# Patient Record
Sex: FEMALE | Race: WHITE | Employment: OTHER | URBAN - METROPOLITAN AREA
[De-identification: names, ages, dates, MRNs, and addresses within clinical notes are randomized per-mention and may not be internally consistent; named-entity substitution may affect disease eponyms.]

---

## 2019-05-10 ENCOUNTER — HOSPITAL ENCOUNTER (OUTPATIENT)
Dept: MAMMOGRAPHY | Age: 59
Discharge: HOME | End: 2019-05-10
Attending: NURSE PRACTITIONER
Payer: MEDICARE

## 2019-05-10 DIAGNOSIS — Z12.31 VISIT FOR SCREENING MAMMOGRAM: ICD-10-CM

## 2019-05-10 PROCEDURE — 77063 BREAST TOMOSYNTHESIS BI: CPT

## 2020-05-13 ENCOUNTER — APPOINTMENT (OUTPATIENT)
Dept: GENERAL RADIOLOGY | Age: 60
DRG: 570 | End: 2020-05-13
Attending: INTERNAL MEDICINE
Payer: MEDICARE

## 2020-05-13 ENCOUNTER — APPOINTMENT (OUTPATIENT)
Dept: ULTRASOUND IMAGING | Age: 60
DRG: 570 | End: 2020-05-13
Attending: FAMILY MEDICINE
Payer: MEDICARE

## 2020-05-13 ENCOUNTER — HOSPITAL ENCOUNTER (EMERGENCY)
Age: 60
Discharge: ANOTHER HOSPITAL | DRG: 570 | End: 2020-05-13
Attending: EMERGENCY MEDICINE | Admitting: FAMILY MEDICINE
Payer: MEDICARE

## 2020-05-13 ENCOUNTER — HOSPITAL ENCOUNTER (INPATIENT)
Age: 60
LOS: 9 days | Discharge: NURSING, REHAB OR LONG-TERM CARE | DRG: 570 | End: 2020-05-22
Attending: FAMILY MEDICINE | Admitting: INTERNAL MEDICINE
Payer: MEDICARE

## 2020-05-13 ENCOUNTER — APPOINTMENT (OUTPATIENT)
Dept: GENERAL RADIOLOGY | Age: 60
DRG: 570 | End: 2020-05-13
Attending: EMERGENCY MEDICINE
Payer: MEDICARE

## 2020-05-13 VITALS
WEIGHT: 97 LBS | BODY MASS INDEX: 15.22 KG/M2 | HEIGHT: 67 IN | SYSTOLIC BLOOD PRESSURE: 130 MMHG | DIASTOLIC BLOOD PRESSURE: 60 MMHG | RESPIRATION RATE: 16 BRPM | TEMPERATURE: 97.6 F | HEART RATE: 81 BPM | OXYGEN SATURATION: 96 %

## 2020-05-13 DIAGNOSIS — E86.0 DEHYDRATION: ICD-10-CM

## 2020-05-13 DIAGNOSIS — K80.50 CHOLEDOCHOLITHIASIS: ICD-10-CM

## 2020-05-13 DIAGNOSIS — E46 PROTEIN-CALORIE MALNUTRITION, UNSPECIFIED SEVERITY (HCC): Primary | ICD-10-CM

## 2020-05-13 DIAGNOSIS — D72.829 LEUKOCYTOSIS, UNSPECIFIED TYPE: ICD-10-CM

## 2020-05-13 DIAGNOSIS — D72.828 OTHER ELEVATED WHITE BLOOD CELL (WBC) COUNT: ICD-10-CM

## 2020-05-13 DIAGNOSIS — K80.20 SYMPTOMATIC CHOLELITHIASIS: ICD-10-CM

## 2020-05-13 DIAGNOSIS — N17.9 ACUTE RENAL FAILURE, UNSPECIFIED ACUTE RENAL FAILURE TYPE (HCC): ICD-10-CM
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
## 2020-05-13 DIAGNOSIS — R74.01 TRANSAMINITIS: ICD-10-CM

## 2020-05-13 DIAGNOSIS — E46 PROTEIN-CALORIE MALNUTRITION, UNSPECIFIED SEVERITY (HCC): ICD-10-CM

## 2020-05-13 DIAGNOSIS — E87.6 HYPOKALEMIA: ICD-10-CM

## 2020-05-13 DIAGNOSIS — L89.319 DECUBITUS ULCER OF ISCHIAL AREA, RIGHT, UNSPECIFIED PRESSURE ULCER STAGE: ICD-10-CM

## 2020-05-13 DIAGNOSIS — G35 MULTIPLE SCLEROSIS (HCC): ICD-10-CM

## 2020-05-13 PROBLEM — G93.41 ACUTE METABOLIC ENCEPHALOPATHY: Status: ACTIVE | Noted: 2020-05-13

## 2020-05-13 LAB
ALBUMIN SERPL-MCNC: 2.5 G/DL (ref 3.5–5)
ALBUMIN SERPL-MCNC: 2.7 G/DL (ref 3.5–5)
ALBUMIN/GLOB SERPL: 0.6 {RATIO} (ref 1.2–3.5)
ALBUMIN/GLOB SERPL: 0.6 {RATIO} (ref 1.2–3.5)
ALP SERPL-CCNC: 274 U/L (ref 50–136)
ALP SERPL-CCNC: 320 U/L (ref 50–136)
ALT SERPL-CCNC: 58 U/L (ref 12–65)
ALT SERPL-CCNC: 68 U/L (ref 12–65)
AMPHET UR QL SCN: NEGATIVE
ANION GAP SERPL CALC-SCNC: 12 MMOL/L (ref 7–16)
ANION GAP SERPL CALC-SCNC: 13 MMOL/L (ref 7–16)
APPEARANCE UR: ABNORMAL
AST SERPL-CCNC: 65 U/L (ref 15–37)
AST SERPL-CCNC: 92 U/L (ref 15–37)
BACTERIA URNS QL MICRO: 0 /HPF
BARBITURATES UR QL SCN: NEGATIVE
BASOPHILS # BLD: 0.2 K/UL (ref 0–0.2)
BASOPHILS NFR BLD: 1 % (ref 0–2)
BENZODIAZ UR QL: NEGATIVE
BILIRUB SERPL-MCNC: 0.6 MG/DL (ref 0.2–1.1)
BILIRUB SERPL-MCNC: 0.7 MG/DL (ref 0.2–1.1)
BILIRUB UR QL: ABNORMAL
BLASTS NFR BLD MANUAL: 4 %
BUN SERPL-MCNC: 85 MG/DL (ref 6–23)
BUN SERPL-MCNC: 91 MG/DL (ref 6–23)
CALCIUM SERPL-MCNC: 9.7 MG/DL (ref 8.3–10.4)
CALCIUM SERPL-MCNC: 9.9 MG/DL (ref 8.3–10.4)
CANNABINOIDS UR QL SCN: NEGATIVE
CASTS URNS QL MICRO: ABNORMAL /LPF
CHLORIDE SERPL-SCNC: 96 MMOL/L (ref 98–107)
CHLORIDE SERPL-SCNC: 99 MMOL/L (ref 98–107)
CK SERPL-CCNC: 278 U/L (ref 21–215)
CO2 SERPL-SCNC: 22 MMOL/L (ref 21–32)
CO2 SERPL-SCNC: 24 MMOL/L (ref 21–32)
COCAINE UR QL SCN: NEGATIVE
COLOR UR: ABNORMAL
CREAT SERPL-MCNC: 1.22 MG/DL (ref 0.6–1)
CREAT SERPL-MCNC: 1.52 MG/DL (ref 0.6–1)
DIFFERENTIAL METHOD BLD: ABNORMAL
DIFFERENTIAL METHOD BLD: ABNORMAL
EOSINOPHIL # BLD: 0 K/UL (ref 0–0.8)
EOSINOPHIL # BLD: 0.2 K/UL (ref 0–0.8)
EOSINOPHIL NFR BLD MANUAL: 1 % (ref 1–8)
EOSINOPHIL NFR BLD: 0 % (ref 0.5–7.8)
EPI CELLS #/AREA URNS HPF: ABNORMAL /HPF
ERYTHROCYTE [DISTWIDTH] IN BLOOD BY AUTOMATED COUNT: 13.7 % (ref 11.9–14.6)
ERYTHROCYTE [DISTWIDTH] IN BLOOD BY AUTOMATED COUNT: 13.8 % (ref 11.9–14.6)
ETHANOL SERPL-MCNC: <3 MG/DL
GLOBULIN SER CALC-MCNC: 4.2 G/DL (ref 2.3–3.5)
GLOBULIN SER CALC-MCNC: 4.5 G/DL (ref 2.3–3.5)
GLUCOSE BLD STRIP.AUTO-MCNC: 110 MG/DL (ref 65–100)
GLUCOSE BLD STRIP.AUTO-MCNC: 115 MG/DL (ref 65–100)
GLUCOSE BLD STRIP.AUTO-MCNC: 126 MG/DL (ref 65–100)
GLUCOSE BLD STRIP.AUTO-MCNC: 139 MG/DL (ref 65–100)
GLUCOSE BLD STRIP.AUTO-MCNC: 59 MG/DL (ref 65–100)
GLUCOSE BLD STRIP.AUTO-MCNC: 68 MG/DL (ref 65–100)
GLUCOSE SERPL-MCNC: 100 MG/DL (ref 65–100)
GLUCOSE SERPL-MCNC: 92 MG/DL (ref 65–100)
GLUCOSE UR STRIP.AUTO-MCNC: NEGATIVE MG/DL
HCT VFR BLD AUTO: 39.6 % (ref 35.8–46.3)
HCT VFR BLD AUTO: 40.1 % (ref 35.8–46.3)
HGB BLD-MCNC: 13.8 G/DL (ref 11.7–15.4)
HGB BLD-MCNC: 13.8 G/DL (ref 11.7–15.4)
HGB UR QL STRIP: ABNORMAL
IMM GRANULOCYTES # BLD AUTO: 0.5 K/UL (ref 0–0.5)
IMM GRANULOCYTES NFR BLD AUTO: 3 % (ref 0–5)
KETONES UR QL STRIP.AUTO: 15 MG/DL
LEUKOCYTE ESTERASE UR QL STRIP.AUTO: ABNORMAL
LYMPHOCYTES # BLD: 0.2 K/UL (ref 0.5–4.6)
LYMPHOCYTES # BLD: 0.2 K/UL (ref 0.5–4.6)
LYMPHOCYTES NFR BLD MANUAL: 1 % (ref 16–44)
LYMPHOCYTES NFR BLD: 1 % (ref 13–44)
MAGNESIUM SERPL-MCNC: 2.9 MG/DL (ref 1.8–2.4)
MCH RBC QN AUTO: 33.7 PG (ref 26.1–32.9)
MCH RBC QN AUTO: 34.1 PG (ref 26.1–32.9)
MCHC RBC AUTO-ENTMCNC: 34.4 G/DL (ref 31.4–35)
MCHC RBC AUTO-ENTMCNC: 34.8 G/DL (ref 31.4–35)
MCV RBC AUTO: 96.8 FL (ref 79.6–97.8)
MCV RBC AUTO: 99 FL (ref 79.6–97.8)
METAMYELOCYTES NFR BLD MANUAL: 8 %
METHADONE UR QL: NEGATIVE
MONOCYTES # BLD: 0.5 K/UL (ref 0.1–1.3)
MONOCYTES # BLD: 0.5 K/UL (ref 0.1–1.3)
MONOCYTES NFR BLD MANUAL: 3 % (ref 3–9)
MONOCYTES NFR BLD: 3 % (ref 4–12)
MYELOCYTES NFR BLD MANUAL: 3 %
NEUTS BAND NFR BLD MANUAL: 27 % (ref 0–6)
NEUTS SEG # BLD: 13.7 K/UL (ref 1.7–8.2)
NEUTS SEG # BLD: 13.9 K/UL (ref 1.7–8.2)
NEUTS SEG NFR BLD MANUAL: 53 % (ref 47–75)
NEUTS SEG NFR BLD: 92 % (ref 43–78)
NITRITE UR QL STRIP.AUTO: NEGATIVE
NRBC # BLD: 0 K/UL (ref 0–0.2)
NRBC # BLD: 0 K/UL (ref 0–0.2)
OPIATES UR QL: POSITIVE
PCP UR QL: NEGATIVE
PH UR STRIP: 5 [PH] (ref 5–9)
PLATELET # BLD AUTO: 239 K/UL (ref 150–450)
PLATELET # BLD AUTO: 261 K/UL (ref 150–450)
PLATELET COMMENTS,PCOM: ADEQUATE
PLATELET COMMENTS,PCOM: ADEQUATE
PMV BLD AUTO: 11.3 FL (ref 9.4–12.3)
PMV BLD AUTO: 11.7 FL (ref 9.4–12.3)
POTASSIUM SERPL-SCNC: 3.8 MMOL/L (ref 3.5–5.1)
POTASSIUM SERPL-SCNC: 4.1 MMOL/L (ref 3.5–5.1)
PREALB SERPL-MCNC: 6.85 MG/DL (ref 18–35.7)
PROT SERPL-MCNC: 6.7 G/DL (ref 6.3–8.2)
PROT SERPL-MCNC: 7.2 G/DL (ref 6.3–8.2)
PROT UR STRIP-MCNC: ABNORMAL MG/DL
RBC # BLD AUTO: 4.05 M/UL (ref 4.05–5.2)
RBC # BLD AUTO: 4.09 M/UL (ref 4.05–5.2)
RBC #/AREA URNS HPF: ABNORMAL /HPF
RBC MORPH BLD: ABNORMAL
SODIUM SERPL-SCNC: 130 MMOL/L (ref 136–145)
SODIUM SERPL-SCNC: 136 MMOL/L (ref 136–145)
SP GR UR REFRACTOMETRY: 1.02 (ref 1–1.02)
TROPONIN I SERPL-MCNC: <0.02 NG/ML (ref 0.02–0.05)
UROBILINOGEN UR QL STRIP.AUTO: 0.2 EU/DL (ref 0.2–1)
WBC # BLD AUTO: 15.1 K/UL (ref 4.3–11.1)
WBC # BLD AUTO: 15.3 K/UL (ref 4.3–11.1)
WBC MORPH BLD: ABNORMAL
WBC MORPH BLD: ABNORMAL
WBC URNS QL MICRO: ABNORMAL /HPF

## 2020-05-13 PROCEDURE — 80307 DRUG TEST PRSMV CHEM ANLYZR: CPT

## 2020-05-13 PROCEDURE — 73600 X-RAY EXAM OF ANKLE: CPT

## 2020-05-13 PROCEDURE — 65270000029 HC RM PRIVATE

## 2020-05-13 PROCEDURE — 74011250636 HC RX REV CODE- 250/636: Performed by: FAMILY MEDICINE

## 2020-05-13 PROCEDURE — 77030018836 HC SOL IRR NACL ICUM -A

## 2020-05-13 PROCEDURE — 81001 URINALYSIS AUTO W/SCOPE: CPT

## 2020-05-13 PROCEDURE — 99284 EMERGENCY DEPT VISIT MOD MDM: CPT

## 2020-05-13 PROCEDURE — 74011000302 HC RX REV CODE- 302: Performed by: FAMILY MEDICINE

## 2020-05-13 PROCEDURE — 76705 ECHO EXAM OF ABDOMEN: CPT

## 2020-05-13 PROCEDURE — 82550 ASSAY OF CK (CPK): CPT

## 2020-05-13 PROCEDURE — 77030038269 HC DRN EXT URIN PURWCK BARD -A

## 2020-05-13 PROCEDURE — 85025 COMPLETE CBC W/AUTO DIFF WBC: CPT

## 2020-05-13 PROCEDURE — 93005 ELECTROCARDIOGRAM TRACING: CPT | Performed by: EMERGENCY MEDICINE

## 2020-05-13 PROCEDURE — 80053 COMPREHEN METABOLIC PANEL: CPT

## 2020-05-13 PROCEDURE — 82962 GLUCOSE BLOOD TEST: CPT

## 2020-05-13 PROCEDURE — 99222 1ST HOSP IP/OBS MODERATE 55: CPT | Performed by: SURGERY

## 2020-05-13 PROCEDURE — 74011000258 HC RX REV CODE- 258: Performed by: INTERNAL MEDICINE

## 2020-05-13 PROCEDURE — 71045 X-RAY EXAM CHEST 1 VIEW: CPT

## 2020-05-13 PROCEDURE — 84134 ASSAY OF PREALBUMIN: CPT

## 2020-05-13 PROCEDURE — 74011250636 HC RX REV CODE- 250/636: Performed by: EMERGENCY MEDICINE

## 2020-05-13 PROCEDURE — 86580 TB INTRADERMAL TEST: CPT | Performed by: FAMILY MEDICINE

## 2020-05-13 PROCEDURE — 74011250637 HC RX REV CODE- 250/637: Performed by: FAMILY MEDICINE

## 2020-05-13 PROCEDURE — 83735 ASSAY OF MAGNESIUM: CPT

## 2020-05-13 PROCEDURE — 36415 COLL VENOUS BLD VENIPUNCTURE: CPT

## 2020-05-13 PROCEDURE — 84484 ASSAY OF TROPONIN QUANT: CPT

## 2020-05-13 PROCEDURE — 74011250636 HC RX REV CODE- 250/636: Performed by: INTERNAL MEDICINE

## 2020-05-13 RX ORDER — HYDROCODONE BITARTRATE AND ACETAMINOPHEN 5; 325 MG/1; MG/1
1 TABLET ORAL
Status: DISCONTINUED | OUTPATIENT
Start: 2020-05-13 | End: 2020-05-22 | Stop reason: HOSPADM

## 2020-05-13 RX ORDER — AMANTADINE HYDROCHLORIDE 100 MG/1
100 CAPSULE, GELATIN COATED ORAL 2 TIMES DAILY
COMMUNITY

## 2020-05-13 RX ORDER — ONDANSETRON 4 MG/1
4 TABLET, ORALLY DISINTEGRATING ORAL
Status: DISCONTINUED | OUTPATIENT
Start: 2020-05-13 | End: 2020-05-22 | Stop reason: HOSPADM

## 2020-05-13 RX ORDER — SODIUM CHLORIDE 9 MG/ML
100 INJECTION, SOLUTION INTRAVENOUS CONTINUOUS
Status: DISCONTINUED | OUTPATIENT
Start: 2020-05-13 | End: 2020-05-15

## 2020-05-13 RX ORDER — HEPARIN SODIUM 5000 [USP'U]/ML
5000 INJECTION, SOLUTION INTRAVENOUS; SUBCUTANEOUS EVERY 8 HOURS
Status: DISCONTINUED | OUTPATIENT
Start: 2020-05-13 | End: 2020-05-14

## 2020-05-13 RX ORDER — HYDROCODONE BITARTRATE AND ACETAMINOPHEN 10; 300 MG/1; MG/1
1 TABLET ORAL
COMMUNITY
End: 2020-07-28

## 2020-05-13 RX ORDER — VANCOMYCIN HYDROCHLORIDE
1250 ONCE
Status: COMPLETED | OUTPATIENT
Start: 2020-05-13 | End: 2020-05-13

## 2020-05-13 RX ORDER — AMANTADINE HYDROCHLORIDE 100 MG/1
100 CAPSULE, GELATIN COATED ORAL 2 TIMES DAILY
Status: DISCONTINUED | OUTPATIENT
Start: 2020-05-13 | End: 2020-05-22 | Stop reason: HOSPADM

## 2020-05-13 RX ORDER — ACETAMINOPHEN 325 MG/1
650 TABLET ORAL
Status: DISCONTINUED | OUTPATIENT
Start: 2020-05-13 | End: 2020-05-22 | Stop reason: HOSPADM

## 2020-05-13 RX ORDER — BISACODYL 5 MG
5 TABLET, DELAYED RELEASE (ENTERIC COATED) ORAL DAILY PRN
Status: DISCONTINUED | OUTPATIENT
Start: 2020-05-13 | End: 2020-05-22 | Stop reason: HOSPADM

## 2020-05-13 RX ORDER — SODIUM CHLORIDE 0.9 % (FLUSH) 0.9 %
5-40 SYRINGE (ML) INJECTION EVERY 8 HOURS
Status: DISCONTINUED | OUTPATIENT
Start: 2020-05-13 | End: 2020-05-22 | Stop reason: HOSPADM

## 2020-05-13 RX ORDER — VANCOMYCIN/0.9 % SOD CHLORIDE 750 MG/250
750 PLASTIC BAG, INJECTION (ML) INTRAVENOUS
Status: DISCONTINUED | OUTPATIENT
Start: 2020-05-14 | End: 2020-05-16 | Stop reason: SDUPTHER

## 2020-05-13 RX ORDER — SODIUM CHLORIDE 0.9 % (FLUSH) 0.9 %
5-40 SYRINGE (ML) INJECTION AS NEEDED
Status: DISCONTINUED | OUTPATIENT
Start: 2020-05-13 | End: 2020-05-22 | Stop reason: HOSPADM

## 2020-05-13 RX ADMIN — HYDROCODONE BITARTRATE AND ACETAMINOPHEN 1 TABLET: 5; 325 TABLET ORAL at 15:23

## 2020-05-13 RX ADMIN — SODIUM CHLORIDE 100 ML/HR: 900 INJECTION, SOLUTION INTRAVENOUS at 21:33

## 2020-05-13 RX ADMIN — VANCOMYCIN HYDROCHLORIDE 1250 MG: 10 INJECTION, POWDER, LYOPHILIZED, FOR SOLUTION INTRAVENOUS at 18:00

## 2020-05-13 RX ADMIN — Medication 10 ML: at 21:40

## 2020-05-13 RX ADMIN — AMANTADINE HYDROCHLORIDE 100 MG: 100 CAPSULE ORAL at 09:36

## 2020-05-13 RX ADMIN — HEPARIN SODIUM 5000 UNITS: 5000 INJECTION INTRAVENOUS; SUBCUTANEOUS at 06:01

## 2020-05-13 RX ADMIN — TUBERCULIN PURIFIED PROTEIN DERIVATIVE 5 UNITS: 5 INJECTION, SOLUTION INTRADERMAL at 09:36

## 2020-05-13 RX ADMIN — HEPARIN SODIUM 5000 UNITS: 5000 INJECTION INTRAVENOUS; SUBCUTANEOUS at 15:16

## 2020-05-13 RX ADMIN — Medication 10 ML: at 15:18

## 2020-05-13 RX ADMIN — CEFEPIME HYDROCHLORIDE 1 G: 1 INJECTION, POWDER, FOR SOLUTION INTRAMUSCULAR; INTRAVENOUS at 16:52

## 2020-05-13 RX ADMIN — HEPARIN SODIUM 5000 UNITS: 5000 INJECTION INTRAVENOUS; SUBCUTANEOUS at 21:34

## 2020-05-13 NOTE — PROGRESS NOTES
At bedside to provide hygiene care. Upon assessment, large open wound visualized to the right buttocks and multiple other wounds/ deep tissue injuries visualized (see wound care notes). Nursing supervisor, MD, and wound care notified.

## 2020-05-13 NOTE — WOUND CARE
Patient hs MS, is able to transfer to wheelchair. Patient is very thin and has multiple Has had multiple falls at home. Left hip DTI with some open areas 2.5x1.5x0.1+cm the deep purple areas will likely open to full thickness. Right hip DTI 2x1x0.1cm deep purple areas may open. Right ischial [id number] area of erythema and induration with 9x12cm are of boggy slough, redness extends into inner thigh and right external labia. Concern for deep infection right ischial area, recommend to consult surgical surgery. Coccyx with 2x2cm stage 1. Right lataeral ankle with 2x2x0.2cm open area with slough, mild erythema surrounding, patient states she can't remember how long it has been open, continue foam dressing, consider diagnostic study such as xray to r/o osteomyelitis. Bilateral feet and lower legs have 3+pitting edema and multiple superficial scratches and scabs, she wears tubigrip compression stockings, these area not available in this facility recommend TIGRE hose for light compression. Bridge of nose also has a scab, patient states she hit it last time she fell, recommend to leave open to air. Dr. Leon Point in to see wounds, discussed all above with her. Will add Dakin's wet to dry for right ischial wound. Foam dressing for each hip, coccyx and ankle wounds. Frequent turning and offloading, heel offloading boots, wedge and foam cushion for chair and low air matress will be added.

## 2020-05-13 NOTE — PROGRESS NOTES
05/13/20 0510   Dual Skin Pressure Injury Assessment   Dual Skin Pressure Injury Assessment X   Second Care Provider (Based on [address] [place] [address]) Jose Ramon Aceves RN   Skin Integumentary   Skin Integumentary (WDL) X   Skin Color Ecchymosis (comment); Red  (bruising to nose, redness to back)   Skin Condition/Temp Dry;Flaky; Warm   Skin Integrity Abrasion;Blister;Scars (comment); Other (comment)  (ankit hip abrasion, BLE zach/scars, R foot blister, L footscab)   Wound Prevention and Protection Methods   Orientation of Wound Prevention Lower   Location of Wound Prevention Sacrum/Coccyx   Dressing Present  Yes   Dressing Status Other (comment)  (applied new)     allevyn applied to sacrum, allevyn applied to bilateral hips and allevyn applied to R knee. Pillows appiled to back and between knees.

## 2020-05-13 NOTE — H&P (VIEW-ONLY)
[address]  [address] [facility] , [address] [facility], [address]        History and Physical/Surgical Consult   Jay Gregory    Admit date: 2020    MRN: [id number]     : 1960     Age: 63 y.o.          2020 6:26 PM    Subjective/HPI:   This patient is a 63 y.o. seen and evaluated at the request of Dr. Brisa Llanos for right ischial decubitus ulcer. The patient is a poor historian and offers very little verbal answers to my questions. Her history is taken from the H&P. Patient 61-year-old female history of MS who presents to the emergency room after sustaining multiple falls. Patient reports she \"hurts all over\". Denies any headache, sore throat, cough, chest pain, nausea vomiting. Completed course of antibiotics approximately 1 week ago per report for UTI. Admits to tobacco abuse and 1 glass of wine daily.     ED work-up was notable for GLENROY, dehydration, hyponatremia, transaminitis, leukocytosis. She was admitted for the further work-up to include a right upper quadrant ultrasound which shows dilated CBD, IV fluid resuscitation, PT OT.     Per d/w pt's friend Ed Mays pt is wheelchair dependent, can usually transfer independently. Sustained fall 1.5 months ago and has been \"going downhill\" since - more weakness, confusion and eating/drinking less. Surgery is consulted for right ischial decubitus ulcer. Right Upper Quadrant Ultrasound     INDICATION:  Transaminitis, hyponatremia, leukocytosis, abnormal urinalysis;  generalized weakness and multiple recent falls, acute kidney injury. Cachexia,  multiple sclerosis.     COMPARISON: none     TECHNIQUE:  Sonographic gray scale and Doppler images were obtained of the right  upper quadrant of the abdomen.     FINDINGS: There are no discrete lesions in the visualized portions of the liver.   Liver size is 15 cm, within normal limits.     Main portal vein is patent on Doppler imaging.     The common bile duct diameter is abnormally dilated at 1.2 cm. The gallbladder  demonstrates intraluminal mobile debris and sludge. No gallstones or  pericholecystic fluid are evident. There is no wall thickening. Sonographic  Barboza's sign is negative.     There are no discrete lesions in the limited visualized portions of the  pancreas. Pancreatic duct is normal in caliber as seen.     The right kidney measures 10.8 cm in length. There is no hydronephrosis. There  is no evidence of a solid renal mass.      There is no evidence of ascites.      The aorta and IVC are patent on Doppler imaging. Aortic diameter is within  normal limits. Atherosclerotic changes are noted of the aorta.        IMPRESSION  IMPRESSION:   1. Gallbladder sludge. 2. Common bile duct dilatation. Review of Systems  Review of systems not obtained due to patient factors. Past Medical History:   Diagnosis Date    Menopause       No past surgical history on file. Allergies   Allergen Reactions    Latex Rash    Pcn [Penicillins] Swelling      Social History     Tobacco Use    Smoking status: Not on file   Substance Use Topics    Alcohol use: Not Currently      Social History     Social History Narrative    Not on file     Family History   Problem Relation Age of Onset    Breast Cancer Mother       Prior to Admission Medications   Prescriptions Last Dose Informant Patient Reported? Taking? HYDROcodone-acetaminophen (XODOL)  mg tab per tablet 5/12/2020 at Unknown time  Yes Yes   Sig: Take 1 Tab by mouth four (4) times daily as needed. amantadine HCL (SYMMETREL) 100 mg capsule 5/12/2020 at Unknown time  Yes Yes   Sig: Take 100 mg by mouth two (2) times a day.       Facility-Administered Medications: None     Current Facility-Administered Medications   Medication Dose Route Frequency    amantadine HCL (SYMMETREL) capsule 100 mg  100 mg Oral BID    sodium chloride (NS) flush 5-40 mL  5-40 mL IntraVENous Q8H    sodium chloride (NS) flush 5-40 mL 5-40 mL IntraVENous PRN    acetaminophen (TYLENOL) tablet 650 mg  650 mg Oral Q4H PRN    HYDROcodone-acetaminophen (NORCO) 5-325 mg per tablet 1 Tab  1 Tab Oral Q4H PRN    ondansetron (ZOFRAN ODT) tablet 4 mg  4 mg Oral Q4H PRN    bisacodyL (DULCOLAX) tablet 5 mg  5 mg Oral DAILY PRN    heparin (porcine) injection 5,000 Units  5,000 Units SubCUTAneous Q8H    tuberculin injection 5 Units  5 Units IntraDERMal ONCE    cefepime (MAXIPIME) 1 g in 0.9% sodium chloride (MBP/ADV) 50 mL  1 g IntraVENous Q24H    vancomycin (VANCOCIN) 1250 mg in  ml infusion  1,250 mg IntraVENous ONCE    [START ON 5/14/2020] vancomycin (VANCOCIN) 750 mg in  mL infusion  750 mg IntraVENous Q18H    [START ON 5/14/2020] sodium hypochlorite (QUARTER STRENGTH DAKIN'S) 0.125% irrigation (bottle)   Topical DAILY    pantothenic ac-min oil-pet,hyd (AQUAPHOR) 41 % ointment   Topical PRN    0.9% sodium chloride infusion  100 mL/hr IntraVENous CONTINUOUS     Objective:     Vitals:    05/13/20 0527 05/13/20 0723 05/13/20 1129   BP: 117/70 (!) 142/94 114/67   Pulse: 72 73 82   Resp: 16 18 18   Temp: 97.7 °F (36.5 °C) 97.8 °F (36.6 °C) 98.2 °F (36.8 °C)   SpO2: 100% 98% 93%     05/13 0701 - 05/13 1900  In: -   Out: 400 [Urine:400]  No intake/output data recorded. Physical Exam:   Gen- the patient is well developed and in no acute distress  HEENT- PERRL, EOMI, no scleral icterus       nose without alar flaring or epistaxis                  oral muscosa moist without cyanosis  Neck- no JVD or retractions  Lungs- resp even/unlab   Heart- RRR   Abd-soft there is no tenderness to palpation. She is cachectic with no abdominal pain. Ext- warm without cyanosis. There is no lower leg edema. Skin- no jaundice or rashes. The right ischium demonstrates a 4 x 4 cm necrotic foul-smelling decubitus ulcer. There is surrounding redness extending along the medial thigh with some tenderness palpation. This area has been marked with a sharpie pen. There is otherwise no decubitus ulcers. Neuro- alert and oriented x 3. No gross sensorimotor deficits are present. Data Review   Recent Labs     05/13/20  0649 05/13/20  0153   WBC 15.1* 15.3*   HGB 13.8 13.8   HCT 40.1 39.6    261     Recent Labs     05/13/20  0649 05/13/20  0153    130*   K 3.8 4.1   CL 99 96*   CO2 24 22    92   BUN 85* 91*   CREA 1.22* 1.52*   MG  --  2.9*   TROIQ  --  <0.02*       Assessment:     Hospital Problems  Never Reviewed          Codes Class Noted POA    GLENROY (acute kidney injury) ([facility] ICD-10-CM: N17.9  ICD-9-CM: 584.9  5/13/2020 Unknown        Decubitus ulcer of ischial area, right, unspecified pressure ulcer stage ICD-10-CM: L89.319  ICD-9-CM: 707.05, 707.20  5/13/2020 Unknown        Transaminitis ICD-10-CM: R74.0  ICD-9-CM: 790.4  5/13/2020 Unknown        Acute metabolic encephalopathy NXM-59-FQ: G93.41  ICD-9-CM: 348.31  5/13/2020 Unknown        Multiple sclerosis ([facility] ICD-10-CM: G35  ICD-9-CM: 985  5/13/2020 Unknown        Protein calorie malnutrition ([facility] ICD-10-CM: E46  ICD-9-CM: 263.9  5/13/2020 Unknown        Leukocytosis ICD-10-CM: H81.791  ICD-9-CM: 288.60  5/13/2020 Unknown            Plan:   Stage IV sacral decubitus ulcer of the right ischium    This area will need to be debrided and surgery. I discussed this with the patient and she nodded yes that she would agree to the surgery. I explained to the patient that I would discuss this with surgery and identified time and date that this could be a part accomplished. We will schedule surgery most likely for tomorrow. I note that an MRI of the abdomen is pending most likely to evaluate her dilated common bile duct. We will most likely hold for results of the MRI in case she needs cholecystectomy. For now we will continue subcutaneous heparin. We will reevaluate in the morning with plans.   --    Thereasa Leak Zach, DO

## 2020-05-13 NOTE — PROGRESS NOTES
Visual signs of pain, see FLACC score. PRN Norco 5-325 mg one tab given PO per MD order. Safety measures in place, call light within reach.

## 2020-05-13 NOTE — PROGRESS NOTES
Shift assessment complete. Pt drowsy, easy to arouse. Oriented x4. Respirations even and unlabored. Lung sounds clear on room air. HR regular. Abdomen soft, flat, with active bowel sounds heard in all four quadrants. Skin thin, fragile, with multiple abrasions noted, see flow sheets. Protective Allevyn's intact to bilateral hips and sacrum. Pt repositioned on the right side with pillows between all bony prominences. Pitting, 4+ edema noted to the BLE, BLE elevated. Pt denies pain, nausea, SOB. All needs met at this time. Safety measures in place, call light within reach, side rails x3, bed alarm on, door remaining open. Will continue to monitor.

## 2020-05-13 NOTE — PROGRESS NOTES
Hospitalist Progress Note    2020  Admit Date: 2020  5:12 AM   NAME: Trace Acuña   :  1960   MRN:  [id number]   Attending: Giselle Cha DO  PCP:  Augustina, MD Marga    SUBJECTIVE:   Patient 60-year-old female history of MS follows with Dr. Bakari Clarke (no relation) presents to the emergency room after sustaining multiple falls. Patient reports she \"hurts all over\". Denies any headache, sore throat, cough, chest pain, nausea vomiting. Completed course of antibiotics approximately 1 week ago per report for UTI. Admits to tobacco abuse and 1 glass of wine daily. ED work-up was notable for GLENROY, dehydration, hyponatremia, transaminitis, leukocytosis. She was admitted for the further work-up to include a right upper quadrant ultrasound which shows dilated CBD, IV fluid resuscitation, PT OT. Per d/w pt's friend Victorino Perez pt is wheelchair dependent, can usually transfer independently. Sustained fall 1.5 months ago and has been \"going downhill\" since - more weakness, confusion and eating/drinking less.  - RN has noticed ischial decub and right malleolus pressure ulcers. Ischial may be infected. Pt just keeps saying she \"hurts all over\" especially her \"brain bones\" she is oriented to person only. Review of Systems negative with exception of pertinent positives noted above  PHYSICAL EXAM     Visit Vitals  /67 (BP 1 Location: Left arm, BP Patient Position: At rest)   Pulse 82   Temp 98.2 °F (36.8 °C)   Resp 18   SpO2 93%      Temp (24hrs), Av.8 °F (36.6 °C), Min:97.6 °F (36.4 °C), Max:98.2 °F (36.8 °C)    Oxygen Therapy  O2 Sat (%): 93 % (20 1129)    Intake/Output Summary (Last 24 hours) at 2020 1644  Last data filed at 2020 1610  Gross per 24 hour   Intake    Output 400 ml   Net -400 ml      General: No acute distress, thin/frail. Appears older than stated age    Lungs:  CTA Bilaterally.    Heart:  Regular rate and rhythm,  No murmur, rub, or gallop  Abdomen: Soft, Non distended, Non tender, Positive bowel sounds  Extremities: No cyanosis, clubbing or edema  Neurologic:  No focal deficits    ASSESSMENT      Active Hospital Problems    Diagnosis Date Noted    GLENROY (acute kidney injury) ([facility] 05/13/2020    Decubitus ulcer of ischial area, right, unspecified pressure ulcer stage 05/13/2020    Transaminitis 05/13/2020    Acute metabolic encephalopathy 93/11/8516    Multiple sclerosis ([facility] 05/13/2020    Protein calorie malnutrition ([facility] 05/13/2020     A/P    - GLENROY - secondary to dehydration. Improved with IVF, continue. - Hyponatremia - corrected with IVF. Repeat labs in AM    - Right ischial decub ulcer, right lateral malleoli ulcer - wound care has seen. recs for sx eval for ischial decub. Will obtain plain film of ankle to r/o osteo. Add vanco/cefepime    - Leukocytosis - suspect secondary to above    - MS - given frequent falls, worsening mobility will check MRI brain. Requested outpt neuro records from Dr Penelope Mcdowell. Cont home meds (amantadine)    - Transaminitis - ?secondary to amantadine but with dilated CBD on US will check MRCP r/o CBD stone. - protein calorie malnutrition - nutrition consult    - Acute metabolic encephalopathy - likely secondary to infection, dehydration, malnutrition.     - HTN - on atenolol 50 bid and lotrel 5/20 at home - hold currently and resume when appropriate. - Debility - pt/ot/ppd.  CM following (will order EDP as I anticipate she will need STR at discharge)    DVT Prophylaxis: hep sq    Signed By: Mckenna Roth,      May 13, 2020

## 2020-05-13 NOTE — ED TRIAGE NOTES
Pt was brought from home via EMS for falling approx 4 hrs ago and was found on the floor. EMS reports this is her 3rd fall today. Pt was seen earlier today for scheduled MRI. Pt has hx of MS. Denies LOC, dizziness, or blurred vision. Patient masked upon arrival to ED.

## 2020-05-13 NOTE — PROGRESS NOTES
Pt assessment completed. Pt in bed. NAD noted. purwick placed. Call light and essentials within reach. Pt aware to call with needs. Dyana on, door open. Will monitor.

## 2020-05-13 NOTE — PROGRESS NOTES
TRANSFER - IN REPORT:    Verbal report received from Steve ambriz RN(name) on Josue Poole  being received from  MS(unit) for routine progression of care      Report consisted of patients Situation, Background, Assessment and   Recommendations(SBAR). Information from the following report(s) SBAR, Intake/Output and Recent Results was reviewed with the receiving nurse. Opportunity for questions and clarification was provided. Assessment completed upon patients arrival to unit and care assumed.

## 2020-05-13 NOTE — PROGRESS NOTES
Pharmacokinetic Consult to Pharmacist    Frederic Babinski is a 63 y.o. female being treated for SSTI with vancomycin and cefepime. Lab Results   Component Value Date/Time    BUN 85 (H) 05/13/2020 06:49 AM    Creatinine 1.22 (H) 05/13/2020 06:49 AM    WBC 15.1 (H) 05/13/2020 06:49 AM      Estimated Creatinine Clearance: 36 mL/min (A) (based on SCr of 1.22 mg/dL (H)). Day 1 of vancomycin. Goal trough is 10-20. Dosing started with 1.25g x 1 and then 750 mg q18h. Will continue to follow patient. Thank you,  Pilar Ramirez, Pharm. D.   Clinical Pharmacist  [phone]

## 2020-05-13 NOTE — PROGRESS NOTES
Care Management Interventions  PCP Verified by CM: Yes  Physical Therapy Consult: No  Occupational Therapy Consult: No  Speech Therapy Consult: No  Current Support Network: Lives Alone  [place] of Choice List was Provided with Basic Dialogue that Supports the Patient's Individualized Plan of Care/Goals, Treatment Preferences and Shares the Quality Data Associated with the Providers?: Yes   Resource Information Provided?: No  Discharge Location  Discharge Placement: Unable to determine at this time  Patient lives alone but a friend checks on her several times a day. She uses a wc to ambulate. Currently patient is receiving PT[facility]OT [facility] in home. She tells CM that she has never been to rehab. She has a pcp. CM is following for discharge needs.

## 2020-05-13 NOTE — ED PROVIDER NOTES
Patient is a 43-year-old female who presented to the emergency department today complaining of falls x3 today. The patient has a history of MS and states that over the last couple months she has had increasing falls. She has followed up with neurology and had neck done on 5/12/2020. The patient says that she feels fine at this time and has no complaints but had to call 911 to have her picked up out of the floor. She does have dried blood on the bridge of her nose bruising and history of epistaxis from a fall earlier today. The patient states she was on the ground for about 4 hours prior to getting a hold of 911. The patient says that she lives at home alone. She smokes about 3 to 4 cigarettes a day drinks alcohol a glass of wine nightly and denies any recreational drug use. Past Medical History:   Diagnosis Date    Menopause        History reviewed. No pertinent surgical history.       Family History:   Problem Relation Age of Onset    Breast Cancer Mother        Social History     Socioeconomic History    Marital status: SINGLE     Spouse name: Not on file    Number of children: Not on file    Years of education: Not on file    Highest education level: Not on file   Occupational History    Not on file   Social Needs    Financial resource strain: Not on file    Food insecurity     Worry: Not on file     Inability: Not on file    Transportation needs     Medical: Not on file     Non-medical: Not on file   Tobacco Use    Smoking status: Not on file   Substance and Sexual Activity    Alcohol use: Not Currently    Drug use: Not Currently    Sexual activity: Not on file   Lifestyle    Physical activity     Days per week: Not on file     Minutes per session: Not on file    Stress: Not on file   Relationships    Social connections     Talks on phone: Not on file     Gets together: Not on file     Attends [employer] service: Not on file     Active member of club or organization: Not on file Attends meetings of clubs or organizations: Not on file     Relationship status: Not on file    Intimate partner violence     Fear of current or ex partner: Not on file     Emotionally abused: Not on file     Physically abused: Not on file     Forced sexual activity: Not on file   Other Topics Concern    Not on file   Social History Narrative    Not on file         ALLERGIES: Latex and Pcn [penicillins]    Review of Systems   Constitutional: Positive for fatigue. HENT: Positive for nosebleeds. Neurological: Positive for weakness. Negative for syncope and light-headedness. All other systems reviewed and are negative. Vitals:    05/13/20 0100 05/13/20 0107 05/13/20 0108   BP: 125/62     Pulse: 89     Resp: 16     Temp: 97.7 °F (36.5 °C)     SpO2: 93% 95% 94%   Weight: 44 kg (97 lb)     Height: 5' 7\" (1.702 m)              Physical Exam     GENERAL:The patient is cachectic, and dehydrated. VITAL SIGNS: Heart rate, blood pressure, respiratory rate reviewed as recorded in  nurse's notes  EYES: Pupils reactive. Extraocular motion intact. No conjunctival redness or drainage. EARS: No external masses or lesions. NOSE: No nasal drainage or active epistaxis, dried blood in the right naris. Abrasion over the bridge of the nose with dried blood present and ecchymosis to the left side. MOUTH/THROAT: Pharynx clear; airway patent. Floor the mouth is soft. The patient's lips and tongue are excessively dry and there is dried blood in her mouth and in the posterior pharynx from her epistaxis earlier today  NECK: Supple, no meningeal signs. Trachea midline. No masses or thyromegaly. LUNGS: Breath sounds clear and equal bilaterally no accessory muscle use  CARDIOVASCULAR: Regular rate and rhythm  ABDOMEN: Soft without tenderness. No palpable masses or organomegaly. No  peritoneal signs. No rigidity. EXTREMITIES: No clubbing or cyanosis.   Edema to the lower extremities bilaterally with compression stockings in place. Normal muscle tone. No  restricted range of motion appreciated. NEUROLOGIC: Sensation is grossly intact. Cranial nerve exam reveals face is  symmetrical, tongue is midline speech is clear. SKIN: No rash or petechiae. Decreased skin turgor palpated. MDM  Number of Diagnoses or Management Options  Diagnosis management comments: Rhabdomyolysis, dehydration, electrolyte abnormality, cardiac arrhythmia, pneumonia, renal failure       Amount and/or Complexity of Data Reviewed  Clinical lab tests: reviewed and ordered  Tests in the radiology section of CPT®: ordered and reviewed  Tests in the medicine section of CPT®: ordered and reviewed  Review and summarize past medical records: yes  Independent visualization of images, tracings, or specimens: yes      ED Course as of May 13 0324   Wed May 13, 2020   0205 IMPRESSION: No acute process. [KH]   0214 WBC(!): 15.3 [KH]   0228 BUN(!): 91 [KH]   0229 Creatinine(!): 1.52 [KH]   0229 GFR est non-AA(!): 37 [KH]   0229 Albumin(!): 2.7 [KH]   0308 I talked to the patient about the findings in the emergency department and her because of her renal failure and severe dehydration I feel as though she needs to be admitted to the hospital for further treatment. The patient is agreeable with this plan.     [KH]   0321 [facility] and [facility] patient and arrange for the admission    [KH]      ED Course User Index  [KH] Oneal Miller DO       Procedures

## 2020-05-13 NOTE — ED NOTES
TRANSFER - OUT REPORT:    Verbal report given to [facility], RN on Erma Mtz  being transferred to [address] [place] [address] for routine progression of care       Report consisted of patients Situation, Background, Assessment and   Recommendations(SBAR). Information from the following report(s) SBAR, Kardex, ED Summary, [facility] and Recent Results was reviewed with the receiving nurse. Lines:   Peripheral IV 05/13/20 Right Hand (Active)   Site Assessment Clean, dry, & intact 5/13/2020  2:33 AM   Phlebitis Assessment 0 5/13/2020  2:33 AM   Dressing Status Clean, dry, & intact 5/13/2020  2:33 AM   Hub Color/Line Status Blue;Flushed 5/13/2020  2:33 AM        Opportunity for questions and clarification was provided.       Patient transported with:  [facility]

## 2020-05-13 NOTE — CONSULTS
[address]  [address] [facility] , [address] [facility], [address] [facility]       History and Physical/Surgical Consult   Trace Acuña    Admit date: 2020    MRN: [id number]     : 1960     Age: 63 y.o.          2020 6:26 PM    Subjective/HPI:   This patient is a 63 y.o. seen and evaluated at the request of Dr. Matias Jones for right ischial decubitus ulcer. The patient is a poor historian and offers very little verbal answers to my questions. Her history is taken from the H&P. Patient 60-year-old female history of MS who presents to the emergency room after sustaining multiple falls. Patient reports she \"hurts all over\". Denies any headache, sore throat, cough, chest pain, nausea vomiting. Completed course of antibiotics approximately 1 week ago per report for UTI. Admits to tobacco abuse and 1 glass of wine daily.     ED work-up was notable for GLENROY, dehydration, hyponatremia, transaminitis, leukocytosis. She was admitted for the further work-up to include a right upper quadrant ultrasound which shows dilated CBD, IV fluid resuscitation, PT OT.     Per d/w pt's friend Victorino Perez pt is wheelchair dependent, can usually transfer independently. Sustained fall 1.5 months ago and has been \"going downhill\" since - more weakness, confusion and eating/drinking less. Surgery is consulted for right ischial decubitus ulcer. Right Upper Quadrant Ultrasound     INDICATION:  Transaminitis, hyponatremia, leukocytosis, abnormal urinalysis;  generalized weakness and multiple recent falls, acute kidney injury. Cachexia,  multiple sclerosis.     COMPARISON: none     TECHNIQUE:  Sonographic gray scale and Doppler images were obtained of the right  upper quadrant of the abdomen.     FINDINGS: There are no discrete lesions in the visualized portions of the liver.   Liver size is 15 cm, within normal limits.     Main portal vein is patent on Doppler imaging.     The common bile duct diameter is abnormally dilated at 1.2 cm. The gallbladder  demonstrates intraluminal mobile debris and sludge. No gallstones or  pericholecystic fluid are evident. There is no wall thickening. Sonographic  Barboza's sign is negative.     There are no discrete lesions in the limited visualized portions of the  pancreas. Pancreatic duct is normal in caliber as seen.     The right kidney measures 10.8 cm in length. There is no hydronephrosis. There  is no evidence of a solid renal mass.      There is no evidence of ascites.      The aorta and IVC are patent on Doppler imaging. Aortic diameter is within  normal limits. Atherosclerotic changes are noted of the aorta.        IMPRESSION  IMPRESSION:   1. Gallbladder sludge. 2. Common bile duct dilatation. Review of Systems  Review of systems not obtained due to patient factors. Past Medical History:   Diagnosis Date    Menopause       No past surgical history on file. Allergies   Allergen Reactions    Latex Rash    Pcn [Penicillins] Swelling      Social History     Tobacco Use    Smoking status: Not on file   Substance Use Topics    Alcohol use: Not Currently      Social History     Social History Narrative    Not on file     Family History   Problem Relation Age of Onset    Breast Cancer Mother       Prior to Admission Medications   Prescriptions Last Dose Informant Patient Reported? Taking? HYDROcodone-acetaminophen (XODOL)  mg tab per tablet 5/12/2020 at Unknown time  Yes Yes   Sig: Take 1 Tab by mouth four (4) times daily as needed. amantadine HCL (SYMMETREL) 100 mg capsule 5/12/2020 at Unknown time  Yes Yes   Sig: Take 100 mg by mouth two (2) times a day.       Facility-Administered Medications: None     Current Facility-Administered Medications   Medication Dose Route Frequency    amantadine HCL (SYMMETREL) capsule 100 mg  100 mg Oral BID    sodium chloride (NS) flush 5-40 mL  5-40 mL IntraVENous Q8H    sodium chloride (NS) flush 5-40 mL 5-40 mL IntraVENous PRN    acetaminophen (TYLENOL) tablet 650 mg  650 mg Oral Q4H PRN    HYDROcodone-acetaminophen (NORCO) 5-325 mg per tablet 1 Tab  1 Tab Oral Q4H PRN    ondansetron (ZOFRAN ODT) tablet 4 mg  4 mg Oral Q4H PRN    bisacodyL (DULCOLAX) tablet 5 mg  5 mg Oral DAILY PRN    heparin (porcine) injection 5,000 Units  5,000 Units SubCUTAneous Q8H    tuberculin injection 5 Units  5 Units IntraDERMal ONCE    cefepime (MAXIPIME) 1 g in 0.9% sodium chloride (MBP/ADV) 50 mL  1 g IntraVENous Q24H    vancomycin (VANCOCIN) 1250 mg in  ml infusion  1,250 mg IntraVENous ONCE    [START ON 5/14/2020] vancomycin (VANCOCIN) 750 mg in  mL infusion  750 mg IntraVENous Q18H    [START ON 5/14/2020] sodium hypochlorite (QUARTER STRENGTH DAKIN'S) 0.125% irrigation (bottle)   Topical DAILY    pantothenic ac-min oil-pet,hyd (AQUAPHOR) 41 % ointment   Topical PRN    0.9% sodium chloride infusion  100 mL/hr IntraVENous CONTINUOUS     Objective:     Vitals:    05/13/20 0527 05/13/20 0723 05/13/20 1129   BP: 117/70 (!) 142/94 114/67   Pulse: 72 73 82   Resp: 16 18 18   Temp: 97.7 °F (36.5 °C) 97.8 °F (36.6 °C) 98.2 °F (36.8 °C)   SpO2: 100% 98% 93%     05/13 0701 - 05/13 1900  In: -   Out: 400 [Urine:400]  No intake/output data recorded. Physical Exam:   Gen- the patient is well developed and in no acute distress  HEENT- PERRL, EOMI, no scleral icterus       nose without alar flaring or epistaxis                  oral muscosa moist without cyanosis  Neck- no JVD or retractions  Lungs- resp even/unlab   Heart- RRR   Abd-soft there is no tenderness to palpation. She is cachectic with no abdominal pain. Ext- warm without cyanosis. There is no lower leg edema. Skin- no jaundice or rashes. The right ischium demonstrates a 4 x 4 cm necrotic foul-smelling decubitus ulcer. There is surrounding redness extending along the medial thigh with some tenderness palpation. This area has been marked with a sharpie pen. There is otherwise no decubitus ulcers. Neuro- alert and oriented x 3. No gross sensorimotor deficits are present. Data Review   Recent Labs     05/13/20  0649 05/13/20  0153   WBC 15.1* 15.3*   HGB 13.8 13.8   HCT 40.1 39.6    261     Recent Labs     05/13/20  0649 05/13/20  0153    130*   K 3.8 4.1   CL 99 96*   CO2 24 22    92   BUN 85* 91*   CREA 1.22* 1.52*   MG  --  2.9*   TROIQ  --  <0.02*       Assessment:     Hospital Problems  Never Reviewed          Codes Class Noted POA    GLENROY (acute kidney injury) ([facility] ICD-10-CM: N17.9  ICD-9-CM: 584.9  5/13/2020 Unknown        Decubitus ulcer of ischial area, right, unspecified pressure ulcer stage ICD-10-CM: L89.319  ICD-9-CM: 707.05, 707.20  5/13/2020 Unknown        Transaminitis ICD-10-CM: R74.0  ICD-9-CM: 790.4  5/13/2020 Unknown        Acute metabolic encephalopathy LKG-97-LM: G93.41  ICD-9-CM: 348.31  5/13/2020 Unknown        Multiple sclerosis ([facility].) ICD-10-CM: G35  ICD-9-CM: 053  5/13/2020 Unknown        Protein calorie malnutrition ([facility].) ICD-10-CM: E46  ICD-9-CM: 263.9  5/13/2020 Unknown        Leukocytosis ICD-10-CM: H98.928  ICD-9-CM: 288.60  5/13/2020 Unknown            Plan:   Stage IV sacral decubitus ulcer of the right ischium    This area will need to be debrided and surgery. I discussed this with the patient and she nodded yes that she would agree to the surgery. I explained to the patient that I would discuss this with surgery and identified time and date that this could be a part accomplished. We will schedule surgery most likely for tomorrow. I note that an MRI of the abdomen is pending most likely to evaluate her dilated common bile duct. We will most likely hold for results of the MRI in case she needs cholecystectomy. For now we will continue subcutaneous heparin. We will reevaluate in the morning with plans.   --    Yuliana Serrano, DO

## 2020-05-13 NOTE — PROGRESS NOTES
Pt transported off the floor in stable condition via stretcher to [address] [place] [address].

## 2020-05-13 NOTE — H&P
HOSPITALIST H&P/CONSULT  NAME:  Jay Gregory   Age:  63 y.o.  :   1960   MRN:   [id number]  PCP: Arianna De Oliveira MD  Consulting MD:  Treatment Team: Attending Provider: Cecilia Aviles MD  HPI:   Patient is a 62yo F with hx MS who presents after multiple falls. She had a fall with 4 days on floor several weeks ago, not seen by MD at that time. She had three or four falls today before presentation, helped up by a friend each time. She was down for 4 hours the last time she fell. She is sore all over, denies any acute injuries. No headaches, sore throat, cough, cp, n/v, change in bowel habit. Recently treated for dysuria and sx resolved on antibiotics. Finished antibiotic several days ago. Smoked 4-5 cigarettes daily until recently. Drank 1 glass wine daily until 4d ago. Quit because not feeling well, but can't describe how not feeling well. Complete ROS done and is as stated in HPI or otherwise negative  Past Medical History:   Diagnosis Date    Menopause       No past surgical history on file. reviewed  Cannot display prior to admission medications because the patient has not been admitted in this contact. Allergies   Allergen Reactions    Latex Rash    Pcn [Penicillins] Swelling      Social History     Tobacco Use    Smoking status: Not on file   Substance Use Topics    Alcohol use: Not Currently      Family History   Problem Relation Age of Onset    Breast Cancer Mother     reviewed  Objective: There were no vitals taken for this visit. Temp (24hrs), Av.7 °F (36.5 °C), Min:97.7 °F (36.5 °C), Max:97.7 °F (36.5 °C)       Physical Exam:  General:    Cachectic, NAD  Head:   NCAT  Nose:  Small abrasion bridge of nose  Lungs:   Clear to auscultation bilaterally. No Wheezing or Rhonchi. No rales. Heart:   Regular rate and rhythm,  no murmur, rub or gallop. Abdomen:   Soft, non-tender. Not distended. Bowel sounds normal.   Extremities: No cyanosis. No edema.  No clubbing  Skin:     Various small abrasions   Neurologic: Alert and oriented x 3, non focal     Data Review:   Recent Results (from the past 24 hour(s))   METABOLIC PANEL, COMPREHENSIVE    Collection Time: 05/13/20  1:53 AM   Result Value Ref Range    Sodium 130 (L) 136 - 145 mmol/L    Potassium 4.1 3.5 - 5.1 mmol/L    Chloride 96 (L) 98 - 107 mmol/L    CO2 22 21 - 32 mmol/L    Anion gap 12 7 - 16 mmol/L    Glucose 92 65 - 100 mg/dL    BUN 91 (H) 6 - 23 MG/DL    Creatinine 1.52 (H) 0.6 - 1.0 MG/DL    GFR est AA 45 (L) >60 ml/min/1.73m2    GFR est non-AA 37 (L) >60 ml/min/1.73m2    Calcium 9.9 8.3 - 10.4 MG/DL    Bilirubin, total 0.7 0.2 - 1.1 MG/DL    ALT (SGPT) 58 12 - 65 U/L    AST (SGOT) 65 (H) 15 - 37 U/L    Alk. phosphatase 274 (H) 50 - 136 U/L    Protein, total 7.2 6.3 - 8.2 g/dL    Albumin 2.7 (L) 3.5 - 5.0 g/dL    Globulin 4.5 (H) 2.3 - 3.5 g/dL    A-G Ratio 0.6 (L) 1.2 - 3.5     CBC WITH AUTOMATED DIFF    Collection Time: 05/13/20  1:53 AM   Result Value Ref Range    WBC 15.3 (H) 4.3 - 11.1 K/uL    RBC 4.09 4.05 - 5.2 M/uL    HGB 13.8 11.7 - 15.4 g/dL    HCT 39.6 35.8 - 46.3 %    MCV 96.8 79.6 - 97.8 FL    MCH 33.7 (H) 26.1 - 32.9 PG    MCHC 34.8 31.4 - 35.0 g/dL    RDW 13.7 11.9 - 14.6 %    PLATELET 943 023 - 411 K/uL    MPV 11.7 9.4 - 12.3 FL    ABSOLUTE NRBC 0.00 0.0 - 0.2 K/uL    NEUTROPHILS 53 47 - 75 %    BAND NEUTROPHILS 27 (H) 0 - 6 %    LYMPHOCYTES 1 (L) 16 - 44 %    MONOCYTES 3 3 - 9 %    EOSINOPHILS 1 1 - 8 %    METAMYELOCYTES 8 %    MYELOCYTES 3 %    BLASTS 4 %    ABS. NEUTROPHILS 13.9 (H) 1.7 - 8.2 K/UL    ABS. LYMPHOCYTES 0.2 (L) 0.5 - 4.6 K/UL    ABS. MONOCYTES 0.5 0.1 - 1.3 K/UL    ABS.  EOSINOPHILS 0.2 0.0 - 0.8 K/UL    RBC COMMENTS SLIGHT  ANISOCYTOSIS + POIKILOCYTOSIS        RBC COMMENTS OCCASIONAL  MYRANDA CELLS        WBC COMMENTS Result Confirmed By Smear      PLATELET COMMENTS ADEQUATE      DF MANUAL     CK    Collection Time: 05/13/20  1:53 AM   Result Value Ref Range     (H) 21 - 215 U/L   MAGNESIUM    Collection Time: 05/13/20  1:53 AM   Result Value Ref Range    Magnesium 2.9 (H) 1.8 - 2.4 mg/dL   TROPONIN I    Collection Time: 05/13/20  1:53 AM   Result Value Ref Range    Troponin-I, Qt. <0.02 (L) 0.02 - 0.05 NG/ML   ETHYL ALCOHOL    Collection Time: 05/13/20  1:53 AM   Result Value Ref Range    ALCOHOL(ETHYL),SERUM <3 MG/DL   URINALYSIS W/ RFLX MICROSCOPIC    Collection Time: 05/13/20  2:02 AM   Result Value Ref Range    Color DARIUSZ      Appearance CLOUDY      Specific gravity 1.017 1.001 - 1.023      pH (UA) 5.0 5.0 - 9.0      Protein TRACE (A) NEG mg/dL    Glucose Negative mg/dL    Ketone 15 (A) NEG mg/dL    Bilirubin SMALL (A) NEG      Blood MODERATE (A) NEG      Urobilinogen 0.2 0.2 - 1.0 EU/dL    Nitrites Negative NEG      Leukocyte Esterase SMALL (A) NEG      WBC 5-10 0 /hpf    RBC 20-50 0 /hpf    Epithelial cells 5-10 0 /hpf    Bacteria 0 0 /hpf    Casts 3-5 0 /lpf     Imaging /Procedures /Studies   No orders to display       Assessment and Plan:   There are no active hospital problems to display for this patient. PLAN:  GLENROY: likely dehydration  SCr doubled from prior  IV fluids  Trend Cr. Recheck CK     Hyponatremia  IVF as above  Possibly contributing to weakness/falls    Transaminitis  No RUQ pain or abdominal sx. Possibly from dehydration  Trend.  RUQ US    Leukocytosis/Left shift  No clear infectious source, urine noninfectious  Consider heme consult for abnormal     MS  Home amantadine, can have someone bring home fingolimod    Falls:  PT    DVT PPx: hsq  Code Status: DNR- sister is HCPOA    Anticipated discharge: 2-3 days    Signed By: Renan Mario MD     May 13, 2020

## 2020-05-14 ENCOUNTER — APPOINTMENT (OUTPATIENT)
Dept: MRI IMAGING | Age: 60
DRG: 570 | End: 2020-05-14
Attending: INTERNAL MEDICINE
Payer: MEDICARE

## 2020-05-14 ENCOUNTER — ANESTHESIA EVENT (OUTPATIENT)
Dept: SURGERY | Age: 60
DRG: 570 | End: 2020-05-14
Payer: MEDICARE

## 2020-05-14 ENCOUNTER — PATIENT OUTREACH (OUTPATIENT)
Dept: CASE MANAGEMENT | Age: 60
End: 2020-05-14

## 2020-05-14 ENCOUNTER — APPOINTMENT (OUTPATIENT)
Dept: MRI IMAGING | Age: 60
DRG: 570 | End: 2020-05-14
Attending: SURGERY
Payer: MEDICARE

## 2020-05-14 ENCOUNTER — ANESTHESIA (OUTPATIENT)
Dept: SURGERY | Age: 60
DRG: 570 | End: 2020-05-14
Payer: MEDICARE

## 2020-05-14 LAB
AMORPH CRY URNS QL MICRO: ABNORMAL
ANION GAP SERPL CALC-SCNC: 9 MMOL/L (ref 7–16)
APPEARANCE UR: ABNORMAL
BACTERIA URNS QL MICRO: 0 /HPF
BILIRUB UR QL: NEGATIVE
BUN SERPL-MCNC: 33 MG/DL (ref 6–23)
CALCIUM SERPL-MCNC: 9 MG/DL (ref 8.3–10.4)
CHLORIDE SERPL-SCNC: 108 MMOL/L (ref 98–107)
CO2 SERPL-SCNC: 25 MMOL/L (ref 21–32)
COLOR UR: YELLOW
CREAT SERPL-MCNC: 0.49 MG/DL (ref 0.6–1)
ERYTHROCYTE [DISTWIDTH] IN BLOOD BY AUTOMATED COUNT: 14.5 % (ref 11.9–14.6)
GLUCOSE BLD STRIP.AUTO-MCNC: 103 MG/DL (ref 65–100)
GLUCOSE BLD STRIP.AUTO-MCNC: 108 MG/DL (ref 65–100)
GLUCOSE BLD STRIP.AUTO-MCNC: 123 MG/DL (ref 65–100)
GLUCOSE BLD STRIP.AUTO-MCNC: 219 MG/DL (ref 65–100)
GLUCOSE SERPL-MCNC: 169 MG/DL (ref 65–100)
GLUCOSE UR STRIP.AUTO-MCNC: 250 MG/DL
HCT VFR BLD AUTO: 34.7 % (ref 35.8–46.3)
HGB BLD-MCNC: 11.7 G/DL (ref 11.7–15.4)
HGB UR QL STRIP: ABNORMAL
KETONES UR QL STRIP.AUTO: 15 MG/DL
LEUKOCYTE ESTERASE UR QL STRIP.AUTO: NEGATIVE
MCH RBC QN AUTO: 33.8 PG (ref 26.1–32.9)
MCHC RBC AUTO-ENTMCNC: 33.7 G/DL (ref 31.4–35)
MCV RBC AUTO: 100.3 FL (ref 79.6–97.8)
MM INDURATION POC: 0 MM (ref 0–5)
NITRITE UR QL STRIP.AUTO: NEGATIVE
NRBC # BLD: 0 K/UL (ref 0–0.2)
OTHER OBSERVATIONS,UCOM: ABNORMAL
PH UR STRIP: 6 [PH] (ref 5–9)
PLATELET # BLD AUTO: 244 K/UL (ref 150–450)
PMV BLD AUTO: 11.3 FL (ref 9.4–12.3)
POTASSIUM SERPL-SCNC: 4 MMOL/L (ref 3.5–5.1)
PPD POC: NEGATIVE NEGATIVE
PROT UR STRIP-MCNC: 30 MG/DL
RBC # BLD AUTO: 3.46 M/UL (ref 4.05–5.2)
RBC #/AREA URNS HPF: ABNORMAL /HPF
SODIUM SERPL-SCNC: 142 MMOL/L (ref 136–145)
SP GR UR REFRACTOMETRY: 1.02 (ref 1–1.02)
UROBILINOGEN UR QL STRIP.AUTO: 1 EU/DL (ref 0.2–1)
WBC # BLD AUTO: 19.1 K/UL (ref 4.3–11.1)
WBC URNS QL MICRO: ABNORMAL /HPF

## 2020-05-14 PROCEDURE — 80074 ACUTE HEPATITIS PANEL: CPT

## 2020-05-14 PROCEDURE — 11047 DBRDMT BONE EACH ADDL: CPT | Performed by: SURGERY

## 2020-05-14 PROCEDURE — 76210000006 HC OR PH I REC 0.5 TO 1 HR: Performed by: SURGERY

## 2020-05-14 PROCEDURE — 80048 BASIC METABOLIC PNL TOTAL CA: CPT

## 2020-05-14 PROCEDURE — 74011000258 HC RX REV CODE- 258: Performed by: SURGERY

## 2020-05-14 PROCEDURE — 87040 BLOOD CULTURE FOR BACTERIA: CPT

## 2020-05-14 PROCEDURE — 74011250636 HC RX REV CODE- 250/636: Performed by: ANESTHESIOLOGY

## 2020-05-14 PROCEDURE — 77030018842 HC SOL IRR SOD CL 9% BAXT -A: Performed by: SURGERY

## 2020-05-14 PROCEDURE — 74011000250 HC RX REV CODE- 250: Performed by: NURSE ANESTHETIST, CERTIFIED REGISTERED

## 2020-05-14 PROCEDURE — 88305 TISSUE EXAM BY PATHOLOGIST: CPT

## 2020-05-14 PROCEDURE — 77030010509 HC AIRWY LMA MSK TELE -A: Performed by: NURSE ANESTHETIST, CERTIFIED REGISTERED

## 2020-05-14 PROCEDURE — 76060000033 HC ANESTHESIA 1 TO 1.5 HR: Performed by: SURGERY

## 2020-05-14 PROCEDURE — 74011250636 HC RX REV CODE- 250/636: Performed by: NURSE ANESTHETIST, CERTIFIED REGISTERED

## 2020-05-14 PROCEDURE — 65270000029 HC RM PRIVATE

## 2020-05-14 PROCEDURE — 74750000023 HC WOUND THERAPY

## 2020-05-14 PROCEDURE — 74011250636 HC RX REV CODE- 250/636: Performed by: INTERNAL MEDICINE

## 2020-05-14 PROCEDURE — 76010000160 HC OR TIME 0.5 TO 1 HR INTENSV-TIER 1: Performed by: SURGERY

## 2020-05-14 PROCEDURE — 82962 GLUCOSE BLOOD TEST: CPT

## 2020-05-14 PROCEDURE — A9575 INJ GADOTERATE MEGLUMI 0.1ML: HCPCS | Performed by: INTERNAL MEDICINE

## 2020-05-14 PROCEDURE — 77030040361 HC SLV COMPR DVT MDII -B: Performed by: SURGERY

## 2020-05-14 PROCEDURE — 11044 DBRDMT BONE 1ST 20 SQ CM/<: CPT | Performed by: SURGERY

## 2020-05-14 PROCEDURE — 77030038269 HC DRN EXT URIN PURWCK BARD -A

## 2020-05-14 PROCEDURE — 99232 SBSQ HOSP IP/OBS MODERATE 35: CPT | Performed by: SURGERY

## 2020-05-14 PROCEDURE — 77030019952 HC CANSTR VAC ASST KCON -B: Performed by: SURGERY

## 2020-05-14 PROCEDURE — 77030018717 HC DRSG GRNUFM KCON -B: Performed by: SURGERY

## 2020-05-14 PROCEDURE — 74011000250 HC RX REV CODE- 250: Performed by: SURGERY

## 2020-05-14 PROCEDURE — 74011250637 HC RX REV CODE- 250/637: Performed by: SURGERY

## 2020-05-14 PROCEDURE — 74011250637 HC RX REV CODE- 250/637: Performed by: FAMILY MEDICINE

## 2020-05-14 PROCEDURE — 36415 COLL VENOUS BLD VENIPUNCTURE: CPT

## 2020-05-14 PROCEDURE — 81001 URINALYSIS AUTO W/SCOPE: CPT

## 2020-05-14 PROCEDURE — 70553 MRI BRAIN STEM W/O & W/DYE: CPT

## 2020-05-14 PROCEDURE — 85027 COMPLETE CBC AUTOMATED: CPT

## 2020-05-14 PROCEDURE — 74181 MRI ABDOMEN W/O CONTRAST: CPT

## 2020-05-14 PROCEDURE — 77030040922 HC BLNKT HYPOTHRM STRY -A: Performed by: ANESTHESIOLOGY

## 2020-05-14 PROCEDURE — 0JB90ZZ EXCISION OF BUTTOCK SUBCUTANEOUS TISSUE AND FASCIA, OPEN APPROACH: ICD-10-PCS | Performed by: SURGERY

## 2020-05-14 PROCEDURE — 77030040832 HC IRR TRAY MDII -A

## 2020-05-14 PROCEDURE — 74011250636 HC RX REV CODE- 250/636: Performed by: SURGERY

## 2020-05-14 RX ORDER — SODIUM CHLORIDE, SODIUM LACTATE, POTASSIUM CHLORIDE, CALCIUM CHLORIDE 600; 310; 30; 20 MG/100ML; MG/100ML; MG/100ML; MG/100ML
75 INJECTION, SOLUTION INTRAVENOUS CONTINUOUS
Status: DISCONTINUED | OUTPATIENT
Start: 2020-05-14 | End: 2020-05-14 | Stop reason: HOSPADM

## 2020-05-14 RX ORDER — OXYCODONE AND ACETAMINOPHEN 5; 325 MG/1; MG/1
1 TABLET ORAL
Status: DISCONTINUED | OUTPATIENT
Start: 2020-05-14 | End: 2020-05-22 | Stop reason: HOSPADM

## 2020-05-14 RX ORDER — LIDOCAINE HYDROCHLORIDE 10 MG/ML
0.1 INJECTION INFILTRATION; PERINEURAL AS NEEDED
Status: DISCONTINUED | OUTPATIENT
Start: 2020-05-14 | End: 2020-05-14 | Stop reason: HOSPADM

## 2020-05-14 RX ORDER — SODIUM CHLORIDE, SODIUM LACTATE, POTASSIUM CHLORIDE, CALCIUM CHLORIDE 600; 310; 30; 20 MG/100ML; MG/100ML; MG/100ML; MG/100ML
100 INJECTION, SOLUTION INTRAVENOUS CONTINUOUS
Status: DISCONTINUED | OUTPATIENT
Start: 2020-05-14 | End: 2020-05-14 | Stop reason: HOSPADM

## 2020-05-14 RX ORDER — SODIUM CHLORIDE 0.9 % (FLUSH) 0.9 %
10 SYRINGE (ML) INJECTION
Status: COMPLETED | OUTPATIENT
Start: 2020-05-14 | End: 2020-05-14

## 2020-05-14 RX ORDER — HYDROMORPHONE HYDROCHLORIDE 2 MG/ML
0.5 INJECTION, SOLUTION INTRAMUSCULAR; INTRAVENOUS; SUBCUTANEOUS
Status: DISCONTINUED | OUTPATIENT
Start: 2020-05-14 | End: 2020-05-14 | Stop reason: HOSPADM

## 2020-05-14 RX ORDER — DIPHENHYDRAMINE HYDROCHLORIDE 50 MG/ML
12.5 INJECTION, SOLUTION INTRAMUSCULAR; INTRAVENOUS
Status: DISCONTINUED | OUTPATIENT
Start: 2020-05-14 | End: 2020-05-14 | Stop reason: HOSPADM

## 2020-05-14 RX ORDER — MORPHINE SULFATE 2 MG/ML
2 INJECTION, SOLUTION INTRAMUSCULAR; INTRAVENOUS
Status: DISCONTINUED | OUTPATIENT
Start: 2020-05-14 | End: 2020-05-22 | Stop reason: HOSPADM

## 2020-05-14 RX ORDER — OXYCODONE HYDROCHLORIDE 5 MG/1
5 TABLET ORAL
Status: DISCONTINUED | OUTPATIENT
Start: 2020-05-14 | End: 2020-05-14 | Stop reason: HOSPADM

## 2020-05-14 RX ORDER — EPHEDRINE SULFATE/0.9% NACL/PF 50 MG/5 ML
SYRINGE (ML) INTRAVENOUS AS NEEDED
Status: DISCONTINUED | OUTPATIENT
Start: 2020-05-14 | End: 2020-05-14 | Stop reason: HOSPADM

## 2020-05-14 RX ORDER — GADOTERATE MEGLUMINE 376.9 MG/ML
9 INJECTION INTRAVENOUS
Status: COMPLETED | OUTPATIENT
Start: 2020-05-14 | End: 2020-05-14

## 2020-05-14 RX ORDER — LIDOCAINE HYDROCHLORIDE 20 MG/ML
INJECTION, SOLUTION EPIDURAL; INFILTRATION; INTRACAUDAL; PERINEURAL AS NEEDED
Status: DISCONTINUED | OUTPATIENT
Start: 2020-05-14 | End: 2020-05-14 | Stop reason: HOSPADM

## 2020-05-14 RX ORDER — FLUMAZENIL 0.1 MG/ML
0.2 INJECTION INTRAVENOUS
Status: DISCONTINUED | OUTPATIENT
Start: 2020-05-14 | End: 2020-05-14 | Stop reason: HOSPADM

## 2020-05-14 RX ORDER — BUPIVACAINE HYDROCHLORIDE AND EPINEPHRINE 2.5; 5 MG/ML; UG/ML
INJECTION, SOLUTION EPIDURAL; INFILTRATION; INTRACAUDAL; PERINEURAL AS NEEDED
Status: DISCONTINUED | OUTPATIENT
Start: 2020-05-14 | End: 2020-05-14 | Stop reason: HOSPADM

## 2020-05-14 RX ORDER — NALOXONE HYDROCHLORIDE 0.4 MG/ML
0.1 INJECTION, SOLUTION INTRAMUSCULAR; INTRAVENOUS; SUBCUTANEOUS AS NEEDED
Status: DISCONTINUED | OUTPATIENT
Start: 2020-05-14 | End: 2020-05-14 | Stop reason: HOSPADM

## 2020-05-14 RX ORDER — PROPOFOL 10 MG/ML
INJECTION, EMULSION INTRAVENOUS AS NEEDED
Status: DISCONTINUED | OUTPATIENT
Start: 2020-05-14 | End: 2020-05-14 | Stop reason: HOSPADM

## 2020-05-14 RX ADMIN — GADOTERATE MEGLUMINE 9 ML: 376.9 INJECTION INTRAVENOUS at 15:45

## 2020-05-14 RX ADMIN — SODIUM CHLORIDE 100 ML/HR: 900 INJECTION, SOLUTION INTRAVENOUS at 22:40

## 2020-05-14 RX ADMIN — AMANTADINE HYDROCHLORIDE 100 MG: 100 CAPSULE ORAL at 21:16

## 2020-05-14 RX ADMIN — Medication 10 ML: at 15:45

## 2020-05-14 RX ADMIN — Medication 10 ML: at 05:23

## 2020-05-14 RX ADMIN — CEFEPIME HYDROCHLORIDE 1 G: 1 INJECTION, POWDER, FOR SOLUTION INTRAMUSCULAR; INTRAVENOUS at 16:50

## 2020-05-14 RX ADMIN — PROPOFOL 100 MG: 10 INJECTION, EMULSION INTRAVENOUS at 11:04

## 2020-05-14 RX ADMIN — PHENYLEPHRINE HYDROCHLORIDE 100 MCG: 10 INJECTION INTRAVENOUS at 11:21

## 2020-05-14 RX ADMIN — PHENYLEPHRINE HYDROCHLORIDE 100 MCG: 10 INJECTION INTRAVENOUS at 11:15

## 2020-05-14 RX ADMIN — Medication 10 ML: at 13:53

## 2020-05-14 RX ADMIN — SODIUM CHLORIDE, SODIUM LACTATE, POTASSIUM CHLORIDE, AND CALCIUM CHLORIDE 100 ML/HR: 600; 310; 30; 20 INJECTION, SOLUTION INTRAVENOUS at 10:30

## 2020-05-14 RX ADMIN — PHENYLEPHRINE HYDROCHLORIDE 100 MCG: 10 INJECTION INTRAVENOUS at 11:28

## 2020-05-14 RX ADMIN — PHENYLEPHRINE HYDROCHLORIDE 100 MCG: 10 INJECTION INTRAVENOUS at 11:12

## 2020-05-14 RX ADMIN — AMANTADINE HYDROCHLORIDE 100 MG: 100 CAPSULE ORAL at 08:44

## 2020-05-14 RX ADMIN — LIDOCAINE HYDROCHLORIDE 60 MG: 20 INJECTION, SOLUTION EPIDURAL; INFILTRATION; INTRACAUDAL; PERINEURAL at 11:04

## 2020-05-14 RX ADMIN — Medication 10 MG: at 11:29

## 2020-05-14 RX ADMIN — VANCOMYCIN HYDROCHLORIDE 750 MG: 1 INJECTION, POWDER, LYOPHILIZED, FOR SOLUTION INTRAVENOUS at 12:10

## 2020-05-14 RX ADMIN — Medication 10 ML: at 21:16

## 2020-05-14 NOTE — ROUTINE PROCESS
TRANSFER - IN REPORT: 
 
Verbal report received from RADHA Bhakta(name) on Hannah Spearing  being received from 819(unit) for routine progression of care Report consisted of patients Situation, Background, Assessment and  
Recommendations(SBAR). Information from the following report(s) SBAR, Kardex, [facility] and Recent Results was reviewed with the receiving nurse. Opportunity for questions and clarification was provided. Assessment completed upon patients arrival to unit and care assumed.

## 2020-05-14 NOTE — PROGRESS NOTES
Nutrition Assessment for:   Consult for general nutrition management and supplements (Hospitalist)  [address] for Pressure Injury stage 2 or greater    Assessment: Patient with PMH of MS admitted after multiple falls. She was found to have an ischial ulcer and is now s/p I&D with wound vac. Attempted to see patient x2 today. Was in surgery and now in MRI. Spoke with primary RN who reports that patient was not eating well prior to admission due to gradual decline. RN reports that patient seems very frail. Called and spoke with emergency contact who verified patient by name and . She states that patient has not been eating anything for the last week. She states that she and her ex  check on her frequently and she has been barely eating. She states that she takes food and they bought her Ensure, but she has not really been drinking those. She does state that she does not like chocolate, but will drink vanilla or strawberry. She states that she has not noticed any weight loss and that patient has always been thin with her MS. NFPE: deferred  DIET REGULAR      Anthropometrics:  Height: 66\", Weight: 45.4 kg (100#), unknown weight source, BMI 16.1. BMI class of Underweight. There is no weight history since 2014. Macronutrient needs: (using Listed body weight 45.4 kg)  EER: 4408-8272 kcal/day (30-35 kcal/kg)  EPR: 68-91 g/day (1.5-2 g/kg)    Nutrition Diagnosis:  Inadequate oral intake related to poor appetite as evidenced by reported barrier to PO intake, reported diet recall prior to admission. Nutrition Intervention:  Meals and snacks: Continue current diet  Medical food supplement therapy: Add Ensure Enlive TID - vanilla or strawberry  Coordination of nutrition care by a Nutrition Professional: Discussed with Christopher Mathews, RN  Discharge Nutrition Plan: Too soon to determine.     [address] [facility] [address] [place] [address], [address], [address]

## 2020-05-14 NOTE — ANESTHESIA POSTPROCEDURE EVALUATION
Procedure(s):  INCISION AND DRAINAGE DECUBITUS. general    Anesthesia Post Evaluation        Patient location during evaluation: PACU  Patient participation: complete - patient participated  Level of consciousness: awake  Pain management: adequate  Airway patency: patent  Anesthetic complications: no  Cardiovascular status: acceptable  Respiratory status: acceptable  Hydration status: acceptable  Post anesthesia nausea and vomiting:  controlled      Vitals Value Taken Time   /70 5/14/2020 12:32 PM   Temp 36.8 °C (98.3 °F) 5/14/2020 12:28 PM   Pulse 85 5/14/2020 12:32 PM   Resp 17 5/14/2020 12:28 PM   SpO2 100 % 5/14/2020 12:32 PM   Vitals shown include unvalidated device data.

## 2020-05-14 NOTE — PROGRESS NOTES
Occupational Therapy Note:  OT orders received, chart review initiated for evaluation. Patient off the floor at this time per chart. Will check back as schedule permits and patient is available to initiate occupational therapy evaluation.    Thank you for this consult,   Mary Mcknight, OTR/L

## 2020-05-14 NOTE — PROGRESS NOTES
Currently inpatient. JESSICA deferred. Likely to be reassigned post discharge for [facility] call. This note will not be viewable in [address]

## 2020-05-14 NOTE — ANESTHESIA PREPROCEDURE EVALUATION
Relevant Problems   No relevant active problems       Anesthetic History   No history of anesthetic complications            Review of Systems / Medical History  Patient summary reviewed and pertinent labs reviewed    Pulmonary                Comments: Former smoker   Neuro/Psych             Comments: MS - limited to lower extremities and arms - no pharyngeal weakness/symptoms  Cardiovascular                  Exercise tolerance: <4 METS     GI/Hepatic/Renal         Renal disease (GLENROY )      Comments: Mild transaminitis  Endo/Other             Other Findings   Comments: Decub ulcer for I&D            Physical Exam    Airway  Mallampati: II  TM Distance: 4 - 6 cm  Neck ROM: normal range of motion   Mouth opening: Normal     Cardiovascular    Rhythm: regular  Rate: normal         Dental  No notable dental hx       Pulmonary  Breath sounds clear to auscultation               Abdominal  GI exam deferred       Other Findings            Anesthetic Plan    ASA: 3  Anesthesia type: general          Induction: Intravenous  Anesthetic plan and risks discussed with: Patient      Of note, patient has a DNR order - we discussed at length the normal operations of the OR/anesthesia and suspending her DNR for the procedure. Patient endorsed understanding and wishes to proceed. All questions/concerns addressed.

## 2020-05-14 NOTE — BRIEF OP NOTE
Brief Postoperative Note    Patient: Theresa Stone  YOB: 1960  MRN: [id number]    Date of Procedure: 5/14/2020     Pre-Op Diagnosis: Encounter for incision and drainage procedure [Z76.89]    Post-Op Diagnosis: Same as preoperative diagnosis. Ischial decubitus ulcer down to ischial bone [id number]    Procedure(s):  Sharp incision and sharp debridement (with scissors and #10 scalpel blade) of right ischial decubitus ulcer down to ischial bone [id number]   First 20cm2 CPT 57506  Second 20cm2 CPT 54437  Third 20cm2 CPT 98691  Fourth 20cm2 CPT 52175  Placement of wound vac negative pressure wound dressing CPT 45884    Surgeon(s):  Ave Serrano DO    Surgical Assistant: None    Anesthesia: General     Estimated Blood Loss (mL): Minimal    Complications: None    Specimens:   ID Type Source Tests Collected by Time Destination   1 : right ischial decubitus ulcer Preservative Decubitus  Nia Stephen DO 5/14/2020 1127 Pathology        Implants: * No implants in log *    Drains: * No LDAs found *    Findings: [id number] necrotic ischial decubitus ulcer stage 4 down to ischial bone. Necrotic liquifactive foul smelling tissue debrided with sharp scissors and #10 scalpel blade down to bone.       Electronically Signed by Chloe Stewart DO on 5/14/2020 at 11:52 AM  [id number]

## 2020-05-14 NOTE — OP NOTES
[address] [place] [address]  OPERATIVE REPORT    Name:  Raghav Toribio  MR#:  [id number]  :  1960  ACCOUNT #:  [de-identified]  DATE OF SERVICE:  2020    PREOPERATIVE DIAGNOSIS:  Stage IV right ischial decubitus ulcer. POSTOPERATIVE DIAGNOSIS:  Ischial decubitus ulcer stage IV down to ischial bone, 6 x 6 x 2 cm, for a total square centimeters of 72 cm2. PROCEDURE PERFORMED:  1.  Sharp incision and sharp debridement with scissors and 10-scalpel blade of right ischial decubitus ulcer down to ischial bone, 6 cm x 6 cm x 2 cm, for total square centimeter surface area of 72 cm2. A.  First 20 cm2 would be CPT code [id number]. B. Second 20 cm2, CPT code 62755. C. Third 20 cm2, CPT code 93564. D. Fourth 20 cm2, CPT code 32896.    2.  Placement of wound VAC negative pressure wound dressing, CPT code 96547. SURGEON:  Andrew Arrington DO    ASSISTANT:  None. ANESTHESIA:  General endotracheal.    COMPLICATIONS:  None. SPECIMENS REMOVED:  Devitalized necrotic skin and subcutaneous tissue. IMPLANTS:  Wound VAC. ESTIMATED BLOOD LOSS:  Minimal.    DISPOSITION:  Stable. COUNTS:  Sponge count, needle count, instrument count all correct x3. DESCRIPTION OF PROCEDURE:  This is a 27-year-old female with history of MS with an ischial right-sided stage IV decubitus ulcer. She is prepared for incision and debridement of decubitus ulcer with wound VAC placement. Consent was obtained by describing the procedure to the patient including potential complications to include infection, bleeding, wound VAC placement. Consent was obtained and placed in the final chart. She was administered vancomycin and Maxipime IV preoperatively. She was taken to the operating suite and placed in a left lateral decubitus position with a beanbag and then prepped and draped in usual sterile fashion. General anesthesia was initiated.   Following this, a time-out was taken to confirm the patient's name and proper procedure. We then obtained a 10-scalpel blade and used sharp incisional debridement removing devitalized tissue, necrotic fat with liquefaction, and foul odor down to the ischial bone. We also removed additional tissue with sharp scissors until all devitalized tissue had been removed to fresh bleeding subcutaneous fat and skin. Using spot cauterization, we controlled hemostasis. Once hemostasis was confirmed, we irrigated with saline until clear. We probed the wound in all directions finding no new evidence of necrosis or abscess cavity. We then obtained a ruler and measured the debridement area to be 6 cm x 6 cm x 2 cm deep for a total square surface of 72 cm2. We obtained a wound VAC and trimmed the sponge to fit appropriately within the wound. We then sealed with the adhesive and then placed the wound VAC with a good seal.  At this point, we concluded the case. The patient will be transferred back to her room stable. FINDINGS:  A 69-year-old female with MS. She had a right ischial decubitus stage IV ulcer down to the ischial bone. Sharp incision and debridement with 10-scalpel blade and sharp scissors removed devitalized necrotic liquefactive foul-smelling tissue down to the ischial bone. The total square surface was 72 cm2. A wound VAC was placed. She tolerated the procedure well with minimal bleeding.       [address], DO [id number]  D:  05/14/2020 12:04  T:  05/14/2020 15:36  JOB #:  [id number]

## 2020-05-14 NOTE — PERIOP NOTES
TRANSFER - OUT REPORT:    Verbal report given to Monica Pack on Liliam Yanes  being transferred to [facility] [phone] for routine post - op       Report consisted of patients Situation, Background, Assessment and   Recommendations(SBAR). Information from the following report(s) OR Summary, Procedure Summary, Intake/Output and MAR was reviewed with the receiving nurse. Lines:   Peripheral IV 05/13/20 Left Arm (Active)   Site Assessment Clean, dry, & intact 5/14/2020 11:58 AM   Phlebitis Assessment 0 5/14/2020 11:58 AM   Infiltration Assessment 0 5/14/2020 11:58 AM   Dressing Status Clean, dry, & intact 5/14/2020 11:58 AM   Dressing Type Tape;Transparent 5/14/2020 11:58 AM   Hub Color/Line Status Patent 5/14/2020 11:58 AM   Alcohol Cap Used No 5/14/2020 11:58 AM       Peripheral IV 05/14/20 Right Wrist (Active)   Site Assessment Clean, dry, & intact 5/14/2020 11:58 AM   Phlebitis Assessment 0 5/14/2020 11:58 AM   Infiltration Assessment 0 5/14/2020 11:58 AM   Dressing Status Clean, dry, & intact 5/14/2020 11:58 AM   Dressing Type Tape;Transparent 5/14/2020 11:58 AM   Hub Color/Line Status Patent 5/14/2020 11:58 AM   Alcohol Cap Used No 5/14/2020 11:58 AM        Opportunity for questions and clarification was provided. Patient transported with:   O2 @ 2 liters    VTE prophylaxis orders have been written for Liliam Yanes. Patient and family given floor number and nurses name. Family updated re: pt status after security code verified.

## 2020-05-14 NOTE — PROGRESS NOTES
[address]  1454 [facility] 2050, [address] [facility], [address]      PLAN:To OR today at 11:00 for I and D of right decubitus ulcer  NPO  Hold Sub Q Heparin  Consent  MRI abdomen later today      ASSESSMENT:  Admit Date: 5/13/2020   * No surgery date entered *  Procedure(s) with comments:  INCISION AND DRAINAGE DECUBITUS - I&D ischecial decubitus    Active Problems:    GLENROY (acute kidney injury) ([facility] (5/13/2020)      Decubitus ulcer of ischial area, right, unspecified pressure ulcer stage (5/13/2020)      Transaminitis (5/13/2020)      Acute metabolic encephalopathy (3/67/4539)      Multiple sclerosis ([facility] (5/13/2020)      Protein calorie malnutrition ([facility] (5/13/2020)      Leukocytosis (5/13/2020)         Jules Benitez is more alert today. She denies abdominal pain, N/V.  VSS afebrile. She agrees to I and D of decubitus ulcer today. We discussed wound vac. Decision for surgery today at 11:00 for I and D of decubitus ulcer. OBJECTIVE:  Constitutional: Alert oriented cooperative patient in no acute distress; appears stated age   Visit Vitals  /83 (BP 1 Location: Right arm, BP Patient Position: At rest;Supine)   Pulse 95   Temp 98.5 °F (36.9 °C)   Resp 16   SpO2 96%     Eyes:Sclera are clear. ENMT: no external lesions gross hearing normal; no obvious neck masses, no ear or lip lesions  CV: RRR. Normal perfusion  Resp: No JVD. Breathing is  non-labored; no audible wheezing. GI: Soft. Calcifications of abdominal wall. No TTP. Musculoskeletal: unremarkable with normal function. No embolic signs or cyanosis.    Neuro:  Oriented; moves all 4; no focal deficits  Psychiatric: normal affect and mood, no memory impairment      Patient Vitals for the past 24 hrs:   BP Temp Pulse Resp SpO2   05/14/20 0748 146/83 98.5 °F (36.9 °C) 95 16 96 %   05/14/20 0349 150/87 98.3 °F (36.8 °C) 63 18 98 %   05/14/20 0015 135/79 98.2 °F (36.8 °C) 82 18 98 %   05/13/20 1947 116/70 98.2 °F (36.8 °C) 78 18 98 %   05/13/20 1129 114/67 98.2 °F (36.8 °C) 82 18 93 %     Labs:    Recent Labs     05/13/20  0649 05/13/20  0153   WBC 15.1* 15.3*   HGB 13.8 13.8    261    130*   K 3.8 4.1   CL 99 96*   CO2 24 22   BUN 85* 91*   CREA 1.22* 1.52*    92   TBILI 0.6 0.7   SGOT 92* 65*   ALT 68* 58   * 274*   TROIQ  --  <0.02*         Chester Grave Zach, DO

## 2020-05-14 NOTE — PROGRESS NOTES
TRANSFER - OUT REPORT:    Verbal report given to Skyler Naranjo RN(name) on Nick Freeman  being transferred to Preop(unit) for ordered procedure       Report consisted of patients Situation, Background, Assessment and   Recommendations(SBAR). Information from the following report(s) SBAR, Kardex, [facility] and Recent Results was reviewed with the receiving nurse. Lines:   Peripheral IV 05/13/20 Left Arm (Active)   Site Assessment Clean, dry, & intact 5/14/2020  7:16 AM   Phlebitis Assessment 0 5/14/2020  7:16 AM   Infiltration Assessment 0 5/14/2020  7:16 AM   Dressing Status Clean, dry, & intact 5/14/2020  7:16 AM   Dressing Type Transparent 5/14/2020  7:16 AM   Hub Color/Line Status Infusing 5/14/2020  7:16 AM        Opportunity for questions and clarification was provided.       Patient transported with:   Kenny Whiteside updated per pt request.

## 2020-05-14 NOTE — PROGRESS NOTES
Physical Therapy Note:    Physical therapy evaluation orders received and chart reviewed. Patient scheduled for OR for I&D this morning. Will follow and re-attempt at a later time/date as schedule permits/patient available in order to initiate assessment.     Brian Barrow, PT, DPT

## 2020-05-14 NOTE — PROGRESS NOTES
Hospitalist Progress Note    2020  Admit Date: 2020  5:12 AM   NAME: Kendra Wright   :  1960   MRN:  [id number]   Attending: Ben Kan DO  PCP:  Augustina, MD Marga    SUBJECTIVE:   Patient 81-year-old female history of MS follows with Dr. Ki Car (no relation) presents to the emergency room after sustaining multiple falls. Patient reports she \"hurts all over\". Denies any headache, sore throat, cough, chest pain, nausea vomiting. Completed course of antibiotics approximately 1 week ago per report for UTI. Admits to tobacco abuse and 1 glass of wine daily. ED work-up was notable for GLENROY, dehydration, hyponatremia, transaminitis, leukocytosis. She was admitted for the further work-up to include a right upper quadrant ultrasound which shows dilated CBD, IV fluid resuscitation, PT OT. Per d/w pt's friend Stacey Appiah pt is wheelchair dependent, can usually transfer independently. Sustained fall 1.5 months ago and has been \"going downhill\" since - more weakness, confusion and eating/drinking less.  - RN has noticed ischial decub and right malleolus pressure ulcers. Ischial may be infected. Pt just keeps saying she \"hurts all over\" especially her \"brain bones\" she is oriented to person only.  - much more alert and in less pain today. Sacral decub debrided  by surgery.  Pt admits to mild RUQ pain w/ palpation    Review of Systems negative with exception of pertinent positives noted above  PHYSICAL EXAM     Visit Vitals  /70 (BP 1 Location: Right arm, BP Patient Position: At rest;Supine)   Pulse 91   Temp 97.9 °F (36.6 °C)   Resp 16   SpO2 98%      Temp (24hrs), Av.3 °F (36.8 °C), Min:97.9 °F (36.6 °C), Max:99.1 °F (37.3 °C)    Oxygen Therapy  O2 Sat (%): 98 % (20 1329)  Pulse via Oximetry: 84 beats per minute (20 1228)  O2 Device: Nasal cannula (20 1228)  O2 Flow Rate (L/min): 2 l/min (20 1228)    Intake/Output Summary (Last 24 hours) at 5/14/2020 1910  Last data filed at 5/14/2020 1739  Gross per 24 hour   Intake 2080 ml   Output 330 ml   Net 1750 ml      General: No acute distress, thin/frail. Appears older than stated age    Lungs:  CTA Bilaterally. Heart:  Regular rate and rhythm,  No murmur, rub, or gallop  Abdomen: Soft, Non distended, Non tender, Positive bowel sounds  Extremities: No cyanosis, clubbing or edema  Neurologic:  No focal deficits    ASSESSMENT      Active Hospital Problems    Diagnosis Date Noted    GLENROY (acute kidney injury) ([facility] 05/13/2020    Decubitus ulcer of ischial area, right, unspecified pressure ulcer stage 05/13/2020    Transaminitis 05/13/2020    Acute metabolic encephalopathy 21/92/5818    Multiple sclerosis ([facility] 05/13/2020    [facility] 05/13/2020    Leukocytosis 05/13/2020     A/P    - GLENROY - secondary to dehydration. Improved with IVF, continue. - Hyponatremia - corrected with IVF. monitor    - Right ischial decub ulcer, right lateral malleoli ulcer - wound care has seen. recs s/p sacral decub debridement by gen sx 5/14. Plain film of ankle neg for osteo. cont vanco/cefepime D2  Blood cultures added after atbx - follow    - Leukocytosis - suspect secondary to above. Follow blood cx.     - MS - MRI brain unchanged with regard to hyperintensities. Does mention smooth enhancement of meninges - ?significance. Will request neuro eval in AM.   Cont home meds (amantadine)    - Transaminitis - ?secondary to amantadine but with dilated CBD on US as well as on MRCP with distal CBD small stone. Moderately symptomatic with abd pain today. Will request GI eval for AM.     - protein calorie malnutrition - nutrition consult    - Acute metabolic encephalopathy - likely secondary to infection, dehydration, malnutrition. improving    - HTN - on atenolol 50 bid and lotrel 5/20 at home - hold currently and resume when appropriate. - Debility - pt/ot/ppd.  CM following (will order EDP as I anticipate she will need STR at discharge)    DVT Prophylaxis: hep sq    Signed By: Wilfrido Martínez,      May 14, 2020

## 2020-05-14 NOTE — PROGRESS NOTES
TRANSFER - IN REPORT:    Verbal report received from Armando Crane RN(name) on Armando Patel  being received from PACU(unit) for routine post - op      Report consisted of patients Situation, Background, Assessment and   Recommendations(SBAR). Information from the following report(s) SBAR and OR Summary was reviewed with the receiving nurse. Opportunity for questions and clarification was provided. Assessment completed upon patients arrival to unit and care assumed.      VAC in place

## 2020-05-15 ENCOUNTER — ANESTHESIA (OUTPATIENT)
Dept: ENDOSCOPY | Age: 60
DRG: 570 | End: 2020-05-15
Payer: MEDICARE

## 2020-05-15 ENCOUNTER — ANESTHESIA EVENT (OUTPATIENT)
Dept: ENDOSCOPY | Age: 60
DRG: 570 | End: 2020-05-15
Payer: MEDICARE

## 2020-05-15 ENCOUNTER — APPOINTMENT (OUTPATIENT)
Dept: GENERAL RADIOLOGY | Age: 60
DRG: 570 | End: 2020-05-15
Attending: HOSPITALIST
Payer: MEDICARE

## 2020-05-15 LAB
ALBUMIN SERPL-MCNC: 1.7 G/DL (ref 3.5–5)
ALBUMIN/GLOB SERPL: 0.5 {RATIO} (ref 1.2–3.5)
ALP SERPL-CCNC: 226 U/L (ref 50–136)
ALT SERPL-CCNC: 39 U/L (ref 12–65)
ANION GAP SERPL CALC-SCNC: 6 MMOL/L (ref 7–16)
AST SERPL-CCNC: 31 U/L (ref 15–37)
BILIRUB SERPL-MCNC: 0.4 MG/DL (ref 0.2–1.1)
BUN SERPL-MCNC: 24 MG/DL (ref 6–23)
CALCIUM SERPL-MCNC: 8.3 MG/DL (ref 8.3–10.4)
CHLORIDE SERPL-SCNC: 112 MMOL/L (ref 98–107)
CO2 SERPL-SCNC: 25 MMOL/L (ref 21–32)
CREAT SERPL-MCNC: 0.37 MG/DL (ref 0.6–1)
EMERGENT DISEASE PANEL, EDPR: NOT DETECTED
ERYTHROCYTE [DISTWIDTH] IN BLOOD BY AUTOMATED COUNT: 14.5 % (ref 11.9–14.6)
GLOBULIN SER CALC-MCNC: 3.5 G/DL (ref 2.3–3.5)
GLUCOSE BLD STRIP.AUTO-MCNC: 111 MG/DL (ref 65–100)
GLUCOSE BLD STRIP.AUTO-MCNC: 121 MG/DL (ref 65–100)
GLUCOSE BLD STRIP.AUTO-MCNC: 134 MG/DL (ref 65–100)
GLUCOSE BLD STRIP.AUTO-MCNC: 92 MG/DL (ref 65–100)
GLUCOSE SERPL-MCNC: 143 MG/DL (ref 65–100)
HCT VFR BLD AUTO: 32.3 % (ref 35.8–46.3)
HGB BLD-MCNC: 11.4 G/DL (ref 11.7–15.4)
MAGNESIUM SERPL-MCNC: 1.9 MG/DL (ref 1.8–2.4)
MCH RBC QN AUTO: 34 PG (ref 26.1–32.9)
MCHC RBC AUTO-ENTMCNC: 35.3 G/DL (ref 31.4–35)
MCV RBC AUTO: 96.4 FL (ref 79.6–97.8)
MM INDURATION POC: 0 MM (ref 0–5)
NRBC # BLD: 0 K/UL (ref 0–0.2)
PLATELET # BLD AUTO: 187 K/UL (ref 150–450)
PMV BLD AUTO: 11 FL (ref 9.4–12.3)
POTASSIUM SERPL-SCNC: 2.9 MMOL/L (ref 3.5–5.1)
PPD POC: NEGATIVE NEGATIVE
PROT SERPL-MCNC: 5.2 G/DL (ref 6.3–8.2)
RBC # BLD AUTO: 3.35 M/UL (ref 4.05–5.2)
SODIUM SERPL-SCNC: 143 MMOL/L (ref 136–145)
WBC # BLD AUTO: 17.3 K/UL (ref 4.3–11.1)

## 2020-05-15 PROCEDURE — 74011000250 HC RX REV CODE- 250: Performed by: REGISTERED NURSE

## 2020-05-15 PROCEDURE — 65270000029 HC RM PRIVATE

## 2020-05-15 PROCEDURE — 76060000032 HC ANESTHESIA 0.5 TO 1 HR: Performed by: INTERNAL MEDICINE

## 2020-05-15 PROCEDURE — 97605 NEG PRS WND THER DME<=50SQCM: CPT

## 2020-05-15 PROCEDURE — 83735 ASSAY OF MAGNESIUM: CPT

## 2020-05-15 PROCEDURE — 85027 COMPLETE CBC AUTOMATED: CPT

## 2020-05-15 PROCEDURE — 74011250637 HC RX REV CODE- 250/637: Performed by: HOSPITALIST

## 2020-05-15 PROCEDURE — 97162 PT EVAL MOD COMPLEX 30 MIN: CPT

## 2020-05-15 PROCEDURE — 77030012595 HC SPHNTOM BILI BSC -D: Performed by: INTERNAL MEDICINE

## 2020-05-15 PROCEDURE — 97535 SELF CARE MNGMENT TRAINING: CPT

## 2020-05-15 PROCEDURE — 74011250637 HC RX REV CODE- 250/637: Performed by: SURGERY

## 2020-05-15 PROCEDURE — 77030038269 HC DRN EXT URIN PURWCK BARD -A

## 2020-05-15 PROCEDURE — 74011250636 HC RX REV CODE- 250/636: Performed by: REGISTERED NURSE

## 2020-05-15 PROCEDURE — 77030019934 HC DRSG VAC ASST KCON -B

## 2020-05-15 PROCEDURE — 82962 GLUCOSE BLOOD TEST: CPT

## 2020-05-15 PROCEDURE — 99223 1ST HOSP IP/OBS HIGH 75: CPT | Performed by: PSYCHIATRY & NEUROLOGY

## 2020-05-15 PROCEDURE — 74750000023 HC WOUND THERAPY

## 2020-05-15 PROCEDURE — 97530 THERAPEUTIC ACTIVITIES: CPT

## 2020-05-15 PROCEDURE — 36573 INSJ PICC RS&I 5 YR+: CPT | Performed by: HOSPITALIST

## 2020-05-15 PROCEDURE — 74011250636 HC RX REV CODE- 250/636: Performed by: HOSPITALIST

## 2020-05-15 PROCEDURE — 74011250636 HC RX REV CODE- 250/636: Performed by: SURGERY

## 2020-05-15 PROCEDURE — 74011250636 HC RX REV CODE- 250/636: Performed by: PHYSICIAN ASSISTANT

## 2020-05-15 PROCEDURE — 77030039425 HC BLD LARYNG TRULITE DISP TELE -A: Performed by: ANESTHESIOLOGY

## 2020-05-15 PROCEDURE — 77030032490 HC SLV COMPR SCD KNE COVD -B: Performed by: INTERNAL MEDICINE

## 2020-05-15 PROCEDURE — 74330 X-RAY BILE/PANC ENDOSCOPY: CPT

## 2020-05-15 PROCEDURE — 74011250637 HC RX REV CODE- 250/637: Performed by: INTERNAL MEDICINE

## 2020-05-15 PROCEDURE — 02HV33Z INSERTION OF INFUSION DEVICE INTO SUPERIOR VENA CAVA, PERCUTANEOUS APPROACH: ICD-10-PCS | Performed by: HOSPITALIST

## 2020-05-15 PROCEDURE — 77030007288 HC DEV LOK BILI BSC -A: Performed by: INTERNAL MEDICINE

## 2020-05-15 PROCEDURE — 76040000026: Performed by: INTERNAL MEDICINE

## 2020-05-15 PROCEDURE — 74011250636 HC RX REV CODE- 250/636: Performed by: ANESTHESIOLOGY

## 2020-05-15 PROCEDURE — 97167 OT EVAL HIGH COMPLEX 60 MIN: CPT

## 2020-05-15 PROCEDURE — 77030009038 HC CATH BILI STN RTVR BSC -C: Performed by: INTERNAL MEDICINE

## 2020-05-15 PROCEDURE — 36415 COLL VENOUS BLD VENIPUNCTURE: CPT

## 2020-05-15 PROCEDURE — 0FC98ZZ EXTIRPATION OF MATTER FROM COMMON BILE DUCT, VIA NATURAL OR ARTIFICIAL OPENING ENDOSCOPIC: ICD-10-PCS | Performed by: INTERNAL MEDICINE

## 2020-05-15 PROCEDURE — 77030037088 HC TUBE ENDOTRACH ORAL NSL COVD-A: Performed by: ANESTHESIOLOGY

## 2020-05-15 PROCEDURE — 80053 COMPREHEN METABOLIC PANEL: CPT

## 2020-05-15 PROCEDURE — 74011636320 HC RX REV CODE- 636/320: Performed by: INTERNAL MEDICINE

## 2020-05-15 RX ORDER — SODIUM CHLORIDE, SODIUM LACTATE, POTASSIUM CHLORIDE, CALCIUM CHLORIDE 600; 310; 30; 20 MG/100ML; MG/100ML; MG/100ML; MG/100ML
75 INJECTION, SOLUTION INTRAVENOUS CONTINUOUS
Status: DISCONTINUED | OUTPATIENT
Start: 2020-05-15 | End: 2020-05-15 | Stop reason: HOSPADM

## 2020-05-15 RX ORDER — LIDOCAINE HYDROCHLORIDE 10 MG/ML
0.1 INJECTION INFILTRATION; PERINEURAL AS NEEDED
Status: DISCONTINUED | OUTPATIENT
Start: 2020-05-15 | End: 2020-05-15 | Stop reason: HOSPADM

## 2020-05-15 RX ORDER — HALOPERIDOL 5 MG/ML
1 INJECTION INTRAMUSCULAR
Status: DISCONTINUED | OUTPATIENT
Start: 2020-05-15 | End: 2020-05-15 | Stop reason: HOSPADM

## 2020-05-15 RX ORDER — OXYCODONE HYDROCHLORIDE 5 MG/1
5 TABLET ORAL
Status: DISCONTINUED | OUTPATIENT
Start: 2020-05-15 | End: 2020-05-15 | Stop reason: HOSPADM

## 2020-05-15 RX ORDER — HYDROMORPHONE HYDROCHLORIDE 1 MG/ML
0.5 INJECTION, SOLUTION INTRAMUSCULAR; INTRAVENOUS; SUBCUTANEOUS
Status: DISCONTINUED | OUTPATIENT
Start: 2020-05-15 | End: 2020-05-15 | Stop reason: HOSPADM

## 2020-05-15 RX ORDER — POTASSIUM CHLORIDE 14.9 MG/ML
20 INJECTION INTRAVENOUS
Status: COMPLETED | OUTPATIENT
Start: 2020-05-15 | End: 2020-05-15

## 2020-05-15 RX ORDER — MIDAZOLAM HYDROCHLORIDE 1 MG/ML
2 INJECTION, SOLUTION INTRAMUSCULAR; INTRAVENOUS
Status: DISCONTINUED | OUTPATIENT
Start: 2020-05-15 | End: 2020-05-15 | Stop reason: HOSPADM

## 2020-05-15 RX ORDER — LIDOCAINE HYDROCHLORIDE 20 MG/ML
INJECTION, SOLUTION EPIDURAL; INFILTRATION; INTRACAUDAL; PERINEURAL AS NEEDED
Status: DISCONTINUED | OUTPATIENT
Start: 2020-05-15 | End: 2020-05-15 | Stop reason: HOSPADM

## 2020-05-15 RX ORDER — FENTANYL CITRATE 50 UG/ML
100 INJECTION, SOLUTION INTRAMUSCULAR; INTRAVENOUS ONCE
Status: DISCONTINUED | OUTPATIENT
Start: 2020-05-15 | End: 2020-05-15 | Stop reason: HOSPADM

## 2020-05-15 RX ORDER — SODIUM CHLORIDE, SODIUM LACTATE, POTASSIUM CHLORIDE, CALCIUM CHLORIDE 600; 310; 30; 20 MG/100ML; MG/100ML; MG/100ML; MG/100ML
25 INJECTION, SOLUTION INTRAVENOUS CONTINUOUS
Status: DISCONTINUED | OUTPATIENT
Start: 2020-05-15 | End: 2020-05-15 | Stop reason: HOSPADM

## 2020-05-15 RX ORDER — ROCURONIUM BROMIDE 10 MG/ML
INJECTION, SOLUTION INTRAVENOUS AS NEEDED
Status: DISCONTINUED | OUTPATIENT
Start: 2020-05-15 | End: 2020-05-15 | Stop reason: HOSPADM

## 2020-05-15 RX ORDER — SODIUM CHLORIDE AND POTASSIUM CHLORIDE .9; .15 G/100ML; G/100ML
SOLUTION INTRAVENOUS CONTINUOUS
Status: DISPENSED | OUTPATIENT
Start: 2020-05-15 | End: 2020-05-16

## 2020-05-15 RX ORDER — NALOXONE HYDROCHLORIDE 0.4 MG/ML
0.2 INJECTION, SOLUTION INTRAMUSCULAR; INTRAVENOUS; SUBCUTANEOUS AS NEEDED
Status: DISCONTINUED | OUTPATIENT
Start: 2020-05-15 | End: 2020-05-15 | Stop reason: HOSPADM

## 2020-05-15 RX ORDER — NALOXONE HYDROCHLORIDE 0.4 MG/ML
0.2 INJECTION, SOLUTION INTRAMUSCULAR; INTRAVENOUS; SUBCUTANEOUS
Status: DISCONTINUED | OUTPATIENT
Start: 2020-05-15 | End: 2020-05-15 | Stop reason: HOSPADM

## 2020-05-15 RX ORDER — CLINDAMYCIN HYDROCHLORIDE 150 MG/1
300 CAPSULE ORAL EVERY 6 HOURS
Status: COMPLETED | OUTPATIENT
Start: 2020-05-15 | End: 2020-05-16

## 2020-05-15 RX ORDER — SODIUM CHLORIDE AND POTASSIUM CHLORIDE .9; .15 G/100ML; G/100ML
SOLUTION INTRAVENOUS CONTINUOUS
Status: DISCONTINUED | OUTPATIENT
Start: 2020-05-15 | End: 2020-05-15

## 2020-05-15 RX ORDER — MIDAZOLAM HYDROCHLORIDE 1 MG/ML
2 INJECTION, SOLUTION INTRAMUSCULAR; INTRAVENOUS ONCE
Status: DISCONTINUED | OUTPATIENT
Start: 2020-05-15 | End: 2020-05-15 | Stop reason: HOSPADM

## 2020-05-15 RX ORDER — POTASSIUM CHLORIDE 20 MEQ/1
40 TABLET, EXTENDED RELEASE ORAL
Status: COMPLETED | OUTPATIENT
Start: 2020-05-15 | End: 2020-05-15

## 2020-05-15 RX ORDER — ONDANSETRON 2 MG/ML
4 INJECTION INTRAMUSCULAR; INTRAVENOUS
Status: DISCONTINUED | OUTPATIENT
Start: 2020-05-15 | End: 2020-05-15 | Stop reason: HOSPADM

## 2020-05-15 RX ORDER — POTASSIUM CHLORIDE 29.8 MG/ML
40 INJECTION INTRAVENOUS ONCE
Status: DISCONTINUED | OUTPATIENT
Start: 2020-05-15 | End: 2020-05-15 | Stop reason: DRUGHIGH

## 2020-05-15 RX ORDER — PROPOFOL 10 MG/ML
INJECTION, EMULSION INTRAVENOUS AS NEEDED
Status: DISCONTINUED | OUTPATIENT
Start: 2020-05-15 | End: 2020-05-15 | Stop reason: HOSPADM

## 2020-05-15 RX ADMIN — AMANTADINE HYDROCHLORIDE 100 MG: 100 CAPSULE ORAL at 09:10

## 2020-05-15 RX ADMIN — ROCURONIUM BROMIDE 30 MG: 10 INJECTION, SOLUTION INTRAVENOUS at 14:16

## 2020-05-15 RX ADMIN — POTASSIUM CHLORIDE 20 MEQ: 200 INJECTION, SOLUTION INTRAVENOUS at 11:42

## 2020-05-15 RX ADMIN — SODIUM CHLORIDE AND POTASSIUM CHLORIDE: 9; 1.49 INJECTION, SOLUTION INTRAVENOUS at 09:44

## 2020-05-15 RX ADMIN — PROPOFOL 200 MG: 10 INJECTION, EMULSION INTRAVENOUS at 14:16

## 2020-05-15 RX ADMIN — SODIUM CHLORIDE, SODIUM LACTATE, POTASSIUM CHLORIDE, AND CALCIUM CHLORIDE 25 ML/HR: 600; 310; 30; 20 INJECTION, SOLUTION INTRAVENOUS at 13:06

## 2020-05-15 RX ADMIN — POTASSIUM CHLORIDE 40 MEQ: 20 TABLET, EXTENDED RELEASE ORAL at 09:10

## 2020-05-15 RX ADMIN — HYDROCODONE BITARTRATE AND ACETAMINOPHEN 1 TABLET: 5; 325 TABLET ORAL at 06:05

## 2020-05-15 RX ADMIN — INDOMETHACIN 100 MG: 50 SUPPOSITORY RECTAL at 14:52

## 2020-05-15 RX ADMIN — CLINDAMYCIN HYDROCHLORIDE 300 MG: 150 CAPSULE ORAL at 22:10

## 2020-05-15 RX ADMIN — IOPAMIDOL 10 ML: 755 INJECTION, SOLUTION INTRAVENOUS at 14:00

## 2020-05-15 RX ADMIN — LIDOCAINE HYDROCHLORIDE 100 MG: 20 INJECTION, SOLUTION EPIDURAL; INFILTRATION; INTRACAUDAL; PERINEURAL at 14:16

## 2020-05-15 RX ADMIN — POTASSIUM CHLORIDE 20 MEQ: 200 INJECTION, SOLUTION INTRAVENOUS at 09:45

## 2020-05-15 RX ADMIN — AMANTADINE HYDROCHLORIDE 100 MG: 100 CAPSULE ORAL at 17:44

## 2020-05-15 RX ADMIN — SUGAMMADEX 200 MG: 100 INJECTION, SOLUTION INTRAVENOUS at 14:48

## 2020-05-15 RX ADMIN — Medication 10 ML: at 05:52

## 2020-05-15 RX ADMIN — VANCOMYCIN HYDROCHLORIDE 750 MG: 1 INJECTION, POWDER, LYOPHILIZED, FOR SOLUTION INTRAVENOUS at 05:52

## 2020-05-15 RX ADMIN — PHENYLEPHRINE HYDROCHLORIDE 200 MCG: 10 INJECTION INTRAVENOUS at 14:30

## 2020-05-15 NOTE — PROGRESS NOTES
Assessment completed. Respirations re even and unlabored. Wound vac in place. Continuous IV fluid infusing. No distress at this time. Bed is low, locked and call light within reach.

## 2020-05-15 NOTE — PROGRESS NOTES
Problem: Self Care Deficits Care Plan (Adult)  Goal: *Acute Goals and Plan of Care (Insert Text)  Description:   1. Patient will complete upper body bathing and dressing with moderate assistance and adaptive equipment as needed. 2. Patient will complete grooming with minimal assistance and adaptive equipment as needed. 3. Patient will tolerate 30 minutes of OT treatment with self-incorporated rest breaks to increase activity tolerance for ADLs. 4. Patient will complete bed mobility with minimal assistance in preparation for functional transfers. 5. Patient will attempt to  preparation for functional transfers with adaptive equipment as needed. 6. Patient will demonstrate modified independence with therapeutic exercise HEP to increase strength in BUEs for increased safety and independence with functional transfers. 7. Patient will demonstrate improved sitting balance for ADLs with stand by assistance and adaptive equipment as needed. Timeframe: 7 visits      Outcome: Progressing Towards Goal     OCCUPATIONAL THERAPY: Initial Assessment, Daily Note, and AM 5/15/2020  INPATIENT: OT Visit Days: 1  [place] / [place] / Product Type: Medicare /      NAME/AGE/GENDER: Frederic Babinski is a 63 y.o. female   PRIMARY DIAGNOSIS:  [facility]RO[facility]acute kidney [facility]) ([facility] [N17.9] Decubitus ulcer of ischial area, right, unspecified pressure ulcer stage Decubitus ulcer of ischial area, right, unspecified pressure ulcer stage  Procedure(s) (LRB):  ENDOSCOPIC RETROGRADE CHOLANGIOPANCREATOGRAPHY (ERCP) (N/A)  Day of Surgery  ICD-10: Treatment Diagnosis:    Generalized Muscle Weakness (M62.81)  Other lack of cordination (R27.8)  Repeated Falls (R29.6)   Precautions/Allergies:    Fall precautions    Latex and Pcn [penicillins]      ASSESSMENT:     Ms. Nima Hansen is a 63 y.o. female admitted with GLENROY, several wounds including R ischial decubitus ulcer s/p I&D and wound vac placement. Hx MS.  At baseline pt lives alone and reports modified independence with ADLs, IADLs including grocery shopping and cooking, driving, and mobility using power w/c. Pt has friends who supervise during bathing. Pt reports independence with functional transfers including car transfers (keeps manual w/c in car for grocery shopping). Endorses several recent falls, per chart spent 3-4 days on floor. Upon arrival pt alert and agreeable to OT/PT evaluation and co-treatment. BUE assessment revealed AROM generally decreased in BUEs proximally and strength grossly decreased in BUEs. Pt completed bed mobility with MaxAx2/cueing for technique. Pt with poor sitting balance requiring total assist for proper positioning. PT addressed balance while OT addressed self-care. Pt practiced tooth brushing with Min-ModA after set up. Pt using LUE as c/o pain from IV in RUE. RN aware and presents to assess. MaxA-total assist for hair washing and total assist for combing hair. Pt returned to sidelying with MaxAx2-total assist. Pt left sidelying L with bony prominences offloaded and call bell within reach. Pt presents with deficits in skin integrity, strength, activity tolerance, balance, and transfers. Severa Rumpf is currently functioning below baseline and would benefit from continued OT to increase safety and independence with ADLs. Will follow. This patient is appropriate for co-treatment at this time due to multiple deficits including decreased balance, decreased skin integrity, decreased endurance, decreased strength, and need for high level assistance to complete functional transfers and functional tasks.      This section established at most recent assessment   PROBLEM LIST (Impairments causing functional limitations):  Decreased Strength  Decreased ADL/Functional Activities  Decreased Transfer Abilities  Decreased Balance  Increased Pain  Decreased Activity Tolerance  Decreased Pacing Skills  Decreased Work Simplification/Energy Conservation Techniques  Increased Fatigue  Decreased Flexibility/Joint Mobility  Decreased Skin Integrity/Hygeine  Decreased Media with Home Exercise Program   INTERVENTIONS PLANNED: (Benefits and precautions of occupational therapy have been discussed with the patient.)  Activities of daily living training  Adaptive equipment training  Balance training  Clothing management  Donning&doffing training  Hygiene training  Neuromuscular re-eduation  Re-evaluation  Therapeutic activity  Therapeutic exercise  Wheelchair management     TREATMENT PLAN: Frequency/Duration: Follow patient 3-4x/week to address above goals. Rehabilitation Potential For Stated Goals: Good     REHAB RECOMMENDATIONS (at time of discharge pending progress):    Placement: It is my opinion, based on this patient's performance to date, that Ms. Ambrosio Cartagena may benefit intensive rehab at [facility] vs intensive therapy at [address] after discharge due to the functional deficits listed above that are likely to improve with skilled rehabilitation and concerns that he/she may be unsafe to be unsupervised at home due to impaired strength and balance impacting ADLs, increasing risk of falls. Equipment:   TBD               OCCUPATIONAL PROFILE AND HISTORY:   History of Present Injury/Illness (Reason for Referral):  See H&P. \"Patient is a 64yo F with hx MS who presents after multiple falls. She had a fall with 4 days on floor several weeks ago, not seen by MD at that time. She had three or four falls today before presentation, helped up by a friend each time. She was down for 4 hours the last time she fell. She is sore all over, denies any acute injuries. No headaches, sore throat, cough, cp, n/v, change in bowel habit. Recently treated for dysuria and sx resolved on antibiotics. Finished antibiotic several days ago. Smoked 4-5 cigarettes daily until recently. Drank 1 glass wine daily until 4d ago.  Quit because not feeling well, but can't describe how not feeling well. \"  Past Medical History/Comorbidities:   Ms. [facility]  has a past medical history of Menopause. Ms. [facility]  has no past surgical history on file. Social History/Living Environment:   Home Environment: Private residence  # Steps to Enter: 0  One/Two Story Residence: One story  Living Alone: Yes  Support Systems: Friends \ neighbors  Patient Expects to be Discharged to[de-identified] Unknown  Current DME Used/Available at Home: Hospital bed, Tub transfer bench, Wheelchair, Wheelchair, power  Tub or Shower Type: Tub/Shower combination  Prior Level of Function/Work/Activity:  At baseline pt lives alone and reports modified independence with ADLs, IADLs including grocery shopping and cooking, driving, and mobility using power w/c. Pt has friends who supervise during bathing. Pt reports independence with functional transfers including car transfers (keeps manual w/c in car for grocery shopping). Endorses several recent falls. Dominant Side:         RIGHT    Personal Factors:          Sex:  female        Age:  63 y.o. Other factors that influence how disability is experienced by the patient:  Multiple co-morbidities, Falls, extensive medical hx   Number of Personal Factors/Comorbidities that affect the Plan of Care: Extensive review of physical, cognitive, and psychosocial performance (3+):  HIGH COMPLEXITY   ASSESSMENT OF OCCUPATIONAL PERFORMANCE[de-identified]   Activities of Daily Living:   Basic ADLs (From Assessment) Complex ADLs (From Assessment)   Feeding: Minimum assistance  Oral Facial Hygiene/Grooming: Maximum assistance  Bathing: Maximum assistance  Upper Body Dressing: Maximum assistance  Lower Body Dressing: Total assistance  Toileting: Total assistance Instrumental ADL  Meal Preparation: Total assistance  Homemaking: Total assistance   Grooming/Bathing/Dressing Activities of Daily Living   Grooming  Brushing Teeth:  Moderate assistance  Brushing/Combing Hair: Total assistance (dependent) Cognitive Retraining  Safety/Judgement: Awareness of environment; Fall prevention; Insight into deficits                     Lower Body Dressing Assistance  Socks:  Total assistance (dependent) Bed/Mat Mobility  Rolling: Maximum assistance;Assist x2  Supine to Sit: Maximum assistance;Assist x2  Sit to Supine: Maximum assistance;Assist x2;Total assistance     Most Recent Physical Functioning:   Gross Assessment:  AROM: Generally decreased, functional(BUEs proximally)  Strength: Grossly decreased, non-functional(BUEs)  Coordination: Generally decreased, functional(BUEs)  Tone: Abnormal(Low tone BUEs)  Sensation: Intact(BUEs to light touch)               Posture:     Balance:  Sitting: Impaired  Sitting - Static: Poor (constant support)  Sitting - Dynamic: Poor (constant support) Bed Mobility:  Rolling: Maximum assistance;Assist x2  Supine to Sit: Maximum assistance;Assist x2  Sit to Supine: Maximum assistance;Assist x2;Total assistance  Wheelchair Mobility:     Transfers:               Patient Vitals for the past 6 hrs:   BP BP Patient Position SpO2 Pulse   05/15/20 1221 120/69 At rest;Supine 96 % 86   05/15/20 1257 138/82 -- 96 % 87       Mental Status  Neurologic State: Alert  Orientation Level: Appropriate for age  Cognition: Appropriate for age attention/concentration, Follows commands  Perception: Cues to maintain midline in sitting  Perseveration: No perseveration noted  Safety/Judgement: Awareness of environment, Fall prevention, Insight into deficits                          Physical Skills Involved:  Range of Motion  Balance  Strength  Activity Tolerance  Fine Motor Control  Gross Motor Control  Pain (acute)  Pain (Chronic)  Skin Integrity Cognitive Skills Affected (resulting in the inability to perform in a timely and safe manner):  None  Psychosocial Skills Affected:  Habits/Routines  Environmental Adaptation  Social Interaction  Emotional Regulation  Self-Awareness  Awareness of Others  Social Roles   Number of elements that affect the Plan of Care: 5+:  HIGH COMPLEXITY   CLINICAL DECISION MAKIN[address] [place] [id number] AM-PAC 6 Clicks   Daily Activity Inpatient Short Form  How much help from another person does the patient currently need. .. Total A Lot A Little None   1. Putting on and taking off regular lower body clothing? [x] 1   [] 2   [] 3   [] 4   2. Bathing (including washing, rinsing, drying)? [] 1   [x] 2   [] 3   [] 4   3. Toileting, which includes using toilet, bedpan or urinal?   [x] 1   [] 2   [] 3   [] 4   4. Putting on and taking off regular upper body clothing? [] 1   [x] 2   [] 3   [] 4   5. Taking care of personal grooming such as brushing teeth? [] 1   [x] 2   [] 3   [] 4   6. Eating meals? [] 1   [] 2   [x] 3   [] 4   © , Trustees of [address] [place], [place] [id number], under license to [employer]. All rights reserved      Score:  Initial: 11 5/15/2020 Most Recent: X (Date: -- )    Interpretation of Tool:  Represents activities that are increasingly more difficult (i.e. Bed mobility, Transfers, Gait). Medical Necessity:     Patient demonstrates   good   rehab potential due to higher previous functional level. Reason for Services/Other Comments:  Patient continues to require skilled intervention due to   Inability to complete ADLs at prior level of independence   .    Use of outcome tool(s) and clinical judgement create a POC that gives a: HIGH COMPLEXITY         TREATMENT:   (In addition to Assessment/Re-Assessment sessions the following treatments were rendered)     Pre-treatment Symptoms/Complaints:    Pain: Initial:   Pain Intensity 1: 4  Post Session:  no complaint of pain at rest     Today's treatment session addressed Decreased Strength, Decreased ADL/Functional Activities, Decreased Transfer Abilities, Decreased Balance, Increased Pain, Decreased Activity Tolerance, Increased Fatigue, Decreased Flexibility/Joint Mobility, and Decreased Skin Integrity/Hygeine to progress towards achieving goal(s). During this session,  Physical Therapy addressed  Balance to progress towards their discipline specific goal(s). Co-treatment was necessary to improve patient's ability to follow higher level commands, ability to increase activity demands, and ability to return to normal functional activity. Self Care: (38 minutes): Procedure(s) (per grid) utilized to improve and/or restore self-care/home management as related to grooming. Required moderate to maximal visual, verbal, manual, and tactile cueing to facilitate activities of daily living skills and compensatory activities. PT addressed balance while OT addressed self-care. Pt practiced tooth brushing with Min-ModA after set up. Pt using LUE as c/o pain from IV in RUE. RN aware and presents to assess. MaxA-total assist for hair washing and total assist for combing hair. Pt returned to sidelying with MaxAx2-total assist. Pt left sidelying L with bony prominences offloaded and call bell within reach    Braces/Orthotics/Lines/Etc:   IV  Wound vac   O2 Device: Room air  Purewick  Treatment/Session Assessment:    Response to Treatment:  Tolerated well   Interdisciplinary Collaboration:   Physical Therapist  Occupational Therapist  Registered Nurse    After treatment position/precautions:   Supine in bed  Bed/Chair-wheels locked  Bed in low position  Call light within reach  RN notified  Nurse at bedside   Compliance with Program/Exercises: Compliant all of the time, Will assess as treatment progresses. Recommendations/Intent for next treatment session: \"Next visit will focus on advancements to more challenging activities and reduction in assistance provided\".   Total Treatment Duration:  OT Patient Time In/Time Out  Time In: 1105  Time Out: SIVA Morse/BERNARDINO

## 2020-05-15 NOTE — PROGRESS NOTES
Problem: Mobility Impaired (Adult and Pediatric)  Goal: *Acute Goals and Plan of Care  Description: Acute PT Goals:  (1.)Dalia Hale will move from supine to sit and sit to supine , scoot up and down and roll side to side with MINIMAL ASSIST within 7 treatment day(s). (2.)Dalia Hale will transfer from bed to chair and chair to bed with MODERATE ASSIST using the least restrictive device within 7 treatment day(s). (3.)Dalia Hale will perform sitting static and dynamic balance activities x 30 minutes with CONTACT GUARD ASSIST to improve safety and activity tolerance within 7 treatment day(s). (4.)Dalia Mcdaniels will perform bilateral lower extremity exercises x 20 min for HEP with SUPERVISION to improve strength, endurance, and functional mobility within 7 treatment day(s). PHYSICAL THERAPY: Initial Assessment, Daily Note, and AM 5/15/2020  INPATIENT: PT Visit Days : 1  [place] / [place] / Product Type: Medicare /       NAME/AGE/GENDER: Trish Patel is a 63 y.o. female   PRIMARY DIAGNOSIS: [facility]RO[facility]) [facility] [N17.9] Decubitus ulcer of ischial area, right, unspecified pressure ulcer stage Decubitus ulcer of ischial area, right, unspecified pressure ulcer stage  Procedure(s) (LRB):  ENDOSCOPIC RETROGRADE CHOLANGIOPANCREATOGRAPHY (ERCP) (N/A)  Day of Surgery  ICD-10: Treatment Diagnosis:   · Generalized Muscle Weakness (M62.81)  · Other lack of cordination (R27.8)  · Difficulty in walking, Not elsewhere classified (R26.2)  · Other abnormalities of gait and mobility (R26.89)  · Repeated Falls (R29.6)  · History of falling (Z91.81)   Precaution/Allergies:  Latex and Pcn [penicillins]      ASSESSMENT:     Ms. Mary Mcdaniels is a 63year old F who presents to hospital with GLENROY, several wounds including R ischial decubitus ulcer s/p I&D and wound vac placement. PMH includes MSFeng  Prior to hospital admission pt lives alone, with friend support, in a one story home with no step(s) to enter. Pt endorses several falls in past 6 months. Prior to admission Ms. Shannon Bhatia uses a power wheelchair for mobility which she reports prior to this onset of illness she was able to transfer to independently. Upon entering, pt resting in bed, agreeable to PT evaluation. she reports 4/10 pain in her buttocks (wound site) at rest. BLE assessment indicates sensation to light touch diminished distal BLE, AROM diminished BLE, and strength grossly diminished. Pt performed supine > sit with Max A x2, sitting EOB with poor sitting balance control. Facilitation of sitting balance at edge of bed, fluctuating balance control. Pt very weak/debilitated, with strong posterior lean due to decreased strength. Sit > supine with Max A x. Pt in sidelying with bony prominences offloaded. All needs within reach, RN notified. Pt presents as functioning below her baseline, with deficits in mobility including transfers, gait, balance, and activity tolerance. Pt will benefit from skilled therapy services to address stated deficits to promote return to highest level of function, independence, and safety. Will continue to follow. At this time, patient is appropriate for Co-treatment with occupational therapy due to patient's decreased overall endurance/tolerance levels, as well as need for high level skilled assistance to complete functional transfers/mobility and functional tasks. Willie Nose is appropriate for a multidisciplinary co-treatment of PT and OT to address goals of both disciplines. This section established at most recent assessment   PROBLEM LIST (Impairments causing functional limitations):  1. Decreased Strength  2. Decreased ADL/Functional Activities  3. Decreased Transfer Abilities  4. Decreased Ambulation Ability/Technique  5. Decreased Balance  6. Increased Pain  7. Decreased Activity Tolerance  8. Increased Fatigue  9.  Decreased Skin Integrity/Hygeine   INTERVENTIONS PLANNED: (Benefits and precautions of physical therapy have been discussed with the patient.)  1. Balance Exercise  2. Bed Mobility  3. Family Education  4. Gait Training  5. Home Exercise Program (HEP)  6. Neuromuscular Re-education/Strengthening  7. Therapeutic Activites  8. Therapeutic Exercise/Strengthening  9. Transfer Training     TREATMENT PLAN: Frequency/Duration: 3 times a week for duration of hospital stay  Rehabilitation Potential For Stated Goals: Good     REHAB RECOMMENDATIONS (at time of discharge pending progress):    Placement: It is my opinion, based on this patient's performance to date, that Ms. Polo Boeck may benefit from intensive therapy at a [address] [place] [address] after discharge due to the functional deficits listed above that are likely to improve with skilled rehabilitation and concerns that he/she may be unsafe to be unsupervised at home due to medical complications and mobility deficits which put her at increase risk of functional decline and/or falling. LTACH. Equipment:    None at this time            HISTORY:   History of Present Injury/Illness (Reason for Referral):  Per H&P: \"Patient is a 62yo F with hx MS who presents after multiple falls. She had a fall with 4 days on floor several weeks ago, not seen by MD at that time. She had three or four falls today before presentation, helped up by a friend each time. She was down for 4 hours the last time she fell. She is sore all over, denies any acute injuries. No headaches, sore throat, cough, cp, n/v, change in bowel habit. Recently treated for dysuria and sx resolved on antibiotics. Finished antibiotic several days ago. Smoked 4-5 cigarettes daily until recently. Drank 1 glass wine daily until 4d ago. Quit because not feeling well, but can't describe how not feeling well. \"  Past Medical History/Comorbidities:   Ms. Polo Boeck  has a past medical history of Menopause. Ms. Polo Boeck  has no past surgical history on file.   Social History/Living Environment: Home Environment: Private residence  # Steps to Enter: 0  Wheelchair Ramp: Yes  One/Two Story Residence: One story  Living Alone: Yes  Support Systems: Friends \ neighbors  Patient Expects to be Discharged to[de-identified] Unknown  Current DME Used/Available at Home: Hospital bed, Tub transfer bench, Wheelchair, Wheelchair, power  Tub or Shower Type: Tub/Shower combination  Prior Level of Function/Work/Activity:   Prior to hospital admission pt lives alone, with friend support, in a one story home with no step(s) to enter. Pt endorses several falls in past 6 months. Prior to admission Ms. Baudilio Mello uses a power wheelchair for mobility which she reports prior to this onset of illness she was able to transfer to independently. Number of Personal Factors/Comorbidities that affect the Plan of Care: 1-2: MODERATE COMPLEXITY   EXAMINATION:   Most Recent Physical Functioning:   Gross Assessment:  AROM: Generally decreased, functional  Strength: Grossly decreased, non-functional  Coordination: Generally decreased, functional  Tone: Abnormal  Sensation: Intact               Posture:     Balance:  Sitting: Impaired  Sitting - Static: Poor (constant support)  Sitting - Dynamic: Poor (constant support) Bed Mobility:  Rolling: Maximum assistance;Assist x2  Supine to Sit: Maximum assistance;Assist x2  Sit to Supine: Maximum assistance;Assist x2  Interventions: Safety awareness training;Verbal cues; Tactile cues  Wheelchair Mobility:     Transfers:     Gait:            Body Structures Involved:  1. Nerves  2. Bones  3. Joints  4. Muscles Body Functions Affected:  1. Neuromusculoskeletal  2. Movement Related  3. Skin Related Activities and Participation Affected:  1. General Tasks and Demands  2. Mobility  3. Self Care  4.  Interpersonal Interactions and Relationships   Number of elements that affect the Plan of Care: 3: MODERATE COMPLEXITY   CLINICAL PRESENTATION:   Presentation: Evolving clinical presentation with changing clinical characteristics: MODERATE COMPLEXITY   CLINICAL DECISION MAKIN[address] [place] [address] AM-PAC 6 Clicks   Basic Mobility Inpatient Short Form  How much difficulty does the patient currently have. .. Unable A Lot A Little None   1. Turning over in bed (including adjusting bedclothes, sheets and blankets)? [] 1   [x] 2   [] 3   [] 4   2. Sitting down on and standing up from a chair with arms ( e.g., wheelchair, bedside commode, etc.)   [x] 1   [] 2   [] 3   [] 4   3. Moving from lying on back to sitting on the side of the bed? [] 1   [x] 2   [] 3   [] 4   How much help from another person does the patient currently need. .. Total A Lot A Little None   4. Moving to and from a bed to a chair (including a wheelchair)? [x] 1   [] 2   [] 3   [] 4   5. Need to walk in hospital room? [x] 1   [] 2   [] 3   [] 4   6. Climbing 3-5 steps with a railing? [x] 1   [] 2   [] 3   [] 4   © 2007, Trustees of [address] [place] [id number], under license to [employer]. All rights reserved      Score:  Initial: 8 Most Recent: X (Date: -- )    Interpretation of Tool:  Represents activities that are increasingly more difficult (i.e. Bed mobility, Transfers, Gait). Medical Necessity:     · Patient is expected to demonstrate progress in strength, range of motion, balance, coordination and functional technique to decrease assistance required with all mobility. Reason for Services/Other Comments:  · Patient continues to require skilled intervention due to medical complications and mobility deficits which impact her level of function, mobility, and independence as indicated above. Use of outcome tool(s) and clinical judgement create a POC that gives a: Questionable prediction of patient's progress: MODERATE COMPLEXITY        TREATMENT:   (In addition to Assessment/Re-Assessment sessions the following treatments were rendered)   Pre-treatment Symptoms/Complaints:  Pain, fatigue.   Pain: Initial:   Pain Intensity 1: 4  Pain Location 1: Buttocks  Post Session:  Unchanged. RN aware. Today's treatment session addressed Decreased Strength, Decreased ADL/Functional Activities, Decreased Transfer Abilities, Decreased Balance, Increased Pain and Decreased Skin Integrity/Hygeine to progress towards achieving stated therapy goals. During this session, Occupational Therapy addressed ADLs to progress towards their discipline specific goal(s). Co-treatment was necessary to improve patient's ability to follow higher level commands, ability to increase activity demands and ability to return to normal functional activity. Therapeutic Activity: (    38 Minutes): Therapeutic activities including Bed transfers and sitting balance control training to improve mobility, strength, balance and coordination. Required maximal   to promote static and dynamic balance in sitting and promote coordination of bilateral, upper extremity(s), lower extremity(s). Braces/Orthotics/Lines/Etc:   · IV  · Wound vac  · O2 Device: Room air  Treatment/Session Assessment:    · Response to Treatment:  See above  · Interdisciplinary Collaboration:   o Physical Therapist  o Occupational Therapist  o Registered Nurse  · After treatment position/precautions:   o Bed/Chair-wheels locked  o Bed in low position  o Call light within reach  o RN notified  o Sidelying   · Compliance with Program/Exercises: Will assess as treatment progresses  · Recommendations/Intent for next treatment session: \"Next visit will focus on advancements to more challenging activities and reduction in assistance provided\".     Total Treatment Duration:  PT Patient Time In/Time Out  Time In: 1105  Time Out: 1815 [facility]

## 2020-05-15 NOTE — PROGRESS NOTES
TRANSFER - IN REPORT:    Verbal report received from hospitals (name) on Armando Patel  being received from room 819 (unit) for ordered procedure. Report consisted of patients Situation, Background, Assessment and   Recommendations(SBAR). Information from the following report(s) SBAR was reviewed with the receiving nurse. Opportunity for questions and clarification was provided. Awaiting transport to bring pt to the GI Lab at this time.

## 2020-05-15 NOTE — CONSULTS
[facility] Consult Note       Primary GI Physician:  Dr. Amanda Irizarry    Referring Provider:  Dr. Suma Hall Date:  5/15/2020    Admit Date:  5/13/2020    Chief Complaint:  RUQ discomfort and CBD stone on MRCP    Subjective:     History of Present Illness:  Patient is a 63 y.o. female with PMH of MS - wheelchair bound, who is seen in consultation at the request of Dr. Donald Morillo for RUQ discomfort and CBD stone on MRCP. She was admitted after presenting after multiple falls and hurting all over. She was noted on labs to have elevated LFTs, GLENROY, hyponatremia, and leukocytosis. RUQ US noted CBD and MRCP obtained and noted distal CBD stone. She also has a sacral decubitus ulcer and underwent debridement yesterday. She has had some confusion over this admission, but is more alert today. She denies any abd pain, nausea, vomiting, heartburn, chest pain, chronic cough, difficulty swallowing, changes in bowel habits, or bloody or black stools. She denies any prior EGD or colonoscopy, but in review of our records, she had an incomplete colonoscopy in 2013 with Dr. Amanda Irizarry with solid stool noted. She is wheelchair dependent. Her father had colon cancer about age 79, as well as ulcers and hx of pancreatitis. PMH:  Past Medical History:   Diagnosis Date    Menopause      MS - wheelchair bound    Incomplete colonoscopy (open access colonoscopy) 2013 with Dr. Amanda Irizarry with solid stool. PSH:  Debridement of decubitus ulcer. Allergies: Allergies   Allergen Reactions    Latex Rash    Pcn [Penicillins] Swelling       Home Medications:  Prior to Admission medications    Medication Sig Start Date End Date Taking? Authorizing Provider   amantadine HCL (SYMMETREL) 100 mg capsule Take 100 mg by mouth two (2) times a day. Yes Other, MD Marga   HYDROcodone-acetaminophen (XODOL)  mg tab per tablet Take 1 Tab by mouth four (4) times daily as needed.    Yes Other, MD MARIELLA Gresham Medications:  Current Facility-Administered Medications   Medication Dose Route Frequency    0.9% sodium chloride with KCl 20 mEq/L infusion   IntraVENous CONTINUOUS    potassium chloride (K-DUR, KLOR-CON) SR tablet 40 mEq  40 mEq Oral NOW    morphine injection 2 mg  2 mg IntraVENous Q3H PRN    oxyCODONE-acetaminophen (PERCOCET) 5-325 mg per tablet 1 Tab  1 Tab Oral Q4H PRN    amantadine HCL (SYMMETREL) capsule 100 mg  100 mg Oral BID    sodium chloride (NS) flush 5-40 mL  5-40 mL IntraVENous Q8H    sodium chloride (NS) flush 5-40 mL  5-40 mL IntraVENous PRN    acetaminophen (TYLENOL) tablet 650 mg  650 mg Oral Q4H PRN    HYDROcodone-acetaminophen (NORCO) 5-325 mg per tablet 1 Tab  1 Tab Oral Q4H PRN    ondansetron (ZOFRAN ODT) tablet 4 mg  4 mg Oral Q4H PRN    bisacodyL (DULCOLAX) tablet 5 mg  5 mg Oral DAILY PRN    cefepime (MAXIPIME) 1 g in 0.9% sodium chloride (MBP/ADV) 50 mL  1 g IntraVENous Q24H    vancomycin (VANCOCIN) 750 mg in  mL infusion  750 mg IntraVENous Q18H    sodium hypochlorite (QUARTER STRENGTH DAKIN'S) 0.125% irrigation (bottle)   Topical DAILY    pantothenic ac-min oil-pet,hyd (AQUAPHOR) 41 % ointment   Topical PRN       Social History:  Social History     Tobacco Use    Smoking status: Not on file   Substance Use Topics    Alcohol use: Not Currently       Family History:  Family History   Problem Relation Age of Onset    Breast Cancer Mother      Her father had colon cancer about age 79, as well as ulcers and hx of pancreatitis. Review of Systems:  A detailed 10 system ROS is obtained, with pertinent positives as listed above. All others are negative.     Diet:  NPO    Objective:     Physical Exam:  Vitals:  Visit Vitals  /76 (BP 1 Location: Right arm, BP Patient Position: At rest)   Pulse 90   Temp 98.9 °F (37.2 °C)   Resp 18   SpO2 95%     Gen:  Pt is alert, cooperative, no acute distress, lying in bed on left side (RN changing pt's wound vac)  Skin: Extremities and face reveal no rashes. HEENT: Sclerae anicteric. Extra-occular muscles are intact. No oral ulcers. No abnormal pigmentation of the lips. The neck is supple. Cardiovascular: Regular rate and rhythm. No murmurs, gallops, or rubs. Respiratory:  Comfortable breathing with no accessory muscle use. Clear breath sounds anteriorly with no wheezes, rales, or rhonchi. GI:  Abdomen nondistended, soft, and nontender. Normal active bowel sounds. No enlargement of the liver or spleen. No masses palpable. Rectal:  Deferred  Musculoskeletal:  Edema of the lower legs, SCDs in place. Neurological:  Gross memory appears intact. Patient is alert and oriented. Psychiatric:  Mood appears appropriate with judgement intact. Lymphatic:  No cervical or supraclavicular adenopathy. Laboratory:    Recent Labs     05/15/20  0538 05/14/20  1759 05/13/20  0649 05/13/20  0153   WBC 17.3* 19.1* 15.1* 15.3*   HGB 11.4* 11.7 13.8 13.8   HCT 32.3* 34.7* 40.1 39.6    244 239 261   MCV 96.4 100.3* 99.0* 96.8    142 136 130*   K 2.9* 4.0 3.8 4.1   * 108* 99 96*   CO2 25 25 24 22   BUN 24* 33* 85* 91*   CREA 0.37* 0.49* 1.22* 1.52*   CA 8.3 9.0 9.7 9.9   MG  --   --   --  2.9*   * 169* 100 92   *  --  320* 274*   SGOT 31  --  92* 65*   ALT 39  --  68* 58   TBILI 0.4  --  0.6 0.7   ALB 1.7*  --  2.5* 2.7*   TP 5.2*  --  6.7 7.2      Ref. Range 5/13/2020 01:53 5/13/2020 06:49   Troponin-I, Qt. Latest Ref Range: 0.02 - 0.05 NG/ML <0.02 (L)    Prealbumin Latest Ref Range: 18.0 - 35.7 MG/DL  6.85 (L)     Right Upper Quadrant Ultrasound 13 May 2020   INDICATION:  Transaminitis, hyponatremia, leukocytosis, abnormal urinalysis;  generalized weakness and multiple recent falls, acute kidney injury.  Cachexia,  multiple sclerosis.   COMPARISON: none   TECHNIQUE:  Sonographic gray scale and Doppler images were obtained of the right  upper quadrant of the abdomen.   FINDINGS: There are no discrete lesions in the visualized portions of the liver. Liver size is 15 cm, within normal limits.   Main portal vein is patent on Doppler imaging.   The common bile duct diameter is abnormally dilated at 1.2 cm. The gallbladder  demonstrates intraluminal mobile debris and sludge. No gallstones or  pericholecystic fluid are evident. There is no wall thickening. Sonographic  Barboza's sign is negative.   There are no discrete lesions in the limited visualized portions of the  pancreas. Pancreatic duct is normal in caliber as seen.   The right kidney measures 10.8 cm in length. There is no hydronephrosis. There  is no evidence of a solid renal mass.    There is no evidence of ascites.    The aorta and IVC are patent on Doppler imaging. Aortic diameter is within  normal limits. Atherosclerotic changes are noted of the aorta.   IMPRESSION  IMPRESSION:   1. Gallbladder sludge. 2. Common bile duct dilatation. MRCP, 5/14/2020:   Indication: Elevated liver function tests, and dilated common bile duct on  ultrasound.   Procedure: Three-dimensional MRCP was performed using maximum intensity  projection reconstruction. The examination consisted of axial T2W/HASTE, axial  fat-saturated T2W, axial T1 in and an out-of-phase,and T2 weighted MIP images  rotated about the axis of the biliary tree generated.   Comparison: Limited abdominal ultrasound 5/13/2020    Findings:   Evaluation of the lung bases is limited by MRI imaging. Small bilateral  dependent pleural effusions are seen. Additional dependent signal is seen in the  left lung base which may represent additional dependent atelectatic changes.   The gallbladder appears distended and mildly thick-walled. No focal filling  defect is seen within the gallbladder to suggest a gallstone. No significant  intrahepatic biliary ductal dilatation is seen. However, the extrahepatic bile  ducts measures up to 12 mm in diameter which is dilated.  A tiny 1 to 2 mm  filling defect is seen at the most distal common bile duct on axial T2-weighted  image 21, and MRCP image 78. Although this could be artifactual, the possibility  of a tiny distal biliary stone cannot be excluded. No abnormal focal caliber  changes are seen of the biliary tree. The pancreatic duct is normal in caliber  measuring 1 to 2 mm. The course of the pancreatic duct is normal without  evidence for pancreas divisum.   Evaluation of the solid organs is somewahat limited without intravenous  contrast. However, no focal signal abnormalities are definitely seen of the  liver, spleen, pancreas or kidneys. No significant drop in signal intensity of  the liver is seen to suggest fatty infiltration. No contour deforming  abnormalities are seen of the pancreas although a small lesion could be missed  due to the noncontrast technique. No evidence of hydronephrosis is seen. No  contour deforming abnormality is seen of the kidneys.   Assessment of bowel and peritoneum is limited given that there is significant  patient breathing motion artifact and this patient is relatively thin. No  abnormal bowel dilation is seen. On MRCP images, there is suggested asymmetric  edema in the left abdomen. This closely approximates the pancreatic tail and  left kidney.   IMPRESSION  IMPRESSION:  1. Dilated common bile duct measuring up to 12 mm in diameter. A tiny 1 to 2 mm  filling defect is suggested in the most distal common bile duct on axial T2 and  MRCP images. A tiny distal biliary stone cannot be excluded.   2.  Left abdominal mesenteric edema poorly assessed due to significant patient  breathing motion artifact and the patient's thin body habitus. This most closely  approximates the pancreatic tail and left kidney and can suggest potential acute  inflammation of either of these structures. Recommend correlation for  signs/symptoms of either pancreatitis or left pyelonephritis.   3.  Distended gallbladder with mild gallbladder wall thickening.  The gallbladder  was better assessed by ultrasound and I would refer to that examination for  assessment of this structure. Covid-19 testing 13 May 2020 pending    Hepatitis panel pending 14 May 2020    Assessment:     Active Problems:    GLENROY (acute kidney injury) ([facility] (5/13/2020)      Decubitus ulcer of ischial area, right, unspecified pressure ulcer stage (5/13/2020)      Transaminitis (5/13/2020)      Acute metabolic encephalopathy (6/78/8932)      Multiple sclerosis (HCC) (5/13/2020)      Protein calorie malnutrition ([address] (5/13/2020)      Leukocytosis (5/13/2020)    62 yo female pt of Dr. Devyn Dorman with PMH of MS - wheelchair dependent, who is seen in consultation 15 May 2020 at the request of Dr. Nathan Paige for RUQ discomfort and CBD stone on MRCP, who was admitted after presenting after multiple falls and hurting all over and was noted on labs to have elevated LFTs, GLENROY, hyponatremia, and leukocytosis. RUQ US noted CBD and MRCP obtained and noted distal CBD stone. Though she has been asymptomatic from GI standpoint, concern for risk of cholangitis or pancreatitis given MRCP findings. She also has a sacral decubitus ulcer and underwent debridement yesterday. In review of our records, she had an incomplete colonoscopy in 2013 with Dr. Devyn Dorman with solid stool noted. Her father had colon cancer about age 79, as well as ulcers and hx of pancreatitis. Plan:     -Supportive care, maintain electrolytes. -ERCP today with Dr. Corby Mittal. Discussed risks including bleeding, perforation, IV complications, sedation risks, and pt states understanding and agrees to proceed.   -NPO. -Will give dose of KCL 40 meq IV now prior to ERCP. -Monitor labs. -Follow. Patient is seen and examined in collaboration with Dr. Chilango Webb. Assessment and plan as per Dr. Corby Mittal. [facility]  [facility]

## 2020-05-15 NOTE — ANESTHESIA POSTPROCEDURE EVALUATION
Procedure(s):  ENDOSCOPIC RETROGRADE CHOLANGIOPANCREATOGRAPHY (ERCP)  ENDOSCOPIC SPHINCTEROTOMY  ENDOSCOPIC STONE EXTRACTION/BALLOON SWEEP. general    Anesthesia Post Evaluation      Multimodal analgesia: multimodal analgesia used between 6 hours prior to anesthesia start to PACU discharge  Patient location during evaluation: bedside  Patient participation: complete - patient participated  Level of consciousness: awake  Pain score: 1  Pain management: adequate  Airway patency: patent  Anesthetic complications: no  Cardiovascular status: acceptable  Respiratory status: acceptable  Hydration status: acceptable  Comments: Patient doing well. Continue care on floor. Post anesthesia nausea and vomiting:  none      Vitals Value Taken Time   /77 5/15/2020  3:18 PM   Temp 36.5 °C (97.7 °F) 5/15/2020  3:00 PM   Pulse 82 5/15/2020  3:21 PM   Resp 17 5/15/2020  3:00 PM   SpO2 100 % 5/15/2020  3:21 PM   Vitals shown include unvalidated device data.

## 2020-05-15 NOTE — PROGRESS NOTES
Nutrition:  Provided CM with number for sister provided by patient's friend yesteday.     [address] [facility], , LD

## 2020-05-15 NOTE — PROGRESS NOTES
Pt continues to be alert and oriented, NPO for ERCP today. Potassium to be repleted. VAC intact; frequent turn and position as pt has many areas of breakdown/fragility. Dietician to order ensure supplements.

## 2020-05-15 NOTE — CONSULTS
[facility] Neurology [facility]  [address] [facility]  [address] [place] [address], [address] [place] [address]          No chief complaint on file. Liliam Yanes is a 63 y.o. female who presents on referral from the hospitalist service    28-year-old female with a longstanding history of multiple sclerosis admitted with multiple problems including a sacral decubitus ulcer which is being addressed by surgery. Asked to see her with reference to diminished responsiveness and suggestions with reference to ongoing management  Patient's course has been characterized by some degree of progression of disability which is been insidious. She is under the care of Dr. Raul Cooper. She has been on fingolimod she believes ever since the drug was introduced to the market which was early 2011. Prior to that time she was on high-dose interferon beta-1a in the form of rebif  She has not been Tysabri nor has she been on other oral such as Tecfidera or Aubagio. She has not had B-cell therapy in the form of ocrelizumab    The patient's last dose of fingolimod was 4 days ago. Past Medical History:   Diagnosis Date    Menopause        No past surgical history on file.     Family History   Problem Relation Age of Onset    Breast Cancer Mother        Social History     Socioeconomic History    Marital status: SINGLE     Spouse name: Not on file    Number of children: Not on file    Years of education: Not on file    Highest education level: Not on file   Substance and Sexual Activity    Alcohol use: Not Currently    Drug use: Not Currently           Current Facility-Administered Medications:     potassium chloride 20 mEq in 100 ml IVPB, 20 mEq, IntraVENous, Q2H, Verna Byrnes PA, Last Rate: 50 mL/hr at 05/15/20 0945, 20 mEq at 05/15/20 0945    0.9% sodium chloride with KCl 20 mEq/L infusion, , IntraVENous, CONTINUOUS, Bulmaro Montgomery MD    morphine injection 2 mg, 2 mg, IntraVENous, Q3H PRN, Zach, Viviana Filter, DO   oxyCODONE-acetaminophen (PERCOCET) 5-325 mg per tablet 1 Tab, 1 Tab, Oral, Q4H PRN, Zach, Claybon Ditty, DO    amantadine HCL (SYMMETREL) capsule 100 mg, 100 mg, Oral, BID, Zach, Claybon Ditty, DO, 100 mg at 05/15/20 0910    sodium chloride (NS) flush 5-40 mL, 5-40 mL, IntraVENous, Q8H, Zach, Claybon Ditty, DO, 10 mL at 05/15/20 0552    sodium chloride (NS) flush 5-40 mL, 5-40 mL, IntraVENous, PRN, Zach, Claybon Ditty, DO    acetaminophen (TYLENOL) tablet 650 mg, 650 mg, Oral, Q4H PRN, Zach, Claybon Ditty, DO    HYDROcodone-acetaminophen (NORCO) 5-325 mg per tablet 1 Tab, 1 Tab, Oral, Q4H PRN, Zach, Claybon Ditty, DO, 1 Tab at 05/15/20 0605    ondansetron (ZOFRAN ODT) tablet 4 mg, 4 mg, Oral, Q4H PRN, Zach, Claybon Ditty, DO    bisacodyL (DULCOLAX) tablet 5 mg, 5 mg, Oral, DAILY PRN, Zach, Claybon Ditty, DO    cefepime (MAXIPIME) 1 g in 0.9% sodium chloride (MBP/ADV) 50 mL, 1 g, IntraVENous, Q24H, Zach, Claybon Ditty, DO, Last Rate: 100 mL/hr at 05/14/20 1650, 1 g at 05/14/20 1650    vancomycin (VANCOCIN) 750 mg in  mL infusion, 750 mg, IntraVENous, Q18H, Zach, Claybon Ditty, DO, Last Rate: 250 mL/hr at 05/15/20 0552, 750 mg at 05/15/20 0552    sodium hypochlorite (QUARTER STRENGTH DAKIN'S) 0.125% irrigation (bottle), , Topical, DAILY, Zach, Claybon Ditty, DO, Stopped at 05/14/20 0900    pantothenic ac-min oil-pet,hyd (AQUAPHOR) 41 % ointment, , Topical, PRN, Zach, Michael Ditty, DO    Allergies   Allergen Reactions    Latex Rash    Pcn [Penicillins] Swelling       Review of Systems  Substantial amount of generalized discomfort with her MS. Poor appetite. No GI bleeding. Substantial fatigue and poor energy. Visit Vitals  /72 (BP 1 Location: Right arm, BP Patient Position: At rest;Supine)   Pulse 87   Temp 99.4 °F (37.4 °C)   Resp 16   SpO2 96%       Neurologic Exam    Alert and cooperative.   She is able to give reasonable details of her history but has a degree of generalized cognitive impairment  She is petite small muscle. Overall substantial deconditioning  No cranial nerve signs with the exception of minimal droop to the left side of the face. Motor examination demonstrates poor muscle development. There is no focal drift there is generalized weakness proximally and distally with a degree of spasticity. There is hypotonia in both lower extremities. There is bilateral Achilles tendon contracture. There is hyperreflexia there are bilateral upgoing toes. No sensory level is identified  Most recent MRI  Results from [place] encounter on 05/13/20   MRI BRAIN W WO CONT    Narrative MRI brain with and without contrast    History: extremity weakness/pain, eval for MS exacerbation. Imaging sequences: Sagittal short TR/short TE, axial short TR/short TE, long  TR/long TE, sagittal and axial FLAIR, gradient recall, diffusion weighted images  and ADC mapping. Axial and coronal short TR/short TE postcontrast images. Imaging was performed on a 1.5 Melissa magnet, utilizing the uneventful  administration of 9 mL of intravenous Dotarem and order to better evaluate for  intracranial pathology. Comparison: 02/24/2014    Findings: The ventricles and sulci are prominent compatible with moderate volume  loss for age with probable progression. 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 There are no  extra-axial fluid collections. Normal flow voids are present within all of the  major intracranial vessels. No evidence of intraparenchymal hemorrhage or mass  effect is identified. .  There are no areas of restricted diffusion to suggest an  acute or subacute infarction. There is a remote left cerebellar infarction,  unchanged. Several patchy and discrete foci of T2 hyperintensity are once again  noted within the supratentorial white matter, without significant change . There  is mild smooth thin enhancement of the pachymeninges.  No additional areas of  abnormal enhancement identified. The visualized mastoid air cells and paranasal  sinuses are well pneumatized and aerated. Impression Impression:  1. No appreciable change in the multifocal areas of nonenhancing T2  hyperintensity within the supratentorial white matter. These are nonspecific  findings but would be compatible the patient's clinical diagnosis of multiple  sclerosis. 2. Moderate volume loss. 3. Remote left cerebellar infarction. 4. Smooth enhancement of the pachymeninges. This can be seen secondary to  intracranial hypotension including from recent lumbar puncture. Other meningeal  processes including infectious meningitis cannot be entirely excluded. Most recent MRA  Results from [place] encounter on 05/13/20   MRI BRAIN W WO CONT    Narrative MRI brain with and without contrast    History: extremity weakness/pain, eval for MS exacerbation. Imaging sequences: Sagittal short TR/short TE, axial short TR/short TE, long  TR/long TE, sagittal and axial FLAIR, gradient recall, diffusion weighted images  and ADC mapping. Axial and coronal short TR/short TE postcontrast images. Imaging was performed on a 1.5 Melissa magnet, utilizing the uneventful  administration of 9 mL of intravenous Dotarem and order to better evaluate for  intracranial pathology. Comparison: 02/24/2014    Findings: The ventricles and sulci are prominent compatible with moderate volume  loss for age with probable progression. 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 There are no  extra-axial fluid collections. Normal flow voids are present within all of the  major intracranial vessels. No evidence of intraparenchymal hemorrhage or mass  effect is identified. .  There are no areas of restricted diffusion to suggest an  acute or subacute infarction. There is a remote left cerebellar infarction,  unchanged.  Several patchy and discrete foci of T2 hyperintensity are once again  noted within the supratentorial white matter, without significant change . There  is mild smooth thin enhancement of the pachymeninges. No additional areas of  abnormal enhancement identified. The visualized mastoid air cells and paranasal  sinuses are well pneumatized and aerated. Impression Impression:  1. No appreciable change in the multifocal areas of nonenhancing T2  hyperintensity within the supratentorial white matter. These are nonspecific  findings but would be compatible the patient's clinical diagnosis of multiple  sclerosis. 2. Moderate volume loss. 3. Remote left cerebellar infarction. 4. Smooth enhancement of the pachymeninges. This can be seen secondary to  intracranial hypotension including from recent lumbar puncture. Other meningeal  processes including infectious meningitis cannot be entirely excluded. Most recent CTA  No results found for this or any previous visit. Most recent Echo  No results found for this visit on 05/13/20. Most recent lipid panels  No results found for: CHOL, CHOLPOCT, CHOLX, CHLST, CHOLV, HDL, HDLPOC, HDLP, LDL, LDLCPOC, LDLC, DLDLP, VLDLC, VLDL, TGLX, TRIGL, TRIGP, TGLPOCT, CHHD, CHHDX    Most recent Hgb A1C  No results found for: HBA1C, HGBE8, XBH4QISR, ADA9BKQK              Diagnoses and all orders for this visit:    1. Decubitus ulcer of ischial area, right, unspecified pressure ulcer stage    2. Multiple sclerosis ([address]    3. Transaminitis    4.  Protein-calorie malnutrition, unspecified severity ([address]    Other orders  -     amantadine HCL (SYMMETREL) capsule 100 mg  -     sodium chloride (NS) flush 5-40 mL  -     sodium chloride (NS) flush 5-40 mL  -     DO NOT RESUSCITATE; Standing  -     VITAL SIGNS PER UNIT ROUTINE; Standing  -     acetaminophen (TYLENOL) tablet 650 mg  -     HYDROcodone-acetaminophen (NORCO) 5-325 mg per tablet 1 Tab  -     ondansetron (ZOFRAN ODT) tablet 4 mg  - bisacodyL (DULCOLAX) tablet 5 mg  -     POC GLUCOSE; Standing  -     METABOLIC PANEL, COMPREHENSIVE; Standing  -     CBC WITH AUTOMATED DIFF; Standing  -     PLEASE READ & DOCUMENT PPD TEST IN 24 HRS; Standing  -     PLEASE READ & DOCUMENT PPD TEST IN 48 HRS; Standing  -     PLEASE READ & DOCUMENT PPD TEST IN 72 HRS; Standing  -     tuberculin injection 5 Units  -     PREALBUMIN; Standing  -     US ABD LTD; Standing  -     GLUCOSE, POC; Standing  -     GLUCOSE, POC; Standing  -     GLUCOSE, POC; Standing  -     GLUCOSE, POC; Standing  -     INITIAL PHYSICIAN ORDER: INPATIENT; Standing  -     DRUG SCREEN, URINE; Standing  -     MRI ABD WO CONT; Standing  -     STRAIGHT CATHETER (NURSING); Standing  -     NURSING-MISCELLANEOUS:; Standing  -     IP CONSULT TO WOUND CARE; Standing  -     SPECIALTY BED; Standing  -     IP CONSULT TO GENERAL SURGERY; Standing  -     IP CONSULT TO PHARMACY - VANCOMYCIN DOSING; Standing  -     cefepime (MAXIPIME) 1 g in 0.9% sodium chloride (MBP/ADV) 50 mL  -     XR ANKLE RT AP/LAT; Standing  -     vancomycin (VANCOCIN) 1250 mg in  ml infusion  -     vancomycin (VANCOCIN) 750 mg in  mL infusion  -     WOUND CARE, DRESSING CHANGE; Standing  -     sodium hypochlorite (QUARTER STRENGTH DAKIN'S) 0.125% irrigation (bottle)  -     pantothenic ac-min oil-pet,hyd (AQUAPHOR) 41 % ointment  -     WOUND CARE, DRESSING CHANGE; Standing  -     MATERIAL DISTRIBUTION MESSAGE; Standing  -     TURN & POSITION; Standing  -     GLUCOSE, POC; Standing  -     IP CONSULT TO NUTRITION SERVICES; Standing  -     EMERGENT DISEASE PANEL; Standing  -     PT--EVAL, DEVISE PLAN OF CARE AND TREAT; Standing  -     OT--EVAL, DEVISE PLAN OF CARE AND TREAT; Standing  -     GLUCOSE, POC; Standing  -     STRAIGHT CATHETER (NURSING);  Standing  -     URINALYSIS W/ RFLX MICROSCOPIC; Standing  -     VERIFY CONSENT HAS BEEN OBTAINED; Standing  -     GLUCOSE, POC; Standing  -     MRI BRAIN W WO CONT; Standing  - TRANSFER PATIENT; Standing  -     IP CONSULT TO WOUND CARE; Standing  -     morphine injection 2 mg  -     oxyCODONE-acetaminophen (PERCOCET) 5-325 mg per tablet 1 Tab  -     GLUCOSE, POC; Standing  -     CULTURE, BLOOD; Standing  -     CULTURE, BLOOD; Standing  -     CBC W/O DIFF; Standing  -     METABOLIC PANEL, BASIC; Standing  -     gadoterate meglumine (DOTAREM) 0.5 mmol/mL (376.9 mg/mL) contrast solution 9 mL  -     saline peripheral flush soln 10 mL  -     DIET NUTRITIONAL SUPPLEMENTS; Standing  -     GLUCOSE, POC; Standing  -     HEPATITIS PANEL, ACUTE; Standing  -     IP CONSULT TO NEUROLOGY; Standing  -     IP CONSULT TO GASTROENTEROLOGY; Standing  -     METABOLIC PANEL, COMPREHENSIVE; Standing  -     CBC W/O DIFF; Standing  -     GLUCOSE, POC; Standing  -     MATERIAL DISTRIBUTION MESSAGE; Standing  -     POTASSIUM; Standing  -     MAGNESIUM; Standing  -     potassium chloride (K-DUR, KLOR-CON) SR tablet 40 mEq  -     GLUCOSE, POC; Standing  -     DIET NPO; Standing  -     VERIFY CONSENT HAS BEEN OBTAINED; Standing  -     ERCP; Standing  -     STF SURGICAL PATHOLOGY; Standing  -     potassium chloride 20 mEq in 100 ml IVPB  -     WOUND CARE, DRESSING CHANGE; Standing  -     0.9% sodium chloride with KCl 20 mEq/L infusion      Impression    Secondary progressive multiple sclerosis. Recent discontinuation of fingolimod. Acute discontinuation of fingolimod is associated with a incidence of material worsening of underlying multiple sclerosis and there is a recent FDA warning to that effect. Accordingly there is a rationale to continuing it in terms of avoiding this complication  On the other hand,I also have a bias against using it where there are decubitus ulcers. Brain MRI has not shown significant evolution however for the patient on the drug for a period of a number of years. Her spine undoubtedly has substantial disease burden and has not been imaged however.   Fingolimod once discontinued needs to be restarted with cardiac monitoring for the initial 6 hours  My bias with reference to her management would be to reinstitute fingolimod but at the point 2.5 mg daily dosage which can conveniently be given as 5 mg every other day off label.   This is going to have somewhat less impact on her immunologic response but will hopefully bridge her over in terms of MS relapse    We appreciate being involved in her care            Shayna Romo MD

## 2020-05-15 NOTE — ANESTHESIA PREPROCEDURE EVALUATION
Relevant Problems   No relevant active problems       Anesthetic History   No history of anesthetic complications            Review of Systems / Medical History  Patient summary reviewed and pertinent labs reviewed    Pulmonary          Smoker (Former)         Neuro/Psych             Comments: MS - limited to lower extremities and arms - no pharyngeal weakness/symptoms  Cardiovascular                  Exercise tolerance: <4 METS  Comments: Denies known CV history   GI/Hepatic/Renal         Renal disease (GLENROY ): ARF      Comments: Mild transaminitis  Endo/Other             Other Findings   Comments: Decub ulcer for I&D   MS Flair           Physical Exam    Airway  Mallampati: II  TM Distance: 4 - 6 cm  Neck ROM: normal range of motion   Mouth opening: Normal     Cardiovascular    Rhythm: regular  Rate: normal         Dental  No notable dental hx       Pulmonary  Breath sounds clear to auscultation               Abdominal  GI exam deferred       Other Findings            Anesthetic Plan    ASA: 3  Anesthesia type: general          Induction: Intravenous  Anesthetic plan and risks discussed with: Patient      Aware DNR suspended for perioperative period. Potassium replaced.  GETA with sugammadex for reversal.

## 2020-05-15 NOTE — PROGRESS NOTES
Hospitalist Progress Note    5/15/2020  Admit Date: 2020  5:12 AM   NAME: Severa Rumpf   :  1960   MRN:  [id number]   Attending: Vince Baig MD  PCP:  Estela Sainz MD    SUBJECTIVE:   Patient 50-year-old female history of MS follows with Dr. Cristina Springer (no relation) presents to the emergency room after sustaining multiple falls. Patient reports she \"hurts all over\". Denies any headache, sore throat, cough, chest pain, nausea vomiting. Completed course of antibiotics approximately 1 week ago per report for UTI. Admits to tobacco abuse and 1 glass of wine daily. ED work-up was notable for GLENROY, dehydration, hyponatremia, transaminitis, leukocytosis. She was admitted for the further work-up to include a right upper quadrant ultrasound which shows dilated CBD, IV fluid resuscitation, PT OT. Per d/w pt's friend Miya Gutierrez pt is wheelchair dependent, can usually transfer independently. Sustained fall 1.5 months ago and has been \"going downhill\" since - more weakness, confusion and eating/drinking less. Today, pt appears fully oriented, no ac events, had ERCP    PHYSICAL EXAM     Visit Vitals  /80 (BP 1 Location: Right arm, BP Patient Position: At rest;Supine)   Pulse 84   Temp 98.1 °F (36.7 °C)   Resp 17   SpO2 98%      Temp (24hrs), Av.5 °F (36.9 °C), Min:97.7 °F (36.5 °C), Max:99.5 °F (37.5 °C)    Oxygen Therapy  O2 Sat (%): 98 % (05/15/20 1558)  Pulse via Oximetry: 81 beats per minute (05/15/20 1538)  O2 Device: Nasal cannula (05/15/20 153)  O2 Flow Rate (L/min): 2 l/min (05/15/20 153)    Intake/Output Summary (Last 24 hours) at 5/15/2020 1707  Last data filed at 5/15/2020 1452  Gross per 24 hour   Intake 2200 ml   Output 0 ml   Net 2200 ml      General: AAO3, pale, mild to moderate distress, thin/frail. Appears older than stated age    Lungs:  CTA Bilaterally.  Diminished bibasilar  Heart:  Regular rate and rhythm,  No murmur, rub, or gallop  Abdomen: Soft, Non distended, Non tender, Positive bowel sounds  Extremities: No cyanosis, clubbing or edema  Neurologic:  No focal deficits    ASSESSMENT      1- GLENROY - secondary to dehydration. Resolved    2- Hyponatremia - hypovolemic, corrected with IVF    3- Right ischial decub ulcer, right lateral malleoli ulcer - wound care has seen. recs s/p sacral decub debridement by gen sx 5/14. Plain film of ankle neg for osteo. cont vanco/cefepime D2  Blood cultures added after atbx - follow. Discussed with wound care RN    4- M\ultiple sclerosis - MRI brain unchanged with regard to hyperintensities. Does mention smooth enhancement of meninges - seen by Neuro, defer Rx      5- CBD minimal sludge, s/p ERCP and papillotomy today    6- protein calorie malnutrition - nutrition consult    7- Acute metabolic encephalopathy - likely secondary to infection, dehydration, malnutrition.  resolved    8- HTN - controlled    Dispo; rehab    Signed By: Lakshmi Marin MD     May 15, 2020

## 2020-05-15 NOTE — PROCEDURES
ENDOSCOPIC  RETROGRADE CHOLANGIOPANCREATOGRAPHY    DATE of PROCEDURE: 5/15/2020    PT NAME: Larwence Baumgarten     [id number]    MEDICATION: general    INSTRUMENT:  [id number]    SPECIAL PROCEDURE:papillotomy w/ balloon sweep  BLOOD LOSS- 0 to min. SPEC- no  IMPLANT- none    PROCEDURE: standard procedure w/o complications    ASSESSMENT:  1. Stenotic orifice with CBD of 10 mm- minimal sludge extracted   2.  Pancreas normal by guide wire  3. GB not seen    PLAN:  1. inpt f/u    C Ana Cristina Mckee MD

## 2020-05-15 NOTE — PROGRESS NOTES
[address]  [address] [facility], [facility] [address], [address]      PLAN:  NPO  ERCP today with GI  Wound care following for wound vac to right ischial wound  Eventual lap jj after ERCP      ASSESSMENT:  Admit Date: 5/13/2020   * No surgery date entered *  Procedure(s) with comments:  INCISION AND DRAINAGE DECUBITUS - I&D ischecial decubitus    Principal Problem:    Decubitus ulcer of ischial area, right, unspecified pressure ulcer stage (5/13/2020)    Active Problems:    GLENROY (acute kidney injury) ([facility] (5/13/2020)      Transaminitis (5/13/2020)      Acute metabolic encephalopathy (3/49/9013)      Multiple sclerosis ([facility] (5/13/2020)      Protein calorie malnutrition ([facility] (5/13/2020)      Leukocytosis (5/13/2020)         Linus Barrett is more alert today. She denies abdominal pain, N/V. WB 17.  Wound RN doing vac change at bedside. OBJECTIVE:  Constitutional: Alert oriented cooperative patient in no acute distress; appears stated age   Visit Vitals  /72 (BP 1 Location: Right arm, BP Patient Position: At rest;Supine)   Pulse 87   Temp 99.4 °F (37.4 °C)   Resp 16   SpO2 96%     Eyes: Sclera are clear. ENMT: no external lesions gross hearing normal; no obvious neck masses, no ear or lip lesions  CV: RRR. Normal perfusion  Resp: No JVD. Breathing is  non-labored; no audible wheezing. GI: Soft. Non distended. Musculoskeletal: unremarkable with normal function. No embolic signs or cyanosis. Integument: right ischial wound slough/eschar in base and pink areas surrounding it. Moderate amount of serosanguinous drainage noted.   Neuro:  Oriented; moves all 4; no focal deficits  Psychiatric: normal affect and mood, no memory impairment      Patient Vitals for the past 24 hrs:   BP Temp Pulse Resp SpO2   05/15/20 0722 102/72 99.4 °F (37.4 °C) 87 16 96 %   05/15/20 0423 135/76 98.9 °F (37.2 °C) 90 18 95 %   05/15/20 0012 126/72 99.5 °F (37.5 °C) 86 18 97 % 05/14/20 2000 112/65 98.3 °F (36.8 °C) 84 18 100 %   05/14/20 1653 117/70 97.9 °F (36.6 °C) 91 16    05/14/20 1329 108/66 98 °F (36.7 °C) 86 18 98 %   05/14/20 1228 126/73 98.3 °F (36.8 °C) 84 17 100 %   05/14/20 1222 132/72  84 16 100 %   05/14/20 1217 126/68  85 16 100 %   05/14/20 1212 126/69  85 16 100 %   05/14/20 1207 131/70  86 16 100 %   05/14/20 1202 131/67  85 16 100 %   05/14/20 1158 100/48 98.1 °F (36.7 °C) 87 15 100 %     Labs:    Recent Labs     05/15/20  0538  05/13/20  0153   WBC 17.3*   < > 15.3*   HGB 11.4*   < > 13.8      < > 261      < > 130*   K 2.9*   < > 4.1   *   < > 96*   CO2 25   < > 22   BUN 24*   < > 91*   CREA 0.37*   < > 1.52*   *   < > 92   TBILI 0.4   < > 0.7   SGOT 31   < > 65*   ALT 39   < > 58   *   < > 274*   TROIQ  --   --  <0.02*    < > = values in this interval not displayed.          MICHELINE Tomlin

## 2020-05-15 NOTE — PROGRESS NOTES
Nurse notified Dr Thuan Eugene  about patient potassium (2.9) through perfect serve. No orders received.

## 2020-05-15 NOTE — PROGRESS NOTES
TRANSFER - IN REPORT:    Verbal report received from Christin RN(name) on Clark Senior  being received from GI lab(unit) for routine progression of care      Report consisted of patients Situation, Background, Assessment and   Recommendations(SBAR). Information from the following report(s) Procedure Summary and MAR was reviewed with the receiving nurse. Opportunity for questions and clarification was provided. Assessment completed upon patients arrival to unit and care assumed.

## 2020-05-15 NOTE — WOUND CARE
Pt seen for sacral wound vac dressing change. Pt turned to left side and on right ischial wound, removed vac dressing, noted kimberley wound blanchable erythema and small skin tears from tegaderm adhesive. Wound with slough/eschar in base and pink areas surrounding it. Moderate amount of serosanguinous drainage noted. Applied skin prep to kimberley wound, cleansed wound with dermal wound cleanser. Ashley Topete [facility] with surgery able to visualize after wound vac dressing removed. Applied new wound vac dressing attached to vac machine, no leaks noted. Pt tolerated well. Pt left on left side. Will continue with dressing changes MWF.

## 2020-05-15 NOTE — PROGRESS NOTES
PT/OT is recommending LTAC. CM contacted Marlee Smith RN liaison with [facility] and made the referral. Awaiting response.

## 2020-05-16 PROBLEM — K80.50 CHOLEDOCHOLITHIASIS: Status: ACTIVE | Noted: 2020-05-16

## 2020-05-16 LAB
BASOPHILS # BLD: 0.1 K/UL (ref 0–0.2)
BASOPHILS NFR BLD: 0 % (ref 0–2)
CRP SERPL-MCNC: 9.5 MG/DL (ref 0–0.9)
DIFFERENTIAL METHOD BLD: ABNORMAL
EOSINOPHIL # BLD: 0.1 K/UL (ref 0–0.8)
EOSINOPHIL NFR BLD: 1 % (ref 0.5–7.8)
ERYTHROCYTE [DISTWIDTH] IN BLOOD BY AUTOMATED COUNT: 14.6 % (ref 11.9–14.6)
ERYTHROCYTE [SEDIMENTATION RATE] IN BLOOD: 75 MM/HR (ref 0–30)
GLUCOSE BLD STRIP.AUTO-MCNC: 107 MG/DL (ref 65–100)
GLUCOSE BLD STRIP.AUTO-MCNC: 140 MG/DL (ref 65–100)
GLUCOSE BLD STRIP.AUTO-MCNC: 168 MG/DL (ref 65–100)
GLUCOSE BLD STRIP.AUTO-MCNC: 184 MG/DL (ref 65–100)
HAV IGM SERPL QL IA: NEGATIVE
HBV CORE IGM SERPL QL IA: NEGATIVE
HBV SURFACE AG SERPL QL IA: NEGATIVE
HCT VFR BLD AUTO: 32.7 % (ref 35.8–46.3)
HCV AB S/CO SERPL IA: 0.1 S/CO RATIO (ref 0–0.9)
HGB BLD-MCNC: 11.2 G/DL (ref 11.7–15.4)
IMM GRANULOCYTES # BLD AUTO: 0.5 K/UL (ref 0–0.5)
IMM GRANULOCYTES NFR BLD AUTO: 3 % (ref 0–5)
LYMPHOCYTES # BLD: 0.4 K/UL (ref 0.5–4.6)
LYMPHOCYTES NFR BLD: 2 % (ref 13–44)
MCH RBC QN AUTO: 33.6 PG (ref 26.1–32.9)
MCHC RBC AUTO-ENTMCNC: 34.3 G/DL (ref 31.4–35)
MCV RBC AUTO: 98.2 FL (ref 79.6–97.8)
MM INDURATION POC: 0 MM (ref 0–5)
MONOCYTES # BLD: 0.7 K/UL (ref 0.1–1.3)
MONOCYTES NFR BLD: 4 % (ref 4–12)
NEUTS SEG # BLD: 15.8 K/UL (ref 1.7–8.2)
NEUTS SEG NFR BLD: 90 % (ref 43–78)
NRBC # BLD: 0 K/UL (ref 0–0.2)
PLATELET # BLD AUTO: 200 K/UL (ref 150–450)
PMV BLD AUTO: 11.4 FL (ref 9.4–12.3)
POTASSIUM SERPL-SCNC: 3.5 MMOL/L (ref 3.5–5.1)
PPD POC: NEGATIVE NEGATIVE
RBC # BLD AUTO: 3.33 M/UL (ref 4.05–5.2)
VANCOMYCIN TROUGH SERPL-MCNC: 2.4 UG/ML (ref 5–20)
WBC # BLD AUTO: 17.5 K/UL (ref 4.3–11.1)

## 2020-05-16 PROCEDURE — 99231 SBSQ HOSP IP/OBS SF/LOW 25: CPT | Performed by: SURGERY

## 2020-05-16 PROCEDURE — 74011000258 HC RX REV CODE- 258: Performed by: SURGERY

## 2020-05-16 PROCEDURE — 74011250637 HC RX REV CODE- 250/637: Performed by: HOSPITALIST

## 2020-05-16 PROCEDURE — 80202 ASSAY OF VANCOMYCIN: CPT

## 2020-05-16 PROCEDURE — 65270000029 HC RM PRIVATE

## 2020-05-16 PROCEDURE — 74011250637 HC RX REV CODE- 250/637: Performed by: NURSE PRACTITIONER

## 2020-05-16 PROCEDURE — 85652 RBC SED RATE AUTOMATED: CPT

## 2020-05-16 PROCEDURE — 74011250637 HC RX REV CODE- 250/637: Performed by: SURGERY

## 2020-05-16 PROCEDURE — 74750000023 HC WOUND THERAPY

## 2020-05-16 PROCEDURE — 82962 GLUCOSE BLOOD TEST: CPT

## 2020-05-16 PROCEDURE — 86140 C-REACTIVE PROTEIN: CPT

## 2020-05-16 PROCEDURE — 85025 COMPLETE CBC W/AUTO DIFF WBC: CPT

## 2020-05-16 PROCEDURE — C1751 CATH, INF, PER/CENT/MIDLINE: HCPCS

## 2020-05-16 PROCEDURE — 74011250636 HC RX REV CODE- 250/636: Performed by: SURGERY

## 2020-05-16 PROCEDURE — 36415 COLL VENOUS BLD VENIPUNCTURE: CPT

## 2020-05-16 PROCEDURE — 84132 ASSAY OF SERUM POTASSIUM: CPT

## 2020-05-16 RX ORDER — METOPROLOL TARTRATE 25 MG/1
12.5 TABLET, FILM COATED ORAL 2 TIMES DAILY
Status: DISCONTINUED | OUTPATIENT
Start: 2020-05-16 | End: 2020-05-22 | Stop reason: HOSPADM

## 2020-05-16 RX ORDER — CLONIDINE HYDROCHLORIDE 0.2 MG/1
0.2 TABLET ORAL
Status: DISCONTINUED | OUTPATIENT
Start: 2020-05-16 | End: 2020-05-22 | Stop reason: HOSPADM

## 2020-05-16 RX ORDER — METOPROLOL TARTRATE 25 MG/1
12.5 TABLET, FILM COATED ORAL 2 TIMES DAILY
Status: DISCONTINUED | OUTPATIENT
Start: 2020-05-16 | End: 2020-05-16

## 2020-05-16 RX ORDER — VANCOMYCIN/0.9 % SOD CHLORIDE 750 MG/250
750 PLASTIC BAG, INJECTION (ML) INTRAVENOUS EVERY 12 HOURS
Status: DISCONTINUED | OUTPATIENT
Start: 2020-05-17 | End: 2020-05-17

## 2020-05-16 RX ORDER — SODIUM CHLORIDE 0.9 % (FLUSH) 0.9 %
10 SYRINGE (ML) INJECTION AS NEEDED
Status: DISCONTINUED | OUTPATIENT
Start: 2020-05-16 | End: 2020-05-22 | Stop reason: HOSPADM

## 2020-05-16 RX ORDER — SODIUM CHLORIDE 0.9 % (FLUSH) 0.9 %
10 SYRINGE (ML) INJECTION EVERY 8 HOURS
Status: DISCONTINUED | OUTPATIENT
Start: 2020-05-16 | End: 2020-05-22 | Stop reason: HOSPADM

## 2020-05-16 RX ORDER — METRONIDAZOLE 500 MG/1
500 TABLET ORAL EVERY 12 HOURS
Status: DISCONTINUED | OUTPATIENT
Start: 2020-05-16 | End: 2020-05-22 | Stop reason: HOSPADM

## 2020-05-16 RX ORDER — POTASSIUM CHLORIDE 20 MEQ/1
40 TABLET, EXTENDED RELEASE ORAL
Status: COMPLETED | OUTPATIENT
Start: 2020-05-16 | End: 2020-05-16

## 2020-05-16 RX ADMIN — METOPROLOL TARTRATE 12.5 MG: 25 TABLET, FILM COATED ORAL at 17:25

## 2020-05-16 RX ADMIN — AMANTADINE HYDROCHLORIDE 100 MG: 100 CAPSULE ORAL at 08:10

## 2020-05-16 RX ADMIN — CLINDAMYCIN HYDROCHLORIDE 300 MG: 150 CAPSULE ORAL at 03:58

## 2020-05-16 RX ADMIN — AMANTADINE HYDROCHLORIDE 100 MG: 100 CAPSULE ORAL at 17:25

## 2020-05-16 RX ADMIN — METOPROLOL TARTRATE 12.5 MG: 25 TABLET, FILM COATED ORAL at 12:06

## 2020-05-16 RX ADMIN — POTASSIUM CHLORIDE 40 MEQ: 20 TABLET, EXTENDED RELEASE ORAL at 08:10

## 2020-05-16 RX ADMIN — Medication 10 ML: at 17:33

## 2020-05-16 RX ADMIN — VANCOMYCIN HYDROCHLORIDE 750 MG: 1 INJECTION, POWDER, LYOPHILIZED, FOR SOLUTION INTRAVENOUS at 18:13

## 2020-05-16 RX ADMIN — METRONIDAZOLE 500 MG: 500 TABLET ORAL at 17:24

## 2020-05-16 RX ADMIN — Medication 5 ML: at 23:06

## 2020-05-16 RX ADMIN — CEFEPIME HYDROCHLORIDE 1 G: 1 INJECTION, POWDER, FOR SOLUTION INTRAMUSCULAR; INTRAVENOUS at 17:25

## 2020-05-16 NOTE — PROGRESS NOTES
Shift assessment completed. Pt alert and oriented x4. Lungs CTA with even and unlabored respirations, on room air. Heart sounds regular, slightly tachycardic. Active bowel sounds with soft and flat abdomen. Skin warm and dry. Scattered abrasions noted. Right ischial wound with wound vac dressing c/d/i. Foam dressings c/d/i to bilateral hips and right ankle. No IV access at this time, MD aware and PICC line order active. Purewick in place draining clear, emily urine. Pt reports no pain, nausea, and no signs of acute distress noted. Pt resting quietly in bed locked in lowest position with call light in reach and bed alarm set.

## 2020-05-16 NOTE — PROGRESS NOTES
Notified Charge RN and MD Montgomery of MEWS score of 3 r/t . Pt has no c/o palpitations, chest pain, SOB, pain or nausea. Resting quietly in bed watching TV with no signs of acute distress.  MD to place orders

## 2020-05-16 NOTE — CONSULTS
Infectious Disease Consult    Today's Date: 5/16/2020   Admit Date: 5/13/2020    Impression:   · Stage IV right ischial decubitus ulcer/osteomyelitis s/p I&D (5/14) ; no op cx  · Common bile duct stone s/p ERCP (5/15)  · MS  · Tranaminitis; resolved  · Acute encephalopathy  · Debility; s/p multiple falls  · Malnutrition  · GLENROY resolved  · Leukocytosis    Plan:   · Continue Vancomycin and Cefepime  · Start PO Flagyl  · Check and trend APR's  · Follow blood cx  · Anticipate a long course of broad spectrum abx  · ID will continue to follow    Anti-infectives:   · IV Vancomycin (5/14-  · IV Clindamycin (5/15-5/16)  · IV Cefepime (5/13-    Subjective:   Date of Consultation:  May 16, 2020  Referring Physician: Trenton Dewitt MD    Patient is a 63 y.o. female  hx MS who presented to ER (5/13)  after multiple falls. She had a fall with 4 days on floor several weeks ago, not seen by MD at that time. She had three or four falls today before presentation, helped up by a friend each time. She was down for 4 hours the last time she fell. She is sore all over, denies any acute injuries. No headaches, sore throat, cough, cp, n/v, change in bowel habit. Recently treated for dysuria and sx resolved on antibiotics. Finished antibiotic several days ago. Smoked 4-5 cigarettes daily until recently. Drank 1 glass wine daily until 4d ago. Quit because not feeling well, but couldn't describe how not feeling well. Stage IV right ischial decubitus ulcer noted on initial assessment; S/P  I&D (5/14) with operative cx not sent. Blood cx X 2 NGTD. COVID 19 negative. Started on IV Cefepime and Vancomycin. Also had been given two days of Clindamycin. ID is asked to evaluate patient for abx recommendations.     Patient Active Problem List   Diagnosis Code    GLENROY (acute kidney injury) ([facility] N17.9    Decubitus ulcer of ischial area, right, unspecified pressure ulcer stage L89.319    Transaminitis R74.0    Acute metabolic encephalopathy G93.41    Multiple sclerosis (HCC) G35    Protein calorie malnutrition ([facility] [address] E46    Leukocytosis D72.829    Choledocholithiasis K80.50     Past Medical History:   Diagnosis Date    Menopause       Family History   Problem Relation Age of Onset    Breast Cancer Mother       Social History     Tobacco Use    Smoking status: Not on file   Substance Use Topics    Alcohol use: Not Currently     Past Surgical History:   Procedure Laterality Date    ERCP  5/15/2020           Prior to Admission medications    Medication Sig Start Date End Date Taking? Authorizing Provider   amantadine HCL (SYMMETREL) 100 mg capsule Take 100 mg by mouth two (2) times a day. Yes Other, MD Marga   HYDROcodone-acetaminophen (XODOL)  mg tab per tablet Take 1 Tab by mouth four (4) times daily as needed. Yes Other, MD Marga       Allergies   Allergen Reactions    Latex Rash    Pcn [Penicillins] Swelling        Review of Systems:  A comprehensive review of systems was negative except for that written in the History of Present Illness. Objective:     Visit Vitals  /84   Pulse (!) 115   Temp 98.6 °F (37 °C)   Resp 20   SpO2 95%     Temp (24hrs), Av.4 °F (36.9 °C), Min:97.7 °F (36.5 °C), Max:98.7 °F (37.1 °C)       Lines:  Peripheral IV:       Physical Exam:    General:  Disheveled, alert, cooperative, cachectic, appears older than stated age   Eyes:  Sclera anicteric. Pupils equally round and reactive to light. Mouth/Throat: Mucous membranes normal, oral pharynx clear   Neck: Supple   Lungs:   Clear to auscultation bilaterally, good effort   CV:  Regular rate and rhythm,no murmur, click, rub or gallop   Abdomen:   Soft, non-tender.  bowel sounds normal. non-distended   Extremities: No cyanosis or edema   Skin: Skin color, texture, turgor normal. no acute rash or lesions, L ischial wd vac   Lymph nodes: Cervical and supraclavicular normal   Musculoskeletal: No swelling or deformity   Lines/Devices: Intact, no erythema, drainage or tenderness   Psych: Alert and oriented, normal mood affect given the setting       Data Review:     CBC:  Recent Labs     05/15/20  0538 05/14/20  1759   WBC 17.3* 19.1*   HGB 11.4* 11.7   HCT 32.3* 34.7*    244       BMP:  Recent Labs     05/16/20  0626 05/15/20  0538 05/14/20  1759   CREA  --  0.37* 0.49*   BUN  --  24* 33*   NA  --  143 142   K 3.5 2.9* 4.0   CL  --  112* 108*   CO2  --  25 25   AGAP  --  6* 9   GLU  --  143* 169*       LFTS:  Recent Labs     05/15/20  0538   TBILI 0.4   ALT 39   SGOT 31   *   TP 5.2*   ALB 1.7*       Microbiology:     All Micro Results     Procedure Component Value Units Date/Time    CULTURE, BLOOD [[id number]] Collected:  05/14/20 1759    Order Status:  Completed Specimen:  Blood Updated:  05/16/20 0627     Special Requests: --        LEFT  ARM       Culture result: NO GROWTH 2 DAYS       CULTURE, BLOOD [[id number]] Collected:  05/14/20 1759    Order Status:  Completed Specimen:  Blood Updated:  05/16/20 0626     Special Requests: --        LEFT  Antecubital       Culture result: NO GROWTH 2 DAYS       EMERGENT DISEASE PANEL [[id number]] Collected:  05/13/20 1709    Order Status:  Completed Specimen:  Not Specified Updated:  05/15/20 1147     Emergent disease panel Not detected        Comment: Performed at [place] [id number], URINE [[id number]]     Order Status:  Canceled Specimen:  Urine from Clean catch           Imaging:   MRI brain  Impression:  1. No appreciable change in the multifocal areas of nonenhancing T2  hyperintensity within the supratentorial white matter. These are nonspecific  findings but would be compatible the patient's clinical diagnosis of multiple  sclerosis. 2. Moderate volume loss. 3. Remote left cerebellar infarction. 4. Smooth enhancement of the pachymeninges. This can be seen secondary to  intracranial hypotension including from recent lumbar puncture.  Other meningeal  processes including infectious meningitis cannot be entirely excluded. MRI abd  IMPRESSION:  1. Dilated common bile duct measuring up to 12 mm in diameter. A tiny 1 to 2 mm  filling defect is suggested in the most distal common bile duct on axial T2 and  MRCP images. A tiny distal biliary stone cannot be excluded.     2.  Left abdominal mesenteric edema poorly assessed due to significant patient  breathing motion artifact and the patient's thin body habitus. This most closely  approximates the pancreatic tail and left kidney and can suggest potential acute  inflammation of either of these structures. Recommend correlation for  signs/symptoms of either pancreatitis or left pyelonephritis.     3. Distended gallbladder with mild gallbladder wall thickening. The gallbladder  was better assessed by ultrasound and I would refer to that examination for  assessment of this structure. L ankle XR negative     U/S abd  IMPRESSION:   1. Gallbladder sludge. 2. Common bile duct dilatation.       CXR (-)  Signed By: Sandy Boateng NP     May 16, 2020

## 2020-05-16 NOTE — PROGRESS NOTES
Problem: Falls - Risk of  Goal: *Absence of Falls  Description: Document Dada Ashby Fall Risk and appropriate interventions in the flowsheet. Outcome: Progressing Towards Goal  Note: Fall Risk Interventions:       Mentation Interventions: Adequate sleep, hydration, pain control, Bed/chair exit alarm, Reorient patient, Room close to nurse's station    Medication Interventions: Bed/chair exit alarm, Patient to call before getting OOB, Teach patient to arise slowly    Elimination Interventions: Bed/chair exit alarm, Call light in reach, Patient to call for help with toileting needs, Stay With Me (per policy)    History of Falls Interventions: Bed/chair exit alarm, Room close to nurse's station         Problem: Pressure Injury - Risk of  Goal: *Prevention of pressure injury  Description: Document Kimani Scale and appropriate interventions in the flowsheet.   Outcome: Progressing Towards Goal  Note: Pressure Injury Interventions:  Sensory Interventions: Assess changes in LOC, Assess need for specialty bed, Check visual cues for pain, Discuss PT/OT consult with provider, Float heels, Keep linens dry and wrinkle-free, Maintain/enhance activity level, Minimize linen layers, Monitor skin under medical devices, Pad between skin to skin    Moisture Interventions: Absorbent underpads, Internal/External fecal devices, Internal/External urinary devices    Activity Interventions: Assess need for specialty bed, Increase time out of bed, Chair cushion, Pressure redistribution bed/mattress(bed type), PT/OT evaluation    Mobility Interventions: Assess need for specialty bed, HOB 30 degrees or less, Float heels, Pressure redistribution bed/mattress (bed type), PT/OT evaluation    Nutrition Interventions: Document food/fluid/supplement intake, Offer support with meals,snacks and hydration    Friction and Shear Interventions: Apply protective barrier, creams and emollients, Feet elevated on foot rest, Lift sheet, Minimize layers, Lift team/patient mobility team

## 2020-05-16 NOTE — PROGRESS NOTES
PICC Placement Note    PRE-PROCEDURE VERIFICATION  Correct Procedure: yes. Time out completed with assistant susana mcqueen rn and all persons present in agreement with time out. Correct Site:  yes  Temperature: Temp: 98 °F (36.7 °C), Temperature Source: Temp Source: Oral  Recent Labs     05/15/20  0538   BUN 24*   CREA 0.37*      WBC 17.3*     Allergies: Latex and Pcn [penicillins]  Education materials for [facility] given to patient or family. PROCEDURE DETAIL  A single lumen PICC line was started for antibiotic therapy. The following documentation is in addition to the PICC properties in the lines/airways flowsheet :  Lot #: [id number]  xylocaine used: yes  Mid-Arm Circumference: 20 (cm)  Internal Catheter Length: 39 (cm)  Internal Catheter Total Length: 39 (cm)  Vein Selection for PICC:right brachial  Central Line Bundle followed yes  Complication Related to Insertion: none  Both the insertion guidewire and ECG guidewire were removed intact all ports have positive blood return and were flush well with normal saline. The location of the tip of the PICC is verified using ECG technology. The tip is in the SVC per ECG reading. See image below.              Line is okay to use: yes

## 2020-05-16 NOTE — PROGRESS NOTES
Hospitalist Progress Note    2020  Admit Date: 2020  5:12 AM   NAME: Trish Patel   :  1960   MRN:  [id number]   Attending: Seven Farah MD  PCP:  Marvel Edwards MD    SUBJECTIVE:   Patient 81-year-old female history of MS follows with Dr. Yusef Brink (no relation) presents to the emergency room after sustaining multiple falls. Patient reports she \"hurts all over\". Denies any headache, sore throat, cough, chest pain, nausea vomiting. Completed course of antibiotics approximately 1 week ago per report for UTI. Admits to tobacco abuse and 1 glass of wine daily. ED work-up was notable for GLENROY, dehydration, hyponatremia, transaminitis, leukocytosis. She was admitted for the further work-up to include a right upper quadrant ultrasound which shows dilated CBD, IV fluid resuscitation, PT OT. Per d/w pt's friend Layton Cranker pt is wheelchair dependent, can usually transfer independently. Sustained fall 1.5 months ago and has been \"going downhill\" since - more weakness, confusion and eating/drinking less. Today, pt appears fully oriented, no ac events, had ERCP yesterday    PHYSICAL EXAM     Visit Vitals  /84   Pulse (!) 115   Temp 98.6 °F (37 °C)   Resp 20   SpO2 95%      Temp (24hrs), Av.4 °F (36.9 °C), Min:98.1 °F (36.7 °C), Max:98.7 °F (37.1 °C)    Oxygen Therapy  O2 Sat (%): 95 % (20 1147)  Pulse via Oximetry: 81 beats per minute (05/15/20 1538)  O2 Device: Room air (20 0801)  O2 Flow Rate (L/min): 2 l/min (05/15/20 1538)    Intake/Output Summary (Last 24 hours) at 2020 1517  Last data filed at 2020 0940  Gross per 24 hour   Intake    Output 300 ml   Net -300 ml      General: AAO3, pale, mild to moderate distress, thin/frail. Appears older than stated age    Lungs:  CTA Bilaterally.  Diminished bibasilar  Heart:  Regular rate and rhythm,  No murmur, rub, or gallop  Abdomen: Soft, Non distended, Non tender, Positive bowel sounds  Extremities: No cyanosis, clubbing or edema  Neurologic:  No focal deficits    ASSESSMENT      1- GLENROY - secondary to dehydration. Resolved    2- Hyponatremia - hypovolemic, corrected with IVF    3- Right ischial decub ulcer, right lateral malleoli ulcer - wound care has seen. recs s/p sacral decub debridement by gen sx 5/14. Plain film of ankle neg for osteo. Cont vanco/cefepime D2  Blood cultures added after abx. ID consulted for antibiotic recommendations. 4- Multiple sclerosis - MRI brain unchanged with regard to hyperintensities. Does mention smooth enhancement of meninges - seen by Neuro, defer Rx      5- CBD minimal sludge (removed), s/p ERCP and papillotomy on 5-15-20    6- Protein calorie malnutrition - nutrition consult    7- Acute metabolic encephalopathy - likely secondary to infection, dehydration, malnutrition.  resolved    8- HTN - controlled    Dispo; rehab when available    Signed By: Laine Reynoso MD     May 16, 2020

## 2020-05-16 NOTE — PROGRESS NOTES
Shift assessment done. Pt in bed, watching tv. Respirations even and unlabored. HR regular. Abdomen flat, soft with active bowel sounds. Denies needs and pain this time. No acute signs of distress noted. Frequent turning and positioning. Wound vac intact. Instructed to call for assistance when needed. Call light in reach. Safety measures provided. Will continue to monitor.

## 2020-05-16 NOTE — PROGRESS NOTES
5/16/2020    Admit Date: 5/13/2020    Subjective:   CHIEF COMPLAINT- fatigue  HPI      Overall-stable           Diet-reg    Appetite-fair     Nausea-no   Vomiting-no          Pain-no            BM-no   Bleeding-no    Medications-reviewed and adjusted as appropriate   IV FLUIDS-reviewed      FAM HX-per H&P   SH-per H&P       Past Medical History:   Diagnosis Date    Menopause                Past Surgical History:   Procedure Laterality Date    ERCP  5/15/2020            ROS--                 RESP-neg            CARDIAC-neg                       -neg             Further ROS as per PMH and PSH- see problem list                            Objective:     Visit Vitals  /84 (BP 1 Location: Right arm, BP Patient Position: At rest)   Pulse 98   Temp 98.5 °F (36.9 °C)   Resp 16   SpO2 94%         Intake/Output Summary (Last 24 hours) at 5/16/2020 0729  Last data filed at 5/16/2020 7543  Gross per 24 hour   Intake 1700 ml   Output 300 ml   Net 1400 ml       EXAM:     NEURO-a&o    HEENT-wnl    LUNGS-clear       COR-rrr        ABD-soft , min tenderness, no rebound, good bs        EXT-no edema                         LABS-  Lab Results   Component Value Date/Time    WBC 17.3 (H) 05/15/2020 05:38 AM    RBC 3.35 (L) 05/15/2020 05:38 AM    HGB 11.4 (L) 05/15/2020 05:38 AM    HCT 32.3 (L) 05/15/2020 05:38 AM    PLATELET 764 77/42/6462 05:38 AM     Lab Results   Component Value Date/Time    Sodium 143 05/15/2020 05:38 AM    Potassium 3.5 05/16/2020 06:26 AM    Chloride 112 (H) 05/15/2020 05:38 AM    CO2 25 05/15/2020 05:38 AM    Anion gap 6 (L) 05/15/2020 05:38 AM    Glucose 143 (H) 05/15/2020 05:38 AM    BUN 24 (H) 05/15/2020 05:38 AM    Creatinine 0.37 (L) 05/15/2020 05:38 AM    GFR est AA >60 05/15/2020 05:38 AM    GFR est non-AA >60 05/15/2020 05:38 AM    Calcium 8.3 05/15/2020 05:38 AM    Magnesium 1.9 05/15/2020 05:38 AM    Albumin 1.7 (L) 05/15/2020 05:38 AM    Bilirubin, total 0.4 05/15/2020 05:38 AM    Protein, total 5.2 (L) 05/15/2020 05:38 AM    Globulin 3.5 05/15/2020 05:38 AM    A-G Ratio 0.5 (L) 05/15/2020 05:38 AM    AST (SGOT) 31 05/15/2020 05:38 AM    ALT (SGPT) 39 05/15/2020 05:38 AM       Assessment:     Principal Problem:    Decubitus ulcer of ischial area, right, unspecified pressure ulcer stage (5/13/2020)    Active Problems:    GLENROY (acute kidney injury) ([address] (5/13/2020)      Transaminitis (5/13/2020)      Acute metabolic encephalopathy (2/93/1489)      Multiple sclerosis ([address] (5/13/2020)      Protein calorie malnutrition ([address] (5/13/2020)      Leukocytosis (5/13/2020)      Choledocholithiasis (5/16/2020)    doing well post ercp  GB not filled    Plan: Will sign off - please call if needed       PT SEEN AND EXAMINED AND PLAN DISCUSSED AND IMPLEMENTED.   Catherine Blair MD

## 2020-05-16 NOTE — PROGRESS NOTES
Informed Dr. Reyes Quan that patient has no IV access and was stuck multiple times today and has IV antibiotics for tonight.  MD ordered PO Clindamycin to cover till PICC can be placed  tomorrow AM.

## 2020-05-16 NOTE — PROGRESS NOTES
[address]  [address] [facility] [address], [facility], [address]      PLAN:  Pt on regular diet  ERCP 5/15 with stenotic CBD, sludge  Wound care following for wound vac to right ischial wound  Eventual lap jj      ASSESSMENT:  Admit Date: 5/13/2020   * No surgery date entered *  Procedure(s):  ENDOSCOPIC RETROGRADE CHOLANGIOPANCREATOGRAPHY (ERCP)  ENDOSCOPIC SPHINCTEROTOMY  ENDOSCOPIC STONE EXTRACTION/BALLOON SWEEP    Principal Problem:    Decubitus ulcer of ischial area, right, unspecified pressure ulcer stage (5/13/2020)    Active Problems:    GLENROY (acute kidney injury) ([facility] [address] (5/13/2020)      Transaminitis (5/13/2020)      Acute metabolic encephalopathy (1/98/0132)      Multiple sclerosis ([facility] [address] (5/13/2020)      Protein calorie malnutrition ([facility] [address] (5/13/2020)      Leukocytosis (5/13/2020)      Choledocholithiasis (5/16/2020)         SUBJECTIVE: Eating breakfast. She denies abdominal pain, N/V. WB 17.  Tbili 0.4, Alk Phos elevated. AF, tachy, on 2L NC. Wound vac to right ischial wound. OBJECTIVE:  Constitutional: Alert oriented cooperative patient in no acute distress; appears stated age   Visit Vitals  /77 (BP 1 Location: Right arm, BP Patient Position: At rest;Lying left side)   Pulse (!) 103   Temp 98.7 °F (37.1 °C)   Resp 20   SpO2 94%     Eyes: Sclera are clear. ENMT: no external lesions gross hearing normal; no obvious neck masses, no ear or lip lesions  CV: RRR. Normal perfusion  Resp: No JVD. Breathing is  non-labored; no audible wheezing. GI: Soft. Non distended. Musculoskeletal: unremarkable with normal function. No embolic signs or cyanosis.    Integument: right ischial wound with vac in place, maintaining seal.   Neuro:  Oriented; moves all 4; no focal deficits  Psychiatric: normal affect and mood, no memory impairment      Patient Vitals for the past 24 hrs:   BP Temp Pulse Resp SpO2   05/16/20 0733 129/77 98.7 °F (37.1 °C) (!) 103 20 94 %   05/16/20 0356 143/84 98.5 °F (36.9 °C) 98 16 94 %   05/15/20 2259 127/72 98.4 °F (36.9 °C) 90 16 96 %   05/15/20 2012 130/80 98.6 °F (37 °C) 87 16 97 %   05/15/20 1558 144/80 98.1 °F (36.7 °C) 84 17 98 %   05/15/20 1538 137/71 98.2 °F (36.8 °C) 81 16 100 %   05/15/20 1533 139/74  82 16 100 %   05/15/20 1528 132/73  83 16 100 %   05/15/20 1523 127/75  82 16 98 %   05/15/20 1518 131/77  82 16 100 %   05/15/20 1513 134/76  82 16 97 %   05/15/20 1507 132/72  85 16 98 %   05/15/20 1506     98 %   05/15/20 1504 139/80   16    05/15/20 1500 139/50 97.7 °F (36.5 °C) (!) 45 17 100 %   05/15/20 1257 138/82 98.1 °F (36.7 °C) 87 18 96 %   05/15/20 1221 120/69 98.2 °F (36.8 °C) 86 18 96 %     Labs:    Recent Labs     05/16/20  0626 05/15/20  0538   WBC  --  17.3*   HGB  --  11.4*   PLT  --  187   NA  --  143   K 3.5 2.9*   CL  --  112*   CO2  --  25   BUN  --  24*   CREA  --  0.37*   GLU  --  143*   TBILI  --  0.4   SGOT  --  31   ALT  --  39   AP  --  226*         MICHELINE Omalley

## 2020-05-17 ENCOUNTER — ANESTHESIA EVENT (OUTPATIENT)
Dept: SURGERY | Age: 60
DRG: 570 | End: 2020-05-17
Payer: MEDICARE

## 2020-05-17 LAB
GLUCOSE BLD STRIP.AUTO-MCNC: 101 MG/DL (ref 65–100)
GLUCOSE BLD STRIP.AUTO-MCNC: 83 MG/DL (ref 65–100)
GLUCOSE BLD STRIP.AUTO-MCNC: 92 MG/DL (ref 65–100)
GLUCOSE BLD STRIP.AUTO-MCNC: 92 MG/DL (ref 65–100)

## 2020-05-17 PROCEDURE — 74011000258 HC RX REV CODE- 258: Performed by: INTERNAL MEDICINE

## 2020-05-17 PROCEDURE — 65270000029 HC RM PRIVATE

## 2020-05-17 PROCEDURE — 74011250636 HC RX REV CODE- 250/636: Performed by: INTERNAL MEDICINE

## 2020-05-17 PROCEDURE — 82962 GLUCOSE BLOOD TEST: CPT

## 2020-05-17 PROCEDURE — 74011250637 HC RX REV CODE- 250/637: Performed by: HOSPITALIST

## 2020-05-17 PROCEDURE — 74750000023 HC WOUND THERAPY

## 2020-05-17 PROCEDURE — 74011250637 HC RX REV CODE- 250/637: Performed by: SURGERY

## 2020-05-17 PROCEDURE — 99231 SBSQ HOSP IP/OBS SF/LOW 25: CPT | Performed by: SURGERY

## 2020-05-17 PROCEDURE — 74011250636 HC RX REV CODE- 250/636: Performed by: HOSPITALIST

## 2020-05-17 PROCEDURE — 80202 ASSAY OF VANCOMYCIN: CPT

## 2020-05-17 PROCEDURE — 77030040393 HC DRSG OPTIFOAM GENT MDII -B

## 2020-05-17 PROCEDURE — 77030038269 HC DRN EXT URIN PURWCK BARD -A

## 2020-05-17 PROCEDURE — 74011250637 HC RX REV CODE- 250/637: Performed by: NURSE PRACTITIONER

## 2020-05-17 RX ORDER — VANCOMYCIN/0.9 % SOD CHLORIDE 750 MG/250
750 PLASTIC BAG, INJECTION (ML) INTRAVENOUS EVERY 8 HOURS
Status: DISCONTINUED | OUTPATIENT
Start: 2020-05-17 | End: 2020-05-19

## 2020-05-17 RX ADMIN — CEFEPIME HYDROCHLORIDE 1 G: 1 INJECTION, POWDER, FOR SOLUTION INTRAMUSCULAR; INTRAVENOUS at 20:43

## 2020-05-17 RX ADMIN — VANCOMYCIN HYDROCHLORIDE 750 MG: 1 INJECTION, POWDER, LYOPHILIZED, FOR SOLUTION INTRAVENOUS at 17:04

## 2020-05-17 RX ADMIN — METOPROLOL TARTRATE 12.5 MG: 25 TABLET, FILM COATED ORAL at 17:04

## 2020-05-17 RX ADMIN — METOPROLOL TARTRATE 12.5 MG: 25 TABLET, FILM COATED ORAL at 08:51

## 2020-05-17 RX ADMIN — VANCOMYCIN HYDROCHLORIDE 750 MG: 1 INJECTION, POWDER, LYOPHILIZED, FOR SOLUTION INTRAVENOUS at 10:09

## 2020-05-17 RX ADMIN — AMANTADINE HYDROCHLORIDE 100 MG: 100 CAPSULE ORAL at 08:51

## 2020-05-17 RX ADMIN — Medication 5 ML: at 05:05

## 2020-05-17 RX ADMIN — Medication 10 ML: at 21:15

## 2020-05-17 RX ADMIN — Medication 10 ML: at 15:32

## 2020-05-17 RX ADMIN — AMANTADINE HYDROCHLORIDE 100 MG: 100 CAPSULE ORAL at 17:04

## 2020-05-17 RX ADMIN — METRONIDAZOLE 500 MG: 500 TABLET ORAL at 08:51

## 2020-05-17 RX ADMIN — CEFEPIME HYDROCHLORIDE 1 G: 1 INJECTION, POWDER, FOR SOLUTION INTRAMUSCULAR; INTRAVENOUS at 08:50

## 2020-05-17 RX ADMIN — VANCOMYCIN HYDROCHLORIDE 750 MG: 10 INJECTION, POWDER, LYOPHILIZED, FOR SOLUTION INTRAVENOUS at 03:40

## 2020-05-17 RX ADMIN — METRONIDAZOLE 500 MG: 500 TABLET ORAL at 20:43

## 2020-05-17 RX ADMIN — BISACODYL 5 MG: 5 TABLET, COATED ORAL at 08:59

## 2020-05-17 NOTE — PROGRESS NOTES
Hospitalist Progress Note    2020  Admit Date: 2020  5:12 AM   NAME: Benny Cordova   :  1960   MRN:  [id number]   Attending: Latanya Sanderson MD  PCP:  Toney Lovell MD    SUBJECTIVE:   Patient 78-year-old female history of MS follows with Dr. Reilly Collins (no relation) presents to the emergency room after sustaining multiple falls. Patient reports she \"hurts all over\". Denies any headache, sore throat, cough, chest pain, nausea vomiting. Completed course of antibiotics approximately 1 week ago per report for UTI. Admits to tobacco abuse and 1 glass of wine daily. ED work-up was notable for GLENROY, dehydration, hyponatremia, transaminitis, leukocytosis. She was admitted for the further work-up to include a right upper quadrant ultrasound which shows dilated CBD, IV fluid resuscitation, PT OT. Per d/w pt's friend Danelle Kim pt is wheelchair dependent, can usually transfer independently. Sustained fall 1.5 months ago and has been \"going downhill\" since - more weakness, confusion and eating/drinking less. GI consulted. S/p MRCP showing dilated CBD and ERCP with papillotomy. Gen surgery consulted for ischial decub ulcer s/p OR debridement. ID following for atbx recs.  - reports feeling much better than she did last week. No acute complaints.      Review of Systems negative with exception of pertinent positives noted above  PHYSICAL EXAM     Visit Vitals  /88 (BP 1 Location: Left arm, BP Patient Position: At rest)   Pulse 92   Temp 98 °F (36.7 °C)   Resp 18   SpO2 98%      Temp (24hrs), Av.2 °F (36.8 °C), Min:98 °F (36.7 °C), Max:98.5 °F (36.9 °C)    Oxygen Therapy  O2 Sat (%): 98 % (20 1536)  Pulse via Oximetry: 81 beats per minute (05/15/20 1538)  O2 Device: Room air (20 0850)  O2 Flow Rate (L/min): 2 l/min (05/15/20 1538)    Intake/Output Summary (Last 24 hours) at 2020 1719  Last data filed at 2020 1500  Gross per 24 hour   Intake    Output 2500 ml   Net -2500 ml      General: No acute distress  thin, frail appearing  Lungs:  CTA Bilaterally. Heart:  Regular rate and rhythm,  No murmur, rub, or gallop  Abdomen: Soft, Non distended, Non tender, Positive bowel sounds  Extremities: No cyanosis, clubbing or edema  Neurologic:  No focal deficits    ASSESSMENT      Active Hospital Problems    Diagnosis Date Noted    Choledocholithiasis 05/16/2020    GLENROY (acute kidney injury) ([facility] [address] 05/13/2020    Decubitus ulcer of ischial area, right, unspecified pressure ulcer stage 05/13/2020    Transaminitis 05/13/2020    Acute metabolic encephalopathy 44/31/3601    Multiple sclerosis ([facility] [address] 05/13/2020    Protein calorie malnutrition ([facility] [address] 05/13/2020    Leukocytosis 05/13/2020     Plan  - GLENROY - secondary to dehydration. Resolved     2- Hyponatremia - hypovolemic, corrected with IVF     3- Right ischial decub ulcer, right lateral malleoli ulcer - wound care has seen. recs s/p sacral decub debridement by gen sx 5/14. Plain film of ankle neg for osteo. Cont vanco/cefepime, oral flagyl  Blood cultures added after abx. ID consulted for antibiotic recommendations.     4- Multiple sclerosis - MRI brain unchanged with regard to hyperintensities. Does mention smooth enhancement of meninges - seen by Neuro, defer Rx  Fingolimod resumed at 2.5mg daily (or 5mg every other day)     5- CBD minimal sludge (removed), s/p ERCP and papillotomy on 5-15-20     6- Protein calorie malnutrition - nutrition consult     7- Acute metabolic encephalopathy - likely secondary to infection, dehydration, malnutrition. resolved     8- HTN - controlled       DVT Prophylaxis:  scds    Dispo - ?if candidate for regency - consult CM. Follow for ID atbx recs.      Signed By: Bella Rodney DO     May 17, 2020

## 2020-05-17 NOTE — PROGRESS NOTES
Problem: Falls - Risk of  Goal: *Absence of Falls  Description: Document Cecilio Higginbotham Fall Risk and appropriate interventions in the flowsheet. Outcome: Progressing Towards Goal  Note: Fall Risk Interventions:  Mobility Interventions: Bed/chair exit alarm, Patient to call before getting OOB, OT consult for ADLs, PT Consult for mobility concerns, PT Consult for assist device competence, Strengthening exercises (ROM-active/passive), Utilize walker, cane, or other assistive device    Mentation Interventions: Bed/chair exit alarm, Adequate sleep, hydration, pain control    Medication Interventions: Bed/chair exit alarm, Patient to call before getting OOB, Teach patient to arise slowly    Elimination Interventions: Bed/chair exit alarm, Call light in reach, Patient to call for help with toileting needs, Stay With Me (per policy)    History of Falls Interventions: Bed/chair exit alarm, Room close to nurse's station         Problem: Pressure Injury - Risk of  Goal: *Prevention of pressure injury  Description: Document Kimani Scale and appropriate interventions in the flowsheet.   Outcome: Progressing Towards Goal  Note: Pressure Injury Interventions:  Sensory Interventions: Assess changes in LOC, Check visual cues for pain, Discuss PT/OT consult with provider, Float heels, Keep linens dry and wrinkle-free, Maintain/enhance activity level, Minimize linen layers, Monitor skin under medical devices, Pad between skin to skin    Moisture Interventions: Absorbent underpads, Internal/External fecal devices, Internal/External urinary devices, Contain wound drainage    Activity Interventions: Assess need for specialty bed, Increase time out of bed, Chair cushion, Pressure redistribution bed/mattress(bed type), PT/OT evaluation    Mobility Interventions: Assess need for specialty bed, Chair cushion, Float heels, PT/OT evaluation, HOB 30 degrees or less, Pressure redistribution bed/mattress (bed type)    Nutrition Interventions: Document food/fluid/supplement intake, Offer support with meals,snacks and hydration    Friction and Shear Interventions: Apply protective barrier, creams and emollients, Feet elevated on foot rest, Lift sheet, Lift team/patient mobility team, Minimize layers                Problem: Nutrition Deficit  Goal: *Optimize nutritional status  Outcome: Progressing Towards Goal

## 2020-05-17 NOTE — PROGRESS NOTES
[address]  [address], [address] [place] [address], [address]      PLAN:  NPO at midnight  ERCP 5/15 with stenotic CBD, sludge  Wound care following for wound vac to right ischial wound  Consent  OR tomorrow for lap jj      ASSESSMENT:  Admit Date: 5/13/2020   * No surgery date entered *  Procedure(s):  ENDOSCOPIC RETROGRADE CHOLANGIOPANCREATOGRAPHY (ERCP)  ENDOSCOPIC SPHINCTEROTOMY  ENDOSCOPIC STONE EXTRACTION/BALLOON SWEEP    Principal Problem:    Decubitus ulcer of ischial area, right, unspecified pressure ulcer stage (5/13/2020)    Active Problems:    GLENROY (acute kidney injury) ([facility] [address] (5/13/2020)      Transaminitis (5/13/2020)      Acute metabolic encephalopathy (2/20/5903)      Multiple sclerosis ([facility] [address] (5/13/2020)      Protein calorie malnutrition ([facility] [address] (5/13/2020)      Leukocytosis (5/13/2020)      Choledocholithiasis (5/16/2020)         SUBJECTIVE:  Af, tachy, HTN. Pt without complaints. OBJECTIVE:  Constitutional: Alert oriented cooperative patient in no acute distress; appears stated age   Visit Vitals  /82 (BP 1 Location: Right arm, BP Patient Position: At rest)   Pulse 98   Temp 98.3 °F (36.8 °C)   Resp 18   SpO2 97%     Eyes: Sclera are clear. ENMT: no external lesions gross hearing normal; no obvious neck masses, no ear or lip lesions  CV: tachy. Normal perfusion  Resp: No JVD. Breathing is  non-labored; no audible wheezing. GI: Soft. Non distended. Tender RUQ. Calciphylaxis RLQ. Musculoskeletal: unremarkable with normal function. No embolic signs or cyanosis.    Integument: right ischial wound with vac in place, maintaining seal.   Neuro:  Oriented; moves all 4; no focal deficits  Psychiatric: normal affect and mood, no memory impairment      Patient Vitals for the past 24 hrs:   BP Temp Pulse Resp SpO2   05/17/20 0729 138/82 98.3 °F (36.8 °C) 98 18 97 %   05/17/20 0337 (!) 147/92 98 °F (36.7 °C) (!) 102 18 96 %   05/17/20 0003 152/90 98.5 °F (36.9 °C) (!) 103 18 95 %   05/16/20 2048 143/86 98.3 °F (36.8 °C) (!) 101 20 96 %   05/16/20 1602 128/86 98 °F (36.7 °C) (!) 106 20 95 %   05/16/20 1147 138/84 98.6 °F (37 °C) (!) 115 20 95 %     Labs:    Recent Labs     05/16/20  1659 05/16/20  0626 05/15/20  0538   WBC 17.5*  --  17.3*   HGB 11.2*  --  11.4*     --  187   NA  --   --  143   K  --  3.5 2.9*   CL  --   --  112*   CO2  --   --  25   BUN  --   --  24*   CREA  --   --  0.37*   GLU  --   --  143*   TBILI  --   --  0.4   SGOT  --   --  31   ALT  --   --  39   AP  --   --  226*         MICHELINE Paz

## 2020-05-17 NOTE — PROGRESS NOTES
Pharmacokinetic Consult to Pharmacist    Nick Rayhmann is a 63 y.o. female being treated for SSTI with vancomycin and cefepime. Lab Results   Component Value Date/Time    BUN 24 (H) 05/15/2020 05:38 AM    Creatinine 0.37 (L) 05/15/2020 05:38 AM    WBC 17.5 (H) 05/16/2020 04:59 PM      Estimated Creatinine Clearance: 84.4 mL/min (A) (by C-G formula based on SCr of 0.37 mg/dL (L)). Lab Results   Component Value Date/Time    Vancomycin,trough 2.4 (L) 05/16/2020 04:59 PM     Day 5 of vancomycin. Goal trough is 10-20. Trough subtherapeutic at 2.4. Scr has greatly improved implying improved renal function. Dose increased to 750 mg every 8 hours. Will continue to follow patient.       Thank you,  Adolfo Martinez, PharmD, [address]  Clinical Pharmacy Specialist  [phone]

## 2020-05-17 NOTE — PROGRESS NOTES
Assessment complete via flow sheet. Pt A&Ox4. Respirations even and unlabored. S1 S2 auscultated. Bowel sounds active, abdomen active. Right sacral wound vac dressing c/d/i. Denies other needs. Bed in lowest position, side rails up x3, call bell in reach. Instructed to call for assistance. Pt verbalized understanding. Plan of care reviewed with patient.

## 2020-05-17 NOTE — PROGRESS NOTES
Shift assessment completed. Pt alert and oriented x4. Lungs CTA with even and unlabored respirations, on room air. Heart sounds regular. Active bowel sounds with soft and non-tender abdomen. Pt reports constipation; administered 5 mg Dulcolax PO per MD order. Skin warm and dry. Buttock wounds with vacuum dressing c/d/i. Bilateral hips & right ankle with foam dressings c/d/i. Scabbed abrasion to nose RINA. Right PICC line dressing c/d/i, flushed with positive blood return and infusing scheduled ABX. Purewick draining clear, yellow urine. Pt reports no pain or nausea and no signs of acute distress noted. Pt resting quietly in bed locked in lowest position with call light in reach and bed alarm set.

## 2020-05-18 ENCOUNTER — ANESTHESIA (OUTPATIENT)
Dept: SURGERY | Age: 60
DRG: 570 | End: 2020-05-18
Payer: MEDICARE

## 2020-05-18 LAB
ANION GAP SERPL CALC-SCNC: 8 MMOL/L (ref 7–16)
BASOPHILS # BLD: 0.1 K/UL (ref 0–0.2)
BASOPHILS NFR BLD: 1 % (ref 0–2)
BUN SERPL-MCNC: 18 MG/DL (ref 6–23)
CALCIUM SERPL-MCNC: 8.5 MG/DL (ref 8.3–10.4)
CHLORIDE SERPL-SCNC: 106 MMOL/L (ref 98–107)
CO2 SERPL-SCNC: 25 MMOL/L (ref 21–32)
CREAT SERPL-MCNC: 0.27 MG/DL (ref 0.6–1)
DIFFERENTIAL METHOD BLD: ABNORMAL
EOSINOPHIL # BLD: 0.2 K/UL (ref 0–0.8)
EOSINOPHIL NFR BLD: 1 % (ref 0.5–7.8)
ERYTHROCYTE [DISTWIDTH] IN BLOOD BY AUTOMATED COUNT: 14.6 % (ref 11.9–14.6)
GLUCOSE BLD STRIP.AUTO-MCNC: 237 MG/DL (ref 65–100)
GLUCOSE BLD STRIP.AUTO-MCNC: 85 MG/DL (ref 65–100)
GLUCOSE BLD STRIP.AUTO-MCNC: 95 MG/DL (ref 65–100)
GLUCOSE SERPL-MCNC: 87 MG/DL (ref 65–100)
HCT VFR BLD AUTO: 33.9 % (ref 35.8–46.3)
HGB BLD-MCNC: 12 G/DL (ref 11.7–15.4)
IMM GRANULOCYTES # BLD AUTO: 0.4 K/UL (ref 0–0.5)
IMM GRANULOCYTES NFR BLD AUTO: 2 % (ref 0–5)
LYMPHOCYTES # BLD: 0.5 K/UL (ref 0.5–4.6)
LYMPHOCYTES NFR BLD: 3 % (ref 13–44)
MCH RBC QN AUTO: 34.4 PG (ref 26.1–32.9)
MCHC RBC AUTO-ENTMCNC: 35.4 G/DL (ref 31.4–35)
MCV RBC AUTO: 97.1 FL (ref 79.6–97.8)
MONOCYTES # BLD: 0.6 K/UL (ref 0.1–1.3)
MONOCYTES NFR BLD: 4 % (ref 4–12)
NEUTS SEG # BLD: 13.8 K/UL (ref 1.7–8.2)
NEUTS SEG NFR BLD: 89 % (ref 43–78)
NRBC # BLD: 0 K/UL (ref 0–0.2)
PLATELET # BLD AUTO: 233 K/UL (ref 150–450)
PMV BLD AUTO: 11.1 FL (ref 9.4–12.3)
POTASSIUM SERPL-SCNC: 3.7 MMOL/L (ref 3.5–5.1)
RBC # BLD AUTO: 3.49 M/UL (ref 4.05–5.2)
SODIUM SERPL-SCNC: 139 MMOL/L (ref 136–145)
VANCOMYCIN TROUGH SERPL-MCNC: 15.4 UG/ML (ref 5–20)
WBC # BLD AUTO: 15.5 K/UL (ref 4.3–11.1)

## 2020-05-18 PROCEDURE — 74011250637 HC RX REV CODE- 250/637: Performed by: SURGERY

## 2020-05-18 PROCEDURE — 65270000029 HC RM PRIVATE

## 2020-05-18 PROCEDURE — 77030040922 HC BLNKT HYPOTHRM STRY -A: Performed by: ANESTHESIOLOGY

## 2020-05-18 PROCEDURE — 77030018836 HC SOL IRR NACL ICUM -A: Performed by: SURGERY

## 2020-05-18 PROCEDURE — 77030021158 HC TRCR BLN GELPRT AMR -B: Performed by: SURGERY

## 2020-05-18 PROCEDURE — 65660000000 HC RM CCU STEPDOWN

## 2020-05-18 PROCEDURE — 77030008606 HC TRCR ENDOSC KII AMR -B: Performed by: SURGERY

## 2020-05-18 PROCEDURE — 88304 TISSUE EXAM BY PATHOLOGIST: CPT

## 2020-05-18 PROCEDURE — 77030037892: Performed by: SURGERY

## 2020-05-18 PROCEDURE — 77030008522 HC TBNG INSUF LAPRO STRY -B: Performed by: SURGERY

## 2020-05-18 PROCEDURE — 47562 LAPAROSCOPIC CHOLECYSTECTOMY: CPT | Performed by: SURGERY

## 2020-05-18 PROCEDURE — 77030016151 HC PROTCTR LNS DFOG COVD -B: Performed by: SURGERY

## 2020-05-18 PROCEDURE — 77030010513 HC APPL CLP LIG J&J -C: Performed by: SURGERY

## 2020-05-18 PROCEDURE — 74011250637 HC RX REV CODE- 250/637: Performed by: HOSPITALIST

## 2020-05-18 PROCEDURE — 74011250636 HC RX REV CODE- 250/636: Performed by: NURSE ANESTHETIST, CERTIFIED REGISTERED

## 2020-05-18 PROCEDURE — 0FT44ZZ RESECTION OF GALLBLADDER, PERCUTANEOUS ENDOSCOPIC APPROACH: ICD-10-PCS | Performed by: SURGERY

## 2020-05-18 PROCEDURE — 74011000250 HC RX REV CODE- 250: Performed by: NURSE ANESTHETIST, CERTIFIED REGISTERED

## 2020-05-18 PROCEDURE — 76210000063 HC OR PH I REC FIRST 0.5 HR: Performed by: SURGERY

## 2020-05-18 PROCEDURE — 74011000250 HC RX REV CODE- 250: Performed by: SURGERY

## 2020-05-18 PROCEDURE — 74750000023 HC WOUND THERAPY

## 2020-05-18 PROCEDURE — 77030040361 HC SLV COMPR DVT MDII -B: Performed by: SURGERY

## 2020-05-18 PROCEDURE — 76060000033 HC ANESTHESIA 1 TO 1.5 HR: Performed by: SURGERY

## 2020-05-18 PROCEDURE — 77030037088 HC TUBE ENDOTRACH ORAL NSL COVD-A: Performed by: ANESTHESIOLOGY

## 2020-05-18 PROCEDURE — 77030000038 HC TIP SCIS LAPSCP SURI -B: Performed by: SURGERY

## 2020-05-18 PROCEDURE — 77030031139 HC SUT VCRL2 J&J -A: Performed by: SURGERY

## 2020-05-18 PROCEDURE — 74011250636 HC RX REV CODE- 250/636: Performed by: HOSPITALIST

## 2020-05-18 PROCEDURE — 77030039425 HC BLD LARYNG TRULITE DISP TELE -A: Performed by: ANESTHESIOLOGY

## 2020-05-18 PROCEDURE — 77030008771 HC TU NG SALEM SUMP -A: Performed by: ANESTHESIOLOGY

## 2020-05-18 PROCEDURE — 74011250636 HC RX REV CODE- 250/636: Performed by: NURSE PRACTITIONER

## 2020-05-18 PROCEDURE — 74011250637 HC RX REV CODE- 250/637: Performed by: NURSE PRACTITIONER

## 2020-05-18 PROCEDURE — 80048 BASIC METABOLIC PNL TOTAL CA: CPT

## 2020-05-18 PROCEDURE — 74011000258 HC RX REV CODE- 258: Performed by: NURSE PRACTITIONER

## 2020-05-18 PROCEDURE — 82962 GLUCOSE BLOOD TEST: CPT

## 2020-05-18 PROCEDURE — 76010000161 HC OR TIME 1 TO 1.5 HR INTENSV-TIER 1: Performed by: SURGERY

## 2020-05-18 PROCEDURE — 85025 COMPLETE CBC W/AUTO DIFF WBC: CPT

## 2020-05-18 RX ORDER — BUPIVACAINE HYDROCHLORIDE AND EPINEPHRINE 2.5; 5 MG/ML; UG/ML
INJECTION, SOLUTION EPIDURAL; INFILTRATION; INTRACAUDAL; PERINEURAL AS NEEDED
Status: DISCONTINUED | OUTPATIENT
Start: 2020-05-18 | End: 2020-05-18 | Stop reason: HOSPADM

## 2020-05-18 RX ORDER — FENTANYL CITRATE 50 UG/ML
INJECTION, SOLUTION INTRAMUSCULAR; INTRAVENOUS AS NEEDED
Status: DISCONTINUED | OUTPATIENT
Start: 2020-05-18 | End: 2020-05-18 | Stop reason: HOSPADM

## 2020-05-18 RX ORDER — SODIUM CHLORIDE, SODIUM LACTATE, POTASSIUM CHLORIDE, CALCIUM CHLORIDE 600; 310; 30; 20 MG/100ML; MG/100ML; MG/100ML; MG/100ML
INJECTION, SOLUTION INTRAVENOUS
Status: DISCONTINUED | OUTPATIENT
Start: 2020-05-18 | End: 2020-05-18 | Stop reason: HOSPADM

## 2020-05-18 RX ORDER — EPHEDRINE SULFATE/0.9% NACL/PF 50 MG/5 ML
SYRINGE (ML) INTRAVENOUS AS NEEDED
Status: DISCONTINUED | OUTPATIENT
Start: 2020-05-18 | End: 2020-05-18 | Stop reason: HOSPADM

## 2020-05-18 RX ORDER — DEXAMETHASONE SODIUM PHOSPHATE 4 MG/ML
INJECTION, SOLUTION INTRA-ARTICULAR; INTRALESIONAL; INTRAMUSCULAR; INTRAVENOUS; SOFT TISSUE AS NEEDED
Status: DISCONTINUED | OUTPATIENT
Start: 2020-05-18 | End: 2020-05-18 | Stop reason: HOSPADM

## 2020-05-18 RX ORDER — SUCCINYLCHOLINE CHLORIDE 20 MG/ML
INJECTION INTRAMUSCULAR; INTRAVENOUS AS NEEDED
Status: DISCONTINUED | OUTPATIENT
Start: 2020-05-18 | End: 2020-05-18 | Stop reason: HOSPADM

## 2020-05-18 RX ORDER — PROPOFOL 10 MG/ML
INJECTION, EMULSION INTRAVENOUS AS NEEDED
Status: DISCONTINUED | OUTPATIENT
Start: 2020-05-18 | End: 2020-05-18 | Stop reason: HOSPADM

## 2020-05-18 RX ORDER — LIDOCAINE HYDROCHLORIDE 20 MG/ML
INJECTION, SOLUTION EPIDURAL; INFILTRATION; INTRACAUDAL; PERINEURAL AS NEEDED
Status: DISCONTINUED | OUTPATIENT
Start: 2020-05-18 | End: 2020-05-18 | Stop reason: HOSPADM

## 2020-05-18 RX ORDER — ONDANSETRON 2 MG/ML
INJECTION INTRAMUSCULAR; INTRAVENOUS AS NEEDED
Status: DISCONTINUED | OUTPATIENT
Start: 2020-05-18 | End: 2020-05-18 | Stop reason: HOSPADM

## 2020-05-18 RX ORDER — ROCURONIUM BROMIDE 10 MG/ML
INJECTION, SOLUTION INTRAVENOUS AS NEEDED
Status: DISCONTINUED | OUTPATIENT
Start: 2020-05-18 | End: 2020-05-18 | Stop reason: HOSPADM

## 2020-05-18 RX ADMIN — SUGAMMADEX 100 MG: 100 INJECTION, SOLUTION INTRAVENOUS at 12:36

## 2020-05-18 RX ADMIN — FENTANYL CITRATE 100 MCG: 50 INJECTION INTRAMUSCULAR; INTRAVENOUS at 11:39

## 2020-05-18 RX ADMIN — PHENYLEPHRINE HYDROCHLORIDE 150 MCG: 10 INJECTION INTRAVENOUS at 11:45

## 2020-05-18 RX ADMIN — METOPROLOL TARTRATE 12.5 MG: 25 TABLET, FILM COATED ORAL at 09:49

## 2020-05-18 RX ADMIN — Medication 10 ML: at 21:09

## 2020-05-18 RX ADMIN — VANCOMYCIN HYDROCHLORIDE 750 MG: 1 INJECTION, POWDER, LYOPHILIZED, FOR SOLUTION INTRAVENOUS at 09:49

## 2020-05-18 RX ADMIN — VANCOMYCIN HYDROCHLORIDE 750 MG: 1 INJECTION, POWDER, LYOPHILIZED, FOR SOLUTION INTRAVENOUS at 01:52

## 2020-05-18 RX ADMIN — Medication 7.5 MG: at 11:59

## 2020-05-18 RX ADMIN — METOPROLOL TARTRATE 12.5 MG: 25 TABLET, FILM COATED ORAL at 17:09

## 2020-05-18 RX ADMIN — AMANTADINE HYDROCHLORIDE 100 MG: 100 CAPSULE ORAL at 17:09

## 2020-05-18 RX ADMIN — PHENYLEPHRINE HYDROCHLORIDE 100 MCG: 10 INJECTION INTRAVENOUS at 12:08

## 2020-05-18 RX ADMIN — SUCCINYLCHOLINE CHLORIDE 120 MG: 20 INJECTION, SOLUTION INTRAMUSCULAR; INTRAVENOUS at 11:39

## 2020-05-18 RX ADMIN — CEFEPIME 2 G: 2 INJECTION, POWDER, FOR SOLUTION INTRAVENOUS at 21:08

## 2020-05-18 RX ADMIN — Medication 10 MG: at 11:50

## 2020-05-18 RX ADMIN — VANCOMYCIN HYDROCHLORIDE 750 MG: 1 INJECTION, POWDER, LYOPHILIZED, FOR SOLUTION INTRAVENOUS at 17:00

## 2020-05-18 RX ADMIN — METRONIDAZOLE 500 MG: 500 TABLET ORAL at 09:49

## 2020-05-18 RX ADMIN — DEXAMETHASONE SODIUM PHOSPHATE 4 MG: 4 INJECTION, SOLUTION INTRAMUSCULAR; INTRAVENOUS at 12:22

## 2020-05-18 RX ADMIN — METRONIDAZOLE 500 MG: 500 TABLET ORAL at 21:08

## 2020-05-18 RX ADMIN — ROCURONIUM BROMIDE 10 MG: 10 INJECTION, SOLUTION INTRAVENOUS at 12:11

## 2020-05-18 RX ADMIN — LIDOCAINE HYDROCHLORIDE 100 MG: 20 INJECTION, SOLUTION EPIDURAL; INFILTRATION; INTRACAUDAL; PERINEURAL at 11:39

## 2020-05-18 RX ADMIN — ROCURONIUM BROMIDE 5 MG: 10 INJECTION, SOLUTION INTRAVENOUS at 11:39

## 2020-05-18 RX ADMIN — CEFEPIME 2 G: 2 INJECTION, POWDER, FOR SOLUTION INTRAVENOUS at 08:42

## 2020-05-18 RX ADMIN — PHENYLEPHRINE HYDROCHLORIDE 150 MCG: 10 INJECTION INTRAVENOUS at 12:00

## 2020-05-18 RX ADMIN — ROCURONIUM BROMIDE 10 MG: 10 INJECTION, SOLUTION INTRAVENOUS at 11:56

## 2020-05-18 RX ADMIN — Medication 10 ML: at 05:22

## 2020-05-18 RX ADMIN — Medication 10 ML: at 17:19

## 2020-05-18 RX ADMIN — SODIUM CHLORIDE, SODIUM LACTATE, POTASSIUM CHLORIDE, AND CALCIUM CHLORIDE: 600; 310; 30; 20 INJECTION, SOLUTION INTRAVENOUS at 11:25

## 2020-05-18 RX ADMIN — PHENYLEPHRINE HYDROCHLORIDE 100 MCG: 10 INJECTION INTRAVENOUS at 12:17

## 2020-05-18 RX ADMIN — ONDANSETRON 4 MG: 2 INJECTION INTRAMUSCULAR; INTRAVENOUS at 12:23

## 2020-05-18 RX ADMIN — PROPOFOL 10 MG: 10 INJECTION, EMULSION INTRAVENOUS at 11:39

## 2020-05-18 NOTE — PROGRESS NOTES
Pharmacokinetic Consult to Pharmacist    Kendra Adriana is a 63 y.o. female being treated for SSTI with vancomycin and cefepime. Weight: 45.4 kg (100 lb)  Lab Results   Component Value Date/Time    BUN 18 05/18/2020 04:07 AM    Creatinine 0.27 (L) 05/18/2020 04:07 AM    WBC 15.5 (H) 05/18/2020 04:07 AM      Estimated Creatinine Clearance: 62.8 mL/min (A) (by C-G formula based on SCr of 0.27 mg/dL (L)). Lab Results   Component Value Date/Time    Vancomycin,trough 15.4 05/17/2020 11:42 PM     Day 6 of vancomycin. Goal trough is 10-20. Trough therapeutic at 15.4. Scr has greatly improved implying improved renal function. Will continue 750 mg every 8 hours. Will continue to follow patient.       Thank you,  Constanza Jackson, PharmD, [address]  Clinical Pharmacy Specialist  [phone]

## 2020-05-18 NOTE — PROGRESS NOTES
Nutrition follow-up  Initial assessment for:   Consult for general nutrition management and supplements (Hospitalist)  [address] [facility] for Pressure Injury stage 2 or greater     Assessment: Patient with PMH of MS admitted after multiple falls. She was found to have an ischial ulcer and is now s/p I&D with wound vac. ERCP on 5/15. She was also found to have choledocholithiasis now s/p lap chol. Plan for I&D of ischial wound possibly tomorrow. Net with patient today. She stats that she has never been a big eater. She states that weight varies between 100-110#. She states she drinks Ensure at home, but not every day. She reports 1-2 meals per day, usually oatmeal in the morning and then something again around 2 pm. She does not provide specifics regarding intake, but states that she does not eat anything fried, or \"fatty food. \" She also states that her mother was a vegetarian and she does not eat much meat. She states that she did not eat much yesterday because she just wasn't very hungry. She states \"that's just me. \" She states that she hates chocolate Ensure. NFPE: Patient is very thin appearing, potential wasting to clavicles, but could also be positioning. DIET NPO  Ensure Enlive with all meals       Anthropometrics:  Height: 66\", Weight: 45.4 kg (100#), unknown weight source, BMI 16.1. BMI class of Underweight. There is no weight history since 2014. Macronutrient needs: (using Listed body weight 45.4 kg)  EER: 2779-8363 kcal/day (30-35 kcal/kg)  EPR: 68-91 g/day (1.5-2 g/kg)     Nutrition Intervention:  Meals and snacks: Advance diet as medically appropriate. Education: Discussed the importance of adequate nutrition for wound healing. Explained food sources of protein and encouraged intake of Ensure to help with daily protein as she does not eat much meat. Medical food supplement therapy: Continue Ensure Enlive TID - vanilla or strawberry  Discharge Nutrition Plan: Too soon to determine.     Blair Hughes [address], [address], [address]

## 2020-05-18 NOTE — PROGRESS NOTES
Shift assessment complete. Patient alert and oriented x4. Heart with regular rate and rhythm. Lung sounds clear. Patient with eschar across bridge of nose and eschar to bilateral shins. Pressure hose in place. Wound vac to ischial point is intact with good suction. Purewick external catheter draining adequate amounts of urine.  Patient is NPO for lap jj this am.

## 2020-05-18 NOTE — INTERVAL H&P NOTE
Update History & Physical    The Patient's History and Physical of May 13,2020   was reviewed with the patient and I examined the patient. There was ERCP completed on Friday successfully. She is now ready for Lap Christa. The surgical site was confirmed by the patient and me. Plan:  The risk, benefits, expected outcome, and alternative to the recommended procedure have been discussed with the patient. Patient understands and wants to proceed with the procedure.     Electronically signed by Ermelinda Betancur DO on 5/18/2020 at 11:19 AM

## 2020-05-18 NOTE — ANESTHESIA POSTPROCEDURE EVALUATION
Procedure(s):  CHOLECYSTECTOMY LAPAROSCOPIC. general    Anesthesia Post Evaluation      Multimodal analgesia: multimodal analgesia used between 6 hours prior to anesthesia start to PACU discharge  Patient location during evaluation: PACU  Patient participation: complete - patient participated  Level of consciousness: awake and awake and alert  Pain management: adequate  Airway patency: patent  Anesthetic complications: no  Cardiovascular status: acceptable  Respiratory status: acceptable  Hydration status: acceptable  Post anesthesia nausea and vomiting:  controlled      Vitals Value Taken Time   /74 5/18/2020  1:24 PM   Temp 37.3 °C (99.1 °F) 5/18/2020  1:14 PM   Pulse 86 5/18/2020  1:23 PM   Resp 15 5/18/2020  1:14 PM   SpO2 99 % 5/18/2020  1:23 PM   Vitals shown include unvalidated device data.

## 2020-05-18 NOTE — PROGRESS NOTES
Problem: Falls - Risk of  Goal: *Absence of Falls  Description: Document Pepe Campbell Fall Risk and appropriate interventions in the flowsheet. Outcome: Progressing Towards Goal  Note: Fall Risk Interventions:  Mobility Interventions: Bed/chair exit alarm, Patient to call before getting OOB    Mentation Interventions: Bed/chair exit alarm, Adequate sleep, hydration, pain control    Medication Interventions: Bed/chair exit alarm, Patient to call before getting OOB, Teach patient to arise slowly    Elimination Interventions: Bed/chair exit alarm, Call light in reach, Toileting schedule/hourly rounds    History of Falls Interventions: Bed/chair exit alarm         Problem: Patient Education: Go to Patient Education Activity  Goal: Patient/Family Education  Outcome: Progressing Towards Goal     Problem: Pressure Injury - Risk of  Goal: *Prevention of pressure injury  Description: Document Kimani Scale and appropriate interventions in the flowsheet. Outcome: Progressing Towards Goal  Note: Pressure Injury Interventions:  Sensory Interventions: Assess changes in LOC, Avoid rigorous massage over bony prominences, Keep linens dry and wrinkle-free, Pad between skin to skin    Moisture Interventions: Absorbent underpads, Apply protective barrier, creams and emollients, Check for incontinence Q2 hours and as needed    Activity Interventions: Pressure redistribution bed/mattress(bed type)    Mobility Interventions: Assess need for specialty bed, Pressure redistribution bed/mattress (bed type), Turn and reposition approx.  every two hours(pillow and wedges)    Nutrition Interventions: Document food/fluid/supplement intake    Friction and Shear Interventions: Apply protective barrier, creams and emollients, Lift sheet      Problem: Patient Education: Go to Patient Education Activity  Goal: Patient/Family Education  Outcome: Progressing Towards Goal     Problem: Nutrition Deficit  Goal: *Optimize nutritional status  Outcome: Progressing Towards Goal     Problem: Patient Education: Go to Patient Education Activity  Goal: Patient/Family Education  Outcome: Progressing Towards Goal     Problem: Patient Education: Go to Patient Education Activity  Goal: Patient/Family Education  Outcome: Progressing Towards Goal

## 2020-05-18 NOTE — PROGRESS NOTES
Physical Therapy Note:    Chart reviewed for physical therapy treatment this AM. Patient off the floor to surgery. Will attempt later as patient is available and schedule permits.     Thank you,  Boston Bear, PT, DPT

## 2020-05-18 NOTE — PERIOP NOTES
TRANSFER - OUT REPORT:    Verbal report given to RADHA flowers, on Trish Patel  being transferred to [address] [place] [address]  for ordered procedure       Report consisted of patients Situation, Background, Assessment and   Recommendations(SBAR). Information from the following report(s) SBAR, OR Summary, Procedure Summary and MAR was reviewed with the receiving nurse. Lines:   PICC Single Lumen 05/16/20 Right;Brachial (Active)   Central Line Being Utilized Yes 5/18/2020 12:58 PM   Criteria for Appropriate Use Limited/no vessel suitable for conventional peripheral access 5/18/2020 12:58 PM   Site Assessment Clean, dry, & intact 5/18/2020 12:58 PM   Phlebitis Assessment 0 5/18/2020 12:58 PM   Infiltration Assessment 0 5/18/2020 12:58 PM   Arm Circumference (cm) 20 cm 5/16/2020  4:54 PM   Date of Last Dressing Change 05/16/20 5/17/2020  7:32 PM   Dressing Status Clean, dry, & intact 5/18/2020 12:58 PM   External Catheter Length (cm) 0 centimeters 5/16/2020  4:54 PM   Dressing Type Disk with Chlorhexadine gluconate (CHG) 5/18/2020  8:11 AM   Hub Color/Line Status Infusing 5/18/2020 12:58 PM   Positive Blood Return (Site #1) Yes 5/17/2020  8:50 AM   Alcohol Cap Used No 5/16/2020  4:54 PM        Opportunity for questions and clarification was provided.       Patient transported with:   O2 @ 2 liters

## 2020-05-18 NOTE — WOUND CARE
Patient was in surgery this afternoon/am. VAC dressing change plan for tomorrow. Discussed with Pilar GARCIA.

## 2020-05-18 NOTE — OP NOTES
[address] [place] [address]  OPERATIVE REPORT    Name:  Nicolasa Mckeon  MR#:  [id number]  :  1960  ACCOUNT #:  [de-identified]  DATE OF SERVICE:  2020    PREOPERATIVE DIAGNOSIS:  Choledocholithiasis. POSTOPERATIVE DIAGNOSIS:  Choledocholithiasis. PROCEDURE PERFORMED:  Laparoscopic cholecystectomy, CPT code 94124. SURGEON:  Brooks Arrington DO    ASSISTANT:  None. ANESTHESIA:  General endotracheal.    COMPLICATIONS:  None. SPECIMENS REMOVED:  Gallbladder. IMPLANTS:  None. ESTIMATED BLOOD LOSS:  Minimal.    DISPOSITION:  Stable. COUNTS:  Sponge count, needle count, instrument count all correct x3. DESCRIPTION OF PROCEDURE:  This is a 52-year-old female with choledocholithiasis. She is status post ERCP with extraction of small amount of sludge and papillotomy. She is now prepared for a laparoscopic cholecystectomy. Preoperative laboratory values were normal.  Consent was obtained by describing the procedure to the patient including potential complications to include infection, bleeding, possible bile leak, bile duct injury, or possible cholangiogram.  Consent was obtained and placed in the final chart. She was administered Ancef 2 g IV preoperatively. She was taken to the operating suite and placed in a supine position. General anesthesia was initiated without complications. She was then prepped and draped in the usual sterile fashion. A time-out was taken to confirm the patient's name and proper procedure. Following this, an infraumbilical incision was planned. A 0.25% Marcaine with epinephrine was used to anesthetize the skin and subcutaneous tissue. An 11-scalpel blade was used to make a skin incision. Bovie cauterization was used to dissect down to the rectus fascia. The fascia was then incised and 0 Vicryl sutures were placed as anchoring stitches. The peritoneum was elevated between tonsil of Schnidt and entered with Metzenbaum scissors.   A Aileen trocar was placed into the peritoneum and secured in place. CO2 gas was used to create a pneumoperitoneum at 15 mmHg. A laparoscope was then passed into the abdomen and a brief survey revealed a normal-appearing liver and a thin-walled gallbladder with some distended loops of bowel. She also had a very small abdominal compartment due to the small size of the patient. We decided to proceed placing a 5-mm trocar in the right upper quadrant in the usual fashion with no evidence of bleeding. The gallbladder was then grasped and elevated over the dome of the liver and there were some omental attachments that were easily taken down. At this point, we did identify some evidence of pneumatosis in the surrounding fat and the omentum presumably from the ERCP. There was no leaky bile. There was no evidence of enteric contents and there was no evidence of abscess or purulent discharge. We decided to proceed with the gallbladder. We then grasped the Clarisa's pouch , elevated anteriorly and laterally exposing the triangle of Calot. The anterior peritoneum was then taken down medially and laterally exposing a 3-mm cystic duct. The duct was easily skeletonized and two clips were placed proximally and one distally, and the duct was divided. The cystic artery was identified within the triangle and two clips were placed proximally and one distally, and the artery was divided. The gallbladder was dissected off the liver bed using spot cauterization. The gallbladder was retrieved and sent to Pathology for analysis. At this point, we readmitted the scope. We irrigated with saline until clear. We ensured hemostasis on the liver bed using spot cauterization. Again, there was no evidence of bile leak and there was no evidence of enteric contents, abscess or inflammation but there was evidence of some air presumably in the subcutaneous tissues surrounding the duodenum presumable from the ERCP.   At this point, we concluded the case.  We then removed all trocars in the usual fashion with no evidence of bleeding. The periumbilical port site was closed with 0 Vicryl in a figure-of-eight fashion. We irrigated with saline until clear. We approximated the skin edges with 3-0 Vicryl in a simple running fashion. Mastisol and Steri-Strips were placed up the incision. Sterile dressing was applied. The patient will be extubated and transferred to recovery stable. FINDINGS:  A 50-year-old female with choledocholithiasis, status post ERCP. Laparoscopic findings revealed a normal-appearing liver, a thin-walled gallbladder, a 3-mm cystic duct. The case was routine. However, we did see some subcutaneous air around the duodenum and mesentery presumably from the ERCP. There was no evidence of bile, abscess or enteric contents. She tolerated the procedure well.       [address], DO      [id number]  D:  05/18/2020 12:45  T:  05/18/2020 15:19  JOB #:  [id number]

## 2020-05-18 NOTE — BRIEF OP NOTE
Brief Postoperative Note    Patient: Trish Patel  YOB: 1960  MRN: [id number]    Date of Procedure: 5/18/2020     Pre-Op Diagnosis: cholecystitis    Post-Op Diagnosis: Choledocholithiasis      Procedure(s):  CHOLECYSTECTOMY LAPAROSCOPIC CPT 95603      Surgeon(s):  Julian Figueroa DO    Surgical Assistant: None    Anesthesia: General     Estimated Blood Loss (mL): Minimal    Complications: None    Specimens:   ID Type Source Tests Collected by Time Destination   1 : Gallbladder Preservative Gallbladder  Julian Figueroa DO 5/18/2020 1205 Pathology        Implants: * No implants in log *    Drains:   External Female Catheter 05/14/20 (Active)   Site Assessment Clean, dry, & intact 5/18/2020  3:11 AM   Repositioned Yes 5/18/2020  3:11 AM   Perineal Care Yes 5/18/2020  3:11 AM   Wick Changed Yes 5/18/2020  3:11 AM   Suction Canister/Tubing Changed Yes 5/18/2020  3:11 AM   Urine Output (mL) 150 ml 5/18/2020  5:46 AM       Findings: Thin walled GB, 3 mm cystic duct, normal appearing liver. No cholangiogram elected due to normal pre op labs and s/p ERCP.      Electronically Signed by Chiquita Spring DO on 5/18/2020 at 12:37 PM  [id number]

## 2020-05-18 NOTE — PROGRESS NOTES
Hospitalist Progress Note    2020  Admit Date: 2020  5:12 AM   NAME: Jake Riley   :  1960   MRN:  [id number]   Attending: Cortney Almazan MD  PCP:  Aldo Yepez MD    SUBJECTIVE:   Patient 72-year-old female history of MS follows with Dr. Pete Orellana (no relation) presents to the emergency room after sustaining multiple falls. Patient reports she \"hurts all over\". Denies any headache, sore throat, cough, chest pain, nausea vomiting. Completed course of antibiotics approximately 1 week ago per report for UTI. Admits to tobacco abuse and 1 glass of wine daily. ED work-up was notable for GLENROY, dehydration, hyponatremia, transaminitis, leukocytosis. She was admitted for the further work-up to include a right upper quadrant ultrasound which shows dilated CBD, IV fluid resuscitation, PT OT. Per d/w pt's friend Karen Acosta pt is wheelchair dependent, can usually transfer independently. Sustained fall 1.5 months ago and has been \"going downhill\" since - more weakness, confusion and eating/drinking less. GI consulted. S/p MRCP showing dilated CBD and ERCP with papillotomy. S.p jj on . Gen surgery consulted for ischial decub ulcer s/p OR debridement. ID following for atbx recs.  - reports feeling much better than she did last week. No acute complaints.  - denies acute complaints. Seen s/p cholecystectomy.      Review of Systems negative with exception of pertinent positives noted above  PHYSICAL EXAM     Visit Vitals  /72   Pulse 91   Temp 97.7 °F (36.5 °C)   Resp 20   Wt 45.4 kg (100 lb)   SpO2 98%   BMI 16.14 kg/m²      Temp (24hrs), Av.2 °F (36.8 °C), Min:97.7 °F (36.5 °C), Max:99.2 °F (37.3 °C)    Oxygen Therapy  O2 Sat (%): 98 % (20 1615)  Pulse via Oximetry: 85 beats per minute (20 1319)  O2 Device: Room air (20 1429)  O2 Flow Rate (L/min): 1 l/min (20 1314)    Intake/Output Summary (Last 24 hours) at 2020 6003  Last data filed at 5/18/2020 1700  Gross per 24 hour   Intake 740 ml   Output 155 ml   Net 585 ml      General: No acute distress  thin, frail appearing  Lungs:  CTA Bilaterally. Heart:  Regular rate and rhythm,  No murmur, rub, or gallop  Abdomen: Soft, Non distended, Non tender, Positive bowel sounds  Extremities: No cyanosis, clubbing or edema  Neurologic:  No focal deficits    ASSESSMENT      Active Hospital Problems    Diagnosis Date Noted    Choledocholithiasis 05/16/2020    GLENROY [facility]acute kidney injury) ([facility] [address] 05/13/2020    Decubitus ulcer of ischial area, right, unspecified pressure ulcer stage 05/13/2020    Transaminitis 05/13/2020    Acute metabolic encephalopathy 36/44/1433    Multiple sclerosis ([facility] [address] 05/13/2020    Protein calorie malnutrition ([facility] [address] 05/13/2020    Leukocytosis 05/13/2020     Plan  - GLENROY - secondary to dehydration. Resolved     2- Hyponatremia - hypovolemic, corrected with IVF     3- Right ischial decub ulcer, right lateral malleoli ulcer - wound care has seen. recs s/p sacral decub debridement by gen marce 5/14. Plain film of ankle neg for osteo. ID following -  Cont vanco/cefepime, oral flagyl. Anticipate 6 week course of atbx.    4- Multiple sclerosis - MRI brain unchanged with regard to hyperintensities. Does mention smooth enhancement of meninges - seen by Neuro, defer Rx  Fingolimod resumed at 2.5mg daily (or 5mg every other day)     5- CBD minimal sludge (removed), s/p ERCP and papillotomy on 5-15-20. S/p Cholecystectomy on 5/18     6- Protein calorie malnutrition - nutrition consult     7- Acute metabolic encephalopathy - likely secondary to infection, dehydration, malnutrition.  resolved     8- HTN - controlled       DVT Prophylaxis:  scds    Dispo -?DC to [facility], will follow with CM    Signed By: Gunner Llamas DO     May 18, 2020

## 2020-05-19 LAB
ANION GAP SERPL CALC-SCNC: 6 MMOL/L (ref 7–16)
BACTERIA SPEC CULT: NORMAL
BACTERIA SPEC CULT: NORMAL
BUN SERPL-MCNC: 26 MG/DL (ref 6–23)
CALCIUM SERPL-MCNC: 8.2 MG/DL (ref 8.3–10.4)
CHLORIDE SERPL-SCNC: 109 MMOL/L (ref 98–107)
CO2 SERPL-SCNC: 26 MMOL/L (ref 21–32)
CREAT SERPL-MCNC: 0.39 MG/DL (ref 0.6–1)
ERYTHROCYTE [DISTWIDTH] IN BLOOD BY AUTOMATED COUNT: 14.8 % (ref 11.9–14.6)
GLUCOSE BLD STRIP.AUTO-MCNC: 131 MG/DL (ref 65–100)
GLUCOSE SERPL-MCNC: 107 MG/DL (ref 65–100)
HCT VFR BLD AUTO: 30.5 % (ref 35.8–46.3)
HGB BLD-MCNC: 10.7 G/DL (ref 11.7–15.4)
MCH RBC QN AUTO: 34.2 PG (ref 26.1–32.9)
MCHC RBC AUTO-ENTMCNC: 35.1 G/DL (ref 31.4–35)
MCV RBC AUTO: 97.4 FL (ref 79.6–97.8)
NRBC # BLD: 0 K/UL (ref 0–0.2)
PLATELET # BLD AUTO: 277 K/UL (ref 150–450)
PMV BLD AUTO: 11.5 FL (ref 9.4–12.3)
POTASSIUM SERPL-SCNC: 3.9 MMOL/L (ref 3.5–5.1)
RBC # BLD AUTO: 3.13 M/UL (ref 4.05–5.2)
SERVICE CMNT-IMP: NORMAL
SERVICE CMNT-IMP: NORMAL
SODIUM SERPL-SCNC: 141 MMOL/L (ref 136–145)
VANCOMYCIN SERPL-MCNC: 15.6 UG/ML
VANCOMYCIN TROUGH SERPL-MCNC: 26.2 UG/ML (ref 5–20)
WBC # BLD AUTO: 15.6 K/UL (ref 4.3–11.1)

## 2020-05-19 PROCEDURE — 77030019934 HC DRSG VAC ASST KCON -B

## 2020-05-19 PROCEDURE — 97605 NEG PRS WND THER DME<=50SQCM: CPT

## 2020-05-19 PROCEDURE — 80048 BASIC METABOLIC PNL TOTAL CA: CPT

## 2020-05-19 PROCEDURE — 97535 SELF CARE MNGMENT TRAINING: CPT

## 2020-05-19 PROCEDURE — 74011250636 HC RX REV CODE- 250/636: Performed by: HOSPITALIST

## 2020-05-19 PROCEDURE — 74011250636 HC RX REV CODE- 250/636: Performed by: INTERNAL MEDICINE

## 2020-05-19 PROCEDURE — 65660000000 HC RM CCU STEPDOWN

## 2020-05-19 PROCEDURE — 97112 NEUROMUSCULAR REEDUCATION: CPT

## 2020-05-19 PROCEDURE — 74011250637 HC RX REV CODE- 250/637: Performed by: NURSE PRACTITIONER

## 2020-05-19 PROCEDURE — 36415 COLL VENOUS BLD VENIPUNCTURE: CPT

## 2020-05-19 PROCEDURE — 74011250636 HC RX REV CODE- 250/636: Performed by: SURGERY

## 2020-05-19 PROCEDURE — 74011250637 HC RX REV CODE- 250/637: Performed by: SURGERY

## 2020-05-19 PROCEDURE — 82962 GLUCOSE BLOOD TEST: CPT

## 2020-05-19 PROCEDURE — 74011250636 HC RX REV CODE- 250/636: Performed by: NURSE PRACTITIONER

## 2020-05-19 PROCEDURE — 80202 ASSAY OF VANCOMYCIN: CPT

## 2020-05-19 PROCEDURE — 74011000258 HC RX REV CODE- 258: Performed by: SURGERY

## 2020-05-19 PROCEDURE — 74011000258 HC RX REV CODE- 258: Performed by: NURSE PRACTITIONER

## 2020-05-19 PROCEDURE — 74011250637 HC RX REV CODE- 250/637: Performed by: HOSPITALIST

## 2020-05-19 PROCEDURE — 65270000029 HC RM PRIVATE

## 2020-05-19 PROCEDURE — 85027 COMPLETE CBC AUTOMATED: CPT

## 2020-05-19 PROCEDURE — 97110 THERAPEUTIC EXERCISES: CPT

## 2020-05-19 PROCEDURE — 74750000023 HC WOUND THERAPY

## 2020-05-19 RX ORDER — VANCOMYCIN/0.9 % SOD CHLORIDE 750 MG/250
750 PLASTIC BAG, INJECTION (ML) INTRAVENOUS EVERY 12 HOURS
Status: DISCONTINUED | OUTPATIENT
Start: 2020-05-19 | End: 2020-05-22 | Stop reason: HOSPADM

## 2020-05-19 RX ADMIN — METRONIDAZOLE 500 MG: 500 TABLET ORAL at 21:07

## 2020-05-19 RX ADMIN — CEFEPIME 2 G: 2 INJECTION, POWDER, FOR SOLUTION INTRAVENOUS at 21:07

## 2020-05-19 RX ADMIN — Medication 10 ML: at 21:07

## 2020-05-19 RX ADMIN — METRONIDAZOLE 500 MG: 500 TABLET ORAL at 09:19

## 2020-05-19 RX ADMIN — VANCOMYCIN HYDROCHLORIDE 750 MG: 10 INJECTION, POWDER, LYOPHILIZED, FOR SOLUTION INTRAVENOUS at 17:20

## 2020-05-19 RX ADMIN — METOPROLOL TARTRATE 12.5 MG: 25 TABLET, FILM COATED ORAL at 17:18

## 2020-05-19 RX ADMIN — Medication 10 ML: at 05:31

## 2020-05-19 RX ADMIN — CEFEPIME 2 G: 2 INJECTION, POWDER, FOR SOLUTION INTRAVENOUS at 09:21

## 2020-05-19 RX ADMIN — AMANTADINE HYDROCHLORIDE 100 MG: 100 CAPSULE ORAL at 17:18

## 2020-05-19 RX ADMIN — Medication 10 ML: at 14:00

## 2020-05-19 RX ADMIN — METOPROLOL TARTRATE 12.5 MG: 25 TABLET, FILM COATED ORAL at 09:20

## 2020-05-19 RX ADMIN — AMANTADINE HYDROCHLORIDE 100 MG: 100 CAPSULE ORAL at 09:19

## 2020-05-19 RX ADMIN — VANCOMYCIN HYDROCHLORIDE 750 MG: 1 INJECTION, POWDER, LYOPHILIZED, FOR SOLUTION INTRAVENOUS at 01:24

## 2020-05-19 RX ADMIN — HYDROCODONE BITARTRATE AND ACETAMINOPHEN 1 TABLET: 5; 325 TABLET ORAL at 17:18

## 2020-05-19 RX ADMIN — HYDROCODONE BITARTRATE AND ACETAMINOPHEN 1 TABLET: 5; 325 TABLET ORAL at 09:35

## 2020-05-19 NOTE — PROGRESS NOTES
Hospitalist Progress Note    2020  Admit Date: 2020  5:12 AM   NAME: Mann Bocanegra   :  1960   MRN:  [id number]   Attending: Alexandria Marie DO  PCP:  Augustina, MD Marga    SUBJECTIVE:   Patient 26-year-old female history of MS follows with Dr. Segundo Chavira (no relation) presents to the emergency room after sustaining multiple falls. Patient reports she \"hurts all over\". Denies any headache, sore throat, cough, chest pain, nausea vomiting. Completed course of antibiotics approximately 1 week ago per report for UTI. Admits to tobacco abuse and 1 glass of wine daily. ED work-up was notable for GLENROY, dehydration, hyponatremia, transaminitis, leukocytosis. She was admitted for the further work-up to include a right upper quadrant ultrasound which shows dilated CBD, IV fluid resuscitation, PT OT. Per d/w pt's friend Jackie Poser pt is wheelchair dependent, can usually transfer independently. Sustained fall 1.5 months ago and has been \"going downhill\" since - more weakness, confusion and eating/drinking less. GI consulted. S/p MRCP showing dilated CBD and ERCP with papillotomy. S.p jj on . Gen surgery consulted for ischial decub ulcer s/p OR debridement. ID following for atbx recs.  - no acute concerns. abd pain improved.  No o/n events    Review of Systems negative with exception of pertinent positives noted above  PHYSICAL EXAM     Visit Vitals  /58   Pulse 90   Temp 97.8 °F (36.6 °C)   Resp 16   Wt 45.4 kg (100 lb)   SpO2 98%   BMI 16.14 kg/m²      Temp (24hrs), Av.7 °F (36.5 °C), Min:97 °F (36.1 °C), Max:98.6 °F (37 °C)    Oxygen Therapy  O2 Sat (%): 98 % (20 1548)  Pulse via Oximetry: 85 beats per minute (20 1319)  O2 Device: Room air (20 1429)  O2 Flow Rate (L/min): 1 l/min (20 1314)    Intake/Output Summary (Last 24 hours) at 2020 1935  Last data filed at 2020 0001  Gross per 24 hour   Intake 60 ml   Output    Net 60 ml      General: No acute distress  thin, frail appearing  Lungs:  CTA Bilaterally. Heart:  Regular rate and rhythm,  No murmur, rub, or gallop  Abdomen: Soft, Non distended, Non tender, Positive bowel sounds  Extremities: No cyanosis, clubbing or edema  Neurologic:  No focal deficits    ASSESSMENT      Active Hospital Problems    Diagnosis Date Noted    Choledocholithiasis 05/16/2020    [facility]ROY [facility]acute kidney [facility]) ([facility] [address] 05/13/2020    Decubitus ulcer of ischial area, right, unspecified pressure ulcer stage 05/13/2020    Transaminitis 05/13/2020    Acute metabolic encephalopathy 59/44/2489    Multiple sclerosis ([facility] [address] 05/13/2020    Protein calorie malnutrition ([facility] [address] 05/13/2020    Leukocytosis 05/13/2020     Plan  1- GLENROY - secondary to dehydration. Resolved     2- Hyponatremia - hypovolemic, corrected with IVF     3- Right ischial decub ulcer, right lateral malleoli ulcer - wound care has seen. recs s/p sacral decub debridement by gen marce 5/14. Plain film of ankle neg for osteo. ID following -  Cont vanco/cefepime, oral flagyl. Anticipate 6 week course of atbx.    4- Multiple sclerosis - MRI brain unchanged with regard to hyperintensities. Does mention smooth enhancement of meninges - seen by Neuro, defer Rx  Fingolimod resumed at 2.5mg daily (or 5mg every other day)     5- CBD minimal sludge (removed), s/p ERCP and papillotomy on 5-15-20. S/p Cholecystectomy on 5/18     6- Protein calorie malnutrition - nutrition consult     7- Acute metabolic encephalopathy - likely secondary to infection, dehydration, malnutrition.  resolved     8- HTN - controlled       DVT Prophylaxis:  scds    Dispo -DC to [facility] planned for Thursday    Signed By: Edyta Wilkinson DO     May 19, 2020

## 2020-05-19 NOTE — PROGRESS NOTES
END OF SHIFT NOTE:    Intake/Output  No intake/output data recorded. Voiding: YES  Catheter: NO  Drain:              Stool:  0 occurrences. Stool Assessment  Stool Color: Joeline Burdock (05/15/20 0620)  Stool Appearance: Soft (05/15/20 1940)  Stool Amount: Large (05/15/20 0620)  Stool Source/Status: Rectum (05/15/20 4011)    Emesis:  0 occurrences. VITAL SIGNS  Patient Vitals for the past 12 hrs:   Temp Pulse Resp BP SpO2   05/19/20 1548 97.8 °F (36.6 °C) 90 16 102/58 98 %   05/19/20 1202 97.6 °F (36.4 °C) 80 16 115/70 97 %   05/19/20 0758 97.3 °F (36.3 °C) 86 16 136/82 96 %       Pain Assessment  Pain 1  Pain Scale 1: Numeric (0 - 10) (05/19/20 1718)  Pain Intensity 1: 7 (05/19/20 1718)  Patient Stated Pain Goal: 2 (05/19/20 0850)  Pain Reassessment 1: Patient resting w/respiratory rate greater than 10 (05/19/20 1815)  Pain Onset 1: chronic (05/19/20 0940)  Pain Location 1: Buttocks;Hip (05/19/20 1718)  Pain Orientation 1: Right (05/19/20 0941)  Pain Description 1: Aching (05/19/20 1718)  Pain Intervention(s) 1: Medication (see MAR) (05/19/20 1718)    Ambulating  No    Additional Information: Patient still on wound vac. C/d/i. Gave pain medication as needed changed vanc dose. Continue tele. Shift report given to oncoming nurse at the bedside.     Tres Overton, RN

## 2020-05-19 NOTE — PROGRESS NOTES
Bedside and Verbal shift change report given to be given to self (oncoming nurse) by Jesusita Hickey RN (offgoing nurse). Report included the following information SBAR, Kardex, MAR and Recent Results.

## 2020-05-19 NOTE — PROGRESS NOTES
Shift assessment complete. A/O x 4. Respirations even and unlabored. Abdomen soft, tender. 4 PS to abdomen, steristrips c/d/i. Denies pain. SCDs in place. Wound vac in place to ischial wound. Refuses to be turned at this time. Call light within reach.

## 2020-05-19 NOTE — PROGRESS NOTES
PLAN:  FLD  Monitor labs  Pain/nausea control  OOB/ambulate  Continue abx  Wound vac to ischial wound    ASSESSMENT:  Admit Date: 5/13/2020   1 Day Post-Op  Procedure(s):  CHOLECYSTECTOMY LAPAROSCOPIC  S/p ischial wound debridement   Principal Problem:    Choledocholithiasis (5/16/2020)    Active Problems:    GLENROY (acute kidney injury) ([facility] [address] (5/13/2020)      Decubitus ulcer of ischial area, right, unspecified pressure ulcer stage (5/13/2020)      Transaminitis (5/13/2020)      Acute metabolic encephalopathy (8/08/6668)      Multiple sclerosis ([facility] [address] (5/13/2020)      Protein calorie malnutrition ([facility] [address] (5/13/2020)      Leukocytosis (5/13/2020)       SUBJECTIVE:  Awake in bed, no complaints. WBC 93075. Denies n/v. Tolerating FLD. Voiding without difficulty. AF. +flatus    Intake/Output Summary (Last 24 hours) at 5/19/2020 1537  Last data filed at 5/19/2020 0001  Gross per 24 hour   Intake 300 ml   Output    Net 300 ml     OBJECTIVE:  Constitutional: Alert oriented cooperative patient in no acute distress; appears stated age   Visit Vitals  /70   Pulse 80   Temp 97.6 °F (36.4 °C)   Resp 16   Wt 100 lb (45.4 kg)   SpO2 97%   BMI 16.14 kg/m²     Eyes:Sclera are clear. ENMT: no external lesions gross hearing normal; no obvious neck masses, no ear or lip lesions  CV: RRR. Normal perfusion  Resp: No JVD. Breathing is  non-labored; no audible wheezing. GI: soft and non-distended, Calciphylaxis RLQ. ,dressings c/d/i     Musculoskeletal: unremarkable with normal function. No embolic signs or cyanosis.    Integument: right ischial wound with vac in place, maintaining seal.   Neuro:  Oriented; moves all 4; no focal deficits  Psychiatric: normal affect and mood, no memory impairment      Patient Vitals for the past 24 hrs:   BP Temp Pulse Resp SpO2   05/19/20 1202 115/70 97.6 °F (36.4 °C) 80 16 97 %   05/19/20 0758 136/82 97.3 °F (36.3 °C) 86 16 96 %   05/19/20 0419 119/70 97 °F (36.1 °C) 87 15 96 %   05/18/20 1408 106/65 97.7 °F (36.5 °C) 88 14 96 %   05/18/20 2007 100/62 98.6 °F (37 °C) 94 19 95 %   05/18/20 1615 122/72 97.7 °F (36.5 °C) 91 20 98 %     Labs:    Recent Labs     05/19/20  0500   WBC 15.6*   HGB 10.7*         K 3.9   *   CO2 26   BUN 26*   CREA 0.39*   *       Signed:  Ramon Parish, NP

## 2020-05-19 NOTE — PROGRESS NOTES
Pharmacokinetic Consult to Pharmacist    Arirocio Patel is a 63 y.o. female being treated for SSTI with vancomycin and cefepime. Weight: 45.4 kg (100 lb)  Lab Results   Component Value Date/Time    BUN 26 (H) 05/19/2020 05:00 AM    Creatinine 0.39 (L) 05/19/2020 05:00 AM    WBC 15.6 (H) 05/19/2020 05:00 AM      Estimated Creatinine Clearance: 62.8 mL/min (A) (by C-G formula based on SCr of 0.39 mg/dL (L)). Lab Results   Component Value Date/Time    Vancomycin,trough 26.2 (HH) 05/19/2020 07:12 AM     Day 7 of vancomycin. Goal trough is 10-20. Trough supratherapeutic at 26.2, however drawn early so true trough would be slightly lower. Dose held this morning. Will obtain another level this afternoon, if therapeutic initiate 1000 mg every 12 hours. Will continue to follow patient.       Thank you,  Jhon Schuster, PharmD, [address]  Clinical Pharmacy Specialist  [phone]

## 2020-05-19 NOTE — WOUND CARE
Patient seen for wound VAC dressing change to right buttock wound. Noted dark red to kimberley-wound skin. Base has yellow slough and pink tissue. No foul odor noted. Drainage serosanguinous with small tan at time. Tract pad bridged to right hip area. Wound team will continue to follow for VAC dressings.

## 2020-05-19 NOTE — PROGRESS NOTES
MONITOR response to TREATMENT. Problem: Mobility Impaired (Adult and Pediatric)  Goal: *Acute Goals and Plan of Care  Description: Acute PT Goals:  (1.)Dalia Vivar will move from supine to sit and sit to supine , scoot up and down and roll side to side with MINIMAL ASSIST within 7 treatment day(s). (2.)Dalia Vivar will transfer from bed to chair and chair to bed with MODERATE ASSIST using the least restrictive device within 7 treatment day(s). (3.)Dalia Vivar will perform sitting static and dynamic balance activities x 30 minutes with CONTACT GUARD ASSIST to improve safety and activity tolerance within 7 treatment day(s). (4.)Dalia Mead will perform bilateral lower extremity exercises x 20 min for HEP with SUPERVISION to improve strength, endurance, and functional mobility within 7 treatment day(s). PHYSICAL THERAPY: Daily Note and AM 5/19/2020  INPATIENT: PT Visit Days : 2  [place] / [place] / Product Type: Medicare /       NAME/AGE/GENDER: Theresa Stone is a 63 y.o. female   PRIMARY DIAGNOSIS: [facility]) [facility] [N17.9] Choledocholithiasis Choledocholithiasis  Procedure(s) (LRB):  CHOLECYSTECTOMY LAPAROSCOPIC (N/A)  1 Day Post-Op  ICD-10: Treatment Diagnosis:   · Generalized Muscle Weakness (M62.81)  · Other lack of cordination (R27.8)  · Difficulty in walking, Not elsewhere classified (R26.2)  · Other abnormalities of gait and mobility (R26.89)  · Repeated Falls (R29.6)  · History of falling (Z91.81)   Precaution/Allergies:  Latex and Pcn [penicillins]      ASSESSMENT:     Ms. Kasia Mead is a 63year old F who presents to hospital with GLENROY, several wounds including R ischial decubitus ulcer s/p I&D and wound vac placement. PMH includes MS. Prior to hospital admission pt lives alone, with friend support, in a one story home with no step(s) to enter. Pt endorses several falls in past 6 months. Prior to admission Ms. Kasia Mead uses a power wheelchair for mobility which she reports prior to this onset of illness she was able to transfer to independently. Pt now s/p ERCP 5/16, and lap jj 5/18.    5/19/20: Upon entering, pt resting in bed, agreeable to therapy. she reports 4/10 pain in her sides, abdomen, and buttocks at rest. Pt performed supine > sit with Max A x2, sitting EOB with poor sitting balance control. Facilitation of sitting balance at edge of bed as pt performed functional tasks, fluctuating balance control. Intermittent posterior lean due to decreased strength and trunk control, however able to tolerate sitting EOB for much longer period of time, with episodes of ability to utilize BUE and trunk to sit upright. Noted BLE with decreased ROM, increased tone, pt noted it is \"not typically like that. \" Sit > supine with Max A x2. PT performed AAROM/PROM all BLE joints to facilitate mobility. Improved ROM, and pt reported feeling better after ther-ex performed. All needs within reach, RN notified. Pt presents as functioning below her baseline, with deficits in mobility including transfers, gait, balance, and activity tolerance. Pt will benefit from skilled therapy services to address stated deficits to promote return to highest level of function, independence, and safety. Will continue to follow. Progress with bed mobility and activity tolerance this session. At this time, patient is appropriate for Co-treatment with occupational therapy due to patient's decreased overall endurance/tolerance levels, as well as need for high level skilled assistance to complete functional transfers/mobility and functional tasks. Martell Lanes is appropriate for a multidisciplinary co-treatment of PT and OT to address goals of both disciplines. This section established at most recent assessment   PROBLEM LIST (Impairments causing functional limitations):  1. Decreased Strength  2. Decreased ADL/Functional Activities  3. Decreased Transfer Abilities  4.  Decreased Ambulation Ability/Technique  5. Decreased Balance  6. Increased Pain  7. Decreased Activity Tolerance  8. Increased Fatigue  9. Decreased Skin Integrity/Hygeine   INTERVENTIONS PLANNED: (Benefits and precautions of physical therapy have been discussed with the patient.)  1. Balance Exercise  2. Bed Mobility  3. Family Education  4. Gait Training  5. Home Exercise Program (HEP)  6. Neuromuscular Re-education/Strengthening  7. Therapeutic Activites  8. Therapeutic Exercise/Strengthening  9. Transfer Training     TREATMENT PLAN: Frequency/Duration: 3 times a week for duration of hospital stay  Rehabilitation Potential For Stated Goals: Good     REHAB RECOMMENDATIONS (at time of discharge pending progress):    Placement: It is my opinion, based on this patient's performance to date, that Ms. Baudilio Mello may benefit from intensive therapy at a [facility] after discharge due to the functional deficits listed above that are likely to improve with skilled rehabilitation and concerns that he/she may be unsafe to be unsupervised at home due to medical complications and mobility deficits which put her at increase risk of functional decline and/or falling. LTACH. Equipment:    None at this time            HISTORY:   History of Present Injury/Illness (Reason for Referral):  Per H&P: \"Patient is a 62yo F with hx MS who presents after multiple falls. She had a fall with 4 days on floor several weeks ago, not seen by MD at that time. She had three or four falls today before presentation, helped up by a friend each time. She was down for 4 hours the last time she fell. She is sore all over, denies any acute injuries. No headaches, sore throat, cough, cp, n/v, change in bowel habit. Recently treated for dysuria and sx resolved on antibiotics. Finished antibiotic several days ago. Smoked 4-5 cigarettes daily until recently. Drank 1 glass wine daily until 4d ago.  Quit because not feeling well, but can't describe how not feeling well.\"  Past Medical History/Comorbidities:   Ms. Les Eldridge  has a past medical history of Menopause. Ms. Les Eldridge  has a past surgical history that includes ercp (5/15/2020). Social History/Living Environment:   Home Environment: Private residence  # Steps to Enter: 0  Wheelchair Ramp: Yes  One/Two Story Residence: One story  Living Alone: Yes  Support Systems: Friends \ neighbors  Patient Expects to be Discharged to[de-identified] Unknown  Current DME Used/Available at Home: Hospital bed, Tub transfer bench, Wheelchair, Wheelchair, power  Tub or Shower Type: Tub/Shower combination  Prior Level of Function/Work/Activity:   Prior to hospital admission pt lives alone, with friend support, in a one story home with no step(s) to enter. Pt endorses several falls in past 6 months. Prior to admission Ms. Les Eldridge uses a power wheelchair for mobility which she reports prior to this onset of illness she was able to transfer to independently. Number of Personal Factors/Comorbidities that affect the Plan of Care: 1-2: MODERATE COMPLEXITY   EXAMINATION:   Most Recent Physical Functioning:   Gross Assessment:  AROM: Generally decreased, functional  Strength: Grossly decreased, non-functional  Coordination: Generally decreased, functional  Tone: Abnormal  Sensation: Intact               Posture:     Balance:  Sitting: Impaired  Sitting - Static: Poor (constant support); Prop sitting  Sitting - Dynamic: Poor (constant support) Bed Mobility:  Rolling: Maximum assistance;Assist x2  Supine to Sit: Maximum assistance;Assist x2  Sit to Supine: Maximum assistance;Assist x2  Scooting: Total assistance;Assist x2  Interventions: Safety awareness training; Tactile cues; Verbal cues  Wheelchair Mobility:     Transfers:     Gait:            Body Structures Involved:  1. Nerves  2. Bones  3. Joints  4. Muscles Body Functions Affected:  1. Neuromusculoskeletal  2. Movement Related  3. Skin Related Activities and Participation Affected:  1.  General Tasks and Demands  2. Mobility  3. Self Care  4. Interpersonal Interactions and Relationships   Number of elements that affect the Plan of Care: 3: MODERATE COMPLEXITY   CLINICAL PRESENTATION:   Presentation: Evolving clinical presentation with changing clinical characteristics: MODERATE COMPLEXITY   CLINICAL DECISION MAKIN [facility] Mobility Inpatient Short Form  How much difficulty does the patient currently have. .. Unable A Lot A Little None   1. Turning over in bed (including adjusting bedclothes, sheets and blankets)? [] 1   [x] 2   [] 3   [] 4   2. Sitting down on and standing up from a chair with arms ( e.g., wheelchair, bedside commode, etc.)   [x] 1   [] 2   [] 3   [] 4   3. Moving from lying on back to sitting on the side of the bed? [] 1   [x] 2   [] 3   [] 4   How much help from another person does the patient currently need. .. Total A Lot A Little None   4. Moving to and from a bed to a chair (including a wheelchair)? [x] 1   [] 2   [] 3   [] 4   5. Need to walk in hospital room? [x] 1   [] 2   [] 3   [] 4   6. Climbing 3-5 steps with a railing? [x] 1   [] 2   [] 3   [] 4   © , [employer], under license to [employer]. All rights reserved      Score:  Initial: 8 Most Recent: X (Date: -- )    Interpretation of Tool:  Represents activities that are increasingly more difficult (i.e. Bed mobility, Transfers, Gait). Medical Necessity:     · Patient is expected to demonstrate progress in strength, range of motion, balance, coordination and functional technique to decrease assistance required with all mobility. Reason for Services/Other Comments:  · Patient continues to require skilled intervention due to medical complications and mobility deficits which impact her level of function, mobility, and independence as indicated above.    Use of outcome tool(s) and clinical judgement create a POC that gives a: Questionable prediction of patient's progress: MODERATE COMPLEXITY        TREATMENT:   (In addition to Assessment/Re-Assessment sessions the following treatments were rendered)   Pre-treatment Symptoms/Complaints:  None  Pain: Initial:   Pain Intensity 1: 4  Pain Location 1: Generalized, Buttocks  Post Session:  Resting comfortably in bed     Today's treatment session addressed Decreased Strength, Decreased ADL/Functional Activities, Decreased Transfer Abilities, Decreased Balance, Increased Pain, Increased Fatigue, Decreased Flexibility/Joint Mobility and Decreased Skin Integrity/Hygeine to progress towards achieving stated therapy goals. During this session, Occupational Therapy addressed ADLs to progress towards their discipline specific goal(s). Co-treatment was necessary to improve patient's ability to follow higher level commands, ability to increase activity demands and ability to return to normal functional activity. Therapeutic Exercise: (  10 Minutes):  BLE AAROM/PROM including dorsiflexion/plantarflexion, knee flexion/extension, and hip flexion/extension to improve mobility and strength. Required maximal visual, verbal, manual and tactile cues to promote proper body alignment, promote proper body posture and promote proper body mechanics. Progressed range and complexity of movement as indicated. Neuromuscular Re-education: ( 50 Minutes):  Sitting balance control training edge of bed, and bed mobility training to improve balance, coordination and posture. Required moderate visual, verbal, manual and tactile cues to promote static and dynamic balance in sitting and promote coordination of bilateral, lower extremity(s).           Braces/Orthotics/Lines/Etc:   · IV  · Wound vac  · O2 Device: Room air  Treatment/Session Assessment:    · Response to Treatment:  See above  · Interdisciplinary Collaboration:   o Physical Therapist  o Occupational Therapist  o Registered Nurse  · After treatment position/precautions:   o Supine in bed  o Bed/Chair-wheels locked  o Bed in low position  o Call light within reach  o RN notified   · Compliance with Program/Exercises: Will assess as treatment progresses  · Recommendations/Intent for next treatment session: \"Next visit will focus on advancements to more challenging activities and reduction in assistance provided\".     Total Treatment Duration:  PT Patient Time In/Time Out  Time In: 0940  Time Out: [place], [place]

## 2020-05-19 NOTE — PROGRESS NOTES
Problem: Self Care Deficits Care Plan (Adult)  Goal: *Acute Goals and Plan of Care (Insert Text)  Description:   1. Patient will complete upper body bathing and dressing with moderate assistance and adaptive equipment as needed. 2. Patient will complete grooming with minimal assistance and adaptive equipment as needed. PROGRESSING 5/19/2020  3. Patient will tolerate 30 minutes of OT treatment with self-incorporated rest breaks to increase activity tolerance for ADLs. PROGRESSING 5/19/2020  4. Patient will complete bed mobility with minimal assistance in preparation for functional transfers. PROGRESSING 5/19/2020  5. Patient will attempt to  preparation for functional transfers with adaptive equipment as needed. 6. Patient will demonstrate modified independence with therapeutic exercise HEP to increase strength in BUEs for increased safety and independence with functional transfers. PROGRESSING 5/19/2020  7. Patient will demonstrate improved sitting balance for ADLs with stand by assistance and adaptive equipment as needed. PROGRESSING 5/19/2020    Timeframe: 7 visits      Outcome: Progressing Towards Goal     OCCUPATIONAL THERAPY: Daily Note and AM 5/19/2020  INPATIENT: OT Visit Days: 2  [place] / [place] B / Product Type: Medicare /      NAME/AGE/GENDER: Cheryl Schaeffer is a 63 y.o. female   PRIMARY DIAGNOSIS:  [facility] [N17.9] Choledocholithiasis Choledocholithiasis  Procedure(s) (LRB):  CHOLECYSTECTOMY LAPAROSCOPIC (N/A)  1 Day Post-Op  ICD-10: Treatment Diagnosis:    · Generalized Muscle Weakness (M62.81)  · Other lack of cordination (R27.8)  · Repeated Falls (R29.6)   Precautions/Allergies:    Fall precautions    Latex and Pcn [penicillins]      ASSESSMENT:   Per Initial Assessment:  Ms. Kalie Owen is a 63 y.o. female admitted with GLENROY, several wounds including R ischial decubitus ulcer s/p I&D and wound vac placement. Hx MS.  At baseline pt lives alone and reports modified independence with ADLs, IADLs including grocery shopping and cooking, driving, and mobility using power w/c. Pt has friends who supervise during bathing. Pt reports independence with functional transfers including car transfers (keeps manual w/c in car for grocery shopping). Endorses several recent falls, per chart spent 3-4 days on floor. 5/19/2020: Pt found turned L in bed upon arrival, alert and agreeable to OT/PT co-treatment. Pt practiced rolling R with MaxAx2/cues for technique. Sidelying to sit with MaxAx2-total assist. Total assist/cueing for technique to scoot to edge of bed. Increased tone noted in BLEs this date, pillow placed between knees in sitting to address positioning/BLE adductor tone. Total assist to don socks. Poor sitting balance, PT addressing throughout while OT addressed self-care. Pt practiced toothbrushing in sitting with set up, cues for adaptive technique. Pt able to use RUE this date. Pt practiced combing hair with total assist in sitting as pt prop sitting, cues for BUE support on bed. Sit to supine with MaxAx2-total assist/cues for technique. Pt practiced BUE AROM with min cueing for technique, practiced grooming hair in supine with Mohini/rest breaks. Pt left supine in bed to promote B knee extension. Call bell within reach, B heels floated on pillows. Pt needs B heel offloading boots per wound care RN on 5/13, none in room. RN notified. Pt is making progress towards goals. Able to participate more in seated ADLs this date. See above. Continue OT POC. This patient is appropriate for co-treatment at this time due to multiple deficits including decreased balance, decreased skin integrity, decreased endurance, decreased strength, and need for high level assistance to complete functional transfers and functional tasks. This section established at most recent assessment   PROBLEM LIST (Impairments causing functional limitations):  1.  Decreased Strength  2. Decreased ADL/Functional Activities  3. Decreased Transfer Abilities  4. Decreased Balance  5. Increased Pain  6. Decreased Activity Tolerance  7. Decreased Pacing Skills  8. Decreased Work Simplification/Energy Conservation Techniques  9. Increased Fatigue  10. Decreased Flexibility/Joint Mobility  11. Decreased Skin Integrity/Hygeine  12. Decreased [place] with Home Exercise Program   INTERVENTIONS PLANNED: (Benefits and precautions of occupational therapy have been discussed with the patient.)  1. Activities of daily living training  2. Adaptive equipment training  3. Balance training  4. Clothing management  5. Donning&doffing training  6. Hygiene training  7. Neuromuscular re-eduation  8. Re-evaluation  9. Therapeutic activity  10. Therapeutic exercise  11. Wheelchair management     TREATMENT PLAN: Frequency/Duration: Follow patient 3-4x/week to address above goals. Rehabilitation Potential For Stated Goals: Good     REHAB RECOMMENDATIONS (at time of discharge pending progress):    Placement: It is my opinion, based on this patient's performance to date, that Ms. Gwendolyn Steward may benefit intensive rehab at [facility] vs intensive therapy at [address] after discharge due to the functional deficits listed above that are likely to improve with skilled rehabilitation and concerns that he/she may be unsafe to be unsupervised at home due to impaired strength and balance impacting ADLs, increasing risk of falls. Equipment:    TBD               OCCUPATIONAL PROFILE AND HISTORY:   History of Present Injury/Illness (Reason for Referral):  See H&P. \"Patient is a 64yo F with hx MS who presents after multiple falls. She had a fall with 4 days on floor several weeks ago, not seen by MD at that time. She had three or four falls today before presentation, helped up by a friend each time. She was down for 4 hours the last time she fell.   She is sore all over, denies any acute injuries. No headaches, sore throat, cough, cp, n/v, change in bowel habit. Recently treated for dysuria and sx resolved on antibiotics. Finished antibiotic several days ago. Smoked 4-5 cigarettes daily until recently. Drank 1 glass wine daily until 4d ago. Quit because not feeling well, but can't describe how not feeling well. \"  Past Medical History/Comorbidities:   Ms. Moise Younger  has a past medical history of Menopause. Ms. Moise Younger  has a past surgical history that includes ercp (5/15/2020). Social History/Living Environment:   Home Environment: Private residence  # Steps to Enter: 0  Wheelchair Ramp: Yes  One/Two Story Residence: One story  Living Alone: Yes  Support Systems: Friends \ neighbors  Patient Expects to be Discharged to[de-identified] Unknown  Current DME Used/Available at Home: Hospital bed, Tub transfer bench, Wheelchair, Wheelchair, power  Tub or Shower Type: Tub/Shower combination  Prior Level of Function/Work/Activity:  At baseline pt lives alone and reports modified independence with ADLs, IADLs including grocery shopping and cooking, driving, and mobility using power w/c. Pt has friends who supervise during bathing. Pt reports independence with functional transfers including car transfers (keeps manual w/c in car for grocery shopping). Endorses several recent falls. Dominant Side:         RIGHT    Personal Factors:          Sex:  female        Age:  63 y.o.         Other factors that influence how disability is experienced by the patient:  Multiple co-morbidities, Falls, extensive medical hx   Number of Personal Factors/Comorbidities that affect the Plan of Care: Extensive review of physical, cognitive, and psychosocial performance (3+):  HIGH COMPLEXITY   ASSESSMENT OF OCCUPATIONAL PERFORMANCE[de-identified]   Activities of Daily Living:   Basic ADLs (From Assessment) Complex ADLs (From Assessment)   Feeding: Minimum assistance  Oral Facial Hygiene/Grooming: Maximum assistance  Bathing: Maximum assistance  Upper Body Dressing: Maximum assistance  Lower Body Dressing: Total assistance  Toileting: Total assistance Instrumental ADL  Meal Preparation: Total assistance  Homemaking: Total assistance   Grooming/Bathing/Dressing Activities of Daily Living   Grooming  Brushing Teeth: Set-up  Brushing/Combing Hair: Maximum assistance                         Bed/Mat Mobility  Rolling: Maximum assistance;Assist x2  Supine to Sit: Maximum assistance;Assist x2  Sit to Supine: Maximum assistance;Assist x2;Total assistance  Scooting: Total assistance;Assist x2     Most Recent Physical Functioning:   Gross Assessment:  AROM: Generally decreased, functional(BUEs proximally)  Strength: Grossly decreased, non-functional(BUEs)  Coordination: Generally decreased, functional(BUEs)  Tone: Abnormal(Low tone BUEs)  Sensation: Intact(BUEs to light touch)               Posture:     Balance:  Sitting: Impaired  Sitting - Static: Poor (constant support); Prop sitting  Sitting - Dynamic: Poor (constant support); Prop sitting Bed Mobility:  Rolling: Maximum assistance;Assist x2  Supine to Sit: Maximum assistance;Assist x2  Sit to Supine: Maximum assistance;Assist x2;Total assistance  Scooting: Total assistance;Assist x2  Interventions: Safety awareness training; Tactile cues; Verbal cues  Wheelchair Mobility:     Transfers:               Patient Vitals for the past 6 hrs:   BP SpO2 Pulse   05/19/20 0758 136/82 96 % 86   05/19/20 1202 115/70 97 % 80       Mental Status  Neurologic State: Alert  Orientation Level: Oriented X4  Cognition: Follows commands, Appropriate decision making, Appropriate for age attention/concentration  Perception: Cues to maintain midline in sitting  Perseveration: No perseveration noted  Safety/Judgement: Fall prevention, Awareness of environment                          Physical Skills Involved:  1. Range of Motion  2. Balance  3. Strength  4. Activity Tolerance  5. Fine Motor Control  6. Gross Motor Control  7. Pain (acute)  8.  Pain (Chronic)  9. Skin Integrity Cognitive Skills Affected (resulting in the inability to perform in a timely and safe manner):  1. None  Psychosocial Skills Affected:  1. Habits/Routines  2. Environmental Adaptation  3. Social Interaction  4. Emotional Regulation  5. Self-Awareness  6. Awareness of Others  7. Social Roles   Number of elements that affect the Plan of Care: 5+:  HIGH COMPLEXITY   CLINICAL DECISION MAKING:   [employer] AM-PAC 6 Clicks   Daily Activity Inpatient Short Form  How much help from another person does the patient currently need. .. Total A Lot A Little None   1. Putting on and taking off regular lower body clothing? [x] 1   [] 2   [] 3   [] 4   2. Bathing (including washing, rinsing, drying)? [] 1   [x] 2   [] 3   [] 4   3. Toileting, which includes using toilet, bedpan or urinal?   [x] 1   [] 2   [] 3   [] 4   4. Putting on and taking off regular upper body clothing? [] 1   [x] 2   [] 3   [] 4   5. Taking care of personal grooming such as brushing teeth? [] 1   [x] 2   [] 3   [] 4   6. Eating meals? [] 1   [] 2   [x] 3   [] 4   © 2007, Trustees of [employer], under license to [employer]. All rights reserved      Score:  Initial: 11 5/15/2020 Most Recent: X (Date: -- )    Interpretation of Tool:  Represents activities that are increasingly more difficult (i.e. Bed mobility, Transfers, Gait). Medical Necessity:     · Patient demonstrates   · good  ·  rehab potential due to higher previous functional level. Reason for Services/Other Comments:  · Patient continues to require skilled intervention due to   · Inability to complete ADLs at prior level of independence   · .    Use of outcome tool(s) and clinical judgement create a POC that gives a: HIGH COMPLEXITY         TREATMENT:   (In addition to Assessment/Re-Assessment sessions the following treatments were rendered)     Pre-treatment Symptoms/Complaints:    Pain: Initial:   Pain Intensity 1: 4  Pain Location 1: Buttocks  Pain Orientation 1: Right  Pain Intervention(s) 1: Repositioned  Post Session:  no complaint of pain at rest     Today's treatment session addressed Decreased Strength, Decreased ADL/Functional Activities, Decreased Transfer Abilities, Decreased Balance, Increased Pain, Decreased Activity Tolerance, Increased Fatigue, Decreased Flexibility/Joint Mobility, and Decreased Skin Integrity/Hygeine to progress towards achieving goal(s). During this session,  Physical Therapy addressed  Balance to progress towards their discipline specific goal(s). Co-treatment was necessary to improve patient's ability to follow higher level commands, ability to increase activity demands, and ability to return to normal functional activity. Self Care: (60 minutes): Procedure(s) (per grid) utilized to improve and/or restore self-care/home management as related to grooming. Required moderate to maximal visual, verbal, manual, and tactile cueing to facilitate activities of daily living skills and compensatory activities. Pt found turned L in bed upon arrival, alert and agreeable to OT/PT co-treatment. Pt practiced rolling R with MaxAx2/cues for technique. Sidelying to sit with MaxAx2-total assist. Total assist/cueing for technique to scoot to edge of bed. Increased tone noted in BLEs this date, pillow placed between knees in sitting to address positioning/BLE adductor tone. Total assist to don socks. Poor sitting balance, PT addressing throughout while OT addressed self-care. Pt practiced toothbrushing in sitting with set up, cues for adaptive technique. Pt able to use RUE this date. Pt practiced combing hair with total assist in sitting as pt prop sitting, cues for BUE support on bed. Sit to supine with MaxAx2-total assist/cues for technique. Pt practiced BUE AROM with min cueing for technique, practiced grooming hair in supine with Mohini/rest breaks. Pt left supine in bed to promote B knee extension. Braces/Orthotics/Lines/Etc:   · IV  · Wound vac   · O2 Device: Room air  Treatment/Session Assessment:    · Response to Treatment:  Tolerated well   · Interdisciplinary Collaboration:   o Physical Therapist  o Occupational Therapist  o Registered Nurse  · After treatment position/precautions:   o Supine in bed  o Bed/Chair-wheels locked  o Bed in low position  o Call light within reach  o RN notified   · Compliance with Program/Exercises: Compliant all of the time, Will assess as treatment progresses. · Recommendations/Intent for next treatment session: \"Next visit will focus on advancements to more challenging activities and reduction in assistance provided\".   Total Treatment Duration:  OT Patient Time In/Time Out  Time In: 0940  Time Out: [address], OTR/L

## 2020-05-20 PROBLEM — M46.28 OSTEOMYELITIS OF SACROILIAC REGION (HCC): Status: ACTIVE | Noted: 2020-05-20

## 2020-05-20 PROBLEM — L89.154 PRESSURE INJURY OF SACRAL REGION, STAGE 4 (HCC): Status: ACTIVE | Noted: 2020-05-13

## 2020-05-20 LAB
ANION GAP SERPL CALC-SCNC: 6 MMOL/L (ref 7–16)
BUN SERPL-MCNC: 21 MG/DL (ref 6–23)
CALCIUM SERPL-MCNC: 8.1 MG/DL (ref 8.3–10.4)
CHLORIDE SERPL-SCNC: 109 MMOL/L (ref 98–107)
CO2 SERPL-SCNC: 26 MMOL/L (ref 21–32)
CREAT SERPL-MCNC: 0.33 MG/DL (ref 0.6–1)
ERYTHROCYTE [DISTWIDTH] IN BLOOD BY AUTOMATED COUNT: 14.8 % (ref 11.9–14.6)
GLUCOSE SERPL-MCNC: 87 MG/DL (ref 65–100)
HCT VFR BLD AUTO: 30.4 % (ref 35.8–46.3)
HGB BLD-MCNC: 10 G/DL (ref 11.7–15.4)
MCH RBC QN AUTO: 33.4 PG (ref 26.1–32.9)
MCHC RBC AUTO-ENTMCNC: 32.9 G/DL (ref 31.4–35)
MCV RBC AUTO: 101.7 FL (ref 79.6–97.8)
NRBC # BLD: 0 K/UL (ref 0–0.2)
PLATELET # BLD AUTO: 324 K/UL (ref 150–450)
PMV BLD AUTO: 11.6 FL (ref 9.4–12.3)
POTASSIUM SERPL-SCNC: 3.9 MMOL/L (ref 3.5–5.1)
RBC # BLD AUTO: 2.99 M/UL (ref 4.05–5.2)
SODIUM SERPL-SCNC: 141 MMOL/L (ref 136–145)
WBC # BLD AUTO: 13.5 K/UL (ref 4.3–11.1)

## 2020-05-20 PROCEDURE — 85027 COMPLETE CBC AUTOMATED: CPT

## 2020-05-20 PROCEDURE — 36592 COLLECT BLOOD FROM PICC: CPT

## 2020-05-20 PROCEDURE — 74011250637 HC RX REV CODE- 250/637: Performed by: SURGERY

## 2020-05-20 PROCEDURE — 77030038269 HC DRN EXT URIN PURWCK BARD -A

## 2020-05-20 PROCEDURE — 74011000258 HC RX REV CODE- 258: Performed by: SURGERY

## 2020-05-20 PROCEDURE — 77030040393 HC DRSG OPTIFOAM GENT MDII -B

## 2020-05-20 PROCEDURE — 80048 BASIC METABOLIC PNL TOTAL CA: CPT

## 2020-05-20 PROCEDURE — 97112 NEUROMUSCULAR REEDUCATION: CPT

## 2020-05-20 PROCEDURE — 74011250636 HC RX REV CODE- 250/636: Performed by: INTERNAL MEDICINE

## 2020-05-20 PROCEDURE — 74011250636 HC RX REV CODE- 250/636: Performed by: SURGERY

## 2020-05-20 PROCEDURE — 65660000000 HC RM CCU STEPDOWN

## 2020-05-20 PROCEDURE — 65270000029 HC RM PRIVATE

## 2020-05-20 PROCEDURE — 74750000023 HC WOUND THERAPY

## 2020-05-20 PROCEDURE — 97110 THERAPEUTIC EXERCISES: CPT

## 2020-05-20 PROCEDURE — 97535 SELF CARE MNGMENT TRAINING: CPT

## 2020-05-20 RX ADMIN — METRONIDAZOLE 500 MG: 500 TABLET ORAL at 08:50

## 2020-05-20 RX ADMIN — OXYCODONE HYDROCHLORIDE AND ACETAMINOPHEN 1 TABLET: 5; 325 TABLET ORAL at 20:46

## 2020-05-20 RX ADMIN — VANCOMYCIN HYDROCHLORIDE 750 MG: 10 INJECTION, POWDER, LYOPHILIZED, FOR SOLUTION INTRAVENOUS at 05:30

## 2020-05-20 RX ADMIN — AMANTADINE HYDROCHLORIDE 100 MG: 100 CAPSULE ORAL at 08:50

## 2020-05-20 RX ADMIN — OXYCODONE HYDROCHLORIDE AND ACETAMINOPHEN 1 TABLET: 5; 325 TABLET ORAL at 13:59

## 2020-05-20 RX ADMIN — CEFEPIME 2 G: 2 INJECTION, POWDER, FOR SOLUTION INTRAVENOUS at 20:46

## 2020-05-20 RX ADMIN — METOPROLOL TARTRATE 12.5 MG: 25 TABLET, FILM COATED ORAL at 17:49

## 2020-05-20 RX ADMIN — HYDROCODONE BITARTRATE AND ACETAMINOPHEN 1 TABLET: 5; 325 TABLET ORAL at 00:21

## 2020-05-20 RX ADMIN — AMANTADINE HYDROCHLORIDE 100 MG: 100 CAPSULE ORAL at 17:49

## 2020-05-20 RX ADMIN — Medication 10 ML: at 20:49

## 2020-05-20 RX ADMIN — METRONIDAZOLE 500 MG: 500 TABLET ORAL at 20:46

## 2020-05-20 RX ADMIN — Medication 10 ML: at 05:01

## 2020-05-20 RX ADMIN — Medication 10 ML: at 14:00

## 2020-05-20 RX ADMIN — VANCOMYCIN HYDROCHLORIDE 750 MG: 10 INJECTION, POWDER, LYOPHILIZED, FOR SOLUTION INTRAVENOUS at 17:51

## 2020-05-20 RX ADMIN — CEFEPIME 2 G: 2 INJECTION, POWDER, FOR SOLUTION INTRAVENOUS at 08:39

## 2020-05-20 RX ADMIN — METOPROLOL TARTRATE 12.5 MG: 25 TABLET, FILM COATED ORAL at 08:50

## 2020-05-20 NOTE — PROGRESS NOTES
Problem: Mobility Impaired (Adult and Pediatric)  Goal: *Acute Goals and Plan of Care  Description: Acute PT Goals:  (1.)Dalia Hale will move from supine to sit and sit to supine , scoot up and down and roll side to side with MINIMAL ASSIST within 7 treatment day(s). (2.)Dalia Hale will transfer from bed to chair and chair to bed with MODERATE ASSIST using the least restrictive device within 7 treatment day(s). (3.)Dalia Hale will perform sitting static and dynamic balance activities x 30 minutes with CONTACT GUARD ASSIST to improve safety and activity tolerance within 7 treatment day(s). (4.)Dalia Mcdaniels will perform bilateral lower extremity exercises x 20 min for HEP with SUPERVISION to improve strength, endurance, and functional mobility within 7 treatment day(s). PHYSICAL THERAPY: Daily Note and PM 5/20/2020  INPATIENT: PT Visit Days : 3  [place] / [place] / Product Type: Medicare /       NAME/AGE/GENDER: Trish Patel is a 63 y.o. female   PRIMARY DIAGNOSIS: [facility] [N17.9] Choledocholithiasis Choledocholithiasis  Procedure(s) (LRB):  CHOLECYSTECTOMY LAPAROSCOPIC (N/A)  2 Days Post-Op  ICD-10: Treatment Diagnosis:   · Generalized Muscle Weakness (M62.81)  · Other lack of cordination (R27.8)  · Difficulty in walking, Not elsewhere classified (R26.2)  · Other abnormalities of gait and mobility (R26.89)  · Repeated Falls (R29.6)  · History of falling (Z91.81)   Precaution/Allergies:  Latex and Pcn [penicillins]      ASSESSMENT:     Ms. Mary Mcdaniels is a 63year old F who presents to hospital with GLENROY, several wounds including R ischial decubitus ulcer s/p I&D and wound vac placement. PMH includes MSFeng Prior to hospital admission pt lives alone, with friend support, in a one story home with no step(s) to enter. Pt endorses several falls in past 6 months. Prior to admission Ms. Mary Mcdaniels uses a power wheelchair for mobility which she reports prior to this onset of illness she was able to transfer to independently. Pt now s/p ERCP 5/16, and lap jj 5/18.    5/20/20: Upon entering, pt resting in bed, agreeable to therapy. she reports 10/10 pain in her buttocks at the site of the wound vac. Noted pt positioned on her R side right on that area. PT performed AAROM/PROM all BLE joints to facilitate mobility. Pt very tight and with increased tone this afternoon. Improved ROM, and pt reported feeling better after ther-ex performed. Pt performed supine > sit with Mod A x2, sitting EOB with poor sitting balance control. Facilitation of sitting balance at edge of bed as pt performed functional tasks, fluctuating balance control. Pt did tolerate sitting up for much longer period of time, and while times of Max A with facilitation of trunk control and BUE support were required, pt did demonstrate an increase in activity tolerance, trunk control, and sitting balance sitting edge of bed for extended period of time, with episodes of ability to utilize BUE and trunk to sit upright. Pt returned sit > supine with Mod A x2. Pt noted to be soiled, instructed in rolling side to side Mod A x2 in order to get pt cleaned up. Once finished, pt positioned on LLE with BLE offloading boots donned. All needs within reach, RN notified. Pt presents as functioning below her baseline, with deficits in mobility including transfers, gait, balance, and activity tolerance. Pt will benefit from skilled therapy services to address stated deficits to promote return to highest level of function, independence, and safety. Will continue to follow. Pt continues with bed mobility and activity tolerance, as well as upright sitting balance control and tolerance this session. Plan is for DC to [facility] soon.     At this time, patient is appropriate for Co-treatment with occupational therapy due to patient's decreased overall endurance/tolerance levels, as well as need for high level skilled assistance to complete functional transfers/mobility and functional tasks. Severa Rumpf is appropriate for a multidisciplinary co-treatment of PT and OT to address goals of both disciplines. This section established at most recent assessment   PROBLEM LIST (Impairments causing functional limitations):  1. Decreased Strength  2. Decreased ADL/Functional Activities  3. Decreased Transfer Abilities  4. Decreased Ambulation Ability/Technique  5. Decreased Balance  6. Increased Pain  7. Decreased Activity Tolerance  8. Increased Fatigue  9. Decreased Skin Integrity/Hygeine   INTERVENTIONS PLANNED: (Benefits and precautions of physical therapy have been discussed with the patient.)  1. Balance Exercise  2. Bed Mobility  3. Family Education  4. Gait Training  5. Home Exercise Program (HEP)  6. Neuromuscular Re-education/Strengthening  7. Therapeutic Activites  8. Therapeutic Exercise/Strengthening  9. Transfer Training     TREATMENT PLAN: Frequency/Duration: 3 times a week for duration of hospital stay  Rehabilitation Potential For Stated Goals: Good     REHAB RECOMMENDATIONS (at time of discharge pending progress):    Placement: It is my opinion, based on this patient's performance to date, that Ms. Gwendolyn Steward may benefit from intensive therapy at a [address] [place] [address] after discharge due to the functional deficits listed above that are likely to improve with skilled rehabilitation and concerns that he/she may be unsafe to be unsupervised at home due to medical complications and mobility deficits which put her at increase risk of functional decline and/or falling. LTACH. Equipment:    None at this time            HISTORY:   History of Present Injury/Illness (Reason for Referral):  Per H&P: \"Patient is a 64yo F with hx MS who presents after multiple falls. She had a fall with 4 days on floor several weeks ago, not seen by MD at that time.  She had three or four falls today before presentation, helped up by a friend each time. She was down for 4 hours the last time she fell. She is sore all over, denies any acute injuries. No headaches, sore throat, cough, cp, n/v, change in bowel habit. Recently treated for dysuria and sx resolved on antibiotics. Finished antibiotic several days ago. Smoked 4-5 cigarettes daily until recently. Drank 1 glass wine daily until 4d ago. Quit because not feeling well, but can't describe how not feeling well. \"  Past Medical History/Comorbidities:   Ms. Shannon Bhatia  has a past medical history of Menopause. Ms. Shannon Bhatia  has a past surgical history that includes ercp (5/15/2020). Social History/Living Environment:   Home Environment: Private residence  # Steps to Enter: 0  Wheelchair Ramp: Yes  One/Two Story Residence: One story  Living Alone: Yes  Support Systems: Friends \ neighbors  Patient Expects to be Discharged to[de-identified] Unknown  Current DME Used/Available at Home: Hospital bed, Tub transfer bench, Wheelchair, Wheelchair, power  Tub or Shower Type: Tub/Shower combination  Prior Level of Function/Work/Activity:   Prior to hospital admission pt lives alone, with friend support, in a one story home with no step(s) to enter. Pt endorses several falls in past 6 months. Prior to admission Ms. Shannon Bhatia uses a power wheelchair for mobility which she reports prior to this onset of illness she was able to transfer to independently. Number of Personal Factors/Comorbidities that affect the Plan of Care: 1-2: MODERATE COMPLEXITY   EXAMINATION:   Most Recent Physical Functioning:   Gross Assessment:  AROM: Generally decreased, functional  Strength: Grossly decreased, non-functional  Coordination: Generally decreased, functional  Tone: Abnormal  Sensation: Intact               Posture:     Balance:  Sitting: Impaired  Sitting - Static: Fair (occasional)  Sitting - Dynamic: Fair (occasional); Poor (constant support) Bed Mobility:  Rolling:  Moderate assistance;Assist x2  Supine to Sit: Moderate assistance;Assist x2  Sit to Supine: Moderate assistance;Assist x2  Scooting: Moderate assistance;Maximum assistance;Assist x2  Interventions: Safety awareness training;Verbal cues; Visual cues; Tactile cues  Wheelchair Mobility:     Transfers:     Gait:            Body Structures Involved:  1. Nerves  2. Bones  3. Joints  4. Muscles Body Functions Affected:  1. Neuromusculoskeletal  2. Movement Related  3. Skin Related Activities and Participation Affected:  1. General Tasks and Demands  2. Mobility  3. Self Care  4. Interpersonal Interactions and Relationships   Number of elements that affect the Plan of Care: 3: MODERATE COMPLEXITY   CLINICAL PRESENTATION:   Presentation: Evolving clinical presentation with changing clinical characteristics: MODERATE COMPLEXITY   CLINICAL DECISION MAKIN [facility] Mobility Inpatient Short Form  How much difficulty does the patient currently have. .. Unable A Lot A Little None   1. Turning over in bed (including adjusting bedclothes, sheets and blankets)? [] 1   [x] 2   [] 3   [] 4   2. Sitting down on and standing up from a chair with arms ( e.g., wheelchair, bedside commode, etc.)   [x] 1   [] 2   [] 3   [] 4   3. Moving from lying on back to sitting on the side of the bed? [] 1   [x] 2   [] 3   [] 4   How much help from another person does the patient currently need. .. Total A Lot A Little None   4. Moving to and from a bed to a chair (including a wheelchair)? [x] 1   [] 2   [] 3   [] 4   5. Need to walk in hospital room? [x] 1   [] 2   [] 3   [] 4   6. Climbing 3-5 steps with a railing? [x] 1   [] 2   [] 3   [] 4   © , Trustees of [address] [place] [address] [address] [address], under license to [employer]. All rights reserved      Score:  Initial: 8 Most Recent: X (Date: -- )    Interpretation of Tool:  Represents activities that are increasingly more difficult (i.e. Bed mobility, Transfers, Gait).     Medical Necessity:     · Patient is expected to demonstrate progress in strength, range of motion, balance, coordination and functional technique to decrease assistance required with all mobility. Reason for Services/Other Comments:  · Patient continues to require skilled intervention due to medical complications and mobility deficits which impact her level of function, mobility, and independence as indicated above. Use of outcome tool(s) and clinical judgement create a POC that gives a: Questionable prediction of patient's progress: MODERATE COMPLEXITY        TREATMENT:   (In addition to Assessment/Re-Assessment sessions the following treatments were rendered)   Pre-treatment Symptoms/Complaints:  None  Pain: Initial:   Pain Intensity 1: 10  Pain Location 1: Buttocks  Pain Intervention(s) 1: Position, Repositioned, Nurse notified  Post Session:  Resting comfortably in bed     Today's treatment session addressed Decreased Strength, Decreased ADL/Functional Activities, Decreased Transfer Abilities, Decreased Balance, Increased Pain, Increased Fatigue, Decreased Flexibility/Joint Mobility and Decreased Skin Integrity/Hygeine to progress towards achieving stated therapy goals. During this session, Occupational Therapy addressed ADLs to progress towards their discipline specific goal(s). Co-treatment was necessary to improve patient's ability to follow higher level commands, ability to increase activity demands and ability to return to normal functional activity. Therapeutic Exercise: (  10 Minutes):  BLE AAROM/PROM including dorsiflexion/plantarflexion, knee flexion/extension, Hip ER/IR, Hip Abd/Add and hip flexion/extension to improve mobility and strength. Required maximal visual, verbal, manual and tactile cues to promote proper body alignment, promote proper body posture and promote proper body mechanics. Progressed range and complexity of movement as indicated.     Neuromuscular Re-education: (  65 Minutes):  Sitting balance control training edge of bed, and bed mobility training to improve balance, coordination and posture. Required moderate visual, verbal, manual and tactile cues to promote static and dynamic balance in sitting and promote coordination of bilateral, lower extremity(s). Braces/Orthotics/Lines/Etc:   · Wound vac  · O2 Device: Room air  Treatment/Session Assessment:    · Response to Treatment:  See above  · Interdisciplinary Collaboration:   o Physical Therapist  o Occupational Therapist  o Registered Nurse  · After treatment position/precautions:   o Bed alarm/tab alert on  o Bed/Chair-wheels locked  o Bed in low position  o Call light within reach  o RN notified  o Sidelying L   · Compliance with Program/Exercises: Will assess as treatment progresses  · Recommendations/Intent for next treatment session: \"Next visit will focus on advancements to more challenging activities and reduction in assistance provided\".     Total Treatment Duration:  PT Patient Time In/Time Out  Time In: 1340  Time Out: [address] PT

## 2020-05-20 NOTE — PROGRESS NOTES
Hospitalist Progress Note    Patient: Kaleb Grajeda MRN: [id number]  SSN: [id number]    YOB: 1960  Age: 63 y.o. Sex: female      Admit Date: 5/13/2020    LOS: 7 days     Subjective:     63year old CF with a PMH of MS admitted on 5/13/20 after sustaining multiple falls and reported she \"hurts all over\".  She commpleted course of antibiotics approximately 1 week prior for UTI.  Admits to tobacco abuse and 1 glass of wine daily. ED work-up was notable for GLENROY, dehydration, hyponatremia, transaminitis, leukocytosis.  She was admitted for the further work-up to include a right upper quadrant ultrasound which shows dilated CBD, IV fluid resuscitation, PT OT. Per d/w pt's friend Lawrence Ramsey pt is wheelchair dependent, can usually transfer independently. Sustained fall 1.5 months ago and has been \"going downhill\" since - more weakness, confusion and eating/drinking less.   GI consulted. S/p MRCP showing dilated CBD and ERCP with papillotomy. S.p jj on 5/18. Gen surgery consulted for ischial decub ulcer s/p OR debridement. ID following for atbx recs - Vancomycin + Cefepime + Flagyl - EOT 6/25.    5/20 - She complains of sacral pain. Feels better than yesterday. Denies CP/SOB. Denies F/C/N/V. Review of systems negative except stated above. Objective:     Visit Vitals  /56   Pulse 97   Temp 96.8 °F (36 °C)   Resp 18   Wt 45.4 kg (100 lb)   SpO2 97%   BMI 16.14 kg/m²      Oxygen Therapy  O2 Sat (%): 97 % (05/20/20 1102)  Pulse via Oximetry: 85 beats per minute (05/18/20 1319)  O2 Device: Room air (05/19/20 2333)  O2 Flow Rate (L/min): 1 l/min (05/18/20 1314)      Intake and Output:     Intake/Output Summary (Last 24 hours) at 5/20/2020 1403  Last data filed at 5/20/2020 1102  Gross per 24 hour   Intake 440 ml   Output    Net 440 ml         Physical Exam:   GENERAL: alert, cooperative, no distress, appears stated age  EYE: conjunctivae/corneas clear. PERRL.   THROAT & NECK: normal and no erythema or exudates noted. LUNG: clear to auscultation bilaterally  HEART: regular rate and rhythm, S1S2, no murmur, no JVD  ABDOMEN: soft, non-tender, non-distended. Bowel sounds normal.   EXTREMITIES:  No edema, 2+ pedal/radial pulses bilaterally  SKIN: no rash or abnormalities  NEUROLOGIC: A&Ox3. Cranial nerves 2-12 grossly intact. Lab/Data Review:  Recent Results (from the past 24 hour(s))   VANCOMYCIN, RANDOM    Collection Time: 05/19/20  4:11 PM   Result Value Ref Range    Vancomycin, random 15.6 UG/ML   GLUCOSE, POC    Collection Time: 05/19/20  5:33 PM   Result Value Ref Range    Glucose (POC) 131 (H) 65 - 399 mg/dL   METABOLIC PANEL, BASIC    Collection Time: 05/20/20  5:28 AM   Result Value Ref Range    Sodium 141 136 - 145 mmol/L    Potassium 3.9 3.5 - 5.1 mmol/L    Chloride 109 (H) 98 - 107 mmol/L    CO2 26 21 - 32 mmol/L    Anion gap 6 (L) 7 - 16 mmol/L    Glucose 87 65 - 100 mg/dL    BUN 21 6 - 23 MG/DL    Creatinine 0.33 (L) 0.6 - 1.0 MG/DL    GFR est AA >60 >60 ml/min/1.73m2    GFR est non-AA >60 >60 ml/min/1.73m2    Calcium 8.1 (L) 8.3 - 10.4 MG/DL   CBC W/O DIFF    Collection Time: 05/20/20  5:28 AM   Result Value Ref Range    WBC 13.5 (H) 4.3 - 11.1 K/uL    RBC 2.99 (L) 4.05 - 5.2 M/uL    HGB 10.0 (L) 11.7 - 15.4 g/dL    HCT 30.4 (L) 35.8 - 46.3 %    .7 (H) 79.6 - 97.8 FL    MCH 33.4 (H) 26.1 - 32.9 PG    MCHC 32.9 31.4 - 35.0 g/dL    RDW 14.8 (H) 11.9 - 14.6 %    PLATELET 289 488 - 727 K/uL    MPV 11.6 9.4 - 12.3 FL    ABSOLUTE NRBC 0.00 0.0 - 0.2 K/uL       Imaging:  Xr Ankle Rt Ap/lat    Result Date: 5/13/2020  Exam:  Right ankle radiographs History:  pain, lateral malleolus ulcer, please eval for osteo, 63 years Female Comparison: None available Findings:  No evidence of acute fracture or dislocation. Normal alignment, joint spaces preserved. Mild diffuse osteopenia. No definite cortical erosion is seen to suggest radiographic evidence of osteomyelitis.   No evidence of ankle joint effusion. Visualized soft tissues otherwise unremarkable. Impression:  No evidence of acute injury. Xr Ercp / Ercb Combined    Result Date: 5/15/2020  ERCP 5/15/2020 CLINICAL HISTORY: Common bile duct stone. FINDINGS: 9 fluoroscopic spot images are submitted for evaluation. The total fluoroscopy time is indicated to be 2 minutes and 31 seconds. 20 mL of 1:1 diluted Isovue-370 was administered via the endoscope for this examination. Today's study was performed by Dr. Prosper Delong of gastroenterology. Please refer to Dr. Lucho Rachel procedure notes and impressions for additional information. The initial image shows the endoscope in place and a wire extending into the biliary system. Subsequent images show the administration of contrast with balloon occlusion. The common bile duct does appear dilated having a diameter approaching that of the endoscope. No abnormal defined filling defect is seen on images submitted. IMPRESSION: 1. Dilated common bile duct without a defined filling defect to suggest common bile duct stone. Please refer to Dr. Lucho Rachel procedure notes and impressions for additional information. Mri Brain W Wo Cont    Result Date: 5/14/2020  MRI brain with and without contrast History: extremity weakness/pain, eval for MS exacerbation. Imaging sequences: Sagittal short TR/short TE, axial short TR/short TE, long TR/long TE, sagittal and axial FLAIR, gradient recall, diffusion weighted images and ADC mapping. Axial and coronal short TR/short TE postcontrast images. Imaging was performed on a 1.5 Melissa magnet, utilizing the uneventful administration of 9 mL of intravenous Dotarem and order to better evaluate for intracranial pathology. Comparison: 02/24/2014 Findings: The ventricles and sulci are prominent compatible with moderate volume loss for age with probable progression. 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  There are no extra-axial fluid collections. Normal flow voids are present within all of the major intracranial vessels. No evidence of intraparenchymal hemorrhage or mass effect is identified. .  There are no areas of restricted diffusion to suggest an acute or subacute infarction. 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 Several patchy and discrete foci of T2 hyperintensity are once again noted within the supratentorial white matter, without significant change . There is mild smooth thin enhancement of the pachymeninges. No additional areas of abnormal enhancement identified. The visualized mastoid air cells and paranasal sinuses are well pneumatized and aerated. Impression: 1. No appreciable change in the multifocal areas of nonenhancing T2 hyperintensity within the supratentorial white matter. These are nonspecific findings but would be compatible the patient's clinical diagnosis of multiple sclerosis. 2. Moderate volume loss. 3. Remote left cerebellar infarction. 4. Smooth enhancement of the pachymeninges. This can be seen secondary to intracranial hypotension including from recent lumbar puncture. Other meningeal processes including infectious meningitis cannot be entirely excluded. Mri Abd Wo Cont    Result Date: 5/14/2020  MRCP, 5/14/2020: Indication: Elevated liver function tests, and dilated common bile duct on ultrasound. Procedure: Three-dimensional MRCP was performed using maximum intensity projection reconstruction. The examination consisted of axial T2W/HASTE, axial fat-saturated T2W, axial T1 in and an out-of-phase,and T2 weighted MIP images rotated about the axis of the biliary tree generated. Comparison: Limited abdominal ultrasound 5/13/2020 Findings: Evaluation of the lung bases is limited by MRI imaging. Small bilateral dependent pleural effusions are seen. Additional dependent signal is seen in the left lung base which may represent additional dependent atelectatic changes.  The gallbladder appears distended and mildly thick-walled. No focal filling defect is seen within the gallbladder to suggest a gallstone. No significant intrahepatic biliary ductal dilatation is seen. However, the extrahepatic bile ducts measures up to 12 mm in diameter which is dilated. A tiny 1 to 2 mm filling defect is seen at the most distal common bile duct on axial T2-weighted image 21, and MRCP image 78. Although this could be artifactual, the possibility of a tiny distal biliary stone cannot be excluded. No abnormal focal caliber changes are seen of the biliary tree. The pancreatic duct is normal in caliber measuring 1 to 2 mm. The course of the pancreatic duct is normal without evidence for pancreas divisum. Evaluation of the solid organs is somewahat limited without intravenous contrast. However, no focal signal abnormalities are definitely seen of the liver, spleen, pancreas or kidneys. No significant drop in signal intensity of the liver is seen to suggest fatty infiltration. No contour deforming abnormalities are seen of the pancreas although a small lesion could be missed due to the noncontrast technique. No evidence of hydronephrosis is seen. No contour deforming abnormality is seen of the kidneys. Assessment of bowel and peritoneum is limited given that there is significant patient breathing motion artifact and this patient is relatively thin. No abnormal bowel dilation is seen. On MRCP images, there is suggested asymmetric edema in the left abdomen. This closely approximates the pancreatic tail and left kidney. IMPRESSION: 1. Dilated common bile duct measuring up to 12 mm in diameter. A tiny 1 to 2 mm filling defect is suggested in the most distal common bile duct on axial T2 and MRCP images. A tiny distal biliary stone cannot be excluded. 2.  Left abdominal mesenteric edema poorly assessed due to significant patient breathing motion artifact and the patient's thin body habitus.  This most closely approximates the pancreatic tail and left kidney and can suggest potential acute inflammation of either of these structures. Recommend correlation for signs/symptoms of either pancreatitis or left pyelonephritis. 3.  Distended gallbladder with mild gallbladder wall thickening. The gallbladder was better assessed by ultrasound and I would refer to that examination for assessment of this structure. [id number]    Result Date: 5/13/2020  Right Upper Quadrant Ultrasound INDICATION:  Transaminitis, hyponatremia, leukocytosis, abnormal urinalysis; generalized weakness and multiple recent falls, acute kidney injury. Cachexia, multiple sclerosis. COMPARISON: none TECHNIQUE:  Sonographic gray scale and Doppler images were obtained of the right upper quadrant of the abdomen. FINDINGS: There are no discrete lesions in the visualized portions of the liver. Liver size is 15 cm, within normal limits. Main portal vein is patent on Doppler imaging. The common bile duct diameter is abnormally dilated at 1.2 cm. The gallbladder demonstrates intraluminal mobile debris and sludge. No gallstones or pericholecystic fluid are evident. There is no wall thickening. Sonographic Barboza's sign is negative. There are no discrete lesions in the limited visualized portions of the pancreas. Pancreatic duct is normal in caliber as seen. The right kidney measures 10.8 cm in length. There is no hydronephrosis. There is no evidence of a solid renal mass. There is no evidence of ascites. The aorta and IVC are patent on Doppler imaging. Aortic diameter is within normal limits. Atherosclerotic changes are noted of the aorta. IMPRESSION: 1. Gallbladder sludge. 2. Common bile duct dilatation. Xr Chest Port    Result Date: 5/13/2020  EXAM: Chest x-ray. INDICATION: Pain, fall injury. COMPARISON: None. TECHNIQUE: Single frontal view. FINDINGS: The lungs are clear.  The cardiac size, mediastinal contour and pulmonary vasculature are normal. No pneumothorax or pleural effusion is seen. No acute displaced fracture is identified. There is a mild positional or scoliotic dextroconvex thoracic spine curvature. IMPRESSION: No acute process. No results found for this visit on 05/13/20. Cultures:   All Micro Results     Procedure Component Value Units Date/Time    CULTURE, BLOOD [[id number]] Collected:  05/14/20 1759    Order Status:  Completed Specimen:  Blood Updated:  05/19/20 0729     Special Requests: --        LEFT  ARM       Culture result: NO GROWTH 5 DAYS       CULTURE, BLOOD [[id number]] Collected:  05/14/20 1759    Order Status:  Completed Specimen:  Blood Updated:  05/19/20 0729     Special Requests: --        LEFT  Antecubital       Culture result: NO GROWTH 5 DAYS       EMERGENT DISEASE PANEL [[id number]] Collected:  05/13/20 1709    Order Status:  Completed Specimen:  Not Specified Updated:  05/15/20 1147     Emergent disease panel Not detected        Comment: Performed at [facility] 23, URINE [[id number]]     Order Status:  Canceled Specimen:  Urine from Clean catch           Assessment/Plan:     Principal Problem:    Choledocholithiasis (5/16/2020)  - S/P cholecystectomy on 5/18  - Doing much better  - Will need OP surgery follow up    Active Problems:    Osteomyelitis of sacroiliac region ([address] (5/20/2020)  - S/P debridement  - Has wound vac in place  - Will need Vancomycin  +  Cefepime + Flagyl x 6 weeks - EOT 6/25  - Wound care      GLENROY (acute kidney injury) ([address] (5/13/2020)  - Resolved      Pressure injury of sacral region, stage 4 ([address] (5/13/2020)  - See #2      Acute metabolic encephalopathy (7/47/8787)  - Resolved      Leukocytosis (5/13/2020)  - Improving  - Due to cholecystitis + OM  - Will need Vancomycin  +  Cefepime + Flagyl x 6 weeks - EOT 6/25      Transaminitis (5/13/2020)  - Resolved      Multiple sclerosis ([address] (5/13/2020)  - Continue home meds  - Wheel chair bound at baseline Protein calorie malnutrition ([facility] (5/13/2020)    Today's Plan: Continue IV Antibiotics    DIET NUTRITIONAL SUPPLEMENTS Breakfast, Lunch, Dinner; [place] ( )  DIET GI SOFT No options chosen    DVT Prophylaxis: SCDs    Discharge Plan: Tomorrow to [facility] By: Mazin Rubalcava DO     May 20, 2020

## 2020-05-20 NOTE — PROGRESS NOTES
Interdisciplinary team rounds were held 5/20/2020 with the following team members:Care Management, Nursing and Physician. Plan of care discussed. See clinical pathway and/or care plan for interventions and desired outcomes.

## 2020-05-20 NOTE — PROGRESS NOTES
Patient takes a home medication called Jackson Terry for MS. Unsure of spelling of medication nor dosage. Patient called family member to request someone to bring tomorrow.

## 2020-05-20 NOTE — PROGRESS NOTES
PLAN:  GI soft diet may adv to regular as juan  Monitor labs  Pain/nausea control  OOB/ambulate  Wound vac to ischial wound  Follow up with Antonino Vitale NP in 2 weeks  Will sign off    ASSESSMENT:  Admit Date: 5/13/2020   2 Day Post-Op  Procedure(s):  CHOLECYSTECTOMY LAPAROSCOPIC  S/p ischial wound debridement   Principal Problem:    Choledocholithiasis (5/16/2020)    Active Problems:    GLENROY (acute kidney injury) ([facility] (5/13/2020)      Decubitus ulcer of ischial area, right, unspecified pressure ulcer stage (5/13/2020)      Transaminitis (5/13/2020)      Acute metabolic encephalopathy (5/68/5693)      Multiple sclerosis ([facility] (5/13/2020)      Protein calorie malnutrition ([facility] (5/13/2020)      Leukocytosis (5/13/2020)       SUBJECTIVE:  Awake in bed, no complaints. WBC down to 13.5. Denies n/v. Tolerating FLD. Voiding without difficulty. AF. +flatus    Intake/Output Summary (Last 24 hours) at 5/20/2020 1111  Last data filed at 5/20/2020 0608  Gross per 24 hour   Intake 320 ml   Output    Net 320 ml     OBJECTIVE:  Constitutional: Alert oriented cooperative patient in no acute distress; appears stated age   Visit Vitals  /56   Pulse 97   Temp 96.8 °F (36 °C)   Resp 18   Wt 100 lb (45.4 kg)   SpO2 97%   BMI 16.14 kg/m²     Eyes:Sclera are clear. ENMT: no external lesions gross hearing normal; no obvious neck masses, no ear or lip lesions  CV: RRR. Normal perfusion  Resp: No JVD. Breathing is  non-labored; no audible wheezing. GI: soft and non-distended, Calciphylaxis RLQ. ,dressings c/d/i     Musculoskeletal: unremarkable with normal function. No embolic signs or cyanosis.    Integument: right ischial wound with vac in place, maintaining seal.   Neuro:  Oriented; moves all 4; no focal deficits  Psychiatric: normal affect and mood, no memory impairment      Patient Vitals for the past 24 hrs:   BP Temp Pulse Resp SpO2   05/20/20 1102 147/56 96.8 °F (36 °C) 97 18 97 %   05/20/20 0848 143/80 98.4 °F (36.9 °C) 94 17 95 %   05/20/20 0340 131/74 98.5 °F (36.9 °C) 89 18 96 %   05/19/20 2354 143/81 98.1 °F (36.7 °C) 90 18 96 %   05/19/20 2213   80     05/19/20 1954 130/77 98 °F (36.7 °C) 85 17 96 %   05/19/20 1548 102/58 97.8 °F (36.6 °C) 90 16 98 %   05/19/20 1202 115/70 97.6 °F (36.4 °C) 80 16 97 %     Labs:    Recent Labs     05/20/20  0528   WBC 13.5*   HGB 10.0*         K 3.9   *   CO2 26   BUN 21   CREA 0.33*   GLU 87       Signed:  Erwin Millan NP

## 2020-05-20 NOTE — PROGRESS NOTES
Infectious Disease Consult    Today's Date: 5/19/2020   Admit Date: 5/13/2020    Impression:   · Stage IV right ischial decubitus ulcer/osteomyelitis s/p I&D (5/14) ; no op cx  · Common bile duct stone s/p ERCP (5/15) s/p laparascopic cholecystectomy 5/18  · MS  · Debility; s/p multiple falls  · Malnutrition    Plan:   · Continue vancomycin, cefepime, flagyl x 6 weeks eot 6/25  · Encourage mobility  · Would benefit from regency and wound care    Anti-infectives:   · IV Vancomycin (5/14-  · IV Clindamycin (5/15-5/16)  · IV Cefepime (5/13-  · Flagyl 5/16 - current    Subjective:   Prior to admission pt says she used a wheelchair but could get around. Tolerating abx ok. No fever no diarrhea. Has wound vac in place    Patient Active Problem List   Diagnosis Code    GLENROY (acute kidney injury) ([facility] [address] N17.9    Decubitus ulcer of ischial area, right, unspecified pressure ulcer stage L89.319    Transaminitis R74.0    Acute metabolic encephalopathy H54.49    Multiple sclerosis ([facility] [address] G35    Protein calorie malnutrition ([facility] [address] E46    Leukocytosis D72.829    Choledocholithiasis K80.50     Past Medical History:   Diagnosis Date    Menopause       Family History   Problem Relation Age of Onset    Breast Cancer Mother       Social History     Tobacco Use    Smoking status: Not on file   Substance Use Topics    Alcohol use: Not Currently     Past Surgical History:   Procedure Laterality Date    ERCP  5/15/2020           Prior to Admission medications    Medication Sig Start Date End Date Taking? Authorizing Provider   amantadine HCL (SYMMETREL) 100 mg capsule Take 100 mg by mouth two (2) times a day. Yes Other, MD Marga   HYDROcodone-acetaminophen (XODOL)  mg tab per tablet Take 1 Tab by mouth four (4) times daily as needed.    Yes Augustina, MD Marga       Allergies   Allergen Reactions    Latex Rash    Pcn [Penicillins] Swelling        Review of Systems:  A comprehensive review of systems was negative except for that written in the History of Present Illness. Objective:     Visit Vitals  /77 (BP 1 Location: Left arm, BP Patient Position: At rest)   Pulse 85   Temp 98 °F (36.7 °C)   Resp 17   Wt 45.4 kg (100 lb)   SpO2 96%   BMI 16.14 kg/m²     Temp (24hrs), Av.6 °F (36.4 °C), Min:97 °F (36.1 °C), Max:98 °F (36.7 °C)       Lines:  Peripheral IV:       Physical Exam:    General:  Disheveled, alert, cooperative, cachectic, appears older than stated age   Eyes:  Sclera anicteric. Pupils equally round and reactive to light. Mouth/Throat: Mucous membranes normal, oral pharynx clear       Lungs:   Clear to auscultation bilaterally, good effort   CV:  Regular rate and rhythm,no murmur, click, rub or gallop   Abdomen:   Soft, +mild post-surgical ttp   Extremities: No cyanosis or edema   Skin: Skin color, texture, turgor normal. no acute rash or lesions, L ischial wd vac       Musculoskeletal: No swelling or deformity   Lines/Devices:  Intact, no erythema, drainage or tenderness   Psych: Alert and oriented, normal mood affect given the setting       Data Review:     CBC:  Recent Labs     20  0500 20  0407   WBC 15.6* 15.5*   GRANS  --  89*   MONOS  --  4   EOS  --  1   ANEU  --  13.8*   ABL  --  0.5   HGB 10.7* 12.0   HCT 30.5* 33.9*    233       BMP:  Recent Labs     20  0500 20  0407   CREA 0.39* 0.27*   BUN 26* 18    139   K 3.9 3.7   * 106   CO2 26 25   AGAP 6* 8   * 87       LFTS:  No results for input(s): TBILI, ALT, SGOT, AP, TP, ALB in the last 72 hours.     Microbiology:     All Micro Results     Procedure Component Value Units Date/Time    CULTURE, BLOOD [[id number]] Collected:  20 6352    Order Status:  Completed Specimen:  Blood Updated:  20 1498     Special Requests: --        LEFT  ARM       Culture result: NO GROWTH 5 DAYS       CULTURE, BLOOD [[id number]] Collected:  20 1759    Order Status:  Completed Specimen:  Blood Updated:  20 8564     Special Requests: --        LEFT  Antecubital       Culture result: NO GROWTH 5 DAYS       EMERGENT DISEASE PANEL [[id number]] Collected:  05/13/20 1709    Order Status:  Completed Specimen:  Not Specified Updated:  05/15/20 1147     Emergent disease panel Not detected        Comment: Performed at [facility] 23, URINE [[id number]]     Order Status:  Canceled Specimen:  Urine from Clean catch           Imaging:   MRI brain  Impression:  1. No appreciable change in the multifocal areas of nonenhancing T2  hyperintensity within the supratentorial white matter. These are nonspecific  findings but would be compatible the patient's clinical diagnosis of multiple  sclerosis. 2. Moderate volume loss. 3. Remote left cerebellar infarction. 4. Smooth enhancement of the pachymeninges. This can be seen secondary to  intracranial hypotension including from recent lumbar puncture. Other meningeal  processes including infectious meningitis cannot be entirely excluded. MRI abd  IMPRESSION:  1. Dilated common bile duct measuring up to 12 mm in diameter. A tiny 1 to 2 mm  filling defect is suggested in the most distal common bile duct on axial T2 and  MRCP images. A tiny distal biliary stone cannot be excluded.     2.  Left abdominal mesenteric edema poorly assessed due to significant patient  breathing motion artifact and the patient's thin body habitus. This most closely  approximates the pancreatic tail and left kidney and can suggest potential acute  inflammation of either of these structures. Recommend correlation for  signs/symptoms of either pancreatitis or left pyelonephritis.     3. Distended gallbladder with mild gallbladder wall thickening. The gallbladder  was better assessed by ultrasound and I would refer to that examination for  assessment of this structure. L ankle XR negative     U/S abd  IMPRESSION:   1. Gallbladder sludge.   2. Common bile duct dilatation.       CXR (-)  Signed By: Evon Oneill MD     May 19, 2020

## 2020-05-20 NOTE — PROGRESS NOTES
END OF SHIFT NOTE:    Intake/Output  No intake/output data recorded. Voiding: YES  Catheter: NO  Drain:              Stool:  0 occurrences. Stool Assessment  Stool Color: Paolo Dollius (05/15/20 0620)  Stool Appearance: Soft (05/19/20 2011)  Stool Amount: Large (05/15/20 0620)  Stool Source/Status: Rectum (05/15/20 5955)    Emesis:  0 occurrences. VITAL SIGNS  Patient Vitals for the past 12 hrs:   Temp Pulse Resp BP SpO2   05/20/20 1549 98 °F (36.7 °C) 86 18 128/74 98 %   05/20/20 1102 96.8 °F (36 °C) 97 18 147/56 97 %   05/20/20 0848 98.4 °F (36.9 °C) 94 17 143/80 95 %       Pain Assessment  Pain 1  Pain Scale 1: Numeric (0 - 10) (05/20/20 1402)  Pain Intensity 1: 10 (05/20/20 1402)  Patient Stated Pain Goal: 2 (05/20/20 0835)  Pain Reassessment 1: Patient resting w/respiratory rate greater than 10 (05/20/20 1502)  Pain Onset 1: ongoing (05/20/20 1340)  Pain Location 1: Buttocks (05/20/20 1402)  Pain Orientation 1: Right (05/20/20 1339)  Pain Description 1: Aching (05/20/20 1402)  Pain Intervention(s) 1: Medication (see MAR) (05/20/20 1402)    Ambulating  No    Additional Information:     Shift report given to oncoming nurse at the bedside.     Ana Collier RN

## 2020-05-20 NOTE — PROGRESS NOTES
Problem: Falls - Risk of  Goal: *Absence of Falls  Description: Document [place] Flow Fall Risk and appropriate interventions in the flowsheet. Outcome: Progressing Towards Goal  Note: Bed alarm on and functioning    Fall Risk Interventions:  Mobility Interventions: Bed/chair exit alarm, Patient to call before getting OOB, PT Consult for mobility concerns, PT Consult for assist device competence    Mentation Interventions: Bed/chair exit alarm, Adequate sleep, hydration, pain control    Medication Interventions: Bed/chair exit alarm, Evaluate medications/consider consulting pharmacy, Patient to call before getting OOB    Elimination Interventions: Bed/chair exit alarm, Call light in reach    History of Falls Interventions: Bed/chair exit alarm, Investigate reason for fall    Problem: Patient Education: Go to Patient Education Activity  Goal: Patient/Family Education  Outcome: Progressing Towards Goal     Problem: Pressure Injury - Risk of  Goal: *Prevention of pressure injury  Description: Document Kimani Scale and appropriate interventions in the flowsheet.   Outcome: Progressing Towards Goal  Note: TURNING EVERY 2 HOURS    Pressure Injury Interventions:  Sensory Interventions: Assess changes in LOC    Moisture Interventions: Absorbent underpads    Activity Interventions: Assess need for specialty bed, PT/OT evaluation, Pressure redistribution bed/mattress(bed type)    Mobility Interventions: Assess need for specialty bed    Nutrition Interventions: Document food/fluid/supplement intake    Friction and Shear Interventions: Foam dressings/transparent film/skin sealants

## 2020-05-20 NOTE — PROGRESS NOTES
Problem: Self Care Deficits Care Plan (Adult)  Goal: *Acute Goals and Plan of Care (Insert Text)  Description:   1. Patient will complete upper body bathing and dressing with moderate assistance and adaptive equipment as needed. 2. Patient will complete grooming with minimal assistance and adaptive equipment as needed. PROGRESSING 5/20/2020  3. Patient will tolerate 30 minutes of OT treatment with self-incorporated rest breaks to increase activity tolerance for ADLs. PROGRESSING 5/20/2020  4. Patient will complete bed mobility with minimal assistance in preparation for functional transfers. PROGRESSING 5/20/2020  5. Patient will attempt to  preparation for functional transfers with adaptive equipment as needed. 6. Patient will demonstrate modified independence with therapeutic exercise HEP to increase strength in BUEs for increased safety and independence with functional transfers. PROGRESSING 5/20/2020  7. Patient will demonstrate improved sitting balance for ADLs with stand by assistance and adaptive equipment as needed. PROGRESSING 5/20/2020    Timeframe: 7 visits      Outcome: Progressing Towards Goal     OCCUPATIONAL THERAPY: Daily Note and PM 5/20/2020  INPATIENT: OT Visit Days: 3  [place] / [place] / Product Type: Medicare /      NAME/AGE/GENDER: Jordyn Lane is a 63 y.o. female   PRIMARY DIAGNOSIS:  [facility] [N17.9] Choledocholithiasis Choledocholithiasis  Procedure(s) (LRB):  CHOLECYSTECTOMY LAPAROSCOPIC (N/A)  2 Days Post-Op  ICD-10: Treatment Diagnosis:    · Generalized Muscle Weakness (M62.81)  · Other lack of cordination (R27.8)  · Repeated Falls (R29.6)   Precautions/Allergies:    Fall precautions    Latex and Pcn [penicillins]      ASSESSMENT:   Per Initial Assessment:  Ms. Moise Younger is a 63 y.o. female admitted with GLENROY, several wounds including R ischial decubitus ulcer s/p I&D and wound vac placement. Hx MS.  At baseline pt lives alone and reports modified independence with ADLs, IADLs including grocery shopping and cooking, driving, and mobility using power w/c. Pt has friends who supervise during bathing. Pt reports independence with functional transfers including car transfers (keeps manual w/c in car for grocery shopping). Endorses several recent falls, per chart spent 3-4 days on floor. 5/20/2020: Pt found turned R in bed upon arrival, alert and agreeable to OT/PT co-treatment. Pt practiced rolling onto back with ModAx2/cues for technique, practiced grooming in supine with MaxA for thoroughness, and BUE AROM with SBA. Pt practiced rolling L with ModAx2/cues for technique. Sidelying to sit with MaxAx2-total assist. Poor initial sitting balance, total assist for BLE positioning. BLE hip adductors with less tone in upright sitting as compared to yesterday. Pt practiced grooming in sitting with total assist. Pt with notably improved sitting tolerance and sitting balance this date, prop sitting with BUE support on bed, requiring less support for balance and fewer rest breaks throughout. Able to maintain upright sitting with fluctuating support levels, CGA at times. Pt returned to sidelying L with MaxAx2, practiced rolling in supine with ModAx2 for brief change, total assist for hygiene and clothing management. Pt left sidelying L with bony prominences offloaded. Call bell within reach. Pt is making progress towards goals. See above. Continue OT POC. This patient is appropriate for co-treatment at this time due to multiple deficits including decreased balance, decreased skin integrity, decreased endurance, decreased strength, and need for high level assistance to complete functional transfers and functional tasks. This section established at most recent assessment   PROBLEM LIST (Impairments causing functional limitations):  1. Decreased Strength  2. Decreased ADL/Functional Activities  3. Decreased Transfer Abilities  4.  Decreased Balance  5. Increased Pain  6. Decreased Activity Tolerance  7. Decreased Pacing Skills  8. Decreased Work Simplification/Energy Conservation Techniques  9. Increased Fatigue  10. Decreased Flexibility/Joint Mobility  11. Decreased Skin Integrity/Hygeine  12. Decreased [place] with Home Exercise Program   INTERVENTIONS PLANNED: (Benefits and precautions of occupational therapy have been discussed with the patient.)  1. Activities of daily living training  2. Adaptive equipment training  3. Balance training  4. Clothing management  5. Donning&doffing training  6. Hygiene training  7. Neuromuscular re-eduation  8. Re-evaluation  9. Therapeutic activity  10. Therapeutic exercise  11. Wheelchair management     TREATMENT PLAN: Frequency/Duration: Follow patient 3-4x/week to address above goals. Rehabilitation Potential For Stated Goals: Good     REHAB RECOMMENDATIONS (at time of discharge pending progress):    Placement: It is my opinion, based on this patient's performance to date, that Ms. Yoav Latham may benefit intensive rehab at [facility] vs intensive therapy at [address] after discharge due to the functional deficits listed above that are likely to improve with skilled rehabilitation and concerns that he/she may be unsafe to be unsupervised at home due to impaired strength and balance impacting ADLs, increasing risk of falls. Equipment:    TBD               OCCUPATIONAL PROFILE AND HISTORY:   History of Present Injury/Illness (Reason for Referral):  See H&P. \"Patient is a 64yo F with hx MS who presents after multiple falls. She had a fall with 4 days on floor several weeks ago, not seen by MD at that time. She had three or four falls today before presentation, helped up by a friend each time. She was down for 4 hours the last time she fell. She is sore all over, denies any acute injuries. No headaches, sore throat, cough, cp, n/v, change in bowel habit.  Recently treated for dysuria and sx resolved on antibiotics. Finished antibiotic several days ago. Smoked 4-5 cigarettes daily until recently. Drank 1 glass wine daily until 4d ago. Quit because not feeling well, but can't describe how not feeling well. \"  Past Medical History/Comorbidities:   Ms. Ty Treviño  has a past medical history of Menopause. Ms. Ty Treviño  has a past surgical history that includes ercp (5/15/2020). Social History/Living Environment:   Home Environment: Private residence  # Steps to Enter: 0  Wheelchair Ramp: Yes  One/Two Story Residence: One story  Living Alone: Yes  Support Systems: Friends \ neighbors  Patient Expects to be Discharged to[de-identified] Unknown  Current DME Used/Available at Home: Hospital bed, Tub transfer bench, Wheelchair, Wheelchair, power  Tub or Shower Type: Tub/Shower combination  Prior Level of Function/Work/Activity:  At baseline pt lives alone and reports modified independence with ADLs, IADLs including grocery shopping and cooking, driving, and mobility using power w/c. Pt has friends who supervise during bathing. Pt reports independence with functional transfers including car transfers (keeps manual w/c in car for grocery shopping). Endorses several recent falls. Dominant Side:         RIGHT    Personal Factors:          Sex:  female        Age:  63 y.o. Other factors that influence how disability is experienced by the patient:  Multiple co-morbidities, Falls, extensive medical hx   Number of Personal Factors/Comorbidities that affect the Plan of Care: Extensive review of physical, cognitive, and psychosocial performance (3+):  HIGH COMPLEXITY   ASSESSMENT OF OCCUPATIONAL PERFORMANCE[de-identified]   Activities of Daily Living:   Basic ADLs (From Assessment) Complex ADLs (From Assessment)   Feeding: Minimum assistance  Oral Facial Hygiene/Grooming: Maximum assistance  Bathing: Maximum assistance  Upper Body Dressing: Maximum assistance  Lower Body Dressing: Total assistance  Toileting:  Total assistance Instrumental ADL  Meal Preparation: Total assistance  Homemaking: Total assistance   Grooming/Bathing/Dressing Activities of Daily Living   Grooming  Position Performed: Seated edge of bed(and supine)  Brushing/Combing Hair: Maximum assistance; Total assistance (dependent)             Toileting  Bladder Hygiene: Total assistance (dependent)  Bowel Hygiene: Total assistance (dependent)  Clothing Management: Total assistance (dependent)           Bed/Mat Mobility  Rolling: Moderate assistance;Assist x2  Supine to Sit: Moderate assistance;Assist x2  Sit to Supine: Moderate assistance;Assist x2  Scooting: Moderate assistance;Maximum assistance;Assist x2     Most Recent Physical Functioning:   Gross Assessment:  AROM: Generally decreased, functional(BUEs proximally)  Strength: Grossly decreased, non-functional(BUEs)  Coordination: Generally decreased, functional(BUEs)  Tone: Abnormal(Low tone BUEs)  Sensation: Intact(BUEs to light touch)               Posture:     Balance:  Sitting: Impaired  Sitting - Static: Fair (occasional)  Sitting - Dynamic: Fair (occasional); Poor (constant support) Bed Mobility:  Rolling: Moderate assistance;Assist x2  Supine to Sit: Moderate assistance;Assist x2  Sit to Supine: Moderate assistance;Assist x2  Scooting: Moderate assistance;Maximum assistance;Assist x2  Interventions: Safety awareness training;Verbal cues; Visual cues; Tactile cues  Wheelchair Mobility:     Transfers:               Patient Vitals for the past 6 hrs:   BP SpO2 Pulse   05/20/20 1102 147/56 97 % 97       Mental Status  Neurologic State: Alert  Orientation Level: Oriented X4  Cognition: Follows commands  Perception: Cues to maintain midline in sitting  Perseveration: No perseveration noted  Safety/Judgement: Fall prevention, Awareness of environment                          Physical Skills Involved:  1. Range of Motion  2. Balance  3. Strength  4. Activity Tolerance  5. Fine Motor Control  6.  Gross Motor Control  7. Pain (acute)  8. Pain (Chronic)  9. Skin Integrity Cognitive Skills Affected (resulting in the inability to perform in a timely and safe manner):  1. None  Psychosocial Skills Affected:  1. Habits/Routines  2. Environmental Adaptation  3. Social Interaction  4. Emotional Regulation  5. Self-Awareness  6. Awareness of Others  7. Social Roles   Number of elements that affect the Plan of Care: 5+:  HIGH COMPLEXITY   CLINICAL DECISION MAKIN[address] [place] [address] AM-PAC 6 Clicks   Daily Activity Inpatient Short Form  How much help from another person does the patient currently need. .. Total A Lot A Little None   1. Putting on and taking off regular lower body clothing? [x] 1   [] 2   [] 3   [] 4   2. Bathing (including washing, rinsing, drying)? [] 1   [x] 2   [] 3   [] 4   3. Toileting, which includes using toilet, bedpan or urinal?   [x] 1   [] 2   [] 3   [] 4   4. Putting on and taking off regular upper body clothing? [] 1   [x] 2   [] 3   [] 4   5. Taking care of personal grooming such as brushing teeth? [] 1   [x] 2   [] 3   [] 4   6. Eating meals? [] 1   [] 2   [x] 3   [] 4   © , Trustees of [address] [place] [address], under license to [employer]. All rights reserved      Score:  Initial: 11 5/15/2020 Most Recent: X (Date: -- )    Interpretation of Tool:  Represents activities that are increasingly more difficult (i.e. Bed mobility, Transfers, Gait). Medical Necessity:     · Patient demonstrates   · good  ·  rehab potential due to higher previous functional level. Reason for Services/Other Comments:  · Patient continues to require skilled intervention due to   · Inability to complete ADLs at prior level of independence   · .    Use of outcome tool(s) and clinical judgement create a POC that gives a: HIGH COMPLEXITY         TREATMENT:   (In addition to Assessment/Re-Assessment sessions the following treatments were rendered)     Pre-treatment Symptoms/Complaints:    Pain: Initial: Pain Intensity 1: 6  Pain Location 1: Buttocks  Pain Orientation 1: Right  Pain Intervention(s) 1: Repositioned, Nurse notified  Post Session:  no complaint of pain at rest     Today's treatment session addressed Decreased Strength, Decreased ADL/Functional Activities, Decreased Transfer Abilities, Decreased Balance, Increased Pain, Decreased Activity Tolerance, Increased Fatigue, Decreased Flexibility/Joint Mobility, and Decreased Skin Integrity/Hygeine to progress towards achieving goal(s). During this session,  Physical Therapy addressed  Balance to progress towards their discipline specific goal(s). Co-treatment was necessary to improve patient's ability to follow higher level commands, ability to increase activity demands, and ability to return to normal functional activity. Self Care: (76 minutes): Procedure(s) (per grid) utilized to improve and/or restore self-care/home management as related to toileting and grooming. Required maximal visual, verbal, manual, and tactile cueing to facilitate activities of daily living skills and compensatory activities . Pt practiced rolling onto back with ModAx2/cues for technique, practiced grooming in supine with MaxA for thoroughness, and BUE AROM with SBA. Pt practiced rolling L with ModAx2/cues for technique. Sidelying to sit with MaxAx2-total assist. Poor initial sitting balance, total assist for BLE positioning. BLE hip adductors with less tone in upright sitting as compared to yesterday. Pt practiced grooming in sitting with total assist. Pt with notably improved sitting tolerance and sitting balance this date, prop sitting with BUE support on bed, requiring less support for balance and fewer rest breaks throughout. Able to maintain upright sitting with fluctuating support levels, CGA at times. Pt returned to sidelying L with MaxAx2, practiced rolling in supine with ModAx2 for brief change, total assist for hygiene and clothing management.  Pt left sidelying L with bony prominences offloaded. Braces/Orthotics/Lines/Etc:   · Wound vac  · O2 Device: Room air  Treatment/Session Assessment:    · Response to Treatment:  Tolerated well, improving tolerance    · Interdisciplinary Collaboration:   o Physical Therapist  o Occupational Therapist  o Registered Nurse  o Physician  · After treatment position/precautions:   o Bed alarm/tab alert on  o Bed/Chair-wheels locked  o Bed in low position  o Call light within reach  o Sidelying L   · Compliance with Program/Exercises: Compliant all of the time, Will assess as treatment progresses. · Recommendations/Intent for next treatment session: \"Next visit will focus on advancements to more challenging activities and reduction in assistance provided\".   Total Treatment Duration:  OT Patient Time In/Time Out  Time In: 1339  Time Out: [address], OTR/L

## 2020-05-20 NOTE — WOUND CARE
Spoke with patient and Zen Delarosa this am regarding dc plan. Patient may go to [facility] tomorrow per MD note yesterday. VAC dressing due tomorrow. If patient does transfer tomorrow, will removed VAC prior and place saline soak until [facility] can evaluation and place their vac. Awaiting final plan.

## 2020-05-20 NOTE — PROGRESS NOTES
Bedside and Verbal shift change report given to be given to RADHA Medrano (oncoming nurse) by self (offgoing nurse). Report included the following information SBAR, Kardex, MAR and Recent Results.

## 2020-05-20 NOTE — PROGRESS NOTES
Infectious Disease Progress Note    Today's Date: 2020   Admit Date: 2020    Impression:   · Stage IV right ischial decubitus ulcer/osteomyelitis s/p I&D down to the ischium (); no op cultures or path sent  · Choledocholithiasis/CBD stone and ludge s/p ERCP (5/15), lap-jj 20  · MS  · Acute encephalopathy  · Debility; s/p multiple falls  · Malnutrition  · Leukocytosis, resolving  · PCN allergy    Plan:   · Continue Vancomycin: renally dosed, Cefepime 2g Q12h and Flagyl 500mg PO Q12h for 6 weeks EOT 20  · She needs aggressive wound care, off loading and nutrition to assist with healing this wound  · Dispo: [facility] is planned ?tomorrow    Anti-infectives:   · IV Vancomycin (-  · IV Clindamycin (5/15-)  · IV Cefepime (-  · Flagyl -    Subjective:   No acute issues, tolerating antbx. Denies fevers, chills, sweats, nausea, vomiting or diarrhea. Allergies   Allergen Reactions    Latex Rash    Pcn [Penicillins] Swelling        Review of Systems:  A comprehensive review of systems was negative except for that written in the History of Present Illness.     Objective:     Visit Vitals  /56   Pulse 97   Temp 96.8 °F (36 °C)   Resp 18   Wt 45.4 kg (100 lb)   SpO2 97%   BMI 16.14 kg/m²     Temp (24hrs), Av.9 °F (36.6 °C), Min:96.8 °F (36 °C), Max:98.5 °F (36.9 °C)       General:  Alert, cooperative, no acute distress, thin   Head:  Normocephalic, atraumatic    Eyes:  Anicteric, no drainage, not injected, EOMI   Throat: Mucus membranes moist OP clear   Neck: Supple, symmetrical, trachea midline, no JVD   Lungs:   Clear throughout lung fields without increased work of breathing or audible wheezes   Heart:  Regular rate and rhythm, without audible murmur, rub, or gallop   Abdomen:   Soft, non-tender/non-distended, lap sites intact, bowel sounds active   Extremities: Extremities thin, atraumatic, no cyanosis or edema   Pulses: 2+ and symmetric   Skin: Skin color, texture, turgor normal, no rashes or lesions. Sacral wound vac intact     Lines/Devices: RUE PICC intact           Data Review:     CBC:  Recent Labs     05/20/20  0528 05/19/20  0500 05/18/20  0407   WBC 13.5* 15.6* 15.5*   GRANS  --   --  89*   MONOS  --   --  4   EOS  --   --  1   ANEU  --   --  13.8*   ABL  --   --  0.5   HGB 10.0* 10.7* 12.0   HCT 30.4* 30.5* 33.9*    277 233       BMP:  Recent Labs     05/20/20  0528 05/19/20  0500 05/18/20  0407   CREA 0.33* 0.39* 0.27*   BUN 21 26* 18    141 139   K 3.9 3.9 3.7   * 109* 106   CO2 26 26 25   AGAP 6* 6* 8   GLU 87 107* 87       LFTS:  No results for input(s): TBILI, ALT, SGOT, AP, TP, ALB in the last 72 hours. Microbiology:     All Micro Results     Procedure Component Value Units Date/Time    CULTURE, BLOOD [[id number]] Collected:  05/14/20 1759    Order Status:  Completed Specimen:  Blood Updated:  05/19/20 0729     Special Requests: --        LEFT  ARM       Culture result: NO GROWTH 5 DAYS       CULTURE, BLOOD [[id number]] Collected:  05/14/20 1759    Order Status:  Completed Specimen:  Blood Updated:  05/19/20 0729     Special Requests: --        LEFT  Antecubital       Culture result: NO GROWTH 5 DAYS       EMERGENT DISEASE PANEL [[id number]] Collected:  05/13/20 1709    Order Status:  Completed Specimen:  Not Specified Updated:  05/15/20 1147     Emergent disease panel Not detected        Comment: Performed at [place] [address], URINE [[id number]]     Order Status:  Canceled Specimen:  Urine from Clean catch           Imaging:   MRI brain  Impression:  1. No appreciable change in the multifocal areas of nonenhancing T2  hyperintensity within the supratentorial white matter. These are nonspecific  findings but would be compatible the patient's clinical diagnosis of multiple  sclerosis. 2. Moderate volume loss. 3. Remote left cerebellar infarction. 4. Smooth enhancement of the pachymeninges.  This can be seen secondary to  intracranial hypotension including from recent lumbar puncture. Other meningeal  processes including infectious meningitis cannot be entirely excluded. MRI abd  IMPRESSION:  1. Dilated common bile duct measuring up to 12 mm in diameter. A tiny 1 to 2 mm  filling defect is suggested in the most distal common bile duct on axial T2 and  MRCP images. A tiny distal biliary stone cannot be excluded.     2.  Left abdominal mesenteric edema poorly assessed due to significant patient  breathing motion artifact and the patient's thin body habitus. This most closely  approximates the pancreatic tail and left kidney and can suggest potential acute  inflammation of either of these structures. Recommend correlation for  signs/symptoms of either pancreatitis or left pyelonephritis.     3. Distended gallbladder with mild gallbladder wall thickening. The gallbladder  was better assessed by ultrasound and I would refer to that examination for  assessment of this structure. L ankle XR negative     U/S abd  IMPRESSION:   1. Gallbladder sludge. 2. Common bile duct dilatation.       CXR (-)  Signed By: Monserrat Waters NP     May 20, 2020

## 2020-05-21 LAB
GLUCOSE BLD STRIP.AUTO-MCNC: 97 MG/DL (ref 65–100)
VANCOMYCIN TROUGH SERPL-MCNC: 14.5 UG/ML (ref 5–20)

## 2020-05-21 PROCEDURE — 74011250636 HC RX REV CODE- 250/636: Performed by: INTERNAL MEDICINE

## 2020-05-21 PROCEDURE — 74011250637 HC RX REV CODE- 250/637: Performed by: SURGERY

## 2020-05-21 PROCEDURE — 74011000250 HC RX REV CODE- 250: Performed by: NURSE PRACTITIONER

## 2020-05-21 PROCEDURE — 77030018836 HC SOL IRR NACL ICUM -A

## 2020-05-21 PROCEDURE — 65660000000 HC RM CCU STEPDOWN

## 2020-05-21 PROCEDURE — 74750000023 HC WOUND THERAPY

## 2020-05-21 PROCEDURE — 82962 GLUCOSE BLOOD TEST: CPT

## 2020-05-21 PROCEDURE — 97112 NEUROMUSCULAR REEDUCATION: CPT

## 2020-05-21 PROCEDURE — 80202 ASSAY OF VANCOMYCIN: CPT

## 2020-05-21 PROCEDURE — 74011000258 HC RX REV CODE- 258: Performed by: INTERNAL MEDICINE

## 2020-05-21 PROCEDURE — 36592 COLLECT BLOOD FROM PICC: CPT

## 2020-05-21 PROCEDURE — 97110 THERAPEUTIC EXERCISES: CPT

## 2020-05-21 RX ADMIN — OXYCODONE HYDROCHLORIDE AND ACETAMINOPHEN 1 TABLET: 5; 325 TABLET ORAL at 04:54

## 2020-05-21 RX ADMIN — VANCOMYCIN HYDROCHLORIDE 750 MG: 10 INJECTION, POWDER, LYOPHILIZED, FOR SOLUTION INTRAVENOUS at 15:34

## 2020-05-21 RX ADMIN — VANCOMYCIN HYDROCHLORIDE 750 MG: 10 INJECTION, POWDER, LYOPHILIZED, FOR SOLUTION INTRAVENOUS at 05:12

## 2020-05-21 RX ADMIN — OXYCODONE HYDROCHLORIDE AND ACETAMINOPHEN 1 TABLET: 5; 325 TABLET ORAL at 12:18

## 2020-05-21 RX ADMIN — Medication 10 ML: at 14:00

## 2020-05-21 RX ADMIN — OXYCODONE HYDROCHLORIDE AND ACETAMINOPHEN 1 TABLET: 5; 325 TABLET ORAL at 17:04

## 2020-05-21 RX ADMIN — METRONIDAZOLE 500 MG: 500 TABLET ORAL at 21:17

## 2020-05-21 RX ADMIN — AMANTADINE HYDROCHLORIDE 100 MG: 100 CAPSULE ORAL at 09:44

## 2020-05-21 RX ADMIN — CEFEPIME HYDROCHLORIDE 2 G: 2 INJECTION, POWDER, FOR SOLUTION INTRAVENOUS at 12:19

## 2020-05-21 RX ADMIN — METOPROLOL TARTRATE 12.5 MG: 25 TABLET, FILM COATED ORAL at 09:44

## 2020-05-21 RX ADMIN — DAKIN'S SOLUTION 0.125% (QUARTER STRENGTH): 0.12 SOLUTION at 18:00

## 2020-05-21 RX ADMIN — Medication 10 ML: at 05:10

## 2020-05-21 RX ADMIN — OXYCODONE HYDROCHLORIDE AND ACETAMINOPHEN 1 TABLET: 5; 325 TABLET ORAL at 21:17

## 2020-05-21 RX ADMIN — Medication 10 ML: at 21:25

## 2020-05-21 RX ADMIN — AMANTADINE HYDROCHLORIDE 100 MG: 100 CAPSULE ORAL at 17:04

## 2020-05-21 RX ADMIN — METOPROLOL TARTRATE 12.5 MG: 25 TABLET, FILM COATED ORAL at 17:04

## 2020-05-21 RX ADMIN — METRONIDAZOLE 500 MG: 500 TABLET ORAL at 09:44

## 2020-05-21 NOTE — PROGRESS NOTES
Problem: Mobility Impaired (Adult and Pediatric)  Goal: *Acute Goals and Plan of Care  Description: Acute PT Goals:  (1.)Dalia Duncan will move from supine to sit and sit to supine , scoot up and down and roll side to side with MINIMAL ASSIST within 7 treatment day(s). (2.)Dalia Duncan will transfer from bed to chair and chair to bed with MODERATE ASSIST using the least restrictive device within 7 treatment day(s). (3.)Dalia Duncan will perform sitting static and dynamic balance activities x 30 minutes with CONTACT GUARD ASSIST to improve safety and activity tolerance within 7 treatment day(s). (4.)Dalia Steward will perform bilateral lower extremity exercises x 20 min for HEP with SUPERVISION to improve strength, endurance, and functional mobility within 7 treatment day(s). PHYSICAL THERAPY: Daily Note and PM 5/21/2020  INPATIENT: PT Visit Days : 4  [place] / [place] / Product Type: Medicare /       NAME/AGE/GENDER: Severa Rumpf is a 63 y.o. female   PRIMARY DIAGNOSIS: GLENROY (acute kidney injury) ([facility] [address] [N17.9] Choledocholithiasis Choledocholithiasis  Procedure(s) (LRB):  CHOLECYSTECTOMY LAPAROSCOPIC (N/A)  3 Days Post-Op  ICD-10: Treatment Diagnosis:   · Generalized Muscle Weakness (M62.81)  · Other lack of cordination (R27.8)  · Difficulty in walking, Not elsewhere classified (R26.2)  · Other abnormalities of gait and mobility (R26.89)  · Repeated Falls (R29.6)  · History of falling (Z91.81)   Precaution/Allergies:  Latex; Norco [hydrocodone-acetaminophen]; and Pcn [penicillins]      ASSESSMENT:     Ms. Gwendolyn Steward is a 63year old F who presents to hospital with GLENROY, several wounds including R ischial decubitus ulcer s/p I&D and wound vac placement. PMH includes MS. Prior to hospital admission pt lives alone, with friend support, in a one story home with no step(s) to enter. Pt endorses several falls in past 6 months. Prior to admission Ms. Gwendolyn Steward uses a power wheelchair for mobility which she reports prior to this onset of illness she was able to transfer to independently. Pt now s/p ERCP 5/16, and lap jj 5/18.    5/21/20: Upon entering, pt resting in bed, agreeable to therapy. she reports 7/10 pain in her buttocks at the site of the wound vac. She did have wound care assess and dress her wound today, states that is the reason for her discomfort. PT performed AAROM/PROM all BLE joints to facilitate mobility. Pt tolerated well and was able to assist more with mobility and ROM,  reported feeling better after ther-ex performed. Pt performed supine > sit with Mod A, sitting EOB with much improved sitting balance control. Facilitation of sitting balance at edge of bed, pt able to maintain upright sitting just with CGA with prop sitting BUE support. Pt fatigued, returned sit > supine with Mod A. Rolling side to side Min/Mod A in order to get pt cleaned up. Once finished, pt positioned on L side with BLE offloading boots donned. All needs within reach, RN notified. Pt presents as functioning below her baseline, with deficits in mobility including transfers, gait, balance, and activity tolerance. Pt will benefit from skilled therapy services to address stated deficits to promote return to highest level of function, independence, and safety. Will continue to follow. Pt continues with progress with bed mobility, BLE ROM, sitting balance and tolerance, requiring significantly less assistance this session. Hopeful for [facility] tomorrow. At this time, patient is appropriate for Co-treatment with occupational therapy due to patient's decreased overall endurance/tolerance levels, as well as need for high level skilled assistance to complete functional transfers/mobility and functional tasks. Josue Virgilio is appropriate for a multidisciplinary co-treatment of PT and OT to address goals of both disciplines.      This section established at most recent assessment   PROBLEM LIST (Impairments causing functional limitations):  1. Decreased Strength  2. Decreased ADL/Functional Activities  3. Decreased Transfer Abilities  4. Decreased Ambulation Ability/Technique  5. Decreased Balance  6. Increased Pain  7. Decreased Activity Tolerance  8. Increased Fatigue  9. Decreased Skin Integrity/Hygeine   INTERVENTIONS PLANNED: (Benefits and precautions of physical therapy have been discussed with the patient.)  1. Balance Exercise  2. Bed Mobility  3. Family Education  4. Gait Training  5. Home Exercise Program (HEP)  6. Neuromuscular Re-education/Strengthening  7. Therapeutic Activites  8. Therapeutic Exercise/Strengthening  9. Transfer Training     TREATMENT PLAN: Frequency/Duration: 3 times a week for duration of hospital stay  Rehabilitation Potential For Stated Goals: Good     REHAB RECOMMENDATIONS (at time of discharge pending progress):    Placement: It is my opinion, based on this patient's performance to date, that Ms. Moise Younger may benefit from intensive therapy at a [address] [place] [address] after discharge due to the functional deficits listed above that are likely to improve with skilled rehabilitation and concerns that he/she may be unsafe to be unsupervised at home due to medical complications and mobility deficits which put her at increase risk of functional decline and/or falling. LTACH. Equipment:    None at this time            HISTORY:   History of Present Injury/Illness (Reason for Referral):  Per H&P: \"Patient is a 62yo F with hx MS who presents after multiple falls. She had a fall with 4 days on floor several weeks ago, not seen by MD at that time. She had three or four falls today before presentation, helped up by a friend each time. She was down for 4 hours the last time she fell. She is sore all over, denies any acute injuries. No headaches, sore throat, cough, cp, n/v, change in bowel habit. Recently treated for dysuria and sx resolved on antibiotics.  Finished antibiotic several days ago. Smoked 4-5 cigarettes daily until recently. Drank 1 glass wine daily until 4d ago. Quit because not feeling well, but can't describe how not feeling well. \"  Past Medical History/Comorbidities:   Ms. Polo Boeck  has a past medical history of Menopause. Ms. Polo Boeck  has a past surgical history that includes ercp (5/15/2020). Social History/Living Environment:   Home Environment: Private residence  # Steps to Enter: 0  Wheelchair Ramp: Yes  One/Two Story Residence: One story  Living Alone: Yes  Support Systems: Friends \ neighbors  Patient Expects to be Discharged to[de-identified] Unknown  Current DME Used/Available at Home: Hospital bed, Tub transfer bench, Wheelchair, Wheelchair, power  Tub or Shower Type: Tub/Shower combination  Prior Level of Function/Work/Activity:   Prior to hospital admission pt lives alone, with friend support, in a one story home with no step(s) to enter. Pt endorses several falls in past 6 months. Prior to admission Ms. Polo Boeck uses a power wheelchair for mobility which she reports prior to this onset of illness she was able to transfer to independently. Number of Personal Factors/Comorbidities that affect the Plan of Care: 1-2: MODERATE COMPLEXITY   EXAMINATION:   Most Recent Physical Functioning:   Gross Assessment:  AROM: Generally decreased, functional  Strength: Grossly decreased, non-functional  Coordination: Generally decreased, functional  Tone: Abnormal  Sensation: Intact               Posture:     Balance:  Sitting: Impaired  Sitting - Static: Good (unsupported)  Sitting - Dynamic: Fair (occasional) Bed Mobility:  Rolling: Moderate assistance  Supine to Sit: Moderate assistance  Sit to Supine: Moderate assistance  Scooting: Moderate assistance  Interventions: Safety awareness training; Tactile cues; Verbal cues  Wheelchair Mobility:     Transfers:     Gait:            Body Structures Involved:  1. Nerves  2. Bones  3. Joints  4.  Muscles Body Functions Affected:  1. Neuromusculoskeletal  2. Movement Related  3. Skin Related Activities and Participation Affected:  1. General Tasks and Demands  2. Mobility  3. Self Care  4. Interpersonal Interactions and Relationships   Number of elements that affect the Plan of Care: 3: MODERATE COMPLEXITY   CLINICAL PRESENTATION:   Presentation: Evolving clinical presentation with changing clinical characteristics: MODERATE COMPLEXITY   CLINICAL DECISION MAKIN [facility] Inpatient Short Form  How much difficulty does the patient currently have. .. Unable A Lot A Little None   1. Turning over in bed (including adjusting bedclothes, sheets and blankets)? [] 1   [x] 2   [] 3   [] 4   2. Sitting down on and standing up from a chair with arms ( e.g., wheelchair, bedside commode, etc.)   [x] 1   [] 2   [] 3   [] 4   3. Moving from lying on back to sitting on the side of the bed? [] 1   [x] 2   [] 3   [] 4   How much help from another person does the patient currently need. .. Total A Lot A Little None   4. Moving to and from a bed to a chair (including a wheelchair)? [x] 1   [] 2   [] 3   [] 4   5. Need to walk in hospital room? [x] 1   [] 2   [] 3   [] 4   6. Climbing 3-5 steps with a railing? [x] 1   [] 2   [] 3   [] 4   © , Trustees of [address] [place] [address], under license to [employer]. All rights reserved      Score:  Initial: 8 Most Recent: X (Date: -- )    Interpretation of Tool:  Represents activities that are increasingly more difficult (i.e. Bed mobility, Transfers, Gait). Medical Necessity:     · Patient is expected to demonstrate progress in strength, range of motion, balance, coordination and functional technique to decrease assistance required with all mobility.   Reason for Services/Other Comments:  · Patient continues to require skilled intervention due to medical complications and mobility deficits which impact her level of function, mobility, and independence as indicated above. Use of outcome tool(s) and clinical judgement create a POC that gives a: Questionable prediction of patient's progress: MODERATE COMPLEXITY        TREATMENT:   (In addition to Assessment/Re-Assessment sessions the following treatments were rendered)   Pre-treatment Symptoms/Complaints:  No specific complaints  Pain: Initial:   Pain Intensity 1: 7  Pain Location 1: Buttocks  Pain Orientation 1: Right  Pain Intervention(s) 1: Position, Repositioned, Nurse notified  Post Session:  Resting comfortably in bed, decreased pain     Therapeutic Exercise: (  10 Minutes):  BLE AAROM/PROM including dorsiflexion/plantarflexion, knee flexion/extension, Hip ER/IR, Hip Abd/Add and hip flexion/extension to improve mobility and strength. visual, verbal, manual and tactile cues to promote proper body alignment, promote proper body posture and promote proper body mechanics. Progressed range and complexity of movement as indicated. Neuromuscular Re-education: (  32 Minutes):  Sitting balance control training edge of bed, and bed mobility training to improve balance, coordination and posture. Required minimal visual, verbal, manual and tactile cues to promote static and dynamic balance in sitting and promote coordination of bilateral, lower extremity(s). Braces/Orthotics/Lines/Etc:   · IV  · O2 Device: Room air  Treatment/Session Assessment:    · Response to Treatment:  See above  · Interdisciplinary Collaboration:   o Physical Therapist  o Registered Nurse  · After treatment position/precautions:   o Bed alarm/tab alert on  o Bed/Chair-wheels locked  o Bed in low position  o Call light within reach  o RN notified  o Sidelying L   · Compliance with Program/Exercises: Will assess as treatment progresses  · Recommendations/Intent for next treatment session: \"Next visit will focus on advancements to more challenging activities and reduction in assistance provided\".     Total Treatment Duration:  PT Patient Time In/Time Out  Time In: 6897  Time Out: 1593 [facility],

## 2020-05-21 NOTE — PROGRESS NOTES
Problem: Falls - Risk of  Goal: *Absence of Falls  Description: Document Starleen Freeze Fall Risk and appropriate interventions in the flowsheet. Outcome: Progressing Towards Goal  Note: Fall Risk Interventions:  Mobility Interventions: Assess mobility with egress test, Bed/chair exit alarm, PT Consult for mobility concerns, Patient to call before getting OOB, PT Consult for assist device competence, OT consult for ADLs    Mentation Interventions: Adequate sleep, hydration, pain control, Bed/chair exit alarm, More frequent rounding, Update white board    Medication Interventions: Bed/chair exit alarm, Evaluate medications/consider consulting pharmacy    Elimination Interventions: Bed/chair exit alarm, Call light in reach    History of Falls Interventions: Bed/chair exit alarm         Problem: Patient Education: Go to Patient Education Activity  Goal: Patient/Family Education  Outcome: Progressing Towards Goal     Problem: Pressure Injury - Risk of  Goal: *Prevention of pressure injury  Description: Document Kimani Scale and appropriate interventions in the flowsheet.   Outcome: Progressing Towards Goal  Note: Pressure Injury Interventions:  Sensory Interventions: Assess changes in LOC, Discuss PT/OT consult with provider    Moisture Interventions: Absorbent underpads, Limit adult briefs    Activity Interventions: Assess need for specialty bed, Pressure redistribution bed/mattress(bed type), PT/OT evaluation    Mobility Interventions: Assess need for specialty bed, PT/OT evaluation, Suspension boots    Nutrition Interventions: Document food/fluid/supplement intake    Friction and Shear Interventions: HOB 30 degrees or less, Foam dressings/transparent film/skin sealants                Problem: Patient Education: Go to Patient Education Activity  Goal: Patient/Family Education  Outcome: Progressing Towards Goal     Problem: Nutrition Deficit  Goal: *Optimize nutritional status  Outcome: Progressing Towards Goal

## 2020-05-21 NOTE — PROGRESS NOTES
END OF SHIFT NOTE:    Intake/Output  No intake/output data recorded. Voiding: YES  Catheter: NO  Drain:              Stool:  0 occurrences. Stool Assessment  Stool Color: Ramandeep Dollar (05/15/20 0620)  Stool Appearance: Soft (05/20/20 2040)  Stool Amount: Large (05/15/20 0620)  Stool Source/Status: Rectum (05/15/20 4081)    Emesis:  0 occurrences. VITAL SIGNS  Patient Vitals for the past 12 hrs:   Temp Pulse Resp BP SpO2   05/21/20 1548 98.3 °F (36.8 °C) 72 17 117/66 98 %   05/21/20 1249 98.2 °F (36.8 °C) 73 15 133/73 97 %   05/21/20 0747 (!) 96.5 °F (35.8 °C) 79 17 119/81 93 %       Pain Assessment  Pain 1  Pain Scale 1: Numeric (0 - 10) (05/21/20 1704)  Pain Intensity 1: 9 (05/21/20 1704)  Patient Stated Pain Goal: 0 (05/21/20 0900)  Pain Reassessment 1: Patient resting w/respiratory rate greater than 10 (05/21/20 1803)  Pain Onset 1: ongoing (05/21/20 1345)  Pain Location 1: Buttocks (05/21/20 1704)  Pain Orientation 1: Right (05/21/20 1345)  Pain Description 1: Aching (05/21/20 1704)  Pain Intervention(s) 1: Medication (see MAR) (05/21/20 1704)    Ambulating  No    Additional Information:     Shift report given to oncoming nurse at the bedside.     Daisy Barnard, RN

## 2020-05-21 NOTE — PROGRESS NOTES
Hospitalist Progress Note    Patient: Pita Hatch MRN: [id number]  SSN: [id number]    YOB: 1960  Age: 63 y.o. Sex: female      Admit Date: 5/13/2020    LOS: 8 days     Subjective:     63year old CF with a PMH of MS admitted on 5/13/20 after sustaining multiple falls and reported she \"hurts all over\".  She commpleted course of antibiotics approximately 1 week prior for UTI.  Admits to tobacco abuse and 1 glass of wine daily. ED work-up was notable for GLENROY, dehydration, hyponatremia, transaminitis, leukocytosis.  She was admitted for the further work-up to include a right upper quadrant ultrasound which shows dilated CBD, IV fluid resuscitation, PT OT. Per d/w pt's friend Vargas Rowanrodrick pt is wheelchair dependent, can usually transfer independently. Sustained fall 1.5 months ago and has been \"going downhill\" since - more weakness, confusion and eating/drinking less.   GI consulted. S/p MRCP showing dilated CBD and ERCP with papillotomy. S.p jj on 5/18. Gen surgery consulted for ischial decub ulcer s/p OR debridement. ID following for atbx recs - Vancomycin + Cefepime + Flagyl - EOT 6/25.    5/21 - Sacral pain improved. Feels better than yesterday. Denies CP/SOB. Denies F/C/N/V. Review of systems negative except stated above.     Objective:     Visit Vitals  /81 (BP 1 Location: Left arm, BP Patient Position: At rest;Supine)   Pulse 79   Temp (!) 96.5 °F (35.8 °C)   Resp 17   Wt 45.4 kg (100 lb)   SpO2 93%   BMI 16.14 kg/m²      Oxygen Therapy  O2 Sat (%): 93 % (05/21/20 0747)  Pulse via Oximetry: 85 beats per minute (05/18/20 1319)  O2 Device: Room air (05/20/20 6857)  O2 Flow Rate (L/min): 1 l/min (05/18/20 1314)      Intake and Output:     Intake/Output Summary (Last 24 hours) at 5/21/2020 1258  Last data filed at 5/21/2020 0403  Gross per 24 hour   Intake 360 ml   Output 150 ml   Net 210 ml         Physical Exam:   GENERAL: alert, cooperative, no distress, appears stated age  EYE: conjunctivae/corneas clear. PERRL. THROAT & NECK: normal and no erythema or exudates noted. LUNG: clear to auscultation bilaterally  HEART: regular rate and rhythm, S1S2, no murmur, no JVD  ABDOMEN: soft, non-tender, non-distended. Bowel sounds normal.   EXTREMITIES:  No edema, 2+ pedal/radial pulses bilaterally  SKIN: no rash or abnormalities  NEUROLOGIC: A&Ox3. Cranial nerves 2-12 grossly intact. Lab/Data Review:  Recent Results (from the past 24 hour(s))   VANCOMYCIN, TROUGH    Collection Time: 05/21/20  5:15 AM   Result Value Ref Range    Vancomycin,trough 14.5 5 - 20 ug/mL   GLUCOSE, POC    Collection Time: 05/21/20  7:29 AM   Result Value Ref Range    Glucose (POC) 97 65 - 100 mg/dL       Imaging:  Xr Ankle Rt Ap/lat    Result Date: 5/13/2020  Exam:  Right ankle radiographs History:  pain, lateral malleolus ulcer, please eval for osteo, 63 years Female Comparison: None available Findings:  No evidence of acute fracture or dislocation. Normal alignment, joint spaces preserved. Mild diffuse osteopenia. No definite cortical erosion is seen to suggest radiographic evidence of osteomyelitis. No evidence of ankle joint effusion. Visualized soft tissues otherwise unremarkable. Impression:  No evidence of acute injury. Xr Ercp / Ercb Combined    Result Date: 5/15/2020  ERCP 5/15/2020 CLINICAL HISTORY: Common bile duct stone. FINDINGS: 9 fluoroscopic spot images are submitted for evaluation. The total fluoroscopy time is indicated to be 2 minutes and 31 seconds. 20 mL of 1:1 diluted Isovue-370 was administered via the endoscope for this examination. Today's study was performed by Dr. Ashish James of gastroenterology. Please refer to Dr. Katy Song procedure notes and impressions for additional information. The initial image shows the endoscope in place and a wire extending into the biliary system. Subsequent images show the administration of contrast with balloon occlusion.  The common bile duct does appear dilated having a diameter approaching that of the endoscope. No abnormal defined filling defect is seen on images submitted. IMPRESSION: 1. Dilated common bile duct without a defined filling defect to suggest common bile duct stone. Please refer to Dr. Rod Britt procedure notes and impressions for additional information. Mri Brain W Wo Cont    Result Date: 5/14/2020  MRI brain with and without contrast History: extremity weakness/pain, eval for MS exacerbation. Imaging sequences: Sagittal short TR/short TE, axial short TR/short TE, long TR/long TE, sagittal and axial FLAIR, gradient recall, diffusion weighted images and ADC mapping. Axial and coronal short TR/short TE postcontrast images. Imaging was performed on a 1.5 Melissa magnet, utilizing the uneventful administration of 9 mL of intravenous Dotarem and order to better evaluate for intracranial pathology. Comparison: 02/24/2014 Findings: The ventricles and sulci are prominent compatible with moderate volume loss for age with probable progression. 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 There are no extra-axial fluid collections. Normal flow voids are present within all of the major intracranial vessels. No evidence of intraparenchymal hemorrhage or mass effect is identified. .  There are no areas of restricted diffusion to suggest an acute or subacute infarction. 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 Several patchy and discrete foci of T2 hyperintensity are once again noted within the supratentorial white matter, without significant change . There is mild smooth thin enhancement of the pachymeninges. No additional areas of abnormal enhancement identified. The visualized mastoid air cells and paranasal sinuses are well pneumatized and aerated. Impression: 1.  No appreciable change in the multifocal areas of nonenhancing T2 hyperintensity within the supratentorial white matter. These are nonspecific findings but would be compatible the patient's clinical diagnosis of multiple sclerosis. 2. Moderate volume loss. 3. Remote left cerebellar infarction. 4. Smooth enhancement of the pachymeninges. This can be seen secondary to intracranial hypotension including from recent lumbar puncture. Other meningeal processes including infectious meningitis cannot be entirely excluded. Mri Abd Wo Cont    Result Date: 5/14/2020  MRCP, 5/14/2020: Indication: Elevated liver function tests, and dilated common bile duct on ultrasound. Procedure: Three-dimensional MRCP was performed using maximum intensity projection reconstruction. The examination consisted of axial T2W/HASTE, axial fat-saturated T2W, axial T1 in and an out-of-phase,and T2 weighted MIP images rotated about the axis of the biliary tree generated. Comparison: Limited abdominal ultrasound 5/13/2020 Findings: Evaluation of the lung bases is limited by MRI imaging. Small bilateral dependent pleural effusions are seen. Additional dependent signal is seen in the left lung base which may represent additional dependent atelectatic changes. The gallbladder appears distended and mildly thick-walled. No focal filling defect is seen within the gallbladder to suggest a gallstone. No significant intrahepatic biliary ductal dilatation is seen. However, the extrahepatic bile ducts measures up to 12 mm in diameter which is dilated. A tiny 1 to 2 mm filling defect is seen at the most distal common bile duct on axial T2-weighted image 21, and MRCP image 78. Although this could be artifactual, the possibility of a tiny distal biliary stone cannot be excluded. No abnormal focal caliber changes are seen of the biliary tree. The pancreatic duct is normal in caliber measuring 1 to 2 mm. The course of the pancreatic duct is normal without evidence for pancreas divisum.  Evaluation of the solid organs is somewahat limited without intravenous contrast. However, no focal signal abnormalities are definitely seen of the liver, spleen, pancreas or kidneys. No significant drop in signal intensity of the liver is seen to suggest fatty infiltration. No contour deforming abnormalities are seen of the pancreas although a small lesion could be missed due to the noncontrast technique. No evidence of hydronephrosis is seen. No contour deforming abnormality is seen of the kidneys. Assessment of bowel and peritoneum is limited given that there is significant patient breathing motion artifact and this patient is relatively thin. No abnormal bowel dilation is seen. On MRCP images, there is suggested asymmetric edema in the left abdomen. This closely approximates the pancreatic tail and left kidney. IMPRESSION: 1. Dilated common bile duct measuring up to 12 mm in diameter. A tiny 1 to 2 mm filling defect is suggested in the most distal common bile duct on axial T2 and MRCP images. A tiny distal biliary stone cannot be excluded. 2.  Left abdominal mesenteric edema poorly assessed due to significant patient breathing motion artifact and the patient's thin body habitus. This most closely approximates the pancreatic tail and left kidney and can suggest potential acute inflammation of either of these structures. Recommend correlation for signs/symptoms of either pancreatitis or left pyelonephritis. 3.  Distended gallbladder with mild gallbladder wall thickening. The gallbladder was better assessed by ultrasound and I would refer to that examination for assessment of this structure. [facility]    Result Date: 5/13/2020  Right Upper Quadrant Ultrasound INDICATION:  Transaminitis, hyponatremia, leukocytosis, abnormal urinalysis; generalized weakness and multiple recent falls, acute kidney injury. Cachexia, multiple sclerosis. COMPARISON: none TECHNIQUE:  Sonographic gray scale and Doppler images were obtained of the right upper quadrant of the abdomen.  FINDINGS: There are no discrete lesions in the visualized portions of the liver. Liver size is 15 cm, within normal limits. Main portal vein is patent on Doppler imaging. The common bile duct diameter is abnormally dilated at 1.2 cm. The gallbladder demonstrates intraluminal mobile debris and sludge. No gallstones or pericholecystic fluid are evident. There is no wall thickening. Sonographic Barboza's sign is negative. There are no discrete lesions in the limited visualized portions of the pancreas. Pancreatic duct is normal in caliber as seen. The right kidney measures 10.8 cm in length. There is no hydronephrosis. There is no evidence of a solid renal mass. There is no evidence of ascites. The aorta and IVC are patent on Doppler imaging. Aortic diameter is within normal limits. Atherosclerotic changes are noted of the aorta. IMPRESSION: 1. Gallbladder sludge. 2. Common bile duct dilatation. Xr Chest Port    Result Date: 5/13/2020  EXAM: Chest x-ray. INDICATION: Pain, fall injury. COMPARISON: None. TECHNIQUE: Single frontal view. FINDINGS: The lungs are clear. The cardiac size, mediastinal contour and pulmonary vasculature are normal. No pneumothorax or pleural effusion is seen. No acute displaced fracture is identified. There is a mild positional or scoliotic dextroconvex thoracic spine curvature. IMPRESSION: No acute process. No results found for this visit on 05/13/20. Cultures:   All Micro Results     Procedure Component Value Units Date/Time    CULTURE, BLOOD [[id number]] Collected:  05/14/20 1759    Order Status:  Completed Specimen:  Blood Updated:  05/19/20 0729     Special Requests: --        LEFT  ARM       Culture result: NO GROWTH 5 DAYS       CULTURE, BLOOD [[id number]] Collected:  05/14/20 1759    Order Status:  Completed Specimen:  Blood Updated:  05/19/20 0729     Special Requests: --        LEFT  Antecubital       Culture result: NO GROWTH 5 DAYS       EMERGENT DISEASE PANEL [[id number]] Collected: 05/13/20 1709    Order Status:  Completed Specimen:  Not Specified Updated:  05/15/20 1147     Emergent disease panel Not detected        Comment: Performed at [facility], URINE [[id number]]     Order Status:  Canceled Specimen:  Urine from Clean catch           Assessment/Plan:     Principal Problem:    Choledocholithiasis (5/16/2020)  - S/P cholecystectomy on 5/18  - Doing much better  - Will need OP surgery follow up    Active Problems:    Osteomyelitis of sacroiliac region [facility]) (5/20/2020)  - S/P debridement  - Has wound vac in place  - Will need Vancomycin  +  Cefepime + Flagyl x 6 weeks - EOT 6/25  - Wound care      GLENROY (acute kidney injury) ([address] (5/13/2020)  - Resolved      Pressure injury of sacral region, stage 4 ([address] (5/13/2020)  - See #2      Acute metabolic encephalopathy (2/43/4683)  - Resolved      Leukocytosis (5/13/2020)  - Improving  - Due to cholecystitis + OM  - Will need Vancomycin  +  Cefepime + Flagyl x 6 weeks - EOT 6/25      Transaminitis (5/13/2020)  - Resolved      Multiple sclerosis ([address] (5/13/2020)  - Continue home meds  - Wheel chair bound at baseline      Protein calorie malnutrition ([address] (5/13/2020)    Today's Plan: Continue IV Antibiotics    DIET NUTRITIONAL SUPPLEMENTS Breakfast, Lunch, Dinner; Desk ( )  DIET GI SOFT No options chosen    DVT Prophylaxis: SCDs    Discharge Plan: Tomorrow to [employer] By: Summer Serrano DO     May 21, 2020

## 2020-05-21 NOTE — PROGRESS NOTES
Pharmacokinetic Consult to Pharmacist    Cyndee Burk is a 63 y.o. female being treated with vancomycin and cefepime. Weight: 45.4 kg (100 lb)  Lab Results   Component Value Date/Time    BUN 21 05/20/2020 05:28 AM    Creatinine 0.33 (L) 05/20/2020 05:28 AM    WBC 13.5 (H) 05/20/2020 05:28 AM      Estimated Creatinine Clearance: 62.8 mL/min (A) (by C-G formula based on SCr of 0.33 mg/dL (L)). Lab Results   Component Value Date/Time    Vancomycin,trough 14.5 05/21/2020 05:15 AM     Day 9 of vancomycin. Goal trough is 15-20. Trough slightly sub therapeutic - will continue current dose as some accumulation may occur and will move up next dose. Will continue to follow patient.       Thank you,  Shahnaz Galeas, PharmD  Clinical Pharmacist  [phone]

## 2020-05-21 NOTE — PROGRESS NOTES
Nutrition follow-up  Initial assessment for:   Consult for general nutrition management and supplements (Hospitalist)  Best Practice Alert for Pressure Injury stage 2 or greater     Assessment: Patient with PMH of MS admitted after multiple falls. She was found to have an ischial ulcer and is now s/p I&D with wound vac. ERCP on 5/15. She was also found to have choledocholithiasis now s/p lap chol. Patient seen in follow-up today. She states that her appetite has improved and she thinks she is eating better. She is still very vague about intake, just saying that she did pretty good. She reports that she is not drinking as much Ensure as before because she is \"getting Ensure-ed out. \" Explained Ensure Clear and she agrees to try. NFPE: Patient is very thin appearing, potential wasting to clavicles, but could also be positioning. DIET GI soft, Ensure Enlive with all meals    Intake: Per RN charting, intake 20-80% since last RD assessment.   Anthropometrics:  Height: 66\", Weight: 45.4 kg (100#), unknown weight source, BMI 16.1.  BMI class of Underweight. There is no weight history since 2014. Macronutrient needs: (using Listed body weight 45.4 kg)  FMH: 2264-3232 BPUK/VDQ (30-35 kcal/kg)  RD-12 B/XAT (1.5-2 g/kg)     Nutrition Intervention:  Meals and snacks: Advance diet as medically appropriate. Education: Reinforced the importance of adequate nutrition for wound healing. Medical food supplement therapy: Change to Ensure Clear TID   Discharge Nutrition Plan: Too soon to determine.     [address], [address], [address]

## 2020-05-21 NOTE — PROGRESS NOTES
Interdisciplinary team rounds were held 5/21/2020 with the following team members:Care Management, Nursing and Physician. Plan of care discussed. See clinical pathway and/or care plan for interventions and desired outcomes.

## 2020-05-21 NOTE — PROGRESS NOTES
Bedside and Verbal shift change report given to be given to RADHA Medrano (oncoming nurse) by self (offgoing nurse). Report included the following information SBAR, Kardex, MAR and Recent Results. Bed alarm on and functioning. Eyes closed. Respirations even and unlabored.

## 2020-05-21 NOTE — PROGRESS NOTES
Infectious Disease Progress Note    Today's Date: 2020   Admit Date: 2020    Impression:   · Stage IV right ischial decubitus ulcer/osteomyelitis s/p I&D down to the ischium (); no op cultures or path sent. Needs wound care  · Choledocholithiasis/CBD stone and ludge s/p ERCP (5/15), lap-jj 20  · MS  · Acute encephalopathy  · Debility; s/p multiple falls  · Malnutrition  · Leukocytosis, resolving  · PCN allergy    Plan:   · Surgery team to evaluate indurated/tender, fluctuant area adjacent to the wound  · Continue Vancomycin renally dosed, Cefepime 2g IV Q12h and Flagyl 500mg PO Q12h for 6 weeks EOT 20  · She needs aggressive wound care, off loading and nutrition to assist with wound healing  · Dispo: [facility] is planned, ID will plan to follow there       Anti-infectives:   · IV Vancomycin (-  · IV Clindamycin (5/15-)  · IV Cefepime (-  · Flagyl -    Subjective:   Slept well, denies acute issues, tolerating antbx, denies fever, chills, sweats, nausea or diarrhea. Allergies   Allergen Reactions    Latex Rash    Norco [Hydrocodone-Acetaminophen] Other (comments)     Sees dead people    Pcn [Penicillins] Swelling        Review of Systems:  A comprehensive review of systems was negative except for that written in the History of Present Illness.     Objective:     Visit Vitals  /81 (BP 1 Location: Left arm, BP Patient Position: At rest;Supine)   Pulse 79   Temp (!) 96.5 °F (35.8 °C)   Resp 17   Wt 45.4 kg (100 lb)   SpO2 93%   BMI 16.14 kg/m²     Temp (24hrs), Av.7 °F (36.5 °C), Min:96.5 °F (35.8 °C), Max:98.2 °F (36.8 °C)       General:  Alert, cooperative, cachetic    Head:  Normocephalic, atraumatic    Eyes:  Anicteric, no drainage, not injected, EOMI   Throat: Mucus membranes moist OP clear   Neck: Supple, symmetrical, trachea midline, no JVD   Lungs:   Clear throughout lung fields without increased work of breathing or audible wheezes   Heart:  Regular rate and rhythm, without audible murmur, rub, or gallop   Abdomen:   Soft, non-tender, no guarding, no distention, bowel sounds active   Extremities: Extremities thin/weak, no cyanosis or edema   Pulses: 2+ and symmetric   Skin: Skin color, texture, turgor normal, no rashes or lesions. Sacral wound vac intact     Lines/Devices: R arm PICC intact                   Data Review:     CBC:  Recent Labs     05/20/20  0528 05/19/20  0500   WBC 13.5* 15.6*   HGB 10.0* 10.7*   HCT 30.4* 30.5*    277       BMP:  Recent Labs     05/20/20  0528 05/19/20  0500   CREA 0.33* 0.39*   BUN 21 26*    141   K 3.9 3.9   * 109*   CO2 26 26   AGAP 6* 6*   GLU 87 107*       LFTS:  No results for input(s): TBILI, ALT, SGOT, AP, TP, ALB in the last 72 hours. Microbiology:     All Micro Results     Procedure Component Value Units Date/Time    CULTURE, BLOOD [[id number]] Collected:  05/14/20 1759    Order Status:  Completed Specimen:  Blood Updated:  05/19/20 0729     Special Requests: --        LEFT  ARM       Culture result: NO GROWTH 5 DAYS       CULTURE, BLOOD [[id number]] Collected:  05/14/20 1759    Order Status:  Completed Specimen:  Blood Updated:  05/19/20 0729     Special Requests: --        LEFT  Antecubital       Culture result: NO GROWTH 5 DAYS       EMERGENT DISEASE PANEL [[id number]] Collected:  05/13/20 1709    Order Status:  Completed Specimen:  Not Specified Updated:  05/15/20 1147     Emergent disease panel Not detected        Comment: Performed at [facility], URINE [[id number]]     Order Status:  Canceled Specimen:  Urine from Clean catch           Imaging:   MRI brain  Impression:  1. No appreciable change in the multifocal areas of nonenhancing T2  hyperintensity within the supratentorial white matter. These are nonspecific  findings but would be compatible the patient's clinical diagnosis of multiple  sclerosis. 2. Moderate volume loss.   3. Remote left cerebellar infarction. 4. Smooth enhancement of the pachymeninges. This can be seen secondary to  intracranial hypotension including from recent lumbar puncture. Other meningeal  processes including infectious meningitis cannot be entirely excluded. MRI abd  IMPRESSION:  1. Dilated common bile duct measuring up to 12 mm in diameter. A tiny 1 to 2 mm  filling defect is suggested in the most distal common bile duct on axial T2 and  MRCP images. A tiny distal biliary stone cannot be excluded.     2.  Left abdominal mesenteric edema poorly assessed due to significant patient  breathing motion artifact and the patient's thin body habitus. This most closely  approximates the pancreatic tail and left kidney and can suggest potential acute  inflammation of either of these structures. Recommend correlation for  signs/symptoms of either pancreatitis or left pyelonephritis.     3. Distended gallbladder with mild gallbladder wall thickening. The gallbladder  was better assessed by ultrasound and I would refer to that examination for  assessment of this structure. L ankle XR negative     U/S abd  IMPRESSION:   1. Gallbladder sludge. 2. Common bile duct dilatation.       CXR (-)  Signed By: Nasreen Dolan NP     May 21, 2020

## 2020-05-21 NOTE — WOUND CARE
Patient seen with Dr Shy Cadet. Discussed red raised area above open right ischial wound. She called Audie Dura Np with surgery to come see. Np was able to open us abscess from within the wound bed at bedside and express large amount of thick yellow, chunky material. Saline soak then packed into wound and Dakin's orders received to start twice daily for now. VAC to be stopped and it can be restarted if appropriate at [facility] in a week. Discussed with nurse and care manager Lynette for [facility]. She will not be transferred until tomorrow per care manager. Patient requested pain medicine after procedure, informed primary nurse. Patient updated on wound and transfer. All questions answered.

## 2020-05-22 ENCOUNTER — APPOINTMENT (OUTPATIENT)
Dept: GENERAL RADIOLOGY | Age: 60
End: 2020-05-22
Attending: INTERNAL MEDICINE

## 2020-05-22 ENCOUNTER — HOSPITAL ENCOUNTER (OUTPATIENT)
Age: 60
Discharge: NURSING, REHAB OR LONG-TERM CARE | End: 2020-07-07
Attending: INTERNAL MEDICINE | Admitting: INTERNAL MEDICINE

## 2020-05-22 VITALS
TEMPERATURE: 97.8 F | WEIGHT: 100 LBS | HEART RATE: 78 BPM | OXYGEN SATURATION: 97 % | SYSTOLIC BLOOD PRESSURE: 126 MMHG | RESPIRATION RATE: 15 BRPM | DIASTOLIC BLOOD PRESSURE: 82 MMHG | BODY MASS INDEX: 16.14 KG/M2

## 2020-05-22 LAB
ALBUMIN SERPL-MCNC: 1.9 G/DL (ref 3.5–5)
ALBUMIN/GLOB SERPL: 0.5 {RATIO} (ref 1.2–3.5)
ALP SERPL-CCNC: 126 U/L (ref 50–136)
ALT SERPL-CCNC: 17 U/L (ref 12–65)
ANION GAP SERPL CALC-SCNC: 7 MMOL/L (ref 7–16)
AST SERPL-CCNC: 20 U/L (ref 15–37)
BASOPHILS # BLD: 0.1 K/UL (ref 0–0.2)
BASOPHILS NFR BLD: 1 % (ref 0–2)
BILIRUB SERPL-MCNC: 0.2 MG/DL (ref 0.2–1.1)
BUN SERPL-MCNC: 16 MG/DL (ref 6–23)
CALCIUM SERPL-MCNC: 8.4 MG/DL (ref 8.3–10.4)
CHLORIDE SERPL-SCNC: 109 MMOL/L (ref 98–107)
CO2 SERPL-SCNC: 26 MMOL/L (ref 21–32)
CREAT SERPL-MCNC: 0.44 MG/DL (ref 0.6–1)
DIFFERENTIAL METHOD BLD: ABNORMAL
EOSINOPHIL # BLD: 0.1 K/UL (ref 0–0.8)
EOSINOPHIL NFR BLD: 1 % (ref 0.5–7.8)
ERYTHROCYTE [DISTWIDTH] IN BLOOD BY AUTOMATED COUNT: 14.6 % (ref 11.9–14.6)
GLOBULIN SER CALC-MCNC: 3.8 G/DL (ref 2.3–3.5)
GLUCOSE SERPL-MCNC: 127 MG/DL (ref 65–100)
HCT VFR BLD AUTO: 33.5 % (ref 35.8–46.3)
HGB BLD-MCNC: 11 G/DL (ref 11.7–15.4)
IMM GRANULOCYTES # BLD AUTO: 0.1 K/UL (ref 0–0.5)
IMM GRANULOCYTES NFR BLD AUTO: 1 % (ref 0–5)
LYMPHOCYTES # BLD: 0.4 K/UL (ref 0.5–4.6)
LYMPHOCYTES NFR BLD: 4 % (ref 13–44)
MCH RBC QN AUTO: 33.7 PG (ref 26.1–32.9)
MCHC RBC AUTO-ENTMCNC: 32.8 G/DL (ref 31.4–35)
MCV RBC AUTO: 102.8 FL (ref 79.6–97.8)
MONOCYTES # BLD: 0.7 K/UL (ref 0.1–1.3)
MONOCYTES NFR BLD: 6 % (ref 4–12)
NEUTS SEG # BLD: 9.1 K/UL (ref 1.7–8.2)
NEUTS SEG NFR BLD: 87 % (ref 43–78)
NRBC # BLD: 0 K/UL (ref 0–0.2)
PLATELET # BLD AUTO: 503 K/UL (ref 150–450)
PMV BLD AUTO: 10.9 FL (ref 9.4–12.3)
POTASSIUM SERPL-SCNC: 4 MMOL/L (ref 3.5–5.1)
PROT SERPL-MCNC: 5.7 G/DL (ref 6.3–8.2)
RBC # BLD AUTO: 3.26 M/UL (ref 4.05–5.2)
SODIUM SERPL-SCNC: 142 MMOL/L (ref 136–145)
VANCOMYCIN TROUGH SERPL-MCNC: 11.9 UG/ML (ref 5–20)
WBC # BLD AUTO: 10.5 K/UL (ref 4.3–11.1)

## 2020-05-22 PROCEDURE — 74011250636 HC RX REV CODE- 250/636: Performed by: INTERNAL MEDICINE

## 2020-05-22 PROCEDURE — 80202 ASSAY OF VANCOMYCIN: CPT

## 2020-05-22 PROCEDURE — 74011250637 HC RX REV CODE- 250/637: Performed by: SURGERY

## 2020-05-22 PROCEDURE — 80053 COMPREHEN METABOLIC PANEL: CPT

## 2020-05-22 PROCEDURE — 71045 X-RAY EXAM CHEST 1 VIEW: CPT

## 2020-05-22 PROCEDURE — 85025 COMPLETE CBC W/AUTO DIFF WBC: CPT

## 2020-05-22 PROCEDURE — 74011000258 HC RX REV CODE- 258: Performed by: INTERNAL MEDICINE

## 2020-05-22 PROCEDURE — 36415 COLL VENOUS BLD VENIPUNCTURE: CPT

## 2020-05-22 RX ORDER — METOPROLOL TARTRATE 25 MG/1
12.5 TABLET, FILM COATED ORAL 2 TIMES DAILY
Qty: 30 TAB | Refills: 0 | Status: SHIPPED
Start: 2020-05-22 | End: 2021-01-04

## 2020-05-22 RX ORDER — METRONIDAZOLE 500 MG/1
500 TABLET ORAL EVERY 12 HOURS
Qty: 68 TAB | Refills: 0 | Status: SHIPPED
Start: 2020-05-22 | End: 2020-06-25

## 2020-05-22 RX ORDER — VANCOMYCIN/0.9 % SOD CHLORIDE 750 MG/250
750 PLASTIC BAG, INJECTION (ML) INTRAVENOUS EVERY 12 HOURS
Qty: 68 EACH | Refills: 0 | Status: SHIPPED
Start: 2020-05-22 | End: 2020-06-25

## 2020-05-22 RX ADMIN — METOPROLOL TARTRATE 12.5 MG: 25 TABLET, FILM COATED ORAL at 10:19

## 2020-05-22 RX ADMIN — AMANTADINE HYDROCHLORIDE 100 MG: 100 CAPSULE ORAL at 10:19

## 2020-05-22 RX ADMIN — METRONIDAZOLE 500 MG: 500 TABLET ORAL at 10:19

## 2020-05-22 RX ADMIN — CEFEPIME HYDROCHLORIDE 2 G: 2 INJECTION, POWDER, FOR SOLUTION INTRAVENOUS at 02:05

## 2020-05-22 RX ADMIN — OXYCODONE HYDROCHLORIDE AND ACETAMINOPHEN 1 TABLET: 5; 325 TABLET ORAL at 12:27

## 2020-05-22 RX ADMIN — VANCOMYCIN HYDROCHLORIDE 750 MG: 10 INJECTION, POWDER, LYOPHILIZED, FOR SOLUTION INTRAVENOUS at 03:08

## 2020-05-22 RX ADMIN — OXYCODONE HYDROCHLORIDE AND ACETAMINOPHEN 1 TABLET: 5; 325 TABLET ORAL at 06:48

## 2020-05-22 NOTE — PROGRESS NOTES
Shift assessment completed via doc flow sheet. Pt A & O x 4. Lungs CTA, HR WNL, NSR. Abdomen soft and non tender. IVS c/d/i, no s/sx of infection/infiltration noted. Pt able to make needs known. No concerns at this time. Bed low and locked. Call light within reach. Will continue to monitor.

## 2020-05-22 NOTE — PROGRESS NOTES
Infectious Disease Progress Note    Today's Date: 2020   Admit Date: 2020    Impression:   · Stage IV right ischial decubitus ulcer/osteomyelitis s/p I&D down to the ischium (); no op cultures or path sent. Needs wound care  · Choledocholithiasis/CBD stone and ludge s/p ERCP (5/15), lap-jj 20  · MS  · Debility; s/p multiple falls  · Malnutrition  · PCN allergy - tolerating cefepime    Plan:   · Continue Vancomycin renally dosed, Cefepime 2g IV Q12h and Flagyl 500mg PO Q12h for 6 weeks EOT 20  · She needs aggressive wound care, off loading and nutrition to assist with wound healing  · Dispo: [facility] is planned, ID will plan to follow there       Anti-infectives:   · IV Vancomycin (-  · IV Clindamycin (5/15-)  · IV Cefepime (-  · Flagyl -    Subjective:   Feels a little better compared to yesterday. Pain still there in her bottom. No diarrhea, fevers or rashes      Allergies   Allergen Reactions    Latex Rash    Norco [Hydrocodone-Acetaminophen] Other (comments)     Sees dead people    Pcn [Penicillins] Swelling        Review of Systems:  A comprehensive review of systems was negative except for that written in the History of Present Illness.     Objective:     Visit Vitals  /72 (BP 1 Location: Left arm, BP Patient Position: At rest;Supine)   Pulse 81   Temp 98.1 °F (36.7 °C)   Resp 17   Wt 45.4 kg (100 lb)   SpO2 95%   BMI 16.14 kg/m²     Temp (24hrs), Av.1 °F (36.7 °C), Min:97.9 °F (36.6 °C), Max:98.3 °F (36.8 °C)       General:  Alert, cooperative, cachetic    Head:  Normocephalic, atraumatic    Eyes:  Anicteric, no drainage, not injected, EOMI   Throat: Mucus membranes moist OP clear       Lungs:   Clear throughout lung fields without increased work of breathing or audible wheezes   Heart:  Regular rate and rhythm, without audible murmur, rub, or gallop   Abdomen:   Soft, non-tender, no guarding, no distention, bowel sounds active   Extremities: Extremities thin/weak, no cyanosis or edema       Skin: No rashes     Lines/Devices: R arm PICC intact                   Data Review:     CBC:  Recent Labs     05/20/20 0528   WBC 13.5*   HGB 10.0*   HCT 30.4*          BMP:  Recent Labs     05/20/20 0528   CREA 0.33*   BUN 21      K 3.9   *   CO2 26   AGAP 6*   GLU 87       LFTS:  No results for input(s): TBILI, ALT, SGOT, AP, TP, ALB in the last 72 hours. Microbiology:     All Micro Results     Procedure Component Value Units Date/Time    CULTURE, BLOOD [[id number]] Collected:  05/14/20 1759    Order Status:  Completed Specimen:  Blood Updated:  05/19/20 0729     Special Requests: --        LEFT  ARM       Culture result: NO GROWTH 5 DAYS       CULTURE, BLOOD [[id number]] Collected:  05/14/20 1759    Order Status:  Completed Specimen:  Blood Updated:  05/19/20 0729     Special Requests: --        LEFT  Antecubital       Culture result: NO GROWTH 5 DAYS       EMERGENT DISEASE PANEL [[id number]] Collected:  05/13/20 1709    Order Status:  Completed Specimen:  Not Specified Updated:  05/15/20 1147     Emergent disease panel Not detected        Comment: Performed at [facility], URINE [[id number]]     Order Status:  Canceled Specimen:  Urine from Clean catch           Imaging:   MRI brain  Impression:  1. No appreciable change in the multifocal areas of nonenhancing T2  hyperintensity within the supratentorial white matter. These are nonspecific  findings but would be compatible the patient's clinical diagnosis of multiple  sclerosis. 2. Moderate volume loss. 3. Remote left cerebellar infarction. 4. Smooth enhancement of the pachymeninges. This can be seen secondary to  intracranial hypotension including from recent lumbar puncture. Other meningeal  processes including infectious meningitis cannot be entirely excluded. MRI abd  IMPRESSION:  1. Dilated common bile duct measuring up to 12 mm in diameter.  A tiny 1 to 2 mm  filling defect is suggested in the most distal common bile duct on axial T2 and  MRCP images. A tiny distal biliary stone cannot be excluded.     2.  Left abdominal mesenteric edema poorly assessed due to significant patient  breathing motion artifact and the patient's thin body habitus. This most closely  approximates the pancreatic tail and left kidney and can suggest potential acute  inflammation of either of these structures. Recommend correlation for  signs/symptoms of either pancreatitis or left pyelonephritis.     3. Distended gallbladder with mild gallbladder wall thickening. The gallbladder  was better assessed by ultrasound and I would refer to that examination for  assessment of this structure. L ankle XR negative     U/S abd  IMPRESSION:   1. Gallbladder sludge. 2. Common bile duct dilatation.       CXR (-)  Signed By: Remi Lemons MD     May 22, 2020

## 2020-05-22 NOTE — PROGRESS NOTES
TRANSFER - OUT REPORT:    Verbal report given to Aníbal GARCIA(name) on Nick Freeman  being transferred to [facility] AT [facility](unit) for routine progression of care       Report consisted of patients Situation, Background, Assessment and   Recommendations(SBAR). Information from the following report(s) SBAR and Kardex was reviewed with the receiving nurse. Lines:   PICC Single Lumen 05/16/20 Right;Brachial (Active)   Central Line Being Utilized Yes 5/22/2020 10:01 AM   Criteria for Appropriate Use Limited/no vessel suitable for conventional peripheral access 5/22/2020 10:01 AM   Site Assessment Clean, dry, & intact 5/22/2020 10:01 AM   Phlebitis Assessment 0 5/22/2020 10:01 AM   Infiltration Assessment 0 5/22/2020 10:01 AM   Arm Circumference (cm) 20 cm 5/16/2020  4:54 PM   Date of Last Dressing Change 05/16/20 5/22/2020 10:01 AM   Dressing Status Clean, dry, & intact 5/22/2020 10:01 AM   External Catheter Length (cm) 0 centimeters 5/16/2020  4:54 PM   Dressing Type Disk with Chlorhexadine gluconate (CHG) 5/22/2020 10:01 AM   Hub Color/Line Status Flushed 5/21/2020  9:00 AM   Action Taken Blood drawn 5/21/2020  4:03 AM   Positive Blood Return (Site #1) Yes 5/21/2020  9:00 AM   Alcohol Cap Used No 5/19/2020  8:50 AM        Opportunity for questions and clarification was provided.

## 2020-05-22 NOTE — PROGRESS NOTES
Dressing/packing to sacral wound changed as ordered by Wound Care RN. Pt repositioned to sides with pillows.

## 2020-05-22 NOTE — DISCHARGE SUMMARY
Hospitalist Discharge Summary     Patient ID:  Jordyn Lane  [id number]  29 y.o.  1960  Admit date: 5/13/2020  5:12 AM  Discharge date and time: 5/22/2020  Attending: Ayaz Diop DO  PCP:  Marga Jackman MD  Treatment Team: Attending Provider: Ayaz Diop DO; Utilization Review: [facility]; Consulting Provider: Luz Maria Ladd MD; Care Manager: Sana Mohr RN; Physical Therapist: Delwyn Homans, PT    Principal Diagnosis Choledocholithiasis    Hospital Problems as of 5/22/2020 Date Reviewed: 5/15/2020          Codes Class Noted - Resolved POA    Osteomyelitis of sacroiliac region [facility]) ICD-10-CM: M46.28  ICD-9-CM: 730.28  5/20/2020 - Present Yes        * (Principal) Choledocholithiasis ICD-10-CM: K80.50  ICD-9-CM: 574.50  5/16/2020 - Present Yes        GLENROY (acute kidney injury) ([facility] ICD-10-CM: N17.9  ICD-9-CM: 584.9  5/13/2020 - Present Yes        Pressure injury of sacral region, stage 4 ([facility] ICD-10-CM: Y99.305  ICD-9-CM: 707.03, 707.24  5/13/2020 - Present Yes        Acute metabolic encephalopathy K-63-GS: G93.41  ICD-9-CM: 348.31  5/13/2020 - Present Yes        Leukocytosis ICD-10-CM: V41.911  ICD-9-CM: 288.60  5/13/2020 - Present Yes        Transaminitis ICD-10-CM: R74.0  ICD-9-CM: 790.4  5/13/2020 - Present Yes        Multiple sclerosis ([facility] ICD-10-CM: Karissa Hodgson  ICD-9-CM: 340  5/13/2020 - Present Yes        Protein calorie malnutrition ([facility] ICD-10-CM: E46  ICD-9-CM: 263.9  5/13/2020 - Present Yes              Hospital Course:  63year old CF with a PMH of MS admitted on 5/13/20 after sustaining multiple falls and reported she \"hurts all over\".  She commpleted course of antibiotics approximately 1 week prior for UTI.  Admits to tobacco abuse and 1 glass of wine daily.   ED work-up was notable for GLENROY, dehydration, hyponatremia, transaminitis, leukocytosis.  She was admitted for the further work-up to include a right upper quadrant ultrasound which shows dilated CBD, IV fluid resuscitation, PT OT. Per d/w pt's friend Victorino Perez pt is wheelchair dependent, can usually transfer independently. Sustained fall 1.5 months ago and has been \"going downhill\" since - more weakness, confusion and eating/drinking less.   GI consulted. S/p MRCP showing dilated CBD and ERCP with papillotomy. S.p jj on 5/18. Gen surgery consulted for ischial decub ulcer s/p OR debridement. ID following for atbx recs - Vancomycin + Cefepime + Flagyl - EOT 6/25. She remained stable and was transferred to [facility] for continued wound management with a wound vac and long term IV antibiotics. Significant Diagnostic Studies:    Labs: Results:       Chemistry Recent Labs     05/20/20  0528   GLU 87      K 3.9   *   CO2 26   BUN 21   CREA 0.33*   CA 8.1*   AGAP 6*      CBC w/Diff Recent Labs     05/20/20  0528   WBC 13.5*   RBC 2.99*   HGB 10.0*   HCT 30.4*         Cardiac Enzymes No results for input(s): CPK, CKND1, LORI in the last 72 hours. No lab exists for component: CKRMB, TROIP   Coagulation No results for input(s): PTP, INR, APTT, INREXT in the last 72 hours. Lipid Panel No results found for: CHOL, CHOLPOCT, CHOLX, CHLST, CHOLV, 234976, HDL, HDLP, LDL, LDLC, DLDLP, 757168, VLDLC, VLDL, TGLX, TRIGL, TRIGP, TGLPOCT, CHHD, CHHDX   BNP No results for input(s): BNPP in the last 72 hours. Liver Enzymes No results for input(s): TP, ALB, TBIL, AP, SGOT, GPT in the last 72 hours. No lab exists for component: DBIL   Thyroid Studies No results found for: T4, T3U, TSH, TSHEXT         Imaging:  Xr Ankle Rt Ap/lat    Result Date: 5/13/2020  Impression:  No evidence of acute injury. Xr Ercp / Ercb Combined    Result Date: 5/15/2020  IMPRESSION: 1. Dilated common bile duct without a defined filling defect to suggest common bile duct stone. Please refer to Dr. Gi Lopez procedure notes and impressions for additional information.      Mri Brain W Wo Cont    Result Date: 5/14/2020  Impression: 1. No appreciable change in the multifocal areas of nonenhancing T2 hyperintensity within the supratentorial white matter. These are nonspecific findings but would be compatible the patient's clinical diagnosis of multiple sclerosis. 2. Moderate volume loss. 3. Remote left cerebellar infarction. 4. Smooth enhancement of the pachymeninges. This can be seen secondary to intracranial hypotension including from recent lumbar puncture. Other meningeal processes including infectious meningitis cannot be entirely excluded. Mri Abd Wo Cont    Result Date: 5/14/2020  IMPRESSION: 1. Dilated common bile duct measuring up to 12 mm in diameter. A tiny 1 to 2 mm filling defect is suggested in the most distal common bile duct on axial T2 and MRCP images. A tiny distal biliary stone cannot be excluded. 2.  Left abdominal mesenteric edema poorly assessed due to significant patient breathing motion artifact and the patient's thin body habitus. This most closely approximates the pancreatic tail and left kidney and can suggest potential acute inflammation of either of these structures. Recommend correlation for signs/symptoms of either pancreatitis or left pyelonephritis. 3.  Distended gallbladder with mild gallbladder wall thickening. The gallbladder was better assessed by ultrasound and I would refer to that examination for assessment of this structure. [address] [facility]    Result Date: 5/13/2020  IMPRESSION: 1. Gallbladder sludge. 2. Common bile duct dilatation. Xr Chest Port    Result Date: 5/13/2020  IMPRESSION: No acute process. Microbiology/Cultures:   All Micro Results     Procedure Component Value Units Date/Time    CULTURE, BLOOD [[id number]] Collected:  05/14/20 1759    Order Status:  Completed Specimen:  Blood Updated:  05/19/20 0729     Special Requests: --        LEFT  ARM       Culture result: NO GROWTH 5 DAYS       CULTURE, BLOOD [[id number]] Collected:  05/14/20 1759    Order Status: Completed Specimen:  Blood Updated:  05/19/20 0729     Special Requests: --        LEFT  Antecubital       Culture result: NO GROWTH 5 DAYS       EMERGENT DISEASE PANEL [[id number]] Collected:  05/13/20 1709    Order Status:  Completed Specimen:  Not Specified Updated:  05/15/20 1147     Emergent disease panel Not detected        Comment: Performed at [facility] [address], URINE [[id number]]     Order Status:  Canceled Specimen:  Urine from Clean catch           Discharge Exam:  Visit Vitals  /72 (BP 1 Location: Left arm, BP Patient Position: At rest;Supine)   Pulse 81   Temp 98.1 °F (36.7 °C)   Resp 17   Wt 45.4 kg (100 lb)   SpO2 95%   BMI 16.14 kg/m²     General appearance: alert, cooperative, no distress, appears stated age  Lungs: clear to auscultation bilaterally  Heart: regular rate and rhythm, S1, S2 normal, no murmur, click, rub or gallop  Abdomen: soft, non-tender. Bowel sounds normal. No masses,  no organomegaly  Extremities: no cyanosis or edema  Neurologic: Grossly normal    Disposition: long term care facility  Discharge Condition: stable  Patient Instructions:   Current Discharge Medication List      START taking these medications    Details   cefepime 2 gram 2 g, ADDaptor 1 Device IVPB 2 g by IntraVENous route every twelve (12) hours every twelve (12) hours for 34 days. Qty: 68 Dose, Refills: 0      fingolimod 0.5 mg cap Take 0.5 mg by mouth daily. Qty: 1 Cap, Refills: 0      metoprolol tartrate (LOPRESSOR) 25 mg tablet Take 0.5 Tabs by mouth two (2) times a day. Qty: 30 Tab, Refills: 0      metroNIDAZOLE (FLAGYL) 500 mg tablet Take 1 Tab by mouth every twelve (12) hours for 34 days. Qty: 68 Tab, Refills: 0      vancomycin in 0.9% sodium chloride (VANCOCIN) 750 mg/250 mL soln 250 mL by IntraVENous route every twelve (12) hours every twelve (12) hours for 34 days.   Qty: 68 Each, Refills: 0         CONTINUE these medications which have NOT CHANGED    Details amantadine HCL (SYMMETREL) 100 mg capsule Take 100 mg by mouth two (2) times a day. HYDROcodone-acetaminophen (XODOL)  mg tab per tablet Take 1 Tab by mouth four (4) times daily as needed.              Activity: Activity as tolerated  Diet: GI Soft Diet, Ensure Clear with each meal  Wound Care: As directed, wound vac    Follow-up  ·  PCP once discharged from [facility]  Time spent to discharge patient 35 minutes  Signed:  Gene Corrales DO  5/22/2020  11:18 AM

## 2020-05-22 NOTE — PROGRESS NOTES
MSN, CM:  Patient to be discharge today to [address] - 4th floor). Patient has met all milestones for this admission. In house transport to transport patient to [place]. Care Management Interventions  PCP Verified by CM: Yes  Mode of Transport at Discharge:  Other (see comment)([facility] in house transport)  Transition of Care Consult (CM Consult): LTAC  Physical Therapy Consult: No  Occupational Therapy Consult: No  Speech Therapy Consult: No  Current Support Network: Lives Alone  [place] of Choice List was Provided with Basic Dialogue that Supports the Patient's Individualized Plan of Care/Goals, Treatment Preferences and Shares the Quality Data Associated with the Providers?: Yes  [place] Resource Information Provided?: No  Discharge Location  Discharge Placement: [address] [place] [address]

## 2020-05-22 NOTE — PROGRESS NOTES
Problem: Falls - Risk of  Goal: *Absence of Falls  Description: Document Shanitaphilipp Diop Fall Risk and appropriate interventions in the flowsheet. Outcome: Progressing Towards Goal  Note: Fall Risk Interventions:  Mobility Interventions: Bed/chair exit alarm, Patient to call before getting OOB, PT Consult for mobility concerns    Mentation Interventions: Adequate sleep, hydration, pain control, Bed/chair exit alarm, Door open when patient unattended    Medication Interventions: Bed/chair exit alarm, Patient to call before getting OOB    Elimination Interventions: Bed/chair exit alarm, Call light in reach, Patient to call for help with toileting needs    History of Falls Interventions: Bed/chair exit alarm         Problem: Pressure Injury - Risk of  Goal: *Prevention of pressure injury  Description: Document Kimani Scale and appropriate interventions in the flowsheet.   Outcome: Progressing Towards Goal  Note: Pressure Injury Interventions:  Sensory Interventions: Assess changes in LOC, Check visual cues for pain, Float heels    Moisture Interventions: Absorbent underpads, Limit adult briefs    Activity Interventions: Assess need for specialty bed, Pressure redistribution bed/mattress(bed type), PT/OT evaluation    Mobility Interventions: Float heels, HOB 30 degrees or less, Pressure redistribution bed/mattress (bed type), PT/OT evaluation    Nutrition Interventions: Document food/fluid/supplement intake    Friction and Shear Interventions: Apply protective barrier, creams and emollients, Feet elevated on foot rest, Foam dressings/transparent film/skin sealants, HOB 30 degrees or less                Problem: Nutrition Deficit  Goal: *Optimize nutritional status  Outcome: Progressing Towards Goal

## 2020-05-22 NOTE — PROGRESS NOTES
Asssesment done via doc flow sheet. Pt resting in bed, watching TV, A&O x4, resp easy & regular, lungs CTA. Denies pain at this time. Pt positioned on right side. Call light wthtin reach,pt instructed to call for assistance as needed.

## 2020-05-25 LAB
ALBUMIN SERPL-MCNC: 1.8 G/DL (ref 3.5–5)
ALBUMIN/GLOB SERPL: 0.5 {RATIO} (ref 1.2–3.5)
ALP SERPL-CCNC: 106 U/L (ref 50–136)
ALT SERPL-CCNC: 15 U/L (ref 12–65)
ANION GAP SERPL CALC-SCNC: 7 MMOL/L (ref 7–16)
AST SERPL-CCNC: 19 U/L (ref 15–37)
BASOPHILS # BLD: 0.1 K/UL (ref 0–0.2)
BASOPHILS NFR BLD: 1 % (ref 0–2)
BILIRUB SERPL-MCNC: 0.3 MG/DL (ref 0.2–1.1)
BUN SERPL-MCNC: 16 MG/DL (ref 6–23)
CALCIUM SERPL-MCNC: 8.4 MG/DL (ref 8.3–10.4)
CHLORIDE SERPL-SCNC: 111 MMOL/L (ref 98–107)
CO2 SERPL-SCNC: 25 MMOL/L (ref 21–32)
CREAT SERPL-MCNC: 0.35 MG/DL (ref 0.6–1)
DIFFERENTIAL METHOD BLD: ABNORMAL
EOSINOPHIL # BLD: 0.2 K/UL (ref 0–0.8)
EOSINOPHIL NFR BLD: 2 % (ref 0.5–7.8)
ERYTHROCYTE [DISTWIDTH] IN BLOOD BY AUTOMATED COUNT: 14.3 % (ref 11.9–14.6)
GLOBULIN SER CALC-MCNC: 4 G/DL (ref 2.3–3.5)
GLUCOSE SERPL-MCNC: 106 MG/DL (ref 65–100)
HCT VFR BLD AUTO: 31.6 % (ref 35.8–46.3)
HGB BLD-MCNC: 10.7 G/DL (ref 11.7–15.4)
IMM GRANULOCYTES # BLD AUTO: 0.1 K/UL (ref 0–0.5)
IMM GRANULOCYTES NFR BLD AUTO: 1 % (ref 0–5)
LYMPHOCYTES # BLD: 0.5 K/UL (ref 0.5–4.6)
LYMPHOCYTES NFR BLD: 5 % (ref 13–44)
MAGNESIUM SERPL-MCNC: 2 MG/DL (ref 1.8–2.4)
MCH RBC QN AUTO: 34 PG (ref 26.1–32.9)
MCHC RBC AUTO-ENTMCNC: 33.9 G/DL (ref 31.4–35)
MCV RBC AUTO: 100.3 FL (ref 79.6–97.8)
MONOCYTES # BLD: 0.7 K/UL (ref 0.1–1.3)
MONOCYTES NFR BLD: 8 % (ref 4–12)
NEUTS SEG # BLD: 7.5 K/UL (ref 1.7–8.2)
NEUTS SEG NFR BLD: 83 % (ref 43–78)
NRBC # BLD: 0 K/UL (ref 0–0.2)
PHOSPHATE SERPL-MCNC: 2.9 MG/DL (ref 2.5–4.5)
PLATELET # BLD AUTO: 523 K/UL (ref 150–450)
PMV BLD AUTO: 10.6 FL (ref 9.4–12.3)
POTASSIUM SERPL-SCNC: 3.8 MMOL/L (ref 3.5–5.1)
PROT SERPL-MCNC: 5.8 G/DL (ref 6.3–8.2)
RBC # BLD AUTO: 3.15 M/UL (ref 4.05–5.2)
SODIUM SERPL-SCNC: 143 MMOL/L (ref 136–145)
VANCOMYCIN TROUGH SERPL-MCNC: 15.4 UG/ML (ref 5–20)
WBC # BLD AUTO: 9 K/UL (ref 4.3–11.1)

## 2020-05-25 PROCEDURE — 80202 ASSAY OF VANCOMYCIN: CPT

## 2020-05-25 PROCEDURE — 80053 COMPREHEN METABOLIC PANEL: CPT

## 2020-05-25 PROCEDURE — 83735 ASSAY OF MAGNESIUM: CPT

## 2020-05-25 PROCEDURE — 85025 COMPLETE CBC W/AUTO DIFF WBC: CPT

## 2020-05-25 PROCEDURE — 36415 COLL VENOUS BLD VENIPUNCTURE: CPT

## 2020-05-25 PROCEDURE — 84100 ASSAY OF PHOSPHORUS: CPT

## 2020-05-27 LAB — VANCOMYCIN TROUGH SERPL-MCNC: 11.3 UG/ML (ref 5–20)

## 2020-05-27 PROCEDURE — 36415 COLL VENOUS BLD VENIPUNCTURE: CPT

## 2020-05-27 PROCEDURE — 80202 ASSAY OF VANCOMYCIN: CPT

## 2020-05-27 NOTE — CDMP QUERY
Patient admitted with GLENROY and stage 4 pressure ulcer. Per Op note dated 5/14 documentation of debridement / I&D. Operative note stated, \"Sharp incision and sharp debridement with scissors and 10-scalpel blade of right ischial decubitus ulcer down to ischial bone, 6 cm x 6 cm x 2 cm, for total square centimeter surface area of 72 cm2. We then obtained a 10-scalpel blade and used sharp incisional debridement removing devitalized tissue, necrotic fat with liquefaction, and foul odor down to the ischial bone. We also removed additional tissue with sharp scissors until all devitalized tissue had been removed to fresh bleeding subcutaneous fat and skin. \" To accurately reflect the procedure performed please addend the note to include: ? Deepest depth of tissue removed as down to and including skin, subcutaneous, fascia, muscle, tendon/joint, bone, etc., as appropriate ? Excisional debridement = cutting off or away--without replacement--devitalized tissue, necrosis or slough ? Non-excisional debridement = brushing, irrigating, scrubbing or washing of devitalized tissue, necrosis or slough Ex. Minor removal of loose fragments, scraping, whirlpool, mechanical or pulsatile lavage, irrigation ? Type of instrument used The medical record reflects the following: 
  Risk Factors: Stage 4 pressure ulcer Clinical Indicators:  
--5/14 OP note stating, \"Sharp incision and sharp debridement with scissors and 10-scalpel blade of right ischial decubitus ulcer down to ischial bone, 6 cm x 6 cm x 2 cm, for total square centimeter surface area of 72 cm2. \" Treatment: debridement, wound vac, wound consult Thank you, Yaritza Berger, [address] [place] [address] RN 
[phone]

## 2020-05-27 NOTE — CDMP QUERY
Pt admitted with GLENROY and stage 4 pressure ulcer with osteomyelitis. Pt noted to have elevated WBC and metabolic encephalopathy. If possible, please document in the progress notes and d/c summary if you are evaluating and / or treating any of the following: 
 
? Sepsis, present on admission ? Sepsis, not present on admission ? No Sepsis ? Other, please specify ? Clinically unable to determine The medical record reflects the following: 
   Risk Factors: stage 4 pressure ulcer with osteo, choledocholithiasis, multiple falls and was down for 4 hours prior to admission, recently treated for dysuria and finished antibiotics several days ago, MS- wheelchair dependent, malnutrition Clinical Indicators:  
--5/13 through 5/22 WBC: 15.3>1531>19.1>17.3>17.5>15.5>15.6>13.5>10.5>9.0 
--5/13 though 5/15 Cr 1.52>1.22>0.49>0.37 
--5/13 though 5/18 glucose 92>100>169>143>87 
--5/13 though 5/15 bilirubin 0.7>0.6>0.4 
--5/13 progress note stating, \"Acute metabolic encephalopathy - likely secondary to infection, dehydration, malnutrition. \" 
--5/13 VS @0100 temp 97.7 HR 89 RR 16 /62 
--5/16 CRP 9.5 
--5/16 VS @1147 temp 98.6  RR 20 /84 
--5/21 VS @ 0747 Temp 96.5 HR 70 RR 17 /81 Treatment: IV antibiotics-longterm, debridement, cholecystectomy, IV, wound vac Thank you, Nadine Young, [address] [place] [address] RN 
[phone]

## 2020-05-28 PROCEDURE — 80202 ASSAY OF VANCOMYCIN: CPT

## 2020-05-28 PROCEDURE — 36415 COLL VENOUS BLD VENIPUNCTURE: CPT

## 2020-05-29 LAB
CREAT SERPL-MCNC: 0.36 MG/DL (ref 0.6–1)
VANCOMYCIN TROUGH SERPL-MCNC: 23.4 UG/ML (ref 5–20)

## 2020-05-29 PROCEDURE — 36415 COLL VENOUS BLD VENIPUNCTURE: CPT

## 2020-05-29 PROCEDURE — 82565 ASSAY OF CREATININE: CPT

## 2020-05-31 LAB — VANCOMYCIN TROUGH SERPL-MCNC: 15 UG/ML (ref 5–20)

## 2020-05-31 PROCEDURE — 36415 COLL VENOUS BLD VENIPUNCTURE: CPT

## 2020-05-31 PROCEDURE — 80202 ASSAY OF VANCOMYCIN: CPT

## 2020-06-01 ENCOUNTER — APPOINTMENT (OUTPATIENT)
Dept: CT IMAGING | Age: 60
End: 2020-06-01
Attending: INTERNAL MEDICINE

## 2020-06-01 LAB
ALBUMIN SERPL-MCNC: 2.2 G/DL (ref 3.5–5)
ALBUMIN/GLOB SERPL: 0.5 {RATIO} (ref 1.2–3.5)
ALP SERPL-CCNC: 112 U/L (ref 50–136)
ALT SERPL-CCNC: 15 U/L (ref 12–65)
ANION GAP SERPL CALC-SCNC: 7 MMOL/L (ref 7–16)
APPEARANCE UR: CLEAR
AST SERPL-CCNC: 19 U/L (ref 15–37)
BACTERIA URNS QL MICRO: ABNORMAL /HPF
BASOPHILS # BLD: 0.1 K/UL (ref 0–0.2)
BASOPHILS NFR BLD: 1 % (ref 0–2)
BILIRUB SERPL-MCNC: 0.2 MG/DL (ref 0.2–1.1)
BILIRUB UR QL: NEGATIVE
BUN SERPL-MCNC: 13 MG/DL (ref 6–23)
CALCIUM SERPL-MCNC: 8.5 MG/DL (ref 8.3–10.4)
CASTS URNS QL MICRO: ABNORMAL /LPF
CHLORIDE SERPL-SCNC: 112 MMOL/L (ref 98–107)
CO2 SERPL-SCNC: 26 MMOL/L (ref 21–32)
COLOR UR: YELLOW
CREAT SERPL-MCNC: 0.35 MG/DL (ref 0.6–1)
DIFFERENTIAL METHOD BLD: ABNORMAL
EOSINOPHIL # BLD: 0.3 K/UL (ref 0–0.8)
EOSINOPHIL NFR BLD: 6 % (ref 0.5–7.8)
EPI CELLS #/AREA URNS HPF: ABNORMAL /HPF
ERYTHROCYTE [DISTWIDTH] IN BLOOD BY AUTOMATED COUNT: 13.6 % (ref 11.9–14.6)
GLOBULIN SER CALC-MCNC: 4.1 G/DL (ref 2.3–3.5)
GLUCOSE SERPL-MCNC: 97 MG/DL (ref 65–100)
GLUCOSE UR STRIP.AUTO-MCNC: NEGATIVE MG/DL
HCT VFR BLD AUTO: 33.1 % (ref 35.8–46.3)
HGB BLD-MCNC: 10.4 G/DL (ref 11.7–15.4)
HGB UR QL STRIP: NEGATIVE
IMM GRANULOCYTES # BLD AUTO: 0 K/UL (ref 0–0.5)
IMM GRANULOCYTES NFR BLD AUTO: 1 % (ref 0–5)
KETONES UR QL STRIP.AUTO: NEGATIVE MG/DL
LEUKOCYTE ESTERASE UR QL STRIP.AUTO: ABNORMAL
LYMPHOCYTES # BLD: 0.4 K/UL (ref 0.5–4.6)
LYMPHOCYTES NFR BLD: 8 % (ref 13–44)
MCH RBC QN AUTO: 32.8 PG (ref 26.1–32.9)
MCHC RBC AUTO-ENTMCNC: 31.4 G/DL (ref 31.4–35)
MCV RBC AUTO: 104.4 FL (ref 79.6–97.8)
MONOCYTES # BLD: 0.7 K/UL (ref 0.1–1.3)
MONOCYTES NFR BLD: 13 % (ref 4–12)
MUCOUS THREADS URNS QL MICRO: ABNORMAL /LPF
NEUTS SEG # BLD: 3.9 K/UL (ref 1.7–8.2)
NEUTS SEG NFR BLD: 71 % (ref 43–78)
NITRITE UR QL STRIP.AUTO: NEGATIVE
NRBC # BLD: 0 K/UL (ref 0–0.2)
OTHER OBSERVATIONS,UCOM: ABNORMAL
PH UR STRIP: 6.5 [PH] (ref 5–9)
PLATELET # BLD AUTO: 536 K/UL (ref 150–450)
PMV BLD AUTO: 10.8 FL (ref 9.4–12.3)
POTASSIUM SERPL-SCNC: 3.5 MMOL/L (ref 3.5–5.1)
PROT SERPL-MCNC: 6.3 G/DL (ref 6.3–8.2)
PROT UR STRIP-MCNC: 30 MG/DL
RBC # BLD AUTO: 3.17 M/UL (ref 4.05–5.2)
RBC #/AREA URNS HPF: ABNORMAL /HPF
SODIUM SERPL-SCNC: 145 MMOL/L (ref 136–145)
SP GR UR REFRACTOMETRY: 1.02 (ref 1–1.02)
UROBILINOGEN UR QL STRIP.AUTO: 0.2 EU/DL (ref 0.2–1)
WBC # BLD AUTO: 5.4 K/UL (ref 4.3–11.1)
WBC URNS QL MICRO: ABNORMAL /HPF

## 2020-06-01 PROCEDURE — 72192 CT PELVIS W/O DYE: CPT

## 2020-06-01 PROCEDURE — 80053 COMPREHEN METABOLIC PANEL: CPT

## 2020-06-01 PROCEDURE — 87086 URINE CULTURE/COLONY COUNT: CPT

## 2020-06-01 PROCEDURE — 81001 URINALYSIS AUTO W/SCOPE: CPT

## 2020-06-01 PROCEDURE — 70450 CT HEAD/BRAIN W/O DYE: CPT

## 2020-06-01 PROCEDURE — 36415 COLL VENOUS BLD VENIPUNCTURE: CPT

## 2020-06-01 PROCEDURE — 85025 COMPLETE CBC W/AUTO DIFF WBC: CPT

## 2020-06-01 NOTE — PROGRESS NOTES
This note is to address a coding query on the current patient. Excisional debridement was performed on this patient on the date that was included on the operative report. I am adding the word excisional debridement to the operative report by way of this progress note. The procedure was in fact an excisional debridement which should be obvious due to the fact that sharp excision using a #10 and 15 scalpel blade and sharp scissors were used. The excisional debridement was continued down to the bone as already stated within the note. It did not include the bone. By reading the note you should be able to determine that the bone was not removed as I did not say that in the note. Please contact me if this does not satisfy the query.     Dr. Guillermina Mccarthy, DO

## 2020-06-04 LAB
BACTERIA SPEC CULT: NORMAL
SERVICE CMNT-IMP: NORMAL
VANCOMYCIN TROUGH SERPL-MCNC: 9.9 UG/ML (ref 5–20)

## 2020-06-04 PROCEDURE — 36415 COLL VENOUS BLD VENIPUNCTURE: CPT

## 2020-06-04 PROCEDURE — 80202 ASSAY OF VANCOMYCIN: CPT

## 2020-06-06 LAB — VANCOMYCIN TROUGH SERPL-MCNC: 15.6 UG/ML (ref 5–20)

## 2020-06-06 PROCEDURE — 36415 COLL VENOUS BLD VENIPUNCTURE: CPT

## 2020-06-06 PROCEDURE — 80202 ASSAY OF VANCOMYCIN: CPT

## 2020-06-08 LAB
ALBUMIN SERPL-MCNC: 2.5 G/DL (ref 3.5–5)
ALBUMIN/GLOB SERPL: 0.6 {RATIO} (ref 1.2–3.5)
ALP SERPL-CCNC: 141 U/L (ref 50–136)
ALT SERPL-CCNC: 84 U/L (ref 12–65)
ANION GAP SERPL CALC-SCNC: 8 MMOL/L (ref 7–16)
AST SERPL-CCNC: 55 U/L (ref 15–37)
BASOPHILS # BLD: 0 K/UL (ref 0–0.2)
BASOPHILS NFR BLD: 0 % (ref 0–2)
BILIRUB SERPL-MCNC: 0.3 MG/DL (ref 0.2–1.1)
BUN SERPL-MCNC: 15 MG/DL (ref 6–23)
CALCIUM SERPL-MCNC: 8.6 MG/DL (ref 8.3–10.4)
CHLORIDE SERPL-SCNC: 106 MMOL/L (ref 98–107)
CO2 SERPL-SCNC: 29 MMOL/L (ref 21–32)
CREAT SERPL-MCNC: 0.35 MG/DL (ref 0.6–1)
DIFFERENTIAL METHOD BLD: ABNORMAL
EOSINOPHIL # BLD: 0.1 K/UL (ref 0–0.8)
EOSINOPHIL NFR BLD: 1 % (ref 0.5–7.8)
ERYTHROCYTE [DISTWIDTH] IN BLOOD BY AUTOMATED COUNT: 13.6 % (ref 11.9–14.6)
GLOBULIN SER CALC-MCNC: 4.3 G/DL (ref 2.3–3.5)
GLUCOSE SERPL-MCNC: 99 MG/DL (ref 65–100)
HCT VFR BLD AUTO: 37.5 % (ref 35.8–46.3)
HGB BLD-MCNC: 12.7 G/DL (ref 11.7–15.4)
IMM GRANULOCYTES # BLD AUTO: 0.1 K/UL (ref 0–0.5)
IMM GRANULOCYTES NFR BLD AUTO: 1 % (ref 0–5)
LYMPHOCYTES # BLD: 0.6 K/UL (ref 0.5–4.6)
LYMPHOCYTES NFR BLD: 6 % (ref 13–44)
MCH RBC QN AUTO: 33.5 PG (ref 26.1–32.9)
MCHC RBC AUTO-ENTMCNC: 33.9 G/DL (ref 31.4–35)
MCV RBC AUTO: 98.9 FL (ref 79.6–97.8)
MONOCYTES # BLD: 0.7 K/UL (ref 0.1–1.3)
MONOCYTES NFR BLD: 8 % (ref 4–12)
NEUTS SEG # BLD: 7.9 K/UL (ref 1.7–8.2)
NEUTS SEG NFR BLD: 84 % (ref 43–78)
NRBC # BLD: 0 K/UL (ref 0–0.2)
PLATELET # BLD AUTO: 360 K/UL (ref 150–450)
PMV BLD AUTO: 11.1 FL (ref 9.4–12.3)
POTASSIUM SERPL-SCNC: 2.3 MMOL/L (ref 3.5–5.1)
PROT SERPL-MCNC: 6.8 G/DL (ref 6.3–8.2)
RBC # BLD AUTO: 3.79 M/UL (ref 4.05–5.2)
SODIUM SERPL-SCNC: 143 MMOL/L (ref 136–145)
VANCOMYCIN TROUGH SERPL-MCNC: 16.6 UG/ML (ref 5–20)
WBC # BLD AUTO: 9.4 K/UL (ref 4.3–11.1)

## 2020-06-08 PROCEDURE — 36415 COLL VENOUS BLD VENIPUNCTURE: CPT

## 2020-06-08 PROCEDURE — 80053 COMPREHEN METABOLIC PANEL: CPT

## 2020-06-08 PROCEDURE — 85025 COMPLETE CBC W/AUTO DIFF WBC: CPT

## 2020-06-08 PROCEDURE — 80202 ASSAY OF VANCOMYCIN: CPT

## 2020-06-09 LAB — POTASSIUM SERPL-SCNC: 4 MMOL/L (ref 3.5–5.1)

## 2020-06-09 PROCEDURE — 84132 ASSAY OF SERUM POTASSIUM: CPT

## 2020-06-09 PROCEDURE — 36415 COLL VENOUS BLD VENIPUNCTURE: CPT

## 2020-06-12 LAB — VANCOMYCIN TROUGH SERPL-MCNC: 19.9 UG/ML (ref 5–20)

## 2020-06-12 PROCEDURE — 36415 COLL VENOUS BLD VENIPUNCTURE: CPT

## 2020-06-12 PROCEDURE — 80202 ASSAY OF VANCOMYCIN: CPT

## 2020-06-14 LAB — VANCOMYCIN TROUGH SERPL-MCNC: 18.8 UG/ML (ref 5–20)

## 2020-06-14 PROCEDURE — 80202 ASSAY OF VANCOMYCIN: CPT

## 2020-06-14 PROCEDURE — 36415 COLL VENOUS BLD VENIPUNCTURE: CPT

## 2020-06-15 LAB
ALBUMIN SERPL-MCNC: 2.8 G/DL (ref 3.5–5)
ALBUMIN/GLOB SERPL: 0.8 {RATIO} (ref 1.2–3.5)
ALP SERPL-CCNC: 136 U/L (ref 50–136)
ALT SERPL-CCNC: 69 U/L (ref 12–65)
ANION GAP SERPL CALC-SCNC: 7 MMOL/L (ref 7–16)
AST SERPL-CCNC: 30 U/L (ref 15–37)
BASOPHILS # BLD: 0.1 K/UL (ref 0–0.2)
BASOPHILS NFR BLD: 0 % (ref 0–2)
BILIRUB SERPL-MCNC: 0.2 MG/DL (ref 0.2–1.1)
BUN SERPL-MCNC: 24 MG/DL (ref 6–23)
CALCIUM SERPL-MCNC: 8.9 MG/DL (ref 8.3–10.4)
CHLORIDE SERPL-SCNC: 107 MMOL/L (ref 98–107)
CO2 SERPL-SCNC: 25 MMOL/L (ref 21–32)
CREAT SERPL-MCNC: 0.39 MG/DL (ref 0.6–1)
DIFFERENTIAL METHOD BLD: ABNORMAL
EOSINOPHIL # BLD: 0 K/UL (ref 0–0.8)
EOSINOPHIL NFR BLD: 0 % (ref 0.5–7.8)
ERYTHROCYTE [DISTWIDTH] IN BLOOD BY AUTOMATED COUNT: 13.8 % (ref 11.9–14.6)
GLOBULIN SER CALC-MCNC: 3.7 G/DL (ref 2.3–3.5)
GLUCOSE SERPL-MCNC: 85 MG/DL (ref 65–100)
HCT VFR BLD AUTO: 33 % (ref 35.8–46.3)
HGB BLD-MCNC: 11.2 G/DL (ref 11.7–15.4)
IMM GRANULOCYTES # BLD AUTO: 0.3 K/UL (ref 0–0.5)
IMM GRANULOCYTES NFR BLD AUTO: 2 % (ref 0–5)
LYMPHOCYTES # BLD: 0.6 K/UL (ref 0.5–4.6)
LYMPHOCYTES NFR BLD: 4 % (ref 13–44)
MCH RBC QN AUTO: 33.2 PG (ref 26.1–32.9)
MCHC RBC AUTO-ENTMCNC: 33.9 G/DL (ref 31.4–35)
MCV RBC AUTO: 97.9 FL (ref 79.6–97.8)
MONOCYTES # BLD: 1.5 K/UL (ref 0.1–1.3)
MONOCYTES NFR BLD: 10 % (ref 4–12)
NEUTS SEG # BLD: 12.4 K/UL (ref 1.7–8.2)
NEUTS SEG NFR BLD: 84 % (ref 43–78)
NRBC # BLD: 0 K/UL (ref 0–0.2)
PLATELET # BLD AUTO: 307 K/UL (ref 150–450)
PMV BLD AUTO: 11.5 FL (ref 9.4–12.3)
POTASSIUM SERPL-SCNC: 4.1 MMOL/L (ref 3.5–5.1)
PROT SERPL-MCNC: 6.5 G/DL (ref 6.3–8.2)
RBC # BLD AUTO: 3.37 M/UL (ref 4.05–5.2)
SODIUM SERPL-SCNC: 139 MMOL/L (ref 136–145)
WBC # BLD AUTO: 14.9 K/UL (ref 4.3–11.1)

## 2020-06-15 PROCEDURE — 80053 COMPREHEN METABOLIC PANEL: CPT

## 2020-06-15 PROCEDURE — 85025 COMPLETE CBC W/AUTO DIFF WBC: CPT

## 2020-06-15 PROCEDURE — 36415 COLL VENOUS BLD VENIPUNCTURE: CPT

## 2020-06-18 LAB
BASOPHILS # BLD: 0 K/UL (ref 0–0.2)
BASOPHILS NFR BLD: 0 % (ref 0–2)
DIFFERENTIAL METHOD BLD: ABNORMAL
EOSINOPHIL # BLD: 0 K/UL (ref 0–0.8)
EOSINOPHIL NFR BLD: 0 % (ref 0.5–7.8)
ERYTHROCYTE [DISTWIDTH] IN BLOOD BY AUTOMATED COUNT: 14 % (ref 11.9–14.6)
HCT VFR BLD AUTO: 34.1 % (ref 35.8–46.3)
HGB BLD-MCNC: 11 G/DL (ref 11.7–15.4)
IMM GRANULOCYTES # BLD AUTO: 0.2 K/UL (ref 0–0.5)
IMM GRANULOCYTES NFR BLD AUTO: 2 % (ref 0–5)
LYMPHOCYTES # BLD: 0.3 K/UL (ref 0.5–4.6)
LYMPHOCYTES NFR BLD: 3 % (ref 13–44)
MCH RBC QN AUTO: 33 PG (ref 26.1–32.9)
MCHC RBC AUTO-ENTMCNC: 32.3 G/DL (ref 31.4–35)
MCV RBC AUTO: 102.4 FL (ref 79.6–97.8)
MONOCYTES # BLD: 1 K/UL (ref 0.1–1.3)
MONOCYTES NFR BLD: 9 % (ref 4–12)
NEUTS SEG # BLD: 9.7 K/UL (ref 1.7–8.2)
NEUTS SEG NFR BLD: 86 % (ref 43–78)
NRBC # BLD: 0 K/UL (ref 0–0.2)
PLATELET # BLD AUTO: 262 K/UL (ref 150–450)
PMV BLD AUTO: 10.9 FL (ref 9.4–12.3)
RBC # BLD AUTO: 3.33 M/UL (ref 4.05–5.2)
WBC # BLD AUTO: 11.2 K/UL (ref 4.3–11.1)

## 2020-06-18 PROCEDURE — 36415 COLL VENOUS BLD VENIPUNCTURE: CPT

## 2020-06-18 PROCEDURE — 85025 COMPLETE CBC W/AUTO DIFF WBC: CPT

## 2020-06-19 LAB — VANCOMYCIN TROUGH SERPL-MCNC: 16.8 UG/ML (ref 5–20)

## 2020-06-19 PROCEDURE — 80202 ASSAY OF VANCOMYCIN: CPT

## 2020-06-19 PROCEDURE — 36415 COLL VENOUS BLD VENIPUNCTURE: CPT

## 2020-06-22 LAB
ALBUMIN SERPL-MCNC: 2.9 G/DL (ref 3.5–5)
ALBUMIN/GLOB SERPL: 0.7 {RATIO} (ref 1.2–3.5)
ALP SERPL-CCNC: 101 U/L (ref 50–136)
ALT SERPL-CCNC: 62 U/L (ref 12–65)
ANION GAP SERPL CALC-SCNC: 7 MMOL/L (ref 7–16)
AST SERPL-CCNC: 23 U/L (ref 15–37)
BASOPHILS # BLD: 0 K/UL (ref 0–0.2)
BASOPHILS NFR BLD: 0 % (ref 0–2)
BILIRUB SERPL-MCNC: 0.2 MG/DL (ref 0.2–1.1)
BUN SERPL-MCNC: 25 MG/DL (ref 6–23)
CALCIUM SERPL-MCNC: 9.2 MG/DL (ref 8.3–10.4)
CHLORIDE SERPL-SCNC: 104 MMOL/L (ref 98–107)
CO2 SERPL-SCNC: 28 MMOL/L (ref 21–32)
CREAT SERPL-MCNC: 0.39 MG/DL (ref 0.6–1)
DIFFERENTIAL METHOD BLD: ABNORMAL
EOSINOPHIL # BLD: 0.1 K/UL (ref 0–0.8)
EOSINOPHIL NFR BLD: 1 % (ref 0.5–7.8)
ERYTHROCYTE [DISTWIDTH] IN BLOOD BY AUTOMATED COUNT: 14.2 % (ref 11.9–14.6)
GLOBULIN SER CALC-MCNC: 4 G/DL (ref 2.3–3.5)
GLUCOSE SERPL-MCNC: 87 MG/DL (ref 65–100)
HCT VFR BLD AUTO: 35.1 % (ref 35.8–46.3)
HGB BLD-MCNC: 11.1 G/DL (ref 11.7–15.4)
IMM GRANULOCYTES # BLD AUTO: 0.1 K/UL (ref 0–0.5)
IMM GRANULOCYTES NFR BLD AUTO: 1 % (ref 0–5)
LYMPHOCYTES # BLD: 0.3 K/UL (ref 0.5–4.6)
LYMPHOCYTES NFR BLD: 3 % (ref 13–44)
MCH RBC QN AUTO: 32.9 PG (ref 26.1–32.9)
MCHC RBC AUTO-ENTMCNC: 31.6 G/DL (ref 31.4–35)
MCV RBC AUTO: 104.2 FL (ref 79.6–97.8)
MONOCYTES # BLD: 0.6 K/UL (ref 0.1–1.3)
MONOCYTES NFR BLD: 8 % (ref 4–12)
NEUTS SEG # BLD: 6.9 K/UL (ref 1.7–8.2)
NEUTS SEG NFR BLD: 87 % (ref 43–78)
NRBC # BLD: 0 K/UL (ref 0–0.2)
PLATELET # BLD AUTO: 316 K/UL (ref 150–450)
PMV BLD AUTO: 11.1 FL (ref 9.4–12.3)
POTASSIUM SERPL-SCNC: 3.9 MMOL/L (ref 3.5–5.1)
PROT SERPL-MCNC: 6.9 G/DL (ref 6.3–8.2)
RBC # BLD AUTO: 3.37 M/UL (ref 4.05–5.2)
SODIUM SERPL-SCNC: 139 MMOL/L (ref 136–145)
WBC # BLD AUTO: 7.9 K/UL (ref 4.3–11.1)

## 2020-06-22 PROCEDURE — 85025 COMPLETE CBC W/AUTO DIFF WBC: CPT

## 2020-06-22 PROCEDURE — 36415 COLL VENOUS BLD VENIPUNCTURE: CPT

## 2020-06-22 PROCEDURE — 80053 COMPREHEN METABOLIC PANEL: CPT

## 2020-06-23 LAB — VANCOMYCIN TROUGH SERPL-MCNC: 20.9 UG/ML (ref 5–20)

## 2020-06-23 PROCEDURE — 80202 ASSAY OF VANCOMYCIN: CPT

## 2020-06-23 PROCEDURE — 36415 COLL VENOUS BLD VENIPUNCTURE: CPT

## 2020-06-24 LAB
HCT VFR BLD AUTO: 34.7 % (ref 35.8–46.3)
HGB BLD-MCNC: 10.8 G/DL (ref 11.7–15.4)

## 2020-06-24 PROCEDURE — 85018 HEMOGLOBIN: CPT

## 2020-06-24 PROCEDURE — 36415 COLL VENOUS BLD VENIPUNCTURE: CPT

## 2020-06-25 LAB
ANION GAP SERPL CALC-SCNC: 5 MMOL/L (ref 7–16)
BUN SERPL-MCNC: 29 MG/DL (ref 6–23)
CALCIUM SERPL-MCNC: 9.1 MG/DL (ref 8.3–10.4)
CHLORIDE SERPL-SCNC: 106 MMOL/L (ref 98–107)
CO2 SERPL-SCNC: 29 MMOL/L (ref 21–32)
CREAT SERPL-MCNC: 0.43 MG/DL (ref 0.6–1)
DIFFERENTIAL METHOD BLD: ABNORMAL
EOSINOPHIL # BLD: 0.1 K/UL (ref 0–0.8)
EOSINOPHIL NFR BLD: 1 %
ERYTHROCYTE [DISTWIDTH] IN BLOOD BY AUTOMATED COUNT: 13.9 %
GLUCOSE SERPL-MCNC: 108 MG/DL (ref 65–100)
HCT VFR BLD AUTO: 30.7 % (ref 35.8–46.3)
HCT VFR BLD AUTO: 31.9 % (ref 35.8–46.3)
HCT VFR BLD AUTO: 33.2 % (ref 35.8–46.3)
HGB BLD-MCNC: 10.6 G/DL (ref 11.7–15.4)
HGB BLD-MCNC: 9.6 G/DL (ref 11.7–15.4)
HGB BLD-MCNC: 9.9 G/DL (ref 11.7–15.4)
IMM GRANULOCYTES # BLD AUTO: 0.2 K/UL
IMM GRANULOCYTES NFR BLD AUTO: 2 %
LYMPHOCYTES # BLD: 0.2 K/UL (ref 0.5–4.6)
LYMPHOCYTES NFR BLD: 2 %
MCH RBC QN AUTO: 33.2 PG (ref 26.1–32.9)
MCHC RBC AUTO-ENTMCNC: 31.9 G/DL (ref 31.4–35)
MCV RBC AUTO: 104.1 FL (ref 79.6–97.8)
MONOCYTES # BLD: 0.8 K/UL (ref 0.1–1.3)
MONOCYTES NFR BLD: 7 %
NEUTS SEG # BLD: 9.8 K/UL (ref 1.7–8.2)
NEUTS SEG NFR BLD: 88 %
NRBC # BLD: 0 K/UL
PLATELET # BLD AUTO: 388 K/UL
PMV BLD AUTO: 10.6 FL (ref 9.4–12.3)
POTASSIUM SERPL-SCNC: 3.7 MMOL/L (ref 3.5–5.1)
RBC # BLD AUTO: 3.19 M/UL
SODIUM SERPL-SCNC: 140 MMOL/L (ref 136–145)
WBC # BLD AUTO: 11.1 K/UL (ref 4.3–11.1)

## 2020-06-25 PROCEDURE — 86900 BLOOD TYPING SEROLOGIC ABO: CPT

## 2020-06-25 PROCEDURE — 80048 BASIC METABOLIC PNL TOTAL CA: CPT

## 2020-06-25 PROCEDURE — 36415 COLL VENOUS BLD VENIPUNCTURE: CPT

## 2020-06-25 PROCEDURE — 85018 HEMOGLOBIN: CPT

## 2020-06-25 PROCEDURE — 86923 COMPATIBILITY TEST ELECTRIC: CPT

## 2020-06-25 PROCEDURE — 85025 COMPLETE CBC W/AUTO DIFF WBC: CPT

## 2020-06-26 LAB
ANION GAP SERPL CALC-SCNC: 5 MMOL/L (ref 7–16)
BASOPHILS # BLD: 0 K/UL (ref 0–0.2)
BASOPHILS NFR BLD: 0 % (ref 0–2)
BUN SERPL-MCNC: 21 MG/DL (ref 6–23)
CALCIUM SERPL-MCNC: 9.2 MG/DL (ref 8.3–10.4)
CHLORIDE SERPL-SCNC: 104 MMOL/L (ref 98–107)
CO2 SERPL-SCNC: 31 MMOL/L (ref 21–32)
CREAT SERPL-MCNC: 0.45 MG/DL (ref 0.6–1)
DIFFERENTIAL METHOD BLD: ABNORMAL
EOSINOPHIL # BLD: 0.2 K/UL (ref 0–0.8)
EOSINOPHIL NFR BLD: 2 % (ref 0.5–7.8)
ERYTHROCYTE [DISTWIDTH] IN BLOOD BY AUTOMATED COUNT: 13.8 % (ref 11.9–14.6)
GLUCOSE SERPL-MCNC: 96 MG/DL (ref 65–100)
HCT VFR BLD AUTO: 37.6 % (ref 35.8–46.3)
HGB BLD-MCNC: 11.6 G/DL (ref 11.7–15.4)
IMM GRANULOCYTES # BLD AUTO: 0.1 K/UL (ref 0–0.5)
IMM GRANULOCYTES NFR BLD AUTO: 1 % (ref 0–5)
LYMPHOCYTES # BLD: 0.3 K/UL (ref 0.5–4.6)
LYMPHOCYTES NFR BLD: 3 % (ref 13–44)
MCH RBC QN AUTO: 31.7 PG (ref 26.1–32.9)
MCHC RBC AUTO-ENTMCNC: 30.9 G/DL (ref 31.4–35)
MCV RBC AUTO: 102.7 FL (ref 79.6–97.8)
MONOCYTES # BLD: 0.9 K/UL (ref 0.1–1.3)
MONOCYTES NFR BLD: 8 % (ref 4–12)
NEUTS SEG # BLD: 10 K/UL (ref 1.7–8.2)
NEUTS SEG NFR BLD: 87 % (ref 43–78)
NRBC # BLD: 0 K/UL (ref 0–0.2)
PLATELET # BLD AUTO: 415 K/UL (ref 150–450)
PMV BLD AUTO: 10.2 FL (ref 9.4–12.3)
POTASSIUM SERPL-SCNC: 3.7 MMOL/L (ref 3.5–5.1)
RBC # BLD AUTO: 3.66 M/UL (ref 4.05–5.2)
SODIUM SERPL-SCNC: 140 MMOL/L (ref 136–145)
WBC # BLD AUTO: 11.5 K/UL (ref 4.3–11.1)

## 2020-06-26 PROCEDURE — 80048 BASIC METABOLIC PNL TOTAL CA: CPT

## 2020-06-26 PROCEDURE — 36415 COLL VENOUS BLD VENIPUNCTURE: CPT

## 2020-06-26 PROCEDURE — 85025 COMPLETE CBC W/AUTO DIFF WBC: CPT

## 2020-06-27 LAB
BASOPHILS # BLD: 0 K/UL (ref 0–0.2)
BASOPHILS NFR BLD: 0 % (ref 0–2)
DIFFERENTIAL METHOD BLD: ABNORMAL
EOSINOPHIL # BLD: 0.2 K/UL (ref 0–0.8)
EOSINOPHIL NFR BLD: 3 % (ref 0.5–7.8)
ERYTHROCYTE [DISTWIDTH] IN BLOOD BY AUTOMATED COUNT: 13.9 % (ref 11.9–14.6)
HCT VFR BLD AUTO: 29.2 % (ref 35.8–46.3)
HCT VFR BLD AUTO: 31.9 % (ref 35.8–46.3)
HGB BLD-MCNC: 8.8 G/DL (ref 11.7–15.4)
HGB BLD-MCNC: 9.8 G/DL (ref 11.7–15.4)
IMM GRANULOCYTES # BLD AUTO: 0.1 K/UL (ref 0–0.5)
IMM GRANULOCYTES NFR BLD AUTO: 1 % (ref 0–5)
LYMPHOCYTES # BLD: 0.2 K/UL (ref 0.5–4.6)
LYMPHOCYTES NFR BLD: 3 % (ref 13–44)
MCH RBC QN AUTO: 32.1 PG (ref 26.1–32.9)
MCHC RBC AUTO-ENTMCNC: 30.7 G/DL (ref 31.4–35)
MCV RBC AUTO: 104.6 FL (ref 79.6–97.8)
MONOCYTES # BLD: 0.5 K/UL (ref 0.1–1.3)
MONOCYTES NFR BLD: 7 % (ref 4–12)
NEUTS SEG # BLD: 5.4 K/UL (ref 1.7–8.2)
NEUTS SEG NFR BLD: 85 % (ref 43–78)
NRBC # BLD: 0 K/UL (ref 0–0.2)
PLATELET # BLD AUTO: 378 K/UL (ref 150–450)
PMV BLD AUTO: 10.1 FL (ref 9.4–12.3)
RBC # BLD AUTO: 3.05 M/UL (ref 4.05–5.2)
WBC # BLD AUTO: 6.3 K/UL (ref 4.3–11.1)

## 2020-06-27 PROCEDURE — 85018 HEMOGLOBIN: CPT

## 2020-06-27 PROCEDURE — 36415 COLL VENOUS BLD VENIPUNCTURE: CPT

## 2020-06-27 PROCEDURE — 85025 COMPLETE CBC W/AUTO DIFF WBC: CPT

## 2020-06-28 LAB
HCT VFR BLD AUTO: 26.6 % (ref 35.8–46.3)
HGB BLD-MCNC: 8.6 G/DL (ref 11.7–15.4)

## 2020-06-28 PROCEDURE — 36415 COLL VENOUS BLD VENIPUNCTURE: CPT

## 2020-06-28 PROCEDURE — 85018 HEMOGLOBIN: CPT

## 2020-06-29 ENCOUNTER — HOSPITAL ENCOUNTER (OUTPATIENT)
Age: 60
Setting detail: OUTPATIENT SURGERY
Discharge: OTHER INSTITUTION | End: 2020-06-29
Attending: INTERNAL MEDICINE | Admitting: INTERNAL MEDICINE
Payer: MEDICARE

## 2020-06-29 ENCOUNTER — ANESTHESIA EVENT (OUTPATIENT)
Dept: ENDOSCOPY | Age: 60
End: 2020-06-29
Payer: MEDICARE

## 2020-06-29 ENCOUNTER — ANESTHESIA (OUTPATIENT)
Dept: ENDOSCOPY | Age: 60
End: 2020-06-29
Payer: MEDICARE

## 2020-06-29 VITALS
RESPIRATION RATE: 14 BRPM | WEIGHT: 90 LBS | SYSTOLIC BLOOD PRESSURE: 101 MMHG | HEIGHT: 68 IN | OXYGEN SATURATION: 99 % | TEMPERATURE: 98.6 F | DIASTOLIC BLOOD PRESSURE: 58 MMHG | BODY MASS INDEX: 13.64 KG/M2 | HEART RATE: 71 BPM

## 2020-06-29 LAB
ABO + RH BLD: NORMAL
ALBUMIN SERPL-MCNC: 2.3 G/DL (ref 3.5–5)
ALBUMIN/GLOB SERPL: 0.7 {RATIO} (ref 1.2–3.5)
ALP SERPL-CCNC: 84 U/L (ref 50–136)
ALT SERPL-CCNC: 46 U/L (ref 12–65)
ANION GAP SERPL CALC-SCNC: 6 MMOL/L (ref 7–16)
AST SERPL-CCNC: 26 U/L (ref 15–37)
BASOPHILS # BLD: 0 K/UL (ref 0–0.2)
BASOPHILS NFR BLD: 1 % (ref 0–2)
BILIRUB SERPL-MCNC: <0.1 MG/DL (ref 0.2–1.1)
BLD PROD TYP BPU: NORMAL
BLD PROD TYP BPU: NORMAL
BLOOD GROUP ANTIBODIES SERPL: NORMAL
BPU ID: NORMAL
BPU ID: NORMAL
BUN SERPL-MCNC: 7 MG/DL (ref 6–23)
CALCIUM SERPL-MCNC: 9.1 MG/DL (ref 8.3–10.4)
CHLORIDE SERPL-SCNC: 110 MMOL/L (ref 98–107)
CO2 SERPL-SCNC: 27 MMOL/L (ref 21–32)
CREAT SERPL-MCNC: 0.39 MG/DL (ref 0.6–1)
CROSSMATCH RESULT,%XM: NORMAL
CROSSMATCH RESULT,%XM: NORMAL
DIFFERENTIAL METHOD BLD: ABNORMAL
EOSINOPHIL # BLD: 0.3 K/UL (ref 0–0.8)
EOSINOPHIL NFR BLD: 6 % (ref 0.5–7.8)
ERYTHROCYTE [DISTWIDTH] IN BLOOD BY AUTOMATED COUNT: 13.2 % (ref 11.9–14.6)
GLOBULIN SER CALC-MCNC: 3.5 G/DL (ref 2.3–3.5)
GLUCOSE SERPL-MCNC: 88 MG/DL (ref 65–100)
HCT VFR BLD AUTO: 30.6 % (ref 35.8–46.3)
HGB BLD-MCNC: 9.4 G/DL (ref 11.7–15.4)
IMM GRANULOCYTES # BLD AUTO: 0.1 K/UL (ref 0–0.5)
IMM GRANULOCYTES NFR BLD AUTO: 1 % (ref 0–5)
LYMPHOCYTES # BLD: 0.3 K/UL (ref 0.5–4.6)
LYMPHOCYTES NFR BLD: 5 % (ref 13–44)
MCH RBC QN AUTO: 31.4 PG (ref 26.1–32.9)
MCHC RBC AUTO-ENTMCNC: 30.7 G/DL (ref 31.4–35)
MCV RBC AUTO: 102.3 FL (ref 79.6–97.8)
MONOCYTES # BLD: 0.6 K/UL (ref 0.1–1.3)
MONOCYTES NFR BLD: 11 % (ref 4–12)
NEUTS SEG # BLD: 4.2 K/UL (ref 1.7–8.2)
NEUTS SEG NFR BLD: 76 % (ref 43–78)
NRBC # BLD: 0 K/UL (ref 0–0.2)
PLATELET # BLD AUTO: 371 K/UL (ref 150–450)
PMV BLD AUTO: 10.7 FL (ref 9.4–12.3)
POTASSIUM SERPL-SCNC: 4.1 MMOL/L (ref 3.5–5.1)
PROT SERPL-MCNC: 5.8 G/DL (ref 6.3–8.2)
RBC # BLD AUTO: 2.99 M/UL (ref 4.05–5.2)
SODIUM SERPL-SCNC: 143 MMOL/L (ref 136–145)
SPECIMEN EXP DATE BLD: NORMAL
STATUS OF UNIT,%ST: NORMAL
STATUS OF UNIT,%ST: NORMAL
UNIT DIVISION, %UDIV: 0
UNIT DIVISION, %UDIV: 0
WBC # BLD AUTO: 5.5 K/UL (ref 4.3–11.1)

## 2020-06-29 PROCEDURE — 74011250636 HC RX REV CODE- 250/636: Performed by: ANESTHESIOLOGY

## 2020-06-29 PROCEDURE — 87635 SARS-COV-2 COVID-19 AMP PRB: CPT

## 2020-06-29 PROCEDURE — 74011000250 HC RX REV CODE- 250: Performed by: NURSE ANESTHETIST, CERTIFIED REGISTERED

## 2020-06-29 PROCEDURE — 88305 TISSUE EXAM BY PATHOLOGIST: CPT

## 2020-06-29 PROCEDURE — 77030021593 HC FCPS BIOP ENDOSC BSC -A: Performed by: INTERNAL MEDICINE

## 2020-06-29 PROCEDURE — 88342 IMHCHEM/IMCYTCHM 1ST ANTB: CPT

## 2020-06-29 PROCEDURE — 74011250636 HC RX REV CODE- 250/636: Performed by: NURSE ANESTHETIST, CERTIFIED REGISTERED

## 2020-06-29 PROCEDURE — 85025 COMPLETE CBC W/AUTO DIFF WBC: CPT

## 2020-06-29 PROCEDURE — 88341 IMHCHEM/IMCYTCHM EA ADD ANTB: CPT

## 2020-06-29 PROCEDURE — 76040000025: Performed by: INTERNAL MEDICINE

## 2020-06-29 PROCEDURE — 77030029929: Performed by: INTERNAL MEDICINE

## 2020-06-29 PROCEDURE — 76060000032 HC ANESTHESIA 0.5 TO 1 HR: Performed by: INTERNAL MEDICINE

## 2020-06-29 PROCEDURE — 77030020018 HC MRKR ENDOSC SPOT 5ML SYR GISP -B: Performed by: INTERNAL MEDICINE

## 2020-06-29 PROCEDURE — 80053 COMPREHEN METABOLIC PANEL: CPT

## 2020-06-29 PROCEDURE — 36415 COLL VENOUS BLD VENIPUNCTURE: CPT

## 2020-06-29 RX ORDER — PROPOFOL 10 MG/ML
INJECTION, EMULSION INTRAVENOUS AS NEEDED
Status: DISCONTINUED | OUTPATIENT
Start: 2020-06-29 | End: 2020-06-29 | Stop reason: HOSPADM

## 2020-06-29 RX ORDER — SODIUM CHLORIDE, SODIUM LACTATE, POTASSIUM CHLORIDE, CALCIUM CHLORIDE 600; 310; 30; 20 MG/100ML; MG/100ML; MG/100ML; MG/100ML
100 INJECTION, SOLUTION INTRAVENOUS CONTINUOUS
Status: DISCONTINUED | OUTPATIENT
Start: 2020-06-29 | End: 2020-06-29 | Stop reason: HOSPADM

## 2020-06-29 RX ORDER — LIDOCAINE HYDROCHLORIDE 20 MG/ML
INJECTION, SOLUTION EPIDURAL; INFILTRATION; INTRACAUDAL; PERINEURAL AS NEEDED
Status: DISCONTINUED | OUTPATIENT
Start: 2020-06-29 | End: 2020-06-29 | Stop reason: HOSPADM

## 2020-06-29 RX ORDER — PROPOFOL 10 MG/ML
INJECTION, EMULSION INTRAVENOUS
Status: DISCONTINUED | OUTPATIENT
Start: 2020-06-29 | End: 2020-06-29 | Stop reason: HOSPADM

## 2020-06-29 RX ORDER — EPHEDRINE SULFATE/0.9% NACL/PF 50 MG/5 ML
SYRINGE (ML) INTRAVENOUS AS NEEDED
Status: DISCONTINUED | OUTPATIENT
Start: 2020-06-29 | End: 2020-06-29 | Stop reason: HOSPADM

## 2020-06-29 RX ADMIN — SODIUM CHLORIDE, SODIUM LACTATE, POTASSIUM CHLORIDE, AND CALCIUM CHLORIDE 100 ML/HR: 600; 310; 30; 20 INJECTION, SOLUTION INTRAVENOUS at 10:18

## 2020-06-29 RX ADMIN — PHENYLEPHRINE HYDROCHLORIDE 100 MCG: 10 INJECTION INTRAVENOUS at 11:12

## 2020-06-29 RX ADMIN — PROPOFOL 140 MCG/KG/MIN: 10 INJECTION, EMULSION INTRAVENOUS at 11:05

## 2020-06-29 RX ADMIN — PHENYLEPHRINE HYDROCHLORIDE 100 MCG: 10 INJECTION INTRAVENOUS at 11:19

## 2020-06-29 RX ADMIN — Medication 10 MG: at 11:25

## 2020-06-29 RX ADMIN — Medication 10 MG: at 11:19

## 2020-06-29 RX ADMIN — PHENYLEPHRINE HYDROCHLORIDE 50 MCG: 10 INJECTION INTRAVENOUS at 11:07

## 2020-06-29 RX ADMIN — PROPOFOL 40 MG: 10 INJECTION, EMULSION INTRAVENOUS at 11:05

## 2020-06-29 RX ADMIN — LIDOCAINE HYDROCHLORIDE 40 MG: 20 INJECTION, SOLUTION EPIDURAL; INFILTRATION; INTRACAUDAL; PERINEURAL at 11:05

## 2020-06-29 RX ADMIN — PHENYLEPHRINE HYDROCHLORIDE 50 MCG: 10 INJECTION INTRAVENOUS at 11:25

## 2020-06-29 NOTE — ANESTHESIA PREPROCEDURE EVALUATION
Relevant Problems   No relevant active problems       Anesthetic History   No history of anesthetic complications            Review of Systems / Medical History  Patient summary reviewed and pertinent labs reviewed    Pulmonary          Smoker      Comments: 4-5 cigs/day prior to fall may 2020   Neuro/Psych         Neuromuscular disease    Comments: Multiple sclerosis greater than 20 years Cardiovascular                  Exercise tolerance: <4 METS     GI/Hepatic/Renal  Within defined limits              Endo/Other             Other Findings   Comments: Ischial decubiti/osteomyelitis  Protein/calorie malnutrition         Physical Exam    Airway  Mallampati: II  TM Distance: > 6 cm  Neck ROM: normal range of motion   Mouth opening: Normal     Cardiovascular    Rhythm: regular           Dental  No notable dental hx       Pulmonary                 Abdominal  GI exam deferred       Other Findings            Anesthetic Plan    ASA: 3  Anesthesia type: total IV anesthesia          Induction: Intravenous  Anesthetic plan and risks discussed with: Patient

## 2020-06-29 NOTE — ROUTINE PROCESS
VSS. No complaints noted. Report called to receiving nurse. Pt transported back to inpatient room via bed with transport staff.

## 2020-06-29 NOTE — PROGRESS NOTES
TRANSFER - OUT REPORT:    Verbal report given to RADHA baldwin(name) on Marian Cain  being transferred to [facility](unit) for routine post - op       Report consisted of patients Situation, Background, Assessment and   Recommendations(SBAR). Information from the following report(s) SBAR was reviewed with the receiving nurse. Lines:   Peripheral IV 06/29/20 Anterior;Left;Proximal Forearm (Active)   Site Assessment Clean, dry, & intact 6/29/2020 10:07 AM   Phlebitis Assessment 0 6/29/2020 10:07 AM   Dressing Status Clean, dry, & intact 6/29/2020 10:07 AM   Dressing Type Transparent;Tape 6/29/2020 10:07 AM   Hub Color/Line Status Blue; Infusing;Flushed 6/29/2020 10:07 AM        Opportunity for questions and clarification was provided.

## 2020-06-29 NOTE — ROUTINE PROCESS
Patient transported to ENDO pre-op from [facility] AT [facility]. Midline leaking and painful to the touch, unable to draw back blood. Removed at this time.

## 2020-06-29 NOTE — OP NOTES
COLONOSCOPY    DATE of PROCEDURE: 6/29/2020    INDICATION: GI Bleed    POSTPROCEDURE DIAGNOSIS: Colon, mass    MEDICATION:   MAC anesthesia (see anesthesia report)    INSTRUMENT: [id number]    PROCEDURE: After obtaining informed consent, the patient was placed in the left lateral position and sedated. The endoscope was advanced to the cecum where the appendiceal orifice and ileocecal valve were identified. On withdrawal, the colon was carefully visualized in a 360 degree fashion, looking between folds and proximal and distal aspect of the folds. The patient was taken to the recovery area in stable condition. FINDINGS:  Rectum: hemorrhoids  Sigmoid: mass vs healing ulcer base, biopsied and tattooed, Diverticulosis  Descending Colon: Diverticulosis  Transverse Colon: normal  Ascending Colon: normal  Cecum: normal  Terminal ileum: not entered    Bowel Prep: Adequate  Estimated blood loss: 0-minimal   Specimens obtained during procedure: Biopsies colon mass    ASSESSMENT/PLAN: Colon Mass Vs Ulcer, await biopsy (STAT) results.

## 2020-06-29 NOTE — ANESTHESIA POSTPROCEDURE EVALUATION
Procedure(s):  COLONOSCOPY /Room 424  COLON BIOPSY  INJECTION.     total IV anesthesia    Anesthesia Post Evaluation      Multimodal analgesia: multimodal analgesia not used between 6 hours prior to anesthesia start to PACU discharge  Patient location during evaluation: PACU  Patient participation: complete - patient participated  Level of consciousness: awake  Pain management: adequate  Airway patency: patent  Anesthetic complications: no  Cardiovascular status: acceptable  Respiratory status: acceptable  Hydration status: acceptable  Post anesthesia nausea and vomiting:  none  Final Post Anesthesia Temperature Assessment:  Normothermia (36.0-37.5 degrees C)      INITIAL Post-op Vital signs:   Vitals Value Taken Time   /59 6/29/2020 11:33 AM   Temp 37 °C (98.6 °F) 6/29/2020 11:33 AM   Pulse 71 6/29/2020 11:33 AM   Resp 14 6/29/2020 11:33 AM   SpO2 100 % 6/29/2020 11:33 AM

## 2020-06-29 NOTE — H&P
[facility] H&P (Admission)        Date:  6/29/2020    Primary GI Physician: Delta Herring    Chief Complaint:  LGIB    Subjective:     History of Present Illness:  Patient is a 63 y.o. female with PMH of LGIB with difficulty prepping, who is being admitted for elective colo. PMH:  Past Medical History:   Diagnosis Date    Decubitus ulcer of ischial area     Hypertension     Kidney infection     Menopause     MS (multiple sclerosis) ([facility] [address]        PSH:  Past Surgical History:   Procedure Laterality Date    ERCP  5/15/2020         HX CHOLECYSTECTOMY  05/18/2020       Allergies: Allergies   Allergen Reactions    Latex Rash    Norco [Hydrocodone-Acetaminophen] Other (comments)     Sees dead people    Pcn [Penicillins] Swelling       Home Medications:  Prior to Admission medications    Medication Sig Start Date End Date Taking? Authorizing Provider   fingolimod 0.5 mg cap Take 0.5 mg by mouth daily. 5/23/20   Steve Levy, DO   metoprolol tartrate (LOPRESSOR) 25 mg tablet Take 0.5 Tabs by mouth two (2) times a day. 5/22/20   Steve Levy DO   amantadine HCL (SYMMETREL) 100 mg capsule Take 100 mg by mouth two (2) times a day. Marga Jackman MD   HYDROcodone-acetaminophen (XODOL)  mg tab per tablet Take 1 Tab by mouth four (4) times daily as needed. Marga Jackman MD       Hospital Medications:  Current Facility-Administered Medications   Medication Dose Route Frequency    lactated Ringers infusion  100 mL/hr IntraVENous CONTINUOUS       Social History:  Social History     Tobacco Use    Smoking status: Former Smoker   Substance Use Topics    Alcohol use: Not Currently       Pt denies any history of drug use, tattoos, or blood transfusions. Family History:  Family History   Problem Relation Age of Onset    Breast Cancer Mother        Review of Systems:  A detailed 10 system ROS is obtained, with pertinent positives as listed above. All others are negative.     Objective: Physical Exam:  Vitals:  Visit Vitals  /66   Pulse 71   Temp 98.1 °F (36.7 °C)   Resp 14   Ht 5' 7.5\" (1.715 m)   Wt 40.8 kg (90 lb)   SpO2 100%   Breastfeeding No   BMI 13.89 kg/m²     Gen:  Pt is alert, cooperative, no acute distress  Skin:  Extremities and face reveal no rashes. HEENT: Sclerae anicteric. Extra-occular muscles are intact. No oral ulcers. No abnormal pigmentation of the lips. The neck is supple. Cardiovascular: Regular rate and rhythm. No murmurs, gallops, or rubs. Respiratory:  Comfortable breathing with no accessory muscle use. Clear breath sounds with no wheezes, rales, or rhonchi. GI:  Abdomen nondistended, soft, and nontender. Normal active bowel sounds. No enlargement of the liver or spleen. No masses palpable. Rectal:  Deferred  Musculoskeletal:  No pitting edema of the lower legs. Neurological:  Gross memory appears intact. Patient is alert and oriented. Psychiatric:  Mood appears appropriate with judgement intact. Lymphatic:  No cervical or supraclavicular adenopathy. Laboratory:    Recent Labs     06/29/20  0724 06/28/20  0714 06/27/20 2026 06/27/20  0650   WBC 5.5  --   --  6.3   HGB 9.4* 8.6* 8.8* 9.8*   HCT 30.6* 26.6* 29.2* 31.9*     --   --  378   .3*  --   --  104.6*     --   --   --    K 4.1  --   --   --    *  --   --   --    CO2 27  --   --   --    BUN 7  --   --   --    CREA 0.39*  --   --   --    CA 9.1  --   --   --    GLU 88  --   --   --    AP 84  --   --   --    ALT 46  --   --   --    TBILI <0.1*  --   --   --    ALB 2.3*  --   --   --    TP 5.8*  --   --   --        Assessment:       Active Problems:    * No active hospital problems.  *      Plan:       GI bleed- plan colo if prepped

## 2020-07-01 LAB
ANION GAP SERPL CALC-SCNC: 8 MMOL/L (ref 7–16)
APPEARANCE UR: CLEAR
BACTERIA URNS QL MICRO: 0 /HPF
BASOPHILS # BLD: 0.1 K/UL (ref 0–0.2)
BASOPHILS NFR BLD: 0 % (ref 0–2)
BILIRUB UR QL: NEGATIVE
BUN SERPL-MCNC: 13 MG/DL (ref 6–23)
CALCIUM SERPL-MCNC: 8.6 MG/DL (ref 8.3–10.4)
CHLORIDE SERPL-SCNC: 103 MMOL/L (ref 98–107)
CO2 SERPL-SCNC: 27 MMOL/L (ref 21–32)
COLOR UR: YELLOW
CREAT SERPL-MCNC: 0.53 MG/DL (ref 0.6–1)
DIFFERENTIAL METHOD BLD: ABNORMAL
EOSINOPHIL # BLD: 0.4 K/UL (ref 0–0.8)
EOSINOPHIL NFR BLD: 3 % (ref 0.5–7.8)
ERYTHROCYTE [DISTWIDTH] IN BLOOD BY AUTOMATED COUNT: 13.4 % (ref 11.9–14.6)
GLUCOSE SERPL-MCNC: 89 MG/DL (ref 65–100)
GLUCOSE UR STRIP.AUTO-MCNC: NEGATIVE MG/DL
HCT VFR BLD AUTO: 37.2 % (ref 35.8–46.3)
HGB BLD-MCNC: 10.9 G/DL (ref 11.7–15.4)
HGB UR QL STRIP: NEGATIVE
IMM GRANULOCYTES # BLD AUTO: 0.1 K/UL (ref 0–0.5)
IMM GRANULOCYTES NFR BLD AUTO: 1 % (ref 0–5)
KETONES UR QL STRIP.AUTO: NEGATIVE MG/DL
LEUKOCYTE ESTERASE UR QL STRIP.AUTO: ABNORMAL
LYMPHOCYTES # BLD: 0.3 K/UL (ref 0.5–4.6)
LYMPHOCYTES NFR BLD: 2 % (ref 13–44)
MCH RBC QN AUTO: 31.5 PG (ref 26.1–32.9)
MCHC RBC AUTO-ENTMCNC: 29.3 G/DL (ref 31.4–35)
MCV RBC AUTO: 107.5 FL (ref 79.6–97.8)
MONOCYTES # BLD: 0.7 K/UL (ref 0.1–1.3)
MONOCYTES NFR BLD: 5 % (ref 4–12)
NEUTS SEG # BLD: 12.7 K/UL (ref 1.7–8.2)
NEUTS SEG NFR BLD: 89 % (ref 43–78)
NITRITE UR QL STRIP.AUTO: NEGATIVE
NRBC # BLD: 0 K/UL (ref 0–0.2)
OTHER OBSERVATIONS,UCOM: ABNORMAL
PH UR STRIP: 6.5 [PH] (ref 5–9)
PLATELET # BLD AUTO: 390 K/UL (ref 150–450)
PMV BLD AUTO: 9.7 FL (ref 9.4–12.3)
POTASSIUM SERPL-SCNC: 4.1 MMOL/L (ref 3.5–5.1)
PROT UR STRIP-MCNC: NEGATIVE MG/DL
RBC # BLD AUTO: 3.46 M/UL (ref 4.05–5.2)
SODIUM SERPL-SCNC: 138 MMOL/L (ref 136–145)
SP GR UR REFRACTOMETRY: 1.01 (ref 1–1.02)
UROBILINOGEN UR QL STRIP.AUTO: 0.2 EU/DL (ref 0.2–1)
WBC # BLD AUTO: 14.2 K/UL (ref 4.3–11.1)
WBC URNS QL MICRO: ABNORMAL /HPF

## 2020-07-01 PROCEDURE — 85025 COMPLETE CBC W/AUTO DIFF WBC: CPT

## 2020-07-01 PROCEDURE — 87040 BLOOD CULTURE FOR BACTERIA: CPT

## 2020-07-01 PROCEDURE — 36415 COLL VENOUS BLD VENIPUNCTURE: CPT

## 2020-07-01 PROCEDURE — 80048 BASIC METABOLIC PNL TOTAL CA: CPT

## 2020-07-01 PROCEDURE — 87106 FUNGI IDENTIFICATION YEAST: CPT

## 2020-07-01 PROCEDURE — 81001 URINALYSIS AUTO W/SCOPE: CPT

## 2020-07-01 PROCEDURE — 87086 URINE CULTURE/COLONY COUNT: CPT

## 2020-07-02 LAB
HCT VFR BLD AUTO: 30.1 % (ref 35.8–46.3)
HGB BLD-MCNC: 9.2 G/DL (ref 11.7–15.4)

## 2020-07-02 PROCEDURE — 36415 COLL VENOUS BLD VENIPUNCTURE: CPT

## 2020-07-02 PROCEDURE — 85018 HEMOGLOBIN: CPT

## 2020-07-03 LAB
ERYTHROCYTE [DISTWIDTH] IN BLOOD BY AUTOMATED COUNT: 13.1 % (ref 11.9–14.6)
HCT VFR BLD AUTO: 29.1 % (ref 35.8–46.3)
HGB BLD-MCNC: 9.7 G/DL (ref 11.7–15.4)
MCH RBC QN AUTO: 32.4 PG (ref 26.1–32.9)
MCHC RBC AUTO-ENTMCNC: 33.3 G/DL (ref 31.4–35)
MCV RBC AUTO: 97.3 FL (ref 79.6–97.8)
NRBC # BLD: 0 K/UL (ref 0–0.2)
PLATELET # BLD AUTO: 413 K/UL (ref 150–450)
PMV BLD AUTO: 9.4 FL (ref 9.4–12.3)
RBC # BLD AUTO: 2.99 M/UL (ref 4.05–5.2)
WBC # BLD AUTO: 14.9 K/UL (ref 4.3–11.1)

## 2020-07-03 PROCEDURE — 85027 COMPLETE CBC AUTOMATED: CPT

## 2020-07-03 PROCEDURE — 36415 COLL VENOUS BLD VENIPUNCTURE: CPT

## 2020-07-05 LAB
BACTERIA SPEC CULT: ABNORMAL
SERVICE CMNT-IMP: ABNORMAL

## 2020-07-06 LAB
ALBUMIN SERPL-MCNC: 2.2 G/DL (ref 3.5–5)
ALBUMIN/GLOB SERPL: 0.5 {RATIO} (ref 1.2–3.5)
ALP SERPL-CCNC: 168 U/L (ref 50–136)
ALT SERPL-CCNC: 28 U/L (ref 12–65)
ANION GAP SERPL CALC-SCNC: 6 MMOL/L (ref 7–16)
AST SERPL-CCNC: 16 U/L (ref 15–37)
BACTERIA SPEC CULT: NORMAL
BACTERIA SPEC CULT: NORMAL
BASOPHILS # BLD: 0.1 K/UL (ref 0–0.2)
BASOPHILS NFR BLD: 1 % (ref 0–2)
BILIRUB SERPL-MCNC: 0.2 MG/DL (ref 0.2–1.1)
BUN SERPL-MCNC: 17 MG/DL (ref 6–23)
CALCIUM SERPL-MCNC: 9 MG/DL (ref 8.3–10.4)
CHLORIDE SERPL-SCNC: 105 MMOL/L (ref 98–107)
CO2 SERPL-SCNC: 28 MMOL/L (ref 21–32)
CREAT SERPL-MCNC: 0.45 MG/DL (ref 0.6–1)
DIFFERENTIAL METHOD BLD: ABNORMAL
EOSINOPHIL # BLD: 0.4 K/UL (ref 0–0.8)
EOSINOPHIL NFR BLD: 6 % (ref 0.5–7.8)
ERYTHROCYTE [DISTWIDTH] IN BLOOD BY AUTOMATED COUNT: 13 % (ref 11.9–14.6)
GLOBULIN SER CALC-MCNC: 4.1 G/DL (ref 2.3–3.5)
GLUCOSE SERPL-MCNC: 95 MG/DL (ref 65–100)
HCT VFR BLD AUTO: 31.2 % (ref 35.8–46.3)
HGB BLD-MCNC: 9.6 G/DL (ref 11.7–15.4)
IMM GRANULOCYTES # BLD AUTO: 0.3 K/UL (ref 0–0.5)
IMM GRANULOCYTES NFR BLD AUTO: 4 % (ref 0–5)
LYMPHOCYTES # BLD: 0.5 K/UL (ref 0.5–4.6)
LYMPHOCYTES NFR BLD: 8 % (ref 13–44)
MCH RBC QN AUTO: 30.8 PG (ref 26.1–32.9)
MCHC RBC AUTO-ENTMCNC: 30.8 G/DL (ref 31.4–35)
MCV RBC AUTO: 100 FL (ref 79.6–97.8)
MONOCYTES # BLD: 0.8 K/UL (ref 0.1–1.3)
MONOCYTES NFR BLD: 12 % (ref 4–12)
NEUTS SEG # BLD: 4.3 K/UL (ref 1.7–8.2)
NEUTS SEG NFR BLD: 69 % (ref 43–78)
NRBC # BLD: 0 K/UL (ref 0–0.2)
PLATELET # BLD AUTO: 439 K/UL (ref 150–450)
PMV BLD AUTO: 9.5 FL (ref 9.4–12.3)
POTASSIUM SERPL-SCNC: 4.3 MMOL/L (ref 3.5–5.1)
PROT SERPL-MCNC: 6.3 G/DL (ref 6.3–8.2)
RBC # BLD AUTO: 3.12 M/UL (ref 4.05–5.2)
SARS-COV-2, COV2NT: NOT DETECTED
SERVICE CMNT-IMP: NORMAL
SERVICE CMNT-IMP: NORMAL
SODIUM SERPL-SCNC: 139 MMOL/L (ref 136–145)
WBC # BLD AUTO: 6.3 K/UL (ref 4.3–11.1)

## 2020-07-06 PROCEDURE — 36415 COLL VENOUS BLD VENIPUNCTURE: CPT

## 2020-07-06 PROCEDURE — 80053 COMPREHEN METABOLIC PANEL: CPT

## 2020-07-06 PROCEDURE — 85025 COMPLETE CBC W/AUTO DIFF WBC: CPT

## 2020-07-23 ENCOUNTER — ANESTHESIA EVENT (OUTPATIENT)
Dept: SURGERY | Age: 60
DRG: 853 | End: 2020-07-23
Payer: MEDICARE

## 2020-07-23 ENCOUNTER — HOSPITAL ENCOUNTER (INPATIENT)
Age: 60
LOS: 5 days | Discharge: NURSING, REHAB OR LONG-TERM CARE | DRG: 853 | End: 2020-07-28
Attending: EMERGENCY MEDICINE | Admitting: HOSPITALIST
Payer: MEDICARE

## 2020-07-23 ENCOUNTER — APPOINTMENT (OUTPATIENT)
Dept: GENERAL RADIOLOGY | Age: 60
DRG: 853 | End: 2020-07-23
Attending: EMERGENCY MEDICINE
Payer: MEDICARE

## 2020-07-23 DIAGNOSIS — L89.154 PRESSURE INJURY OF SACRAL REGION, STAGE 4 (HCC): ICD-10-CM

## 2020-07-23 DIAGNOSIS — D72.829 LEUKOCYTOSIS, UNSPECIFIED TYPE: ICD-10-CM

## 2020-07-23 DIAGNOSIS — L89.214 PRESSURE INJURY OF RIGHT HIP, STAGE 4 (HCC): ICD-10-CM

## 2020-07-23 DIAGNOSIS — N39.0 URINARY TRACT INFECTION WITH HEMATURIA, SITE UNSPECIFIED: Primary | ICD-10-CM

## 2020-07-23 DIAGNOSIS — R31.9 URINARY TRACT INFECTION WITH HEMATURIA, SITE UNSPECIFIED: Primary | ICD-10-CM

## 2020-07-23 DIAGNOSIS — I95.9 HYPOTENSION, UNSPECIFIED HYPOTENSION TYPE: ICD-10-CM

## 2020-07-23 PROBLEM — A41.9 SEPSIS (HCC): Status: ACTIVE | Noted: 2020-07-23

## 2020-07-23 LAB
ALBUMIN SERPL-MCNC: 2.4 G/DL (ref 3.5–5)
ALBUMIN/GLOB SERPL: 0.6 {RATIO} (ref 1.2–3.5)
ALP SERPL-CCNC: 262 U/L (ref 50–136)
ALT SERPL-CCNC: 136 U/L (ref 12–65)
ANION GAP SERPL CALC-SCNC: 9 MMOL/L (ref 7–16)
APPEARANCE UR: ABNORMAL
AST SERPL-CCNC: 162 U/L (ref 15–37)
BACTERIA URNS QL MICRO: ABNORMAL /HPF
BASOPHILS # BLD: 0 K/UL (ref 0–0.2)
BASOPHILS NFR BLD: 0 % (ref 0–2)
BILIRUB SERPL-MCNC: 0.6 MG/DL (ref 0.2–1.1)
BILIRUB UR QL: ABNORMAL
BUN SERPL-MCNC: 22 MG/DL (ref 6–23)
CALCIUM SERPL-MCNC: 8.7 MG/DL (ref 8.3–10.4)
CASTS URNS QL MICRO: ABNORMAL /LPF
CHLORIDE SERPL-SCNC: 100 MMOL/L (ref 98–107)
CO2 SERPL-SCNC: 25 MMOL/L (ref 21–32)
COLOR UR: ABNORMAL
CREAT SERPL-MCNC: 0.74 MG/DL (ref 0.6–1)
DIFFERENTIAL METHOD BLD: ABNORMAL
EOSINOPHIL # BLD: 0.1 K/UL (ref 0–0.8)
EOSINOPHIL NFR BLD: 1 % (ref 0.5–7.8)
ERYTHROCYTE [DISTWIDTH] IN BLOOD BY AUTOMATED COUNT: 13.2 % (ref 11.9–14.6)
GLOBULIN SER CALC-MCNC: 4 G/DL (ref 2.3–3.5)
GLUCOSE SERPL-MCNC: 123 MG/DL (ref 65–100)
GLUCOSE UR STRIP.AUTO-MCNC: NEGATIVE MG/DL
HCT VFR BLD AUTO: 25.7 % (ref 35.8–46.3)
HGB BLD-MCNC: 8.3 G/DL (ref 11.7–15.4)
HGB UR QL STRIP: ABNORMAL
IMM GRANULOCYTES # BLD AUTO: 0.1 K/UL (ref 0–0.5)
IMM GRANULOCYTES NFR BLD AUTO: 1 % (ref 0–5)
KETONES UR QL STRIP.AUTO: ABNORMAL MG/DL
LACTATE SERPL-SCNC: 1.4 MMOL/L (ref 0.4–2)
LEUKOCYTE ESTERASE UR QL STRIP.AUTO: ABNORMAL
LYMPHOCYTES # BLD: 0.2 K/UL (ref 0.5–4.6)
LYMPHOCYTES NFR BLD: 1 % (ref 13–44)
MCH RBC QN AUTO: 31.9 PG (ref 26.1–32.9)
MCHC RBC AUTO-ENTMCNC: 32.3 G/DL (ref 31.4–35)
MCV RBC AUTO: 98.8 FL (ref 79.6–97.8)
MONOCYTES # BLD: 0.4 K/UL (ref 0.1–1.3)
MONOCYTES NFR BLD: 2 % (ref 4–12)
NEUTS SEG # BLD: 20.1 K/UL (ref 1.7–8.2)
NEUTS SEG NFR BLD: 96 % (ref 43–78)
NITRITE UR QL STRIP.AUTO: POSITIVE
NRBC # BLD: 0 K/UL (ref 0–0.2)
OTHER OBSERVATIONS,UCOM: ABNORMAL
PH UR STRIP: 5 [PH] (ref 5–9)
PLATELET # BLD AUTO: 542 K/UL (ref 150–450)
PMV BLD AUTO: 9.8 FL (ref 9.4–12.3)
POTASSIUM SERPL-SCNC: 4.6 MMOL/L (ref 3.5–5.1)
PROCALCITONIN SERPL-MCNC: 1.48 NG/ML
PROT SERPL-MCNC: 6.4 G/DL (ref 6.3–8.2)
PROT UR STRIP-MCNC: ABNORMAL MG/DL
RBC # BLD AUTO: 2.6 M/UL (ref 4.05–5.2)
RBC #/AREA URNS HPF: ABNORMAL /HPF
SODIUM SERPL-SCNC: 134 MMOL/L (ref 136–145)
SP GR UR REFRACTOMETRY: 1.02 (ref 1–1.02)
TROPONIN-HIGH SENSITIVITY: 5.2 PG/ML (ref 0–14)
UROBILINOGEN UR QL STRIP.AUTO: 1 EU/DL (ref 0.2–1)
WBC # BLD AUTO: 20.9 K/UL (ref 4.3–11.1)
WBC URNS QL MICRO: ABNORMAL /HPF
YEAST URNS QL MICRO: ABNORMAL

## 2020-07-23 PROCEDURE — 80053 COMPREHEN METABOLIC PANEL: CPT

## 2020-07-23 PROCEDURE — 97605 NEG PRS WND THER DME<=50SQCM: CPT | Performed by: SURGERY

## 2020-07-23 PROCEDURE — 87106 FUNGI IDENTIFICATION YEAST: CPT

## 2020-07-23 PROCEDURE — 81001 URINALYSIS AUTO W/SCOPE: CPT

## 2020-07-23 PROCEDURE — 96368 THER/DIAG CONCURRENT INF: CPT

## 2020-07-23 PROCEDURE — 85025 COMPLETE CBC W/AUTO DIFF WBC: CPT

## 2020-07-23 PROCEDURE — 87040 BLOOD CULTURE FOR BACTERIA: CPT

## 2020-07-23 PROCEDURE — 65270000029 HC RM PRIVATE

## 2020-07-23 PROCEDURE — 11047 DBRDMT BONE EACH ADDL: CPT | Performed by: SURGERY

## 2020-07-23 PROCEDURE — 87086 URINE CULTURE/COLONY COUNT: CPT

## 2020-07-23 PROCEDURE — 74011250636 HC RX REV CODE- 250/636: Performed by: EMERGENCY MEDICINE

## 2020-07-23 PROCEDURE — 74011250636 HC RX REV CODE- 250/636: Performed by: INTERNAL MEDICINE

## 2020-07-23 PROCEDURE — 84484 ASSAY OF TROPONIN QUANT: CPT

## 2020-07-23 PROCEDURE — 71045 X-RAY EXAM CHEST 1 VIEW: CPT

## 2020-07-23 PROCEDURE — 74011000258 HC RX REV CODE- 258: Performed by: EMERGENCY MEDICINE

## 2020-07-23 PROCEDURE — 84145 PROCALCITONIN (PCT): CPT

## 2020-07-23 PROCEDURE — 96375 TX/PRO/DX INJ NEW DRUG ADDON: CPT

## 2020-07-23 PROCEDURE — 74011250636 HC RX REV CODE- 250/636: Performed by: HOSPITALIST

## 2020-07-23 PROCEDURE — 11044 DBRDMT BONE 1ST 20 SQ CM/<: CPT | Performed by: SURGERY

## 2020-07-23 PROCEDURE — 99222 1ST HOSP IP/OBS MODERATE 55: CPT | Performed by: SURGERY

## 2020-07-23 PROCEDURE — 80074 ACUTE HEPATITIS PANEL: CPT

## 2020-07-23 PROCEDURE — 87635 SARS-COV-2 COVID-19 AMP PRB: CPT

## 2020-07-23 PROCEDURE — 74011000258 HC RX REV CODE- 258: Performed by: HOSPITALIST

## 2020-07-23 PROCEDURE — 74011250637 HC RX REV CODE- 250/637: Performed by: HOSPITALIST

## 2020-07-23 PROCEDURE — 36415 COLL VENOUS BLD VENIPUNCTURE: CPT

## 2020-07-23 PROCEDURE — 87205 SMEAR GRAM STAIN: CPT

## 2020-07-23 PROCEDURE — 87088 URINE BACTERIA CULTURE: CPT

## 2020-07-23 PROCEDURE — 77030018836 HC SOL IRR NACL ICUM -A

## 2020-07-23 PROCEDURE — 93005 ELECTROCARDIOGRAM TRACING: CPT | Performed by: EMERGENCY MEDICINE

## 2020-07-23 PROCEDURE — 96365 THER/PROPH/DIAG IV INF INIT: CPT

## 2020-07-23 PROCEDURE — 99285 EMERGENCY DEPT VISIT HI MDM: CPT

## 2020-07-23 PROCEDURE — 87186 SC STD MICRODIL/AGAR DIL: CPT

## 2020-07-23 PROCEDURE — 83605 ASSAY OF LACTIC ACID: CPT

## 2020-07-23 PROCEDURE — 87150 DNA/RNA AMPLIFIED PROBE: CPT

## 2020-07-23 RX ORDER — SODIUM CHLORIDE, SODIUM LACTATE, POTASSIUM CHLORIDE, CALCIUM CHLORIDE 600; 310; 30; 20 MG/100ML; MG/100ML; MG/100ML; MG/100ML
100 INJECTION, SOLUTION INTRAVENOUS CONTINUOUS
Status: CANCELLED | OUTPATIENT
Start: 2020-07-23 | End: 2020-07-24

## 2020-07-23 RX ORDER — METRONIDAZOLE 500 MG/100ML
500 INJECTION, SOLUTION INTRAVENOUS
Status: COMPLETED | OUTPATIENT
Start: 2020-07-23 | End: 2020-07-23

## 2020-07-23 RX ORDER — VANCOMYCIN HYDROCHLORIDE
1250 ONCE
Status: DISCONTINUED | OUTPATIENT
Start: 2020-07-23 | End: 2020-07-23

## 2020-07-23 RX ORDER — SODIUM CHLORIDE 0.9 % (FLUSH) 0.9 %
5-40 SYRINGE (ML) INJECTION EVERY 8 HOURS
Status: DISCONTINUED | OUTPATIENT
Start: 2020-07-23 | End: 2020-07-28 | Stop reason: HOSPADM

## 2020-07-23 RX ORDER — MORPHINE SULFATE 2 MG/ML
2 INJECTION, SOLUTION INTRAMUSCULAR; INTRAVENOUS ONCE
Status: COMPLETED | OUTPATIENT
Start: 2020-07-23 | End: 2020-07-23

## 2020-07-23 RX ORDER — SODIUM CHLORIDE 0.9 % (FLUSH) 0.9 %
5-10 SYRINGE (ML) INJECTION AS NEEDED
Status: DISCONTINUED | OUTPATIENT
Start: 2020-07-23 | End: 2020-07-28 | Stop reason: HOSPADM

## 2020-07-23 RX ORDER — PROMETHAZINE HYDROCHLORIDE 25 MG/1
12.5 TABLET ORAL
Status: DISCONTINUED | OUTPATIENT
Start: 2020-07-23 | End: 2020-07-28 | Stop reason: HOSPADM

## 2020-07-23 RX ORDER — SODIUM CHLORIDE 9 MG/ML
125 INJECTION, SOLUTION INTRAVENOUS CONTINUOUS
Status: DISCONTINUED | OUTPATIENT
Start: 2020-07-23 | End: 2020-07-28 | Stop reason: HOSPADM

## 2020-07-23 RX ORDER — VANCOMYCIN HYDROCHLORIDE
1250 ONCE
Status: COMPLETED | OUTPATIENT
Start: 2020-07-23 | End: 2020-07-23

## 2020-07-23 RX ORDER — POLYETHYLENE GLYCOL 3350 17 G/17G
17 POWDER, FOR SOLUTION ORAL DAILY PRN
Status: DISCONTINUED | OUTPATIENT
Start: 2020-07-23 | End: 2020-07-28 | Stop reason: HOSPADM

## 2020-07-23 RX ORDER — ACETAMINOPHEN 325 MG/1
650 TABLET ORAL
Status: DISCONTINUED | OUTPATIENT
Start: 2020-07-23 | End: 2020-07-28 | Stop reason: HOSPADM

## 2020-07-23 RX ORDER — ACETAMINOPHEN 650 MG/1
650 SUPPOSITORY RECTAL
Status: DISCONTINUED | OUTPATIENT
Start: 2020-07-23 | End: 2020-07-28 | Stop reason: HOSPADM

## 2020-07-23 RX ORDER — ENOXAPARIN SODIUM 100 MG/ML
40 INJECTION SUBCUTANEOUS DAILY
Status: DISCONTINUED | OUTPATIENT
Start: 2020-07-23 | End: 2020-07-24

## 2020-07-23 RX ORDER — SODIUM CHLORIDE 0.9 % (FLUSH) 0.9 %
5-40 SYRINGE (ML) INJECTION AS NEEDED
Status: DISCONTINUED | OUTPATIENT
Start: 2020-07-23 | End: 2020-07-28 | Stop reason: HOSPADM

## 2020-07-23 RX ORDER — LIDOCAINE HYDROCHLORIDE 10 MG/ML
0.1 INJECTION INFILTRATION; PERINEURAL AS NEEDED
Status: CANCELLED | OUTPATIENT
Start: 2020-07-23

## 2020-07-23 RX ORDER — MORPHINE SULFATE 2 MG/ML
2 INJECTION, SOLUTION INTRAMUSCULAR; INTRAVENOUS
Status: DISCONTINUED | OUTPATIENT
Start: 2020-07-23 | End: 2020-07-28 | Stop reason: HOSPADM

## 2020-07-23 RX ORDER — METRONIDAZOLE 500 MG/100ML
500 INJECTION, SOLUTION INTRAVENOUS EVERY 12 HOURS
Status: DISCONTINUED | OUTPATIENT
Start: 2020-07-23 | End: 2020-07-27

## 2020-07-23 RX ORDER — ONDANSETRON 2 MG/ML
4 INJECTION INTRAMUSCULAR; INTRAVENOUS
Status: DISCONTINUED | OUTPATIENT
Start: 2020-07-23 | End: 2020-07-28 | Stop reason: HOSPADM

## 2020-07-23 RX ORDER — MIDAZOLAM HYDROCHLORIDE 1 MG/ML
2 INJECTION, SOLUTION INTRAMUSCULAR; INTRAVENOUS
Status: CANCELLED | OUTPATIENT
Start: 2020-07-23 | End: 2020-07-24

## 2020-07-23 RX ADMIN — SODIUM CHLORIDE 1000 ML: 900 INJECTION, SOLUTION INTRAVENOUS at 09:08

## 2020-07-23 RX ADMIN — METRONIDAZOLE 500 MG: 500 INJECTION, SOLUTION INTRAVENOUS at 20:41

## 2020-07-23 RX ADMIN — SODIUM CHLORIDE 125 ML/HR: 900 INJECTION, SOLUTION INTRAVENOUS at 04:02

## 2020-07-23 RX ADMIN — SODIUM CHLORIDE 1000 ML: 900 INJECTION, SOLUTION INTRAVENOUS at 00:53

## 2020-07-23 RX ADMIN — SODIUM CHLORIDE 1000 ML: 900 INJECTION, SOLUTION INTRAVENOUS at 16:16

## 2020-07-23 RX ADMIN — SODIUM CHLORIDE 125 ML/HR: 900 INJECTION, SOLUTION INTRAVENOUS at 13:28

## 2020-07-23 RX ADMIN — METRONIDAZOLE 500 MG: 500 INJECTION, SOLUTION INTRAVENOUS at 01:40

## 2020-07-23 RX ADMIN — VANCOMYCIN HYDROCHLORIDE 1250 MG: 10 INJECTION, POWDER, LYOPHILIZED, FOR SOLUTION INTRAVENOUS at 02:19

## 2020-07-23 RX ADMIN — VANCOMYCIN HYDROCHLORIDE 1000 MG: 1 INJECTION, POWDER, LYOPHILIZED, FOR SOLUTION INTRAVENOUS at 17:37

## 2020-07-23 RX ADMIN — MORPHINE SULFATE 2 MG: 2 INJECTION, SOLUTION INTRAMUSCULAR; INTRAVENOUS at 16:34

## 2020-07-23 RX ADMIN — Medication 10 ML: at 22:00

## 2020-07-23 RX ADMIN — CEFEPIME HYDROCHLORIDE 2 G: 2 INJECTION, POWDER, FOR SOLUTION INTRAVENOUS at 12:00

## 2020-07-23 RX ADMIN — Medication 10 ML: at 04:05

## 2020-07-23 RX ADMIN — Medication 5 ML: at 16:36

## 2020-07-23 RX ADMIN — METRONIDAZOLE 500 MG: 500 INJECTION, SOLUTION INTRAVENOUS at 16:32

## 2020-07-23 RX ADMIN — ACETAMINOPHEN 650 MG: 325 TABLET, FILM COATED ORAL at 04:49

## 2020-07-23 RX ADMIN — CEFEPIME HYDROCHLORIDE 2 G: 2 INJECTION, POWDER, FOR SOLUTION INTRAVENOUS at 01:38

## 2020-07-23 RX ADMIN — CEFEPIME HYDROCHLORIDE 2 G: 2 INJECTION, POWDER, FOR SOLUTION INTRAVENOUS at 23:56

## 2020-07-23 NOTE — ED PROVIDER NOTES
HPI:  60-year-old female was brought in from rehab facility with an episode of hypotension. She is currently there after she was admitted to the hospital for right hip wound infection that was involved with the bones. She apparently has stage IV decubitus ulcer with osteomyelitis per medical record. Was treated with vancomycin renally dose along with cefepime and Flagyl up to June 26 of 2020. They apparently lost her IV tonight. She was hypotensive. EMS was called. She was hypotensive for EMS as well. They were unable to establish an IV. Patient herself feels fine. She said the only problem she has is pain in her right hip which is chronic from her ulcer. She denies any chest pain, shortness of breath. Denies any GI symptoms. No vomiting. Does not feel nauseous. Does not know what her normal blood pressure is. She has a Mathew catheter. ROS  Constitutional: No fever, no chills  Skin: no rash  Eye: No vision changes  ENMT: No sore throat  Respiratory: No shortness of breath, no cough  Cardiovascular: No chest pain, no palpitations  Gastrointestinal: No vomiting, no nausea, no diarrhea, no abdominal pain  : No dysuria  MSK: No back pain, no muscle pain, + joint pain  Neuro: No headache, no change in mental status, no numbness, no tingling, no weakness  Psych:   Endocrine:   All other review of systems positive per history of present illness and the above otherwise negative or noncontributory. There were no vitals taken for this visit.   Past Medical History:   Diagnosis Date    Decubitus ulcer of ischial area     Hypertension     Kidney infection     Menopause     MS (multiple sclerosis) ([facility] [address]      Past Surgical History:   Procedure Laterality Date    COLONOSCOPY N/A 6/29/2020    COLONOSCOPY /Room 424 performed by Arsalan [facility]ier [facility] END[facility]COPY    ERCP  5/15/2020         HX CHOLECYSTECTOMY  05/18/2020     Prior to Admission Medications   Prescriptions Last Dose Informant Patient Reported? Taking? HYDROcodone-acetaminophen (XODOL)  mg tab per tablet   Yes No   Sig: Take 1 Tab by mouth four (4) times daily as needed. amantadine HCL (SYMMETREL) 100 mg capsule   Yes No   Sig: Take 100 mg by mouth two (2) times a day. fingolimod 0.5 mg cap   No No   Sig: Take 0.5 mg by mouth daily. metoprolol tartrate (LOPRESSOR) 25 mg tablet   No No   Sig: Take 0.5 Tabs by mouth two (2) times a day. Facility-Administered Medications: None         Adult Exam   General: alert, no acute distress  Head: normocephalic, atraumatic  ENT: moist mucous membranes  Pale appearing subconjunctivas. Neck: supple, non-tender; full range of motion  Cardiovascular: regular rate and rhythm, normal peripheral perfusion, no edema, equal pulses   Respiratory:  normal respirations; no wheezing, rales or rhonchi  Gastrointestinal: soft, non-tender; no rebound or guarding, no peritoneal signs, no distension  RT hip stage IV decubitus ulcer with packing in place. Mild associated erythema. Slightly warm to the touch. She also has a stage II ulcer noted at the sacrum  Back: non-tender, full range of motion  Musculoskeletal: Decreased strength throughout the lower extremities bilaterally. Appears somewhat stiff. Neurological: alert and oriented x 4, no gross focal deficits; normal speech  Psychiatric: cooperative; appropriate mood and affect    MDM:  Abdomen is soft. Reported hypotension. We will obtain labs here including lactic acid. Will check vitals here. Evaluate for possible signs of infection. Will obtain chest x-ray however she has no cough. Will obtain urinalysis. She has an indwelling Mathew catheter we may need to change prior to obtaining urine. She has a pale appearance of conjunctiva. Likely secondary to anemia. Which could potentially cause hypotension. She apparently had a colonoscopy recently in the EMR. She has hemorrhoids. Sigmoid colon with mass versus healing ulcer base.   It was biopsied. Also diverticulosis. Could be source of bleeding and anemia.    1:21 AM  EKG rate of 88. Normal sinus rhythm with normal axis and interval.  No STEMI or ischemic changes noted. No ectopy or Brugada. Leukocytosis of 20,000 today. Blood pressure is improving with IV fluid. I suspect her source is her decubitus ulcer. She was given vancomycin, cefepime, Flagyl previously. We will start her back on IV dosing of dose exact medication. She will need to be admitted to the hospitalist service for further assessment and treatment. 1:56 AM  Of note her urine is also showing signs of infection. I spoke with hospitalist for admission. Her blood pressure is significantly improved at this point compared to arrival.    CRITICAL CARE Documentation: This patient is critically ill and there is a high probability of of imminent or life threatening deterioration in the patient's condition without immediate management. The nature of the patient's clinical problem is: uti, decubitus ulcer, hypotension, sepsis     I have spent 35 minutes in direct patient care, documentation, review of labs/xrays/old records, discussion with Colleague . The time involved in the performance of separately reportable procedures was not counted toward critical care time. Kenney Haro MD; 7/23/2020 @1:56 AM           Dragon voice recognition software was used to create this note. Although the note has been reviewed and corrected where necessary, additional errors may have been overlooked and remain in the text.

## 2020-07-23 NOTE — ED NOTES
TRANSFER - OUT REPORT:    Verbal report given to [facility] RN  on Néstor Freitas  being transferred to 3rd floor room 346 for routine progression of care       Report consisted of patients Situation, Background, Assessment and   Recommendations(SBAR). Information from the following report(s) SBAR was reviewed with the receiving nurse. Lines:   Peripheral IV 07/23/20 Left Antecubital (Active)       Peripheral IV 07/23/20 Right Hand (Active)        Opportunity for questions and clarification was provided.       Patient transported with:   Registered Nurse

## 2020-07-23 NOTE — CONSULTS
H&P/Consult Note/Progress Note/Office Note:   Asuncion Oleary  MRN: [id number]  :1960  Age:59 y.o.    HPI: Asuncion Oleary is a 63 y.o. female who we are asked by Dr. Eli How to see for right ischial wound with OM. This was previously debrided by Dr. Malissa Rosario on 2020. Pt had a wound vac post operatively. She went to [facility] postoperatively and was transferred to [facility] due to hypotension and fever. She was admitted with sepsis likely due to infected decubitus ulcer. WBC 20. Blood cultures NGTD. General surgery was consulted for evaluation of her right ischial wound.       Past Medical History:   Diagnosis Date    Decubitus ulcer of ischial area     Hypertension     Kidney infection     Menopause     MS (multiple sclerosis) ([facility] [address]      Past Surgical History:   Procedure Laterality Date    COLONOSCOPY N/A 2020    COLONOSCOPY /Room 424 performed by Nia Gamez MD at [facility] ENDOSCOPY    ERCP  5/15/2020         HX CHOLECYSTECTOMY  2020     Current Facility-Administered Medications   Medication Dose Route Frequency    sodium chloride (NS) flush 5-10 mL  5-10 mL IntraVENous PRN    0.9% sodium chloride infusion  125 mL/hr IntraVENous CONTINUOUS    sodium chloride (NS) flush 5-40 mL  5-40 mL IntraVENous Q8H    sodium chloride (NS) flush 5-40 mL  5-40 mL IntraVENous PRN    acetaminophen (TYLENOL) tablet 650 mg  650 mg Oral Q6H PRN    Or    acetaminophen (TYLENOL) suppository 650 mg  650 mg Rectal Q6H PRN    polyethylene glycol (MIRALAX) packet 17 g  17 g Oral DAILY PRN    promethazine (PHENERGAN) tablet 12.5 mg  12.5 mg Oral Q6H PRN    Or    ondansetron (ZOFRAN) injection 4 mg  4 mg IntraVENous Q6H PRN    [Held by provider] enoxaparin (LOVENOX) injection 40 mg  40 mg SubCUTAneous DAILY    cefepime (MAXIPIME) 2 g in 0.9% sodium chloride (MBP/ADV) 100 mL  2 g IntraVENous Q12H    metroNIDAZOLE (FLAGYL) IVPB premix 500 mg  500 mg IntraVENous Q12H    vancomycin (VANCOCIN) 1,000 mg in 0.9% sodium chloride (MBP/ADV) 250 mL  1,000 mg IntraVENous Q12H    sodium chloride 0.9 % bolus infusion 1,000 mL  1,000 mL IntraVENous ONCE     Latex; Norco [hydrocodone-acetaminophen]; and Pcn [penicillins]  Social History     Socioeconomic History    Marital status:      Spouse name: Not on file    Number of children: Not on file    Years of education: Not on file    Highest education level: Not on file   Tobacco Use    Smoking status: Former Smoker   Substance and Sexual Activity    Alcohol use: Not Currently    Drug use: Not Currently     Social History     Tobacco Use   Smoking Status Former Smoker     Family History   Problem Relation Age of Onset    Breast Cancer Mother      ROS: The patient has no difficulty with chest pain or shortness of breath.  + fever/chills. Comprehensive review of systems was otherwise unremarkable except as noted above. Physical Exam:   Visit Vitals  BP (!) 76/50 (BP 1 Location: Right arm, BP Patient Position: At rest)   Pulse (!) 110   Temp 98.6 °F (37 °C)   Resp 16   Wt 98 lb (44.5 kg)   SpO2 96%   BMI 15.12 kg/m²     Constitutional: lethargic; cooperative patient in no acute distress; appears stated age    Eyes:Sclera are clear. EOMs intact  ENMT: no external lesions gross hearing normal; no obvious neck masses, no ear or lip lesions, nares normal  CV: tachy; Normal perfusion  Resp: No JVD. Breathing is  non-labored; no audible wheezing. GI: soft and non-distended    Integument:  Sacral and ischial decubitus ulcers with surrounding DTI   Neuro:  Awake, alert.     Recent vitals (if inpt):  Patient Vitals for the past 24 hrs:   BP Temp Pulse Resp SpO2 Weight   07/23/20 0830 (!) 76/50 -- -- -- -- --   07/23/20 0745 (!) 80/52 98.6 °F (37 °C) (!) 110 16 96 % --   07/23/20 0608 -- 99.3 °F (37.4 °C) -- -- -- --   07/23/20 0432 108/68 (!) 103 °F (39.4 °C) 86 24 95 % --   07/23/20 0300 135/66 98.9 °F (37.2 °C) (!) 114 26 94 % --   07/23/20 0215 129/62 -- (!) 110 26 -- --   07/23/20 0200 115/59 -- (!) 105 21 94 % --   07/23/20 0130 110/59 -- 95 20 98 % --   07/23/20 0118 -- -- -- -- -- 98 lb (44.5 kg)   07/23/20 0100 95/51 -- 94 21 97 % --   07/23/20 0045 (!) 83/49 98.2 °F (36.8 °C) 90 18 97 % --   07/23/20 0020 (!) 80/45 -- -- -- -- --       Labs:  Recent Labs     07/23/20  0039   WBC 20.9*   HGB 8.3*   *   *   K 4.6      CO2 25   BUN 22   CREA 0.74   *   TBILI 0.6   *   *       Lab Results   Component Value Date/Time    WBC 20.9 (H) 07/23/2020 12:39 AM    HGB 8.3 (L) 07/23/2020 12:39 AM    PLATELET 177 (H) 81/01/1673 12:39 AM    Sodium 134 (L) 07/23/2020 12:39 AM    Potassium 4.6 07/23/2020 12:39 AM    Chloride 100 07/23/2020 12:39 AM    CO2 25 07/23/2020 12:39 AM    BUN 22 07/23/2020 12:39 AM    Creatinine 0.74 07/23/2020 12:39 AM    Glucose 123 (H) 07/23/2020 12:39 AM    Bilirubin, total 0.6 07/23/2020 12:39 AM    ALT (SGPT) 136 (H) 07/23/2020 12:39 AM    Alk. phosphatase 262 (H) 07/23/2020 12:39 AM    Troponin-I, Qt. <0.02 (L) 05/13/2020 01:53 AM       I reviewed recent labs and recent radiologic studies. I independently reviewed radiology images for studies I described above or studies I have ordered.    Admission date (for inpatients): 7/23/2020   * No surgery found *  * No surgery found *    ASSESSMENT/PLAN:  Problem List  Date Reviewed: 6/29/2020          Codes Class Noted    * (Principal) Sepsis ([facility] ICD-10-CM: A41.9  ICD-9-CM: 038.9, 995.91  7/23/2020        Osteomyelitis of sacroiliac region [facility]) ICD-10-CM: M46.28  ICD-9-CM: 730.28  5/20/2020        Choledocholithiasis ICD-10-CM: K80.50  ICD-9-CM: 574.50  5/16/2020        GLENROY (acute kidney injury) ([facility] ICD-10-CM: N17.9  ICD-9-CM: 584.9  5/13/2020        Pressure injury of sacral region, stage 4 ([facility] ICD-10-CM: E68.352  ICD-9-CM: 707.03, 707.24  5/13/2020        Transaminitis ICD-10-CM: R74.0  ICD-9-CM: 790.4  5/13/2020        Acute metabolic encephalopathy CGG-85-IQ: G93.41  ICD-9-CM: 348.31  5/13/2020        Multiple sclerosis ([facility] [address] ICD-10-CM: G35  ICD-9-CM: 983  5/13/2020        Protein calorie malnutrition ([facility] [address] ICD-10-CM: E46  ICD-9-CM: 263.9  5/13/2020        Leukocytosis ICD-10-CM: O55.144  ICD-9-CM: 288.60  5/13/2020            Principal Problem:    Sepsis ([facility] [address] (7/23/2020)    Active Problems:    Pressure injury of sacral region, stage 4 ([facility] [address] (5/13/2020)      Multiple sclerosis ([facility] [address] (5/13/2020)      Protein calorie malnutrition ([facility] (5/13/2020)      Osteomyelitis of sacroiliac region [facility]) (5/20/2020)       Plan:    NPO  Hold Lovenox  Consent for debridement of right ischial wound tomorrow AM (7/24) with Dr. Linda Mcdonald      Signed:  MICHELINE Adams     I have personally performed a face-to-face diagnostic evaluation and management  service on this patient. I have independently seen the patient. I have independently obtained the above history from the patient/family. I have independently examined the patient with above findings. I have independently reviewed data/labs for this patient and developed the above plan of care.     [place] [id number] [facility]

## 2020-07-23 NOTE — ED TRIAGE NOTES
Pt sent from [facility] and rehab because her IV infiltrated and they could not start another IV with her blood pressure dropping low

## 2020-07-23 NOTE — PROGRESS NOTES
Care Management Interventions  Transition of Care Consult (CM Consult): SNF  Partner SNF: Yes  Current Support Network: [address]  Discharge Location  Discharge Placement: Skilled nursing facility  Pt admitted from [facility]. Pt was admitted to [facility] and then transferred to [facility] on 5/22/20. SW spoke with Holly Melchor with admissions from [facility] who reports pt was admitted to  [facility] on 7/7/20 for STR. PT needs a COVID test with in 3-7 days of return to facility per Holly Melchor who also states pt can have a rapid test.    SARY following for return to [facility].   Yani Longoria

## 2020-07-23 NOTE — H&P (VIEW-ONLY)
H&P/Consult Note/Progress Note/Office Note:   Mega Avila  MRN: [id number]  :1960  Age:59 y.o.    HPI: Mega Avila is a 63 y.o. female who we are asked by Dr. Latanya Crowley to see for right ischial wound with OM. This was previously debrided by Dr. Dakota Laureano on 2020. Pt had a wound vac post operatively. She went to [facility] postoperatively and was transferred to [facility] due to hypotension and fever. She was admitted with sepsis likely due to infected decubitus ulcer. WBC 20. Blood cultures NGTD. General surgery was consulted for evaluation of her right ischial wound.       Past Medical History:   Diagnosis Date    Decubitus ulcer of ischial area     Hypertension     Kidney infection     Menopause     MS (multiple sclerosis) ([address]      Past Surgical History:   Procedure Laterality Date    COLONOSCOPY N/A 2020    COLONOSCOPY /Room 424 performed by April Marks MD at [facility] ENDOSCOPY    ERCP  5/15/2020         HX CHOLECYSTECTOMY  2020     Current Facility-Administered Medications   Medication Dose Route Frequency    sodium chloride (NS) flush 5-10 mL  5-10 mL IntraVENous PRN    0.9% sodium chloride infusion  125 mL/hr IntraVENous CONTINUOUS    sodium chloride (NS) flush 5-40 mL  5-40 mL IntraVENous Q8H    sodium chloride (NS) flush 5-40 mL  5-40 mL IntraVENous PRN    acetaminophen (TYLENOL) tablet 650 mg  650 mg Oral Q6H PRN    Or    acetaminophen (TYLENOL) suppository 650 mg  650 mg Rectal Q6H PRN    polyethylene glycol (MIRALAX) packet 17 g  17 g Oral DAILY PRN    promethazine (PHENERGAN) tablet 12.5 mg  12.5 mg Oral Q6H PRN    Or    ondansetron (ZOFRAN) injection 4 mg  4 mg IntraVENous Q6H PRN    [Held by provider] enoxaparin (LOVENOX) injection 40 mg  40 mg SubCUTAneous DAILY    cefepime (MAXIPIME) 2 g in 0.9% sodium chloride (MBP/ADV) 100 mL  2 g IntraVENous Q12H    metroNIDAZOLE (FLAGYL) IVPB premix 500 mg  500 mg IntraVENous Q12H    vancomycin (VANCOCIN) 1,000 mg in 0.9% sodium chloride (MBP/ADV) 250 mL  1,000 mg IntraVENous Q12H    sodium chloride 0.9 % bolus infusion 1,000 mL  1,000 mL IntraVENous ONCE     Latex; Norco [hydrocodone-acetaminophen]; and Pcn [penicillins]  Social History     Socioeconomic History    Marital status:      Spouse name: Not on file    Number of children: Not on file    Years of education: Not on file    Highest education level: Not on file   Tobacco Use    Smoking status: Former Smoker   Substance and Sexual Activity    Alcohol use: Not Currently    Drug use: Not Currently     Social History     Tobacco Use   Smoking Status Former Smoker     Family History   Problem Relation Age of Onset    Breast Cancer Mother      ROS: The patient has no difficulty with chest pain or shortness of breath.  + fever/chills. Comprehensive review of systems was otherwise unremarkable except as noted above. Physical Exam:   Visit Vitals  BP (!) 76/50 (BP 1 Location: Right arm, BP Patient Position: At rest)   Pulse (!) 110   Temp 98.6 °F (37 °C)   Resp 16   Wt 98 lb (44.5 kg)   SpO2 96%   BMI 15.12 kg/m²     Constitutional: lethargic; cooperative patient in no acute distress; appears stated age    Eyes:Sclera are clear. EOMs intact  ENMT: no external lesions gross hearing normal; no obvious neck masses, no ear or lip lesions, nares normal  CV: tachy; Normal perfusion  Resp: No JVD. Breathing is  non-labored; no audible wheezing. GI: soft and non-distended    Integument:  Sacral and ischial decubitus ulcers with surrounding DTI   Neuro:  Awake, alert.     Recent vitals (if inpt):  Patient Vitals for the past 24 hrs:   BP Temp Pulse Resp SpO2 Weight   07/23/20 0830 (!) 76/50 -- -- -- -- --   07/23/20 0745 (!) 80/52 98.6 °F (37 °C) (!) 110 16 96 % --   07/23/20 0608 -- 99.3 °F (37.4 °C) -- -- -- --   07/23/20 0432 108/68 (!) 103 °F (39.4 °C) 86 24 95 % --   07/23/20 0300 135/66 98.9 °F (37.2 °C) (!) 114 26 94 % --   07/23/20 0215 129/62 -- (!) 110 26 -- --   07/23/20 0200 115/59 -- (!) 105 21 94 % --   07/23/20 0130 110/59 -- 95 20 98 % --   07/23/20 0118 -- -- -- -- -- 98 lb (44.5 kg)   07/23/20 0100 95/51 -- 94 21 97 % --   07/23/20 0045 (!) 83/49 98.2 °F (36.8 °C) 90 18 97 % --   07/23/20 0020 (!) 80/45 -- -- -- -- --       Labs:  Recent Labs     07/23/20  0039   WBC 20.9*   HGB 8.3*   *   *   K 4.6      CO2 25   BUN 22   CREA 0.74   *   TBILI 0.6   *   *       Lab Results   Component Value Date/Time    WBC 20.9 (H) 07/23/2020 12:39 AM    HGB 8.3 (L) 07/23/2020 12:39 AM    PLATELET 392 (H) 44/54/0116 12:39 AM    Sodium 134 (L) 07/23/2020 12:39 AM    Potassium 4.6 07/23/2020 12:39 AM    Chloride 100 07/23/2020 12:39 AM    CO2 25 07/23/2020 12:39 AM    BUN 22 07/23/2020 12:39 AM    Creatinine 0.74 07/23/2020 12:39 AM    Glucose 123 (H) 07/23/2020 12:39 AM    Bilirubin, total 0.6 07/23/2020 12:39 AM    ALT (SGPT) 136 (H) 07/23/2020 12:39 AM    Alk. phosphatase 262 (H) 07/23/2020 12:39 AM    Troponin-I, Qt. <0.02 (L) 05/13/2020 01:53 AM       I reviewed recent labs and recent radiologic studies. I independently reviewed radiology images for studies I described above or studies I have ordered.    Admission date (for inpatients): 7/23/2020   * No surgery found *  * No surgery found *    ASSESSMENT/PLAN:  Problem List  Date Reviewed: 6/29/2020          Codes Class Noted    * (Principal) Sepsis ([facility] ICD-10-CM: A41.9  ICD-9-CM: 038.9, 995.91  7/23/2020        Osteomyelitis of sacroiliac region [facility]) ICD-10-CM: M46.28  ICD-9-CM: 730.28  5/20/2020        Choledocholithiasis ICD-10-CM: K80.50  ICD-9-CM: 574.50  5/16/2020        GLENROY (acute kidney injury) ([facility] ICD-10-CM: N17.9  ICD-9-CM: 584.9  5/13/2020        Pressure injury of sacral region, stage 4 ([facility] ICD-10-CM: B74.967  ICD-9-CM: 707.03, 707.24  5/13/2020        Transaminitis ICD-10-CM: R74.0  ICD-9-CM: 790.4  5/13/2020        Acute metabolic encephalopathy SUM-11-GS: G93.41  ICD-9-CM: 348.31  5/13/2020        Multiple sclerosis ([facility] ICD-10-CM: G35  ICD-9-CM: 975  5/13/2020        Protein calorie malnutrition ([facility] ICD-10-CM: E46  ICD-9-CM: 263.9  5/13/2020        Leukocytosis ICD-10-CM: B06.415  ICD-9-CM: 288.60  5/13/2020            Principal Problem:    Sepsis ([facility] (7/23/2020)    Active Problems:    Pressure injury of sacral region, stage 4 ([facility] (5/13/2020)      Multiple sclerosis ([facility] (5/13/2020)      Protein calorie malnutrition ([facility] (5/13/2020)      Osteomyelitis of sacroiliac region [facility]) (5/20/2020)       Plan:    NPO  Hold Lovenox  Consent for debridement of right ischial wound tomorrow AM (7/24) with Dr. Clark Melendez      Signed:  MICHELINE Beauchamp     I have personally performed a face-to-face diagnostic evaluation and management  service on this patient. I have independently seen the patient. I have independently obtained the above history from the patient/family. I have independently examined the patient with above findings. I have independently reviewed data/labs for this patient and developed the above plan of care.     [place] [id number] [facility]

## 2020-07-23 NOTE — CONSULTS
Infectious Disease Consult    Today's Date: 7/23/2020   Admit Date: 7/23/2020    Impression:   · Fever, leukocytosis. BC pending, UA abn/UCx pending: wasserman change- I think UTI more likely than wound. covid neg  · Abnormal AST/ALT  · Stage IV R ischial decub/OM, s/p 6 weeks IV Vanc/Cefep/Flagyl 6/25/20  · MS, with chronic debility and wasserman  · PCN allergy: swelling    Plan:   ·  Ask for a wasserman change then specimen collection, if not done since admission  · Hepatitis screen  · Surgical debridment planned - agree - please send cultures of any purulent material  · Cont vanc/cefepime/flagyl- if goes into shock, broaden to vanc + merrem  · Consider goals of care discussion - pt already DNR but may want to discuss pain control/comfort measures     Anti-infectives:   · Vanc 7/23-  · Cefepime 7/23/20-  · Flagyl 7/23/20-    Subjective:   Date of Consultation:  July 23, 2020  Referring Physician: Dr Vinny Mary    Patient is a 63 y.o. female well-known to infectious disease for recent admission in May for right ischio decubitus ulcer and osteomyelitis for which 6 weeks of vancomycin, cefepime and Flagyl were recommended and completed at [facility] on 6/25/2020. During this admission she also required a laparoscopic cholecystectomy for choledocholithiasis. She was sent to rehab after her admission at [facility], readmitted today with fever as high as 101-103, hypotensive, leukocytosis 20.9k, neutrophil predominant, procalcitonin 1.48, creatinine 0.74, chest x-ray negative, lactate 1.4, blood and urine cultures are pending, she does have a chronic Wasserman for her multiple sclerosis, UA markedly abnormal, however no evidence that this Wasserman was changed prior to specimen collection. Vancomycin, cefepime and Flagyl have been resumed, ID consulted for treatment recommendations. PCN allergy: swelling. Feels that she is not getting any better. Is in a lot of pain with movement.    .     Patient Active Problem List   Diagnosis Code    GLENROY (acute kidney injury) ([facility] N17.9    Pressure injury of sacral region, stage 4 ([facility]) L89.154    Transaminitis R74.0    Acute metabolic encephalopathy N12.05    Multiple sclerosis (HCC) G35    Protein calorie malnutrition (HCC) E46    Leukocytosis D72.829    Choledocholithiasis K80.50    Osteomyelitis of sacroiliac region ([facility]) M46.28    Sepsis ([facility]) A41.9     Past Medical History:   Diagnosis Date    Decubitus ulcer of ischial area     Hypertension     Kidney infection     Menopause     MS (multiple sclerosis) ([facility]       Family History   Problem Relation Age of Onset    Breast Cancer Mother       Social History     Tobacco Use    Smoking status: Former Smoker   Substance Use Topics    Alcohol use: Not Currently     Past Surgical History:   Procedure Laterality Date    COLONOSCOPY N/A 6/29/2020    COLONOSCOPY /Room 424 performed by Lin Drake MD at [facility] ENDOSCOPY    ERCP  5/15/2020         HX CHOLECYSTECTOMY  05/18/2020      Prior to Admission medications    Medication Sig Start Date End Date Taking? Authorizing Provider   fingolimod 0.5 mg cap Take 0.5 mg by mouth daily. 5/23/20  Yes Steve Levy, DO   metoprolol tartrate (LOPRESSOR) 25 mg tablet Take 0.5 Tabs by mouth two (2) times a day. 5/22/20  Yes Steve Levy DO   amantadine HCL (SYMMETREL) 100 mg capsule Take 100 mg by mouth two (2) times a day. Yes Augustina, MD Marga   HYDROcodone-acetaminophen (XODOL)  mg tab per tablet Take 1 Tab by mouth four (4) times daily as needed. Yes Other, MD Marga       Allergies   Allergen Reactions    Latex Rash    Norco [Hydrocodone-Acetaminophen] Other (comments)     Sees dead people    Pcn [Penicillins] Swelling        Review of Systems:  A comprehensive review of systems was negative except for that written in the History of Present Illness.     Objective:     Visit Vitals  BP (!) 76/50 (BP 1 Location: Right arm, BP Patient Position: At rest)   Pulse (!) 110   Temp 98.6 °F (37 °C)   Resp 16   Wt 44.5 kg (98 lb)   SpO2 96%   BMI 15.12 kg/m²     Temp (24hrs), Av.6 °F (37.6 °C), Min:98.2 °F (36.8 °C), Max:103 °F (39.4 °C)       Lines:  Peripheral IV:       Physical Exam:    General:  Alert, NAD, thin, contractured, uanble to move   Eyes:  Sclera anicteric. Pupils equally round and reactive to light. Mouth/Throat: Mucous membranes normal, oral pharynx clear       Lungs:   Clear to auscultation bilaterally, good effort   CV:  Regular rate and rhythm,no murmur, click, rub or gallop   Abdomen:   Soft, non-tender.  bowel sounds normal. non-distended   Extremities: No cyanosis or edema   Skin: R ischium with granulation tissue, no drainage, but is contaminated with stool   Lymph nodes: Cervical and supraclavicular normal   Musculoskeletal: No swelling or deformity   Lines/Devices:  Intact, no erythema, drainage or tenderness   Psych: Alert and oriented, normal mood affect given the setting       Data Review:     CBC:  Recent Labs     20   WBC 20.9*   GRANS 96*   MONOS 2*   EOS 1   ANEU 20.1*   ABL 0.2*   HGB 8.3*   HCT 25.7*   *       BMP:  Recent Labs     20   CREA 0.74   BUN 22   *   K 4.6      CO2 25   AGAP 9   *       LFTS:  Recent Labs     20   TBILI 0.6   *   *   TP 6.4   ALB 2.4*       Microbiology:     All Micro Results     Procedure Component Value Units Date/Time    CULTURE, URINE [[id number]] Collected:  20    Order Status:  Completed Specimen:  Cath Urine Updated:  20 8747    CULTURE, BLOOD [[id number]] Collected:  20    Order Status:  Completed Specimen:  Blood Updated:  20    CULTURE, BLOOD [[id number]] Collected:  20    Order Status:  Completed Specimen:  Blood Updated:  20          Imaging:   Reviewed CXr  - no PNA    Signed By: Bright Ruiz NP     2020

## 2020-07-23 NOTE — PROGRESS NOTES
Name: Sharad Hughes MRN: [id number]  : 1960  Age:59 y.o.  female  Admit Date:  2020 LOS: 0      Hospitalist Progress Note    Sharad Hughes is a 63 y.o. female with medical history significant for multiple sclerosis, chronic indwelling wasserman,ischial decubitus ulcer with osteomyelitis (recently completed antibiotics about a week ago) who was sent from [facility] rehab due to low blood pressure. Patient was noted to have fever of 101 °F at the facility. In May patient was admitted here for right history of 2 cubitus ulcer with OM and was treated with cefepime, vancomycin, Flagyl. She was discharged to [facility] subsequently to [facility] with rehab with 6 weeks of IV ABx. She was found to meet SIRS criteria with leukocytosis, tachycardia and fever(at outside facility) and admitted for sepsis likely secondary to infected decubitus ulcer. Subjective (20) : Patient is seen and examined at bedside. Patient appears lethargic but comfortable in bed. Opens eyes to voice but does not really answer questions. ROS: Unable to obtain due to patient status. Objective:    Patient Vitals for the past 24 hrs:   Temp Pulse Resp BP SpO2   20 0830    (!) 76/50    20 0745 98.6 °F (37 °C) (!) 110 16 (!) 80/52 96 %   20 0608 99.3 °F (37.4 °C)       20 0432 (!) 103 °F (39.4 °C) 86 24 108/68 95 %   20 0300 98.9 °F (37.2 °C) (!) 114 26 135/66 94 %   20 0215  (!) 110 26 129/62    20 0200  (!) 105 21 115/59 94 %   20 0130  95 20 110/59 98 %   20 0100  94 21 95/51 97 %   20 0045 98.2 °F (36.8 °C) 90 18 (!) 83/49 97 %   20 0020    (!) 80/45      Oxygen Therapy  O2 Sat (%): 96 % (20 0745)  Pulse via Oximetry: 106 beats per minute (20 0200)  O2 Device: Room air (20 0300)    Estimated body mass index is 15.12 kg/m² as calculated from the following:    Height as of 20: 5' 7.5\" (1.715 m).     Weight as of this encounter: 44.5 kg (98 lb). Intake/Output Summary (Last 24 hours) at 7/23/2020 0916  Last data filed at 7/23/2020 0400  Gross per 24 hour   Intake 1200 ml   Output 400 ml   Net 800 ml       *Note that automatically entered I/Os may not be accurate; dependent on patient compliance with collection and accurate  by techs. Physical Exam:   General:     Lethargic, appears comfortable in bed. Thin, malnourished. Head:   normocephalic, atraumatic  Eyes, Ears, nose: PERRL. Normal conjunctiva  Neck:    supple, non-tender. Trachea midline. Lungs:   CTAB, no wheezing, rhonchi, rales  Cardiac:   RRR, Normal S1 and S2   Abdomen:   Soft, non distended, nontender, +BS, no guarding/rebound  Extremities:   Warm, dry. No edema   Skin:   Stage IV sacral decubitus ulcers, stage IV right ischial decubitus ulcer with packing in place with surrounding warmth and mild erythema. Neuro:  Awakens to voice. Unable to assess    Data Review:  I have reviewed all labs, meds, and studies from the last 24 hours:    Recent Results (from the past 24 hour(s))   CBC WITH AUTOMATED DIFF    Collection Time: 07/23/20 12:39 AM   Result Value Ref Range    WBC 20.9 (H) 4.3 - 11.1 K/uL    RBC 2.60 (L) 4.05 - 5.2 M/uL    HGB 8.3 (L) 11.7 - 15.4 g/dL    HCT 25.7 (L) 35.8 - 46.3 %    MCV 98.8 (H) 79.6 - 97.8 FL    MCH 31.9 26.1 - 32.9 PG    MCHC 32.3 31.4 - 35.0 g/dL    RDW 13.2 11.9 - 14.6 %    PLATELET 761 (H) 668 - 450 K/uL    MPV 9.8 9.4 - 12.3 FL    ABSOLUTE NRBC 0.00 0.0 - 0.2 K/uL    DF AUTOMATED      NEUTROPHILS 96 (H) 43 - 78 %    LYMPHOCYTES 1 (L) 13 - 44 %    MONOCYTES 2 (L) 4.0 - 12.0 %    EOSINOPHILS 1 0.5 - 7.8 %    BASOPHILS 0 0.0 - 2.0 %    IMMATURE GRANULOCYTES 1 0.0 - 5.0 %    ABS. NEUTROPHILS 20.1 (H) 1.7 - 8.2 K/UL    ABS. LYMPHOCYTES 0.2 (L) 0.5 - 4.6 K/UL    ABS. MONOCYTES 0.4 0.1 - 1.3 K/UL    ABS. EOSINOPHILS 0.1 0.0 - 0.8 K/UL    ABS. BASOPHILS 0.0 0.0 - 0.2 K/UL    ABS. IMM.  GRANS. 0.1 0.0 - 0.5 K/UL METABOLIC PANEL, COMPREHENSIVE    Collection Time: 07/23/20 12:39 AM   Result Value Ref Range    Sodium 134 (L) 136 - 145 mmol/L    Potassium 4.6 3.5 - 5.1 mmol/L    Chloride 100 98 - 107 mmol/L    CO2 25 21 - 32 mmol/L    Anion gap 9 7 - 16 mmol/L    Glucose 123 (H) 65 - 100 mg/dL    BUN 22 6 - 23 MG/DL    Creatinine 0.74 0.6 - 1.0 MG/DL    GFR est AA >60 >60 ml/min/1.73m2    GFR est non-AA >60 >60 ml/min/1.73m2    Calcium 8.7 8.3 - 10.4 MG/DL    Bilirubin, total 0.6 0.2 - 1.1 MG/DL    ALT (SGPT) 136 (H) 12 - 65 U/L    AST (SGOT) 162 (H) 15 - 37 U/L    Alk. phosphatase 262 (H) 50 - 136 U/L    Protein, total 6.4 6.3 - 8.2 g/dL    Albumin 2.4 (L) 3.5 - 5.0 g/dL    Globulin 4.0 (H) 2.3 - 3.5 g/dL    A-G Ratio 0.6 (L) 1.2 - 3.5     TROPONIN-HIGH SENSITIVITY    Collection Time: 07/23/20 12:39 AM   Result Value Ref Range    Troponin-High Sensitivity 5.2 0 - 14 pg/mL   PROCALCITONIN    Collection Time: 07/23/20 12:39 AM   Result Value Ref Range    Procalcitonin 1.48 ng/mL   LACTIC ACID    Collection Time: 07/23/20 12:42 AM   Result Value Ref Range    Lactic acid 1.4 0.4 - 2.0 MMOL/L   URINALYSIS W/ RFLX MICROSCOPIC    Collection Time: 07/23/20 12:53 AM   Result Value Ref Range    Color DARIUSZ      Appearance TURBID      Specific gravity 1.024 (H) 1.001 - 1.023      pH (UA) 5.0 5.0 - 9.0      Protein TRACE (A) NEG mg/dL    Glucose Negative mg/dL    Ketone TRACE (A) NEG mg/dL    Bilirubin SMALL (A) NEG      Blood SMALL (A) NEG      Urobilinogen 1.0 0.2 - 1.0 EU/dL    Nitrites Positive (A) NEG      Leukocyte Esterase LARGE (A) NEG      WBC 10-20 0 /hpf    RBC 0-3 0 /hpf    Bacteria 3+ (H) 0 /hpf    Casts 0-3 0 /lpf    Yeast MODERATE      Other observations Microscopic performed on unspun urine. QNS to spin.      SARS-COV-2    Collection Time: 07/23/20  2:23 AM   Result Value Ref Range    Specimen source Nasopharyngeal      COVID-19 rapid test Not detected NOTD      SARS CoV-2 PENDING         All Micro Results     Procedure Component Value Units Date/Time    CULTURE, URINE [[id number]]     Order Status:  Sent Specimen:  Cath Urine     CULTURE, BLOOD [[id number]] Collected:  07/23/20 0105    Order Status:  Completed Specimen:  Blood Updated:  07/23/20 0108    CULTURE, BLOOD [[id number]] Collected:  07/23/20 0039    Order Status:  Completed Specimen:  Blood Updated:  07/23/20 0048          Current Meds:  Current Facility-Administered Medications   Medication Dose Route Frequency    sodium chloride (NS) flush 5-10 mL  5-10 mL IntraVENous PRN    0.9% sodium chloride infusion  125 mL/hr IntraVENous CONTINUOUS    sodium chloride (NS) flush 5-40 mL  5-40 mL IntraVENous Q8H    sodium chloride (NS) flush 5-40 mL  5-40 mL IntraVENous PRN    acetaminophen (TYLENOL) tablet 650 mg  650 mg Oral Q6H PRN    Or    acetaminophen (TYLENOL) suppository 650 mg  650 mg Rectal Q6H PRN    polyethylene glycol (MIRALAX) packet 17 g  17 g Oral DAILY PRN    promethazine (PHENERGAN) tablet 12.5 mg  12.5 mg Oral Q6H PRN    Or    ondansetron (ZOFRAN) injection 4 mg  4 mg IntraVENous Q6H PRN    [Held by provider] enoxaparin (LOVENOX) injection 40 mg  40 mg SubCUTAneous DAILY    cefepime (MAXIPIME) 2 g in 0.9% sodium chloride (MBP/ADV) 100 mL  2 g IntraVENous Q12H    metroNIDAZOLE (FLAGYL) IVPB premix 500 mg  500 mg IntraVENous Q12H    vancomycin (VANCOCIN) 1,000 mg in 0.9% sodium chloride (MBP/ADV) 250 mL  1,000 mg IntraVENous Q12H    sodium chloride 0.9 % bolus infusion 1,000 mL  1,000 mL IntraVENous ONCE         Other Studies:  Xr Chest Port    Result Date: 7/23/2020  Portable chest xray  COMPARISON: Hypotension, infectious evaluation INDICATION: May 22, 2020 FINDINGS: Heart appears mildly enlarged. Mediastinal contour is within normal limits. No pulmonary consolidation, pneumothorax or pulmonary edema. No large pleural effusion. Bones are osteopenic. IMPRESSION: No pulmonary consolidation or pulmonary edema.       Assessment:    Active Hospital Problems    Diagnosis Date Noted    Sepsis ([facility] 07/23/2020    Osteomyelitis of sacroiliac region [facility]) 05/20/2020    Multiple sclerosis ([facility] 05/13/2020    Protein calorie malnutrition ([facility] 05/13/2020    Pressure injury of sacral region, stage 4 ([facility] 05/13/2020       Plan:  Sepsis(on admission) likely secondary to infected right decubitus ulcer  Right ischial decubitus ulcer with osteomyelitis  Met SIRS criteria on admission with fever(from outside facility), leukocytosis and tachycardia.  Continue with cefepime, vancomycin, Flagyl   ID consulted   IV fluids   Follow-up blood cultures   General surgery consulted for possible debridement    UTI  Patient has indwelling Mathew catheter. UA consistent with UTI.  On antibiotics as above   Follow-up urine culture    Multiple sclerosis: Continue with home medications    Moderate to severe PCM: nutrition consulted    Diet:  DIET NPO  DVT PPx: lovenox  Code: DNR  Dispo: pending clinical course and PT/OT Eval  Estimated Discharge: TBD based on clinical course    DPOA: Pt's sister Ms Peggy Medina    Labs/Imaging Reviewed. Patient is high risk due to current condition and comorbid conditions as well as requiring frequent monitoring. Plan discussed with staff, patient/family and are in agreement. Time Spent: Greater than 45 minutes was spent in reviewing charts, physical exam, discussion with patient, and answered any questions.     Signed By: Chel Cartwright MD     July 23, 2020

## 2020-07-23 NOTE — PROGRESS NOTES
TRANSFER - IN REPORT:    Verbal report received from Emelyn(name) on González Ojeda  being received from ED(unit) for routine progression of care      Report consisted of patients Situation, Background, Assessment and   Recommendations(SBAR). Information from the following report(s) SBAR, Kardex and MAR was reviewed with the receiving nurse. Opportunity for questions and clarification was provided. Assessment completed upon patients arrival to unit and care assumed.

## 2020-07-23 NOTE — H&P
Hospitalist Note     Admit Date:  2020 12:19 AM   Name:  Umm Street   Age:  63 y.o.  :  1960   MRN:  [id number]   PCP:  Alex Ulrich MD  Treatment Team: Attending Provider: Peng Tsang MD; Primary Nurse: Alden Sharma    HPI/Subjective:   Chief complaint: Low blood pressure    This is a 31-year-old female with past medical history significant for multiple sclerosis, ischial decubitus ulcer with osteomyelitis, recently completed antibiotics about a week ago was sent from [facility] rehab facility today because of low blood pressure. Patient reports having fever with temperature of 101 F this evening. No chest pain no palpitations. No nausea no vomiting. In May patient was admitted for right ischial decubitus ulcer with osteomyelitis and was treated with cefepime vancomycin and Flagyl. She was subsequently discharged to [facility] facility and subsequently to [facility] the rehab facility. She was on IV antibiotics for 6 weeks. She denies any abdominal pain constipation or diarrhea. She has chronic indwelling Mathew catheter. 10 systems reviewed and negative except as noted in HPI.   Past Medical History:   Diagnosis Date    Decubitus ulcer of ischial area     Hypertension     Kidney infection     Menopause     MS (multiple sclerosis) ([facility] [address]       Past Surgical History:   Procedure Laterality Date    COLONOSCOPY N/A 2020    COLONOSCOPY /Room 424 performed by Javier Fitzpatrick MD at [facility] ENDOSCOPY    ERCP  5/15/2020         HX CHOLECYSTECTOMY  2020      Allergies   Allergen Reactions    Latex Rash    Norco [Hydrocodone-Acetaminophen] Other (comments)     Sees dead people    Pcn [Penicillins] Swelling      Social History     Tobacco Use    Smoking status: Former Smoker   Substance Use Topics    Alcohol use: Not Currently      Family History   Problem Relation Age of Onset   Anton Breast Cancer Mother       Immunization History   Administered Date(s) Administered   Anton TB Skin Test (PPD) Intradermal 05/13/2020     PTA Medications:  Prior to Admission Medications   Prescriptions Last Dose Informant Patient Reported? Taking? HYDROcodone-acetaminophen (XODOL)  mg tab per tablet   Yes No   Sig: Take 1 Tab by mouth four (4) times daily as needed. amantadine HCL (SYMMETREL) 100 mg capsule   Yes No   Sig: Take 100 mg by mouth two (2) times a day. fingolimod 0.5 mg cap   No No   Sig: Take 0.5 mg by mouth daily. metoprolol tartrate (LOPRESSOR) 25 mg tablet   No No   Sig: Take 0.5 Tabs by mouth two (2) times a day. Facility-Administered Medications: None       Objective:     Patient Vitals for the past 24 hrs:   Temp Pulse Resp BP SpO2   07/23/20 0300 98.9 °F (37.2 °C) (!) 114 26 135/66 94 %   07/23/20 0215 -- (!) 110 26 129/62 --   07/23/20 0200 -- (!) 105 21 115/59 94 %   07/23/20 0130 -- 95 20 110/59 98 %   07/23/20 0100 -- 94 21 95/51 97 %   07/23/20 0045 98.2 °F (36.8 °C) 90 18 (!) 83/49 97 %   07/23/20 0020 -- -- -- (!) 80/45 --     Oxygen Therapy  O2 Sat (%): 94 % (07/23/20 0300)  Pulse via Oximetry: 106 beats per minute (07/23/20 0200)  O2 Device: Room air (07/23/20 0300)      Intake/Output Summary (Last 24 hours) at 7/23/2020 0306  Last data filed at 7/23/2020 0245  Gross per 24 hour   Intake 1200 ml   Output --   Net 1200 ml       *Note that automatically entered I/Os may not be accurate; dependent on patient compliance with collection and accurate  by assistants. Physical Exam:  General:    Thin, malnourished, no overt distress,  Eyes:   Pallor,  Extraocular movements intact. HENT:  Normocephalic, atraumatic.  dry mucous membranes  CV:   RRR. No m/r/g. Lungs:  CTAB. No wheezing, rhonchi, or rales. Abdomen: Soft, nontender, nondistended. Extremities: Warm and dry. No cyanosis or edema. Neurologic: CN II-XII grossly intact. Sensation intact.   Skin:     Stage IV sacral decubitus ulcer, stage IV right ischial decubitus ulcer with packing in place, warm, associated mild erythema,  Psych:  Normal mood and affect. I reviewed the labs, imaging, EKGs, telemetry, and other studies done this admission. Data Review:   Recent Results (from the past 24 hour(s))   CBC WITH AUTOMATED DIFF    Collection Time: 07/23/20 12:39 AM   Result Value Ref Range    WBC 20.9 (H) 4.3 - 11.1 K/uL    RBC 2.60 (L) 4.05 - 5.2 M/uL    HGB 8.3 (L) 11.7 - 15.4 g/dL    HCT 25.7 (L) 35.8 - 46.3 %    MCV 98.8 (H) 79.6 - 97.8 FL    MCH 31.9 26.1 - 32.9 PG    MCHC 32.3 31.4 - 35.0 g/dL    RDW 13.2 11.9 - 14.6 %    PLATELET 291 (H) 711 - 450 K/uL    MPV 9.8 9.4 - 12.3 FL    ABSOLUTE NRBC 0.00 0.0 - 0.2 K/uL    DF AUTOMATED      NEUTROPHILS 96 (H) 43 - 78 %    LYMPHOCYTES 1 (L) 13 - 44 %    MONOCYTES 2 (L) 4.0 - 12.0 %    EOSINOPHILS 1 0.5 - 7.8 %    BASOPHILS 0 0.0 - 2.0 %    IMMATURE GRANULOCYTES 1 0.0 - 5.0 %    ABS. NEUTROPHILS 20.1 (H) 1.7 - 8.2 K/UL    ABS. LYMPHOCYTES 0.2 (L) 0.5 - 4.6 K/UL    ABS. MONOCYTES 0.4 0.1 - 1.3 K/UL    ABS. EOSINOPHILS 0.1 0.0 - 0.8 K/UL    ABS. BASOPHILS 0.0 0.0 - 0.2 K/UL    ABS. IMM. GRANS. 0.1 0.0 - 0.5 K/UL   METABOLIC PANEL, COMPREHENSIVE    Collection Time: 07/23/20 12:39 AM   Result Value Ref Range    Sodium 134 (L) 136 - 145 mmol/L    Potassium 4.6 3.5 - 5.1 mmol/L    Chloride 100 98 - 107 mmol/L    CO2 25 21 - 32 mmol/L    Anion gap 9 7 - 16 mmol/L    Glucose 123 (H) 65 - 100 mg/dL    BUN 22 6 - 23 MG/DL    Creatinine 0.74 0.6 - 1.0 MG/DL    GFR est AA >60 >60 ml/min/1.73m2    GFR est non-AA >60 >60 ml/min/1.73m2    Calcium 8.7 8.3 - 10.4 MG/DL    Bilirubin, total 0.6 0.2 - 1.1 MG/DL    ALT (SGPT) 136 (H) 12 - 65 U/L    AST (SGOT) 162 (H) 15 - 37 U/L    Alk.  phosphatase 262 (H) 50 - 136 U/L    Protein, total 6.4 6.3 - 8.2 g/dL    Albumin 2.4 (L) 3.5 - 5.0 g/dL    Globulin 4.0 (H) 2.3 - 3.5 g/dL    A-G Ratio 0.6 (L) 1.2 - 3.5     TROPONIN-HIGH SENSITIVITY    Collection Time: 07/23/20 12:39 AM   Result Value Ref Range    Troponin-High Sensitivity 5.2 0 - 14 pg/mL   PROCALCITONIN    Collection Time: 07/23/20 12:39 AM   Result Value Ref Range    Procalcitonin 1.48 ng/mL   LACTIC ACID    Collection Time: 07/23/20 12:42 AM   Result Value Ref Range    Lactic acid 1.4 0.4 - 2.0 MMOL/L   URINALYSIS W/ RFLX MICROSCOPIC    Collection Time: 07/23/20 12:53 AM   Result Value Ref Range    Color DARIUSZ      Appearance TURBID      Specific gravity 1.024 (H) 1.001 - 1.023      pH (UA) 5.0 5.0 - 9.0      Protein TRACE (A) NEG mg/dL    Glucose Negative mg/dL    Ketone TRACE (A) NEG mg/dL    Bilirubin SMALL (A) NEG      Blood SMALL (A) NEG      Urobilinogen 1.0 0.2 - 1.0 EU/dL    Nitrites Positive (A) NEG      Leukocyte Esterase LARGE (A) NEG      WBC 10-20 0 /hpf    RBC 0-3 0 /hpf    Bacteria 3+ (H) 0 /hpf    Casts 0-3 0 /lpf    Yeast MODERATE      Other observations Microscopic performed on unspun urine. QNS to spin.      SARS-COV-2    Collection Time: 07/23/20  2:23 AM   Result Value Ref Range    Specimen source Nasopharyngeal      COVID-19 rapid test Not detected NOTD      SARS CoV-2 PENDING        All Micro Results     Procedure Component Value Units Date/Time    CULTURE, URINE [[id number]]     Order Status:  Sent Specimen:  Cath Urine     CULTURE, BLOOD [[id number]] Collected:  07/23/20 0105    Order Status:  Completed Specimen:  Blood Updated:  07/23/20 0108    CULTURE, BLOOD [[id number]] Collected:  07/23/20 0039    Order Status:  Completed Specimen:  Blood Updated:  07/23/20 0048          Current Facility-Administered Medications   Medication Dose Route Frequency    vancomycin (VANCOCIN) 1250 mg in  ml infusion  1,250 mg IntraVENous ONCE    sodium chloride (NS) flush 5-10 mL  5-10 mL IntraVENous PRN    0.9% sodium chloride infusion  125 mL/hr IntraVENous CONTINUOUS    sodium chloride (NS) flush 5-40 mL  5-40 mL IntraVENous Q8H    sodium chloride (NS) flush 5-40 mL  5-40 mL IntraVENous PRN    acetaminophen (TYLENOL) tablet 650 mg  650 mg Oral Q6H PRN Or    acetaminophen (TYLENOL) suppository 650 mg  650 mg Rectal Q6H PRN    polyethylene glycol (MIRALAX) packet 17 g  17 g Oral DAILY PRN    promethazine (PHENERGAN) tablet 12.5 mg  12.5 mg Oral Q6H PRN    Or    ondansetron (ZOFRAN) injection 4 mg  4 mg IntraVENous Q6H PRN    enoxaparin (LOVENOX) injection 40 mg  40 mg SubCUTAneous DAILY    cefepime (MAXIPIME) 2 g in 0.9% sodium chloride (MBP/ADV) 100 mL  2 g IntraVENous Q12H    metroNIDAZOLE (FLAGYL) IVPB premix 500 mg  500 mg IntraVENous Q12H    vancomycin (VANCOCIN) 1,000 mg in 0.9% sodium chloride (MBP/ADV) 250 mL  1,000 mg IntraVENous Q12H     Current Outpatient Medications   Medication Sig    fingolimod 0.5 mg cap Take 0.5 mg by mouth daily.  metoprolol tartrate (LOPRESSOR) 25 mg tablet Take 0.5 Tabs by mouth two (2) times a day.  amantadine HCL (SYMMETREL) 100 mg capsule Take 100 mg by mouth two (2) times a day.  HYDROcodone-acetaminophen (XODOL)  mg tab per tablet Take 1 Tab by mouth four (4) times daily as needed. Other Studies:  No results found. Assessment and Plan:     Hospital Problems as of 7/23/2020 Date Reviewed: 6/29/2020          Codes Class Noted - Resolved POA    * (Principal) Sepsis ([facility] ICD-10-CM: A41.9  ICD-9-CM: 038.9, 995.91  7/23/2020 - Present Unknown        Osteomyelitis of sacroiliac region [facility]) ICD-10-CM: M46.28  ICD-9-CM: 730.28  5/20/2020 - Present Yes        Pressure injury of sacral region, stage 4 (HCC) ICD-10-CM: R39.306  ICD-9-CM: 707.03, 707.24  5/13/2020 - Present Yes        Multiple sclerosis ([facility] ICD-10-CM: G35  ICD-9-CM: 954  5/13/2020 - Present Yes        Protein calorie malnutrition ([facility] ICD-10-CM: E46  ICD-9-CM: 263.9  5/13/2020 - Present Yes              Plan:     This is a 52-year-old female with    Sepsis most likely secondary to infected decubitus ulcer on the right hip  Fever (at outside facility) leukocytosis, tachycardia with underlying infection, thus meeting criteria for sepsis  Cefepime Vancomycin and Flagyl. IV fluids 30 mL/kg per sepsis protocol. Will check procalcitonin. Lactic acid within normal limits. Blood cultures x2. ID consulted. Appreciate recommendations. General surgery consulted. Appreciate recommendations. Decubitus ulcer with osteomyelitis of the right ischium:  General surgery consulted. Appreciate recommendations. ? UTI  Urine cultures sent. Cefepime     Multiple sclerosis  Continue home medications. Moderate to severe Protein calorie malnutrition:  Nutrition consulted. Appreciate recommendations. Nutritional supplements. Discharge planning: Medical, inpatient. Anticipate return to rehab facility on discharge.     DVT ppx: Lovenox    Code status:  DNR    DPOA: Pt's sister Ms Ginette Lainez    Estimated LOS:  Greater than 2 midnights    Risk:  high    Signed:  Tushar Barros MD

## 2020-07-23 NOTE — PROGRESS NOTES
Low blood pressure      D: Spoke to Dr. Lena Finch and informed BP taken by PCT of 80/52. Rechecked 76/50 and 74/47 and Pulse of 110. A: New order for 1 Liter bolus to be given. R: Will continue to monitor.

## 2020-07-23 NOTE — PROGRESS NOTES
Patient arrived room 346 via stretcher accompanied by nursing staff. Patient is oriented to room. Patient is alert and oriented X4. Dual skin assessment completed with Maged Bush. Stage IV ulcer noted on buttocks. Scattered scars on bilateral lower extremities. Mathew is intact. Bed is low, locked and call light within reach.

## 2020-07-23 NOTE — REHAB NOTE
PT/OT note: Checked on patient today and spoke with RN who agrees with therapy that maybe we hold today. There is the possibility of surgical debridement today as well. Also with her blood pressure being so low we would prefer for her to be more medically stable. We will check on patient tomorrow.   Meka Jarquin, PT

## 2020-07-23 NOTE — PROGRESS NOTES
Pharmacokinetic Consult to Pharmacist    Yareliskimberley Tavo is a 63 y.o. female being treated for BJI with vancomycin. Weight: 44.5 kg (98 lb)  Lab Results   Component Value Date/Time    BUN 22 07/23/2020 12:39 AM    Creatinine 0.74 07/23/2020 12:39 AM    WBC 20.9 (H) 07/23/2020 12:39 AM    Procalcitonin 1.48 07/23/2020 12:39 AM    Lactic acid 1.4 07/23/2020 12:42 AM      Estimated Creatinine Clearance: 58.2 mL/min (based on SCr of 0.74 mg/dL). Day 1 of vancomycin. Goal trough is 15-20. Vancomycin dose initiated at 1250 mg x 1 dose; followed by Vanc 1000 mg IV q12h. Will continue to follow patient and order levels when clinically indicated. Thank you,  Author Tirso, PharmD.

## 2020-07-23 NOTE — PROGRESS NOTES
Problem: Falls - Risk of  Goal: *Absence of Falls  Description: Document Narcisa Deleon Fall Risk and appropriate interventions in the flowsheet. Outcome: Progressing Towards Goal  Note: Fall Risk Interventions:  Mobility Interventions: Communicate number of staff needed for ambulation/transfer, Bed/chair exit alarm, PT Consult for mobility concerns, PT Consult for assist device competence    Mentation Interventions: Adequate sleep, hydration, pain control, Bed/chair exit alarm    Medication Interventions: Assess postural VS orthostatic hypotension, Bed/chair exit alarm    Elimination Interventions: Call light in reach, Patient to call for help with toileting needs              Problem: Patient Education: Go to Patient Education Activity  Goal: Patient/Family Education  Outcome: Progressing Towards Goal     Problem: Pressure Injury - Risk of  Goal: *Prevention of pressure injury  Description: Document Kimani Scale and appropriate interventions in the flowsheet.   Outcome: Progressing Towards Goal  Note: Pressure Injury Interventions:  Sensory Interventions: Assess changes in LOC, Minimize linen layers, Keep linens dry and wrinkle-free    Moisture Interventions: Absorbent underpads, Maintain skin hydration (lotion/cream), Limit adult briefs    Activity Interventions: Increase time out of bed, Chair cushion, PT/OT evaluation    Mobility Interventions: HOB 30 degrees or less, Pressure redistribution bed/mattress (bed type), PT/OT evaluation    Nutrition Interventions: Document food/fluid/supplement intake, Offer support with meals,snacks and hydration    Friction and Shear Interventions: Minimize layers, HOB 30 degrees or less, Foam dressings/transparent film/skin sealants                Problem: Patient Education: Go to Patient Education Activity  Goal: Patient/Family Education  Outcome: Progressing Towards Goal

## 2020-07-24 ENCOUNTER — ANESTHESIA (OUTPATIENT)
Dept: SURGERY | Age: 60
DRG: 853 | End: 2020-07-24
Payer: MEDICARE

## 2020-07-24 LAB
ALBUMIN SERPL-MCNC: 2 G/DL (ref 3.5–5)
ALBUMIN/GLOB SERPL: 0.5 {RATIO} (ref 1.2–3.5)
ALP SERPL-CCNC: 283 U/L (ref 50–136)
ALT SERPL-CCNC: 99 U/L (ref 12–65)
ANION GAP SERPL CALC-SCNC: 8 MMOL/L (ref 7–16)
AST SERPL-CCNC: 54 U/L (ref 15–37)
BASOPHILS # BLD: 0 K/UL (ref 0–0.2)
BASOPHILS NFR BLD: 0 % (ref 0–2)
BILIRUB SERPL-MCNC: 0.5 MG/DL (ref 0.2–1.1)
BUN SERPL-MCNC: 23 MG/DL (ref 6–23)
CALCIUM SERPL-MCNC: 8.4 MG/DL (ref 8.3–10.4)
CHLORIDE SERPL-SCNC: 111 MMOL/L (ref 98–107)
CO2 SERPL-SCNC: 20 MMOL/L (ref 21–32)
COVID-19 RAPID TEST, COVR: NOT DETECTED
CREAT SERPL-MCNC: 0.5 MG/DL (ref 0.6–1)
DIFFERENTIAL METHOD BLD: ABNORMAL
EOSINOPHIL # BLD: 0.1 K/UL (ref 0–0.8)
EOSINOPHIL NFR BLD: 1 % (ref 0.5–7.8)
ERYTHROCYTE [DISTWIDTH] IN BLOOD BY AUTOMATED COUNT: 13.2 % (ref 11.9–14.6)
GLOBULIN SER CALC-MCNC: 3.7 G/DL (ref 2.3–3.5)
GLUCOSE SERPL-MCNC: 89 MG/DL (ref 65–100)
HAV IGM SERPL QL IA: NEGATIVE
HBV CORE IGM SERPL QL IA: NEGATIVE
HBV SURFACE AG SERPL QL IA: NEGATIVE
HCT VFR BLD AUTO: 25 % (ref 35.8–46.3)
HCV AB S/CO SERPL IA: <0.1 S/CO RATIO (ref 0–0.9)
HGB BLD-MCNC: 7.9 G/DL (ref 11.7–15.4)
IMM GRANULOCYTES # BLD AUTO: 0.1 K/UL (ref 0–0.5)
IMM GRANULOCYTES NFR BLD AUTO: 1 % (ref 0–5)
LYMPHOCYTES # BLD: 0.2 K/UL (ref 0.5–4.6)
LYMPHOCYTES NFR BLD: 1 % (ref 13–44)
MCH RBC QN AUTO: 31.2 PG (ref 26.1–32.9)
MCHC RBC AUTO-ENTMCNC: 31.6 G/DL (ref 31.4–35)
MCV RBC AUTO: 98.8 FL (ref 79.6–97.8)
MONOCYTES # BLD: 0.4 K/UL (ref 0.1–1.3)
MONOCYTES NFR BLD: 3 % (ref 4–12)
NEUTS SEG # BLD: 14.3 K/UL (ref 1.7–8.2)
NEUTS SEG NFR BLD: 94 % (ref 43–78)
NRBC # BLD: 0 K/UL (ref 0–0.2)
PLATELET # BLD AUTO: 441 K/UL (ref 150–450)
PMV BLD AUTO: 10.2 FL (ref 9.4–12.3)
POTASSIUM SERPL-SCNC: 3.6 MMOL/L (ref 3.5–5.1)
PROT SERPL-MCNC: 5.7 G/DL (ref 6.3–8.2)
RBC # BLD AUTO: 2.53 M/UL (ref 4.05–5.2)
SARS COV-2, XPGCVT: NEGATIVE
SODIUM SERPL-SCNC: 139 MMOL/L (ref 136–145)
SOURCE, COVRS: NORMAL
VANCOMYCIN TROUGH SERPL-MCNC: 13.6 UG/ML (ref 5–20)
WBC # BLD AUTO: 15.2 K/UL (ref 4.3–11.1)

## 2020-07-24 PROCEDURE — 74011250636 HC RX REV CODE- 250/636: Performed by: NURSE ANESTHETIST, CERTIFIED REGISTERED

## 2020-07-24 PROCEDURE — 87040 BLOOD CULTURE FOR BACTERIA: CPT

## 2020-07-24 PROCEDURE — 65270000029 HC RM PRIVATE

## 2020-07-24 PROCEDURE — 87075 CULTR BACTERIA EXCEPT BLOOD: CPT

## 2020-07-24 PROCEDURE — 36415 COLL VENOUS BLD VENIPUNCTURE: CPT

## 2020-07-24 PROCEDURE — 74011250636 HC RX REV CODE- 250/636: Performed by: HOSPITALIST

## 2020-07-24 PROCEDURE — 77030040361 HC SLV COMPR DVT MDII -B: Performed by: SURGERY

## 2020-07-24 PROCEDURE — 76010000138 HC OR TIME 0.5 TO 1 HR: Performed by: SURGERY

## 2020-07-24 PROCEDURE — 74011250636 HC RX REV CODE- 250/636: Performed by: INTERNAL MEDICINE

## 2020-07-24 PROCEDURE — 74011250636 HC RX REV CODE- 250/636: Performed by: ANESTHESIOLOGY

## 2020-07-24 PROCEDURE — 80202 ASSAY OF VANCOMYCIN: CPT

## 2020-07-24 PROCEDURE — 88305 TISSUE EXAM BY PATHOLOGIST: CPT

## 2020-07-24 PROCEDURE — 87077 CULTURE AEROBIC IDENTIFY: CPT

## 2020-07-24 PROCEDURE — 87205 SMEAR GRAM STAIN: CPT

## 2020-07-24 PROCEDURE — 74011250636 HC RX REV CODE- 250/636: Performed by: SURGERY

## 2020-07-24 PROCEDURE — 0QB20ZZ EXCISION OF RIGHT PELVIC BONE, OPEN APPROACH: ICD-10-PCS | Performed by: SURGERY

## 2020-07-24 PROCEDURE — 76210000006 HC OR PH I REC 0.5 TO 1 HR: Performed by: SURGERY

## 2020-07-24 PROCEDURE — 87186 SC STD MICRODIL/AGAR DIL: CPT

## 2020-07-24 PROCEDURE — 77030010509 HC AIRWY LMA MSK TELE -A: Performed by: ANESTHESIOLOGY

## 2020-07-24 PROCEDURE — 77030040922 HC BLNKT HYPOTHRM STRY -A: Performed by: ANESTHESIOLOGY

## 2020-07-24 PROCEDURE — 74011000258 HC RX REV CODE- 258: Performed by: SURGERY

## 2020-07-24 PROCEDURE — 74750000023 HC WOUND THERAPY

## 2020-07-24 PROCEDURE — 74011000250 HC RX REV CODE- 250: Performed by: NURSE ANESTHETIST, CERTIFIED REGISTERED

## 2020-07-24 PROCEDURE — 88311 DECALCIFY TISSUE: CPT

## 2020-07-24 PROCEDURE — 80053 COMPREHEN METABOLIC PANEL: CPT

## 2020-07-24 PROCEDURE — 77030019952 HC CANSTR VAC ASST KCON -B: Performed by: SURGERY

## 2020-07-24 PROCEDURE — 76060000032 HC ANESTHESIA 0.5 TO 1 HR: Performed by: SURGERY

## 2020-07-24 PROCEDURE — 85025 COMPLETE CBC W/AUTO DIFF WBC: CPT

## 2020-07-24 RX ORDER — DIPHENHYDRAMINE HYDROCHLORIDE 50 MG/ML
12.5 INJECTION, SOLUTION INTRAMUSCULAR; INTRAVENOUS
Status: DISCONTINUED | OUTPATIENT
Start: 2020-07-24 | End: 2020-07-24 | Stop reason: HOSPADM

## 2020-07-24 RX ORDER — SODIUM CHLORIDE, SODIUM LACTATE, POTASSIUM CHLORIDE, CALCIUM CHLORIDE 600; 310; 30; 20 MG/100ML; MG/100ML; MG/100ML; MG/100ML
75 INJECTION, SOLUTION INTRAVENOUS CONTINUOUS
Status: DISCONTINUED | OUTPATIENT
Start: 2020-07-24 | End: 2020-07-24 | Stop reason: HOSPADM

## 2020-07-24 RX ORDER — DEXAMETHASONE SODIUM PHOSPHATE 4 MG/ML
INJECTION, SOLUTION INTRA-ARTICULAR; INTRALESIONAL; INTRAMUSCULAR; INTRAVENOUS; SOFT TISSUE AS NEEDED
Status: DISCONTINUED | OUTPATIENT
Start: 2020-07-24 | End: 2020-07-24 | Stop reason: HOSPADM

## 2020-07-24 RX ORDER — HYDROMORPHONE HYDROCHLORIDE 2 MG/ML
0.2 INJECTION, SOLUTION INTRAMUSCULAR; INTRAVENOUS; SUBCUTANEOUS
Status: COMPLETED | OUTPATIENT
Start: 2020-07-24 | End: 2020-07-24

## 2020-07-24 RX ORDER — FLUMAZENIL 0.1 MG/ML
0.2 INJECTION INTRAVENOUS
Status: DISCONTINUED | OUTPATIENT
Start: 2020-07-24 | End: 2020-07-24 | Stop reason: HOSPADM

## 2020-07-24 RX ORDER — PROPOFOL 10 MG/ML
INJECTION, EMULSION INTRAVENOUS AS NEEDED
Status: DISCONTINUED | OUTPATIENT
Start: 2020-07-24 | End: 2020-07-24 | Stop reason: HOSPADM

## 2020-07-24 RX ORDER — FENTANYL CITRATE 50 UG/ML
INJECTION, SOLUTION INTRAMUSCULAR; INTRAVENOUS AS NEEDED
Status: DISCONTINUED | OUTPATIENT
Start: 2020-07-24 | End: 2020-07-24 | Stop reason: HOSPADM

## 2020-07-24 RX ORDER — ONDANSETRON 2 MG/ML
INJECTION INTRAMUSCULAR; INTRAVENOUS AS NEEDED
Status: DISCONTINUED | OUTPATIENT
Start: 2020-07-24 | End: 2020-07-24 | Stop reason: HOSPADM

## 2020-07-24 RX ORDER — EPHEDRINE SULFATE/0.9% NACL/PF 50 MG/5 ML
SYRINGE (ML) INTRAVENOUS AS NEEDED
Status: DISCONTINUED | OUTPATIENT
Start: 2020-07-24 | End: 2020-07-24 | Stop reason: HOSPADM

## 2020-07-24 RX ORDER — NALOXONE HYDROCHLORIDE 0.4 MG/ML
0.1 INJECTION, SOLUTION INTRAMUSCULAR; INTRAVENOUS; SUBCUTANEOUS AS NEEDED
Status: DISCONTINUED | OUTPATIENT
Start: 2020-07-24 | End: 2020-07-24 | Stop reason: HOSPADM

## 2020-07-24 RX ORDER — ENOXAPARIN SODIUM 100 MG/ML
30 INJECTION SUBCUTANEOUS DAILY
Status: DISCONTINUED | OUTPATIENT
Start: 2020-07-25 | End: 2020-07-28 | Stop reason: HOSPADM

## 2020-07-24 RX ORDER — SODIUM CHLORIDE, SODIUM LACTATE, POTASSIUM CHLORIDE, CALCIUM CHLORIDE 600; 310; 30; 20 MG/100ML; MG/100ML; MG/100ML; MG/100ML
INJECTION, SOLUTION INTRAVENOUS
Status: DISCONTINUED | OUTPATIENT
Start: 2020-07-24 | End: 2020-07-24 | Stop reason: HOSPADM

## 2020-07-24 RX ORDER — LIDOCAINE HYDROCHLORIDE 20 MG/ML
INJECTION, SOLUTION EPIDURAL; INFILTRATION; INTRACAUDAL; PERINEURAL AS NEEDED
Status: DISCONTINUED | OUTPATIENT
Start: 2020-07-24 | End: 2020-07-24 | Stop reason: HOSPADM

## 2020-07-24 RX ADMIN — Medication 10 ML: at 06:00

## 2020-07-24 RX ADMIN — HYDROMORPHONE HYDROCHLORIDE 0.2 MG: 2 INJECTION INTRAMUSCULAR; INTRAVENOUS; SUBCUTANEOUS at 11:01

## 2020-07-24 RX ADMIN — PHENYLEPHRINE HYDROCHLORIDE 100 MCG: 10 INJECTION INTRAVENOUS at 10:27

## 2020-07-24 RX ADMIN — METRONIDAZOLE 500 MG: 500 INJECTION, SOLUTION INTRAVENOUS at 20:31

## 2020-07-24 RX ADMIN — PHENYLEPHRINE HYDROCHLORIDE 100 MCG: 10 INJECTION INTRAVENOUS at 10:23

## 2020-07-24 RX ADMIN — VANCOMYCIN HYDROCHLORIDE 1000 MG: 1 INJECTION, POWDER, LYOPHILIZED, FOR SOLUTION INTRAVENOUS at 02:25

## 2020-07-24 RX ADMIN — LIDOCAINE HYDROCHLORIDE 60 MG: 20 INJECTION, SOLUTION EPIDURAL; INFILTRATION; INTRACAUDAL; PERINEURAL at 09:46

## 2020-07-24 RX ADMIN — CEFEPIME HYDROCHLORIDE 2 G: 2 INJECTION, POWDER, FOR SOLUTION INTRAVENOUS at 23:14

## 2020-07-24 RX ADMIN — Medication 5 MG: at 09:55

## 2020-07-24 RX ADMIN — MORPHINE SULFATE 2 MG: 2 INJECTION, SOLUTION INTRAMUSCULAR; INTRAVENOUS at 13:56

## 2020-07-24 RX ADMIN — Medication 10 ML: at 22:00

## 2020-07-24 RX ADMIN — METRONIDAZOLE 500 MG: 500 INJECTION, SOLUTION INTRAVENOUS at 13:56

## 2020-07-24 RX ADMIN — CEFEPIME HYDROCHLORIDE 2 G: 2 INJECTION, POWDER, FOR SOLUTION INTRAVENOUS at 13:48

## 2020-07-24 RX ADMIN — MORPHINE SULFATE 2 MG: 2 INJECTION, SOLUTION INTRAMUSCULAR; INTRAVENOUS at 05:57

## 2020-07-24 RX ADMIN — SODIUM CHLORIDE, SODIUM LACTATE, POTASSIUM CHLORIDE, AND CALCIUM CHLORIDE: 600; 310; 30; 20 INJECTION, SOLUTION INTRAVENOUS at 09:40

## 2020-07-24 RX ADMIN — PHENYLEPHRINE HYDROCHLORIDE 100 MCG: 10 INJECTION INTRAVENOUS at 10:02

## 2020-07-24 RX ADMIN — PHENYLEPHRINE HYDROCHLORIDE 100 MCG: 10 INJECTION INTRAVENOUS at 10:13

## 2020-07-24 RX ADMIN — HYDROMORPHONE HYDROCHLORIDE 0.2 MG: 2 INJECTION INTRAMUSCULAR; INTRAVENOUS; SUBCUTANEOUS at 10:41

## 2020-07-24 RX ADMIN — HYDROMORPHONE HYDROCHLORIDE 0.2 MG: 2 INJECTION INTRAMUSCULAR; INTRAVENOUS; SUBCUTANEOUS at 10:47

## 2020-07-24 RX ADMIN — FENTANYL CITRATE 25 MCG: 50 INJECTION INTRAMUSCULAR; INTRAVENOUS at 10:33

## 2020-07-24 RX ADMIN — PROPOFOL 100 MG: 10 INJECTION, EMULSION INTRAVENOUS at 09:46

## 2020-07-24 RX ADMIN — MORPHINE SULFATE 2 MG: 2 INJECTION, SOLUTION INTRAMUSCULAR; INTRAVENOUS at 18:30

## 2020-07-24 RX ADMIN — HYDROMORPHONE HYDROCHLORIDE 0.2 MG: 2 INJECTION INTRAMUSCULAR; INTRAVENOUS; SUBCUTANEOUS at 10:55

## 2020-07-24 RX ADMIN — ONDANSETRON 4 MG: 2 INJECTION INTRAMUSCULAR; INTRAVENOUS at 10:21

## 2020-07-24 RX ADMIN — Medication 10 MG: at 09:58

## 2020-07-24 RX ADMIN — FENTANYL CITRATE 25 MCG: 50 INJECTION INTRAMUSCULAR; INTRAVENOUS at 10:06

## 2020-07-24 RX ADMIN — PHENYLEPHRINE HYDROCHLORIDE 100 MCG: 10 INJECTION INTRAVENOUS at 10:10

## 2020-07-24 RX ADMIN — MORPHINE SULFATE 2 MG: 2 INJECTION, SOLUTION INTRAMUSCULAR; INTRAVENOUS at 23:14

## 2020-07-24 RX ADMIN — HYDROMORPHONE HYDROCHLORIDE 0.2 MG: 2 INJECTION INTRAMUSCULAR; INTRAVENOUS; SUBCUTANEOUS at 11:05

## 2020-07-24 RX ADMIN — DEXAMETHASONE SODIUM PHOSPHATE 4 MG: 4 INJECTION, SOLUTION INTRAMUSCULAR; INTRAVENOUS at 10:20

## 2020-07-24 RX ADMIN — VANCOMYCIN HYDROCHLORIDE 1000 MG: 1 INJECTION, POWDER, LYOPHILIZED, FOR SOLUTION INTRAVENOUS at 13:55

## 2020-07-24 NOTE — PERIOP NOTES
TRANSFER - OUT REPORT:    Verbal report given to Jose D Pineda on Robin Vo  being transferred to [phone] for routine post - op       Report consisted of patients Situation, Background, Assessment and   Recommendations(SBAR). Information from the following report(s) SBAR, OR Summary, Intake/Output and MAR was reviewed with the receiving nurse. Lines:   Peripheral IV 07/23/20 Left Antecubital (Active)   Site Assessment Clean, dry, & intact 07/24/20 1049   Phlebitis Assessment 0 07/24/20 1049   Infiltration Assessment 0 07/24/20 1049   Dressing Status Clean, dry, & intact 07/24/20 1049   Dressing Type Transparent;Tape 07/24/20 0729   Hub Color/Line Status Flushed 07/24/20 0729   Alcohol Cap Used No 07/24/20 0200        Opportunity for questions and clarification was provided. Patient transported with:   Tech    VTE prophylaxis orders have been written for Robin Vo. Patient and family given floor number and nurses name. Family updated re: pt status after security code verified.

## 2020-07-24 NOTE — ANESTHESIA PREPROCEDURE EVALUATION
Relevant Problems   No relevant active problems       Anesthetic History   No history of anesthetic complications            Review of Systems / Medical History  Patient summary reviewed and pertinent labs reviewed    Pulmonary          Smoker (former)         Neuro/Psych         Neuromuscular disease    Comments: Multiple sclerosis greater than 20 years - no pharyngeal symptoms or recent flair  Cardiovascular    Hypertension              Exercise tolerance: <4 METS     GI/Hepatic/Renal               Comments: Chronic wasserman Endo/Other        Anemia (Chronic)     Other Findings   Comments: Ischial decubiti/osteomyelitis  Protein/calorie malnutrition         Physical Exam    Airway  Mallampati: II  TM Distance: 4 - 6 cm  Neck ROM: normal range of motion   Mouth opening: Normal     Cardiovascular    Rhythm: regular  Rate: normal         Dental  No notable dental hx       Pulmonary  Breath sounds clear to auscultation               Abdominal  GI exam deferred       Other Findings            Anesthetic Plan    ASA: 3  Anesthesia type: general          Induction: Intravenous  Anesthetic plan and risks discussed with: Patient      Of note, patient has DNR. We discussed temporarily suspending for the procedure. Patient endorsed understanding and wish to proceed.

## 2020-07-24 NOTE — PROGRESS NOTES
Spoke with sister and updated her on pt status, Anthony Bermudez [phone](MVJX), [phone] (work)

## 2020-07-24 NOTE — PROGRESS NOTES
TRANSFER - IN REPORT:    Verbal report received from Charlene Slade RN(name) on Anthony Romero  being received from PACU(unit) for routine post - op      Report consisted of patients Situation, Background, Assessment and   Recommendations(SBAR). Information from the following report(s) SBAR, OR Summary, Procedure Summary, MAR and Recent Results was reviewed with the receiving nurse. Opportunity for questions and clarification was provided. Assessment completed upon patients arrival to unit and care assumed.

## 2020-07-24 NOTE — PROGRESS NOTES
END OF SHIFT NOTE:    Intake/Output  07/24 0701 - 07/24 1900  In: 2147 [P.O.:100; I.V.:2047]  Out: 305 [Urine:300]   Voiding: YES  Catheter: YES  Drain:              Stool:  0 occurrences. Stool Assessment  Stool Color: Elizabeth Duet (07/23/20 1737)  Stool Appearance: Soft (07/23/20 1737)  Stool Amount: Small (07/23/20 1737)  Stool Source/Status: Rectum (07/23/20 1737)    Emesis:  0 occurrences. VITAL SIGNS  Patient Vitals for the past 12 hrs:   Temp Pulse Resp BP SpO2   07/24/20 1517 97.8 °F (36.6 °C) 97 20 120/77 98 %   07/24/20 1213 98.3 °F (36.8 °C) (!) 115 22 113/69 93 %   07/24/20 1135  (!) 102 16  94 %   07/24/20 1130  (!) 101 16 115/63 94 %   07/24/20 1125  99 16 108/59 95 %   07/24/20 1120  98 16 106/63 96 %   07/24/20 1115  99 16 111/64 99 %   07/24/20 1110  100 16 109/65 100 %   07/24/20 1104  98 16 114/68 98 %   07/24/20 1059  98 16 114/68 98 %   07/24/20 1057  (!) 103 16 117/79 95 %   07/24/20 1054  (!) 104 16 124/77 93 %   07/24/20 1049  (!) 104 16 126/77 94 %   07/24/20 1039 98.9 °F (37.2 °C) (!) 105 16 134/68 96 %   07/24/20 0729 98.9 °F (37.2 °C) (!) 109 22 (!) 140/93 96 %       Pain Assessment  Pain 1  Pain Scale 1: Numeric (0 - 10) (07/24/20 1440)  Pain Intensity 1: 5 (07/24/20 1440)  Patient Stated Pain Goal: 0 (07/24/20 1356)  Pain Reassessment 1: Yes (07/24/20 1440)  Pain Location 1: Buttocks (07/24/20 1356)  Pain Description 1: Aching (07/24/20 1356)  Pain Intervention(s) 1: Medication (see MAR) (07/24/20 1356)    Ambulating  No    Additional Information: patient has wound vac now. I & D today. Wound bed now. Patient confused this morning, resolved this afternoon. Shift report to be given to oncoming nurse at the bedside.     Lena Cunningham RN

## 2020-07-24 NOTE — DISCHARGE INSTR - DIET
· Good nutrition is important when healing from an illness, injury, or surgery. Follow any nutrition recommendations given to you during your hospital stay. · If you were given an oral nutrition supplement while in the hospital, continue to take this supplement at home. You can take it with meals, in-between meals, and/or before bedtime. These supplements can be purchased at most local grocery stores, pharmacies, and chain super-stores. · If you have any questions about your diet or nutrition, call the hospital and ask for the dietitian. NUTRITION       Continue Oral Nutrition Supplement (ONS) at discharge. Recommend Garcia Mayor or a comparable/similar product Twice daily for 30 days unless otherwise directed by your Primary Care Physician.      Brianna Crane RD, LD

## 2020-07-24 NOTE — PROGRESS NOTES
Comprehensive Nutrition Assessment    Type and Reason for Visit: Initial, Wound, Consult(hospitalist)    Nutrition Recommendations/Plan: Resume diet as regular. João for wound healing bid  Ensure Enlive once daily. Nutrition Assessment:   Nutrition History and Allergies: H/O: MS, ischial decubitus ulcer with osteomyelitis, recently completed antibiotics about a week ago was sent from [facility] rehab facility today because of low blood pressure. Admitted d/t sepsis most likely secondary to infected decubitus ulcer on the right hip. S/P debridement of wound 7/24   Pt accepted Dorotha Olga or strawberry Ensure Enlive during her May 2020 admission until she got \"ensure-ed out\". She was then offered Ensure clear. Estimated Daily Nutrient Needs:  Energy (kcal):  9005-8891 kcal/d (35-40 kcal[facility] 7/[facility])_Underweight, stage 4 wound  Protein (g):  84-96 grams/d (20% of kcal)       Fluid (ml/day):       Nutrition Related Findings:  Deferrred NFPE-pt on respiratory isolation,r/o COVID-19      Wounds:    Stage IV(right ischial)       Current Nutrition Therapies:   NPO    Anthropometric Measures:  · Height:  5' 7.5\" (171.5 cm)  Current Body Wt:      Last 3 Recorded Weights in this Encounter    07/23/20 0118 07/23/20 2036   Weight: 44.5 kg (98 lb) 48.2 kg (106 lb 4.8 oz)   ·   Usual Body Wt:        WT / BMI WEIGHT   6/29/2020 90 lb   5/18/2020 100 lb   5/13/2020 97 lb   2/24/2014 107 lb   1/13/2012 103 lb   · Body mass index is 16.4 kg/m².   · BMI Categories:  Underweight (BMI less than 18.5)       Nutrition Diagnosis:   · Increased nutrient needs related to catabolic illness as evidenced by wounds      Nutrition Interventions:   Food and/or Nutrient Delivery: Start oral nutrition supplement, Start oral diet  Coordination of Nutrition Care:  Discussed with Erica Luna RN    Goals:  Intake >75% of needs within 7 days       Nutrition Monitoring and Evaluation:      Food/Nutrient Intake Outcomes: Food and nutrient intake, Supplement intake  Physical Signs/Symptoms Outcomes: Skin(wound healing)    Discharge Planning:    Continue oral nutrition supplement. F/U by nutrition services once she returns to rehab facility.     Electronically signed by Kalen Riggs RD on 7/24/2020 at 3:58 PM    Contact: [phone]

## 2020-07-24 NOTE — PERIOP NOTES
TRANSFER - IN REPORT:    Verbal report received from [facility] RN(name) on Nomi Mccormick  being received from [facility](unit) for routine progression of care      Report consisted of patients Situation, Background, Assessment and   Recommendations(SBAR). Information from the following report(s) SBAR and MAR was reviewed with the receiving nurse. Opportunity for questions and clarification was provided. Assessment completed upon patients arrival to unit and care assumed.

## 2020-07-24 NOTE — PROGRESS NOTES
Notified from lab of her blood cultures gram positive cocci in the anaerobic. Notified Dr. Tu Cardenas. No new orders received.

## 2020-07-24 NOTE — PROGRESS NOTES
TRANSFER - OUT REPORT:    Verbal report given to RADHA Guidry(name) on Salbador Ibarra  being transferred to Pre-OP(unit) for ordered procedure       Report consisted of patients Situation, Background, Assessment and   Recommendations(SBAR). Information from the following report(s) SBAR, Kardex, Intake/Output and MAR was reviewed with the receiving nurse. Lines:   Peripheral IV 07/23/20 Left Antecubital (Active)   Site Assessment Clean, dry, & intact 07/24/20 0200   Phlebitis Assessment 0 07/24/20 0200   Infiltration Assessment 0 07/24/20 0200   Dressing Status Clean, dry, & intact 07/24/20 0200   Dressing Type Transparent;Tape 07/24/20 0200   Hub Color/Line Status Patent; Flushed 07/24/20 0200   Alcohol Cap Used No 07/24/20 0200       Peripheral IV 07/23/20 Right Hand (Active)   Site Assessment Clean, dry, & intact 07/24/20 0200   Phlebitis Assessment 0 07/24/20 0200   Infiltration Assessment 0 07/24/20 0200   Dressing Status Clean, dry, & intact 07/24/20 0200   Dressing Type Transparent;Tape 07/24/20 0200   Hub Color/Line Status Patent; Flushed 07/24/20 0200   Alcohol Cap Used No 07/24/20 0200        Opportunity for questions and clarification was provided.       Patient transported with:   [employer]

## 2020-07-24 NOTE — PROGRESS NOTES
Vancomycin Consult    MD ordering: CHLOÉ Lindsay   ID following? yes  Indication: BJI  DOT:  TBD  days  Goal level(s): 15-20    Ht Readings from Last 1 Encounters:   20 5' 7.5\" (1.715 m)      Wt Readings from Last 1 Encounters:   20 44.5 kg (98 lb)         Temp (24hrs), Av.6 °F (37 °C), Min:98.2 °F (36.8 °C), Max:98.9 °F (37.2 °C)    Dosing weight: 44.5 kg  63 y.o. Date:  Dose/Freq Admin Times Level/Time:    1250 mg x 1  0219     1000 mg IV q12h 1741     1000 mg IV q12h  02:25  (14) Trough at 13:00 was 13.6                 Recent Labs     20  0042 20  0039   BUN  --  22   CREA  --  0.74   WBC  --  20.9*   PCT  --  1.48   LAC 1.4  --      Estimated Creatinine Clearance: 58.2 mL/min (based on SCr of 0.74 mg/dL). Lab Results   Component Value Date/Time    Vancomycin,trough 13.6 2020 01:00 PM    Vancomycin, random 15.6 2020 04:11 PM       Lab Results   Component Value Date/Time    Vancomycin,trough 20.9 (HH) 2020 07:42 AM    Vancomycin, random 15.6 2020 04:11 PM       Day 2 Assessment and Plan: Will continue with Vancomycin 1 gram IV every 12 hours.         Paul Donald, DiogoD, BCPS

## 2020-07-24 NOTE — PROGRESS NOTES
Problem: Falls - Risk of  Goal: *Absence of Falls  Description: Document Ewelina Rojas Fall Risk and appropriate interventions in the flowsheet.   Outcome: Progressing Towards Goal  Note: Fall Risk Interventions:  Mobility Interventions: Communicate number of staff needed for ambulation/transfer    Mentation Interventions: Adequate sleep, hydration, pain control    Medication Interventions: Assess postural VS orthostatic hypotension    Elimination Interventions: Call light in reach

## 2020-07-24 NOTE — PROGRESS NOTES
Name: Néstor Freitas MRN: [id number]  : 1960  Age:59 y.o.  female  Admit Date:  2020 LOS: 1      Hospitalist Progress Note    Néstor Freitas is a 63 y.o. female with medical history significant for multiple sclerosis, chronic indwelling wasserman,ischial decubitus ulcer with osteomyelitis (recently completed antibiotics about a week ago) who was sent from [facility] rehab due to low blood pressure. Patient was noted to have fever of 101 °F at the facility. In May patient was admitted here for right history of 2 cubitus ulcer with OM and was treated with cefepime, vancomycin, Flagyl. She was discharged to [facility] subsequently to [facility] with rehab with 6 weeks of IV ABx. She was found to meet SIRS criteria with leukocytosis, tachycardia and fever(at outside facility) and admitted for sepsis likely secondary to infected decubitus ulcer. Subjective (20) :  Patient is seen and examined at bedside. No acute events overnight. He underwent I&D of right ischial decubitus ulcer today in the OR. She tolerated the procedure well. Reports having generalized pain at this time but otherwise no other complaints at this time  Any chills, chest pains, fever, shortness of breath, abdominal pain, nausea, vomiting. ROS: 10 point review of system negative except for what is noted above.     Objective:    Patient Vitals for the past 24 hrs:   Temp Pulse Resp BP SpO2   20 0729 98.9 °F (37.2 °C) (!) 109 22 (!) 140/93 96 %   20 0515 98.2 °F (36.8 °C) 82 20 153/74 96 %   20 2226 98.8 °F (37.1 °C) (!) 114 21 133/71 96 %   20 2036 98.5 °F (36.9 °C) (!) 106 20 124/75 97 %   20 1442 98.6 °F (37 °C) (!) 133 26 125/78 95 %   20 1153 98.8 °F (37.1 °C) (!) 121 22 131/66 94 %   20 0830    (!) 76/50    20 0745 98.6 °F (37 °C) (!) 110 16 (!) 80/52 96 %     Oxygen Therapy  O2 Sat (%): 96 % (20 0729)  Pulse via Oximetry: 106 beats per minute (20 0200)  O2 Device: Room air (07/23/20 0300)    Estimated body mass index is 16.4 kg/m² as calculated from the following:    Height as of 6/29/20: 5' 7.5\" (1.715 m). Weight as of this encounter: 48.2 kg (106 lb 4.8 oz). Intake/Output Summary (Last 24 hours) at 7/24/2020 0741  Last data filed at 7/24/2020 0515  Gross per 24 hour   Intake    Output 900 ml   Net -900 ml       *Note that automatically entered I/Os may not be accurate; dependent on patient compliance with collection and accurate  by techs. Physical Exam:   General:     Lethargic, appears comfortable in bed. Thin, malnourished. Head:   normocephalic, atraumatic  Eyes, Ears, nose: PERRL. Normal conjunctiva  Neck:    supple, non-tender. Trachea midline. Lungs:   CTAB, no wheezing, rhonchi, rales  Cardiac:   RRR, Normal S1 and S2   Abdomen:   Soft, non distended, nontender, +BS, no guarding/rebound  Extremities:   Warm, dry. No edema   Skin:   Decubitus ulcers covered on gauze s/p I&D. Neuro:  Awakens to voice. Unable to assess    Data Review:  I have reviewed all labs, meds, and studies from the last 24 hours:    Recent Results (from the past 24 hour(s))   CBC WITH AUTOMATED DIFF    Collection Time: 07/24/20  5:30 AM   Result Value Ref Range    WBC 15.2 (H) 4.3 - 11.1 K/uL    RBC 2.53 (L) 4.05 - 5.2 M/uL    HGB 7.9 (L) 11.7 - 15.4 g/dL    HCT 25.0 (L) 35.8 - 46.3 %    MCV 98.8 (H) 79.6 - 97.8 FL    MCH 31.2 26.1 - 32.9 PG    MCHC 31.6 31.4 - 35.0 g/dL    RDW 13.2 11.9 - 14.6 %    PLATELET 563 414 - 521 K/uL    MPV 10.2 9.4 - 12.3 FL    ABSOLUTE NRBC 0.00 0.0 - 0.2 K/uL    DF AUTOMATED      NEUTROPHILS 94 (H) 43 - 78 %    LYMPHOCYTES 1 (L) 13 - 44 %    MONOCYTES 3 (L) 4.0 - 12.0 %    EOSINOPHILS 1 0.5 - 7.8 %    BASOPHILS 0 0.0 - 2.0 %    IMMATURE GRANULOCYTES 1 0.0 - 5.0 %    ABS. NEUTROPHILS 14.3 (H) 1.7 - 8.2 K/UL    ABS. LYMPHOCYTES 0.2 (L) 0.5 - 4.6 K/UL    ABS. MONOCYTES 0.4 0.1 - 1.3 K/UL    ABS. EOSINOPHILS 0.1 0.0 - 0.8 K/UL    ABS. BASOPHILS 0.0 0.0 - 0.2 K/UL    ABS. IMM. GRANS. 0.1 0.0 - 0.5 K/UL   METABOLIC PANEL, COMPREHENSIVE    Collection Time: 07/24/20  5:30 AM   Result Value Ref Range    Sodium 139 136 - 145 mmol/L    Potassium 3.6 3.5 - 5.1 mmol/L    Chloride 111 (H) 98 - 107 mmol/L    CO2 20 (L) 21 - 32 mmol/L    Anion gap 8 7 - 16 mmol/L    Glucose 89 65 - 100 mg/dL    BUN 23 6 - 23 MG/DL    Creatinine 0.50 (L) 0.6 - 1.0 MG/DL    GFR est AA >60 >60 ml/min/1.73m2    GFR est non-AA >60 >60 ml/min/1.73m2    Calcium 8.4 8.3 - 10.4 MG/DL    Bilirubin, total 0.5 0.2 - 1.1 MG/DL    ALT (SGPT) 99 (H) 12 - 65 U/L    AST (SGOT) 54 (H) 15 - 37 U/L    Alk. phosphatase 283 (H) 50 - 136 U/L    Protein, total 5.7 (L) 6.3 - 8.2 g/dL    Albumin 2.0 (L) 3.5 - 5.0 g/dL    Globulin 3.7 (H) 2.3 - 3.5 g/dL    A-G Ratio 0.5 (L) 1.2 - 3.5          All Micro Results     Procedure Component Value Units Date/Time    CULTURE, BLOOD [[id number]] Collected:  07/24/20 0700    Order Status:  No result     CULTURE, BLOOD [[id number]] Collected:  07/24/20 0530    Order Status:  Completed Specimen:  Blood Updated:  07/24/20 0557    CULTURE, BLOOD [[id number]] Collected:  07/23/20 0039    Order Status:  Completed Specimen:  Blood Updated:  07/24/20 0544     Special Requests: --        NO SPECIAL REQUESTS  LEFT  ARM       GRAM STAIN       AEROBIC BOTTLE POSITIVE ANAEROBIC BOTTLE POSITIVE GRAM POSITIVE COCCI                  RESULTS VERIFIED, PHONED TO AND READ BACK BY            Franco LAMAR [address] [id number]. SLB            CRITICAL RESULT NOT CALLED DUE TO PREVIOUS NOTIFICATION OF CRITICAL RESULT WITHIN THE LAST 24 HOURS.            Culture result:       CULTURE IN PROGRESS,FURTHER UPDATES TO FOLLOW          CULTURE, BLOOD [[id number]] Collected:  07/24/20 0530    Order Status:  Canceled Specimen:  Blood     CULTURE, URINE [[id number]] Collected:  07/23/20 2010    Order Status:  Completed Specimen:  Cath Urine Updated:  07/23/20 2234    CULTURE, URINE [[id number]] Collected:  07/23/20 0054    Order Status:  Completed Specimen:  Cath Urine Updated:  07/23/20 1132    CULTURE, BLOOD [[id number]] Collected:  07/23/20 0105    Order Status:  Completed Specimen:  Blood Updated:  07/23/20 0108          Current Meds:  Current Facility-Administered Medications   Medication Dose Route Frequency    sodium chloride (NS) flush 5-10 mL  5-10 mL IntraVENous PRN    0.9% sodium chloride infusion  125 mL/hr IntraVENous CONTINUOUS    sodium chloride (NS) flush 5-40 mL  5-40 mL IntraVENous Q8H    sodium chloride (NS) flush 5-40 mL  5-40 mL IntraVENous PRN    acetaminophen (TYLENOL) tablet 650 mg  650 mg Oral Q6H PRN    Or    acetaminophen (TYLENOL) suppository 650 mg  650 mg Rectal Q6H PRN    polyethylene glycol (MIRALAX) packet 17 g  17 g Oral DAILY PRN    promethazine (PHENERGAN) tablet 12.5 mg  12.5 mg Oral Q6H PRN    Or    ondansetron (ZOFRAN) injection 4 mg  4 mg IntraVENous Q6H PRN    [Held by provider] enoxaparin (LOVENOX) injection 40 mg  40 mg SubCUTAneous DAILY    cefepime (MAXIPIME) 2 g in 0.9% sodium chloride (MBP/ADV) 100 mL  2 g IntraVENous Q12H    metroNIDAZOLE (FLAGYL) IVPB premix 500 mg  500 mg IntraVENous Q12H    vancomycin (VANCOCIN) 1,000 mg in 0.9% sodium chloride (MBP/ADV) 250 mL  1,000 mg IntraVENous Q12H    morphine injection 2 mg  2 mg IntraVENous Q4H PRN         Other Studies:  Xr Chest Port    Result Date: 7/23/2020  Portable chest xray  COMPARISON: Hypotension, infectious evaluation INDICATION: May 22, 2020 FINDINGS: Heart appears mildly enlarged. Mediastinal contour is within normal limits. No pulmonary consolidation, pneumothorax or pulmonary edema. No large pleural effusion. Bones are osteopenic. IMPRESSION: No pulmonary consolidation or pulmonary edema.       Assessment:    Active Hospital Problems    Diagnosis Date Noted    Sepsis ([facility] [address] 07/23/2020    Osteomyelitis of sacroiliac region [facility]) 05/20/2020    Multiple sclerosis ([facility] [address] 05/13/2020    Protein calorie malnutrition ([facility] [address] 05/13/2020    Pressure injury of sacral region, stage 4 ([facility] [address] 05/13/2020       Plan:  Sepsis(on admission) likely secondary to infected right decubitus ulcer and/or UTI  Right ischial decubitus ulcer with osteomyelitis   Met SIRS criteria on admission with fever(from outside facility), leukocytosis and tachycardia. S/p I&D (7/24) by Gen Surgery  BCx growing MRSA.  ID recs appreciated: Continue with cefepime, vancomycin, Flagyl (if worsens - broaden to vanc + cj)   IV fluids   Follow-up intrap-op tissue/wound cultures      UTI  Patient has indwelling Mathew catheter. UA consistent with UTI. UCx neg to date   On antibiotics as above   exchange Mathew and resend UA    Multiple sclerosis: Continue with home medications    Moderate to severe PCM: nutrition consulted    Diet:  DIET NPO  DVT PPx: lovenox (on hold for kimberley-procedure), SCDs  Code: DNR  Dispo: pending clinical course and PT/OT Eval  Estimated Discharge: TBD based on clinical course    DPOA: Pt's sister Ms Jillian Estrella    Labs/Imaging Reviewed. Patient is high risk due to current condition and comorbid conditions as well as requiring frequent monitoring. Plan discussed with staff, patient/family and are in agreement. Time Spent: Greater than 45 minutes was spent in reviewing charts, physical exam, discussion with patient, and answered any questions.     Signed By: Angela Cohen MD     July 24, 2020

## 2020-07-24 NOTE — BRIEF OP NOTE
Brief Postoperative Note    Patient: Chuy Blair  YOB: 1960  MRN: [id number]    Date of Procedure: 7/24/2020     Pre-Op Diagnosis: Decubitus ulcer of right perineal ischial region, stage 4 ([address] [L89.312]    Post-Op Diagnosis: Same as preoperative diagnosis. Procedure(s):  Sharp excisional and sharp debridement (with scissors and #10 scalpel blade) of right ischial decubitus ulcer down to, and including ischial bone [id number]   First 20cm2 CPT 29216  Second 20cm2 CPT 72957  Third 20cm2 CPT 71231  Fourth 20cm2 CPT 47069  Placement of wound vac negative pressure wound dressing CPT 20915    Surgeon(s):  Xochitl Serrano DO    Surgical Assistant: None    Anesthesia: General     Estimated Blood Loss (mL): Minimal    Complications: None    Specimens:   ID Type Source Tests Collected by Time Destination   1 : Ischial Bone Osteomylitis Fresh Bone  Efraín Chandler DO 7/24/2020 1027 Pathology   2 : Sacral Wound Preservative Wound  Xochitl Serrano,  7/24/2020 1028 Pathology   1 : Decubitus Ulcer Wound Decubitus CULTURE, ANAEROBIC, CULTURE, WOUND W GRAM STAIN Efraín Chandler DO 7/24/2020 1015 Microbiology        Implants: * No implants in log *    Drains: * No LDAs found *    Findings: 6x8x2 cm ischial wound down to and including bone. Cultures obtained. Wound excisional debridement with number 10 scalpel and sharp scissors. Bone biopsy obtained for culture. Wound vac applied.     Electronically Signed by Cleveland Gandhi DO on 7/24/2020 at 10:33 AM  [id number]

## 2020-07-24 NOTE — ANESTHESIA POSTPROCEDURE EVALUATION
Procedure(s):  RIGHT ISCHIAL WOUND DEBRIDEMENT ROOM 346.     general    Anesthesia Post Evaluation        Patient location during evaluation: PACU  Patient participation: complete - patient participated  Level of consciousness: awake and alert  Pain management: adequate  Airway patency: patent  Anesthetic complications: no  Cardiovascular status: acceptable  Respiratory status: acceptable  Hydration status: acceptable  Post anesthesia nausea and vomiting:  controlled  Final Post Anesthesia Temperature Assessment:  Normothermia (36.0-37.5 degrees C)      INITIAL Post-op Vital signs:   Vitals Value Taken Time   /63 7/24/2020 11:30 AM   Temp 37.2 °C (98.9 °F) 7/24/2020 10:39 AM   Pulse 102 7/24/2020 11:35 AM   Resp 16 7/24/2020 11:35 AM   SpO2 94 % 7/24/2020 11:35 AM

## 2020-07-25 PROBLEM — R78.81 MRSA BACTEREMIA: Status: ACTIVE | Noted: 2020-07-25

## 2020-07-25 PROBLEM — B95.62 MRSA BACTEREMIA: Status: ACTIVE | Noted: 2020-07-25

## 2020-07-25 LAB
ACC. NO. FROM MICRO ORDER, ACCP: ABNORMAL
ANION GAP SERPL CALC-SCNC: 6 MMOL/L (ref 7–16)
APPEARANCE UR: CLEAR
BACTERIA SPEC CULT: ABNORMAL
BACTERIA URNS QL MICRO: ABNORMAL /HPF
BILIRUB UR QL: NEGATIVE
BUN SERPL-MCNC: 33 MG/DL (ref 6–23)
CALCIUM SERPL-MCNC: 8.5 MG/DL (ref 8.3–10.4)
CASTS URNS QL MICRO: ABNORMAL /LPF
CHLORIDE SERPL-SCNC: 113 MMOL/L (ref 98–107)
CO2 SERPL-SCNC: 23 MMOL/L (ref 21–32)
COLOR UR: YELLOW
CREAT SERPL-MCNC: 0.46 MG/DL (ref 0.6–1)
EPI CELLS #/AREA URNS HPF: ABNORMAL /HPF
ERYTHROCYTE [DISTWIDTH] IN BLOOD BY AUTOMATED COUNT: 13.2 % (ref 11.9–14.6)
GLUCOSE SERPL-MCNC: 118 MG/DL (ref 65–100)
GLUCOSE UR STRIP.AUTO-MCNC: NEGATIVE MG/DL
GRAM STN SPEC: ABNORMAL
HCT VFR BLD AUTO: 26.1 % (ref 35.8–46.3)
HGB BLD-MCNC: 8.3 G/DL (ref 11.7–15.4)
HGB UR QL STRIP: ABNORMAL
INTERPRETATION: ABNORMAL
KETONES UR QL STRIP.AUTO: 15 MG/DL
LEUKOCYTE ESTERASE UR QL STRIP.AUTO: ABNORMAL
MCH RBC QN AUTO: 31.1 PG (ref 26.1–32.9)
MCHC RBC AUTO-ENTMCNC: 31.8 G/DL (ref 31.4–35)
MCV RBC AUTO: 97.8 FL (ref 79.6–97.8)
MECA (METHICILLIN-RESISTANCE GENES), MRGP: DETECTED
NITRITE UR QL STRIP.AUTO: NEGATIVE
NRBC # BLD: 0 K/UL (ref 0–0.2)
PH UR STRIP: 6 [PH] (ref 5–9)
PLATELET # BLD AUTO: 445 K/UL (ref 150–450)
PMV BLD AUTO: 10.4 FL (ref 9.4–12.3)
POTASSIUM SERPL-SCNC: 3.5 MMOL/L (ref 3.5–5.1)
PROT UR STRIP-MCNC: ABNORMAL MG/DL
RBC # BLD AUTO: 2.67 M/UL (ref 4.05–5.2)
RBC #/AREA URNS HPF: ABNORMAL /HPF
SERVICE CMNT-IMP: ABNORMAL
SODIUM SERPL-SCNC: 142 MMOL/L (ref 136–145)
SP GR UR REFRACTOMETRY: 1.03 (ref 1–1.02)
STAPHYLOCOCCUS, STAPP: DETECTED
UROBILINOGEN UR QL STRIP.AUTO: 0.2 EU/DL (ref 0.2–1)
WBC # BLD AUTO: 9.8 K/UL (ref 4.3–11.1)
WBC URNS QL MICRO: ABNORMAL /HPF

## 2020-07-25 PROCEDURE — 74011250636 HC RX REV CODE- 250/636: Performed by: SURGERY

## 2020-07-25 PROCEDURE — 85027 COMPLETE CBC AUTOMATED: CPT

## 2020-07-25 PROCEDURE — 97163 PT EVAL HIGH COMPLEX 45 MIN: CPT

## 2020-07-25 PROCEDURE — 65270000029 HC RM PRIVATE

## 2020-07-25 PROCEDURE — 80048 BASIC METABOLIC PNL TOTAL CA: CPT

## 2020-07-25 PROCEDURE — 81001 URINALYSIS AUTO W/SCOPE: CPT

## 2020-07-25 PROCEDURE — 74011250637 HC RX REV CODE- 250/637: Performed by: INTERNAL MEDICINE

## 2020-07-25 PROCEDURE — 74750000023 HC WOUND THERAPY

## 2020-07-25 PROCEDURE — 74011250637 HC RX REV CODE- 250/637: Performed by: HOSPITALIST

## 2020-07-25 PROCEDURE — 74011000258 HC RX REV CODE- 258: Performed by: SURGERY

## 2020-07-25 PROCEDURE — 97166 OT EVAL MOD COMPLEX 45 MIN: CPT

## 2020-07-25 PROCEDURE — 36415 COLL VENOUS BLD VENIPUNCTURE: CPT

## 2020-07-25 PROCEDURE — 93306 TTE W/DOPPLER COMPLETE: CPT

## 2020-07-25 RX ORDER — HYDROCODONE BITARTRATE AND ACETAMINOPHEN 5; 325 MG/1; MG/1
1 TABLET ORAL
Status: DISCONTINUED | OUTPATIENT
Start: 2020-07-25 | End: 2020-07-28 | Stop reason: HOSPADM

## 2020-07-25 RX ORDER — AMANTADINE HYDROCHLORIDE 100 MG/1
100 CAPSULE, GELATIN COATED ORAL 2 TIMES DAILY
Status: DISCONTINUED | OUTPATIENT
Start: 2020-07-25 | End: 2020-07-28 | Stop reason: HOSPADM

## 2020-07-25 RX ADMIN — VANCOMYCIN HYDROCHLORIDE 1000 MG: 1 INJECTION, POWDER, LYOPHILIZED, FOR SOLUTION INTRAVENOUS at 13:45

## 2020-07-25 RX ADMIN — Medication 10 ML: at 06:00

## 2020-07-25 RX ADMIN — HYDROCODONE BITARTRATE AND ACETAMINOPHEN 1 TABLET: 5; 325 TABLET ORAL at 10:26

## 2020-07-25 RX ADMIN — METRONIDAZOLE 500 MG: 500 INJECTION, SOLUTION INTRAVENOUS at 08:35

## 2020-07-25 RX ADMIN — VANCOMYCIN HYDROCHLORIDE 1000 MG: 1 INJECTION, POWDER, LYOPHILIZED, FOR SOLUTION INTRAVENOUS at 02:37

## 2020-07-25 RX ADMIN — SODIUM CHLORIDE 125 ML/HR: 900 INJECTION, SOLUTION INTRAVENOUS at 17:29

## 2020-07-25 RX ADMIN — Medication 10 ML: at 21:13

## 2020-07-25 RX ADMIN — MORPHINE SULFATE 2 MG: 2 INJECTION, SOLUTION INTRAMUSCULAR; INTRAVENOUS at 12:02

## 2020-07-25 RX ADMIN — ENOXAPARIN SODIUM 30 MG: 30 INJECTION SUBCUTANEOUS at 08:35

## 2020-07-25 RX ADMIN — SODIUM CHLORIDE 125 ML/HR: 900 INJECTION, SOLUTION INTRAVENOUS at 07:13

## 2020-07-25 RX ADMIN — MORPHINE SULFATE 2 MG: 2 INJECTION, SOLUTION INTRAMUSCULAR; INTRAVENOUS at 17:30

## 2020-07-25 RX ADMIN — METRONIDAZOLE 500 MG: 500 INJECTION, SOLUTION INTRAVENOUS at 20:54

## 2020-07-25 RX ADMIN — AMANTADINE HYDROCHLORIDE 100 MG: 100 CAPSULE, LIQUID FILLED ORAL at 17:29

## 2020-07-25 RX ADMIN — MORPHINE SULFATE 2 MG: 2 INJECTION, SOLUTION INTRAMUSCULAR; INTRAVENOUS at 07:13

## 2020-07-25 RX ADMIN — AMANTADINE HYDROCHLORIDE 100 MG: 100 CAPSULE, LIQUID FILLED ORAL at 10:26

## 2020-07-25 RX ADMIN — CEFEPIME HYDROCHLORIDE 2 G: 2 INJECTION, POWDER, FOR SOLUTION INTRAVENOUS at 12:01

## 2020-07-25 RX ADMIN — Medication 10 ML: at 13:48

## 2020-07-25 NOTE — PROGRESS NOTES
Problem: Self Care Deficits Care Plan (Adult)  Goal: *Acute Goals and Plan of Care (Insert Text)  Description: 1. Patient will perform grooming with minimal assistance in supported sitting. 2. Patient will perform Upper body dressing with minimal assistance in supported sitting. 3. Patient will perform upper body bathing with minimal assistance in supported sitting. 4. Patient will perform toilet transfers bed to [facility] with maximum assistance. 5. Patient will participate in 10 + minutes of ADL/ therapeutic exercise/therapeutic activity with min rest breaks to increase activity tolerance for self care, seated edge of bed. Goals to be achieved in 7 days. Outcome: Progressing Towards Goal     OCCUPATIONAL THERAPY: Initial Assessment and PM 7/25/2020  INPATIENT:    [place] / [place] / Product Type: Medicare /      NAME/AGE/GENDER: Chuy Blair is a 63 y.o. female   PRIMARY DIAGNOSIS:  Sepsis ([facility] [address] [A41.9] Pressure injury of sacral region, stage 4 (HCC) Pressure injury of sacral region, stage 4 (HCC)  Procedure(s) (LRB):  RIGHT ISCHIAL WOUND DEBRIDEMENT ROOM 346 (Left)  1 Day Post-Op  ICD-10: Treatment Diagnosis:    Generalized Muscle Weakness (M62.81)  Other lack of cordination (R27.8)   Precautions/Allergies:     Latex; Norco [hydrocodone-acetaminophen]; and Pcn [penicillins]      ASSESSMENT:     Ms. Ana Dacosta presents with above diagnosis and was seen with PT present. Pt has been dealing with this medical issue for a long period of time. She has been here at this hospital then to [facility] then to [facility] rehab and now back here with continued infection and stage pressure wound on her sacral region. Pt reports living in her own handicap accessible home prior to \"all\" this. She used a scooter for mobility with a Roho cushion.  Pt states this started with a small carpet burn on her bottom that \"got out of control\" Pt at times seems to be a good historian but does appear to get her time spent at different facility confused. Pt is very weak with poor activity tolerance combined with her MS and those limitation would benefit from OT while here in the hospital to begin to work toward increased independence with self care and functional mobility. Pt will likely need post hospital rehab. This section established at most recent assessment   PROBLEM LIST (Impairments causing functional limitations):  Decreased Strength  Decreased ADL/Functional Activities  Decreased Transfer Abilities  Decreased Balance  Decreased Activity Tolerance  Decreased Cognition   INTERVENTIONS PLANNED: (Benefits and precautions of occupational therapy have been discussed with the patient.)  Activities of daily living training  Adaptive equipment training  Balance training  Cognitive training  Therapeutic activity  Therapeutic exercise     TREATMENT PLAN: Frequency/Duration: Follow patient 3 times per week to address above goals. Rehabilitation Potential For Stated Goals: Good     REHAB RECOMMENDATIONS (at time of discharge pending progress):    Placement: It is my opinion, based on this patient's performance to date, that Ms. David Murphy may benefit from intensive therapy at a [address] [place] [address] after discharge due to the functional deficits listed above that are likely to improve with skilled rehabilitation and concerns that he/she may be unsafe to be unsupervised at home due to high level of care . Equipment:   None at this time              OCCUPATIONAL PROFILE AND HISTORY:   History of Present Injury/Illness (Reason for Referral):  See H&P  Past Medical History/Comorbidities:   Ms. David Murphy  has a past medical history of Decubitus ulcer of ischial area, Hypertension, Kidney infection, Menopause, and MS (multiple sclerosis) ([address]  She also has no past medical history of Aneurysm ([address] Arrhythmia, Arthritis, Asthma, Autoimmune disease ([address] CAD (coronary artery disease), Cancer ([address] Chronic kidney disease, Chronic obstructive pulmonary disease ([facility] [address] [address] Chronic pain, Coagulation disorder ([facility] [address] Diabetes ([facility] [address] Endocarditis, GERD (gastroesophageal reflux disease), Heart failure ([facility] [address] Ill-defined condition, Liver disease, Morbid obesity ([facility] [address] Nicotine vapor product user, Non-nicotine vapor product user, Psychiatric disorder, PUD (peptic ulcer disease), Rheumatic fever, Seizures ([facility] [address] Sleep apnea, Stroke ([facility] [address] Thromboembolus ([facility] [address] or Thyroid disease. Ms. Kashmir Galvan  has a past surgical history that includes ercp (5/15/2020); hx cholecystectomy (05/18/2020); and colonoscopy (N/A, 6/29/2020). Social History/Living Environment:   Home Environment: Rehabilitation facility  One/Two Story Residence: Other (Comment)  Living Alone: No  Support Systems: Skilled nursing facility  Patient Expects to be Discharged to[de-identified] Rehabilitation facility  Current DME Used/Available at Home: None  Prior Level of Function/Work/Activity:  Unsure of exactly how she was doing before this long medical course started. Pt states she was independent with modification. Number of Personal Factors/Comorbidities that affect the Plan of Care: Expanded review of therapy/medical records (1-2):  MODERATE COMPLEXITY   ASSESSMENT OF OCCUPATIONAL PERFORMANCE[de-identified]   Activities of Daily Living:   Basic ADLs (From Assessment) Complex ADLs (From Assessment)   Feeding: Setup  Oral Facial Hygiene/Grooming: Moderate assistance  Bathing: Maximum assistance  Upper Body Dressing: Moderate assistance  Lower Body Dressing: Maximum assistance  Toileting:  Total assistance     Grooming/Bathing/Dressing Activities of Daily Living     Cognitive Retraining  Safety/Judgement: Awareness of environment                 Functional Transfers  Toilet Transfer : Maximum assistance(to BSC)     Bed/Mat Mobility  Supine to Sit: Maximum assistance  Sit to Supine: Maximum assistance  Bed to Chair: Total assistance  Scooting: Maximum assistance     Most Recent Physical Functioning: Gross Assessment:                  Posture:     Balance:  Sitting: Intact  Standing: (sit pivot transfer unable to stand) Bed Mobility:  Supine to Sit: Maximum assistance  Sit to Supine: Maximum assistance  Scooting: Maximum assistance  Wheelchair Mobility:     Transfers:  Bed to Chair: Total assistance            Patient Vitals for the past 6 hrs:   BP BP Patient Position SpO2 Pulse   20 1158 130/89 Supine 98 % 86   20 1541 -- -- 99 % 96       Mental Status  Neurologic State: Alert, Confused  Orientation Level: Oriented to person, Oriented to situation, Disoriented to time, Disoriented to situation  Perception: Appears intact  Perseveration: No perseveration noted  Safety/Judgement: Awareness of environment                          Physical Skills Involved:  Range of Motion  Balance  Strength  Activity Tolerance  Gross Motor Control Cognitive Skills Affected (resulting in the inability to perform in a timely and safe manner):  Executive Function  Sustained Attention Psychosocial Skills Affected:  Environmental Adaptation   Number of elements that affect the Plan of Care: 5+:  HIGH COMPLEXITY   CLINICAL DECISION MAKIN[address] [place] [id number] AM-PAC 6 Clicks   Daily Activity Inpatient Short Form  How much help from another person does the patient currently need. .. Total A Lot A Little None   1. Putting on and taking off regular lower body clothing? [] 1   [x] 2   [] 3   [] 4   2. Bathing (including washing, rinsing, drying)? [] 1   [x] 2   [] 3   [] 4   3. Toileting, which includes using toilet, bedpan or urinal?   [x] 1   [] 2   [] 3   [] 4   4. Putting on and taking off regular upper body clothing? [] 1   [x] 2   [] 3   [] 4   5. Taking care of personal grooming such as brushing teeth? [] 1   [] 2   [x] 3   [] 4   6. Eating meals? [] 1   [] 2   [] 3   [x] 4   © , Trustees of [address] [place] [address] [address] [address], under license to [employer].  All rights reserved      Score:  Initial: 14 Most Recent: X (Date: -- )    Interpretation of Tool:  Represents activities that are increasingly more difficult (i.e. Bed mobility, Transfers, Gait). Use of outcome tool(s) and clinical judgement create a POC that gives a: MODERATE COMPLEXITY         TREATMENT:   (In addition to Assessment/Re-Assessment sessions the following treatments were rendered)     Pre-treatment Symptoms/Complaints:  only complaint of pain with a brief transfer to a chair, no complaint of pain when back in bed  Pain: Initial:     0 Post Session:  0     Assessment/Reassessment only, no treatment provided today    Braces/Orthotics/Lines/Etc:   IV  wasserman catheter  Wound vac   O2 Device: Room air  Treatment/Session Assessment:    Response to Treatment:  pt up to edge of bed and total assist transfer to chair and back to bed tolerated fair  Interdisciplinary Collaboration:   Physical Therapist  Occupational Therapist  Registered Nurse  After treatment position/precautions:   Supine in bed  Bed/Chair-wheels locked  Bed in low position  Call light within reach  RN notified   Compliance with Program/Exercises: Compliant all of the time, Will assess as treatment progresses. Recommendations/Intent for next treatment session: \"Next visit will focus on advancements to more challenging activities and reduction in assistance provided\".   Total Treatment Duration:  OT Patient Time In/Time Out  Time In: 3973  Time Out: Arnulfo Gallegos

## 2020-07-25 NOTE — OP NOTES
[id number] [address]  OPERATIVE REPORT    Name:  Hillary Britt  MR#:  [id number]  :  1960  ACCOUNT #:  [de-identified]  DATE OF SERVICE:  2020    PREOPERATIVE DIAGNOSIS:  Stage IV right ischial decubitus ulcer. POSTOPERATIVE DIAGNOSIS:  Stage IV right ischial decubitus ulcer. PROCEDURES PERFORMED:  1.  Sharp excisional and sharp debridement (with scissors and #10 scalpel blade) of right ischial decubitus ulcer down to and including ischial bone 6 cm x 8 cm x 2 cm equaling 96 cm2, first 20 cm2 [place] code is 052[address], second 20 cm2 CPT code 34739, third 20 cm2 CPT code 58621, fourth 20 cm2 CPT code 27623.  2.  Placement of wound VAC, negative pressure wound dressing, CPT code 79754. SURGEON:  Sergio Arrington DO    ASSISTANT:  None. ANESTHESIA:  General endotracheal.    COMPLICATIONS:  None. SPECIMENS REMOVED:  1. Wound cultures. 2.  Bone fragments for bone cultures. 3.  Ischial decubitus ulcer excisional debridement tissue. IMPLANTS:  Wound VAC. ESTIMATED BLOOD LOSS:  Minimal.    DISPOSITION:  Stable. COUNTS:  Sponge count, needle count, and instrument count all correct x3. DESCRIPTION OF PROCEDURE:  This is a 60-year-old female with chronic right ischial decubitus ulcer stage IV down to ischial bone with osteomyelitis. She is prepared for excisional debridement of sacral decubitus ulcer. Consent was obtained by describing the procedure to the patient including potential complications to include infection, bleeding, and wound VAC. Consent was obtained and placed in the final chart. She was administered Maxipime IV preoperatively, taken to the operating suite and placed in the supine position. General anesthesia was initiated without complications. She was transferred to left lateral decubitus, prepped and draped in the usual sterile fashion and time-out was taken to confirm the patient name and proper procedure. Following this, the wound was explored.   There was bone involved and we obtained wound cultures for aerobic, anaerobic, and gram-stain. We then obtained bone fragments for bone cultures and sent this to Microbiology as well. We explored the wound. There was tunneling superiorly for a distance of approximately 4 cm and an open area that measured approximately 4 x 4 cm. The wound was actually clean with no necrotic tissue. We unroofed the wound in the superior aspect removing approximately 4 x 4 cm with sharp #10 scalpel blade and sharp scissors. This was sent to Pathology for analysis. At this point, we copiously irrigated with saline until clear. We ensured hemostasis using spot cauterization. The wound in the end measured 6 x 8 x 2 cm deep for total cm2 surface area of 96 cm2. Once the irrigant was cleared, we applied a wound VAC with the dressing, sealing the skin edges with Mastisol. The wound VAC was then applied with good seal and good shrinking of the sponge and wound. She tolerated the procedure well. FINDINGS:  A 6 x 8 x 2 cm ischial wound down to bone including bone. Bone was debrided and sent for cultures. Would cultures were also obtained. We performed excisional debridement with #10 scalpel blade with sharp scissors for area described above.       [address], DO [id number]  D:  07/24/2020 10:45  T:  07/24/2020 21:09  JOB #:  [id number]

## 2020-07-25 NOTE — PROGRESS NOTES
Problem: Mobility Impaired (Adult and Pediatric)  Goal: *Acute Goals and Plan of Care (Insert Text)  Description: DISCHARGE GOALS :  (1.)Ms. Horacio Jacobs will move from supine to sit and sit to supine  with MODERATE ASSIST.   (2.)Ms. Horacio Jacobs will transfer from bed to chair and chair to bed with MAXIMAL ASSIST & pt participating, performing a lift lateral pivot bed<>chair. (3.)Ms. Horacio Jacobs will  be able to perform wt-shifting while sitting EOB (with air bed inflated) & pressure relief while sitting & lying.  _______________________________________________________________________________________________     Outcome: Progressing Towards Goal     PHYSICAL THERAPY: Initial Assessment and PM 7/25/2020  INPATIENT: PT Visit Days : 1  [place] / Product Type: Medicare /       NAME/AGE/GENDER: Sharad Hughes is a 63 y.o. female   PRIMARY DIAGNOSIS: [facility] [A41.9] Pressure injury of sacral region, stage 4 (HCC) Pressure injury of sacral region, stage 4 (HCC)  Procedure(s) (LRB):  RIGHT ISCHIAL WOUND DEBRIDEMENT ROOM 346 (Left)  1 Day Post-Op  ICD-10: Treatment Diagnosis:    Generalized Muscle Weakness (M62.81)  Other lack of cordination (R27.8)   Precaution/Allergies:  Latex; Norco [hydrocodone-acetaminophen]; and Pcn [penicillins]      ASSESSMENT:     Ms. Horacio Jacobs presents with a wound vac on sacral region, currently on a dynamic air flow bed with significantly impaired functional mobility. This pt needed max assist for bed mobility & transfers were totally dependent. This pt will need an additional rehab stay to address these deficits. This pt will need to get her Roho cushion & scooter from her home or SNF  & to the hospital to allow her to work with therapy towards out of bed transfers. This pt is completely unsafe to return home under the current circumstance.     This section established at most recent assessment   PROBLEM LIST (Impairments causing functional limitations):  Decreased Strength  Decreased ADL/Functional Activities  Decreased Transfer Abilities  Decreased Balance  Decreased Activity Tolerance  Decreased Flexibility/Joint Mobility   INTERVENTIONS PLANNED: (Benefits and precautions of physical therapy have been discussed with the patient.)  Balance Exercise  Bed Mobility  Neuromuscular Re-education/Strengthening  Therapeutic Activites  Therapeutic Exercise/Strengthening  Transfer Training     TREATMENT PLAN: Frequency/Duration: daily for duration of hospital stay  Rehabilitation Potential For Stated Goals: [facility] (at time of discharge pending progress):    Placement: It is my opinion, based on this patient's performance to date, that Ms. Jitendra Pelayo may benefit from intensive therapy at a [address] [place] [address] after discharge due to the functional deficits listed above that are likely to improve with skilled rehabilitation and concerns that he/she may be unsafe to be unsupervised at home due to her severe debility . Equipment: To be detrmined              HISTORY:   History of Present Injury/Illness (Reason for Referral):  Umm Street is a 63 y.o. female who we are asked by Dr. Adalberto Freeman to see for right ischial wound with OM. This was previously debrided by Dr. Jd Young on 5/14/2020. Pt had a wound vac post operatively. She went to [facility] postoperatively and was transferred to [facility] due to hypotension and fever. She was admitted with sepsis likely due to infected decubitus ulcer. WBC 20. Blood cultures NGTD. General surgery was consulted for evaluation of her right ischial wound. Past Medical History/Comorbidities:   Ms. Jitendra Pelayo  has a past medical history of Decubitus ulcer of ischial area, Hypertension, Kidney infection, Menopause, and MS (multiple sclerosis) ([address]  She also has no past medical history of Aneurysm ([address] Arrhythmia, Arthritis, Asthma, Autoimmune disease ([address] CAD (coronary artery disease), Cancer ([address] Chronic kidney disease, Chronic obstructive pulmonary disease (HCC), Chronic pain, Coagulation disorder ([facility] [address] Diabetes ([facility] [address] Endocarditis, GERD (gastroesophageal reflux disease), Heart failure ([facility] [address] Ill-defined condition, Liver disease, Morbid obesity ([facility] Nicotine vapor product user, Non-nicotine vapor product user, Psychiatric disorder, PUD (peptic ulcer disease), Rheumatic fever, Seizures ([facility] [address] Sleep apnea, Stroke ([facility] [address] [facility] Thromboembolus ([facility] [facility] or Thyroid disease. Ms. Gisell Humphrey  has a past surgical history that includes ercp (5/15/2020); hx cholecystectomy (05/18/2020); and colonoscopy (N/A, 6/29/2020). Social History/Living Environment:   Home Environment: Private residence  # Steps to Enter: 0  Wheelchair Ramp: Yes  One/Two Story Residence: One story  Living Alone: Yes  Support Systems: Family member(s), Friends \ neighbors  Patient Expects to be Discharged to[de-identified] Skilled nursing facility  Current DME Used/Available at Home: (scooter)  Prior Level of Function/Work/Activity:  Pt has not been functional for period of time that is unclear, that included LTACH & SNF stays recently, prior to this admission. Personal Factors: Other factors that influence how disability is experienced by the patient:  current & PMH   Number of Personal Factors/Comorbidities that affect the Plan of Care: 3+: HIGH COMPLEXITY   EXAMINATION:   Most Recent Physical Functioning:   Gross Assessment:  AROM: Grossly decreased, non-functional(all limbs & core)  Strength: Grossly decreased, non-functional(all limbs & core)  Coordination: Grossly decreased, non-functional(all limbs & core)               Posture:     Balance:  Sitting: Intact; Without support  Standing: (NT) Bed Mobility:  Supine to Sit: Maximum assistance  Sit to Supine: Maximum assistance  Scooting: Maximum assistance  Wheelchair Mobility:     Transfers:  Bed to Chair: Total assistance  Gait:         Functional Mobility:         Transfers:  dep        Bed Mobility: max   Body Structures Involved:  Nerves  Metabolic  Muscles Body Functions Affected:  Neuromusculoskeletal  Movement Related  Metobolic/Endocrine Activities and Participation Affected:  General Tasks and Demands  Mobility   Number of elements that affect the Plan of Care: 4+: HIGH COMPLEXITY   CLINICAL PRESENTATION:   Presentation: Evolving clinical presentation with unstable and unpredictable characteristics: HIGH COMPLEXITY   CLINICAL DECISION MAKIN [facility] Mobility Inpatient Short Form  How much difficulty does the patient currently have. .. Unable A Lot A Little None   1. Turning over in bed (including adjusting bedclothes, sheets and blankets)? [] 1   [x] 2   [] 3   [] 4   2. Sitting down on and standing up from a chair with arms ( e.g., wheelchair, bedside commode, etc.)   [x] 1   [] 2   [] 3   [] 4   3. Moving from lying on back to sitting on the side of the bed? [] 1   [x] 2   [] 3   [] 4   How much help from another person does the patient currently need. .. Total A Lot A Little None   4. Moving to and from a bed to a chair (including a wheelchair)? [x] 1   [] 2   [] 3   [] 4   5. Need to walk in hospital room? [x] 1   [] 2   [] 3   [] 4   6. Climbing 3-5 steps with a railing? [x] 1   [] 2   [] 3   [] 4   © , Trustees of [address] [place] [address] [address] [address], under license to [employer]. All rights reserved      Score:  Initial: 8 Most Recent: X (Date: -- )    Interpretation of Tool:  Represents activities that are increasingly more difficult (i.e. Bed mobility, Transfers, Gait). Medical Necessity:     Patient is expected to demonstrate progress in   strength, range of motion, balance, coordination, and functional technique   to   decrease assistance required with bed mobility & transfers  . Reason for Services/Other Comments:  Patient continues to require skilled intervention due to   Severe debility  .    Use of outcome tool(s) and clinical judgement create a POC that gives a: Difficult prediction of patient's progress: HIGH COMPLEXITY            TREATMENT:   (In addition to Assessment/Re-Assessment sessions the following treatments were rendered)   Pre-treatment Symptoms/Complaints:  \"I can't stand\"  Pain: Initial: numeric scale  Pain Intensity 1: 5  Pain Location 1: Buttocks  Pain Orientation 1: Mid  Pain Intervention(s) 1: Repositioned  Post Session:  5/10     Assessment/Reassessment only, no treatment provided today    Braces/Orthotics/Lines/Etc:   IV  Wound vac  Treatment/Session Assessment:    Response to Treatment:  pt fearful but cooperative  Interdisciplinary Collaboration:   Occupational Therapist  Registered Nurse    After treatment position/precautions:   Supine in bed  Bed/Chair-wheels locked  Bed in low position  Call light within reach  RN notified   Compliance with Program/Exercises: Will assess as treatment progresses  Recommendations/Intent for next treatment session: \"Next visit will focus on reduction in assistance provided\".   Total Treatment Duration:  PT Patient Time In/Time Out  Time In: 8149  Time Out: [place]

## 2020-07-25 NOTE — PROGRESS NOTES
Infectious Disease Consult    Today's Date: 2020   Admit Date: 2020    Impression:   · MRSA bacteremia - source ? wound  · Fever, leukocytosis - improving. BC pending, UA abn/UCx pending: wasserman change- I think UTI more likely than wound s/p debridement of wound  with cultures pending. covid neg  · Abnormal AST/ALT  · Stage IV R ischial decub/OM, s/p 6 weeks IV Vanc/Cefep/Flagyl 20  · MS, with chronic debility and wasserman  · PCN allergy: swelling    Plan:   · will check TTE  · Cont vanc  · F/u urine cx - can stop cefepime if neg  · F/u wound cx - can likely stop flagyl soon    Anti-infectives:   · Vanc -  · Cefepime 20-  · Flagyl 20-    Subjective:   Fever and leukocytosis have improved. Pt in pain post debridement  . Allergies   Allergen Reactions    Latex Rash    Norco [Hydrocodone-Acetaminophen] Other (comments)     Sees dead people    Pcn [Penicillins] Swelling        Review of Systems:  A comprehensive review of systems was negative except for that written in the History of Present Illness. Objective:     Visit Vitals  /83 (BP 1 Location: Right arm, BP Patient Position: At rest)   Pulse (!) 102   Temp 97.3 °F (36.3 °C)   Resp 14   Ht 5' 7.5\" (1.715 m)   Wt 48.2 kg (106 lb 4.8 oz)   SpO2 96%   BMI 16.40 kg/m²     Temp (24hrs), Av.3 °F (36.8 °C), Min:97.3 °F (36.3 °C), Max:98.9 °F (37.2 °C)       Lines:  Peripheral IV:       Physical Exam:    General:  Alert, NAD, thin, contractured, uanble to move   Eyes:  Sclera anicteric. Pupils equally round and reactive to light. Mouth/Throat: Mucous membranes normal, oral pharynx clear       Lungs:   Clear to auscultation bilaterally, good effort   CV:  Regular rate and rhythm,no murmur, click, rub or gallop   Abdomen:   Soft, non-tender.  bowel sounds normal. non-distended   Extremities: No cyanosis or edema   Skin: R ischium with granulation tissue, no drainage, but is contaminated with stool   Lymph nodes: Cervical and supraclavicular normal   Musculoskeletal: No swelling or deformity   Lines/Devices:  Intact, no erythema, drainage or tenderness   Psych: Alert and oriented, normal mood affect given the setting       Data Review:     CBC:  Recent Labs     07/24/20 0530 07/23/20 0039   WBC 15.2* 20.9*   GRANS 94* 96*   MONOS 3* 2*   EOS 1 1   ANEU 14.3* 20.1*   ABL 0.2* 0.2*   HGB 7.9* 8.3*   HCT 25.0* 25.7*    542*       BMP:  Recent Labs     07/24/20  0530 07/23/20 0039   CREA 0.50* 0.74   BUN 23 22    134*   K 3.6 4.6   * 100   CO2 20* 25   AGAP 8 9   GLU 89 123*       LFTS:  Recent Labs     07/24/20 0530 07/23/20 0039   TBILI 0.5 0.6   ALT 99* 136*   * 262*   TP 5.7* 6.4   ALB 2.0* 2.4*       Microbiology:     All Micro Results     Procedure Component Value Units Date/Time    CULTURE, ANAEROBIC [[id number]] Collected:  07/24/20 1000    Order Status:  Completed Specimen:  Decubitus Updated:  07/24/20 1450    CULTURE, WOUND [address] STAIN [[id number]] Collected:  07/24/20 1000    Order Status:  Completed Specimen:  Wound from Decubitus Updated:  07/24/20 1450    CULTURE, BLOOD [[id number]] Collected:  07/24/20 1300    Order Status:  Completed Updated:  07/24/20 1340    BLOOD CULTURE ID PANEL [[id number]]  (Abnormal) Collected:  07/23/20 0039    Order Status:  Completed Specimen:  Blood Updated:  07/24/20 1207     Acc. no. from Micro Order [id number]     Staphylococcus Detected        Comment: RESULTS VERIFIED, PHONED TO AND READ BACK BY  KYLE Ignacio RN ON 07/24/20 @1154, ADS          mecA (Methicillin-Resistance Genes) Detected        Comment: Presence of mecA is highly indicative of methicillin resistance. The test does not replace traditional culture and susceptibilities  RESULTS VERIFIED, PHONED TO AND READ BACK BY  KYLE HUMPHREYS RN ON 07/24/20 @1154, ADS          INTERPRETATION       Gram positive cocci in clusters, identified by realtime PCR as SUSPECTED MRSA.            Comment: Recommend IV vancomycin use, unlikely to be a contaminant. Infectious Diseases Consult recommended in adult patients. THIS TEST DOES NOT REPLACE SENSITIVITY TESTING. CULTURE, BLOOD [[id number]] Collected:  07/23/20 0039    Order Status:  Completed Specimen:  Blood Updated:  07/24/20 1153     Special Requests: --        NO SPECIAL REQUESTS  LEFT  ARM       GRAM STAIN       AEROBIC BOTTLE POSITIVE ANAEROBIC BOTTLE POSITIVE GRAM POSITIVE COCCI                  RESULTS VERIFIED, PHONED TO AND READ BACK BY            Tracie Marie FLOOR [id number]. SLB            CRITICAL RESULT NOT CALLED DUE TO PREVIOUS NOTIFICATION OF CRITICAL RESULT WITHIN THE LAST 24 HOURS. Culture result:       CULTURE IN PROGRESS,FURTHER UPDATES TO FOLLOW            REFER TO [facility] YASSSU ACCESSION [id number]    CULTURE, URINE [[id number]] Collected:  07/23/20 2010    Order Status:  Completed Specimen:  Cath Urine Updated:  07/24/20 0948     Special Requests: NO SPECIAL REQUESTS        Culture result:       NO GROWTH AFTER SHORT PERIOD OF INCUBATION. FURTHER RESULTS TO FOLLOW AFTER OVERNIGHT INCUBATION.           CULTURE, BLOOD [[id number]] Collected:  07/24/20 0530    Order Status:  Completed Specimen:  Blood Updated:  07/24/20 0557    CULTURE, BLOOD [[id number]] Collected:  07/24/20 0530    Order Status:  Canceled Specimen:  Blood     CULTURE, URINE [[id number]] Collected:  07/23/20 0054    Order Status:  Completed Specimen:  Cath Urine Updated:  07/23/20 1132    CULTURE, BLOOD [[id number]] Collected:  07/23/20 0105    Order Status:  Completed Specimen:  Blood Updated:  07/23/20 0108          Imaging:   Reviewed CXr 7/23 - no PNA    Signed By: Chelsey Contreras MD     July 24, 2020

## 2020-07-25 NOTE — PROGRESS NOTES
Problem: Falls - Risk of  Goal: *Absence of Falls  Description: Document Ashley Booker Fall Risk and appropriate interventions in the flowsheet.   Outcome: Progressing Towards Goal  Note: Fall Risk Interventions:  Mobility Interventions: Communicate number of staff needed for ambulation/transfer    Mentation Interventions: Adequate sleep, hydration, pain control    Medication Interventions: Assess postural VS orthostatic hypotension    Elimination Interventions: Call light in reach

## 2020-07-25 NOTE — PROGRESS NOTES
Name: James Messer MRN: [id number]  : 1960  Age:59 y.o.  female  Admit Date:  2020 LOS: 2      Hospitalist Progress Note    James Messer is a 63 y.o. female with medical history significant for multiple sclerosis, chronic indwelling wasserman,ischial decubitus ulcer with osteomyelitis (recently completed antibiotics about a week ago) who was sent from [facility] rehab due to low blood pressure. Patient was noted to have fever of 101 °F at the facility. In May patient was admitted here for right history of 2 cubitus ulcer with OM and was treated with cefepime, vancomycin, Flagyl. She was discharged to [facility] subsequently to [facility] with rehab with 6 weeks of IV ABx. She was found to meet SIRS criteria with leukocytosis, tachycardia and fever(at outside facility) and admitted for sepsis likely secondary to infected decubitus ulcer. Subjective (20) :  Patient is seen and examined at bedside. No acute events overnight. Patient is more alert and awake today. She appears more comfortable, laying in bed without any distress. States that she feels a lot better today compared to prior days. Pain is better controlled with IV morphine but does have breakthrough pains in between. Any chills, chest pains, fever, shortness of breath, abdominal pain, nausea, vomiting. ROS: 10 point review of system negative except for what is noted above.     Objective:    Patient Vitals for the past 24 hrs:   Temp Pulse Resp BP SpO2   20 0742 97.8 °F (36.6 °C) 84 20 131/87 97 %   20 0414 97.5 °F (36.4 °C) 83 22 126/84 97 %   20 2355 97.7 °F (36.5 °C) 82 22 116/83 97 %   20 1938 97.3 °F (36.3 °C) (!) 102 14 121/83 96 %   20 1517 97.8 °F (36.6 °C) 97 20 120/77 98 %   20 1213 98.3 °F (36.8 °C) (!) 115 22 113/69 93 %   20 1135  (!) 102 16  94 %   20 1130  (!) 101 16 115/63 94 %   20 1125  99 16 108/59 95 %   20 1120  98 16 106/63 96 % 07/24/20 1115  99 16 111/64 99 %   07/24/20 1110  100 16 109/65 100 %   07/24/20 1104  98 16 114/68 98 %   07/24/20 1059  98 16 114/68 98 %   07/24/20 1057  (!) 103 16 117/79 95 %   07/24/20 1054  (!) 104 16 124/77 93 %   07/24/20 1049  (!) 104 16 126/77 94 %   07/24/20 1039 98.9 °F (37.2 °C) (!) 105 16 134/68 96 %     Oxygen Therapy  O2 Sat (%): 97 % (07/25/20 0742)  Pulse via Oximetry: 106 beats per minute (07/23/20 0200)  O2 Device: Room air (07/25/20 0742)    Estimated body mass index is 14.84 kg/m² as calculated from the following:    Height as of this encounter: 5' 7.5\" (1.715 m). Weight as of this encounter: 43.6 kg (96 lb 3.2 oz). Intake/Output Summary (Last 24 hours) at 7/25/2020 0851  Last data filed at 7/25/2020 0414  Gross per 24 hour   Intake 2147 ml   Output 1005 ml   Net 1142 ml       *Note that automatically entered I/Os may not be accurate; dependent on patient compliance with collection and accurate  by techs. Physical Exam:   General:     Awake and alert, no distress. Thin, malnourished. Head:   normocephalic, atraumatic  Eyes, Ears, nose: PERRL. Normal conjunctiva  Neck:    supple, non-tender. Trachea midline. Lungs:   CTAB, no wheezing, rhonchi, rales  Cardiac:   RRR, Normal S1 and S2   Abdomen:   Soft, non distended, nontender, +BS, no guarding/rebound  Extremities:   Warm, dry. No edema   Skin:   Decubitus ulcers covered on gauze and wound vac.    Neuro:  AAOx3, no focal deficits  Data Review:  I have reviewed all labs, meds, and studies from the last 24 hours:    Recent Results (from the past 24 hour(s))   CULTURE, BLOOD    Collection Time: 07/24/20  1:00 PM    Specimen: Blood   Result Value Ref Range    Special Requests: RT HAND     Culture result: NO GROWTH AFTER 17 HOURS     VANCOMYCIN, TROUGH    Collection Time: 07/24/20  1:00 PM   Result Value Ref Range    Vancomycin,trough 13.6 5 - 20 ug/mL        All Micro Results     Procedure Component Value Units Date/Time    CULTURE, URINE [[id number]]  (Abnormal) Collected:  07/23/20 0054    Order Status:  Completed Specimen:  Cath Urine Updated:  07/25/20 0835     Special Requests: NO SPECIAL REQUESTS        Culture result:       >100,000 COLONIES/mL GRAM NEGATIVE RODS IDENTIFICATION AND SUSCEPTIBILITY TO FOLLOW          CULTURE, URINE [[id number]]  (Abnormal) Collected:  07/23/20 2010    Order Status:  Completed Specimen:  Cath Urine Updated:  07/25/20 0829     Special Requests: NO SPECIAL REQUESTS        Culture result:       5000 COLONIES/mL YEAST SUBCULTURE IN PROGRESS          CULTURE, BLOOD [[id number]]  (Abnormal) Collected:  07/23/20 0039    Order Status:  Completed Specimen:  Blood Updated:  07/25/20 0802     Special Requests: --        NO SPECIAL REQUESTS  LEFT  ARM       GRAM STAIN       AEROBIC BOTTLE POSITIVE ANAEROBIC BOTTLE POSITIVE GRAM POSITIVE COCCI                  RESULTS VERIFIED, PHONED TO AND READ BACK BY            Rogelio Christina [place] [id number]. SLB            CRITICAL RESULT NOT CALLED DUE TO PREVIOUS NOTIFICATION OF CRITICAL RESULT WITHIN THE LAST 24 HOURS. Culture result:       STAPHYLOCOCCUS SPECIES, COAGULASE NEGATIVE            REFER TO [facility] ACCESSION [id number]            THIS ORGANISM MAY BE INDICATIVE OF CULTURE CONTAMINATION, HOWEVER, CLINICAL CORRELATION NEEDS TO BE EVALUATED, AS EACH CASE IS UNIQUE.           CULTURE, BLOOD [[id number]] Collected:  07/24/20 1300    Order Status:  Completed Specimen:  Blood Updated:  07/25/20 0707     Special Requests: RT HAND     Culture result: NO GROWTH AFTER 17 HOURS       CULTURE, BLOOD [[id number]] Collected:  07/24/20 0530    Order Status:  Completed Specimen:  Blood Updated:  07/25/20 0707     Special Requests: --        LEFT  HAND       Culture result: NO GROWTH 1 DAY       CULTURE, BLOOD [[id number]] Collected:  07/23/20 0105    Order Status:  Completed Specimen:  Blood Updated:  07/25/20 0707     Special Requests: --        NO SPECIAL REQUESTS  RIGHT  HAND       Culture result: NO GROWTH 2 DAYS       CULTURE, ANAEROBIC [[id number]] Collected:  07/24/20 1000    Order Status:  Completed Specimen:  Decubitus Updated:  07/24/20 1450    CULTURE, Rehan Elaine STAIN [[id number]] Collected:  07/24/20 1000    Order Status:  Completed Specimen:  Wound from Decubitus Updated:  07/24/20 1450    BLOOD CULTURE ID PANEL [[id number]]  (Abnormal) Collected:  07/23/20 0039    Order Status:  Completed Specimen:  Blood Updated:  07/24/20 1207     Acc. no. from Micro Order [id number]     Staphylococcus Detected        Comment: RESULTS VERIFIED, PHONED TO AND READ BACK BY  KYLE Ignacio RN ON 07/24/20 @1154, ADS          mecA (Methicillin-Resistance Genes) Detected        Comment: Presence of mecA is highly indicative of methicillin resistance. The test does not replace traditional culture and susceptibilities  RESULTS VERIFIED, PHONED TO AND READ BACK BY  KYLE HUMPHREYS RN ON 07/24/20 @1154, ADS          INTERPRETATION       Gram positive cocci in clusters, identified by realtime PCR as SUSPECTED MRSA. Comment: Recommend IV vancomycin use, unlikely to be a contaminant. Infectious Diseases Consult recommended in adult patients. THIS TEST DOES NOT REPLACE SENSITIVITY TESTING. CULTURE, BLOOD [[id number]] Collected:  07/24/20 0530    Order Status:  Canceled Specimen:  Blood           Current Meds:  Current Facility-Administered Medications   Medication Dose Route Frequency    amantadine HCL (SYMMETREL) capsule 100 mg  100 mg Oral BID    . PHARMACY TO SUBSTITUTE PER PROTOCOL (Reordered from: fingolimod 0.5 mg cap)    Per Protocol    HYDROcodone-acetaminophen (NORCO) 5-325 mg per tablet 1 Tab  1 Tab Oral Q4H PRN    enoxaparin (LOVENOX) injection 30 mg  30 mg SubCUTAneous DAILY    sodium chloride (NS) flush 5-10 mL  5-10 mL IntraVENous PRN    0.9% sodium chloride infusion  125 mL/hr IntraVENous CONTINUOUS    sodium chloride (NS) flush 5-40 mL  5-40 mL IntraVENous Q8H    sodium chloride (NS) flush 5-40 mL  5-40 mL IntraVENous PRN    acetaminophen (TYLENOL) tablet 650 mg  650 mg Oral Q6H PRN    Or    acetaminophen (TYLENOL) suppository 650 mg  650 mg Rectal Q6H PRN    polyethylene glycol (MIRALAX) packet 17 g  17 g Oral DAILY PRN    promethazine (PHENERGAN) tablet 12.5 mg  12.5 mg Oral Q6H PRN    Or    ondansetron (ZOFRAN) injection 4 mg  4 mg IntraVENous Q6H PRN    cefepime (MAXIPIME) 2 g in 0.9% sodium chloride (MBP/ADV) 100 mL  2 g IntraVENous Q12H    metroNIDAZOLE (FLAGYL) IVPB premix 500 mg  500 mg IntraVENous Q12H    vancomycin (VANCOCIN) 1,000 mg in 0.9% sodium chloride (MBP/ADV) 250 mL  1,000 mg IntraVENous Q12H    morphine injection 2 mg  2 mg IntraVENous Q4H PRN         Other Studies:  Xr Chest Port    Result Date: 7/23/2020  Portable chest xray  COMPARISON: Hypotension, infectious evaluation INDICATION: May 22, 2020 FINDINGS: Heart appears mildly enlarged. Mediastinal contour is within normal limits. No pulmonary consolidation, pneumothorax or pulmonary edema. No large pleural effusion. Bones are osteopenic. IMPRESSION: No pulmonary consolidation or pulmonary edema. Assessment:    Active Hospital Problems    Diagnosis Date Noted    MRSA bacteremia 07/25/2020    Sepsis ([facility] [address] 07/23/2020    Osteomyelitis of sacroiliac region [facility]) 05/20/2020    Multiple sclerosis ([facility] [address] 05/13/2020    Protein calorie malnutrition ([facility] [address] 05/13/2020    Pressure injury of sacral region, stage 4 ([facility] [address] 05/13/2020       Plan:  Sepsis(on admission) likely secondary to infected right decubitus ulcer and/or UTI  Right ischial decubitus ulcer with osteomyelitis  MRSA Bacteremia   Met SIRS criteria on admission with fever(from outside facility), leukocytosis and tachycardia. S/p I&D (7/24) by [facility]  BCx growing MRSA.    ID recs appreciated: Continue with cefepime, vancomycin, Flagyl (if worsens - broaden to vanc + cj)   IV fluids   Follow-up intrap-op tissue/wound cultures   Echo for vegetations    UTI  Patient has indwelling Mathew catheter. UA consistent with UTI. UCx neg to date   On antibiotics as above   exchange Mathew and resend UA    Multiple sclerosis: Continue with home medications    Moderate to severe PCM: nutrition consulted    Diet:  DIET NUTRITIONAL SUPPLEMENTS  DIET NUTRITIONAL SUPPLEMENTS  DIET REGULAR  DVT PPx: lovenox  Code: DNR  Dispo: SNF  Estimated Discharge: TBD based on clinical course    DPOA: Pt's sister Ms Rodriguez Post    Labs/Imaging Reviewed. Patient is high risk due to current condition and comorbid conditions as well as requiring frequent monitoring. Plan discussed with staff, patient/family and are in agreement. Time Spent: Greater than 45 minutes was spent in reviewing charts, physical exam, discussion with patient, and answered any questions.     Signed By: Kirti Lopez MD     July 25, 2020

## 2020-07-25 NOTE — PROGRESS NOTES
END OF SHIFT NOTE:    Intake/Output  07/25 0701 - 07/25 1900  In: Cheng Hong [P.O.:240; I.V.:1632]  Out: 300 [Urine:300]   Voiding: YES  Catheter: YES  Drain:              Stool:  0 occurrences. Stool Assessment  Stool Color: Candy Barbone (07/23/20 1737)  Stool Appearance: Soft (07/23/20 1737)  Stool Amount: Small (07/23/20 1737)  Stool Source/Status: Rectum (07/23/20 1737)    Emesis:  0 occurrences. VITAL SIGNS  Patient Vitals for the past 12 hrs:   Temp Pulse Resp BP SpO2   07/25/20 1618    156/88    07/25/20 1541 98.2 °F (36.8 °C) 96 20  99 %   07/25/20 1158 98 °F (36.7 °C) 86 20 130/89 98 %   07/25/20 0742 97.8 °F (36.6 °C) 84 20 131/87 97 %       Pain Assessment  Pain 1  Pain Scale 1: Numeric (0 - 10) (07/25/20 1730)  Pain Intensity 1: 10 (07/25/20 1730)  Patient Stated Pain Goal: 0 (07/25/20 1730)  Pain Reassessment 1: Yes (07/25/20 1315)  Pain Location 1: Buttocks (07/25/20 1730)  Pain Orientation 1: Right;Mid (07/25/20 1730)  Pain Description 1: Aching (07/25/20 1730)  Pain Intervention(s) 1: Medication (see MAR) (07/25/20 1730)    Ambulating  No    Additional Information: patient cried multiple times today. Has been anxious and periodically confused. Wound is showing probable MRSA. Wound vac in place. Patient on contact isolation. Shift report to be given to oncoming nurse at the bedside.     Jon Lafleur RN

## 2020-07-25 NOTE — PROGRESS NOTES
Chart reviewed, patient NOT seen. White count improving, now afebrile, bcx 7/23 with CoNS in both bottles from one set, second set no growth, repeats 7/24 negative. PCR indicates MRSA but called lab to confirm that this is all CoNS and ID from [facility] will be corrected in the chart. Will discontinue vancomycin for now as no indication that this is truly a pathogen at this point. Urine culture with GNR, not surprising in someone with chronic wasserman. Would not anticipate recurrent long course of IV abx given she was just treated for OM and this is unlikely to change her overall course. Anticipate shorter course for either SSTI or urine. Continue cefepime/flagyl for now, likely dc flagyl on Monday. ID will follow up again on Monday unless called.

## 2020-07-26 PROBLEM — R78.81 COAGULASE NEGATIVE STAPHYLOCOCCUS BACTEREMIA: Status: ACTIVE | Noted: 2020-07-26

## 2020-07-26 PROBLEM — B95.7 COAGULASE NEGATIVE STAPHYLOCOCCUS BACTEREMIA: Status: ACTIVE | Noted: 2020-07-26

## 2020-07-26 LAB
ANION GAP SERPL CALC-SCNC: 8 MMOL/L (ref 7–16)
BACTERIA SPEC CULT: ABNORMAL
BUN SERPL-MCNC: 20 MG/DL (ref 6–23)
CALCIUM SERPL-MCNC: 8.4 MG/DL (ref 8.3–10.4)
CHLORIDE SERPL-SCNC: 114 MMOL/L (ref 98–107)
CO2 SERPL-SCNC: 20 MMOL/L (ref 21–32)
CREAT SERPL-MCNC: 0.29 MG/DL (ref 0.6–1)
ERYTHROCYTE [DISTWIDTH] IN BLOOD BY AUTOMATED COUNT: 13.3 % (ref 11.9–14.6)
GLUCOSE SERPL-MCNC: 88 MG/DL (ref 65–100)
GRAM STN SPEC: ABNORMAL
GRAM STN SPEC: ABNORMAL
HCT VFR BLD AUTO: 24.6 % (ref 35.8–46.3)
HGB BLD-MCNC: 8 G/DL (ref 11.7–15.4)
MCH RBC QN AUTO: 30.8 PG (ref 26.1–32.9)
MCHC RBC AUTO-ENTMCNC: 32.5 G/DL (ref 31.4–35)
MCV RBC AUTO: 94.6 FL (ref 79.6–97.8)
NRBC # BLD: 0 K/UL (ref 0–0.2)
PLATELET # BLD AUTO: 468 K/UL (ref 150–450)
PMV BLD AUTO: 10.3 FL (ref 9.4–12.3)
POTASSIUM SERPL-SCNC: 3.2 MMOL/L (ref 3.5–5.1)
RBC # BLD AUTO: 2.6 M/UL (ref 4.05–5.2)
SERVICE CMNT-IMP: ABNORMAL
SERVICE CMNT-IMP: ABNORMAL
SODIUM SERPL-SCNC: 142 MMOL/L (ref 136–145)
WBC # BLD AUTO: 10.6 K/UL (ref 4.3–11.1)

## 2020-07-26 PROCEDURE — 74011250636 HC RX REV CODE- 250/636: Performed by: SURGERY

## 2020-07-26 PROCEDURE — 97530 THERAPEUTIC ACTIVITIES: CPT

## 2020-07-26 PROCEDURE — 65270000029 HC RM PRIVATE

## 2020-07-26 PROCEDURE — 74011250637 HC RX REV CODE- 250/637: Performed by: INTERNAL MEDICINE

## 2020-07-26 PROCEDURE — 36415 COLL VENOUS BLD VENIPUNCTURE: CPT

## 2020-07-26 PROCEDURE — 80048 BASIC METABOLIC PNL TOTAL CA: CPT

## 2020-07-26 PROCEDURE — 99222 1ST HOSP IP/OBS MODERATE 55: CPT | Performed by: INTERNAL MEDICINE

## 2020-07-26 PROCEDURE — 74011250637 HC RX REV CODE- 250/637: Performed by: HOSPITALIST

## 2020-07-26 PROCEDURE — 74011000258 HC RX REV CODE- 258: Performed by: SURGERY

## 2020-07-26 PROCEDURE — 97110 THERAPEUTIC EXERCISES: CPT

## 2020-07-26 PROCEDURE — 85027 COMPLETE CBC AUTOMATED: CPT

## 2020-07-26 PROCEDURE — 74750000023 HC WOUND THERAPY

## 2020-07-26 RX ORDER — BACLOFEN 10 MG/1
5 TABLET ORAL
Status: DISCONTINUED | OUTPATIENT
Start: 2020-07-26 | End: 2020-07-28 | Stop reason: HOSPADM

## 2020-07-26 RX ADMIN — SODIUM CHLORIDE 125 ML/HR: 900 INJECTION, SOLUTION INTRAVENOUS at 00:19

## 2020-07-26 RX ADMIN — CEFEPIME HYDROCHLORIDE 2 G: 2 INJECTION, POWDER, FOR SOLUTION INTRAVENOUS at 00:15

## 2020-07-26 RX ADMIN — Medication 10 ML: at 21:05

## 2020-07-26 RX ADMIN — AMANTADINE HYDROCHLORIDE 100 MG: 100 CAPSULE, LIQUID FILLED ORAL at 16:31

## 2020-07-26 RX ADMIN — CEFEPIME HYDROCHLORIDE 2 G: 2 INJECTION, POWDER, FOR SOLUTION INTRAVENOUS at 12:22

## 2020-07-26 RX ADMIN — Medication 10 ML: at 05:22

## 2020-07-26 RX ADMIN — HYDROCODONE BITARTRATE AND ACETAMINOPHEN 1 TABLET: 5; 325 TABLET ORAL at 21:18

## 2020-07-26 RX ADMIN — Medication 10 ML: at 12:23

## 2020-07-26 RX ADMIN — AMANTADINE HYDROCHLORIDE 100 MG: 100 CAPSULE, LIQUID FILLED ORAL at 08:44

## 2020-07-26 RX ADMIN — METRONIDAZOLE 500 MG: 500 INJECTION, SOLUTION INTRAVENOUS at 08:44

## 2020-07-26 RX ADMIN — CEFEPIME HYDROCHLORIDE 2 G: 2 INJECTION, POWDER, FOR SOLUTION INTRAVENOUS at 23:44

## 2020-07-26 RX ADMIN — MORPHINE SULFATE 2 MG: 2 INJECTION, SOLUTION INTRAMUSCULAR; INTRAVENOUS at 16:30

## 2020-07-26 RX ADMIN — SODIUM CHLORIDE 125 ML/HR: 900 INJECTION, SOLUTION INTRAVENOUS at 21:07

## 2020-07-26 RX ADMIN — METRONIDAZOLE 500 MG: 500 INJECTION, SOLUTION INTRAVENOUS at 21:06

## 2020-07-26 RX ADMIN — BACLOFEN 5 MG: 10 TABLET ORAL at 12:23

## 2020-07-26 RX ADMIN — SODIUM CHLORIDE 125 ML/HR: 900 INJECTION, SOLUTION INTRAVENOUS at 08:47

## 2020-07-26 RX ADMIN — ENOXAPARIN SODIUM 30 MG: 30 INJECTION SUBCUTANEOUS at 08:44

## 2020-07-26 NOTE — PROGRESS NOTES
END OF SHIFT NOTE:    Intake/Output  07/26 0701 - 07/26 1900  In: 8326 [P.O.:200; I.V.:1022]  Out: 1800 [Urine:1800]   Voiding: YES  Catheter: YES  Drain:              Stool:  1 occurrences. Stool Assessment  Stool Color: Brown (07/26/20 1155)  Stool Appearance: Formed (07/26/20 1155)  Stool Amount: Medium (07/26/20 1155)  Stool Source/Status: Rectum (07/26/20 1155)    Emesis:  0 occurrences. VITAL SIGNS  Patient Vitals for the past 12 hrs:   Temp Pulse Resp BP SpO2   07/26/20 1526 97.5 °F (36.4 °C) (!) 103 18 163/87 96 %   07/26/20 1200 97.6 °F (36.4 °C) 81 18 (!) 157/91 95 %   07/26/20 0745 99 °F (37.2 °C) 98 18 (!) 156/94 93 %       Pain Assessment  Pain 1  Pain Scale 1: Numeric (0 - 10) (07/26/20 1703)  Pain Intensity 1: 7 (07/26/20 1703)  Patient Stated Pain Goal: 0 (07/26/20 1630)  Pain Reassessment 1: Yes (07/26/20 1703)  Pain Location 1: Buttocks (07/26/20 1630)  Pain Orientation 1: Right (07/26/20 1630)  Pain Description 1: Aching (07/26/20 1630)  Pain Intervention(s) 1: Medication (see MAR) (07/26/20 1630)    Ambulating  No    Additional Information: patient had a better day. Less periods of confusion. Only received morphine once today. Shift report to be given to oncoming nurse at the bedside.     Sarah Car RN

## 2020-07-26 NOTE — PROGRESS NOTES
END OF SHIFT NOTE:    Intake/Output  07/25 1901 - 07/26 0700  In: 1325.5 [P.O.:118; I.V.:1207.5]  Out: 1250 [Urine:1250]   Voiding: YES  Catheter: YES  Drain:      Stool:  0 occurrences. Stool Assessment  Stool Color: Jordana Grief (07/23/20 1737)  Stool Appearance: Soft(per patient) (07/25/20 1911)  Stool Amount: Small (07/23/20 1737)  Stool Source/Status: Rectum (07/23/20 1737)    Emesis:  0 occurrences. VITAL SIGNS  Patient Vitals for the past 12 hrs:   Temp Pulse Resp BP SpO2   07/26/20 0445 98.6 °F (37 °C) 94 18 (!) 151/99 93 %   07/26/20 0000 98.2 °F (36.8 °C) 92 18 146/72 96 %   07/25/20 1912 98.1 °F (36.7 °C) 81 20 (!) 153/96 93 %       Pain Assessment  Pain 1  Pain Scale 1: Visual (07/26/20 0603)  Pain Intensity 1: 0 (07/26/20 0603)  Patient Stated Pain Goal: 0 (07/26/20 0603)  Pain Reassessment 1: Patient resting w/respiratory rate greater than 10 (07/26/20 0603)  Pain Location 1: Buttocks (07/25/20 1730)  Pain Orientation 1: Right;Mid (07/25/20 1730)  Pain Description 1: Aching (07/25/20 1730)  Pain Intervention(s) 1: Medication (see MAR) (07/25/20 1730)    Ambulating  No    Additional Information:     Shift report given to oncoming nurse at the bedside.     Ana Rider

## 2020-07-26 NOTE — PROGRESS NOTES
Name: Richard Malhotra MRN: [id number]  : 1960  Age:59 y.o.  female  Admit Date:  2020 LOS: 3      Hospitalist Progress Note    Richard Malhotra is a 63 y.o. female with medical history significant for multiple sclerosis, chronic indwelling wasserman,ischial decubitus ulcer with osteomyelitis (recently completed antibiotics about a week ago) who was sent from [facility] rehab due to low blood pressure. Patient was noted to have fever of 101 °F at the facility. In May patient was admitted here for right history of 2 cubitus ulcer with OM and was treated with cefepime, vancomycin, Flagyl. She was discharged to [facility] subsequently to [facility] with rehab with 6 weeks of IV ABx. She was found to meet SIRS criteria with leukocytosis, tachycardia and fever(at outside facility) and admitted for sepsis likely secondary to infected decubitus ulcer. Subjective (20) :  Patient is seen and examined at bedside. No acute events overnight. Afebrile for 48 hours. Alert and awake. ANO x2-3 but slightly confused regarding site details of her  medical issues. Reports body pain and muscle spasm. Reports of muscle spasms have been chronic and intermittent. Denies chills, chest pains, fever, shortness of breath, abdominal pain, nausea, vomiting. ROS: 10 point review of system negative except for what is noted above.     Objective:    Patient Vitals for the past 24 hrs:   Temp Pulse Resp BP SpO2   20 0445 98.6 °F (37 °C) 94 18 (!) 151/99 93 %   20 0000 98.2 °F (36.8 °C) 92 18 146/72 96 %   20 1912 98.1 °F (36.7 °C) 81 20 (!) 153/96 93 %   20 1618    156/88    20 1541 98.2 °F (36.8 °C) 96 20  99 %   20 1158 98 °F (36.7 °C) 86 20 130/89 98 %     Oxygen Therapy  O2 Sat (%): 93 % (20 0445)  Pulse via Oximetry: 106 beats per minute (07/23/20 0200)  O2 Device: Room air (20)    Estimated body mass index is 14.59 kg/m² as calculated from the following:    Height as of this encounter: 5' 7.5\" (1.715 m). Weight as of this encounter: 42.9 kg (94 lb 9.2 oz). Intake/Output Summary (Last 24 hours) at 7/26/2020 0756  Last data filed at 7/26/2020 0603  Gross per 24 hour   Intake 3197.5 ml   Output 1550 ml   Net 1647.5 ml       *Note that automatically entered I/Os may not be accurate; dependent on patient compliance with collection and accurate  by techs. Physical Exam:   General:     Awake and alert, no distress. Thin, malnourished. Head:   normocephalic, atraumatic  Eyes, Ears, nose: PERRL. Normal conjunctiva  Neck:    supple, non-tender. Trachea midline. Lungs:   CTAB, no wheezing, rhonchi, rales  Cardiac:   RRR, Normal S1 and S2   Abdomen:   Soft, non distended, nontender, +BS, no guarding/rebound  Extremities:   Warm, dry. No edema   Skin:   Decubitus ulcers covered on gauze and wound vac.    Neuro:  AAOx3, no focal deficits  Data Review:  I have reviewed all labs, meds, and studies from the last 24 hours:    Recent Results (from the past 24 hour(s))   CBC W/O DIFF    Collection Time: 07/25/20 11:26 AM   Result Value Ref Range    WBC 9.8 4.3 - 11.1 K/uL    RBC 2.67 (L) 4.05 - 5.2 M/uL    HGB 8.3 (L) 11.7 - 15.4 g/dL    HCT 26.1 (L) 35.8 - 46.3 %    MCV 97.8 79.6 - 97.8 FL    MCH 31.1 26.1 - 32.9 PG    MCHC 31.8 31.4 - 35.0 g/dL    RDW 13.2 11.9 - 14.6 %    PLATELET 847 495 - 704 K/uL    MPV 10.4 9.4 - 12.3 FL    ABSOLUTE NRBC 0.00 0.0 - 0.2 K/uL   METABOLIC PANEL, BASIC    Collection Time: 07/25/20 11:26 AM   Result Value Ref Range    Sodium 142 136 - 145 mmol/L    Potassium 3.5 3.5 - 5.1 mmol/L    Chloride 113 (H) 98 - 107 mmol/L    CO2 23 21 - 32 mmol/L    Anion gap 6 (L) 7 - 16 mmol/L    Glucose 118 (H) 65 - 100 mg/dL    BUN 33 (H) 6 - 23 MG/DL    Creatinine 0.46 (L) 0.6 - 1.0 MG/DL    GFR est AA >60 >60 ml/min/1.73m2    GFR est non-AA >60 >60 ml/min/1.73m2    Calcium 8.5 8.3 - 10.4 MG/DL   URINALYSIS W/ RFLX MICROSCOPIC Collection Time: 07/25/20  1:08 PM   Result Value Ref Range    Color YELLOW      Appearance CLEAR      Specific gravity 1.031 (H) 1.001 - 1.023      pH (UA) 6.0 5.0 - 9.0      Protein TRACE (A) NEG mg/dL    Glucose Negative mg/dL    Ketone 15 (A) NEG mg/dL    Bilirubin Negative NEG      Blood MODERATE (A) NEG      Urobilinogen 0.2 0.2 - 1.0 EU/dL    Nitrites Negative NEG      Leukocyte Esterase SMALL (A) NEG      WBC 3-5 0 /hpf    RBC 3-5 0 /hpf    Epithelial cells 0-3 0 /hpf    Bacteria BUDDING YEAST FORMS SEEN 0 /hpf    Casts 0-3 0 /lpf   CBC W/O DIFF    Collection Time: 07/26/20  5:49 AM   Result Value Ref Range    WBC 10.6 4.3 - 11.1 K/uL    RBC 2.60 (L) 4.05 - 5.2 M/uL    HGB 8.0 (L) 11.7 - 15.4 g/dL    HCT 24.6 (L) 35.8 - 46.3 %    MCV 94.6 79.6 - 97.8 FL    MCH 30.8 26.1 - 32.9 PG    MCHC 32.5 31.4 - 35.0 g/dL    RDW 13.3 11.9 - 14.6 %    PLATELET 057 (H) 821 - 450 K/uL    MPV 10.3 9.4 - 12.3 FL    ABSOLUTE NRBC 0.00 0.0 - 0.2 K/uL   METABOLIC PANEL, BASIC    Collection Time: 07/26/20  5:49 AM   Result Value Ref Range    Sodium 142 136 - 145 mmol/L    Potassium 3.2 (L) 3.5 - 5.1 mmol/L    Chloride 114 (H) 98 - 107 mmol/L    CO2 20 (L) 21 - 32 mmol/L    Anion gap 8 7 - 16 mmol/L    Glucose 88 65 - 100 mg/dL    BUN 20 6 - 23 MG/DL    Creatinine 0.29 (L) 0.6 - 1.0 MG/DL    GFR est AA >60 >60 ml/min/1.73m2    GFR est non-AA >60 >60 ml/min/1.73m2    Calcium 8.4 8.3 - 10.4 MG/DL        All Micro Results     Procedure Component Value Units Date/Time    CULTURE, URINE [[id number]]  (Abnormal)  (Susceptibility) Collected:  07/23/20 0054    Order Status:  Completed Specimen:  Cath Urine Updated:  07/26/20 0728     Special Requests: NO SPECIAL REQUESTS        Culture result:       >100,000 COLONIES/mL KLEBSIELLA OXYTOCA          CULTURE, BLOOD [[id number]] Collected:  07/24/20 0530    Order Status:  Completed Specimen:  Blood Updated:  07/26/20 0725     Special Requests: --        LEFT  HAND       Culture result: NO GROWTH 2 DAYS       CULTURE, BLOOD [[id number]] Collected:  07/24/20 1300    Order Status:  Completed Specimen:  Blood Updated:  07/26/20 0725     Special Requests: RT HAND     Culture result: NO GROWTH 2 DAYS       CULTURE, BLOOD [[id number]] Collected:  07/23/20 0105    Order Status:  Completed Specimen:  Blood Updated:  07/26/20 0725     Special Requests: --        NO SPECIAL REQUESTS  RIGHT  HAND       Culture result: NO GROWTH 3 DAYS       BLOOD CULTURE ID PANEL [[id number]]  (Abnormal) Collected:  07/23/20 0039    Order Status:  Completed Specimen:  Blood Updated:  07/25/20 1530     Acc. no. from Micro Order [id number]     Staphylococcus Detected        Comment: RESULTS VERIFIED, PHONED TO AND READ BACK BY  KYLE Ignacio RN ON 07/24/20 @1154, ADS          mecA (Methicillin-Resistance Genes) Detected        Comment: Presence of mecA is highly indicative of methicillin resistance. The test does not replace traditional culture and susceptibilities  RESULTS VERIFIED, PHONED TO AND READ BACK BY  KYLE HUMPHREYS RN ON 07/24/20 @1154, ADS          INTERPRETATION       Gram positive cocci in clusters. Identified by realtime PCR as  Coagulase negative Staphylococci           Comment: A single positive culture of coagulase negative Staph is likely to be a contaminant in adult patients. Consider discontinuation of antibiotics for gram positive bloodstream infections if patient asymptomatic. THIS TEST DOES NOT REPLACE SENSITIVITY TESTING. CORRECTED RESULT CALLED TO AND CORRECTLY REPEATED BY:  DR. NEAL [facility] ON 7/25/20 @1525, TA  Corrected on 07/25 AT 1530: This is a correction to the medical record of the test results previously reported as Gram positive cocci in clusters, identified by realtime PCR as SUSPECTED MRSA. Recommend IV vancomycin use, unlikely to be a contaminant. Infectious Diseases Consult recommended in adult patients. THIS TEST DOES NOT REPLACE SENSITIVITY TESTING.          CULTURE, WOUND W GRAM STAIN [[id number]] Collected:  07/24/20 1000    Order Status:  Completed Specimen:  Wound from Decubitus Updated:  07/25/20 1149     Special Requests: NO SPECIAL REQUESTS        GRAM STAIN 0 TO 16 WBC'S SEEN PER OIF      FEW GRAM POSITIVE COCCI        Culture result: PENDING    CULTURE, URINE [[id number]]  (Abnormal) Collected:  07/23/20 2010    Order Status:  Completed Specimen:  Cath Urine Updated:  07/25/20 0829     Special Requests: NO SPECIAL REQUESTS        Culture result:       5000 COLONIES/mL YEAST SUBCULTURE IN PROGRESS          CULTURE, BLOOD [[id number]]  (Abnormal) Collected:  07/23/20 0039    Order Status:  Completed Specimen:  Blood Updated:  07/25/20 0802     Special Requests: --        NO SPECIAL REQUESTS  LEFT  ARM       GRAM STAIN       AEROBIC BOTTLE POSITIVE ANAEROBIC BOTTLE POSITIVE GRAM POSITIVE COCCI                  RESULTS VERIFIED, PHONED TO AND READ BACK BY            [facility] [id number]. SLB            CRITICAL RESULT NOT CALLED DUE TO PREVIOUS NOTIFICATION OF CRITICAL RESULT WITHIN THE LAST 24 HOURS. Culture result:       STAPHYLOCOCCUS SPECIES, COAGULASE NEGATIVE            REFER TO [employer] ACCESSION [id number]            THIS ORGANISM MAY BE INDICATIVE OF CULTURE CONTAMINATION, HOWEVER, CLINICAL CORRELATION NEEDS TO BE EVALUATED, AS EACH CASE IS UNIQUE.           CULTURE, ANAEROBIC [[id number]] Collected:  07/24/20 1000    Order Status:  Completed Specimen:  Decubitus Updated:  07/24/20 1450    CULTURE, BLOOD [[id number]] Collected:  07/24/20 0530    Order Status:  Canceled Specimen:  Blood           Current Meds:  Current Facility-Administered Medications   Medication Dose Route Frequency    amantadine HCL (SYMMETREL) capsule 100 mg  100 mg Oral BID    fingolimod cap 0.5 mg (Patient Supplied)  0.5 mg Oral DAILY    HYDROcodone-acetaminophen (NORCO) 5-325 mg per tablet 1 Tab  1 Tab Oral Q4H PRN    enoxaparin (LOVENOX) injection 30 mg  30 mg SubCUTAneous DAILY    sodium chloride (NS) flush 5-10 mL  5-10 mL IntraVENous PRN    0.9% sodium chloride infusion  125 mL/hr IntraVENous CONTINUOUS    sodium chloride (NS) flush 5-40 mL  5-40 mL IntraVENous Q8H    sodium chloride (NS) flush 5-40 mL  5-40 mL IntraVENous PRN    acetaminophen (TYLENOL) tablet 650 mg  650 mg Oral Q6H PRN    Or    acetaminophen (TYLENOL) suppository 650 mg  650 mg Rectal Q6H PRN    polyethylene glycol (MIRALAX) packet 17 g  17 g Oral DAILY PRN    promethazine (PHENERGAN) tablet 12.5 mg  12.5 mg Oral Q6H PRN    Or    ondansetron (ZOFRAN) injection 4 mg  4 mg IntraVENous Q6H PRN    cefepime (MAXIPIME) 2 g in 0.9% sodium chloride (MBP/ADV) 100 mL  2 g IntraVENous Q12H    metroNIDAZOLE (FLAGYL) IVPB premix 500 mg  500 mg IntraVENous Q12H    morphine injection 2 mg  2 mg IntraVENous Q4H PRN         Other Studies:  Xr Chest Port    Result Date: 7/23/2020  Portable chest xray  COMPARISON: Hypotension, infectious evaluation INDICATION: May 22, 2020 FINDINGS: Heart appears mildly enlarged. Mediastinal contour is within normal limits. No pulmonary consolidation, pneumothorax or pulmonary edema. No large pleural effusion. Bones are osteopenic. IMPRESSION: No pulmonary consolidation or pulmonary edema. Assessment:    Active Hospital Problems    Diagnosis Date Noted    Coagulase negative Staphylococcus bacteremia 07/26/2020    Sepsis ([facility] 07/23/2020    Osteomyelitis of sacroiliac region [facility]) 05/20/2020    Multiple sclerosis ([facility] 05/13/2020    Protein calorie malnutrition ([facility] 05/13/2020    Pressure injury of sacral region, stage 4 ([facility] 05/13/2020       Plan:  Sepsis(on admission) likely secondary to infected right decubitus ulcer and/or UTI  Right ischial decubitus ulcer with osteomyelitis  CoNS Bacteremia   Met SIRS criteria on admission with fever(from outside facility), leukocytosis and tachycardia.   S/p I&D (7/24) by Gen Surgery  BCx growing CoNS with repeat (7/24) neg   IV fluids   Follow-up intrap-op tissue/wound cultures (neg so far)   Echo shows normal EF with possible small, irregular, echogenic, mobile vegetation at the tip of posterior MV leaflet. Cardiology consult for eval - SARAHI for definitive diagnosis?  ID recs appreciated: Continue with cefepime, Flagyl (if worsens - broaden to vanc + cj)    UTI  Patient has indwelling Wasserman catheter. UA consistent with UTI. UCx growing Klebsiella oxytoca  Repeat UA after wasserman exchange showed budding yeast forms.  On antibiotics as above   Will defer to ID for additional recommendations. Multiple sclerosis: Continue with home medications    Moderate to severe PCM: nutrition consulted    Diet:  DIET NUTRITIONAL SUPPLEMENTS  DIET NUTRITIONAL SUPPLEMENTS  DIET REGULAR  DVT PPx: lovenox  Code: DNR  Dispo: SNF  Estimated Discharge: TBD based on clinical course    DPOA: Pt's sister Ms Shey Cohn    Labs/Imaging Reviewed. Patient is high risk due to current condition and comorbid conditions as well as requiring frequent monitoring. Plan discussed with staff, patient/family and are in agreement. Time Spent: Greater than 45 minutes was spent in reviewing charts, physical exam, discussion with patient, and answered any questions.     Signed By: Zulma Verdugo MD     July 26, 2020

## 2020-07-26 NOTE — PROGRESS NOTES
Problem: Mobility Impaired (Adult and Pediatric)  Goal: *Acute Goals and Plan of Care (Insert Text)  Description: DISCHARGE GOALS :  (1.)Ms. Tessie Ferguson will move from supine to sit and sit to supine  with MODERATE ASSIST.   (2.)Ms. Tessie Ferguson will transfer from bed to chair and chair to bed with MAXIMAL ASSIST & pt participating, performing a lift lateral pivot bed<>chair. (3.)Ms. Tessie Fergusno will  be able to perform wt-shifting while sitting EOB (with air bed inflated) & pressure relief while sitting & lying.  _______________________________________________________________________________________________     Outcome: Progressing Towards Goal     PHYSICAL THERAPY: Daily Note and AM 7/26/2020  INPATIENT: PT Visit Days : 2  [place] / [place] / Product Type: Medicare /       NAME/AGE/GENDER: Néstor Freitas is a 63 y.o. female   PRIMARY DIAGNOSIS: Sepsis ([facility] [A41.9] Pressure injury of sacral region, stage 4 (HCC) Pressure injury of sacral region, stage 4 (HCC)  Procedure(s) (LRB):  RIGHT ISCHIAL WOUND DEBRIDEMENT ROOM 346 (Left)  2 Days Post-Op  ICD-10: Treatment Diagnosis:    · Generalized Muscle Weakness (M62.81)  · Other lack of cordination (R27.8)   Precaution/Allergies:  Latex; Norco [hydrocodone-acetaminophen]; and Pcn [penicillins]      ASSESSMENT:     Ms. Tessie Ferguson presents with a wound vac on sacral region, currently on a dynamic air flow bed with significantly impaired functional mobility. This pt needed max assist for bed mobility & transfers were totally dependent. This pt will need an additional rehab stay to address these deficits. This pt will need to get her Roho cushion & scooter from her home or SNF  & to the hospital to allow her to work with therapy towards out of bed transfers. This pt is completely unsafe to return home under the current circumstance. 7/26/20: Patient presents supine in bed on contact. Okay to work with pt per nurse.  AA/PROM B LEs, and AROM B UEs to promote ROM, strength, endurance. Supine>sit eob with max A. Patient needed mod A for sitting eob with B UE support as well. Patient was able to sit eob for about 10 minutes until becoming uncomfortable and needing to lie back down. Sit>supine with max A. Positioning in bed with mod A. Patient reported some LE muscle spasms with mobility, but eased with time and ROM. Patient is making slow progress toward goals. Continue to progress toward goals as tolerated. This section established at most recent assessment   PROBLEM LIST (Impairments causing functional limitations):  1. Decreased Strength  2. Decreased ADL/Functional Activities  3. Decreased Transfer Abilities  4. Decreased Balance  5. Decreased Activity Tolerance  6. Decreased Flexibility/Joint Mobility   INTERVENTIONS PLANNED: (Benefits and precautions of physical therapy have been discussed with the patient.)  1. Balance Exercise  2. Bed Mobility  3. Neuromuscular Re-education/Strengthening  4. Therapeutic Activites  5. Therapeutic Exercise/Strengthening  6. Transfer Training     TREATMENT PLAN: Frequency/Duration: daily for duration of hospital stay  Rehabilitation Potential For Stated Goals: [facility] (at time of discharge pending progress):    Placement: It is my opinion, based on this patient's performance to date, that Ms. Monica Beltran may benefit from intensive therapy at a [address] [place] [address] after discharge due to the functional deficits listed above that are likely to improve with skilled rehabilitation and concerns that he/she may be unsafe to be unsupervised at home due to her severe debility . Equipment:    To be detrmined              HISTORY:   History of Present Injury/Illness (Reason for Referral):  Richard Malhotra is a 63 y.o. female who we are asked by Dr. Kleber Travis to see for right ischial wound with OM. This was previously debrided by Dr. Phan Lake on 5/14/2020. Pt had a wound vac post operatively.  She went to [facility] postoperatively and was transferred to [facility] due to hypotension and fever. She was admitted with sepsis likely due to infected decubitus ulcer. WBC 20. Blood cultures NGTD. General surgery was consulted for evaluation of her right ischial wound. Past Medical History/Comorbidities:   Ms. Rai Acharya  has a past medical history of Decubitus ulcer of ischial area, Hypertension, Kidney infection, Menopause, and MS (multiple sclerosis) ([address] She also has no past medical history of Aneurysm ([address] Arrhythmia, Arthritis, Asthma, Autoimmune disease ([address] CAD (coronary artery disease), Cancer ([address] Chronic kidney disease, Chronic obstructive pulmonary disease ([address] Chronic pain, Coagulation disorder ([address] Diabetes ([address] Endocarditis, GERD (gastroesophageal reflux disease), Heart failure ([address] Ill-defined condition, Liver disease, Morbid obesity ([address] Nicotine vapor product user, Non-nicotine vapor product user, Psychiatric disorder, PUD (peptic ulcer disease), Rheumatic fever, Seizures ([address] Sleep apnea, Stroke ([address] Thromboembolus ([address] or Thyroid disease. Ms. Rai Acharya  has a past surgical history that includes ercp (5/15/2020); hx cholecystectomy (05/18/2020); and colonoscopy (N/A, 6/29/2020). Social History/Living Environment:   Home Environment: Private residence  # Steps to Enter: 0  Wheelchair Ramp: Yes  One/Two Story Residence: One story  Living Alone: Yes  Support Systems: Family member(s), Friends \ neighbors  Patient Expects to be Discharged to[de-identified] Skilled nursing facility  Current DME Used/Available at Home: (scooter)  Prior Level of Function/Work/Activity:  Pt has not been functional for period of time that is unclear, that included LTACH & SNF stays recently, prior to this admission. Personal Factors:           Other factors that influence how disability is experienced by the patient:  current & PMH   Number of Personal Factors/Comorbidities that affect the Plan of Care: 3+: HIGH COMPLEXITY EXAMINATION:   Most Recent Physical Functioning:   Gross Assessment:  AROM: Grossly decreased, non-functional  Strength: Grossly decreased, non-functional  Coordination: Grossly decreased, non-functional               Posture:     Balance:  Sitting: Impaired; With support  Sitting - Static: Fair (occasional)  Sitting - Dynamic: Poor (constant support) Bed Mobility:  Supine to Sit: Maximum assistance  Sit to Supine: Maximum assistance  Scooting: Maximum assistance  Wheelchair Mobility:     Transfers:     Gait:         Functional Mobility:         Transfers:  dep        Bed Mobility:  max   Body Structures Involved:  1. Nerves  2. Metabolic  3. Muscles Body Functions Affected:  1. Neuromusculoskeletal  2. Movement Related  3. Metobolic/Endocrine Activities and Participation Affected:  1. General Tasks and Demands  2. Mobility   Number of elements that affect the Plan of Care: 4+: HIGH COMPLEXITY   CLINICAL PRESENTATION:   Presentation: Evolving clinical presentation with unstable and unpredictable characteristics: HIGH COMPLEXITY   CLINICAL DECISION MAKIN [facility] Inpatient Short Form  How much difficulty does the patient currently have. .. Unable A Lot A Little None   1. Turning over in bed (including adjusting bedclothes, sheets and blankets)? [] 1   [x] 2   [] 3   [] 4   2. Sitting down on and standing up from a chair with arms ( e.g., wheelchair, bedside commode, etc.)   [x] 1   [] 2   [] 3   [] 4   3. Moving from lying on back to sitting on the side of the bed? [] 1   [x] 2   [] 3   [] 4   How much help from another person does the patient currently need. .. Total A Lot A Little None   4. Moving to and from a bed to a chair (including a wheelchair)? [x] 1   [] 2   [] 3   [] 4   5. Need to walk in hospital room? [x] 1   [] 2   [] 3   [] 4   6. Climbing 3-5 steps with a railing?    [x] 1   [] 2   [] 3   [] 4   © , Trustees of [address] [place] [address] [address] [address], under license to [employer]. All rights reserved      Score:  Initial: 8 Most Recent: X (Date: -- )    Interpretation of Tool:  Represents activities that are increasingly more difficult (i.e. Bed mobility, Transfers, Gait). Medical Necessity:     · Patient is expected to demonstrate progress in   · strength, range of motion, balance, coordination, and functional technique  ·  to   · decrease assistance required with bed mobility & transfers  · . Reason for Services/Other Comments:  · Patient continues to require skilled intervention due to   · Severe debility  · . Use of outcome tool(s) and clinical judgement create a POC that gives a: Difficult prediction of patient's progress: HIGH COMPLEXITY            TREATMENT:   (In addition to Assessment/Re-Assessment sessions the following treatments were rendered)   Pre-treatment Symptoms/Complaints:  \"I can't stand\"  Pain: Initial: numeric scale  Pain Intensity 1: 0  Post Session:  5/10     Therapeutic Activity: (    15 minutes): Therapeutic activities including Bed transfers and sitting balance and tolerance to improve mobility, strength and balance. Required moderate   cuing and assistance to promote static balance in sitting. Therapeutic Exercise: (  11 minutes):  Exercises per grid below to improve mobility and strength. Required moderate verbal and manual cues to promote proper body alignment and promote proper body posture. Progressed range and repetitions as indicated.      Date:  7/26/20 Date:   Date:     Activity/Exercise Parameters Parameters Parameters   Ankle DF/PF PROM x 20 reps B     Hip/knee flexion/extension AA-PROM x 20 reps B     Hip abduction/adduction AA-PROM x 20 reps B                             P=passive  AA=Active assisted      Braces/Orthotics/Lines/Etc:   · IV  · wasserman catheter  · Wound vac  · O2 Device: Room air  Treatment/Session Assessment:    · Response to Treatment:  Patient seemed was cooperative but not fully engaging during session, seems depressed. · Interdisciplinary Collaboration:   o Physical Therapist  o Occupational Therapist  o Registered Nurse  · After treatment position/precautions:   o Supine in bed  o Bed/Chair-wheels locked  o Bed in low position  o Call light within reach  o RN notified   · Compliance with Program/Exercises: Will assess as treatment progresses  · Recommendations/Intent for next treatment session: \"Next visit will focus on reduction in assistance provided\".   Total Treatment Duration:  PT Patient Time In/Time Out  Time In: 0900  Time Out: 9[address] [facility],

## 2020-07-26 NOTE — CONSULTS
[facility] Cardiology Consultation        Date of  Admission: 7/23/2020 12:19 AM     Primary Cardiologist: none  Referring Physician: Dr. Yolanda Mejia Physician: Dr. Preet Ryan     CC/Reason for consult: bacteremia, r/o endocarditis    HPI:  Umang Ayers is a 63 y.o. female with h/o multiple sclerosis with ischial decubitus ulcer and osteomyelitis who recently completed IV antibiotics about a week ago who was sent to [facility] ED from [facility] rehab facility on 7/23 because of reported low blood pressure. Patient reported fever with temperature of 101 F, no cough, chest pain, palpitations, SOB or N/V. In May, patient was admitted for right ischial decubitus ulcer with osteomyelitis and was treated with Cefepime, Vancomycin and Flagyl. She was [facility]. She was on IV antibiotics for 6 weeks. She has a chronic indwelling wasserman catheter. She was admitted by the hospitalist and ID was consulted. ID felt that with the fever and leukocytosis, UTI more likely casue of bacteremia than wound. She was COVID negative. Her stage IV R ischial decub/OM s/p 6 weeks IV Vanc/Cefep/Flagyl EOT 6/25/20 was evaluated by general surgery who did I&D with placement of wound vac on 7/24. Blood cultures were positive for CoNS bacteremia. Vanc was stopped by ID. Pt had echo showing EF 55-60%, no regional wall motion abnormalities and possible small, irregular, echogenic, mobile vegetation on the tip of the posterior leaflet. Due to echo findings, [facility] Cardiology was consulted.      Past Medical History:   Diagnosis Date    Decubitus ulcer of ischial area     Hypertension     Kidney infection     Menopause     MS (multiple sclerosis) ([address]       Past Surgical History:   Procedure Laterality Date    COLONOSCOPY N/A 6/29/2020    COLONOSCOPY /Room 424 performed by Lena Hyman MD at [facility] ENDOSCOPY    ERCP  5/15/2020         HX CHOLECYSTECTOMY  05/18/2020 Allergies   Allergen Reactions    Latex Rash    Norco [Hydrocodone-Acetaminophen] Other (comments)     Sees dead people    Pcn [Penicillins] Swelling      Social History     Socioeconomic History    Marital status:      Spouse name: Not on file    Number of children: Not on file    Years of education: Not on file    Highest education level: Not on file   Occupational History    Not on file   Social Needs    Financial resource strain: Not on file    Food insecurity     Worry: Not on file     Inability: Not on file    Transportation needs     Medical: Not on file     Non-medical: Not on file   Tobacco Use    Smoking status: Former Smoker   Substance and Sexual Activity    Alcohol use: Not Currently    Drug use: Not Currently    Sexual activity: Not on file   Lifestyle    Physical activity     Days per week: Not on file     Minutes per session: Not on file    Stress: Not on file   Relationships    Social connections     Talks on phone: Not on file     Gets together: Not on file     Attends Cheondoism service: Not on file     Active member of club or organization: Not on file     Attends meetings of clubs or organizations: Not on file     Relationship status: Not on file    Intimate partner violence     Fear of current or ex partner: Not on file     Emotionally abused: Not on file     Physically abused: Not on file     Forced sexual activity: Not on file   Other Topics Concern    Not on file   Social History Narrative    Not on file     Family History   Problem Relation Age of Onset    Breast Cancer Mother         Current Facility-Administered Medications   Medication Dose Route Frequency    baclofen (LIORESAL) tablet 5 mg  5 mg Oral QID PRN    amantadine HCL (SYMMETREL) capsule 100 mg  100 mg Oral BID    fingolimod cap 0.5 mg (Patient Supplied)  0.5 mg Oral DAILY    HYDROcodone-acetaminophen (NORCO) 5-325 mg per tablet 1 Tab  1 Tab Oral Q4H PRN    enoxaparin (LOVENOX) injection 30 mg  30 mg SubCUTAneous DAILY    sodium chloride (NS) flush 5-10 mL  5-10 mL IntraVENous PRN    0.9% sodium chloride infusion  125 mL/hr IntraVENous CONTINUOUS    sodium chloride (NS) flush 5-40 mL  5-40 mL IntraVENous Q8H    sodium chloride (NS) flush 5-40 mL  5-40 mL IntraVENous PRN    acetaminophen (TYLENOL) tablet 650 mg  650 mg Oral Q6H PRN    Or    acetaminophen (TYLENOL) suppository 650 mg  650 mg Rectal Q6H PRN    polyethylene glycol (MIRALAX) packet 17 g  17 g Oral DAILY PRN    promethazine (PHENERGAN) tablet 12.5 mg  12.5 mg Oral Q6H PRN    Or    ondansetron (ZOFRAN) injection 4 mg  4 mg IntraVENous Q6H PRN    cefepime (MAXIPIME) 2 g in 0.9% sodium chloride (MBP/ADV) 100 mL  2 g IntraVENous Q12H    metroNIDAZOLE (FLAGYL) IVPB premix 500 mg  500 mg IntraVENous Q12H    morphine injection 2 mg  2 mg IntraVENous Q4H PRN       Review of symptoms:  General: no recent weight loss/gain,+ weakness, fatigue, fever or chills   Skin: no rashes, lumps, or other skin changes   HEENT: no headache, dizziness, lightheadedness, vision changes, hearing changes, tinnitus, vertigo, sinus pressure/pain, bleeding gums, sore throat, or hoarseness   Neck: no swollen glands, goiter, pain or stiffness   Respiratory: no cough, sputum, hemoptysis, dyspnea, wheezing   Cardiovascular: no chest pain or discomfort, palpitations, dyspnea, orthopnea, paroxysmal nocturnal dyspnea, peripheral edema   Gastrointestinal: no trouble swallowing, heartburn, change of appetite, nausea, change in bowel habits, pain with defecation, rectal bleeding or black/tarry stools, hemorrhoids, constipation, diarrhea, +abdominal pain, jaundice, liver or gallbladder problems   Urinary: +chronic indwelling wasserman cath  Genital: no vaginal or pelvic infections   Peripheral Vascular: no claudication, leg cramps, prior DVTs, swelling of calves, legs, or feet, color change, or swelling with redness or tenderness   Musculoskeletal: no muscle or joint pain/stiffness, joint swelling, erythema of joints, or back pain   Psychiatric: no depression, mental disorders, or excessive stress   Neurological: no history of CVA, dizziness, no sensory or motor loss, seizures, syncope, tremors, numbness, tingling, no changes in mood, attention, or speech, no changes in orientation, memory, insight, or judgment. no headache, vertigo.    Hematologic: no anemia, easy bruising or bleeding   Endocrine: no diabetes, thyroid problems, heat or cold intolerance, excessive sweating, polyuria, polydipsia        Subjective:   Physical Exam    Neck: no JVD chest clear cor RRR no murmur heard Abd soft nontender ext no edema      Labs:   Recent Results (from the past 24 hour(s))   CBC W/O DIFF    Collection Time: 07/25/20 11:26 AM   Result Value Ref Range    WBC 9.8 4.3 - 11.1 K/uL    RBC 2.67 (L) 4.05 - 5.2 M/uL    HGB 8.3 (L) 11.7 - 15.4 g/dL    HCT 26.1 (L) 35.8 - 46.3 %    MCV 97.8 79.6 - 97.8 FL    MCH 31.1 26.1 - 32.9 PG    MCHC 31.8 31.4 - 35.0 g/dL    RDW 13.2 11.9 - 14.6 %    PLATELET 152 596 - 536 K/uL    MPV 10.4 9.4 - 12.3 FL    ABSOLUTE NRBC 0.00 0.0 - 0.2 K/uL   METABOLIC PANEL, BASIC    Collection Time: 07/25/20 11:26 AM   Result Value Ref Range    Sodium 142 136 - 145 mmol/L    Potassium 3.5 3.5 - 5.1 mmol/L    Chloride 113 (H) 98 - 107 mmol/L    CO2 23 21 - 32 mmol/L    Anion gap 6 (L) 7 - 16 mmol/L    Glucose 118 (H) 65 - 100 mg/dL    BUN 33 (H) 6 - 23 MG/DL    Creatinine 0.46 (L) 0.6 - 1.0 MG/DL    GFR est AA >60 >60 ml/min/1.73m2    GFR est non-AA >60 >60 ml/min/1.73m2    Calcium 8.5 8.3 - 10.4 MG/DL   URINALYSIS W/ RFLX MICROSCOPIC    Collection Time: 07/25/20  1:08 PM   Result Value Ref Range    Color YELLOW      Appearance CLEAR      Specific gravity 1.031 (H) 1.001 - 1.023      pH (UA) 6.0 5.0 - 9.0      Protein TRACE (A) NEG mg/dL    Glucose Negative mg/dL    Ketone 15 (A) NEG mg/dL    Bilirubin Negative NEG      Blood MODERATE (A) NEG      Urobilinogen 0.2 0.2 - 1.0 EU/dL    Nitrites Negative NEG      Leukocyte Esterase SMALL (A) NEG      WBC 3-5 0 /hpf    RBC 3-5 0 /hpf    Epithelial cells 0-3 0 /hpf    Bacteria BUDDING YEAST FORMS SEEN 0 /hpf    Casts 0-3 0 /lpf   CBC W/O DIFF    Collection Time: 07/26/20  5:49 AM   Result Value Ref Range    WBC 10.6 4.3 - 11.1 K/uL    RBC 2.60 (L) 4.05 - 5.2 M/uL    HGB 8.0 (L) 11.7 - 15.4 g/dL    HCT 24.6 (L) 35.8 - 46.3 %    MCV 94.6 79.6 - 97.8 FL    MCH 30.8 26.1 - 32.9 PG    MCHC 32.5 31.4 - 35.0 g/dL    RDW 13.3 11.9 - 14.6 %    PLATELET 939 (H) 248 - 450 K/uL    MPV 10.3 9.4 - 12.3 FL    ABSOLUTE NRBC 0.00 0.0 - 0.2 K/uL   METABOLIC PANEL, BASIC    Collection Time: 07/26/20  5:49 AM   Result Value Ref Range    Sodium 142 136 - 145 mmol/L    Potassium 3.2 (L) 3.5 - 5.1 mmol/L    Chloride 114 (H) 98 - 107 mmol/L    CO2 20 (L) 21 - 32 mmol/L    Anion gap 8 7 - 16 mmol/L    Glucose 88 65 - 100 mg/dL    BUN 20 6 - 23 MG/DL    Creatinine 0.29 (L) 0.6 - 1.0 MG/DL    GFR est AA >60 >60 ml/min/1.73m2    GFR est non-AA >60 >60 ml/min/1.73m2    Calcium 8.4 8.3 - 10.4 MG/DL     ECHO nl LV function ? Density on MV no sig MR noted   Assessment/Plan:        Diagnosis    Coagulase negative Staphylococcus bacteremia- IV antibiotics per ID will need long term antibiotics    Sepsis ([address]- IV antibiotics per ID as above    Osteomyelitis of sacroiliac region [facility])- IV antibiotics per ID requires longer antibiotics    Possible small vegetation on MV seen on TTE- consider SARAHI for further evaluation no murmur noted Discussed with pt and only way to evaluate is to go downtown for SARAHI She really does not want to do this She will need longer antibiotics and can follow up TTEs    Multiple sclerosis [address] [address]    Protein calorie malnutrition ([facility] [address]    Leukocytosis       Thank you for consulting [facility] Cardiology and allowing us to participate in the care of this patient. We will continue to follow along with you.     Dr. Jair Grace

## 2020-07-27 PROBLEM — E87.6 HYPOKALEMIA: Status: ACTIVE | Noted: 2020-07-27

## 2020-07-27 PROBLEM — E83.42 HYPOMAGNESEMIA: Status: ACTIVE | Noted: 2020-07-27

## 2020-07-27 LAB
ANION GAP SERPL CALC-SCNC: 10 MMOL/L (ref 7–16)
BACTERIA SPEC CULT: ABNORMAL
BUN SERPL-MCNC: 11 MG/DL (ref 6–23)
CALCIUM SERPL-MCNC: 8.6 MG/DL (ref 8.3–10.4)
CHLORIDE SERPL-SCNC: 109 MMOL/L (ref 98–107)
CO2 SERPL-SCNC: 21 MMOL/L (ref 21–32)
CREAT SERPL-MCNC: 0.28 MG/DL (ref 0.6–1)
ERYTHROCYTE [DISTWIDTH] IN BLOOD BY AUTOMATED COUNT: 13.2 % (ref 11.9–14.6)
GLUCOSE SERPL-MCNC: 91 MG/DL (ref 65–100)
HCT VFR BLD AUTO: 27.2 % (ref 35.8–46.3)
HGB BLD-MCNC: 9 G/DL (ref 11.7–15.4)
MAGNESIUM SERPL-MCNC: 1.6 MG/DL (ref 1.8–2.4)
MCH RBC QN AUTO: 30.6 PG (ref 26.1–32.9)
MCHC RBC AUTO-ENTMCNC: 33.1 G/DL (ref 31.4–35)
MCV RBC AUTO: 92.5 FL (ref 79.6–97.8)
NRBC # BLD: 0 K/UL (ref 0–0.2)
PLATELET # BLD AUTO: 503 K/UL (ref 150–450)
PMV BLD AUTO: 10.1 FL (ref 9.4–12.3)
POTASSIUM SERPL-SCNC: 2.7 MMOL/L (ref 3.5–5.1)
RBC # BLD AUTO: 2.94 M/UL (ref 4.05–5.2)
SERVICE CMNT-IMP: ABNORMAL
SODIUM SERPL-SCNC: 140 MMOL/L (ref 136–145)
WBC # BLD AUTO: 8 K/UL (ref 4.3–11.1)

## 2020-07-27 PROCEDURE — 77030019934 HC DRSG VAC ASST KCON -B

## 2020-07-27 PROCEDURE — 74011250636 HC RX REV CODE- 250/636: Performed by: SURGERY

## 2020-07-27 PROCEDURE — 97606 NEG PRS WND THER DME>50 SQCM: CPT

## 2020-07-27 PROCEDURE — 74011250637 HC RX REV CODE- 250/637: Performed by: HOSPITALIST

## 2020-07-27 PROCEDURE — 85027 COMPLETE CBC AUTOMATED: CPT

## 2020-07-27 PROCEDURE — 74011250637 HC RX REV CODE- 250/637: Performed by: INTERNAL MEDICINE

## 2020-07-27 PROCEDURE — 80048 BASIC METABOLIC PNL TOTAL CA: CPT

## 2020-07-27 PROCEDURE — 74750000023 HC WOUND THERAPY

## 2020-07-27 PROCEDURE — 74011250636 HC RX REV CODE- 250/636: Performed by: INTERNAL MEDICINE

## 2020-07-27 PROCEDURE — 97535 SELF CARE MNGMENT TRAINING: CPT

## 2020-07-27 PROCEDURE — 36415 COLL VENOUS BLD VENIPUNCTURE: CPT

## 2020-07-27 PROCEDURE — 97530 THERAPEUTIC ACTIVITIES: CPT

## 2020-07-27 PROCEDURE — 77030040393 HC DRSG OPTIFOAM GENT MDII -B

## 2020-07-27 PROCEDURE — 65270000029 HC RM PRIVATE

## 2020-07-27 PROCEDURE — 83735 ASSAY OF MAGNESIUM: CPT

## 2020-07-27 PROCEDURE — 74011000258 HC RX REV CODE- 258: Performed by: SURGERY

## 2020-07-27 RX ORDER — MAGNESIUM SULFATE HEPTAHYDRATE 40 MG/ML
2 INJECTION, SOLUTION INTRAVENOUS ONCE
Status: COMPLETED | OUTPATIENT
Start: 2020-07-27 | End: 2020-07-27

## 2020-07-27 RX ORDER — POTASSIUM CHLORIDE 20 MEQ/1
40 TABLET, EXTENDED RELEASE ORAL EVERY 4 HOURS
Status: COMPLETED | OUTPATIENT
Start: 2020-07-27 | End: 2020-07-27

## 2020-07-27 RX ORDER — POTASSIUM CHLORIDE 20 MEQ/1
40 TABLET, EXTENDED RELEASE ORAL EVERY 4 HOURS
Status: DISCONTINUED | OUTPATIENT
Start: 2020-07-27 | End: 2020-07-27

## 2020-07-27 RX ORDER — CEPHALEXIN 500 MG/1
500 CAPSULE ORAL EVERY 6 HOURS
Status: DISCONTINUED | OUTPATIENT
Start: 2020-07-27 | End: 2020-07-28 | Stop reason: HOSPADM

## 2020-07-27 RX ADMIN — ENOXAPARIN SODIUM 30 MG: 30 INJECTION SUBCUTANEOUS at 08:42

## 2020-07-27 RX ADMIN — HYDROCODONE BITARTRATE AND ACETAMINOPHEN 1 TABLET: 5; 325 TABLET ORAL at 15:57

## 2020-07-27 RX ADMIN — POTASSIUM CHLORIDE 40 MEQ: 20 TABLET, EXTENDED RELEASE ORAL at 06:56

## 2020-07-27 RX ADMIN — AMANTADINE HYDROCHLORIDE 100 MG: 100 CAPSULE, LIQUID FILLED ORAL at 08:42

## 2020-07-27 RX ADMIN — AMANTADINE HYDROCHLORIDE 100 MG: 100 CAPSULE, LIQUID FILLED ORAL at 19:01

## 2020-07-27 RX ADMIN — SODIUM CHLORIDE 125 ML/HR: 900 INJECTION, SOLUTION INTRAVENOUS at 06:45

## 2020-07-27 RX ADMIN — Medication 10 ML: at 21:01

## 2020-07-27 RX ADMIN — POTASSIUM CHLORIDE 40 MEQ: 20 TABLET, EXTENDED RELEASE ORAL at 12:18

## 2020-07-27 RX ADMIN — Medication 10 ML: at 05:11

## 2020-07-27 RX ADMIN — CEFEPIME HYDROCHLORIDE 2 G: 2 INJECTION, POWDER, FOR SOLUTION INTRAVENOUS at 13:21

## 2020-07-27 RX ADMIN — HYDROCODONE BITARTRATE AND ACETAMINOPHEN 1 TABLET: 5; 325 TABLET ORAL at 21:01

## 2020-07-27 RX ADMIN — METRONIDAZOLE 500 MG: 500 INJECTION, SOLUTION INTRAVENOUS at 08:42

## 2020-07-27 RX ADMIN — MAGNESIUM SULFATE HEPTAHYDRATE 2 G: 40 INJECTION, SOLUTION INTRAVENOUS at 12:18

## 2020-07-27 RX ADMIN — CEPHALEXIN 500 MG: 500 CAPSULE ORAL at 19:01

## 2020-07-27 NOTE — PROGRESS NOTES
SW reviewed I&D notes which state pt can d/c on oral ABX. SW provided update to SNF. Pt has a bed available on Tues at [facility]. Pt reviewed with MD Alen Presley ) who states pt will likely need IV ABX at d/c. Waiting on recommendations from I&D. SW sent referral to [facility] ( pt came from there ) and advised Amol Renee of need for ABX.                 At the request of PT, SARY called & spoke with pt's friend & asked if possible they bring pt's special cushion to the hospital.  Pt's friend agreeable to look for cushion & bring to the hospital.

## 2020-07-27 NOTE — PROGRESS NOTES
0700-Bedside report received from [facility]. Resting in bed. No needs voiced. No s/s of acute distress.

## 2020-07-27 NOTE — PROGRESS NOTES
Received a call from RN that the patient told her that if she does not get out of here she will kill herself. I saw and evaluated the patient at bedside. Discussed that the patient is very sick with Sepsis and positive blood cultures. Reassured the patient that we need to have a definite safe discharge plan with type of antibiotics duration and frequency. It is not safe to discharge her without improvement in her condition. She told me that she is frustrated that she had to stay longer. She understands that she is sick. She denies any active suicidal thoughts or plan. Patient is reassured. Continue current plan of care. Discussed with patient that she needs to notify us if she feels depressed or has any suicidal thoughts.

## 2020-07-27 NOTE — PROGRESS NOTES
Problem: Self Care Deficits Care Plan (Adult)  Goal: *Acute Goals and Plan of Care (Insert Text)  Description: 1. Patient will perform grooming with minimal assistance in supported sitting. 2. Patient will perform Upper body dressing with minimal assistance in supported sitting. 3. Patient will perform upper body bathing with minimal assistance in supported sitting. 4. Patient will perform toilet transfers bed to [facility] with maximum assistance. 5. Patient will participate in 10 + minutes of ADL/ therapeutic exercise/therapeutic activity with min rest breaks to increase activity tolerance for self care, seated edge of bed. -GOAL MET 7/27/2020  Upgrade to 30 minutes      Goals to be achieved in 7 days. Outcome: Progressing Towards Goal     OCCUPATIONAL THERAPY: Initial Assessment and PM 7/27/2020  INPATIENT: OT Visit Days: 1  [place] / [place] / Product Type: Medicare /      NAME/AGE/GENDER: Robin Vo is a 63 y.o. female   PRIMARY DIAGNOSIS:  Sepsis ([facility] [address] [A41.9] Pressure injury of sacral region, stage 4 (HCC) Pressure injury of sacral region, stage 4 (HCC)  Procedure(s) (LRB):  RIGHT ISCHIAL WOUND DEBRIDEMENT ROOM 346 (Left)  3 Days Post-Op  ICD-10: Treatment Diagnosis:    · Generalized Muscle Weakness (M62.81)  · Other lack of cordination (R27.8)   Precautions/Allergies:     Latex; Norco [hydrocodone-acetaminophen]; and Pcn [penicillins]      ASSESSMENT:     Ms. Fernando Contreras presents with above diagnosis and was seen with PT present. Pt has been dealing with this medical issue for a long period of time. She has been here at [facility] hospital then to [facility] then to [facility] rehab and now back here with continued infection and stage pressure wound on her sacral region. Pt reports living in her own handicap accessible home prior to \"all\" this. She used a scooter for mobility with a Roho cushion.  Pt states this started with a small carpet burn on her bottom that \"got out of control\" Pt at times seems to be a good historian but does appear to get her time spent at different facility confused. Pt is very weak with poor activity tolerance combined with her MS and those limitation would benefit from OT while here in the hospital to begin to work toward increased independence with self care and functional mobility. Pt will likely need post hospital rehab.     7/27/2020 Min assist to edge of bed and max assist back to bed. Balance at edge of bed for future ADL's. Would benefit from own shoes and ROHO cushion. None ambulatory for years but able to transfers at home. Continue OT    This section established at most recent assessment   PROBLEM LIST (Impairments causing functional limitations):  1. Decreased Strength  2. Decreased ADL/Functional Activities  3. Decreased Transfer Abilities  4. Decreased Balance  5. Decreased Activity Tolerance  6. Decreased Cognition   INTERVENTIONS PLANNED: (Benefits and precautions of occupational therapy have been discussed with the patient.)  1. Activities of daily living training  2. Adaptive equipment training  3. Balance training  4. Cognitive training  5. Therapeutic activity  6. Therapeutic exercise     TREATMENT PLAN: Frequency/Duration: Follow patient 3 times per week to address above goals. Rehabilitation Potential For Stated Goals: Good     REHAB RECOMMENDATIONS (at time of discharge pending progress):    Placement: It is my opinion, based on this patient's performance to date, that Ms. Handy Huynh may benefit from intensive therapy at a [address] [place] [address] after discharge due to the functional deficits listed above that are likely to improve with skilled rehabilitation and concerns that he/she may be unsafe to be unsupervised at home due to high level of care .   Equipment:    None at this time              OCCUPATIONAL PROFILE AND HISTORY:   History of Present Injury/Illness (Reason for Referral):  See H&P  Past Medical History/Comorbidities: Ms. Handy Huynh  has a past medical history of Decubitus ulcer of ischial area, Hypertension, Kidney infection, Menopause, and MS (multiple sclerosis) ([address] She also has no past medical history of Aneurysm ([address] Arrhythmia, Arthritis, Asthma, Autoimmune disease ([address] CAD (coronary artery disease), Cancer ([address] Chronic kidney disease, Chronic obstructive pulmonary disease ([address] Chronic pain, Coagulation disorder ([address] Diabetes ([address] Endocarditis, GERD (gastroesophageal reflux disease), Heart failure ([address] Ill-defined condition, Liver disease, Morbid obesity ([address] Nicotine vapor product user, Non-nicotine vapor product user, Psychiatric disorder, PUD (peptic ulcer disease), Rheumatic fever, Seizures ([address] Sleep apnea, Stroke ([address] Thromboembolus ([address] or Thyroid disease. Ms. Handy Huynh  has a past surgical history that includes ercp (5/15/2020); hx cholecystectomy (05/18/2020); and colonoscopy (N/A, 6/29/2020). Social History/Living Environment:   Home Environment: Private residence  # Steps to Enter: 0  Wheelchair Ramp: Yes  One/Two Story Residence: One story  Living Alone: Yes  Support Systems: Family member(s), Friends \ neighbors  Patient Expects to be Discharged to[de-identified] Skilled nursing facility  Current DME Used/Available at Home: (scooter)  Prior Level of Function/Work/Activity:  Unsure of exactly how she was doing before this long medical course started. Pt states she was independent with modification. Number of Personal Factors/Comorbidities that affect the Plan of Care: Expanded review of therapy/medical records (1-2):  MODERATE COMPLEXITY   ASSESSMENT OF OCCUPATIONAL PERFORMANCE[de-identified]   Activities of Daily Living:   Basic ADLs (From Assessment) Complex ADLs (From Assessment)   Feeding: Setup  Oral Facial Hygiene/Grooming: Moderate assistance  Bathing: Maximum assistance  Upper Body Dressing: Moderate assistance  Lower Body Dressing: Maximum assistance  Toileting:  Total assistance Grooming/Bathing/Dressing Activities of Daily Living                             Bed/Mat Mobility  Supine to Sit: Minimum assistance  Sit to Supine: Moderate assistance  Sit to Stand: Maximum assistance;Assist x2  Scooting: Maximum assistance     Most Recent Physical Functioning:   Gross Assessment:                  Posture:     Balance:  Sitting: Intact; Without support  Sitting - Static: (fair)  Sitting - Dynamic: (fair) Bed Mobility:  Supine to Sit: Minimum assistance  Sit to Supine: Moderate assistance  Scooting: Maximum assistance  Duration: 23 Minutes(extra time to work through activity noted)  Wheelchair Mobility:     Transfers:  Sit to Stand: Maximum assistance;Assist x2            Patient Vitals for the past 6 hrs:   BP SpO2 Pulse   20 0728 (!) 159/95 97 % (!) 103   20 1020 (!) 149/91 96 % (!) 101       Mental Status  Neurologic State: Alert  Orientation Level: Oriented X4  Cognition: Appropriate decision making  Perception: Appears intact  Perseveration: No perseveration noted  Safety/Judgement: Decreased awareness of environment, Insight into deficits            LLE Assessment  LLE Assessment (WDL): Exception to WDL RLE Assessment  RLE Assessment (WDL): Exceptions to [facility]           Physical Skills Involved:  1. Range of Motion  2. Balance  3. Strength  4. Activity Tolerance  5. Gross Motor Control Cognitive Skills Affected (resulting in the inability to perform in a timely and safe manner):  1. Executive Function  2. Sustained Attention Psychosocial Skills Affected:  1. Environmental Adaptation   Number of elements that affect the Plan of Care: 5+:  HIGH COMPLEXITY   CLINICAL DECISION MAKIN [facility] [address] [address] AM-PAC 6 Clicks   Daily Activity Inpatient Short Form  How much help from another person does the patient currently need. .. Total A Lot A Little None   1. Putting on and taking off regular lower body clothing? [] 1   [x] 2   [] 3   [] 4   2.   Bathing (including washing, rinsing, drying)? [] 1   [x] 2   [] 3   [] 4   3. Toileting, which includes using toilet, bedpan or urinal?   [x] 1   [] 2   [] 3   [] 4   4. Putting on and taking off regular upper body clothing? [] 1   [x] 2   [] 3   [] 4   5. Taking care of personal grooming such as brushing teeth? [] 1   [] 2   [x] 3   [] 4   6. Eating meals? [] 1   [] 2   [] 3   [x] 4   © 2007, Trustees [address] [address], under license to [employer]. All rights reserved      Score:  Initial: 14 Most Recent: X (Date: -- )    Interpretation of Tool:  Represents activities that are increasingly more difficult (i.e. Bed mobility, Transfers, Gait). Use of outcome tool(s) and clinical judgement create a POC that gives a: MODERATE COMPLEXITY         TREATMENT:   (In addition to Assessment/Re-Assessment sessions the following treatments were rendered)     Pre-treatment Symptoms/Complaints:  only complaint of pain with a brief transfer to a chair, no complaint of pain when back in bed  Pain: Initial:   Pain Intensity 1: 3 0 Post Session:  0     Today's treatment session addressed Decreased Strength, Decreased ADL/Functional Activities, Decreased Transfer Abilities and Decreased Activity Tolerance to progress towards achieving goal(s) . During this session,  Physical Therapy addressed  Functional Transfers to progress towards their discipline specific goal(s). Co-treatment was necessary to improve patient's ability to follow higher level commands, ability to increase activity demands and ability to return to normal functional activity. Self Care: (23): Procedure(s) (per grid) utilized to improve and/or restore self-care/home management as related to bathing, toileting and grooming. Required moderate verbal, manual and tactile cueing to facilitate activities of daily living skills.     Braces/Orthotics/Lines/Etc:   · IV  · wasserman catheter  · Wound vac   · O2 Device: Room air  Treatment/Session Assessment:    · Response to Treatment:  pt up to edge of bed and total assist transfer to chair and back to bed tolerated fair  · Interdisciplinary Collaboration:   o Physical Therapist  o Occupational Therapist  o Registered Nurse  · After treatment position/precautions:   o Supine in bed  o Bed/Chair-wheels locked  o Bed in low position  o Call light within reach  o RN notified   · Compliance with Program/Exercises: Compliant all of the time, Will assess as treatment progresses. · Recommendations/Intent for next treatment session: \"Next visit will focus on advancements to more challenging activities and reduction in assistance provided\".   Total Treatment Duration:  OT Patient Time In/Time Out  Time In: 0854  Time Out: [facility],

## 2020-07-27 NOTE — PROGRESS NOTES
@ 2120 Patient is very upset. She states she is tired of being here and wanted to know plan. I explained to patient that she is here for infection control and that's why she is on ABX. Patient stated that if she doesn't get out of here, she will kill herself. I asked patient if she has a plan to harm himself. She said no I don't, I am just over life and being stuck in this place. I offered prayer, she declined. Administered Norco and a warm blanket. Will notify MD on call. Dr. Ynuior Kebede spoke with patient. Patient is resting. @ 4191 Critical Potassium of 2.7. Provider notified of lab result and that patient only has peripheral IV. Provider to order PO potassium. END OF SHIFT NOTE:    Intake/Output  07/26 1901 - 07/27 0700  In: 914 [P.O.:300; I.V.:241]  Out: 5800 [Urine:5800]   Voiding: YES  Catheter: YES  Drain:      Stool:  0 occurrences. Stool Assessment  Stool Color: Coye Goes (07/26/20 1155)  Stool Appearance: Formed (07/26/20 1922)  Stool Amount: Medium (07/26/20 1155)  Stool Source/Status: Rectum (07/26/20 1155)    Emesis:  0 occurrences. VITAL SIGNS  Patient Vitals for the past 12 hrs:   Temp Pulse Resp BP SpO2   07/27/20 0359 98 °F (36.7 °C) 86 15 (!) 151/94 97 %   07/26/20 2321 98 °F (36.7 °C) 87 15 (!) 164/93 94 %   07/26/20 1940 98.5 °F (36.9 °C) 94 19 (!) 157/97 100 %       Pain Assessment  Pain 1  Pain Scale 1: Visual (07/26/20 2150)  Pain Intensity 1: 0 (07/26/20 2150)  Patient Stated Pain Goal: 0 (07/26/20 2150)  Pain Reassessment 1: Patient resting w/respiratory rate greater than 10 (07/26/20 2150)  Pain Location 1: Buttocks (07/26/20 1630)  Pain Orientation 1: Right (07/26/20 1630)  Pain Description 1: Aching (07/26/20 1630)  Pain Intervention(s) 1: Medication (see MAR) (07/26/20 5896)    Ambulating  No    Additional Information:     Shift report given to oncoming nurse at the bedside.     Mary Stacy

## 2020-07-27 NOTE — CDMP QUERY
Pt admitted with Sepsis. Pt noted to have chronic wasserman catheter and now has a uti. If possible, please document in the progress notes and d/c summary if you are evaluating and / or treating any of the following: 
 
? UTI due to chronic Wasserman catheter (poa or no) ? UTI not due to Wasserman ? Colonization ? Other ? Clinically unable to determine The medical record reflects the following: 
   Risk Factors: MS patient with chronic wasserman, chronic osteo Clinical Indicators: urine culture with >100,000 COLONIES/mL KLEBSIELLA OXYTOCA Treatment: IV antibiotics. Thank you, Su Curling, RN, BSN, CDS Clinical Documentation Management Program 
[facility] AT [address] 
[phone] [email or web address]

## 2020-07-27 NOTE — PROGRESS NOTES
Infectious Disease Progress Note    Today's Date: 2020   Admit Date: 2020    Impression:   · New fever - suspect UTI  · Klebsiella UTI  · Coag-negative Staph bacteremia (20) - presumed contaminant -  BCX negative  · Chronic R ischial osteomyelitis / Stage IV decubitus ulcer  · S/p I&D, wound vac, 20; Op Cx: MSSA - operative findings: clean wound w/o necrosis  · Multiple sclerosis  · Debility due to above  · Severe protein calorie malnutrition    Plan:   · Transition to oral Keflex today. Would discharge on cefadroxil 500mg BID (not on [facility] formulary) x 4 weeks for both ischial wound and UTI. · ID follow up on 20 at [facility].  · Case discussed with Dr. Bharat Marks - based on location of wound, ostomy not required for healing at this time. · OK to discharge on cefadroxil from ID perspective. ID will sign off. Please call us back with any questions or concerns. Anti-infectives:   · IV cefepime    Subjective:   Date of Consultation:  2020     Doing well; pain improved; no fevers; Allergies   Allergen Reactions    Latex Rash    Norco [Hydrocodone-Acetaminophen] Other (comments)     Sees dead people    Pcn [Penicillins] Swelling        Review of Systems:  A comprehensive review of systems was negative except for that written in the History of Present Illness. Objective:     Visit Vitals  BP (!) 149/91 (BP 1 Location: Left arm)   Pulse (!) 101   Temp 98.5 °F (36.9 °C)   Resp 16   Ht 5' 7.5\" (1.715 m)   Wt 42.2 kg (93 lb 1.6 oz)   SpO2 96%   BMI 14.37 kg/m²     Temp (24hrs), Av.9 °F (36.6 °C), Min:97.1 °F (36.2 °C), Max:98.5 °F (36.9 °C)       Lines:  Peripheral IV:       Physical Exam:    General:  Alert, cooperative, in no distress   Eyes:  Sclera anicteric. Mouth/Throat: oral pharynx clear   Neck: Supple   Lungs:   Clear to auscultation bilaterally, good effort   CV:  Regular rate and rhythm,no murmur, click, rub or gallop   Abdomen:   Soft, non-tender.  bowel sounds normal. non-distended   Extremities: No cyanosis or edema; atrophy noted   Skin: Skin color, texture, turgor normal. no acute rash or lesions   Lymph nodes:    Musculoskeletal: Sacral wound vac in place   Lines/Devices:  Intact, no erythema, drainage or tenderness   Psych: Alert and oriented, normal mood affect given the setting       Data Review:     CBC:  Recent Labs     07/27/20  0434 07/26/20  0549 07/25/20  1126   WBC 8.0 10.6 9.8   HGB 9.0* 8.0* 8.3*   HCT 27.2* 24.6* 26.1*   * 468* 445       BMP:  Recent Labs     07/27/20  0434 07/26/20  0549 07/25/20  1126   CREA 0.28* 0.29* 0.46*   BUN 11 20 33*    142 142   K 2.7* 3.2* 3.5   * 114* 113*   CO2 21 20* 23   AGAP 10 8 6*   GLU 91 88 118*       LFTS:  No results for input(s): TBILI, ALT, AP, TP, ALB in the last 72 hours. No lab exists for component: SGOT    Microbiology:     All Micro Results     Procedure Component Value Units Date/Time    CULTURE, ANAEROBIC [[id number]] Collected:  07/24/20 1000    Order Status:  Completed Specimen:  Decubitus Updated:  07/27/20 0927     Special Requests: NO SPECIAL REQUESTS        Culture result:       SUBCULTURE IS NECESSARY TO DETERMINE PRESENCE OR ABSENCE OF ANAEROBIC BACTERIA IN THIS CULTURE. FURTHER REPORT TO FOLLOW AFTER INCUBATION OF SUBCULTURE.           CULTURE, BLOOD [[id number]] Collected:  07/24/20 1300    Order Status:  Completed Specimen:  Blood Updated:  07/27/20 0715     Special Requests: RT HAND     Culture result: NO GROWTH 3 DAYS       CULTURE, BLOOD [[id number]] Collected:  07/24/20 0530    Order Status:  Completed Specimen:  Blood Updated:  07/27/20 0715     Special Requests: --        LEFT  HAND       Culture result: NO GROWTH 3 DAYS       CULTURE, BLOOD [[id number]] Collected:  07/23/20 0105    Order Status:  Completed Specimen:  Blood Updated:  07/27/20 0715     Special Requests: --        NO SPECIAL REQUESTS  RIGHT  HAND       Culture result: NO GROWTH 4 DAYS       CULTURE, URINE [[id number]]  (Abnormal) Collected:  07/23/20 2010    Order Status:  Completed Specimen:  Cath Urine Updated:  07/27/20 0648     Special Requests: NO SPECIAL REQUESTS        Culture result:       5000 COLONIES/mL CANDIDA TROPICALIS          CULTURE, WOUND Jose Space STAIN [[id number]]  (Abnormal)  (Susceptibility) Collected:  07/24/20 1000    Order Status:  Completed Specimen:  Wound from Decubitus Updated:  07/26/20 0912     Special Requests: NO SPECIAL REQUESTS        GRAM STAIN 0 TO 16 WBC'S SEEN PER OIF      FEW GRAM POSITIVE COCCI        Culture result:       LIGHT STAPHYLOCOCCUS AUREUS            SCANT  NORMAL SKIN ANDREW ISOLATED       CULTURE, URINE [[id number]]  (Abnormal)  (Susceptibility) Collected:  07/23/20 0054    Order Status:  Completed Specimen:  Cath Urine Updated:  07/26/20 0728     Special Requests: NO SPECIAL REQUESTS        Culture result:       >100,000 COLONIES/mL KLEBSIELLA OXYTOCA          BLOOD CULTURE ID PANEL [[id number]]  (Abnormal) Collected:  07/23/20 0039    Order Status:  Completed Specimen:  Blood Updated:  07/25/20 1530     Acc. no. from Micro Order [id number]     Staphylococcus Detected        Comment: RESULTS VERIFIED, PHONED TO AND READ BACK BY  KYLE Ignacio RN ON 07/24/20 @1154, ADS          mecA (Methicillin-Resistance Genes) Detected        Comment: Presence of mecA is highly indicative of methicillin resistance. The test does not replace traditional culture and susceptibilities  RESULTS VERIFIED, PHONED TO AND READ BACK BY  KYLE HUMPHREYS RN ON 07/24/20 @1154, ADS          INTERPRETATION       Gram positive cocci in clusters. Identified by realtime PCR as  Coagulase negative Staphylococci           Comment: A single positive culture of coagulase negative Staph is likely to be a contaminant in adult patients. Consider discontinuation of antibiotics for gram positive bloodstream infections if patient asymptomatic. THIS TEST DOES NOT REPLACE SENSITIVITY TESTING.   CORRECTED RESULT CALLED TO AND CORRECTLY REPEATED BY:  [facility]. [facility] ON 7/25/20 @1525, TA  Corrected on 07/25 AT 1530: This is a correction to the medical record of the test results previously reported as Gram positive cocci in clusters, identified by realtime PCR as SUSPECTED MRSA. Recommend IV vancomycin use, unlikely to be a contaminant. Infectious Diseases Consult recommended in adult patients. THIS TEST DOES NOT REPLACE SENSITIVITY TESTING. CULTURE, BLOOD [[id number]]  (Abnormal) Collected:  07/23/20 0039    Order Status:  Completed Specimen:  Blood Updated:  07/25/20 0802     Special Requests: --        NO SPECIAL REQUESTS  LEFT  ARM       GRAM STAIN       AEROBIC BOTTLE POSITIVE ANAEROBIC BOTTLE POSITIVE GRAM POSITIVE COCCI                  RESULTS VERIFIED, PHONED TO AND READ BACK BY            [facility] [id number]. SLB            CRITICAL RESULT NOT CALLED DUE TO PREVIOUS NOTIFICATION OF CRITICAL RESULT WITHIN THE LAST 24 HOURS. Culture result:       STAPHYLOCOCCUS SPECIES, COAGULASE NEGATIVE            REFER TO [facility] [employer] ACCESSION [id number]            THIS ORGANISM MAY BE INDICATIVE OF CULTURE CONTAMINATION, HOWEVER, CLINICAL CORRELATION NEEDS TO BE EVALUATED, AS EACH CASE IS UNIQUE.           CULTURE, BLOOD [[id number]] Collected:  07/24/20 0530    Order Status:  Canceled Specimen:  Blood           Imaging:   No new imaging    Signed By: Issac Loja MD     July 27, 2020

## 2020-07-27 NOTE — PROGRESS NOTES
Problem: Falls - Risk of  Goal: *Absence of Falls  Outcome: Progressing Towards Goal  Note: Fall Risk Interventions:  Mobility Interventions: Communicate number of staff needed for ambulation/transfer    Mentation Interventions: Adequate sleep, hydration, pain control    Medication Interventions: Assess postural VS orthostatic hypotension    Elimination Interventions: Call light in reach

## 2020-07-27 NOTE — WOUND CARE
Right ischial wound vac dressing changed [id number], used foam to bridge to right hip to apply tract pad and tubing off buttock area to prevent damage from tubing. Patient is on low air loss mattress. Sacral wound 5 x4x0. 1cm with dark purple some black and slough mixed with pink open area covered with Allevyn. Recommend 3 times a week changes for both wound vac and foam sacral wound dressing. Will monitor.

## 2020-07-27 NOTE — PROGRESS NOTES
Name: Asuncion Oleary MRN: [id number]  : 1960  Age:59 y.o.  female  Admit Date:  2020 LOS: 4      Hospitalist Progress Note    Asuncion Oleary is a 63 y.o. female with medical history significant for multiple sclerosis, chronic indwelling wasserman,ischial decubitus ulcer with osteomyelitis (recently completed antibiotics about a week ago) who was sent from [facility] rehab due to low blood pressure. Patient was noted to have fever of 101 °F at the facility. In May patient was admitted here for right history of 2 cubitus ulcer with OM and was treated with cefepime, vancomycin, Flagyl. She was discharged to [facility] subsequently to [facility] with rehab with 6 weeks of IV ABx. She was found to meet SIRS criteria with leukocytosis, tachycardia and fever(at outside facility) and admitted for sepsis likely secondary to infected decubitus ulcer. Patient underwent incision and debridement on  by general surgery. Infectious disease is on board and patient has been started on IV antibiotics. Subjective (20) : Patient is seen and examined at bedside. Patient has been afebrile for over 72 hours. Patient is alert and awake, AAO x3. Reports improvement in her total body pain. Denies chills, chest pains, fever, shortness of breath, abdominal pain, nausea, vomiting. She was very frustrated overnight but she is better now. Reports that she was just frustrated that she has been moving from hospital to rehab and back to the hospital process she worried about losing her belongings from the rehab. He was also frustrated that she may not be able to go home ever. ROS: 10 point review of system negative except for what is noted above.     Objective:    Patient Vitals for the past 24 hrs:   Temp Pulse Resp BP SpO2   20 0728 97.1 °F (36.2 °C) (!) 103 16 (!) 159/95 97 %   20 0359 98 °F (36.7 °C) 86 15 (!) 151/94 97 %   20 2321 98 °F (36.7 °C) 87 15 (!) 164/93 94 %   20 1940 98.5 °F (36.9 °C) 94 19 (!) 157/97 100 %   07/26/20 1526 97.5 °F (36.4 °C) (!) 103 18 163/87 96 %   07/26/20 1200 97.6 °F (36.4 °C) 81 18 (!) 157/91 95 %     Oxygen Therapy  O2 Sat (%): 97 % (07/27/20 0728)  Pulse via Oximetry: 106 beats per minute (07/23/20 0200)  O2 Device: Room air (07/26/20 1922)    Estimated body mass index is 14.37 kg/m² as calculated from the following:    Height as of this encounter: 5' 7.5\" (1.715 m). Weight as of this encounter: 42.2 kg (93 lb 1.6 oz). Intake/Output Summary (Last 24 hours) at 7/27/2020 1045  Last data filed at 7/27/2020 0700  Gross per 24 hour   Intake 2808 ml   Output 7300 ml   Net -4492 ml       *Note that automatically entered I/Os may not be accurate; dependent on patient compliance with collection and accurate  by techs. Physical Exam:   General:     Awake and alert, no distress. Thin, malnourished. Head:   normocephalic, atraumatic  Eyes, Ears, nose: PERRL. Normal conjunctiva  Neck:    supple, non-tender. Trachea midline. Lungs:   CTAB, no wheezing, rhonchi, rales  Cardiac:   RRR, Normal S1 and S2   Abdomen:   Soft, non distended, nontender, +BS, no guarding/rebound  Extremities:   Warm, dry. No edema   Skin:   Decubitus ulcers covered on gauze and wound vac.    Neuro:  AAOx3, no focal deficits  Data Review:  I have reviewed all labs, meds, and studies from the last 24 hours:    Recent Results (from the past 24 hour(s))   CBC W/O DIFF    Collection Time: 07/27/20  4:34 AM   Result Value Ref Range    WBC 8.0 4.3 - 11.1 K/uL    RBC 2.94 (L) 4.05 - 5.2 M/uL    HGB 9.0 (L) 11.7 - 15.4 g/dL    HCT 27.2 (L) 35.8 - 46.3 %    MCV 92.5 79.6 - 97.8 FL    MCH 30.6 26.1 - 32.9 PG    MCHC 33.1 31.4 - 35.0 g/dL    RDW 13.2 11.9 - 14.6 %    PLATELET 805 (H) 778 - 450 K/uL    MPV 10.1 9.4 - 12.3 FL    ABSOLUTE NRBC 0.00 0.0 - 0.2 K/uL   METABOLIC PANEL, BASIC    Collection Time: 07/27/20  4:34 AM   Result Value Ref Range    Sodium 140 136 - 145 mmol/L Potassium 2.7 (LL) 3.5 - 5.1 mmol/L    Chloride 109 (H) 98 - 107 mmol/L    CO2 21 21 - 32 mmol/L    Anion gap 10 7 - 16 mmol/L    Glucose 91 65 - 100 mg/dL    BUN 11 6 - 23 MG/DL    Creatinine 0.28 (L) 0.6 - 1.0 MG/DL    GFR est AA >60 >60 ml/min/1.73m2    GFR est non-AA >60 >60 ml/min/1.73m2    Calcium 8.6 8.3 - 10.4 MG/DL   MAGNESIUM    Collection Time: 07/27/20  4:34 AM   Result Value Ref Range    Magnesium 1.6 (L) 1.8 - 2.4 mg/dL        All Micro Results     Procedure Component Value Units Date/Time    CULTURE, ANAEROBIC [[id number]] Collected:  07/24/20 1000    Order Status:  Completed Specimen:  Decubitus Updated:  07/27/20 0927     Special Requests: NO SPECIAL REQUESTS        Culture result:       SUBCULTURE IS NECESSARY TO DETERMINE PRESENCE OR ABSENCE OF ANAEROBIC BACTERIA IN THIS CULTURE. FURTHER REPORT TO FOLLOW AFTER INCUBATION OF SUBCULTURE.           CULTURE, BLOOD [[id number]] Collected:  07/24/20 1300    Order Status:  Completed Specimen:  Blood Updated:  07/27/20 0715     Special Requests: RT HAND     Culture result: NO GROWTH 3 DAYS       CULTURE, BLOOD [[id number]] Collected:  07/24/20 0530    Order Status:  Completed Specimen:  Blood Updated:  07/27/20 0715     Special Requests: --        LEFT  HAND       Culture result: NO GROWTH 3 DAYS       CULTURE, BLOOD [[id number]] Collected:  07/23/20 0105    Order Status:  Completed Specimen:  Blood Updated:  07/27/20 0715     Special Requests: --        NO SPECIAL REQUESTS  RIGHT  HAND       Culture result: NO GROWTH 4 DAYS       CULTURE, URINE [[id number]]  (Abnormal) Collected:  07/23/20 2010    Order Status:  Completed Specimen:  Cath Urine Updated:  07/27/20 0648     Special Requests: NO SPECIAL REQUESTS        Culture result:       5000 COLONIES/mL CANDIDA TROPICALIS          CULTURE, WOUND Hortencia Peals STAIN [[id number]]  (Abnormal)  (Susceptibility) Collected:  07/24/20 1000    Order Status:  Completed Specimen:  Wound from Decubitus Updated:  07/26/20 8727     Special Requests: NO SPECIAL REQUESTS        GRAM STAIN 0 TO 16 WBC'S SEEN PER OIF      FEW GRAM POSITIVE COCCI        Culture result:       LIGHT STAPHYLOCOCCUS AUREUS            SCANT  NORMAL SKIN ANDREW ISOLATED       CULTURE, URINE [[id number]]  (Abnormal)  (Susceptibility) Collected:  07/23/20 0054    Order Status:  Completed Specimen:  Cath Urine Updated:  07/26/20 0728     Special Requests: NO SPECIAL REQUESTS        Culture result:       >100,000 COLONIES/mL KLEBSIELLA OXYTOCA          BLOOD CULTURE ID PANEL [[id number]]  (Abnormal) Collected:  07/23/20 0039    Order Status:  Completed Specimen:  Blood Updated:  07/25/20 1530     Acc. no. from Micro Order [id number]     Staphylococcus Detected        Comment: RESULTS VERIFIED, PHONED TO AND READ BACK BY  KYLE Ignacio RN ON 07/24/20 @1154, ADS          mecA (Methicillin-Resistance Genes) Detected        Comment: Presence of mecA is highly indicative of methicillin resistance. The test does not replace traditional culture and susceptibilities  RESULTS VERIFIED, PHONED TO AND READ BACK BY  KYLE HUMPHREYS RN ON 07/24/20 @1154, ADS          INTERPRETATION       Gram positive cocci in clusters. Identified by realtime PCR as  Coagulase negative Staphylococci           Comment: A single positive culture of coagulase negative Staph is likely to be a contaminant in adult patients. Consider discontinuation of antibiotics for gram positive bloodstream infections if patient asymptomatic. THIS TEST DOES NOT REPLACE SENSITIVITY TESTING. CORRECTED RESULT CALLED TO AND CORRECTLY REPEATED BY:  DR. NEAL [facility] ON 7/25/20 @1525, TA  Corrected on 07/25 AT 1530: This is a correction to the medical record of the test results previously reported as Gram positive cocci in clusters, identified by realtime PCR as SUSPECTED MRSA. Recommend IV vancomycin use, unlikely to be a contaminant. Infectious Diseases Consult recommended in adult patients.   THIS TEST DOES NOT REPLACE SENSITIVITY TESTING. CULTURE, BLOOD [[id number]]  (Abnormal) Collected:  07/23/20 0039    Order Status:  Completed Specimen:  Blood Updated:  07/25/20 0802     Special Requests: --        NO SPECIAL REQUESTS  LEFT  ARM       GRAM STAIN       AEROBIC BOTTLE POSITIVE ANAEROBIC BOTTLE POSITIVE GRAM POSITIVE COCCI                  RESULTS VERIFIED, PHONED TO AND READ BACK BY            Kiana Perez FLOOR [id number]. SLB            CRITICAL RESULT NOT CALLED DUE TO PREVIOUS NOTIFICATION OF CRITICAL RESULT WITHIN THE LAST 24 HOURS. Culture result:       STAPHYLOCOCCUS SPECIES, COAGULASE NEGATIVE            REFER TO [facility] ACCESSION [id number]            THIS ORGANISM MAY BE INDICATIVE OF CULTURE CONTAMINATION, HOWEVER, CLINICAL CORRELATION NEEDS TO BE EVALUATED, AS EACH CASE IS UNIQUE.           CULTURE, BLOOD [[id number]] Collected:  07/24/20 0530    Order Status:  Canceled Specimen:  Blood           Current Meds:  Current Facility-Administered Medications   Medication Dose Route Frequency    potassium chloride (K-DUR, KLOR-CON) SR tablet 40 mEq  40 mEq Oral Q4H    magnesium sulfate 2 g/50 ml IVPB (premix or compounded)  2 g IntraVENous ONCE    baclofen (LIORESAL) tablet 5 mg  5 mg Oral QID PRN    amantadine HCL (SYMMETREL) capsule 100 mg  100 mg Oral BID    fingolimod cap 0.5 mg (Patient Supplied)  0.5 mg Oral DAILY    HYDROcodone-acetaminophen (NORCO) 5-325 mg per tablet 1 Tab  1 Tab Oral Q4H PRN    enoxaparin (LOVENOX) injection 30 mg  30 mg SubCUTAneous DAILY    sodium chloride (NS) flush 5-10 mL  5-10 mL IntraVENous PRN    0.9% sodium chloride infusion  125 mL/hr IntraVENous CONTINUOUS    sodium chloride (NS) flush 5-40 mL  5-40 mL IntraVENous Q8H    sodium chloride (NS) flush 5-40 mL  5-40 mL IntraVENous PRN    acetaminophen (TYLENOL) tablet 650 mg  650 mg Oral Q6H PRN    Or    acetaminophen (TYLENOL) suppository 650 mg  650 mg Rectal Q6H PRN    polyethylene glycol (MIRALAX) packet 17 g  17 g Oral DAILY PRN    promethazine (PHENERGAN) tablet 12.5 mg  12.5 mg Oral Q6H PRN    Or    ondansetron (ZOFRAN) injection 4 mg  4 mg IntraVENous Q6H PRN    cefepime (MAXIPIME) 2 g in 0.9% sodium chloride (MBP/ADV) 100 mL  2 g IntraVENous Q12H    metroNIDAZOLE (FLAGYL) IVPB premix 500 mg  500 mg IntraVENous Q12H    morphine injection 2 mg  2 mg IntraVENous Q4H PRN         Other Studies:  Xr Chest Port    Result Date: 7/23/2020  Portable chest xray  COMPARISON: Hypotension, infectious evaluation INDICATION: May 22, 2020 FINDINGS: Heart appears mildly enlarged. Mediastinal contour is within normal limits. No pulmonary consolidation, pneumothorax or pulmonary edema. No large pleural effusion. Bones are osteopenic. IMPRESSION: No pulmonary consolidation or pulmonary edema. Assessment:    Active Hospital Problems    Diagnosis Date Noted    Hypokalemia 07/27/2020    Hypomagnesemia 07/27/2020    Coagulase negative Staphylococcus bacteremia 07/26/2020    Sepsis ([facility] 07/23/2020    Osteomyelitis of sacroiliac region [facility]) 05/20/2020    Multiple sclerosis ([facility] 05/13/2020    Protein calorie malnutrition ([facility] 05/13/2020    Pressure injury of sacral region, stage 4 ([facility] 05/13/2020       Plan:  Sepsis(on admission) likely secondary to infected right decubitus ulcer and/or UTI  Right ischial decubitus ulcer with osteomyelitis  CoNS Bacteremia   Met SIRS criteria on admission with fever(from outside facility), leukocytosis and tachycardia. S/p I&D (7/24) by Gen Surgery  BCx growing CoNS with repeat (7/24) neg to date. Wound Culture (7/24) growing light MSSA   Echo shows normal EF with possible small, irregular, echogenic, mobile vegetation at the tip of posterior MV leaflet. Cardiology consulted but patient does not want to do SARAHI at the moment. - Cardio recs appreciated: continue with IV antibiotics, repeat TTE as outpatient.   - ID recs appreciated: Continue with cefepime, Flagyl (if worsens - broaden to vanc + cj)    UTI due to chronic wasserman catheter, present on admission  Patient has indwelling Wasserman catheter. UA consistent with UTI. UCx growing Klebsiella oxytoca  Repeat UA after wasserman exchange showed budding yeast forms.  On antibiotics as above   Will defer to ID for additional recommendations. Multiple sclerosis: Continue with home medications    Hypokalemia and hypomagnesemia: Replete and monitor    Moderate to severe PCM: nutrition consulted    Diet:  DIET NUTRITIONAL SUPPLEMENTS  DIET NUTRITIONAL SUPPLEMENTS  DIET REGULAR  DVT PPx: lovenox  Code: DNR  Dispo: SNF  Estimated Discharge: TBD based on clinical course    DPOA: Pt's sister Ms Carrie Hickey    Labs/Imaging Reviewed. Patient is high risk due to current condition and comorbid conditions as well as requiring frequent monitoring. Plan discussed with staff, patient/family and are in agreement. Time Spent: Greater than 45 minutes was spent in reviewing charts, physical exam, discussion with patient, and answered any questions.     Signed By: Luke Echols MD     July 27, 2020

## 2020-07-27 NOTE — PROGRESS NOTES
Problem: Mobility Impaired (Adult and Pediatric)  Goal: *Acute Goals and Plan of Care (Insert Text)  Description: DISCHARGE GOALS :  (1.)Ms. Vernon Hairston will move from supine to sit and sit to supine  with MODERATE ASSIST. Met 7/27  (2.)Ms. Vernon Hairston will transfer from bed to chair and chair to bed with MAXIMAL ASSIST & pt participating, performing a lift lateral pivot bed<>chair. (3.)Ms. Vernon Hairston will  be able to perform wt-shifting while sitting EOB (with air bed inflated) & pressure relief while sitting & lying.  _______________________________________________________________________________________________     Outcome: Progressing Towards Goal     PHYSICAL THERAPY: Daily Note and AM 7/27/2020  INPATIENT: PT Visit Days : 3  [place] / [place] / Product Type: Medicare /       NAME/AGE/GENDER: Loral Nageotte is a 63 y.o. 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 Vernon Hairston seemed to do better with bed mobility from a flat bed that was put on max inflate. Pt also worked on sitting balance, wt-shift multiple directions, wt-bearing on R & L elbows then pushing back up to midline. Pt did try to perform repeated prep activity for pivot transfers by grabbing a chair in front & pushing down with LE's & UE's to achieve lift off. This pt will need her ROHO cushion to sit out of her bed. Pt will also need extensive rehab after hospital DC. This section established at most recent assessment   PROBLEM LIST (Impairments causing functional limitations):  1. Decreased Strength  2.  Decreased ADL/Functional Activities  3. Decreased Transfer Abilities  4. Decreased Balance  5. Decreased Activity Tolerance  6. Decreased Flexibility/Joint Mobility   INTERVENTIONS PLANNED: (Benefits and precautions of physical therapy have been discussed with the patient.)  1. Balance Exercise  2. Bed Mobility  3. Neuromuscular Re-education/Strengthening  4. Therapeutic Activites  5. Therapeutic Exercise/Strengthening  6. Transfer Training     TREATMENT PLAN: Frequency/Duration: daily for duration of hospital stay  Rehabilitation Potential For Stated Goals: [facility] (at time of discharge pending progress):    Placement: It is my opinion, based on this patient's performance to date, that Ms. [facility] may benefit from intensive therapy at a [address] [place] [address] after discharge due to the functional deficits listed above that are likely to improve with skilled rehabilitation and concerns that he/she may be unsafe to be unsupervised at home due to her severe debility . Equipment:    To be detrmined              HISTORY:   History of Present Injury/Illness (Reason for Referral):  Caleb Mojica is a 63 y.o. female who we are asked by Dr. Tyree Holt to see for right ischial wound with OM. This was previously debrided by Dr. Chelo Floers on 5/14/2020. Pt had a wound vac post operatively. She went to [facility] postoperatively and was transferred to [facility] [place] due to hypotension and fever. She was admitted with sepsis likely due to infected decubitus ulcer. WBC 20. Blood cultures NGTD. General surgery was consulted for evaluation of her right ischial wound. Past Medical History/Comorbidities:   Ms. YULIA PATEL  has a past medical history of Decubitus ulcer of ischial area, Hypertension, Kidney infection, Menopause, and MS (multiple sclerosis) ([address]  She also has no past medical history of Aneurysm ([address] Arrhythmia, Arthritis, Asthma, Autoimmune disease ([address] CAD (coronary artery disease), Cancer ([address] Chronic kidney disease, Chronic obstructive pulmonary disease ([facility] Chronic pain, Coagulation disorder ([facility] Diabetes ([facility] Endocarditis, GERD (gastroesophageal reflux disease), Heart failure ([facility] Ill-defined condition, Liver disease, Morbid obesity ([facility] Nicotine vapor product user, Non-nicotine vapor product user, Psychiatric disorder, PUD (peptic ulcer disease), Rheumatic fever, Seizures ([facility] Sleep apnea, Stroke ([facility] Thromboembolus ([facility] or Thyroid disease. Ms. Cindy Deluca  has a past surgical history that includes ercp (5/15/2020); hx cholecystectomy (05/18/2020); and colonoscopy (N/A, 6/29/2020). Social History/Living Environment:   Home Environment: Private residence  # Steps to Enter: 0  Wheelchair Ramp: Yes  One/Two Story Residence: One story  Living Alone: Yes  Support Systems: Family member(s), Friends \ neighbors  Patient Expects to be Discharged to[de-identified] Skilled nursing facility  Current DME Used/Available at Home: (scooter)  Prior Level of Function/Work/Activity:  Pt has not been functional for period of time that is unclear, that included LTACH & SNF stays recently, prior to this admission. Personal Factors: Other factors that influence how disability is experienced by the patient:  current & PMH   Number of Personal Factors/Comorbidities that affect the Plan of Care: 3+: HIGH COMPLEXITY   EXAMINATION:   Most Recent Physical Functioning:   Gross Assessment:  AROM: Grossly decreased, non-functional(all limbs & core)  Strength: Grossly decreased, non-functional(all limbs & core)  Coordination: Grossly decreased, non-functional(all limbs & core)                    Balance:  Sitting: Intact; Without support  Sitting - Static: (fair)  Sitting - Dynamic: (fair) Bed Mobility:  Supine to Sit: Minimum assistance  Sit to Supine:  Moderate assistance  Scooting: Maximum assistance  Duration: 23 Minutes(extra time to work through activity noted)       Transfers: not able     Gait: NA         Functional Mobility: Transfers:  NT        Bed Mobility:  mod   Body Structures Involved:  1. Nerves  2. Metabolic  3. Muscles Body Functions Affected:  1. Neuromusculoskeletal  2. Movement Related  3. Metobolic/Endocrine Activities and Participation Affected:  1. General Tasks and Demands  2. Mobility   Number of elements that affect the Plan of Care: 4+: HIGH COMPLEXITY   CLINICAL PRESENTATION:   Presentation: Evolving clinical presentation with unstable and unpredictable characteristics: HIGH COMPLEXITY   CLINICAL DECISION MAKIN [facility] Inpatient Short Form  How much difficulty does the patient currently have. .. Unable A Lot A Little None   1. Turning over in bed (including adjusting bedclothes, sheets and blankets)? [] 1   [x] 2   [] 3   [] 4   2. Sitting down on and standing up from a chair with arms ( e.g., wheelchair, bedside commode, etc.)   [x] 1   [] 2   [] 3   [] 4   3. Moving from lying on back to sitting on the side of the bed? [] 1   [x] 2   [] 3   [] 4   How much help from another person does the patient currently need. .. Total A Lot A Little None   4. Moving to and from a bed to a chair (including a wheelchair)? [x] 1   [] 2   [] 3   [] 4   5. Need to walk in hospital room? [x] 1   [] 2   [] 3   [] 4   6. Climbing 3-5 steps with a railing? [x] 1   [] 2   [] 3   [] 4   © , Trustees of [address] [place] [address], under license to [employer]. All rights reserved      Score:  Initial: 8 Most Recent: X (Date: -- )    Interpretation of Tool:  Represents activities that are increasingly more difficult (i.e. Bed mobility, Transfers, Gait). Medical Necessity:     · Patient is expected to demonstrate progress in   · strength, range of motion, balance, coordination, and functional technique  ·  to   · decrease assistance required with bed mobility & transfers  · .   Reason for Services/Other Comments:  · Patient continues to require skilled intervention due to · Severe debility  · . Use of outcome tool(s) and clinical judgement create a POC that gives a: Difficult prediction of patient's progress: HIGH COMPLEXITY            TREATMENT:   (In addition to Assessment/Re-Assessment sessions the following treatments were rendered)   Pre-treatment Symptoms/Complaints:  Pt was agreeable  Pain: Initial: numeric scale  Pain Intensity 1: 3  Pain Location 1: Coccyx  Pain Orientation 1: Lower, Mid  Pain Intervention(s) 1: Repositioned  Post Session:  3/10     Therapeutic Activity: (23 Minutes(extra time to work through activity noted)   : Therapeutic activities including bed mobility, sitting balance, static & dynamic, then wt bearing on R& L elbows & pushing back p to midline. Pt also performed reaching for arms of a chair & pushing down with arms & legs repeatedly to achieve lift off to improve mobility, strength and balance. Today's treatment session addressed Decreased Strength, Decreased ADL/Functional Activities, Decreased Transfer Abilities, Increased Pain, Decreased Flexibility/Joint Mobility and Decreased [place] with Home Exercise Program to progress towards achieving goal(s) 1, 2 and 3. During this session, Occupational Therapy addressed ADLs to progress towards their discipline specific goal(s). Co-treatment was necessary to improve patient's cognitive participation and ability to increase activity demands. Date:  7/26/20 Date:   Date:     Activity/Exercise Parameters Parameters Parameters   Ankle DF/PF PROM x 20 reps B     Hip/knee flexion/extension AA-PROM x 20 reps B     Hip abduction/adduction AA-PROM x 20 reps B                             P=passive  AA=Active assisted      Braces/Orthotics/Lines/Etc:   · IV  · wasserman catheter  · Wound vac  · O2 Device: Room air  Treatment/Session Assessment:    · Response to Treatment:  Patient was hard working, needs extensive in-pt rehab stay on DC.   · Interdisciplinary Collaboration:   o Occupational Therapist  o Registered Nurse  o   · After treatment position/precautions:   o Supine in bed  o Bed/Chair-wheels locked  o Bed in low position  o Call light within reach  o RN notified   · Compliance with Program/Exercises: Will assess as treatment progresses  · Recommendations/Intent for next treatment session: \"Next visit will focus on reduction in assistance provided\".   Total Treatment Duration:  PT Patient Time In/Time Out  Time In: 0854  Time Out: Shmuel Montes [address], PT

## 2020-07-28 VITALS
SYSTOLIC BLOOD PRESSURE: 143 MMHG | HEIGHT: 68 IN | DIASTOLIC BLOOD PRESSURE: 91 MMHG | BODY MASS INDEX: 12.55 KG/M2 | RESPIRATION RATE: 18 BRPM | OXYGEN SATURATION: 98 % | WEIGHT: 82.8 LBS | HEART RATE: 93 BPM | TEMPERATURE: 97.7 F

## 2020-07-28 LAB
ANION GAP SERPL CALC-SCNC: 5 MMOL/L (ref 7–16)
BACTERIA SPEC CULT: NORMAL
BUN SERPL-MCNC: 9 MG/DL (ref 6–23)
CALCIUM SERPL-MCNC: 8.3 MG/DL (ref 8.3–10.4)
CHLORIDE SERPL-SCNC: 111 MMOL/L (ref 98–107)
CO2 SERPL-SCNC: 27 MMOL/L (ref 21–32)
CREAT SERPL-MCNC: 0.29 MG/DL (ref 0.6–1)
ERYTHROCYTE [DISTWIDTH] IN BLOOD BY AUTOMATED COUNT: 13.3 % (ref 11.9–14.6)
GLUCOSE SERPL-MCNC: 95 MG/DL (ref 65–100)
HCT VFR BLD AUTO: 24.6 % (ref 35.8–46.3)
HGB BLD-MCNC: 8.2 G/DL (ref 11.7–15.4)
MCH RBC QN AUTO: 30.7 PG (ref 26.1–32.9)
MCHC RBC AUTO-ENTMCNC: 33.3 G/DL (ref 31.4–35)
MCV RBC AUTO: 92.1 FL (ref 79.6–97.8)
NRBC # BLD: 0 K/UL (ref 0–0.2)
PLATELET # BLD AUTO: 501 K/UL (ref 150–450)
PMV BLD AUTO: 10 FL (ref 9.4–12.3)
POTASSIUM SERPL-SCNC: 3.3 MMOL/L (ref 3.5–5.1)
RBC # BLD AUTO: 2.67 M/UL (ref 4.05–5.2)
SERVICE CMNT-IMP: NORMAL
SODIUM SERPL-SCNC: 143 MMOL/L (ref 136–145)
WBC # BLD AUTO: 8.6 K/UL (ref 4.3–11.1)

## 2020-07-28 PROCEDURE — 85027 COMPLETE CBC AUTOMATED: CPT

## 2020-07-28 PROCEDURE — 74011250637 HC RX REV CODE- 250/637: Performed by: NURSE PRACTITIONER

## 2020-07-28 PROCEDURE — 77030040393 HC DRSG OPTIFOAM GENT MDII -B

## 2020-07-28 PROCEDURE — 74011250637 HC RX REV CODE- 250/637: Performed by: INTERNAL MEDICINE

## 2020-07-28 PROCEDURE — 74011250636 HC RX REV CODE- 250/636: Performed by: SURGERY

## 2020-07-28 PROCEDURE — 74750000023 HC WOUND THERAPY

## 2020-07-28 PROCEDURE — 36415 COLL VENOUS BLD VENIPUNCTURE: CPT

## 2020-07-28 PROCEDURE — 74011250637 HC RX REV CODE- 250/637: Performed by: HOSPITALIST

## 2020-07-28 PROCEDURE — 80048 BASIC METABOLIC PNL TOTAL CA: CPT

## 2020-07-28 RX ORDER — HYDROCODONE BITARTRATE AND ACETAMINOPHEN 5; 325 MG/1; MG/1
1 TABLET ORAL
Qty: 18 TAB | Refills: 0 | Status: SHIPPED | OUTPATIENT
Start: 2020-07-28 | End: 2020-07-31

## 2020-07-28 RX ORDER — CEFADROXIL 1000 MG/1
500 TABLET ORAL 2 TIMES DAILY
Qty: 28 TAB | Refills: 0 | Status: SHIPPED | OUTPATIENT
Start: 2020-07-28 | End: 2020-08-25

## 2020-07-28 RX ORDER — POTASSIUM CHLORIDE 20 MEQ/1
40 TABLET, EXTENDED RELEASE ORAL
Status: COMPLETED | OUTPATIENT
Start: 2020-07-28 | End: 2020-07-28

## 2020-07-28 RX ADMIN — POTASSIUM CHLORIDE 40 MEQ: 1500 TABLET, EXTENDED RELEASE ORAL at 13:41

## 2020-07-28 RX ADMIN — CEPHALEXIN 500 MG: 500 CAPSULE ORAL at 13:41

## 2020-07-28 RX ADMIN — CEPHALEXIN 500 MG: 500 CAPSULE ORAL at 06:40

## 2020-07-28 RX ADMIN — AMANTADINE HYDROCHLORIDE 100 MG: 100 CAPSULE, LIQUID FILLED ORAL at 08:46

## 2020-07-28 RX ADMIN — Medication 10 ML: at 05:19

## 2020-07-28 RX ADMIN — ENOXAPARIN SODIUM 30 MG: 30 INJECTION SUBCUTANEOUS at 08:46

## 2020-07-28 RX ADMIN — CEPHALEXIN 500 MG: 500 CAPSULE ORAL at 00:01

## 2020-07-28 RX ADMIN — SODIUM CHLORIDE 125 ML/HR: 900 INJECTION, SOLUTION INTRAVENOUS at 03:19

## 2020-07-28 NOTE — PROGRESS NOTES
Problem: Falls - Risk of  Goal: *Absence of Falls  Outcome: Progressing Towards Goal  Note: Fall Risk Interventions:  Mobility Interventions: Bed/chair exit alarm, Communicate number of staff needed for ambulation/transfer    Mentation Interventions: Adequate sleep, hydration, pain control, Bed/chair exit alarm, Door open when patient unattended    Medication Interventions: Evaluate medications/consider consulting pharmacy, Patient to call before getting OOB, Teach patient to arise slowly    Elimination Interventions: Call light in reach, Patient to call for help with toileting needs

## 2020-07-28 NOTE — DISCHARGE SUMMARY
Hospitalist Discharge Summary     Admit Date:  2020 12:19 AM   Name:  Aguila French   Age:  63 y.o.  :  1960   MRN:  [id number]   PCP:  Avtar Hicks MD  Treatment Team: Attending Provider: Pippa York MD; Consulting Provider: Justin Chavez DO; Care Manager: DEBBIE BellW; Utilization Review: Selin Bang; Consulting Provider: Sena Bailey MD; : Zena Izquierdo; Physical Therapist: Pavan Salas PT; Occupational Therapist: Samaria Eldridge OT; Primary Nurse: Carmen José RN; Nurse Practitioner: Elia Mcburney, NP    Problem List for this Hospitalization:  Hospital Problems as of 2020 Date Reviewed: 2020          Codes Class Noted - Resolved POA    Hypokalemia ICD-10-CM: E87.6  ICD-9-CM: 276.8  2020 - Present Unknown        Hypomagnesemia ICD-10-CM: E83.42  ICD-9-CM: 275.2  2020 - Present Unknown        Coagulase negative Staphylococcus bacteremia ICD-10-CM: R78.81, B95.7  ICD-9-CM: 790.7, 041.19  2020 - Present Unknown        Sepsis ([facility] ICD-10-CM: A41.9  ICD-9-CM: 038.9, 995.91  2020 - Present Unknown        Osteomyelitis of sacroiliac region [facility]) ICD-10-CM: M46.28  ICD-9-CM: 730.28  2020 - Present Yes        * (Principal) Pressure injury of sacral region, stage 4 ([facility] ICD-10-CM: G92.232  ICD-9-CM: 707.03, 707.24  2020 - Present Yes        Multiple sclerosis ([facility] ICD-10-CM: Ike Fortis  ICD-9-CM: 340  2020 - Present Yes        Protein calorie malnutrition ([facility] ICD-10-CM: E46  ICD-9-CM: 263.9  2020 - Present Yes                Admission HPI from 2020:    Aguila French is a 63 y.o. female with medical history significant for multiple sclerosis, chronic indwelling wasserman,ischial decubitus ulcer with osteomyelitis (recently completed antibiotics about a week ago) who was sent from [facility] rehab due to low blood pressure. Patient was noted to have fever of 101 °F at the facility.   In May patient was admitted here for right history of 2 cubitus ulcer with OM and was treated with cefepime, vancomycin, Flagyl. She was discharged to [facility] subsequently to [facility] with rehab with 6 weeks of IV ABx. She was found to meet SIRS criteria with leukocytosis, tachycardia and fever(at outside facility) and admitted for sepsis likely secondary to infected decubitus ulcer. Patient underwent incision and debridement on 7/24 by general surgery. Infectious disease is on board and patient was started on IV antibiotics now changed to oral Keflex. She was also seen by cardiology 7/26 for questionable small vegetation on MV seen on TTE. Cardiology spoke with patient about SARAHI and she refused. Recommend longer antibiotics and follow up TTEs.      Hospital Course: The patient is alert and oriented x3. Afebrile, no leukocytosis. Wound vac intact. Per ID, will discharge on Cefadroxil 500 mg bid x 4 weeks. Wound vac to be changed 3x/weekly on Monday, Wednesday, Friday. She has an appointment with ID 8/25 at 2 pm. She is going to [facility] rehab. She will also need follow up appointment with cardiology for TTEs. Follow up instructions and discharge meds at bottom of this note. Plan was discussed with patient, CM. All questions answered. Patient was stable at time of discharge. 10 systems reviewed and negative except as noted in HPI. Diagnostic Imaging/Tests:   Xr Chest Port    Result Date: 7/23/2020  Portable chest xray  COMPARISON: Hypotension, infectious evaluation INDICATION: May 22, 2020 FINDINGS: Heart appears mildly enlarged. Mediastinal contour is within normal limits. No pulmonary consolidation, pneumothorax or pulmonary edema. No large pleural effusion. Bones are osteopenic. IMPRESSION: No pulmonary consolidation or pulmonary edema.        Echocardiogram results:  Results for orders placed or performed during the hospital encounter of 07/23/20   2D ECHO COMPLETE ADULT (TTE) W OR WO CONTR Narrative    Skyla James [address] [facility], [address]  [phone]    Transthoracic Echocardiogram  2D, M-mode, Doppler, and Color Doppler    Patient: Dung Henriquez  MR #: [id number]  : [id number]  Age: 63 years  Gender: Female  Study date: 2020  Account #: [de-identified]  Height: 67 in  Weight: 95.7 lb  BSA: 1.48 mï¾²  Status:Routine  Location: [facility]  BP: 131/ 87    Allergies: LATEX, HYDROCODONE-ACETAMINOPHEN, PENICILLINS    Sonographer:  Shima Connors  Group:  [facility]  Reading Physician:  LITA Diaz MD [facility]    INDICATIONS: Bacterial endocarditis    PROCEDURE: This was a routine study. A transthoracic echocardiogram was  performed. The study included complete 2D imaging, M-mode, complete spectral  Doppler, and color Doppler. Image quality was good. LEFT VENTRICLE: Size was normal. Systolic function was normal. Ejection  fraction was estimated in the range of 55 % to 60 %. There were no regional  wall motion abnormalities. Wall thickness was normal. Normal left ventricular  diastolic function E/e' 92.28    RIGHT VENTRICLE: The size was normal. Systolic function was normal. The  tricuspid jet envelope definition was inadequate for estimation of RV   systolic  pressure. LEFT ATRIUM: Size was normal.    RIGHT ATRIUM: Size was normal.    SYSTEMIC VEINS: IVC: The inferior vena cava was normal in size. The  respirophasic change in diameter was less than 50%. AORTIC VALVE: The valve was structurally normal, tri-commissural. There was   no  evidence for stenosis. There was no insufficiency. MITRAL VALVE: Valve structure was normal. There was a possible, small,  irregular, echogenic, mobile vegetation on the tip of the posterior leaflet. There was no evidence for stenosis. There was mild to moderate regurgitation. TRICUSPID VALVE: The valve structure was normal. There was no evidence for  stenosis.  There was trivial regurgitation. PULMONIC VALVE: The valve structure was normal. There was no evidence for  stenosis. There was trivial regurgitation. PERICARDIUM: There was no pericardial effusion. AORTA: The root exhibited normal size. SUMMARY:    -  Left ventricle: Systolic function was normal. Ejection fraction was  estimated in the range of 55 % to 60 %. There were no regional wall motion  abnormalities. -  Inferior vena cava, hepatic veins: The respirophasic change in diameter   was  less than 50%. -  Mitral valve: There was mild to moderate regurgitation. There was a  possible, small, irregular, echogenic, mobile vegetation on the tip of the  posterior leaflet. -  Tricuspid valve: There was trivial regurgitation. SYSTEM MEASUREMENT TABLES    2D mode  IVS/LVPW (2D): 0.8  IVSd (2D): 0.6 cm  LVIDd (2D): 4.5 cm  LVIDs (2D): 3 cm  LVPWd (2D): 0.8 cm  RVIDd (2D): 1.4 cm    Prepared and signed by    LITA Perez MD [facility]  Signed 25-Jul-2020 13:43:13           All Micro Results     Procedure Component Value Units Date/Time    CULTURE, ANAEROBIC [[id number]] Collected:  07/24/20 1000    Order Status:  Completed Specimen:  Decubitus Updated:  07/27/20 0927     Special Requests: NO SPECIAL REQUESTS        Culture result:       SUBCULTURE IS NECESSARY TO DETERMINE PRESENCE OR ABSENCE OF ANAEROBIC BACTERIA IN THIS CULTURE. FURTHER REPORT TO FOLLOW AFTER INCUBATION OF SUBCULTURE.           CULTURE, BLOOD [[id number]] Collected:  07/24/20 1300    Order Status:  Completed Specimen:  Blood Updated:  07/27/20 0715     Special Requests: RT HAND     Culture result: NO GROWTH 3 DAYS       CULTURE, BLOOD [[id number]] Collected:  07/24/20 0530    Order Status:  Completed Specimen:  Blood Updated:  07/27/20 0715     Special Requests: --        LEFT  HAND       Culture result: NO GROWTH 3 DAYS       CULTURE, BLOOD [[id number]] Collected:  07/23/20 0105    Order Status:  Completed Specimen:  Blood Updated:  07/27/20 0715 Special Requests: --        NO SPECIAL REQUESTS  RIGHT  HAND       Culture result: NO GROWTH 4 DAYS       CULTURE, URINE [[id number]]  (Abnormal) Collected:  07/23/20 2010    Order Status:  Completed Specimen:  Cath Urine Updated:  07/27/20 0648     Special Requests: NO SPECIAL REQUESTS        Culture result:       5000 COLONIES/mL CANDIDA TROPICALIS          CULTURE, WOUND Dewain Fahad STAIN [[id number]]  (Abnormal)  (Susceptibility) Collected:  07/24/20 1000    Order Status:  Completed Specimen:  Wound from Decubitus Updated:  07/26/20 0912     Special Requests: NO SPECIAL REQUESTS        GRAM STAIN 0 TO 16 WBC'S SEEN PER OIF      FEW GRAM POSITIVE COCCI        Culture result:       LIGHT STAPHYLOCOCCUS AUREUS            SCANT  NORMAL SKIN ANDREW ISOLATED       CULTURE, URINE [[id number]]  (Abnormal)  (Susceptibility) Collected:  07/23/20 0054    Order Status:  Completed Specimen:  Cath Urine Updated:  07/26/20 0728     Special Requests: NO SPECIAL REQUESTS        Culture result:       >100,000 COLONIES/mL KLEBSIELLA OXYTOCA          BLOOD CULTURE ID PANEL [[id number]]  (Abnormal) Collected:  07/23/20 0039    Order Status:  Completed Specimen:  Blood Updated:  07/25/20 1530     Acc. no. from Micro Order [id number]     Staphylococcus Detected        Comment: RESULTS VERIFIED, PHONED TO AND READ BACK BY  KYLE Ignacio RN ON 07/24/20 @1154, ADS          mecA (Methicillin-Resistance Genes) Detected        Comment: Presence of mecA is highly indicative of methicillin resistance. The test does not replace traditional culture and susceptibilities  RESULTS VERIFIED, PHONED TO AND READ BACK BY  KYLE HUMPHREYS RN ON 07/24/20 @1154, ADS          INTERPRETATION       Gram positive cocci in clusters. Identified by realtime PCR as  Coagulase negative Staphylococci           Comment: A single positive culture of coagulase negative Staph is likely to be a contaminant in adult patients.  Consider discontinuation of antibiotics for gram positive bloodstream infections if patient asymptomatic. THIS TEST DOES NOT REPLACE SENSITIVITY TESTING. CORRECTED RESULT CALLED TO AND CORRECTLY REPEATED [facility]:  [facility] 7/25/20 @1525, TA  Corrected on 07/25 AT 1530: This is a correction to the medical record of the test results previously reported as Gram positive cocci in clusters, identified by realtime PCR as SUSPECTED MRSA. Recommend IV vancomycin use, unlikely to be a contaminant. Infectious Diseases Consult recommended in adult patients. THIS TEST DOES NOT REPLACE SENSITIVITY TESTING. CULTURE, BLOOD [[id number]]  (Abnormal) Collected:  07/23/20 0039    Order Status:  Completed Specimen:  Blood Updated:  07/25/20 0802     Special Requests: --        NO SPECIAL REQUESTS  LEFT  ARM       GRAM STAIN       AEROBIC BOTTLE POSITIVE ANAEROBIC BOTTLE POSITIVE GRAM POSITIVE COCCI                  RESULTS VERIFIED, PHONED TO AND READ BACK BY            Natali CLEMONS [id number]. SLB            CRITICAL RESULT NOT CALLED DUE TO PREVIOUS NOTIFICATION OF CRITICAL RESULT WITHIN THE LAST 24 HOURS. Culture result:       STAPHYLOCOCCUS SPECIES, COAGULASE NEGATIVE            REFER TO [employer] ACCESSION [id number]            THIS ORGANISM MAY BE INDICATIVE OF CULTURE CONTAMINATION, HOWEVER, CLINICAL CORRELATION NEEDS TO BE EVALUATED, AS EACH CASE IS UNIQUE.           CULTURE, BLOOD [[id number]] Collected:  07/24/20 0530    Order Status:  Canceled Specimen:  Blood           Labs: Results:       BMP, Mg, Phos Recent Labs     07/28/20 0434 07/27/20 0434 07/26/20  0549    140 142   K 3.3* 2.7* 3.2*   * 109* 114*   CO2 27 21 20*   AGAP 5* 10 8   BUN 9 11 20   CREA 0.29* 0.28* 0.29*   CA 8.3 8.6 8.4   GLU 95 91 88   MG  --  1.6*  --       CBC Recent Labs     07/28/20 0434 07/27/20 0434 07/26/20  0549   WBC 8.6 8.0 10.6   RBC 2.67* 2.94* 2.60*   HGB 8.2* 9.0* 8.0*   HCT 24.6* 27.2* 24.6*   * 503* 468*      LFT No results for input(s): ALT, TBIL, AP, TP, ALB, GLOB, AGRAT in the last 72 hours.     No lab exists for component: SGOT, GPT   Cardiac Testing Lab Results   Component Value Date/Time     (H) 05/13/2020 01:53 AM    Troponin-I, Qt. <0.02 (L) 05/13/2020 01:53 AM      Coagulation Tests No results found for: PTP, INR, APTT, INREXT   A1c No results found for: HBA1C, HGBE8, HEO6BWBN   Lipid Panel No results found for: CHOL, CHOLPOCT, CHOLX, CHLST, CHOLV, 930344, HDL, HDLP, LDL, LDLC, DLDLP, 241148, VLDLC, VLDL, TGLX, TRIGL, TRIGP, TGLPOCT, CHHD, CHHDX   Thyroid Panel No results found for: TSH, T4, FT4, TT3, T3U, TSHEXT     Most Recent UA Lab Results   Component Value Date/Time    Color YELLOW 07/25/2020 01:08 PM    Appearance CLEAR 07/25/2020 01:08 PM    Specific gravity 1.031 (H) 07/25/2020 01:08 PM    pH (UA) 6.0 07/25/2020 01:08 PM    Protein TRACE (A) 07/25/2020 01:08 PM    Glucose Negative 07/25/2020 01:08 PM    Ketone 15 (A) 07/25/2020 01:08 PM    Bilirubin Negative 07/25/2020 01:08 PM    Blood MODERATE (A) 07/25/2020 01:08 PM    Urobilinogen 0.2 07/25/2020 01:08 PM    Nitrites Negative 07/25/2020 01:08 PM    Leukocyte Esterase SMALL (A) 07/25/2020 01:08 PM        Allergies   Allergen Reactions    Latex Rash    Norco [Hydrocodone-Acetaminophen] Other (comments)     Sees dead people    Pcn [Penicillins] Swelling     Immunization History   Administered Date(s) Administered    TB Skin Test (PPD) Intradermal 05/13/2020       All Labs from Last 24 Hrs:  Recent Results (from the past 24 hour(s))   CBC W/O DIFF    Collection Time: 07/28/20  4:34 AM   Result Value Ref Range    WBC 8.6 4.3 - 11.1 K/uL    RBC 2.67 (L) 4.05 - 5.2 M/uL    HGB 8.2 (L) 11.7 - 15.4 g/dL    HCT 24.6 (L) 35.8 - 46.3 %    MCV 92.1 79.6 - 97.8 FL    MCH 30.7 26.1 - 32.9 PG    MCHC 33.3 31.4 - 35.0 g/dL    RDW 13.3 11.9 - 14.6 %    PLATELET 030 (H) 109 - 450 K/uL    MPV 10.0 9.4 - 12.3 FL    ABSOLUTE NRBC 0.00 0.0 - 0.2 K/uL   METABOLIC PANEL, BASIC    Collection Time: 07/28/20  4:34 AM   Result Value Ref Range    Sodium 143 136 - 145 mmol/L    Potassium 3.3 (L) 3.5 - 5.1 mmol/L    Chloride 111 (H) 98 - 107 mmol/L    CO2 27 21 - 32 mmol/L    Anion gap 5 (L) 7 - 16 mmol/L    Glucose 95 65 - 100 mg/dL    BUN 9 6 - 23 MG/DL    Creatinine 0.29 (L) 0.6 - 1.0 MG/DL    GFR est AA >60 >60 ml/min/1.73m2    GFR est non-AA >60 >60 ml/min/1.73m2    Calcium 8.3 8.3 - 10.4 MG/DL       Discharge Exam:  Patient Vitals for the past 24 hrs:   Temp Pulse Resp BP SpO2   07/28/20 1037 97.7 °F (36.5 °C) 93 18 (!) 143/91 98 %   07/28/20 0734 98.3 °F (36.8 °C) 85 18 (!) 158/99 98 %   07/28/20 0406 98.3 °F (36.8 °C) 80 17 (!) 144/98 97 %   07/27/20 2346 97.5 °F (36.4 °C) 81 18 (!) 150/95 94 %   07/27/20 2016 98.1 °F (36.7 °C) 84 17 (!) 156/98 98 %   07/27/20 1622 97.7 °F (36.5 °C) 89 18 139/82 97 %     Oxygen Therapy  O2 Sat (%): 98 % (07/28/20 1037)  Pulse via Oximetry: 106 beats per minute (07/23/20 0200)  O2 Device: Room air (07/27/20 1942)    Intake/Output Summary (Last 24 hours) at 7/28/2020 1129  Last data filed at 7/28/2020 1037  Gross per 24 hour   Intake 1358 ml   Output 4600 ml   Net -3242 ml         Physical exam:  General:    Well nourished. Alert. No distress. Eyes:   Normal sclera. Extraocular movements intact. ENT:  Normocephalic, atraumatic. Moist mucous membranes  CV:   Regular rate and rhythm. No murmur, rub, or gallop. Lungs:  Clear to auscultation bilaterally. No wheezing, rhonchi, or rales. Abdomen: Soft, nontender, nondistended. Bowel sounds normal.   Extremities: Warm and dry. No cyanosis or edema. Neurologic: No focal deficits  Skin:     No rashes or jaundice. Psych:  Normal mood and affect. Discharge Info:   Current Discharge Medication List      START taking these medications    Details   HYDROcodone-acetaminophen (NORCO) 5-325 mg per tablet Take 1 Tab by mouth every four (4) hours as needed for Pain for up to 3 days. Max Daily Amount: 6 Tabs.   Qty: 18 Tab, Refills: 0    Associated Diagnoses: Pressure injury of sacral region, stage 4 (HCC)      cefadroxil (DURICEF) 1 gram tablet Take 0.5 Tabs by mouth two (2) times a day for 28 days. Qty: 28 Tab, Refills: 0         CONTINUE these medications which have NOT CHANGED    Details   fingolimod 0.5 mg cap Take 0.5 mg by mouth daily. Qty: 1 Cap, Refills: 0      metoprolol tartrate (LOPRESSOR) 25 mg tablet Take 0.5 Tabs by mouth two (2) times a day. Qty: 30 Tab, Refills: 0      amantadine HCL (SYMMETREL) 100 mg capsule Take 100 mg by mouth two (2) times a day. STOP taking these medications       HYDROcodone-acetaminophen (XODOL)  mg tab per tablet Comments:   Reason for Stopping:                 Disposition: SNF    Activity: Activity as tolerated  Diet: DIET NUTRITIONAL SUPPLEMENTS Breakfast, Dinner; Other  DIET REGULAR  DIET NUTRITIONAL SUPPLEMENTS Lunch; Ensure Enlive    Follow-up Appointments   Procedures    FOLLOW UP VISIT Appointment in: 3 - 11 Days Facility MD next visit day Patient has an appointment 8/25 at 2 pm Needs BMP, CBC next lab day     Facility MD next visit day  Patient has an appointment 8/25 at 2 pm  Needs BMP, CBC next lab day     Standing Status:   Standing     Number of Occurrences:   1     Order Specific Question:   Appointment in     Answer:   3 - 5 Days         Follow-up Information     Follow up With Specialties Details Why Contact Info    [address] [facility]   Avi Ribeiro [facility]  [phone]    Other, MD Marga    Patient can only remember the practice name and not the physician            Case reviewed with supervising physician - Dr. Alyce Tang    Time spent in patient discharge planning and coordination 35 minutes.     Signed:  Abhijit Fairchild NP

## 2020-07-28 NOTE — WOUND CARE
Received call from primary RN earlier today about pt being discharged to [facility] and asked what they needed to do about the patients wound vac. Instructed primary RN to remove cannister and send with patient after confirming that wound vac is at the rehab facility and to place hospital wound vac in dirty utility room. Primary RN verbalized understanding. After speaking with TALON Holt Begin is not sure that the wound vac will be there and it may be a few hours before they can obtain a wound vac for the patient upon arrival. Agreed to remove wound vac dressing and place wet to dry dressing prior to discharge. Applied saline soak wet to dry dressing covered with allevyn pt now being picked up for transfer to [facility] rehab. Pt tolerated well. Hospital wound vac placed in dirty utility room.

## 2020-07-28 NOTE — PROGRESS NOTES
General Surgery Progress Note    7/28/2020    Admit Date: 7/23/2020    Subjective:   S/p debridement of Stage IV right ischial decubitus ulcer and placement of wound vac on 7/24. Cultures grew MSSA on Keflex per ID. Objective:     Visit Vitals  BP (!) 158/99 (BP 1 Location: Left arm)   Pulse 85   Temp 98.3 °F (36.8 °C)   Resp 18   Ht 5' 7.5\" (1.715 m)   Wt 82 lb 12.8 oz (37.6 kg) Comment: w/o wound vac and bed inflation device   SpO2 98%   BMI 12.78 kg/m²         Intake/Output Summary (Last 24 hours) at 7/28/2020 0921  Last data filed at 7/28/2020 0735  Gross per 24 hour   Intake 1358 ml   Output 5300 ml   Net -3942 ml        EXAM:    Wound vac intact and maintaining seal with serosanguinous drainage. Surrounding skin is healthy appearing without cellulitis.        Data Review    Recent Results (from the past 24 hour(s))   CBC W/O DIFF    Collection Time: 07/28/20  4:34 AM   Result Value Ref Range    WBC 8.6 4.3 - 11.1 K/uL    RBC 2.67 (L) 4.05 - 5.2 M/uL    HGB 8.2 (L) 11.7 - 15.4 g/dL    HCT 24.6 (L) 35.8 - 46.3 %    MCV 92.1 79.6 - 97.8 FL    MCH 30.7 26.1 - 32.9 PG    MCHC 33.3 31.4 - 35.0 g/dL    RDW 13.3 11.9 - 14.6 %    PLATELET 013 (H) 817 - 450 K/uL    MPV 10.0 9.4 - 12.3 FL    ABSOLUTE NRBC 0.00 0.0 - 0.2 K/uL   METABOLIC PANEL, BASIC    Collection Time: 07/28/20  4:34 AM   Result Value Ref Range    Sodium 143 136 - 145 mmol/L    Potassium 3.3 (L) 3.5 - 5.1 mmol/L    Chloride 111 (H) 98 - 107 mmol/L    CO2 27 21 - 32 mmol/L    Anion gap 5 (L) 7 - 16 mmol/L    Glucose 95 65 - 100 mg/dL    BUN 9 6 - 23 MG/DL    Creatinine 0.29 (L) 0.6 - 1.0 MG/DL    GFR est AA >60 >60 ml/min/1.73m2    GFR est non-AA >60 >60 ml/min/1.73m2    Calcium 8.3 8.3 - 10.4 MG/DL        Hospital Problems  Date Reviewed: 6/29/2020          Codes Class Noted POA    Hypokalemia ICD-10-CM: E87.6  ICD-9-CM: 276.8  7/27/2020 Unknown        Hypomagnesemia ICD-10-CM: E83.42  ICD-9-CM: 275.2  7/27/2020 Unknown        Coagulase negative Staphylococcus bacteremia ICD-10-CM: R78.81, B95.7  ICD-9-CM: 790.7, 041.19  7/26/2020 Unknown        Sepsis ([facility] ICD-10-CM: A41.9  ICD-9-CM: 038.9, 995.91  7/23/2020 Unknown        Osteomyelitis of sacroiliac region [facility]) ICD-10-CM: M46.28  ICD-9-CM: 730.28  5/20/2020 Yes        * (Principal) Pressure injury of sacral region, stage 4 ([facility] ICD-10-CM: X41.951  ICD-9-CM: 707.03, 707.24  5/13/2020 Yes        Multiple sclerosis ([facility] ICD-10-CM: G35  ICD-9-CM: 379  5/13/2020 Yes        Protein calorie malnutrition ([facility] ICD-10-CM: E46  ICD-9-CM: 263.9  5/13/2020 Yes                Continue wound vac to right ischial wound  Wound care to sacral wound  Abx per ID  Transferring to rehab today per primary team    MICHELINE Crawford

## 2020-07-28 NOTE — PROGRESS NOTES
[facility]. No needs voiced. No s/s of acute distress. 1049-Discussed wound vac d/c with Uzma Bravo in wound care. Instructed to disconnect canister from vac immediately prior to discharge and leave canister attached to patient so wound would not be without suction for a significant amount of time. 1408-TRANSFER - OUT REPORT:    After calling multiple times and requesting assistance from  to get a hold of person to give report to, verbal report given to Huey Jones, hunter RN on González Ojeda  being transferred to [facility] for routine progression of care       Report consisted of patients Situation, Background, Assessment and   Recommendations(SBAR). Information from the following report(s) SBAR, MAR and Recent Results was reviewed with the receiving nurse. Lines:   Peripheral IV 07/26/20 Anterior;Right Forearm (Active)   Site Assessment Clean, dry, & intact 07/28/20 0845   Phlebitis Assessment 0 07/28/20 0845   Infiltration Assessment 0 07/28/20 0845   Dressing Status Clean, dry, & intact 07/28/20 0845   Dressing Type Tape;Transparent 07/28/20 0845   Hub Color/Line Status Infusing 07/27/20 1942   Alcohol Cap Used No 07/27/20 1942        Opportunity for questions and clarification was provided. Patient transported with:   [facility]  Attempted to call back to discuss follow ups but no answer to calls after multiple attempts. Appointment information highlighted on AVS that was sent with patient. 1439-Discharge instructions were reviewed with patient. An opportunity was given for questions. All medications were reviewed, and information was given on the new medications. Patient verbalized understanding, and has no questions at this time. Discharged to [facility] via [employer].

## 2020-07-28 NOTE — PROGRESS NOTES
Care Management Interventions  Transition of Care Consult (CM Consult): SNF  Partner SNF: Yes  Current Support Network: [address] [place] [address]  Discharge Location  Discharge Placement: Skilled nursing facility      Per MD pt stable for d/c.  SARY confirmed with Katt Santos at [facility] that pt can return to ( room 0 W ) they have a wound vac ready for her. SARY spoke with pt who is aware of d/c. Chart copied, nsg called report, transport arranged to [facility].

## 2020-07-28 NOTE — DISCHARGE INSTRUCTIONS
Patient has an appointment 8/25 at 2 pm with infectious disease  BMP, CBC next lab day  Wound care:  Change foam dressing and wound vac dressing 3x/weekly-next change on Wednesday, July 29. Patient also needs follow up appointment with cardiology for TTE in 1 month. Continue wound vac to right ischial wound  Wound care to sacral wound: Sacral wound allevyn every other day and prn. Wound vac to right buttock change 3 times per week. DISCHARGE SUMMARY from Nurse    PATIENT INSTRUCTIONS:    After general anesthesia or intravenous sedation, for 24 hours or while taking prescription Narcotics:  · Limit your activities  · Do not drive and operate hazardous machinery  · Do not make important personal or business decisions  · Do  not drink alcoholic beverages  · If you have not urinated within 8 hours after discharge, please contact your surgeon on call. Report the following to your surgeon:  · Excessive pain, swelling, redness or odor of or around the surgical area  · Temperature over 100.5  · Nausea and vomiting lasting longer than 4 hours or if unable to take medications  · Any signs of decreased circulation or nerve impairment to extremity: change in color, persistent  numbness, tingling, coldness or increase pain  · Any questions    What to do at Home:  Recommended activity: Activity as tolerated. *  Please give a list of your current medications to your Primary Care Provider. *  Please update this list whenever your medications are discontinued, doses are      changed, or new medications (including over-the-counter products) are added. *  Please carry medication information at all times in case of emergency situations. These are general instructions for a healthy lifestyle:    No smoking/ No tobacco products/ Avoid exposure to second hand smoke  Surgeon General's Warning:  Quitting smoking now greatly reduces serious risk to your health.     Obesity, smoking, and sedentary lifestyle greatly increases your risk for illness    A healthy diet, regular physical exercise & weight monitoring are important for maintaining a healthy lifestyle    You may be retaining fluid if you have a history of heart failure or if you experience any of the following symptoms:  Weight gain of 3 pounds or more overnight or 5 pounds in a week, increased swelling in our hands or feet or shortness of breath while lying flat in bed. Please call your doctor as soon as you notice any of these symptoms; do not wait until your next office visit. The discharge information has been reviewed with the patient. The patient verbalized understanding. Discharge medications reviewed with the patient and appropriate educational materials and side effects teaching were provided.   ___________________________________________________________________________________________________________________________________

## 2020-07-29 LAB
BACTERIA SPEC CULT: NORMAL
BACTERIA SPEC CULT: NORMAL
SERVICE CMNT-IMP: NORMAL
SERVICE CMNT-IMP: NORMAL

## 2020-07-31 ENCOUNTER — HOSPITAL ENCOUNTER (OUTPATIENT)
Dept: WOUND CARE | Age: 60
Discharge: HOME | End: 2020-07-31
Attending: SURGERY
Payer: COMMERCIAL

## 2020-07-31 VITALS
BODY MASS INDEX: 16.01 KG/M2 | TEMPERATURE: 97.2 F | WEIGHT: 99.6 LBS | RESPIRATION RATE: 18 BRPM | HEART RATE: 84 BPM | OXYGEN SATURATION: 97 % | HEIGHT: 66 IN | SYSTOLIC BLOOD PRESSURE: 145 MMHG | DIASTOLIC BLOOD PRESSURE: 77 MMHG

## 2020-07-31 LAB
ATRIAL RATE: 125 BPM
BACTERIA SPEC CULT: NORMAL
CALCULATED P AXIS, ECG09: 79 DEGREES
CALCULATED R AXIS, ECG10: 74 DEGREES
CALCULATED T AXIS, ECG11: 71 DEGREES
DIAGNOSIS, 93000: NORMAL
P-R INTERVAL, ECG05: 144 MS
Q-T INTERVAL, ECG07: 304 MS
QRS DURATION, ECG06: 70 MS
QTC CALCULATION (BEZET), ECG08: 438 MS
SERVICE CMNT-IMP: NORMAL
VENTRICULAR RATE, ECG03: 125 BPM

## 2020-07-31 PROCEDURE — 99214 OFFICE O/P EST MOD 30 MIN: CPT

## 2020-07-31 RX ORDER — DIAZEPAM 5 MG/1
5 TABLET ORAL DAILY
COMMUNITY
End: 2021-01-04

## 2020-07-31 RX ORDER — OXYCODONE AND ACETAMINOPHEN 5; 325 MG/1; MG/1
1 TABLET ORAL
COMMUNITY
End: 2021-01-04

## 2020-07-31 NOTE — WOUND CARE
[address] [place] [address], [address] [address] Phone: [phone] Fax: [phone] Patient: Aguila French MRN: [id number]  SSN: [id number] YOB: 1960  Age: 63 y.o. Sex: female Return Appointment: 1 month with Shayna Aburto MD 
 
Instructions: Right Ischial Wound: 
Cleanse with Normal Saline Apply Mesalt ribbon- pack loosely into wound, filling all dead space. Cover with ABD Secure with Tape or Bordered Foam 
CHANGE DRESSING DAILY AND PRN LOOSE OR SOILED Sacrum/Coccyx: 
Cleanse with Normal Saline Cover with Bordered Foam 
Change Every 2 Days and PRN Loose or Soiled Limit Time in [address] [place] [address] to be Using [employer] in Wheelchair. Turn Side to Side Every 2 Hours-Limit Time on Right Ischial Wound Increase Protein in Diet Discontinue Wound Vac Should you experience increased redness, swelling, pain, foul odor, size of wound(s), or have a temperature over 101 degrees please contact the [address] [place] [address] at [phone] or if after hours contact your primary care physician or go to the hospital emergency department. Signed By: Florin Mcknight RN   
 July 31, 2020

## 2020-07-31 NOTE — WOUND CARE
07/31/20 6223 Wound Ischial Right Wound #1 07/31/20 Date First Assessed: 07/31/20   Wound Approximate Age at First Assessment (Weeks): 12 weeks  Primary Wound Type: Pressure Injury  Location: Ischial  Wound Location Orientation: Right  Wound Description: Wound #1  Date of First Observation: 07/31/20 Dressing Status Clean, dry, and intact Dressing Type Negative pressure wound therapy Pressure Injury Stage 4 Wound Length (cm) 5 cm Wound Width (cm) 8 cm Wound Depth (cm) 1.8 cm Wound Surface Area (cm^2) 40 cm^2 Wound Volume (cm^3) 72 cm^3 Tissue Type Percent Red 70 Tissue Type Percent Yellow 30 Drainage Amount Moderate Drainage Color Serosanguinous Wound Odor None Honey-wound Assessment Blanchable erythema Cleansing and Cleansing Agents  Normal saline Wound Sacral/coccyx Posterior Wound #2 07/31/20 Date First Assessed: 07/31/20   Wound Approximate Age at First Assessment (Weeks): 12 weeks  Primary Wound Type: Pressure Injury  Location: Sacral/coccyx  Wound Location Orientation: Posterior  Wound Description: Wound #2  Date of First Observation: 0... Dressing Status Breakthrough drainage Dressing Type  
(Coversite) Pressure Injury Unstageable Wound Length (cm) 2.5 cm Wound Width (cm) 3 cm Wound Surface Area (cm^2) 7.5 cm^2 Condition of [facility] Tissue Type Percent Yellow 100 Drainage Amount Moderate Drainage Color Serosanguinous Wound Odor None Honey-wound Assessment Blanchable erythema;Fragile Cleansing and Cleansing Agents  Normal saline

## 2020-08-10 NOTE — PROGRESS NOTES
Wound Center Progress Note    Patient: Robin Vo MRN: [id number]  SSN: [id number]    YOB: 1960  Age: 63 y.o. Sex: female      Subjective:     Chief Complaint:    Sacral ans ischial PS    History of Present Illness:       Wound Caused By: Pressure  Associated Signs and Symptoms: MS  Timing: since July  Quality: wounds  Severity: full thickness    Episode of sepsis in July 2020 with bedrest resulting in wounds. Has not been ambulatory for 15 years. Still spending 4 hours a day in chair with ROHO. Minimal soiling. Difficulty with maintaining weight. Past Medical History:   Diagnosis Date    Decubitus ulcer of ischial area     Hypertension     Kidney infection     Menopause     MS (multiple sclerosis) ([facility]       Past Surgical History:   Procedure Laterality Date    COLONOSCOPY N/A 6/29/2020    COLONOSCOPY /Room 424 performed by Deisi Chase MD at [facility] ENDOSCOPY    ERCP  5/15/2020         HX CHOLECYSTECTOMY  05/18/2020     Family History   Problem Relation Age of Onset    Breast Cancer Mother       Social History     Tobacco Use    Smoking status: Former Smoker    Smokeless tobacco: Never Used   Substance Use Topics    Alcohol use: Not Currently       Prior to Admission medications    Medication Sig Start Date End Date Taking? Authorizing Provider   oxyCODONE-acetaminophen (Percocet) 5-325 mg per tablet Take 1 Tab by mouth every six (6) hours as needed for Pain. Yes Provider, Historical   diazePAM (Valium) 5 mg tablet Take 5 mg by mouth daily. Yes Provider, Historical   cefadroxil (DURICEF) 1 gram tablet Take 0.5 Tabs by mouth two (2) times a day for 28 days. 7/28/20 8/25/20  Lyla Ruff NP   fingolimod 0.5 mg cap Take 0.5 mg by mouth daily. 5/23/20   Steve Levy DO   metoprolol tartrate (LOPRESSOR) 25 mg tablet Take 0.5 Tabs by mouth two (2) times a day.  5/22/20   Steve Levy DO   amantadine HCL (SYMMETREL) 100 mg capsule Take 100 mg by mouth two (2) times a day. Other, MD Marga     Allergies   Allergen Reactions    Latex Rash    Norco [Hydrocodone-Acetaminophen] Other (comments)     Sees dead people    Pcn [Penicillins] Swelling        Review of Systems:  CONSTITUTIONAL: No fever, chills  HEAD: No headache  EYES: No visual loss  ENT: No hearing loss  SKIN: No rash  CARDIOVASCULAR: No chest pain  RESPIRATORY: No shortness of breath  GASTROINTESTINAL: No nausea, vomiting  GENITOURINARY: No excessive urination  NEUROLOGICAL: ++ weakness  MUSCULOSKELETAL: No muscle pain. No neck pain  HEMATOLOGIC: + easy bleeding  LYMPHATICS: No lymphedema. PSYCHIATRIC: No current depression  ENDOCRINOLOGIC: No high sugars  ALLERGIES: No history of asthma, hives, eczema or rhinitis. No results found for: HBA1C, JSZ9CEJD, HGBE8, FWT5FCTX, LQD2VOIB     Immunization History   Administered Date(s) Administered    TB Skin Test (PPD) Intradermal 05/13/2020       Body mass index is 16.32 kg/m². Current medications:  Current Outpatient Medications   Medication Sig Dispense Refill    oxyCODONE-acetaminophen (Percocet) 5-325 mg per tablet Take 1 Tab by mouth every six (6) hours as needed for Pain.  diazePAM (Valium) 5 mg tablet Take 5 mg by mouth daily.  cefadroxil (DURICEF) 1 gram tablet Take 0.5 Tabs by mouth two (2) times a day for 28 days. 28 Tab 0    fingolimod 0.5 mg cap Take 0.5 mg by mouth daily. 1 Cap 0    metoprolol tartrate (LOPRESSOR) 25 mg tablet Take 0.5 Tabs by mouth two (2) times a day. 30 Tab 0    amantadine HCL (SYMMETREL) 100 mg capsule Take 100 mg by mouth two (2) times a day. Objective:     Physical Exam:     Visit Vitals  /77   Pulse 84   Temp 97.2 °F (36.2 °C)   Resp 18   Ht 5' 5.5\" (1.664 m)   Wt 45.2 kg (99 lb 9.6 oz)   SpO2 97%   BMI 16.32 kg/m²       General: thin, pleasant  Psych: cooperative. Pleasant  Neuro: alert and oriented to person/place/situation.  Otherwise nonfocal.  Derm: Normal turgor for age, dry skin  HEENT: Normocephalic, atraumatic. EOMI. Neck: Normal range of motion. Chest: Respirations nonlabored  Cardio: RRR  Abdomen: Soft, nondistended  Lower extremities:   No hemosiderrosis  No significant varicosities  Capillary refill <3 sec                Right  2+ DP/PT    Left  2+ DP/PT    Ulcer Description:   Wound Nose scab 2x0.8cm irregular (Active)   Number of days: 89       Wound Hip Anterior;Right DTI small (Active)   Number of days: 89       Wound Hip Anterior; Left DTI small open (Active)   Number of days: 89       Wound Ischial Right unstageable slough/ erythema induration (Active)   Number of days: 89       Wound Sacral/coccyx stage1 coccyx (Active)   Number of days: 89       Wound Ankle Anterior; Left slough  (Active)   Number of days: 89       Wound Buttocks Right (Active)   Number of days: 88       Wound Abdomen (Active)   Number of days: 84       Wound Buttocks Left (Active)   Number of days: 17       Wound Ischial Right Wound #1 07/31/20 (Active)   Dressing Status Clean, dry, and intact 07/31/20 0904   Dressing Type Negative pressure wound therapy 07/31/20 0904   Pressure Injury Stage 4 07/31/20 0904   Wound Length (cm) 5 cm 07/31/20 0904   Wound Width (cm) 8 cm 07/31/20 0904   Wound Depth (cm) 1.8 cm 07/31/20 0904   Wound Surface Area (cm^2) 40 cm^2 07/31/20 0904   Wound Volume (cm^3) 72 cm^3 07/31/20 0904   Tissue Type Percent Red 70 07/31/20 0904   Tissue Type Percent Yellow 30 07/31/20 0904   Drainage Amount Moderate 07/31/20 0904   Drainage Color Serosanguinous 07/31/20 0904   Wound Odor None 07/31/20 0904   Honey-wound Assessment Blanchable erythema 07/31/20 0904   Cleansing and Cleansing Agents  Normal saline 07/31/20 0904   Dressing Changed Changed/New 07/31/20 0904   Number of days: 10       Wound Sacral/coccyx Posterior Wound #2 07/31/20 (Active)   Dressing Status Breakthrough drainage 07/31/20 0904   Pressure Injury Unstageable 07/31/20 0904   Wound Length (cm) 2.5 cm 07/31/20 0904 Wound Width (cm) 3 cm 07/31/20 0904   Wound Surface Area (cm^2) 7.5 cm^2 07/31/20 0904   Condition of [facility] 07/31/20 0904   Tissue Type Percent Yellow 100 07/31/20 0904   Drainage Amount Moderate 07/31/20 0904   Drainage Color Serosanguinous 07/31/20 0904   Wound Odor None 07/31/20 0904   Honey-wound Assessment Blanchable erythema;Fragile 07/31/20 0904   Cleansing and Cleansing Agents  Normal saline 07/31/20 0904   Dressing Changed Changed/New 07/31/20 0904   Number of days: 10           Problem List  Date Reviewed: 6/29/2020          Codes Class Noted    Hypokalemia ICD-10-CM: E87.6  ICD-9-CM: 276.8  7/27/2020        Hypomagnesemia ICD-10-CM: E83.42  ICD-9-CM: 275.2  7/27/2020        Coagulase negative Staphylococcus bacteremia ICD-10-CM: R78.81, B95.7  ICD-9-CM: 790.7, 041.19  7/26/2020        Sepsis ([facility] ICD-10-CM: A41.9  ICD-9-CM: 038.9, 995.91  7/23/2020        Osteomyelitis of sacroiliac region [facility]) ICD-10-CM: M46.28  ICD-9-CM: 730.28  5/20/2020        Choledocholithiasis ICD-10-CM: K80.50  ICD-9-CM: 574.50  5/16/2020        GLENROY (acute kidney injury) [facility]) ICD-10-CM: N17.9  ICD-9-CM: 584.9  5/13/2020        Pressure injury of sacral region, stage 4 ([facility] ICD-10-CM: L89.154  ICD-9-CM: 707.03, 707.24  5/13/2020        Transaminitis ICD-10-CM: R74.0  ICD-9-CM: 790.4  5/13/2020        Acute metabolic encephalopathy TMD-27-CO: G93.41  ICD-9-CM: 348.31  5/13/2020        Multiple sclerosis ([facility] ICD-10-CM: G35  ICD-9-CM: 239  5/13/2020        Protein calorie malnutrition ([facility] ICD-10-CM: E46  ICD-9-CM: 263.9  5/13/2020        Leukocytosis ICD-10-CM: N73.441  ICD-9-CM: 288.60  5/13/2020                    Assessment/Plan:     Start dressing changes. Has appropriate support surface. Will need to be out of chair. Nutrition an issue. Discussed nutritional goals. Nutrition consult at next if not making prorgress.      Signed By: Moody Tom MD

## 2020-08-18 ENCOUNTER — TELEPHONE (OUTPATIENT)
Dept: WOUND CARE | Age: 60
End: 2020-08-18

## 2020-08-18 NOTE — TELEPHONE ENCOUNTER
[phone] a call from [address] [place] [address] at [phone] regarding patients wounds-states that wounds are not doing well, patient sitting in wheelchair most of the day, has fallen several times since she has been home, has a MSW working with patient for possible long term placement. This RN called back to speak with [facility]-voice mail-left message that patient has an appt on 8.28.2020 but can call and move appt to Friday [phone].

## 2020-08-25 ENCOUNTER — HOSPITAL ENCOUNTER (OUTPATIENT)
Dept: WOUND CARE | Age: 60
Discharge: HOME | End: 2020-08-25
Attending: SURGERY
Payer: MEDICARE

## 2020-08-25 VITALS
TEMPERATURE: 97.5 F | BODY MASS INDEX: 15.17 KG/M2 | RESPIRATION RATE: 18 BRPM | DIASTOLIC BLOOD PRESSURE: 54 MMHG | SYSTOLIC BLOOD PRESSURE: 88 MMHG | WEIGHT: 94.4 LBS | OXYGEN SATURATION: 95 % | HEIGHT: 66 IN | HEART RATE: 82 BPM

## 2020-08-25 PROCEDURE — 99214 OFFICE O/P EST MOD 30 MIN: CPT

## 2020-08-25 NOTE — PROGRESS NOTES
Deviously seen by Dr. Noni Chapman she comes back in today sent by home health because I did not think she looked very good she is at home by herself and [facility] is changing her dressing every day all she is probably going to need a flap on the initial wound and will be appointed to see Dr. Noni Chapman again next week. In the meantime of her wound should be packed daily with Dakin solution on the initial wound and we will apply Hydrofera Blue ready to the wounds on the ankles and on the sacrum. These will need to be changed every other day               Wound Center Progress Note    Patient: Lucho Cui MRN: [id number]  SSN: [id number]    YOB: 1960  Age: 63 y.o. Sex: female      Subjective:     Chief Complaint:  Lucho Cui is a 63 y.o. WHITE OR  female who presents with R ankle lateral, L ankle medial, right ischial and sacral wound of 1 months duration. History of Present Illness:       Wound Caused By: Pressure  Associated Signs and Symptoms mild pain and drainage timing: Continuous  Quality: Aching  Severity: 2/10  Modifying Factors: Paraplegia  Current Wound Care: See nurses notes  Past Medical History:   Diagnosis Date    Decubitus ulcer of ischial area     Hypertension     Kidney infection     Menopause     MS (multiple sclerosis) ([facility] [address]       Past Surgical History:   Procedure Laterality Date    COLONOSCOPY N/A 6/29/2020    COLONOSCOPY /Room 424 performed by Fabiola Manjarrez MD at [facility] ENDOSCOPY    ERCP  5/15/2020         HX CHOLECYSTECTOMY  05/18/2020     Family History   Problem Relation Age of Onset    Breast Cancer Mother       Social History     Tobacco Use    Smoking status: Former Smoker    Smokeless tobacco: Never Used   Substance Use Topics    Alcohol use: Not Currently       Prior to Admission medications    Medication Sig Start Date End Date Taking?  Authorizing Provider   oxyCODONE-acetaminophen (Percocet) 5-325 mg per tablet Take 1 Tab by mouth every six (6) hours as needed for Pain. Provider, Historical   diazePAM (Valium) 5 mg tablet Take 5 mg by mouth daily. Provider, Historical   cefadroxil (DURICEF) 1 gram tablet Take 0.5 Tabs by mouth two (2) times a day for 28 days. 7/28/20 8/25/20  Megan Ayala, SHYAM   fingolimod 0.5 mg cap Take 0.5 mg by mouth daily. 5/23/20   Steve Levy, DO   metoprolol tartrate (LOPRESSOR) 25 mg tablet Take 0.5 Tabs by mouth two (2) times a day. 5/22/20   Steve Levy, DO   amantadine HCL (SYMMETREL) 100 mg capsule Take 100 mg by mouth two (2) times a day. Other, MD Marga     Allergies   Allergen Reactions    Latex Rash    Norco [Hydrocodone-Acetaminophen] Other (comments)     Sees dead people    Pcn [Penicillins] Swelling        Review of Systems:  A comprehensive review of systems was negative except for that written in the History of Present Illness. No results found for: HBA1C, RKP5YYXU, HGBE8, CGY1MIPE, BWE0XOSF     Immunization History   Administered Date(s) Administered    TB Skin Test (PPD) Intradermal 05/13/2020       Body mass index is 15.47 kg/m². Counseling regarding nutrition done: No     Current medications:  Current Outpatient Medications   Medication Sig Dispense Refill    oxyCODONE-acetaminophen (Percocet) 5-325 mg per tablet Take 1 Tab by mouth every six (6) hours as needed for Pain.  diazePAM (Valium) 5 mg tablet Take 5 mg by mouth daily.  cefadroxil (DURICEF) 1 gram tablet Take 0.5 Tabs by mouth two (2) times a day for 28 days. 28 Tab 0    fingolimod 0.5 mg cap Take 0.5 mg by mouth daily. 1 Cap 0    metoprolol tartrate (LOPRESSOR) 25 mg tablet Take 0.5 Tabs by mouth two (2) times a day. 30 Tab 0    amantadine HCL (SYMMETREL) 100 mg capsule Take 100 mg by mouth two (2) times a day.            Objective:     Physical Exam:     Visit Vitals  BP (!) 88/54   Pulse 82   Temp 97.5 °F (36.4 °C)   Resp 18   Ht 5' 5.5\" (1.664 m)   Wt 42.8 kg (94 lb 6.4 oz)   SpO2 95% BMI 15.47 kg/m²       General: well developed, well nourished, pleasant , NAD. Hygiene good  Psych: cooperative. Pleasant. No anxiety or depression. Normal mood and affect. Neuro: alert and oriented to person/place/situation. Otherwise nonfocal.  Derm: Normal turgor for age, dry skin  HEENT: Normocephalic, atraumatic. EOMI. Conjunctiva clear. No scleral icterus. Neck: Normal range of motion. No masses. Chest: Good air entry bilaterally. Respirations nonlabored  Cardio: Normal heart sounds,no rubs, murmurs or gallops  Abdomen: Soft, nontender, nondistended, normoactive bowel sounds  Lower extremities: color normal; temperature normal. Hair growth is not present. Calves are supple, nontender, approximately equally sized in comparison. Capillary refill <3 sec            Ulcer Description:   Wound Nose scab 2x0.8cm irregular (Active)   Number of days: 104       Wound Hip Anterior;Right DTI small (Active)   Number of days: 104       Wound Hip Anterior; Left DTI small open (Active)   Number of days: 104       Wound Ischial Right unstageable slough/ erythema induration (Active)   Number of days: 104       Wound Sacral/coccyx stage1 coccyx (Active)   Number of days: 104       Wound Ankle Anterior; Left slough  (Active)   Number of days: 104       Wound Buttocks Right (Active)   Number of days: 103       Wound Abdomen (Active)   Number of days: 99       Wound Buttocks Left (Active)   Number of days: 32       Wound Ischial Right Wound #1 07/31/20 (Active)   Dressing Status Old drainage 08/25/20 1336   Dressing Type Negative pressure wound therapy 07/31/20 0904   Pressure Injury Stage 4 08/25/20 1336   Wound Length (cm) 5.5 cm 08/25/20 1336   Wound Width (cm) 7.5 cm 08/25/20 1336   Wound Depth (cm) 1.4 cm 08/25/20 1336   Wound Surface Area (cm^2) 41.25 cm^2 08/25/20 1336   Wound Volume (cm^3) 57.75 cm^3 08/25/20 1336   Change in Wound Size % -3.12 08/25/20 1336   Tissue Type Percent Red 70 08/25/20 1336   Tissue Type Percent Yellow 30 08/25/20 1336   Undermining (cm) 3.5 cm 08/25/20 1336   Direction of Undermining 11 o'clock;3 o'clock 08/25/20 1336   Drainage Amount Moderate 08/25/20 1336   Drainage Color Serosanguinous 08/25/20 1336   Wound Odor None 08/25/20 1336   Honey-wound Assessment Blanchable erythema 07/31/20 0904   Cleansing and Cleansing Agents  Normal saline 08/25/20 1336   Dressing Changed Changed/New 07/31/20 0904   Number of days: 25       Wound Sacral/coccyx Posterior Wound #2 07/31/20 (Active)   Dressing Status Old drainage 08/25/20 1336   Pressure Injury Unstageable 08/25/20 1336   Wound Length (cm) 1.5 cm 08/25/20 1336   Wound Width (cm) 1.5 cm 08/25/20 1336   Wound Depth (cm) 0.1 cm 08/25/20 1336   Wound Surface Area (cm^2) 2.25 cm^2 08/25/20 1336   Wound Volume (cm^3) 0.22 cm^3 08/25/20 1336   Change in Wound Size % 70 08/25/20 1336   Condition of [facility] 08/25/20 1336   Tissue Type Percent Yellow 100 08/25/20 1336   Drainage Amount Small 08/25/20 1336   Drainage Color Serosanguinous 08/25/20 1336   Wound Odor None 08/25/20 1336   Honey-wound Assessment Blanchable erythema;Fragile 07/31/20 0904   Cleansing and Cleansing Agents  Normal saline 08/25/20 1336   Dressing Changed Changed/New 07/31/20 0904   Number of days: 25       Wound Left;Medial;Dorsal 1st met head (Active)   Dressing Status Clean, dry, and intact 08/25/20 1336   Dressing Type Open to air 08/25/20 1336   Pressure Injury Stage 3 08/25/20 1336   Wound Length (cm) 1.5 cm 08/25/20 1336   Wound Width (cm) 1.5 cm 08/25/20 1336   Wound Depth (cm) 0.1 cm 08/25/20 1336   Wound Surface Area (cm^2) 2.25 cm^2 08/25/20 1336   Wound Volume (cm^3) 0.22 cm^3 08/25/20 1336   Condition of Base Slough;Granulation 08/25/20 1336   Tissue Type Percent Red 50 08/25/20 1336   Tissue Type Percent Yellow 50 08/25/20 1336   Drainage Amount Small 08/25/20 1336   Drainage Color Serosanguinous 08/25/20 1336   Wound Odor None 08/25/20 1336   Cleansing and Cleansing Agents Normal saline 08/25/20 1336   Number of days: 0       Wound Ankle Right;Lateral (Active)   Dressing Status Clean, dry, and intact 08/25/20 1336   Dressing Type Open to air 08/25/20 1336   Pressure Injury Stage 3 08/25/20 1336   Wound Length (cm) 0.7 cm 08/25/20 1336   Wound Width (cm) 1 cm 08/25/20 1336   Wound Depth (cm) 0.1 cm 08/25/20 1336   Wound Surface Area (cm^2) 0.7 cm^2 08/25/20 1336   Wound Volume (cm^3) 0.07 cm^3 08/25/20 1336   Condition of Base Slough;Granulation 08/25/20 1336   Tissue Type Percent Red 30 08/25/20 1336   Tissue Type Percent Yellow 70 08/25/20 1336   Drainage Amount Small 08/25/20 1336   Drainage Color Serosanguinous 08/25/20 1336   Wound Odor None 08/25/20 1336   Cleansing and Cleansing Agents  Normal saline 08/25/20 1336   Number of days: 0         Data Review:   No results found for this or any previous visit (from the past 24 hour(s)). Assessment:     63 y.o. female with R ankle lateral, L ankle medial combined ulcer.     Problem List  Date Reviewed: 6/29/2020          Codes Class Noted    Hypokalemia ICD-10-CM: E87.6  ICD-9-CM: 276.8  7/27/2020        Hypomagnesemia ICD-10-CM: E83.42  ICD-9-CM: 275.2  7/27/2020        Coagulase negative Staphylococcus bacteremia ICD-10-CM: R78.81, B95.7  ICD-9-CM: 790.7, 041.19  7/26/2020        Sepsis ([address] ICD-10-CM: A41.9  ICD-9-CM: 038.9, 995.91  7/23/2020        Osteomyelitis of sacroiliac region [facility]) ICD-10-CM: M46.28  ICD-9-CM: 730.28  5/20/2020        Choledocholithiasis ICD-10-CM: K80.50  ICD-9-CM: 574.50  5/16/2020        GLENROY (acute kidney injury) [facility]) ICD-10-CM: N17.9  ICD-9-CM: 584.9  5/13/2020        Pressure injury of sacral region, stage 4 ([facility] ICD-10-CM: L89.154  ICD-9-CM: 707.03, 707.24  5/13/2020        Transaminitis ICD-10-CM: R74.0  ICD-9-CM: 790.4  5/13/2020        Acute metabolic encephalopathy MARY-07-OW: G93.41  ICD-9-CM: 348.31  5/13/2020        Multiple sclerosis ([facility] ICD-10-CM: Trygve Rater  ICD-9-CM: 629  5/13/2020 Protein calorie malnutrition ([facility] [address] ICD-10-CM: E46  ICD-9-CM: 263.9  5/13/2020        Leukocytosis ICD-10-CM: G15.442  ICD-9-CM: 288.60  5/13/2020               Plan:     No orders of the defined types were placed in this encounter. Patient understood and agrees with plan. Questions answered. Follow-up Information    None            Any procedures done today in the [address] [facility] are documented in a separate note in [address] [place] [address] and made part of this record by reference.      Dictated using voice recognition software; proofread, but unrecognized errors may exist.    Signed By: Joana Horn MD     August 25, 2020

## 2020-08-25 NOTE — DISCHARGE INSTRUCTIONS
[address]  [address], [address]  Phone: [phone]  Fax: [phone]    Patient: Shayna Oconnor MRN: [id number]  SSN: [id number]    YOB: 1960  Age: 63 y.o. Sex: female       Return Appointment: 2 weeks (9/4/2020) with Ellyn Antonio MD    Instructions: Right Ischial Wound:  Cleanse with Normal Saline  Apply 0.25% Dakins kerlix- pack loosely into wound, filling all dead space. Cover with ABD  Secure with Tape. CHANGE DRESSING DAILY AND PRN LOOSE OR SOILED     Sacrum, Right Lateral Ankle, and Left Medial Dorsal 1st Met Head:  Cleanse with Normal Saline  Hydrofera Ready: Cut to wound size, place in wound bed, shiny side out. Cover with Bordered Foam  Change Every 2 Days and PRN if becomes soiled     Limit Time in Wheelchair to 2-3 hours per day-Patient Needs to be Using [employer] in Wheelchair. Turn Side to Side Every 2 Hours-Limit Time on Right Ischial Wound  Increase Protein in Diet    Should you experience increased redness, swelling, pain, foul odor, size of wound(s), or have a temperature over 101 degrees please contact the [address] [place] [address] Road at [phone] or if after hours contact your primary care physician or go to the hospital emergency department.     Signed By: Monalisa Panchal PT, [facility]     August 25, 2020

## 2020-08-25 NOTE — PROGRESS NOTES
08/25/20 1336   Wound Left;Medial;Dorsal 1st met head   Date First Assessed/Time First Assessed: 08/25/20 1341   Present on Hospital Admission: Yes  Primary Wound Type: Pressure Injury  Wound Location Orientation: Left;Medial;Dorsal  Wound Description: 1st met head   Dressing Status Clean, dry, and intact   Dressing Type Open to air   Pressure Injury Stage 3   Wound Length (cm) 1.5 cm   Wound Width (cm) 1.5 cm   Wound Depth (cm) 0.1 cm   Wound Surface Area (cm^2) 2.25 cm^2   Wound Volume (cm^3) 0.22 cm^3   Condition of Base Slough;Granulation   Tissue Type Percent Red 50   Tissue Type Percent Yellow 50   Drainage Amount Small   Drainage Color Serosanguinous   Wound Odor None   Cleansing and Cleansing Agents  Normal saline   Wound Ischial Right Wound #1 07/31/20   Date First Assessed: 07/31/20   Wound Approximate Age at First Assessment (Weeks): 12 weeks  Primary Wound Type: Pressure Injury  Location: Ischial  Wound Location Orientation: Right  Wound Description: Wound #1  Date of First Observation: 07/31/20   Dressing Status Old drainage   Dressing Type   ([place], ABD, tape)   Pressure Injury Stage 4   Wound Length (cm) 5.5 cm   Wound Width (cm) 7.5 cm   Wound Depth (cm) 1.4 cm   Wound Surface Area (cm^2) 41.25 cm^2   Wound Volume (cm^3) 57.75 cm^3   Change in Wound Size % -3.12   Tissue Type Percent Red 70   Tissue Type Percent Yellow 30   Undermining (cm) 3.5 cm   Direction of Undermining 11 o'clock;3 o'clock   Drainage Amount Moderate   Drainage Color Serosanguinous   Wound Odor None   Cleansing and Cleansing Agents  Normal saline   Wound Sacral/coccyx Posterior Wound #2 07/31/20   Date First Assessed: 07/31/20   Wound Approximate Age at First Assessment (Weeks): 12 weeks  Primary Wound Type: Pressure Injury  Location: Sacral/coccyx  Wound Location Orientation: Posterior  Wound Description: Wound #2  Date of First Observation: 0...    Dressing Status Old drainage   Dressing Type   (Hydrofera blue, ABD, tape) Pressure Injury Unstageable   Wound Length (cm) 1.5 cm   Wound Width (cm) 1.5 cm   Wound Depth (cm) 0.1 cm   Wound Surface Area (cm^2) 2.25 cm^2   Wound Volume (cm^3) 0.22 cm^3   Change in Wound Size % 70   Condition of Base Slough   Tissue Type Percent Yellow 100   Drainage Amount Small   Drainage Color Serosanguinous   Wound Odor None   Cleansing and Cleansing Agents  Normal saline   Wound Ankle Right;Lateral   Date First Assessed/Time First Assessed: 08/25/20 1342   Primary Wound Type: Pressure Injury  Location: Ankle  Wound Location Orientation: Right;Lateral   Dressing Status Clean, dry, and intact   Dressing Type Open to air   Pressure Injury Stage 3   Wound Length (cm) 0.7 cm   Wound Width (cm) 1 cm   Wound Depth (cm) 0.1 cm   Wound Surface Area (cm^2) 0.7 cm^2   Wound Volume (cm^3) 0.07 cm^3   Condition of Base Slough;Granulation   Tissue Type Percent Red 30   Tissue Type Percent Yellow 70   Drainage Amount Small   Drainage Color Serosanguinous   Wound Odor None   Cleansing and Cleansing Agents  Normal saline

## 2020-08-25 NOTE — WOUND CARE
[address] [place] [address] [address] Phone: [phone] Fax: [phone] Patient: Chuy Blair MRN: [id number]  SSN: [id number] YOB: 1960  Age: 63 y.o. Sex: female Return Appointment: 2 weeks (9/4/2020) with Girma Nuno MD 
 
Instructions: Right Ischial Wound: 
Cleanse with Normal Saline Apply 0.25% Dakins kerlix- pack loosely into wound, filling all dead space. Cover with ABD Secure with Tape. CHANGE DRESSING DAILY AND PRN LOOSE OR SOILED 
  
Sacrum, Right Lateral Ankle, and Left Medial Dorsal 1st Met Head: 
Cleanse with Normal Saline Hydrofera Ready: Cut to wound size, place in wound bed, shiny side out. Cover with Bordered Foam 
Change Every 2 Days and PRN if becomes soiled 
  
Limit Time in Wheelchair to 2-3 hours per day-Patient Needs to be Using [employer] in Wheelchair. Turn Side to Side Every 2 Hours-Limit Time on Right Ischial Wound Increase Protein in Diet Should you experience increased redness, swelling, pain, foul odor, size of wound(s), or have a temperature over 101 degrees please contact the [address] [place] [address] Road at [phone] or if after hours contact your primary care physician or go to the hospital emergency department. Signed By: Jose De Jesus Manuel PT, [facility] August 25, 2020

## 2020-09-04 ENCOUNTER — HOSPITAL ENCOUNTER (OUTPATIENT)
Dept: WOUND CARE | Age: 60
Discharge: HOME | End: 2020-09-04
Attending: SURGERY
Payer: MEDICARE

## 2020-09-04 VITALS
DIASTOLIC BLOOD PRESSURE: 72 MMHG | SYSTOLIC BLOOD PRESSURE: 145 MMHG | HEART RATE: 78 BPM | HEIGHT: 66 IN | RESPIRATION RATE: 18 BRPM | TEMPERATURE: 97.4 F | BODY MASS INDEX: 15.47 KG/M2 | OXYGEN SATURATION: 95 %

## 2020-09-04 PROCEDURE — 99215 OFFICE O/P EST HI 40 MIN: CPT

## 2020-09-04 RX ORDER — CEFDINIR 300 MG/1
300 CAPSULE ORAL 2 TIMES DAILY
COMMUNITY
End: 2021-01-04

## 2020-09-04 NOTE — DISCHARGE INSTRUCTIONS
[address]  [address], [address]  Phone: [phone]  Fax: [phone]    Patient: Umang Ayers MRN: [id number]  SSN: [id number]    YOB: 1960  Age: 63 y.o. Sex: female       Return Appointment: 2 weeks with Johanny Gaxiola MD    Instructions: Right Ischial Wound:  Cleanse with Normal Saline  Apply 0.25% Dakins kerlix- pack loosely into wound, filling all dead space. Cover with ABD/Bordered Foam  Secure with Tape. CHANGE DRESSING EVERY OTHER DAY AND PRN LOOSE OR SOILED     Sacrum, Right Lateral Ankle, Right 5th Toe and Left Medial Dorsal 1st Met Head and Left Medial Heel  Cleanse with Normal Saline  Hydrofera Ready: Cut to wound size, place in wound bed, shiny side out. Cover with Bordered Foam  Change Every 2 Days and PRN if becomes soiled    Home Health for Wound Care     Limit Time in Wheelchair to 2-3 hours per day-Patient Needs to be Using [employer] in Wheelchair. Turn Side to Side Every 2 Hours-Limit Time on Right Ischial Wound  Increase Protein in Diet    Should you experience increased redness, swelling, pain, foul odor, size of wound(s), or have a temperature over 101 degrees please contact the [address] [place] [address] Road at [phone] or if after hours contact your primary care physician or go to the hospital emergency department.     Signed By: Chad Eid RN     September 4, 2020

## 2020-09-04 NOTE — WOUND CARE
09/04/20 7783 Wound Left;Medial;Dorsal 1st met head Date First Assessed/Time First Assessed: 08/25/20 1341   Present on Hospital Admission: Yes  Primary Wound Type: Pressure Injury  Wound Location Orientation: Left;Medial;Dorsal  Wound Description: 1st met head Dressing Type Open to air Pressure Injury Stage 3 Wound Length (cm) 1.5 cm Wound Width (cm) 1.5 cm Wound Depth (cm) 0.1 cm Wound Surface Area (cm^2) 2.25 cm^2 Wound Volume (cm^3) 0.22 cm^3 Change in Wound Size % 0 Condition of Base Other (comment) (Scab) Tissue Type Percent Other (comment) 100 Drainage Amount None Wound Odor None Honey-wound Assessment Intact Cleansing and Cleansing Agents  Normal saline Dressing Changed Changed/New Dressing Type Applied (Hydrofera Ready, ABD, Bordered Foam, Rolled GAuze) Wound Ankle Right;Lateral  
Date First Assessed/Time First Assessed: 08/25/20 1342   Primary Wound Type: Pressure Injury  Location: Ankle  Wound Location Orientation: Right;Lateral  
Dressing Type Open to air Pressure Injury Stage 3 Wound Length (cm) 2 cm Wound Width (cm) 2 cm Wound Depth (cm) 0.1 cm Wound Surface Area (cm^2) 4 cm^2 Wound Volume (cm^3) 0.4 cm^3 Change in Wound Size % -471.43 Condition of Base Eschar Tissue Type Percent Black 100 % Drainage Amount Small Drainage Color Serosanguinous Wound Odor None Honey-wound Assessment Intact Cleansing and Cleansing Agents  Normal saline Dressing Changed Changed/New Dressing Type Applied (Hydrofera Ready, ABD, Bordered Foam, Rolled GAuze) Wound Heel Left;Medial 09/04/20 Date First Assessed: 09/04/20   Wound Approximate Age at First Assessment (Weeks): 1 weeks  Primary Wound Type: Pressure Injury  Location: Heel  Wound Location Orientation: Left;Medial  Date of First Observation: 09/04/20 Dressing Type Open to air Pressure Injury Stage 2 Wound Length (cm) 3.8 cm Wound Width (cm) 3.5 cm Wound Depth (cm) 0.1 cm  
 Wound Surface Area (cm^2) 13.3 cm^2 Wound Volume (cm^3) 1.33 cm^3 Tissue Type Percent Red 100 Drainage Amount Moderate Drainage Color Serosanguinous Wound Odor None Honey-wound Assessment Blanchable erythema Cleansing and Cleansing Agents  Normal saline Dressing Changed Changed/New Dressing Type Applied (Hydrofera Ready, ABD, Bordered Foam, Rolled Gauze) Wound Ischial Right Wound #1 07/31/20 Date First Assessed: 07/31/20   Wound Approximate Age at First Assessment (Weeks): 12 weeks  Primary Wound Type: Pressure Injury  Location: Ischial  Wound Location Orientation: Right  Wound Description: Wound #1  Date of First Observation: 07/31/20 Dressing Status Old drainage Dressing Type (Dakins, Bordered Foam) Pressure Injury Stage 4 Wound Length (cm) 7 cm Wound Width (cm) 5 cm Wound Depth (cm) 4 cm Wound Surface Area (cm^2) 35 cm^2 Wound Volume (cm^3) 140 cm^3 Change in Wound Size % 12.5 Condition of Base Bone exposed Tissue Type Percent Red 70 Tissue Type Percent Yellow 30 Drainage Amount Moderate Drainage Color Serosanguinous Wound Odor Mild Honey-wound Assessment Blanchable erythema Cleansing and Cleansing Agents  Normal saline Dressing Changed Changed/New Dressing Type Applied (Dakins, ABD, Bordered Foam) Wound Sacral/coccyx Posterior Wound #2 07/31/20 Date First Assessed: 07/31/20   Wound Approximate Age at First Assessment (Weeks): 12 weeks  Primary Wound Type: Pressure Injury  Location: Sacral/coccyx  Wound Location Orientation: Posterior  Wound Description: Wound #2  Date of First Observation: 0... Dressing Type Open to air Pressure Injury Unstageable Wound Length (cm) 4.8 cm Wound Width (cm) 4 cm Wound Depth (cm) 0.1 cm Wound Surface Area (cm^2) 19.2 cm^2 Wound Volume (cm^3) 1.92 cm^3 Change in Wound Size % -156 Condition of Base Eschar Tissue Type Percent Black 100 % Drainage Amount Small Drainage Color Serosanguinous Wound Odor None Honey-wound Assessment Blanchable erythema Cleansing and Cleansing Agents  Normal saline Dressing Changed Changed/New Dressing Type Applied (Hydrofera Ready, ABD, Bordered Foam) Wound Toe (Comment  which one) Right 5th Toe 09/04/20 Date First Assessed: 09/04/20   Wound Approximate Age at First Assessment (Weeks): 1 weeks  Primary Wound Type: Traumatic  Location: Toe (Comment  which one)  Wound Location Orientation: Right  Wound Description: 5th Toe  Date of First Observation: 09. .. Dressing Type Open to air Non-staged Wound Description Full thickness Wound Length (cm) 1.8 cm Wound Width (cm) 1.5 cm Wound Depth (cm) 0.1 cm Wound Surface Area (cm^2) 2.7 cm^2 Wound Volume (cm^3) 0.27 cm^3 Condition of Base Granulation Tissue Type Percent Red 100 Drainage Amount Small Drainage Color Serosanguinous Wound Odor None Honey-wound Assessment Intact Cleansing and Cleansing Agents  Normal saline Dressing Changed Changed/New Dressing Type Applied (Hydrofera Ready, ABD,Rolled Gauze)

## 2020-09-04 NOTE — WOUND CARE
[address] [place] [address], [facility] [address] Phone: [phone] Fax: [phone] Patient: Cynthia Francis MRN: [id number]  SSN: [id number] YOB: 1960  Age: 63 y.o. Sex: female Return Appointment: 2 weeks with Blanche Olmstead MD 
 
Instructions: Right Ischial Wound: 
Cleanse with Normal Saline Apply 0.25% Dakins kerlix- pack loosely into wound, filling all dead space. Cover with ABD/Bordered Foam 
Secure with Tape. CHANGE DRESSING EVERY OTHER DAY AND PRN LOOSE OR SOILED 
  
Sacrum, Right Lateral Ankle, Right 5th Toe and Left Medial Dorsal 1st Met Head and Left Medial Heel Cleanse with Normal Saline Hydrofera Ready: Cut to wound size, place in wound bed, shiny side out. Cover with Bordered Foam 
Change Every 2 Days and PRN if becomes soiled Home Health for Wound Care 
  
Limit Time in Wheelchair to 2-3 hours per day-Patient Needs to be Using [employer] in Wheelchair. Turn Side to Side Every 2 Hours-Limit Time on Right Ischial Wound Increase Protein in Diet Should you experience increased redness, swelling, pain, foul odor, size of wound(s), or have a temperature over 101 degrees please contact the [address] [place] [address] Road at [phone] or if after hours contact your primary care physician or go to the hospital emergency department. Signed By: Kyle Soto RN September 4, 2020

## 2020-09-11 NOTE — PROGRESS NOTES
Wound Center Progress Note    Patient: Maria R Huynh MRN: [id number]  SSN: [id number]    YOB: 1960  Age: 63 y.o. Sex: female      Subjective:     Chief Complaint:    Sacral ans ischial PS    History of Present Illness:       Wound Caused By: Pressure  Associated Signs and Symptoms: MS  Timing: since July  Quality: wounds  Severity: full thickness    Episode of sepsis in July 2020 with bedrest resulting in wounds. Has not been ambulatory for 15 years. Still spending 4 hours a day in chair with ROHO. Minimal soiling. Difficulty with maintaining weight. Still spending several hours a day in chair. Unable to transfer at times so sleeps on the floor. Continued soiling. Past Medical History:   Diagnosis Date    Decubitus ulcer of ischial area     Hypertension     Kidney infection     Menopause     MS (multiple sclerosis) ([facility] [address]       Past Surgical History:   Procedure Laterality Date    COLONOSCOPY N/A 6/29/2020    COLONOSCOPY /Room 424 performed by Angie Moore MD at [facility] ENDOSCOPY    ERCP  5/15/2020         HX CHOLECYSTECTOMY  05/18/2020     Family History   Problem Relation Age of Onset    Breast Cancer Mother       Social History     Tobacco Use    Smoking status: Former Smoker    Smokeless tobacco: Never Used   Substance Use Topics    Alcohol use: Not Currently       Prior to Admission medications    Medication Sig Start Date End Date Taking? Authorizing Provider   cefdinir (OMNICEF) 300 mg capsule Take 300 mg by mouth two (2) times a day. Yes Provider, Historical   oxyCODONE-acetaminophen (Percocet) 5-325 mg per tablet Take 1 Tab by mouth every six (6) hours as needed for Pain. Provider, Historical   diazePAM (Valium) 5 mg tablet Take 5 mg by mouth daily. Provider, Historical   fingolimod 0.5 mg cap Take 0.5 mg by mouth daily. 5/23/20   Steve Levy, DO   metoprolol tartrate (LOPRESSOR) 25 mg tablet Take 0.5 Tabs by mouth two (2) times a day.  5/22/20 Steve Levy DO   amantadine HCL (SYMMETREL) 100 mg capsule Take 100 mg by mouth two (2) times a day. Other, MD Marga     Allergies   Allergen Reactions    Latex Rash    Norco [Hydrocodone-Acetaminophen] Other (comments)     Sees dead people    Pcn [Penicillins] Swelling        Review of Systems:  CONSTITUTIONAL: No fever, chills  HEAD: No headache  EYES: No visual loss  ENT: No hearing loss  SKIN: No rash  CARDIOVASCULAR: No chest pain  RESPIRATORY: No shortness of breath  GASTROINTESTINAL: No nausea, vomiting  GENITOURINARY: No excessive urination  NEUROLOGICAL: ++ weakness  MUSCULOSKELETAL: No muscle pain. No neck pain  HEMATOLOGIC: + easy bleeding  LYMPHATICS: No lymphedema. PSYCHIATRIC: No current depression  ENDOCRINOLOGIC: No high sugars  ALLERGIES: No history of asthma, hives, eczema or rhinitis. No results found for: HBA1C, LXL3MUTX, HGBE8, SPH7VGYR, CRL2QKMQ, OFZ3KZXA     Immunization History   Administered Date(s) Administered    TB Skin Test (PPD) Intradermal 05/13/2020       Body mass index is 15.47 kg/m². Current medications:  Current Outpatient Medications   Medication Sig Dispense Refill    cefdinir (OMNICEF) 300 mg capsule Take 300 mg by mouth two (2) times a day.  oxyCODONE-acetaminophen (Percocet) 5-325 mg per tablet Take 1 Tab by mouth every six (6) hours as needed for Pain.  diazePAM (Valium) 5 mg tablet Take 5 mg by mouth daily.  fingolimod 0.5 mg cap Take 0.5 mg by mouth daily. 1 Cap 0    metoprolol tartrate (LOPRESSOR) 25 mg tablet Take 0.5 Tabs by mouth two (2) times a day. 30 Tab 0    amantadine HCL (SYMMETREL) 100 mg capsule Take 100 mg by mouth two (2) times a day. Objective:     Physical Exam:     Visit Vitals  /72   Pulse 78   Temp 97.4 °F (36.3 °C)   Resp 18   Ht 5' 5.5\" (1.664 m)   SpO2 95%   BMI 15.47 kg/m²       General: thin, pleasant  Psych: cooperative. Pleasant  Neuro: alert and oriented to person/place/situation. Otherwise nonfocal.  Derm: Normal turgor for age, dry skin  HEENT: Normocephalic, atraumatic. EOMI. Neck: Normal range of motion. Chest: Respirations nonlabored  Cardio: RRR  Abdomen: Soft, nondistended  Lower extremities:   No hemosiderrosis  No significant varicosities  Capillary refill <3 sec          Right  2+ DP/PT    Left  2+ DP/PT                                Ulcer Description:   Wound Hip Anterior;Right DTI small (Active)   Number of days: 121       Wound Hip Anterior; Left DTI small open (Active)   Number of days: 121       Wound Ischial Right unstageable slough/ erythema induration (Active)   Number of days: 121       Wound Sacral/coccyx stage1 coccyx (Active)   Number of days: 121       Wound Ankle Anterior; Left slough  (Active)   Number of days: 121       Wound Buttocks Right (Active)   Number of days: 120       Wound Abdomen (Active)   Number of days: 116       Wound Buttocks Left (Active)   Number of days: 49       Wound Ischial Right Wound #1 07/31/20 (Active)   Dressing Status Old drainage 09/04/20 0943   Dressing Type Negative pressure wound therapy 07/31/20 0904   Pressure Injury Stage 4 09/04/20 0943   Wound Length (cm) 7 cm 09/04/20 0943   Wound Width (cm) 5 cm 09/04/20 0943   Wound Depth (cm) 4 cm 09/04/20 0943   Wound Surface Area (cm^2) 35 cm^2 09/04/20 0943   Wound Volume (cm^3) 140 cm^3 09/04/20 0943   Change in Wound Size % 12.5 09/04/20 0943   Condition of Base Bone exposed 09/04/20 0943   Tissue Type Percent Red 70 09/04/20 0943   Tissue Type Percent Yellow 30 09/04/20 0943   Undermining (cm) 3.5 cm 08/25/20 1336   Direction of Undermining 11 o'clock;3 o'clock 08/25/20 1336   Drainage Amount Moderate 09/04/20 0943   Drainage Color Serosanguinous 09/04/20 0943   Wound Odor Mild 09/04/20 0943   Honey-wound Assessment Blanchable erythema 09/04/20 0943   Cleansing and Cleansing Agents  Normal saline 09/04/20 0943   Dressing Changed Changed/New 09/04/20 0943   Number of days: 42       Wound Sacral/coccyx Posterior Wound #2 07/31/20 (Active)   Dressing Status Old drainage 08/25/20 1336   Dressing Type Open to air 09/04/20 0943   Pressure Injury Unstageable 09/04/20 0943   Wound Length (cm) 4.8 cm 09/04/20 0943   Wound Width (cm) 4 cm 09/04/20 0943   Wound Depth (cm) 0.1 cm 09/04/20 0943   Wound Surface Area (cm^2) 19.2 cm^2 09/04/20 0943   Wound Volume (cm^3) 1.92 cm^3 09/04/20 0943   Change in Wound Size % -156 09/04/20 0943   Condition of Base Eschar 09/04/20 0943   Tissue Type Percent Black 100 % 09/04/20 0943   Tissue Type Percent Yellow 100 08/25/20 1336   Drainage Amount Small 09/04/20 0943   Drainage Color Serosanguinous 09/04/20 0943   Wound Odor None 09/04/20 0943   Honey-wound Assessment Blanchable erythema 09/04/20 0943   Cleansing and Cleansing Agents  Normal saline 09/04/20 0943   Dressing Changed Changed/New 09/04/20 0943   Number of days: 42       Wound Left;Medial;Dorsal 1st met head (Active)   Dressing Status Clean, dry, and intact 08/25/20 1336   Dressing Type Open to air 09/04/20 0943   Pressure Injury Stage 3 09/04/20 0943   Wound Length (cm) 1.5 cm 09/04/20 0943   Wound Width (cm) 1.5 cm 09/04/20 0943   Wound Depth (cm) 0.1 cm 09/04/20 0943   Wound Surface Area (cm^2) 2.25 cm^2 09/04/20 0943   Wound Volume (cm^3) 0.22 cm^3 09/04/20 0943   Change in Wound Size % 0 09/04/20 0943   Condition of Base Other (comment) 09/04/20 0943   Tissue Type Percent Red 50 08/25/20 1336   Tissue Type Percent Yellow 50 08/25/20 1336   Tissue Type Percent Other (comment) 100 09/04/20 0943   Drainage Amount None 09/04/20 0943   Drainage Color Serosanguinous 08/25/20 1336   Wound Odor None 09/04/20 0943   Honey-wound Assessment Intact 09/04/20 0943   Cleansing and Cleansing Agents  Normal saline 09/04/20 0943   Dressing Changed Changed/New 09/04/20 0943   Number of days: 17       Wound Ankle Right;Lateral (Active)   Dressing Status Clean, dry, and intact 08/25/20 1336   Dressing Type Open to air 09/04/20 0943   Pressure Injury Stage 3 09/04/20 0943   Wound Length (cm) 2 cm 09/04/20 0943   Wound Width (cm) 2 cm 09/04/20 0943   Wound Depth (cm) 0.1 cm 09/04/20 0943   Wound Surface Area (cm^2) 4 cm^2 09/04/20 0943   Wound Volume (cm^3) 0.4 cm^3 09/04/20 0943   Change in Wound Size % -471.43 09/04/20 0943   Condition of Base Eschar 09/04/20 0943   Tissue Type Percent Black 100 % 09/04/20 0943   Tissue Type Percent Red 30 08/25/20 1336   Tissue Type Percent Yellow 70 08/25/20 1336   Drainage Amount Small 09/04/20 0943   Drainage Color Serosanguinous 09/04/20 0943   Wound Odor None 09/04/20 0943   Honey-wound Assessment Intact 09/04/20 0943   Cleansing and Cleansing Agents  Normal saline 09/04/20 0943   Dressing Changed Changed/New 09/04/20 0943   Number of days: 17       Wound Heel Left;Medial 09/04/20 (Active)   Dressing Type Open to air 09/04/20 0943   Pressure Injury Stage 2 09/04/20 0943   Wound Length (cm) 3.8 cm 09/04/20 0943   Wound Width (cm) 3.5 cm 09/04/20 0943   Wound Depth (cm) 0.1 cm 09/04/20 0943   Wound Surface Area (cm^2) 13.3 cm^2 09/04/20 0943   Wound Volume (cm^3) 1.33 cm^3 09/04/20 0943   Tissue Type Percent Red 100 09/04/20 0943   Drainage Amount Moderate 09/04/20 0943   Drainage Color Serosanguinous 09/04/20 0943   Wound Odor None 09/04/20 0943   Honey-wound Assessment Blanchable erythema 09/04/20 0943   Cleansing and Cleansing Agents  Normal saline 09/04/20 0943   Dressing Changed Changed/New 09/04/20 0943   Number of days: 7       Wound Toe (Comment  which one) Right 5th Toe 09/04/20 (Active)   Dressing Type Open to air 09/04/20 0943   Non-staged Wound Description Full thickness 09/04/20 0943   Wound Length (cm) 1.8 cm 09/04/20 0943   Wound Width (cm) 1.5 cm 09/04/20 0943   Wound Depth (cm) 0.1 cm 09/04/20 0943   Wound Surface Area (cm^2) 2.7 cm^2 09/04/20 0943   Wound Volume (cm^3) 0.27 cm^3 09/04/20 0943   Condition of Base Granulation 09/04/20 0943   Tissue Type Percent Red 100 09/04/20 0943 Drainage Amount Small 09/04/20 0943   Drainage Color Serosanguinous 09/04/20 0943   Wound Odor None 09/04/20 0943   Honey-wound Assessment Intact 09/04/20 0943   Cleansing and Cleansing Agents  Normal saline 09/04/20 0943   Dressing Changed Changed/New 09/04/20 0943   Number of days: 7       [REMOVED] Wound Nose scab 2x0.8cm irregular (Removed)   Number of days: 104           Problem List  Date Reviewed: 6/29/2020          Codes Class Noted    Hypokalemia ICD-10-CM: E87.6  ICD-9-CM: 276.8  7/27/2020        Hypomagnesemia ICD-10-CM: E83.42  ICD-9-CM: 275.2  7/27/2020        Coagulase negative Staphylococcus bacteremia ICD-10-CM: R78.81, B95.7  ICD-9-CM: 790.7, 041.19  7/26/2020        Sepsis ([facility] ICD-10-CM: A41.9  ICD-9-CM: 038.9, 995.91  7/23/2020        Osteomyelitis of sacroiliac region [facility]) ICD-10-CM: M46.28  ICD-9-CM: 730.28  5/20/2020        Choledocholithiasis ICD-10-CM: K80.50  ICD-9-CM: 574.50  5/16/2020        GLENROY (acute kidney injury) ([facility] ICD-10-CM: N17.9  ICD-9-CM: 584.9  5/13/2020        Pressure injury of sacral region, stage 4 (HCC) ICD-10-CM: L89.154  ICD-9-CM: 707.03, 707.24  5/13/2020        Transaminitis ICD-10-CM: R74.0  ICD-9-CM: 790.4  5/13/2020        Acute metabolic encephalopathy LPB-84-CO: G93.41  ICD-9-CM: 348.31  5/13/2020        Multiple sclerosis ([facility] ICD-10-CM: G35  ICD-9-CM: 298  5/13/2020        Protein calorie malnutrition ([facility] ICD-10-CM: E46  ICD-9-CM: 263.9  5/13/2020        Leukocytosis ICD-10-CM: G00.457  ICD-9-CM: 288.60  5/13/2020                    Assessment/Plan:     Continued deterioration. Art Suarez discussion conducted with patient regarding home situation. With her degree of disabililty not tenable for her to live independently any longer. Continue dressings changes and limit pressure to areas. Continue to work on nutrition and soiling.      Signed By: Miguelangel Pierre MD

## 2020-12-11 ENCOUNTER — HOSPITAL ENCOUNTER (EMERGENCY)
Age: 60
Discharge: HOME | End: 2020-12-11
Attending: EMERGENCY MEDICINE
Payer: MEDICARE

## 2020-12-11 VITALS
BODY MASS INDEX: 15.7 KG/M2 | DIASTOLIC BLOOD PRESSURE: 73 MMHG | TEMPERATURE: 99.7 F | HEART RATE: 81 BPM | RESPIRATION RATE: 16 BRPM | OXYGEN SATURATION: 100 % | SYSTOLIC BLOOD PRESSURE: 128 MMHG | WEIGHT: 100 LBS | HEIGHT: 67 IN

## 2020-12-11 DIAGNOSIS — N39.0 URINARY TRACT INFECTION WITHOUT HEMATURIA, SITE UNSPECIFIED: ICD-10-CM

## 2020-12-11 DIAGNOSIS — I82.4Y2 ACUTE DEEP VEIN THROMBOSIS (DVT) OF PROXIMAL VEIN OF LEFT LOWER EXTREMITY (HCC): Primary | ICD-10-CM

## 2020-12-11 LAB
ALBUMIN SERPL-MCNC: 2.3 G/DL (ref 3.5–5)
ALBUMIN/GLOB SERPL: 0.5 {RATIO} (ref 1.2–3.5)
ALP SERPL-CCNC: 121 U/L (ref 50–136)
ALT SERPL-CCNC: 23 U/L (ref 12–65)
ANION GAP SERPL CALC-SCNC: 3 MMOL/L (ref 7–16)
APPEARANCE UR: ABNORMAL
AST SERPL-CCNC: 12 U/L (ref 15–37)
BACTERIA URNS QL MICRO: NORMAL /HPF
BASOPHILS # BLD: 0 K/UL (ref 0–0.2)
BASOPHILS NFR BLD: 1 % (ref 0–2)
BILIRUB SERPL-MCNC: 0.1 MG/DL (ref 0.2–1.1)
BILIRUB UR QL: NEGATIVE
BUN SERPL-MCNC: 17 MG/DL (ref 6–23)
CALCIUM SERPL-MCNC: 9.1 MG/DL (ref 8.3–10.4)
CASTS URNS QL MICRO: NORMAL /LPF
CHLORIDE SERPL-SCNC: 104 MMOL/L (ref 98–107)
CO2 SERPL-SCNC: 32 MMOL/L (ref 21–32)
COLOR UR: YELLOW
CREAT SERPL-MCNC: 0.5 MG/DL (ref 0.6–1)
CRYSTALS URNS QL MICRO: 0 /LPF
DIFFERENTIAL METHOD BLD: ABNORMAL
EOSINOPHIL # BLD: 0.2 K/UL (ref 0–0.8)
EOSINOPHIL NFR BLD: 3 % (ref 0.5–7.8)
EPI CELLS #/AREA URNS HPF: NORMAL /HPF
ERYTHROCYTE [DISTWIDTH] IN BLOOD BY AUTOMATED COUNT: 14.6 % (ref 11.9–14.6)
GLOBULIN SER CALC-MCNC: 4.7 G/DL (ref 2.3–3.5)
GLUCOSE SERPL-MCNC: 91 MG/DL (ref 65–100)
GLUCOSE UR STRIP.AUTO-MCNC: NEGATIVE MG/DL
HCT VFR BLD AUTO: 29.3 % (ref 35.8–46.3)
HGB BLD-MCNC: 9 G/DL (ref 11.7–15.4)
HGB UR QL STRIP: ABNORMAL
IMM GRANULOCYTES # BLD AUTO: 0.1 K/UL (ref 0–0.5)
IMM GRANULOCYTES NFR BLD AUTO: 1 % (ref 0–5)
INR PPP: 1.5
KETONES UR QL STRIP.AUTO: NEGATIVE MG/DL
LEUKOCYTE ESTERASE UR QL STRIP.AUTO: ABNORMAL
LYMPHOCYTES # BLD: 0.5 K/UL (ref 0.5–4.6)
LYMPHOCYTES NFR BLD: 6 % (ref 13–44)
MCH RBC QN AUTO: 25.6 PG (ref 26.1–32.9)
MCHC RBC AUTO-ENTMCNC: 30.7 G/DL (ref 31.4–35)
MCV RBC AUTO: 83.5 FL (ref 79.6–97.8)
MONOCYTES # BLD: 0.9 K/UL (ref 0.1–1.3)
MONOCYTES NFR BLD: 11 % (ref 4–12)
MUCOUS THREADS URNS QL MICRO: 0 /LPF
NEUTS SEG # BLD: 6.8 K/UL (ref 1.7–8.2)
NEUTS SEG NFR BLD: 80 % (ref 43–78)
NITRITE UR QL STRIP.AUTO: NEGATIVE
NRBC # BLD: 0 K/UL (ref 0–0.2)
OTHER OBSERVATIONS,UCOM: NORMAL
PH UR STRIP: 6.5 [PH] (ref 5–9)
PLATELET # BLD AUTO: 619 K/UL (ref 150–450)
PMV BLD AUTO: 10 FL (ref 9.4–12.3)
POTASSIUM SERPL-SCNC: 4.8 MMOL/L (ref 3.5–5.1)
PROT SERPL-MCNC: 7 G/DL (ref 6.3–8.2)
PROT UR STRIP-MCNC: 30 MG/DL
PROTHROMBIN TIME: 18.2 SEC (ref 12.5–14.7)
RBC # BLD AUTO: 3.51 M/UL (ref 4.05–5.2)
RBC #/AREA URNS HPF: >100 /HPF
SODIUM SERPL-SCNC: 139 MMOL/L (ref 136–145)
SP GR UR REFRACTOMETRY: 1.02 (ref 1–1.02)
UROBILINOGEN UR QL STRIP.AUTO: 1 EU/DL (ref 0.2–1)
WBC # BLD AUTO: 8.6 K/UL (ref 4.3–11.1)
WBC URNS QL MICRO: NORMAL /HPF

## 2020-12-11 PROCEDURE — 80053 COMPREHEN METABOLIC PANEL: CPT

## 2020-12-11 PROCEDURE — 85610 PROTHROMBIN TIME: CPT

## 2020-12-11 PROCEDURE — 85025 COMPLETE CBC W/AUTO DIFF WBC: CPT

## 2020-12-11 PROCEDURE — 99284 EMERGENCY DEPT VISIT MOD MDM: CPT

## 2020-12-11 PROCEDURE — 74011250637 HC RX REV CODE- 250/637: Performed by: EMERGENCY MEDICINE

## 2020-12-11 PROCEDURE — 81015 MICROSCOPIC EXAM OF URINE: CPT

## 2020-12-11 PROCEDURE — 81001 URINALYSIS AUTO W/SCOPE: CPT

## 2020-12-11 RX ORDER — CEPHALEXIN 500 MG/1
500 CAPSULE ORAL 3 TIMES DAILY
Qty: 21 CAP | Refills: 0 | Status: SHIPPED | OUTPATIENT
Start: 2020-12-11 | End: 2020-12-18

## 2020-12-11 RX ORDER — APIXABAN 5 MG (74)
KIT ORAL
Qty: 1 DOSE PACK | Refills: 0 | Status: SHIPPED | OUTPATIENT
Start: 2020-12-11 | End: 2021-06-05

## 2020-12-11 RX ADMIN — APIXABAN 10 MG: 5 TABLET, FILM COATED ORAL at 16:53

## 2020-12-11 NOTE — CONSULTS
Consult    Subjective:      Kolton Mendez is a 62 y.o. female with severe MS, bedbound, presents secondary to DVT from rehab facility. During exam by ER MD, it was noticed that urethral wasserman catheter appeared aged. Per patient, no change in 2 mo. Urethral wasserman was removed by ER staff and they were then unable to replace and  was consulted. Past Medical History:   Diagnosis Date    Decubitus ulcer of ischial area     Hypertension     Kidney infection     Menopause     MS (multiple sclerosis) ([address]      Past Surgical History:   Procedure Laterality Date    COLONOSCOPY N/A 6/29/2020    COLONOSCOPY /Room 424 performed by Fariha Rapp MD at [facility] ENDOSCOPY    ERCP  5/15/2020         HX CHOLECYSTECTOMY  05/18/2020      Family History   Problem Relation Age of Onset    Breast Cancer Mother      Social History     Tobacco Use    Smoking status: Former Smoker    Smokeless tobacco: Never Used   Substance Use Topics    Alcohol use: Not Currently     Allergies   Allergen Reactions    Latex Rash    Norco [Hydrocodone-Acetaminophen] Other (comments)     Sees dead people    Pcn [Penicillins] Swelling     Prior to Admission medications    Medication Sig Start Date End Date Taking? Authorizing Provider   apixaban (Eliquis DVT-PE Treat 30D Start) 5 mg (74 tabs) starter pack Take 10 mg (two 5 mg tablets) by mouth twice a day for 7 days   Followed by 5 mg (one 5 mg tablet) by mouth twice a day 12/11/20  Yes Demetrice Padilla MD   cefdinir (OMNICEF) 300 mg capsule Take 300 mg by mouth two (2) times a day. Provider, Historical   oxyCODONE-acetaminophen (Percocet) 5-325 mg per tablet Take 1 Tab by mouth every six (6) hours as needed for Pain. Provider, Historical   diazePAM (Valium) 5 mg tablet Take 5 mg by mouth daily. Provider, Historical   fingolimod 0.5 mg cap Take 0.5 mg by mouth daily.  5/23/20   Steve Levy E,    metoprolol tartrate (LOPRESSOR) 25 mg tablet Take 0.5 Tabs by mouth two (2) times a day. 20   Steve Levy,    amantadine HCL (SYMMETREL) 100 mg capsule Take 100 mg by mouth two (2) times a day. Other, MD Marga        Review of Systems:  Pertinent items are noted in HPI. Objective:        Patient Vitals for the past 8 hrs:   BP Temp Pulse Resp SpO2 Height Weight   20 1441 113/66 -- 88 -- 98 % -- --   20 1422 (!) 100/57 99.7 °F (37.6 °C) 87 16 96 % 5' 7\" (1.702 m) 100 lb (45.4 kg)       Temp (24hrs), Av.7 °F (37.6 °C), Min:99.7 °F (37.6 °C), Max:99.7 °F (37.6 °C)      Physical Exam:  GENERAL: alert, cooperative, mild distress, pale, cachectic, LUNG: easy work of breathing, HEART: regular rate, ABDOMEN: soft, Extremeties: spastic and tight : urethra visible after using 3 helpers (2 RN's and ER MD) to position patient and hold light      Assessment:     Urinary Retention    Multiple Sclerosis    Plan:     Using sterile technique, 16 fr urethral catheter was placed with return of yellow urine. 10 cc sterile water used to inflate balloon. Monthly changes recommended. No  follow up required.

## 2020-12-11 NOTE — DISCHARGE INSTRUCTIONS
Patient Education   Patient Education        Urinary Tract Infection in Women: Care Instructions  Your Care Instructions     A urinary tract infection, or UTI, is a general term for an infection anywhere between the kidneys and the urethra (where urine comes out). Most UTIs are bladder infections. They often cause pain or burning when you urinate. UTIs are caused by bacteria and can be cured with antibiotics. Be sure to complete your treatment so that the infection goes away. Follow-up care is a key part of your treatment and safety. Be sure to make and go to all appointments, and call your doctor if you are having problems. It's also a good idea to know your test results and keep a list of the medicines you take. How can you care for yourself at home? · Take your antibiotics as directed. Do not stop taking them just because you feel better. You need to take the full course of antibiotics. · Drink extra water and other fluids for the next day or two. This may help wash out the bacteria that are causing the infection. (If you have kidney, heart, or liver disease and have to limit fluids, talk with your doctor before you increase your fluid intake.)  · Avoid drinks that are carbonated or have caffeine. They can irritate the bladder. · Urinate often. Try to empty your bladder each time. · To relieve pain, take a hot bath or lay a heating pad set on low over your lower belly or genital area. Never go to sleep with a heating pad in place. To prevent UTIs  · Drink plenty of water each day. This helps you urinate often, which clears bacteria from your system. (If you have kidney, heart, or liver disease and have to limit fluids, talk with your doctor before you increase your fluid intake.)  · Urinate when you need to. · Urinate right after you have sex. · Change sanitary pads often. · Avoid douches, bubble baths, feminine hygiene sprays, and other feminine hygiene products that have deodorants.   · After going to the bathroom, wipe from front to back. When should you call for help? Call your doctor now or seek immediate medical care if:    · Symptoms such as fever, chills, nausea, or vomiting get worse or appear for the first time.     · You have new pain in your back just below your rib cage. This is called flank pain.     · There is new blood or pus in your urine.     · You have any problems with your antibiotic medicine. Watch closely for changes in your health, and be sure to contact your doctor if:    · You are not getting better after taking an antibiotic for 2 days.     · Your symptoms go away but then come back. Where can you learn more? Go to http://www.gray.com/  Enter T378 in the search box to learn more about \"Urinary Tract Infection in Women: Care Instructions. \"  Current as of: June 29, 2020               Content Version: 12.6  © 4703-2516 [employer]. Care instructions adapted under license by [employer] (which disclaims liability or warranty for this information). If you have questions about a medical condition or this instruction, always ask your healthcare professional. [address] any warranty or liability for your use of this information. Deep Vein Thrombosis: Care Instructions  Overview     A deep vein thrombosis (DVT) is a blood clot in certain veins, usually in the legs, pelvis, or arms. Blood clots in these veins need to be treated because they can get bigger, break loose, and travel through the bloodstream to the lungs. A blood clot in a lung can be life-threatening. The doctor may have given you a blood thinner (anticoagulant). A blood thinner can stop the blood clot from growing larger and prevent new clots from forming. You will need to take a blood thinner for 3 to 6 months or longer. The doctor has checked you carefully, but problems can develop later.  If you notice any problems or new symptoms, get medical treatment right away. Follow-up care is a key part of your treatment and safety. Be sure to make and go to all appointments, and call your doctor if you are having problems. It's also a good idea to know your test results and keep a list of the medicines you take. How can you care for yourself at home? · Take your medicines exactly as prescribed. Call your doctor if you think you are having a problem with your medicine. · If you are taking a blood thinner, be sure you get instructions about how to take your medicine safely. Blood thinners can cause serious bleeding problems. · Wear compression stockings if your doctor recommends them. These stockings are tighter at the feet than on the legs. They may reduce pain and swelling in your legs. But there are different types of stockings, and they need to fit right. So your doctor will recommend what you need. · When you sit, use a pillow to raise the arm or leg that has the blood clot. Try to keep it above the level of your heart. When should you call for help? Call 911 anytime you think you may need emergency care. For example, call if:    · You passed out (lost consciousness).     · You have symptoms of a blood clot in your lung (called a pulmonary embolism). These include:  ? Sudden chest pain. ? Trouble breathing. ? Coughing up blood. Call your doctor now or seek immediate medical care if:    · You have new or worse trouble breathing.     · You are dizzy or lightheaded, or you feel like you may faint.     · You have symptoms of a blood clot in your arm or leg. These may include:  ? Pain in the arm, calf, back of the knee, thigh, or groin. ? Redness and swelling in the arm, leg, or groin. Watch closely for changes in your health, and be sure to contact your doctor if:    · You do not get better as expected. Where can you learn more?   Go to http://www.gray.com/  Enter I653 in the search box to learn more about \"Deep Vein Thrombosis: Care Instructions. \"  Current as of: March 4, 2020               Content Version: 12.6  © 8521-4894 [employer]. Care instructions adapted under license by [employer] (which disclaims liability or warranty for this information). If you have questions about a medical condition or this instruction, always ask your healthcare professional. [place] [address] any warranty or liability for your use of this information.

## 2020-12-11 NOTE — ED PROVIDER NOTES
46-year-old white female history of MS currently residing at rehab facility for the past 2 weeks resents with swelling and pain to her left leg for the past week. She reports ultrasound today showed a DVT. She denies chest pain, shortness of breath, abdominal pain, vomiting and fever. The history is provided by the patient.         Past Medical History:   Diagnosis Date    Decubitus ulcer of ischial area     Hypertension     Kidney infection     Menopause     MS (multiple sclerosis) ([address]        Past Surgical History:   Procedure Laterality Date    COLONOSCOPY N/A 6/29/2020    COLONOSCOPY /Room 18 performed by Venessa Giron MD at [facility] ENDOSCOPY    ERCP  5/15/2020         HX CHOLECYSTECTOMY  05/18/2020         Family History:   Problem Relation Age of Onset    Breast Cancer Mother        Social History     Socioeconomic History    Marital status:      Spouse name: Not on file    Number of children: Not on file    Years of education: Not on file    Highest education level: Not on file   Occupational History    Not on file   Social Needs    Financial resource strain: Not on file    Food insecurity     Worry: Not on file     Inability: Not on file    Transportation needs     Medical: Not on file     Non-medical: Not on file   Tobacco Use    Smoking status: Former Smoker    Smokeless tobacco: Never Used   Substance and Sexual Activity    Alcohol use: Not Currently    Drug use: Not Currently    Sexual activity: Not on file   Lifestyle    Physical activity     Days per week: Not on file     Minutes per session: Not on file    Stress: Not on file   Relationships    Social connections     Talks on phone: Not on file     Gets together: Not on file     Attends Nondenominational service: Not on file     Active member of club or organization: Not on file     Attends meetings of clubs or organizations: Not on file     Relationship status: Not on file    Intimate partner violence     Fear of current or ex partner: Not on file     Emotionally abused: Not on file     Physically abused: Not on file     Forced sexual activity: Not on file   Other Topics Concern     Service Not Asked    Blood Transfusions Not Asked    Caffeine Concern Not Asked    Occupational Exposure Not Asked    Hobby Hazards Not Asked    Sleep Concern Not Asked    Stress Concern Not Asked    Weight Concern Not Asked    Special Diet Not Asked    Back Care Not Asked    Exercise Not Asked    Bike Helmet Not Asked   [address] Not Asked    Self-Exams Not Asked   Social History Narrative    Not on file         ALLERGIES: Latex; Norco [hydrocodone-acetaminophen]; and Pcn [penicillins]    Review of Systems   Constitutional: Negative for fever. HENT: Negative for congestion. Respiratory: Negative for cough and shortness of breath. Cardiovascular: Positive for leg swelling. Negative for chest pain. Gastrointestinal: Negative for nausea and vomiting. Genitourinary: Negative for dysuria. Musculoskeletal: Negative for back pain and neck pain. Skin: Negative for rash. Neurological: Negative for headaches. Vitals:    12/11/20 1422   BP: (!) 100/57   Pulse: 87   Resp: 16   Temp: 99.7 °F (37.6 °C)   SpO2: 96%   Weight: 45.4 kg (100 lb)   Height: 5' 7\" (1.702 m)            Physical Exam  Vitals signs and nursing note reviewed. Constitutional:       General: She is not in acute distress. Appearance: Normal appearance. She is not toxic-appearing. HENT:      Head: Normocephalic and atraumatic. Nose: Nose normal.      Mouth/Throat:      Mouth: Mucous membranes are moist.   Eyes:      Pupils: Pupils are equal, round, and reactive to light. Neck:      Musculoskeletal: Normal range of motion and neck supple. Cardiovascular:      Rate and Rhythm: Normal rate and regular rhythm. Pulmonary:      Effort: Pulmonary effort is normal.      Breath sounds: Normal breath sounds.    Abdominal:      General: There is no distension. Palpations: Abdomen is soft. Tenderness: There is no abdominal tenderness. Musculoskeletal:      Comments: Mild swelling to the entire left leg. Patient does have muscle atrophy. Skin:     General: Skin is warm and dry. Neurological:      Mental Status: She is alert and oriented to person, place, and time. Psychiatric:         Mood and Affect: Mood normal.         Behavior: Behavior normal.     Blood work is unremarkable except for slightly elevated INR 1.5. Etiology for this not clear but LFTs and renal function normal.    MDM  Number of Diagnoses or Management Options  Diagnosis management comments: Blood work unremarkable with normal LFTs and renal function. INR slightly elevated 1.5. Patient appears safe for outpatient treatment of DVT. Will start her on Eliquis. Was given first dose in the emergency department.        Amount and/or Complexity of Data Reviewed  Clinical lab tests: ordered and reviewed  Tests in the medicine section of CPT®: ordered and reviewed    Risk of Complications, Morbidity, and/or Mortality  Presenting problems: moderate  Diagnostic procedures: moderate  Management options: moderate           Procedures

## 2020-12-11 NOTE — ED TRIAGE NOTES
Pt arrives via EMS from [facility]. Pts facility called out after doing an ultrasound this morning and finding a DVT in the left leg involving the femoral, popliteal, and deep femoral veins. Pt does complain of pain in left leg, as well as some healing sacral ulcers. Pt does have motor and sensory function although decreased in the left leg, and thready pulses. Pt does have hx of MS and hypertension. Pt AandOx4, VSS except slightly hypotensive.

## 2020-12-11 NOTE — ED NOTES
Mathew insertion attempt by this rn, da nick  and [employer] without success. Dr Ino Cervantes notified.

## 2020-12-12 NOTE — ED NOTES
I have reviewed discharge instructions with the patient. The patient verbalized understanding. Patient left ED via Discharge Method: stretcher to Home with [facility] ambulance services  Opportunity for questions and clarification provided. Patient given 0 scripts. To continue your aftercare when you leave the hospital, you may receive an automated call from our care team to check in on how you are doing. This is a free service and part of our promise to provide the best care and service to meet your aftercare needs.  If you have questions, or wish to unsubscribe from this service please call [phone]. Thank you for Choosing our [facility] Emergency Department.

## 2021-01-04 ENCOUNTER — HOSPITAL ENCOUNTER (OUTPATIENT)
Dept: WOUND CARE | Age: 61
Discharge: HOME | End: 2021-01-04
Attending: PHYSICAL MEDICINE & REHABILITATION
Payer: MEDICARE

## 2021-01-04 VITALS
HEART RATE: 74 BPM | SYSTOLIC BLOOD PRESSURE: 116 MMHG | DIASTOLIC BLOOD PRESSURE: 68 MMHG | BODY MASS INDEX: 18.16 KG/M2 | RESPIRATION RATE: 18 BRPM | OXYGEN SATURATION: 97 % | HEIGHT: 66 IN | WEIGHT: 113 LBS

## 2021-01-04 DIAGNOSIS — L89.154 PRESSURE INJURY OF SACRAL REGION, STAGE 4 (HCC): ICD-10-CM

## 2021-01-04 DIAGNOSIS — G35 MULTIPLE SCLEROSIS (HCC): ICD-10-CM

## 2021-01-04 DIAGNOSIS — L89.44 PRESSURE ULCER OF CONTIGUOUS REGION INVOLVING RIGHT BUTTOCK AND HIP, STAGE 4 (HCC): ICD-10-CM

## 2021-01-04 PROCEDURE — 99214 OFFICE O/P EST MOD 30 MIN: CPT

## 2021-01-04 PROCEDURE — 99203 OFFICE O/P NEW LOW 30 MIN: CPT | Performed by: PHYSICAL MEDICINE & REHABILITATION

## 2021-01-04 RX ORDER — PREGABALIN 50 MG/1
50 CAPSULE ORAL DAILY
COMMUNITY

## 2021-01-04 RX ORDER — MELATONIN 5 MG
5 CAPSULE ORAL
COMMUNITY

## 2021-01-04 RX ORDER — POLYETHYLENE GLYCOL 3350 17 G/17G
17 POWDER, FOR SOLUTION ORAL DAILY
COMMUNITY

## 2021-01-04 RX ORDER — NITROFURANTOIN 25; 75 MG/1; MG/1
100 CAPSULE ORAL 2 TIMES DAILY
COMMUNITY
End: 2021-03-31

## 2021-01-04 RX ORDER — SAME BUTANEDISULFONATE/BETAINE 400-600 MG
250 POWDER IN PACKET (EA) ORAL 2 TIMES DAILY
COMMUNITY

## 2021-01-04 RX ORDER — OXYCODONE HYDROCHLORIDE 10 MG/1
10 TABLET ORAL
COMMUNITY
End: 2021-06-05

## 2021-01-04 RX ORDER — IRON POLYSACCHARIDE COMPLEX 150 MG
150 CAPSULE ORAL DAILY
COMMUNITY

## 2021-01-04 RX ORDER — GLUCOSAMINE SULFATE 1500 MG
1000 POWDER IN PACKET (EA) ORAL DAILY
COMMUNITY

## 2021-01-04 RX ORDER — ASPIRIN 81 MG/1
81 TABLET ORAL DAILY
COMMUNITY

## 2021-01-04 RX ORDER — SENNOSIDES 8.6 MG/1
2 TABLET ORAL 2 TIMES DAILY
COMMUNITY

## 2021-01-04 RX ORDER — TRAMADOL HYDROCHLORIDE 50 MG/1
50 TABLET ORAL
COMMUNITY
End: 2021-03-31

## 2021-01-04 RX ORDER — LANOLIN ALCOHOL/MO/W.PET/CERES
400 CREAM (GRAM) TOPICAL 2 TIMES DAILY
COMMUNITY

## 2021-01-04 RX ORDER — HYDROXYZINE 25 MG/1
25 TABLET, FILM COATED ORAL
COMMUNITY

## 2021-01-04 RX ORDER — VENLAFAXINE 75 MG/1
150 TABLET ORAL DAILY
COMMUNITY
End: 2021-03-31

## 2021-01-04 RX ORDER — BACLOFEN 20 MG/1
20 TABLET ORAL 3 TIMES DAILY
COMMUNITY

## 2021-01-04 RX ORDER — BISMUTH SUBSALICYLATE 262 MG
1 TABLET,CHEWABLE ORAL DAILY
COMMUNITY

## 2021-01-04 NOTE — WOUND CARE
[address] [place] [address] [address] Phone: [phone] Fax: [phone] Patient: Jay Gregory MRN: [id number]  SSN: [id number] YOB: 1960  Age: 62 y.o. Sex: female Return Appointment: 2 weeks with Braden Campos MD 
 
Instructions: Sacral and Right Ischial Wound: 
Cleanse with Normal Saline Apply Silver Alginate to Wound Bed, Cover with ABD, Secure with Tape or Bordered Foam.  Change Every Other Day or PRN Loose or Soiled. May Apply Wound Vac to Sacral Wound to Trial-Negative Pressure Therapy(KCI Wound Vac, Medela or SNAP) Black granufoam, 75mmHg, continuous. Change 3 times per week. If pump malfunctions and requires change on non-scheduled day, patient to return and have pump reapplied by clinician MAKE SURE THAT WOUND VAC IS SET AT [employer]-IF BLEEDING OCCURS DISCONTINUE-MAY CALL WOUND CENTER TO TALK ABOUT TREATMENT IF NEEDED. Continue on Pressure Relieving Mattress Eat High Protein Diet Turn Side to Side Should you experience increased redness, swelling, pain, foul odor, size of wound(s), or have a temperature over 101 degrees please contact the [address] [place] [address] Road at [phone] or if after hours contact your primary care physician or go to the hospital emergency department. Signed By: Martha Ross RN   
 January 4, 2021

## 2021-01-04 NOTE — WOUND CARE
01/04/21 5689 Wound Ischial Right Wound #1 07/31/20 Date First Assessed: 07/31/20   Wound Approximate Age at First Assessment (Weeks): 12 weeks  Primary Wound Type: Pressure Injury  Location: Ischial  Wound Location Orientation: Right  Wound Description: Wound #1  Date of First Observation: 07/31/20 Wound Image Wound Etiology Pressure Stage 4 Dressing Status Breakthrough drainage noted Cleansed Cleansed with saline Wound Length (cm) 6 cm Wound Width (cm) 4.5 cm Wound Depth (cm) 2.5 cm Wound Surface Area (cm^2) 27 cm^2 Change in Wound Size % 32.5 Wound Volume (cm^3) 67.5 cm^3 Wound Healing % 6 Wound Assessment Granulation tissue;Slough Drainage Amount Moderate Drainage Description Serosanguinous Wound Odor None Honey-Wound/Incision Assessment Fragile Wound Sacral/coccyx Posterior Wound #2 07/31/20 Date First Assessed: 07/31/20   Wound Approximate Age at First Assessment (Weeks): 12 weeks  Primary Wound Type: Pressure Injury  Location: Sacral/coccyx  Wound Location Orientation: Posterior  Wound Description: Wound #2  Date of First Observation: 0... Wound Image Wound Etiology Pressure Stage 4 Dressing Status Breakthrough drainage noted Cleansed Cleansed with saline Wound Length (cm) 3.5 cm Wound Width (cm) 3 cm Wound Depth (cm) 2.5 cm Wound Surface Area (cm^2) 10.5 cm^2 Change in Wound Size % -40 Wound Volume (cm^3) 26.25 cm^3 Wound Healing % -[id number] Undermining Starts ___ O'Clock 1 o'clock Undermining Ends ___ O'Clock 12 o'clock Undermining Maximum Distance (cm) 3.5 cm Drainage Amount Moderate Drainage Description Serosanguinous Wound Odor None Honey-Wound/Incision Assessment Fragile Patient takes Eliquis Daily

## 2021-01-05 NOTE — PROGRESS NOTES
[address], [address], [address] [place] [address] Rd  [phone]    Progress Notes   Frederic Babinski       MRN: [id number]     : 1960     Age: 62 y.o. Subjective/HPI:   This patient is a 62 y.o. female seen and evaluated at the wound clinic for evaluation and management of pressure ulcers. Ms. Nima Hansen was seen by Dr. Pily Ochoa from 2020 until 2020 for multiple pressure ulcers of the feet and of the trunk. She is nonambulatory and has a history of multiple sclerosis. She was diagnosed with chronic osteomyelitis of the sacrum and was seen by infectious disease while hospitalized for sepsis. She received IV antibiotic therapy and according to infectious disease did not require additional further IV antibiotic therapy. She was hospitalized at [facility] for a UTI and was then sent to an [place] [address] unit in [place] and is now currently at [facility] and has been there since approximately . In comparison to Dr. Vangie Low notes, the pressure ulcers have improved but remain stage IV over the sacrum and the right ischial region. She has had no fever or chills. She is dependent for all of her transfers and mobility and is getting physical therapy currently. Review of Systems  A comprehensive review of systems was negative except for that written in the HPI.     Past Medical History:   Diagnosis Date    Decubitus ulcer of ischial area     Hypertension     Kidney infection     Menopause     MS (multiple sclerosis) ([address]       Past Surgical History:   Procedure Laterality Date    COLONOSCOPY N/A 2020    COLONOSCOPY /Room 424 performed by [facility] at [facility] ENDOSCOPY    ERCP  5/15/2020         HX CHOLECYSTECTOMY  2020      Allergies   Allergen Reactions    Latex Rash    Norco [Hydrocodone-Acetaminophen] Other (comments)     Sees dead people    Pcn [Penicillins] Swelling      Social History     Tobacco Use    Smoking status: Former Smoker    Smokeless tobacco: Never Used   Substance Use Topics    Alcohol use: Not Currently      Social History     Social History Narrative    Not on file     Family History   Problem Relation Age of Onset    Breast Cancer Mother       Cannot display prior to admission medications because the patient has not been admitted in this contact. Current Outpatient Medications   Medication Sig    aspirin delayed-release 81 mg tablet Take 81 mg by mouth daily.  baclofen (LIORESAL) 20 mg tablet Take 20 mg by mouth three (3) times daily.  cholecalciferol (VITAMIN D3) 25 mcg (1,000 unit) cap Take 1,000 Units by mouth daily.  Saccharomyces boulardii (Florastor) 250 mg capsule Take 250 mg by mouth two (2) times a day.  hydrOXYzine HCL (ATARAX) 25 mg tablet Take 25 mg by mouth every eight (8) hours as needed.  nitrofurantoin, macrocrystal-monohydrate, (Macrobid) 100 mg capsule Take 100 mg by mouth two (2) times a day.  magnesium oxide (MAG-OX) 400 mg tablet Take 400 mg by mouth two (2) times a day.  melatonin 5 mg cap capsule Take 5 mg by mouth nightly.  polyethylene glycol (Miralax) 17 gram/dose powder Take 17 g by mouth daily.  oxyCODONE IR (ROXICODONE) 10 mg tab immediate release tablet Take 10 mg by mouth every six (6) hours as needed for Pain.  iron polysaccharides (NIFEREX) 150 mg iron capsule Take 150 mg by mouth daily.  pregabalin (LYRICA) 50 mg capsule Take 50 mg by mouth daily. Give 75mg at HS    senna (Senna) 8.6 mg tablet Take 2 Tabs by mouth two (2) times a day.  multivitamin (ONE A DAY) tablet Take 1 Tab by mouth daily.  traMADoL (ULTRAM) 50 mg tablet Take 50 mg by mouth every six (6) hours as needed for Pain.  venlafaxine (EFFEXOR) 75 mg tablet Take 150 mg by mouth daily.     apixaban (Eliquis DVT-PE Treat 30D Start) 5 mg (74 tabs) starter pack Take 10 mg (two 5 mg tablets) by mouth twice a day for 7 days Followed by 5 mg (one 5 mg tablet) by mouth twice a day    fingolimod 0.5 mg cap Take 0.5 mg by mouth daily.  amantadine HCL (SYMMETREL) 100 mg capsule Take 100 mg by mouth two (2) times a day. No current facility-administered medications for this encounter. Objective:     Vitals:    01/04/21 0844   BP: 116/68   Pulse: 74   Resp: 18   SpO2: 97%   Weight: 113 lb (51.3 kg)   Height: 5' 5.5\" (1.664 m)       Physical Exam:  BMI: 18.52  Ambulation: Nonambulatory and requires assistance with bed mobility and transfers  Gen- the patient is well developed and in no acute distress  HEENT- no focal abnormalities of the scalp or face. Neck- no JVD or retractions  Lungs- normal respiratory effort. Ext- warm without cyanosis. Her ulcers of the feet appear to be healed. Skin- no jaundice or rashes. Please see the specific measurements and descriptions of the wound(s) below. There is no evidence of periwound induration or warmth. No purulence or odor. Neuro- alert and oriented x 3. Psychological: Affect normal. Mood normal. Memory intact.          Wound Ischial Right Wound #1 07/31/20 (Active)   Wound Image   01/04/21 0904   Wound Etiology Pressure Stage 4 01/04/21 0904   Dressing Status Breakthrough drainage noted 01/04/21 0904   Cleansed Cleansed with saline 01/04/21 0904   Dressing/Treatment Negative pressure wound therapy 07/31/20 0904   Wound Length (cm) 6 cm 01/04/21 0904   Wound Width (cm) 4.5 cm 01/04/21 0904   Wound Depth (cm) 2.5 cm 01/04/21 0904   Wound Surface Area (cm^2) 27 cm^2 01/04/21 0904   Change in Wound Size % 32.5 01/04/21 0904   Wound Volume (cm^3) 67.5 cm^3 01/04/21 0904   Wound Healing % 6 01/04/21 0904   Undermining Maximum Distance (cm) 3.5 cm 08/25/20 1336   Wound Assessment Granulation tissue;Slough 01/04/21 0904   Drainage Amount Moderate 01/04/21 0904   Drainage Description Serosanguinous 01/04/21 0904   Wound Odor None 01/04/21 0904   Honey-Wound/Incision Assessment Fragile 01/04/21 0904   Number of days: 158       Wound Sacral/coccyx Posterior Wound #2 07/31/20 (Active)   Wound Image   01/04/21 0904   Wound Etiology Pressure Stage 4 01/04/21 0904   Dressing Status Breakthrough drainage noted 01/04/21 0904   Cleansed Cleansed with saline 01/04/21 0904   Dressing/Treatment Open to air 09/04/20 0943   Wound Length (cm) 3.5 cm 01/04/21 0904   Wound Width (cm) 3 cm 01/04/21 0904   Wound Depth (cm) 2.5 cm 01/04/21 0904   Wound Surface Area (cm^2) 10.5 cm^2 01/04/21 0904   Change in Wound Size % -40 01/04/21 0904   Wound Volume (cm^3) 26.25 cm^3 01/04/21 0904   Wound Healing % -[id number] 01/04/21 0904   Undermining Starts ___ O'Clock 1 o'clock 01/04/21 0904   Undermining Ends ___ O'Clock 12 o'clock 01/04/21 0904   Undermining Maximum Distance (cm) 3.5 cm 01/04/21 0904   Drainage Amount Moderate 01/04/21 0904   Drainage Description Serosanguinous 01/04/21 0904   Wound Odor None 01/04/21 0904   Honey-Wound/Incision Assessment Fragile 01/04/21 0904   Number of days: 158         Data Review     All Micro Results     None        All Micro Results     None        Radiology  Assessment:     Patient Active Problem List   Diagnosis Code    GLENROY (acute kidney injury) ([facility] N17.9    Pressure injury of sacral region, stage 4 ([facility]) L89.154    Transaminitis R74.01    Acute metabolic encephalopathy Y86.96    Multiple sclerosis ([facility] G35    Protein calorie malnutrition ([address] E46    Leukocytosis D72.829    Choledocholithiasis K80.50    Osteomyelitis of sacroiliac region ([facility] M46.28    Sepsis ([facility] A41.9    Coagulase negative Staphylococcus bacteremia R78.81, B95.7    Hypokalemia E87.6    Hypomagnesemia E83.42     Plan:      Active Orders   Nursing    WOUND CARE, DRESSING CHANGE     Frequency: ONE TIME     Number of Occurrences: 1 Occurrences     Order Comments: Sacral and Right Ischial Wound:  Cleanse with Normal Saline  Apply Silver Alginate to Wound Bed, Cover with ABD, Secure with Tape or Bordered Foam.  Change Every Other Day or PRN Loose or Soiled. May Apply Wound Vac to Sacral Wound to Trial-Negative Pressure Therapy([employer] Wound Vac, Medela or SNAP)  Black granufoam, 75mmHg, continuous. Change 3 times per week. If pump malfunctions and requires change on non-scheduled day, patient to return and have pump reapplied by clinician    [address] IS SET AT [address] TO TALK ABOUT TREATMENT IF NEEDED. Continue on Pressure Relieving Mattress  Eat High Protein Diet  Josh Side to Side         Follow-up Information     Follow up With Specialties Details Why Contact Info    [address] In 2 weeks For wound re-check [address] [facility] [address] [phone]        Her sacral ulcer has palpable bone present in the center and is clean. Her labs from November are reviewed. She is on anticoagulation. She could continue with the use of an alginate silver over the wound beds but also a wound VAC is discussed today as well. The patient is at risk for bleeding because she is on anticoagulation. She needs further coverage however of the sacral bone and a wound VAC might provide this more timely. I have recommended that we try a wound VAC at a low rate of 75 mmHg with black foam over the sacral region. We have sent instructions that if they were to see any extra or excess bleeding to stop the treatment immediately and use the silver alginate. They will continue to use silver alginate over the right ischial wound. We will recheck her here in 2 weeks. Total time spent reviewing records, previous treatments, education, labs, and physical/history 33 minutes. Dictated using voice recognition software. Proofread, but unrecognized errors may exist.       Thank you very much for the opportunity to participate in this patient's care.       Signed By: Erika Gomez MD     January 5, 2021

## 2021-01-27 ENCOUNTER — DOCUMENTATION ONLY (OUTPATIENT)
Dept: WOUND CARE | Age: 61
End: 2021-01-27

## 2021-01-27 NOTE — PROGRESS NOTES
Call received today from Jack Hall who is this patient's sister. She is concerned because the patient was discharged from [facility] on Sunday and has yet to receive [address] services for her multiple wounds/WOUND VAC. Ellyn Washburn states that [facility] came but did not agree to accept her care. Call was place to [facility] x2(spoke to Yelena and then to KATHERINE and then to KEVIN) to find out the reason for not admitting the patient. They stated the patient was bedbound and was unable to get OOB on her own and they felt the level of care was too high for home care. They referred her out to multiple other agencies. None of which apparently has agreed to assume care. I called and relayed this to the sister. She feels the patient may have misled the Rehab facility and made them think she had 24 hour care. I offered to move the patient's appointment up to tomorrow or Friday from next Tuesday, but the sister stated that she herself has appointments both tomorrow afternoon and Friday afternoon and she is unsure about acquiring transportation to get the patient here. I then called [facility] and spoke with Vera Salazar, an admissions counselor, to see if perhaps the patient could be readmitted to their facility. She stated the patient had used 80/100 days and she would talk to someone to see if she could possibly be readmitted for the remaining 20 days. She is to call the patient's sister and then also keep us in the loop. I called Ellyn Washburn again to explain the potential situation and to call [facility] and speak to Yo. I also called Devorah Corbett [address] [place] [address] to see if they might be able to take the patient on their service, but they expressed the same concerns for the patient's safety.

## 2021-01-28 NOTE — PROGRESS NOTES
Called sister today to assess the situation and see if she had been contacted by anyone. She states that she [facility]. and the vacuum\". I then called [facility] to see is they were able to assist in any way and spoke to Iraida Serrano, the admissions counselor, and she said that this patient's discharge was self directed. The patient does not want to apply for Medicaid because she doesn't want to lose her house. Will call [employer] and make sure they are on the case.   The patient is due to be seen here on Tuesday Feb. 2.

## 2021-02-02 ENCOUNTER — HOSPITAL ENCOUNTER (OUTPATIENT)
Dept: WOUND CARE | Age: 61
Discharge: HOME | End: 2021-02-02
Attending: PHYSICAL MEDICINE & REHABILITATION
Payer: MEDICARE

## 2021-02-02 ENCOUNTER — HOSPICE ADMISSION (OUTPATIENT)
Dept: HOSPICE | Facility: HOSPICE | Age: 61
End: 2021-02-02

## 2021-02-02 VITALS
TEMPERATURE: 97.5 F | WEIGHT: 96.2 LBS | OXYGEN SATURATION: 95 % | RESPIRATION RATE: 18 BRPM | DIASTOLIC BLOOD PRESSURE: 93 MMHG | HEART RATE: 111 BPM | HEIGHT: 66 IN | SYSTOLIC BLOOD PRESSURE: 134 MMHG | BODY MASS INDEX: 15.46 KG/M2

## 2021-02-02 DIAGNOSIS — G35 MULTIPLE SCLEROSIS (HCC): ICD-10-CM

## 2021-02-02 DIAGNOSIS — E43 SEVERE PROTEIN-CALORIE MALNUTRITION (HCC): ICD-10-CM

## 2021-02-02 DIAGNOSIS — M46.28 OSTEOMYELITIS OF SACROILIAC REGION (HCC): ICD-10-CM

## 2021-02-02 DIAGNOSIS — L89.154 PRESSURE INJURY OF SACRAL REGION, STAGE 4 (HCC): Primary | ICD-10-CM

## 2021-02-02 PROCEDURE — 99215 OFFICE O/P EST HI 40 MIN: CPT

## 2021-02-02 PROCEDURE — 99214 OFFICE O/P EST MOD 30 MIN: CPT | Performed by: PHYSICAL MEDICINE & REHABILITATION

## 2021-02-02 NOTE — WOUND CARE
02/02/21 4917 Wound Ankle Right;Lateral #3 02/02/21 Date First Assessed/Time First Assessed: 02/02/21 0909   Present on Hospital Admission: Yes  Wound Approximate Age at First Assessment (Weeks): 2 weeks  Primary Wound Type: Pressure Injury  Location: Ankle  Wound Location Orientation: Right;Lateral  W... Wound Image Wound Etiology Pressure Unstageable Wound Length (cm) 2.3 cm Wound Width (cm) 1.5 cm Wound Surface Area (cm^2) 3.45 cm^2 Drainage Amount None Honey-Wound/Incision Assessment Non-Blanchable erythema Wound Heel Lateral;Plantar #4 02/02/21 Date First Assessed/Time First Assessed: 02/02/21 0911   Present on Hospital Admission: Yes  Wound Approximate Age at First Assessment (Weeks): 2 weeks  Primary Wound Type: Pressure Injury  Location: Heel  Wound Location Orientation: Lateral;Plantar  . .. Wound Image Wound Etiology Pressure Unstageable Wound Length (cm) 5.3 cm Wound Width (cm) 4 cm Wound Surface Area (cm^2) 21.2 cm^2 Wound Assessment Non-blanchable erythema Drainage Amount None Wound Ischial Right Wound #1 07/31/20 Date First Assessed: 07/31/20   Wound Approximate Age at First Assessment (Weeks): 12 weeks  Primary Wound Type: Pressure Injury  Location: Ischial  Wound Location Orientation: Right  Wound Description: Wound #1  Date of First Observation: 07/31/20 Wound Image Wound Etiology Pressure Stage 4 Dressing Status Breakthrough drainage noted Cleansed Cleansed with saline; Soap and water Dressing/Treatment  
(Alginate Ag, Bordered Foam) Wound Length (cm) 6 cm Wound Width (cm) 4 cm Wound Depth (cm) 0.8 cm Wound Surface Area (cm^2) 24 cm^2 Change in Wound Size % 40 Wound Volume (cm^3) 19.2 cm^3 Wound Healing % 73 Undermining Starts ___ O'Clock 12 o'clock Undermining Ends ___ O'Clock 2 o'clock Undermining Maximum Distance (cm) 0.8 cm Wound Assessment Exposed Structure Bone;Slough Drainage Amount Large Drainage Description Serosanguinous Wound Odor Mild Honey-Wound/Incision Assessment Non-Blanchable erythema Edges Attached edges; Unattached edges Wound Thickness Description Full thickness Wound Sacral/coccyx Posterior Wound #2 07/31/20 Date First Assessed: 07/31/20   Wound Approximate Age at First Assessment (Weeks): 12 weeks  Primary Wound Type: Pressure Injury  Location: Sacral/coccyx  Wound Location Orientation: Posterior  Wound Description: Wound #2  Date of First Observation: 0... Wound Image Wound Etiology Pressure Stage 4 Dressing Status Breakthrough drainage noted Cleansed Cleansed with saline; Soap and water Dressing/Treatment  
(Alginate Ag, Bordered Foam) Wound Length (cm) 3.5 cm Wound Width (cm) 2.8 cm Wound Depth (cm) 2 cm Wound Surface Area (cm^2) 9.8 cm^2 Change in Wound Size % -30.67 Wound Volume (cm^3) 19.6 cm^3 Wound Healing % -8809 Undermining Starts ___ O'Clock 11 o'clock Undermining Ends ___ O'Clock 4 o'clock Undermining Maximum Distance (cm) 3.1 cm Drainage Amount Large Drainage Description Serosanguinous Wound Odor None Honey-Wound/Incision Assessment Non-Blanchable erythema Edges Unattached edges Wound Thickness Description Full thickness Patient is currently on Anticoagulants: Eliquis & ASA Open Wounds mechanically debrided with gauze and normal saline.

## 2021-02-02 NOTE — DISCHARGE INSTRUCTIONS
[address]  [address], [address]  Phone: [phone]  Fax: [phone]    Patient: Clark Weaver MRN: [id number]  SSN: [id number]    YOB: 1960  Age: 62 y.o. Sex: female       Return Appointment: 4 weeks with Miriam Lopez MD    Instructions: Sacral and Right Ischial Wound:  Cleanse with Normal Saline  Apply Silver Alginate to Wound Bed, Cover with ABD, Secure with Tape or Bordered Foam.  Change Every Other Day or PRN Loose or Soiled. Needs DAILY PERINEAL skin care for stool incontinence. Right Heel and Right Ankle:  Cleanse wound and periwound with wound cleanser or normal saline. Apply Betadine DAILY. Offloading Boots for Heel Protection.   Pressure Relieving Mattress: reposition every 2 hours minimal  Eat High Protein Diet  [facility]. Discontinue NPWT. Hospice Referral.    Should you experience increased redness, swelling, pain, foul odor, size of wound(s), or have a temperature over 101 degrees please contact the [address] [place] [address] Road at [phone] or if after hours contact your primary care physician or go to the hospital emergency department.     Signed By: Roland Hong RN     February 2, 2021

## 2021-02-02 NOTE — PROGRESS NOTES
[address], [address], [address]  [phone]    Progress Notes   Hannah Acosta       MRN: [id number]     : 1960     Age: 62 y.o. Subjective/HPI:   This patient is a 62 y.o. female seen and evaluated at the wound clinic for recheck of her multiple truncal pressure ulcers. She was discharged from [facility] rehab and returned home alone with home health nursing three times weekly. No fever but is having chills. She states that she started with diarrhea two days ago and she came in soiled. Her sister is present but cannot live with the patient and provide care. This patient has no identified caregiver in her home with her and she is nonambulatory and currently has stage IV pressure ulcers over her pelvis preventing her from being able to perform her ADLs in her wheelchair. Review of Systems  A comprehensive review of systems was negative except for that written in the HPI. Past Medical History:   Diagnosis Date    Decubitus ulcer of ischial area     Hypertension     Kidney infection     Menopause     MS (multiple sclerosis) ([facility]       Past Surgical History:   Procedure Laterality Date    COLONOSCOPY N/A 2020    COLONOSCOPY /Room 424 performed by Zack Torres MD at [facility] ENDOSCOPY    ERCP  5/15/2020         HX CHOLECYSTECTOMY  2020      Allergies   Allergen Reactions    Latex Rash    Norco [Hydrocodone-Acetaminophen] Other (comments)     Sees dead people    Pcn [Penicillins] Swelling      Social History     Tobacco Use    Smoking status: Former Smoker    Smokeless tobacco: Never Used   Substance Use Topics    Alcohol use: Not Currently      Social History     Social History Narrative    Not on file     Family History   Problem Relation Age of Onset   24 [facility] Stephen Breast Cancer Mother       Cannot display prior to admission medications because the patient has not been admitted in this contact.      Current Outpatient Medications   Medication Sig    aspirin delayed-release 81 mg tablet Take 81 mg by mouth daily.  baclofen (LIORESAL) 20 mg tablet Take 20 mg by mouth three (3) times daily.  cholecalciferol (VITAMIN D3) 25 mcg (1,000 unit) cap Take 1,000 Units by mouth daily.  Saccharomyces boulardii (Florastor) 250 mg capsule Take 250 mg by mouth two (2) times a day.  hydrOXYzine HCL (ATARAX) 25 mg tablet Take 25 mg by mouth every eight (8) hours as needed.  nitrofurantoin, macrocrystal-monohydrate, (Macrobid) 100 mg capsule Take 100 mg by mouth two (2) times a day.  magnesium oxide (MAG-OX) 400 mg tablet Take 400 mg by mouth two (2) times a day.  melatonin 5 mg cap capsule Take 5 mg by mouth nightly.  polyethylene glycol (Miralax) 17 gram/dose powder Take 17 g by mouth daily.  oxyCODONE IR (ROXICODONE) 10 mg tab immediate release tablet Take 10 mg by mouth every six (6) hours as needed for Pain.  iron polysaccharides (NIFEREX) 150 mg iron capsule Take 150 mg by mouth daily.  pregabalin (LYRICA) 50 mg capsule Take 50 mg by mouth daily. Give 75mg at HS    senna (Senna) 8.6 mg tablet Take 2 Tabs by mouth two (2) times a day.  multivitamin (ONE A DAY) tablet Take 1 Tab by mouth daily.  traMADoL (ULTRAM) 50 mg tablet Take 50 mg by mouth every six (6) hours as needed for Pain.  venlafaxine (EFFEXOR) 75 mg tablet Take 150 mg by mouth daily.  apixaban (Eliquis DVT-PE Treat 30D Start) 5 mg (74 tabs) starter pack Take 10 mg (two 5 mg tablets) by mouth twice a day for 7 days   Followed by 5 mg (one 5 mg tablet) by mouth twice a day    fingolimod 0.5 mg cap Take 0.5 mg by mouth daily.  amantadine HCL (SYMMETREL) 100 mg capsule Take 100 mg by mouth two (2) times a day. No current facility-administered medications for this encounter.       Objective:     Vitals:    02/02/21 0814   BP: (!) 134/93   Pulse: (!) 111   Resp: 18   Temp: 97.5 °F (36.4 °C)   SpO2: 95%   Weight: 96 lb 3.2 oz (43.6 kg)   Height: 5' 5.5\" (1.664 m)       Physical Exam:  BMI: 15.77  Ambulation: Nonambulatory and requires assistance for bed mobility transfers, and self-care  Gen- the patient is well developed and in no acute distress  HEENT- no focal abnormalities of the scalp or face. Lungs- normal respiratory effort. Ext- warm without cyanosis. There is bilateral dependent lower leg edema that appears not well controlled. Skin- no jaundice or rashes. Please see the specific measurements and descriptions of the wound(s) below. There is heavy drainage from the right ischial stage IV pressure ulcer with exposed palpable and visible bone. The sacral wound is stage IV and is clean at the base. She has a new pressure ulcer over the heel that is unstageable and has blood and necrotic tissue but no drainage yet. She has several other lower limb pressure ulcers as noted below. Neuro- alert and oriented x 3. She has lower body paralysis. Psychological: Affect normal. Mood normal. Memory intact. Wound Ischial Right Wound #1 07/31/20 (Active)   Wound Image   02/02/21 0903   Wound Etiology Pressure Stage 4 02/02/21 0903   Dressing Status Breakthrough drainage noted 02/02/21 0903   Cleansed Cleansed with saline; Soap and water 02/02/21 0903   Dressing/Treatment Negative pressure wound therapy 07/31/20 0904   Wound Length (cm) 6 cm 02/02/21 0903   Wound Width (cm) 4 cm 02/02/21 0903   Wound Depth (cm) 0.8 cm 02/02/21 0903   Wound Surface Area (cm^2) 24 cm^2 02/02/21 0903   Change in Wound Size % 40 02/02/21 0903   Wound Volume (cm^3) 19.2 cm^3 02/02/21 0903   Wound Healing % 73 02/02/21 0903   Undermining Starts ___ O'Clock 12 o'clock 02/02/21 0903   Undermining Ends ___ O'Clock 2 o'clock 02/02/21 0903   Undermining Maximum Distance (cm) 0.8 cm 02/02/21 0903   Wound Assessment Exposed Structure Bone;Slough 02/02/21 0903   Drainage Amount Large 02/02/21 0903   Drainage Description Serosanguinous 02/02/21 0903   Wound Odor Mild 02/02/21 0903   Honey-Wound/Incision Assessment Non-Blanchable erythema 02/02/21 0903   Edges Attached edges; Unattached edges 02/02/21 0903   Wound Thickness Description Full thickness 02/02/21 0903   Number of days: 186       Wound Sacral/coccyx Posterior Wound #2 07/31/20 (Active)   Wound Image   02/02/21 0903   Wound Etiology Pressure Stage 4 02/02/21 0903   Dressing Status Breakthrough drainage noted 02/02/21 0903   Cleansed Cleansed with saline; Soap and water 02/02/21 0903   Dressing/Treatment Open to air 09/04/20 0943   Wound Length (cm) 3.5 cm 02/02/21 0903   Wound Width (cm) 2.8 cm 02/02/21 0903   Wound Depth (cm) 2 cm 02/02/21 0903   Wound Surface Area (cm^2) 9.8 cm^2 02/02/21 0903   Change in Wound Size % -30.67 02/02/21 0903   Wound Volume (cm^3) 19.6 cm^3 02/02/21 0903   Wound Healing % -8809 02/02/21 0903   Undermining Starts ___ O'Clock 11 o'clock 02/02/21 0903   Undermining Ends ___ O'Clock 4 o'clock 02/02/21 0903   Undermining Maximum Distance (cm) 3.1 cm 02/02/21 0903   Drainage Amount Large 02/02/21 0903   Drainage Description Serosanguinous 02/02/21 0903   Wound Odor None 02/02/21 0903   Honey-Wound/Incision Assessment Non-Blanchable erythema 02/02/21 0903   Edges Unattached edges 02/02/21 0903   Wound Thickness Description Full thickness 02/02/21 0903   Number of days: 186       Wound Ankle Right;Lateral #3 02/02/21 (Active)   Wound Image   02/02/21 0903   Wound Etiology Pressure Unstageable 02/02/21 0903   Wound Length (cm) 2.3 cm 02/02/21 0903   Wound Width (cm) 1.5 cm 02/02/21 0903   Wound Surface Area (cm^2) 3.45 cm^2 02/02/21 0903   Drainage Amount None 02/02/21 0903   Honey-Wound/Incision Assessment Non-Blanchable erythema 02/02/21 0903   Number of days: 0       Wound Heel Lateral;Plantar #4 02/02/21 (Active)   Wound Image   02/02/21 0903   Wound Etiology Pressure Unstageable 02/02/21 0903   Wound Length (cm) 5.3 cm 02/02/21 0903   Wound Width (cm) 4 cm 02/02/21 0903   Wound Surface Area (cm^2) 21.2 cm^2 02/02/21 0903   Wound Assessment Non-blanchable erythema 02/02/21 0903   Drainage Amount None 02/02/21 0903   Number of days: 0           Data Review     All Micro Results     None        All Micro Results     None        Radiology  Assessment:     Patient Active Problem List   Diagnosis Code    GLENROY (acute kidney injury) ([facility] N17.9    Pressure injury of sacral region, stage 4 (HCC) L89.154    Transaminitis R74.01    Acute metabolic encephalopathy J40.81    Multiple sclerosis ([facility] G35    Protein calorie malnutrition ([facility] E46    Leukocytosis D72.829    Choledocholithiasis K80.50    Osteomyelitis of sacroiliac region ([facility] M46.28    Sepsis ([facility] A41.9    Coagulase negative Staphylococcus bacteremia R78.81, B95.7    Hypokalemia E87.6    Hypomagnesemia E83.42     Plan: Active Orders   Nursing    WOUND CARE, DRESSING CHANGE     Frequency: ONE TIME     Number of Occurrences: 1 Occurrences     Order Comments: Sacral and Right Ischial Wound:  Cleanse with Normal Saline  Apply Silver Alginate to Wound Bed, Cover with ABD, Secure with Tape or Bordered Foam.  Change Every Other Day or PRN Loose or Soiled. Needs DAILY PERINEAL skin care for stool incontinence. Right Heel and Right Ankle:  Cleanse wound and periwound with wound cleanser or normal saline. Apply Betadine DAILY.    Pressure Relieving Mattress: reposition every 2 hours minimal  Eat High Protein Diet  Hospice Referral.  [facility] for [facility]ound [facility]. Discontinue Wound Vac.              Follow-up Information     Follow up With Specialties Details Why Contact Info    [address] In 1 week For wound re-check [address] [facility] [address] [phone]        This patient has extensive multiple pressure ulcers including now the right heel, feet, right ischium, and sacrum. She has been discharged from [facility] and is currently living alone.   She refuses to go back to the hospital today. She wants to live in her home at all possible but in my discussions today with her sister and with the patient there are no options of caregivers in the family. The patient does not desire treatment for her tarry stools and does not want heroic life-sustaining treatment. I discussed with the patient and her sister today a referral to hospice. The patient perked up to this suggestion and actually seemed relieved with the suggestion. She would like to meet with someone from hospice to discuss options and care. I will make a referral to open [facility] hospice today and they may have some additional suggestions. I do not see at this point the patient improving from these extensive, life-threatening pressure ulcers. TIME:  If total time for today's E/M service is used for level of service it is documented below. This time includes physician non-face-to-face service time visit on the date of service and includes    Preparing to see the patient (eg, review of tests)  Obtaining and/or reviewing separately obtained history  Performing a medically necessary appropriate examination and/or evaluation  Counseling and educating the patient/family/caregiver  Ordering medications, tests, or procedures  Referring and communicating with other health care professionals as needed  Documenting clinical information in the electronic or other health record  Independently interpreting results (not reported separately) and communicating results to the patient/family/caregiver  Care coordination (not reported separately)    E/M time =     45     minutes. Dictated using voice recognition software. Proofread, but unrecognized errors may exist.       Thank you very much for the opportunity to participate in this patient's care.       Signed By: Mayra Leonard MD     February 2, 2021

## 2021-02-02 NOTE — WOUND CARE
[address] [place] [address], [facility] [address] Phone: [phone] Fax: [phone] Patient: Erma Mtz MRN: [id number]  SSN: [id number] YOB: 1960  Age: 62 y.o. Sex: female Return Appointment: 4 weeks with Dima Laguerre MD 
 
Instructions:  
Sacral and Right Ischial Wound: 
Cleanse with Normal Saline Apply Silver Alginate to Wound Bed, Cover with ABD, Secure with Tape or Bordered Foam.  Change Every Other Day or PRN Loose or Soiled. Needs DAILY PERINEAL skin care for stool incontinence. Right Heel and Right Ankle: 
Cleanse wound and periwound with wound cleanser or normal saline. Apply Betadine DAILY.   
 
Offloading Boots for Heel Protection Pressure Relieving Mattress: reposition every 2 hours minimal 
Eat High Protein Diet [facility] for Wound Care. Discontinue NPWT. Hospice Referral. 
 
 
 
Should you experience increased redness, swelling, pain, foul odor, size of wound(s), or have a temperature over 101 degrees please contact the [address] [place] [address] Road at [phone] or if after hours contact your primary care physician or go to the hospital emergency department. Signed By: Teto Lawson RN February 2, 2021

## 2021-02-03 ENCOUNTER — TELEPHONE (OUTPATIENT)
Dept: WOUND CARE | Age: 61
End: 2021-02-03

## 2021-02-03 NOTE — TELEPHONE ENCOUNTER
Call received from patient's sister stating they had not heard from hospice. Referral notes show that hospice tried to call the patient and got no answer and the voice mail was full. Reported this to the sister. She gave me her number and I told her I would relay it to [address]. Call placed to [phone] [address] and number was given to the referral department.   Sister's number- [phone]

## 2021-02-06 ENCOUNTER — HOME CARE VISIT (OUTPATIENT)
Dept: HOSPICE | Facility: HOSPICE | Age: 61
End: 2021-02-06

## 2021-02-13 ENCOUNTER — HOSPITAL ENCOUNTER (EMERGENCY)
Age: 61
Discharge: HOME | End: 2021-02-13
Attending: EMERGENCY MEDICINE
Payer: MEDICARE

## 2021-02-13 VITALS
SYSTOLIC BLOOD PRESSURE: 108 MMHG | OXYGEN SATURATION: 99 % | WEIGHT: 96 LBS | HEART RATE: 71 BPM | RESPIRATION RATE: 18 BRPM | TEMPERATURE: 98 F | DIASTOLIC BLOOD PRESSURE: 56 MMHG | BODY MASS INDEX: 15.73 KG/M2

## 2021-02-13 DIAGNOSIS — G35 MULTIPLE SCLEROSIS (HCC): ICD-10-CM

## 2021-02-13 DIAGNOSIS — N30.00 ACUTE CYSTITIS WITHOUT HEMATURIA: Primary | ICD-10-CM

## 2021-02-13 DIAGNOSIS — L89.159 PRESSURE INJURY OF SKIN OF SACRAL REGION, UNSPECIFIED INJURY STAGE: ICD-10-CM

## 2021-02-13 LAB
APPEARANCE UR: ABNORMAL
BACTERIA URNS QL MICRO: ABNORMAL /HPF
BILIRUB UR QL: NEGATIVE
CASTS URNS QL MICRO: ABNORMAL /LPF
COLOR UR: YELLOW
EPI CELLS #/AREA URNS HPF: 0 /HPF
GLUCOSE UR STRIP.AUTO-MCNC: NEGATIVE MG/DL
HGB UR QL STRIP: ABNORMAL
KETONES UR QL STRIP.AUTO: NEGATIVE MG/DL
LEUKOCYTE ESTERASE UR QL STRIP.AUTO: ABNORMAL
NITRITE UR QL STRIP.AUTO: POSITIVE
PH UR STRIP: 8.5 [PH] (ref 5–9)
PROT UR STRIP-MCNC: ABNORMAL MG/DL
RBC #/AREA URNS HPF: ABNORMAL /HPF
SP GR UR REFRACTOMETRY: 1.01 (ref 1–1.02)
UROBILINOGEN UR QL STRIP.AUTO: 0.2 EU/DL (ref 0.2–1)
WBC URNS QL MICRO: >100 /HPF

## 2021-02-13 PROCEDURE — 51702 INSERT TEMP BLADDER CATH: CPT

## 2021-02-13 PROCEDURE — 99284 EMERGENCY DEPT VISIT MOD MDM: CPT

## 2021-02-13 PROCEDURE — 87088 URINE BACTERIA CULTURE: CPT

## 2021-02-13 PROCEDURE — 87186 SC STD MICRODIL/AGAR DIL: CPT

## 2021-02-13 PROCEDURE — 87086 URINE CULTURE/COLONY COUNT: CPT

## 2021-02-13 PROCEDURE — 81001 URINALYSIS AUTO W/SCOPE: CPT

## 2021-02-13 RX ORDER — CEPHALEXIN 500 MG/1
500 CAPSULE ORAL 4 TIMES DAILY
Qty: 28 CAP | Refills: 0 | Status: SHIPPED | OUTPATIENT
Start: 2021-02-13 | End: 2021-02-20

## 2021-02-14 NOTE — ED PROVIDER NOTES
Patient has a history of multiple sclerosis, hypertension and decubitus ulcers who presents because her Mathew catheter has been leaking. She has home health and home wound care on a daily basis. She states home health was out today and got concerned about her leaking catheter and sent her here for evaluation.            Past Medical History:   Diagnosis Date    Decubitus ulcer of ischial area     Hypertension     Kidney infection     Menopause     MS (multiple sclerosis) ([address]        Past Surgical History:   Procedure Laterality Date    COLONOSCOPY N/A 6/29/2020    COLONOSCOPY /Room 18 performed by Corrine L[facility]res MD at [facility] ENDOSCOPY    ERCP  5/15/2020         HX CHOLECYSTECTOMY  05/18/2020         Family History:   Problem Relation Age of Onset    Breast Cancer Mother        Social History     Socioeconomic History    Marital status:      Spouse name: Not on file    Number of children: Not on file    Years of education: Not on file    Highest education level: Not on file   Occupational History    Not on file   Social Needs    Financial resource strain: Not on file    Food insecurity     Worry: Not on file     Inability: Not on file    Transportation needs     Medical: Not on file     Non-medical: Not on file   Tobacco Use    Smoking status: Former Smoker    Smokeless tobacco: Never Used   Substance and Sexual Activity    Alcohol use: Not Currently    Drug use: Not Currently    Sexual activity: Not on file   Lifestyle    Physical activity     Days per week: Not on file     Minutes per session: Not on file    Stress: Not on file   Relationships    Social connections     Talks on phone: Not on file     Gets together: Not on file     Attends Latter-day service: Not on file     Active member of club or organization: Not on file     Attends meetings of clubs or organizations: Not on file     Relationship status: Not on file    Intimate partner violence     Fear of current or ex partner: Not on file     Emotionally abused: Not on file     Physically abused: Not on file     Forced sexual activity: Not on file   Other Topics Concern     Service Not Asked    Blood Transfusions Not Asked    Caffeine Concern Not Asked    Occupational Exposure Not Asked    Hobby Hazards Not Asked    Sleep Concern Not Asked    Stress Concern Not Asked    Weight Concern Not Asked    Special Diet Not Asked    Back Care Not Asked    Exercise Not Asked    Bike Helmet Not Asked   [address] Not Asked    Self-Exams Not Asked   Social History Narrative    Not on file         ALLERGIES: Latex, Norco [hydrocodone-acetaminophen], and Pcn [penicillins]    Review of Systems   Constitutional: Negative for chills and fever. Gastrointestinal: Negative for nausea and vomiting. All other systems reviewed and are negative. Vitals:    02/13/21 2123   BP: (!) 106/54   Pulse: 71   Resp: 18   Temp: 98 °F (36.7 °C)   SpO2: 99%   Weight: 43.5 kg (96 lb)            Physical Exam  Vitals signs and nursing note reviewed. Constitutional:       Appearance: Normal appearance. She is well-developed. HENT:      Head: Normocephalic and atraumatic. Eyes:      Conjunctiva/sclera: Conjunctivae normal.      Pupils: Pupils are equal, round, and reactive to light. Neck:      Musculoskeletal: Normal range of motion and neck supple. Pulmonary:      Effort: Pulmonary effort is normal. No respiratory distress. Musculoskeletal:         General: No tenderness. Skin:     General: Skin is warm and dry. Comments: Decubitus ulcers lower back and right gluteus as indicated   Neurological:      Mental Status: She is alert and oriented to person, place, and time.    Psychiatric:         Behavior: Behavior normal.          MDM  Number of Diagnoses or Management Options  Acute cystitis without hematuria: new and requires workup  Multiple sclerosis (HCC)  Pressure injury of skin of sacral region, unspecified injury stage: established and worsening  Diagnosis management comments: 7/23/20 urine culture grew >100,000 COLONIES/mL KLEBSIELLA OXYTOCA with no antibiotic resistance. Discussed results with patient, will treat her with antibiotics pending her urine culture. The Mathew catheter was replaced with a 20 Indonesian catheter with no leaking noted.        Amount and/or Complexity of Data Reviewed  Clinical lab tests: ordered and reviewed  Decide to obtain previous medical records or to obtain history from someone other than the patient: yes  Review and summarize past medical records: yes    Risk of Complications, Morbidity, and/or Mortality  Presenting problems: moderate  Diagnostic procedures: moderate  Management options: moderate    Patient Progress  Patient progress: improved         Procedures

## 2021-02-14 NOTE — ED NOTES
I have reviewed discharge instructions with the patient. The patient verbalized understanding. Patient left ED via Discharge Method: stretcher to Home with [facility]. Opportunity for questions and clarification provided. Patient given 1 scripts. To continue your aftercare when you leave the hospital, you may receive an automated call from our care team to check in on how you are doing. This is a free service and part of our promise to provide the best care and service to meet your aftercare needs.  If you have questions, or wish to unsubscribe from this service please call [phone]. Thank you for Choosing our [employer] Emergency Department.

## 2021-02-14 NOTE — ED NOTES
EMS states they were called because homehealth states pt needs bigger balloon in wasserman, the wasserman keeps coming out.     Pt has c\o of bed sores getting stool in them

## 2021-02-16 ENCOUNTER — HOSPITAL ENCOUNTER (OUTPATIENT)
Dept: WOUND CARE | Age: 61
Discharge: HOME | End: 2021-02-16
Attending: PHYSICAL MEDICINE & REHABILITATION
Payer: MEDICARE

## 2021-02-16 ENCOUNTER — TELEPHONE (OUTPATIENT)
Dept: WOUND CARE | Age: 61
End: 2021-02-16

## 2021-02-16 VITALS
HEIGHT: 66 IN | OXYGEN SATURATION: 98 % | BODY MASS INDEX: 15.69 KG/M2 | RESPIRATION RATE: 18 BRPM | SYSTOLIC BLOOD PRESSURE: 131 MMHG | HEART RATE: 72 BPM | WEIGHT: 97.6 LBS | TEMPERATURE: 97.7 F | DIASTOLIC BLOOD PRESSURE: 71 MMHG

## 2021-02-16 DIAGNOSIS — L89.610 PRESSURE ULCER OF RIGHT HEEL, UNSTAGEABLE (HCC): ICD-10-CM

## 2021-02-16 DIAGNOSIS — M46.28 OSTEOMYELITIS OF SACROILIAC REGION (HCC): ICD-10-CM

## 2021-02-16 DIAGNOSIS — L89.154 PRESSURE INJURY OF SACRAL REGION, STAGE 4 (HCC): ICD-10-CM

## 2021-02-16 DIAGNOSIS — G35 MULTIPLE SCLEROSIS (HCC): ICD-10-CM

## 2021-02-16 PROCEDURE — 99214 OFFICE O/P EST MOD 30 MIN: CPT | Performed by: PHYSICAL MEDICINE & REHABILITATION

## 2021-02-16 PROCEDURE — 99215 OFFICE O/P EST HI 40 MIN: CPT

## 2021-02-16 NOTE — PROGRESS NOTES
02/16/21 0918   Wound Ankle Right;Lateral #3 02/02/21   Date First Assessed/Time First Assessed: 02/02/21 0909   Present on Hospital Admission: Yes  Wound Approximate Age at First Assessment (Weeks): 2 weeks  Primary Wound Type: Pressure Injury  Location: Ankle  Wound Location Orientation: Right;Lateral  W... Wound Image    Wound Etiology Pressure Unstageable   Cleansed Cleansed with saline   Dressing/Treatment Betadine swabs/Povidone Iodine   Wound Length (cm) 1.4 cm   Wound Width (cm) 1 cm   Wound Surface Area (cm^2) 1.4 cm^2   Change in Wound Size % 59.42   Wound Assessment Eschar dry   Drainage Amount None   Wound Odor None   Honey-Wound/Incision Assessment Intact   Wound Heel Lateral;Plantar #4 02/02/21   Date First Assessed/Time First Assessed: 02/02/21 0911   Present on Hospital Admission: Yes  Wound Approximate Age at First Assessment (Weeks): 2 weeks  Primary Wound Type: Pressure Injury  Location: Heel  Wound Location Orientation: Lateral;Plantar  . ..    Wound Image    Wound Etiology Pressure Unstageable   Cleansed Cleansed with saline   Dressing/Treatment Betadine swabs/Povidone Iodine   Wound Length (cm) 4.5 cm   Wound Width (cm) 4.5 cm   Wound Surface Area (cm^2) 20.25 cm^2   Change in Wound Size % 4.48   Wound Assessment Eschar dry   Drainage Amount None   Edges Attached edges   Wound Ischial Right Wound #1 07/31/20   Date First Assessed: 07/31/20   Wound Approximate Age at First Assessment (Weeks): 12 weeks  Primary Wound Type: Pressure Injury  Location: Ischial  Wound Location Orientation: Right  Wound Description: Wound #1  Date of First Observation: 07/31/20   Wound Image    Wound Etiology Pressure Stage 4   Dressing Status Breakthrough drainage noted   Cleansed Cleansed with saline   Dressing/Treatment   (aquacel AG foam)   Wound Length (cm) 5 cm   Wound Width (cm) 5 cm   Wound Depth (cm) 0.4 cm   Wound Surface Area (cm^2) 25 cm^2   Change in Wound Size % 37.5   Wound Volume (cm^3) 10 cm^3 Wound Healing % 86   Wound Assessment Granulation tissue;Slough   Drainage Amount Moderate   Drainage Description Serosanguinous   Wound Odor Mild   Honey-Wound/Incision Assessment Non-Blanchable erythema   Wound Sacral/coccyx Posterior Wound #2 07/31/20   Date First Assessed: 07/31/20   Wound Approximate Age at First Assessment (Weeks): 12 weeks  Primary Wound Type: Pressure Injury  Location: Sacral/coccyx  Wound Location Orientation: Posterior  Wound Description: Wound #2  Date of First Observation: 0...    Wound Image    Wound Etiology Pressure Stage 4   Dressing Status Breakthrough drainage noted   Cleansed Cleansed with saline   Dressing/Treatment   (aquacel AG and foam)   Wound Length (cm) 2.5 cm   Wound Width (cm) 2.5 cm   Wound Depth (cm) 1 cm   Wound Surface Area (cm^2) 6.25 cm^2   Change in Wound Size % 16.67   Wound Volume (cm^3) 6.25 cm^3   Wound Healing % -2741   Drainage Amount Large   Drainage Description Serosanguinous   Wound Odor None   Honey-Wound/Incision Assessment Non-Blanchable erythema     eliquis daily

## 2021-02-16 NOTE — TELEPHONE ENCOUNTER
Call received from Yumiko Garcia at [facility] evaluated the patient today and she has decided against Hospice at this time. She would like to have aggressive care at this time. He stated that the order for hospice is good for 30 days if she changes her mind. I spoke to the sister, Vanessa Sargent), who states she is her healthcare POA, to make sure she knew that she had changed her mind. She is aware, and is on her way to get the antibiotics that were prescribed by the ED Saturday night. Explained to the sister the importance of patient taking her medications as prescribed. She is going to have the patient call me when she gets to the patient's house.

## 2021-02-16 NOTE — DISCHARGE INSTRUCTIONS
[address]  [address], [address]  Phone: [phone]  Fax: [phone]    Patient: Larwence Baumgarten MRN: [id number]  SSN: [id number]    YOB: 1960  Age: 62 y.o. Sex: female       Return Appointment: 4 weeks with Alexandria Vences MD    Instructions: Sacral and Right Ischial Wound:  Cleanse with Normal Saline  Apply Silver Alginate to Wound Bed, Cover with ABD, Secure with Tape or Bordered Foam.  Change Every Other Day or PRN Loose or Soiled. Needs DAILY PERINEAL skin care for stool incontinence.     Right Heel and Right Ankle:  Cleanse wound and periwound with wound cleanser or normal saline. Apply Betadine DAILY.      Offloading Boots for Heel Protection  Pressure Relieving Mattress: reposition every 2 hours minimal  Eat High Protein Diet     [facility] Discontinue [facility]. Hospice Referral awaiting call from [facility]       Should you experience increased redness, swelling, pain, foul odor, size of wound(s), or have a temperature over 101 degrees please contact the [address] [place] [address] Road at [phone] or if after hours contact your primary care physician or go to the hospital emergency department.     Signed By: Maria E Damian RN     February 16, 2021

## 2021-02-16 NOTE — WOUND CARE
[address] [place] [address] [address] Phone: [phone] Fax: [phone] Patient: Trace Acuña MRN: [id number]  SSN: [id number] YOB: 1960  Age: 62 y.o. Sex: female Return Appointment: 4 weeks with Roberta Cook MD 
 
Instructions: Sacral and Right Ischial Wound: 
Cleanse with Normal Saline Apply Silver Alginate to Wound Bed, Cover with ABD, Secure with Tape or Bordered Foam.  Change Every Other Day or PRN Loose or Soiled. Needs DAILY PERINEAL skin care for stool incontinence. 
  
Right Heel and Right Ankle: 
Cleanse wound and periwound with wound cleanser or normal saline. Apply Betadine DAILY.  
  
Offloading Boots for Heel Protection Pressure Relieving Mattress: reposition every 2 hours minimal 
Eat High Protein Diet 
  
[place] for Wound Care. Discontinue NPWT. Hospice Referral awaiting call from [facility] 
  
 
Should you experience increased redness, swelling, pain, foul odor, size of wound(s), or have a temperature over 101 degrees please contact the [address] [place] [address] Road at [phone] or if after hours contact your primary care physician or go to the hospital emergency department. Signed By: Arianna Grossman RN February 16, 2021

## 2021-02-16 NOTE — PROGRESS NOTES
[address], [address], [address]  [phone]    Progress Notes   Benny Cordova       MRN: [id number]     : 1960     Age: 62 y.o. Subjective/HPI:   This patient is a 62 y.o. female seen and evaluated at the wound clinic for recheck of her multiple truncal pressure ulcers. No fever or chills or pain. She was evaluated by Hospice but does not want to stop her MS meds due to a fear of loss of further mobility. She has home heatlh and states that she has hired caregivers for 5 days per week, starting today. She was seen in the ED  for a UTI and was prescribed keflex but has not picked it up yet. Review of Systems  A comprehensive review of systems was negative except for that written in the HPI. Past Medical History:   Diagnosis Date    Decubitus ulcer of ischial area     Hypertension     Kidney infection     Menopause     MS (multiple sclerosis) ([address]       Past Surgical History:   Procedure Laterality Date    COLONOSCOPY N/A 2020    COLONOSCOPY /Room 424 [facility]u [facility]z MD [facility] ENDOSCOPY    ERCP  5/15/2020         HX CHOLECYSTECTOMY  2020      Allergies   Allergen Reactions    Latex Rash    Norco [Hydrocodone-Acetaminophen] Other (comments)     Sees dead people    Pcn [Penicillins] Swelling      Social History     Tobacco Use    Smoking status: Former Smoker    Smokeless tobacco: Never Used   Substance Use Topics    Alcohol use: Not Currently      Social History     Social History Narrative    Not on file     Family History   Problem Relation Age of Onset   Cristin Riggins Breast Cancer Mother       Cannot display prior to admission medications because the patient has not been admitted in this contact. Current Outpatient Medications   Medication Sig    cephALEXin (Keflex) 500 mg capsule Take 1 Cap by mouth four (4) times daily for 7 days.     aspirin delayed-release 81 mg tablet Take 81 mg by mouth daily.    baclofen (LIORESAL) 20 mg tablet Take 20 mg by mouth three (3) times daily.  cholecalciferol (VITAMIN D3) 25 mcg (1,000 unit) cap Take 1,000 Units by mouth daily.  Saccharomyces boulardii (Florastor) 250 mg capsule Take 250 mg by mouth two (2) times a day.  hydrOXYzine HCL (ATARAX) 25 mg tablet Take 25 mg by mouth every eight (8) hours as needed.  nitrofurantoin, macrocrystal-monohydrate, (Macrobid) 100 mg capsule Take 100 mg by mouth two (2) times a day.  magnesium oxide (MAG-OX) 400 mg tablet Take 400 mg by mouth two (2) times a day.  melatonin 5 mg cap capsule Take 5 mg by mouth nightly.  polyethylene glycol (Miralax) 17 gram/dose powder Take 17 g by mouth daily.  oxyCODONE IR (ROXICODONE) 10 mg tab immediate release tablet Take 10 mg by mouth every six (6) hours as needed for Pain.  iron polysaccharides (NIFEREX) 150 mg iron capsule Take 150 mg by mouth daily.  pregabalin (LYRICA) 50 mg capsule Take 50 mg by mouth daily. Give 75mg at HS    senna (Senna) 8.6 mg tablet Take 2 Tabs by mouth two (2) times a day.  multivitamin (ONE A DAY) tablet Take 1 Tab by mouth daily.  traMADoL (ULTRAM) 50 mg tablet Take 50 mg by mouth every six (6) hours as needed for Pain.  venlafaxine (EFFEXOR) 75 mg tablet Take 150 mg by mouth daily.  apixaban (Eliquis DVT-PE Treat 30D Start) 5 mg (74 tabs) starter pack Take 10 mg (two 5 mg tablets) by mouth twice a day for 7 days   Followed by 5 mg (one 5 mg tablet) by mouth twice a day    fingolimod 0.5 mg cap Take 0.5 mg by mouth daily.  amantadine HCL (SYMMETREL) 100 mg capsule Take 100 mg by mouth two (2) times a day. No current facility-administered medications for this encounter. Objective:     Vitals:    02/16/21 0916   BP: 131/71   Pulse: 72   Resp: 18   Temp: 97.7 °F (36.5 °C)   SpO2: 98%   Weight: 97 lb 9.6 oz (44.3 kg)   Height: 5' 5.5\" (1.664 m)       Physical Exam:  BMI: 15.99  Ambulation: non ambulatory.  Needs assistance for bed mobility  Gen- the patient is well developed and in no acute distress  HEENT- no focal abnormalities of the scalp or face. Neck- no JVD or retractions  Lungs- normal respiratory effort. Ext- warm without cyanosis. There is bilateral lower leg edema that appears improved  Skin- no jaundice or rashes. Please see the specific measurements and descriptions of the wound(s) below. There is no evidence of periwound induration or warmth. No purulence or odor. It is noted today that when we turned the patient, her cell phone, a bottle cap, and her TV remote were underneath her causing pressure but no skin punctures. Neuro- alert and oriented x 3. Bilateral lower limb gross imotor deficits are present. Lower limb sensation is impaired. Graylon Kyle Psychological: Affect normal. Mood normal. Memory intact. Wound Ischial Right Wound #1 07/31/20 (Active)   Wound Image   02/16/21 0918   Wound Etiology Pressure Stage 4 02/16/21 0918   Dressing Status Breakthrough drainage noted 02/16/21 0918   Cleansed Cleansed with saline 02/16/21 0918   Dressing/Treatment Negative pressure wound therapy 07/31/20 0904   Wound Length (cm) 5 cm 02/16/21 0918   Wound Width (cm) 5 cm 02/16/21 0918   Wound Depth (cm) 0.4 cm 02/16/21 0918   Wound Surface Area (cm^2) 25 cm^2 02/16/21 0918   Change in Wound Size % 37.5 02/16/21 0918   Wound Volume (cm^3) 10 cm^3 02/16/21 0918   Wound Healing % 86 02/16/21 0918   Undermining Starts ___ O'Clock 12 o'clock 02/02/21 0903   Undermining Ends ___ O'Clock 2 o'clock 02/02/21 0903   Undermining Maximum Distance (cm) 0.8 cm 02/02/21 0903   Wound Assessment Granulation tissue;Slough 02/16/21 0918   Drainage Amount Moderate 02/16/21 0918   Drainage Description Serosanguinous 02/16/21 0918   Wound Odor Mild 02/16/21 0918   Honey-Wound/Incision Assessment Non-Blanchable erythema 02/16/21 0918   Edges Attached edges; Unattached edges 02/02/21 0903   Wound Thickness Description Full thickness 02/02/21 0029   Number of days: 200       Wound Sacral/coccyx Posterior Wound #2 07/31/20 (Active)   Wound Image   02/16/21 0918   Wound Etiology Pressure Stage 4 02/16/21 0918   Dressing Status Breakthrough drainage noted 02/16/21 0918   Cleansed Cleansed with saline 02/16/21 0918   Dressing/Treatment Open to air 09/04/20 0943   Wound Length (cm) 2.5 cm 02/16/21 0918   Wound Width (cm) 2.5 cm 02/16/21 0918   Wound Depth (cm) 1 cm 02/16/21 0918   Wound Surface Area (cm^2) 6.25 cm^2 02/16/21 0918   Change in Wound Size % 16.67 02/16/21 0918   Wound Volume (cm^3) 6.25 cm^3 02/16/21 0918   Wound Healing % -2741 02/16/21 0918   Undermining Starts ___ O'Clock 11 o'clock 02/02/21 0903   Undermining Ends ___ O'Clock 4 o'clock 02/02/21 0903   Undermining Maximum Distance (cm) 3.1 cm 02/02/21 0903   Drainage Amount Large 02/16/21 0918   Drainage Description Serosanguinous 02/16/21 0918   Wound Odor None 02/16/21 0918   Honey-Wound/Incision Assessment Non-Blanchable erythema 02/16/21 0918   Edges Unattached edges 02/02/21 0903   Wound Thickness Description Full thickness 02/02/21 0903   Number of days: 200       Wound Ankle Right;Lateral #3 02/02/21 (Active)   Wound Image   02/16/21 0918   Wound Etiology Pressure Unstageable 02/16/21 0918   Cleansed Cleansed with saline 02/16/21 0918   Dressing/Treatment Betadine swabs/Povidone Iodine 02/16/21 0918   Wound Length (cm) 1.4 cm 02/16/21 0918   Wound Width (cm) 1 cm 02/16/21 0918   Wound Surface Area (cm^2) 1.4 cm^2 02/16/21 0918   Change in Wound Size % 59.42 02/16/21 0918   Wound Assessment Eschar dry 02/16/21 0918   Drainage Amount None 02/16/21 0918   Wound Odor None 02/16/21 0918   Honey-Wound/Incision Assessment Intact 02/16/21 0918   Edges Attached edges 02/02/21 0903   Number of days: 14       Wound Heel Lateral;Plantar #4 02/02/21 (Active)   Wound Image   02/16/21 0918   Wound Etiology Pressure Unstageable 02/16/21 0918   Cleansed Cleansed with saline 02/16/21 4844 Dressing/Treatment Betadine swabs/Povidone Iodine 02/16/21 0918   Wound Length (cm) 4.5 cm 02/16/21 0918   Wound Width (cm) 4.5 cm 02/16/21 0918   Wound Surface Area (cm^2) 20.25 cm^2 02/16/21 0918   Change in Wound Size % 4.48 02/16/21 0918   Wound Assessment Eschar dry 02/16/21 0918   Drainage Amount None 02/16/21 0918   Honey-Wound/Incision Assessment Non-Blanchable erythema 02/02/21 0903   Edges Attached edges 02/16/21 0918   Number of days: 14                       Data Review     All Micro Results     None        All Micro Results     None        Radiology  Assessment:     Patient Active Problem List   Diagnosis Code    GLENROY (acute kidney injury) ([facility] N17.9    Pressure injury of sacral region, stage 4 (HCC) L89.154    Transaminitis R74.01    Acute metabolic encephalopathy Q96.46    Multiple sclerosis ([facility] G35    Protein calorie malnutrition ([facility] E46    Leukocytosis D72.829    Choledocholithiasis K80.50    Osteomyelitis of sacroiliac region ([facility] M46.28    Sepsis ([facility] A41.9    Coagulase negative Staphylococcus bacteremia R78.81, B95.7    Hypokalemia E87.6    Hypomagnesemia E83.42     Plan: Active Orders   Nursing    WOUND CARE, DRESSING CHANGE     Frequency: ONE TIME     Number of Occurrences: 1 Occurrences     Order Comments: Sacral and Right Ischial Wound:  Cleanse with Normal Saline  Apply Silver Alginate to Wound Bed, Cover with ABD, Secure with Tape or Bordered Foam.  Change Every Other Day or PRN Loose or Soiled. Needs DAILY PERINEAL skin care for stool incontinence.     Right Heel and Right Ankle:  Cleanse wound and periwound with wound cleanser or normal saline. Apply Betadine DAILY.      Offloading Boots for Heel Protection  Pressure Relieving Mattress: reposition every 2 hours minimal  Eat High Protein Diet     [facility] for Wound Care. Discontinue NPWT.   Hospice Referral awaiting call from [address] [place] [address]     Follow up With Specialties Details Why Contact Info    [address] In 1 week For wound re-check [address] [facility] [address] [phone]        Overall, her skin pressure ulcers appear to be  and in better condition. Her right heel eschar is starting to demarcate but not ready for debridement yet. She wants to stay on her MS medication for fear that she will lose mobility and lose ADL function. We will continue with home health. She will call the pharmacy and get her antibiotic delivered today. Recheck in one month. Dictated using voice recognition software. Proofread, but unrecognized errors may exist.       Level of MDM (Based on 2/3 elements below)  Number and Complexity of Problems Addressed Amount and/or Complexity of Data to be Reviewed and Analyzed  *Each unique test, order, or document contributes to the combination of 2 or combination of 3 in Category 1 below.  Risk of Complications and/or Morbidity or Mortality of pt Management     75887  04901 SF Minimal  1self-limited or minor problem Minimal or none Minimal risk of morbidity from additional diagnostic testing or Rx   93819  22318 Low Low  2or more self-limited or minor problems;    or  1stable chronic illness;    or  [id number], uncomplicated illness or injury   Limited  (Must meet the requirements of at least 1 of the 2 categories)  Category 1: Tests and documents   Any combination of 2 from the following:  Review of prior external note(s) from each unique source*;  review of the result(s) of each unique test*;   ordering of each unique test*    or   Category 2: Assessment requiring an independent historian(s)  (For the categories of independent interpretation of tests and discussion of management or test interpretation, see moderate or high) Low risk of morbidity from additional diagnostic testing or treatment     06612  01798 Mod Moderate  1or more chronic illnesses with exacerbation, progression, or side effects of treatment;    or  2or more stable chronic illnesses;    or      1undiagnosed new problem with uncertain prognosis;    or  1acute illness with systemic symptoms;    or  1FTIQH complicated injury   Moderate:  (Must meet the requirements of 1 out of 3 categories)    Category 1: Tests, documents, or independent historian(s)  Any combination of 3 from the following:   Review of prior external note(s) from each unique source*;  Review of the result(s) of each unique test*;  Ordering of each unique test*;  Assessment requiring an independent historian(s)    or  Category 2: Independent interpretation of tests   Independent interpretation of a test performed by another physician/other qualified health care professional (not separately reported);     or  Category 3: Discussion of management or test interpretation  Discussion of management or test interpretation with external physician/other qualified health care professional/appropriate source (not separately reported)   Moderate risk of morbidity from additional diagnostic testing or treatment  Examples only:  Prescription drug management   Decision regarding minor surgery with identified patient or procedure risk factors  Decision regarding elective major surgery without identified patient or procedure risk factors   Diagnosis or treatment significantly limited by social determinants of health       34403  22244 High High  1or more chronic illnesses with severe exacerbation, progression, or side effects of treatment;    or  1 acute or chronic illness or injury that poses a threat to life or bodily function   Extensive  (Must meet the requirements of at least 2 out of 3 categories)    Category 1: Tests, documents, or independent historian(s)  Any combination of 3 from the following:   Review of prior external note(s) from each unique source*;  Review of the result(s) of each unique test*;   Ordering of each unique test*; Assessment requiring an independent historian(s)    or   Category 2: Independent interpretation of tests   Independent interpretation of a test performed by another physician/other qualified health care professional (not separately reported);     or  Category 3: Discussion of management or test interpretation  Discussion of management or test interpretation with external physician/other qualified health care professional/appropriate source (not separately reported)   High risk of morbidity from additional diagnostic testing or treatment  Examples only:  Drug therapy requiring intensive monitoring for toxicity  Decision regarding elective major surgery with identified patient or procedure risk factors  Decision regarding emergency major surgery  Decision regarding hospitalization  Decision not to resuscitate or to de-escalate care because of poor prognosis       Thank you very much for the opportunity to participate in this patient's care.       Signed By: David Bowens MD     February 16, 2021

## 2021-02-17 LAB
BACTERIA SPEC CULT: ABNORMAL
BACTERIA SPEC CULT: ABNORMAL
SERVICE CMNT-IMP: ABNORMAL

## 2021-03-03 ENCOUNTER — HOSPITAL ENCOUNTER (INPATIENT)
Age: 61
LOS: 28 days | Discharge: HOME HEALTH | DRG: 698 | End: 2021-03-31
Attending: EMERGENCY MEDICINE | Admitting: FAMILY MEDICINE
Payer: MEDICARE

## 2021-03-03 DIAGNOSIS — L89.154 PRESSURE INJURY OF SACRAL REGION, STAGE 4 (HCC): ICD-10-CM

## 2021-03-03 DIAGNOSIS — L89.159 PRESSURE INJURY OF SKIN OF SACRAL REGION, UNSPECIFIED INJURY STAGE: ICD-10-CM

## 2021-03-03 DIAGNOSIS — N39.0 URINARY TRACT INFECTION ASSOCIATED WITH INDWELLING URETHRAL CATHETER, INITIAL ENCOUNTER (HCC): Primary | ICD-10-CM

## 2021-03-03 DIAGNOSIS — T83.511A URINARY TRACT INFECTION ASSOCIATED WITH INDWELLING URETHRAL CATHETER, INITIAL ENCOUNTER (HCC): Primary | ICD-10-CM

## 2021-03-03 PROBLEM — F32.A DEPRESSION: Status: ACTIVE | Noted: 2020-09-10

## 2021-03-03 PROBLEM — Z86.718 HISTORY OF DVT OF LOWER EXTREMITY: Status: ACTIVE | Noted: 2021-03-03

## 2021-03-03 PROBLEM — K21.9 GASTROESOPHAGEAL REFLUX DISEASE WITHOUT ESOPHAGITIS: Status: ACTIVE | Noted: 2020-08-08

## 2021-03-03 PROBLEM — R53.1 GENERALIZED WEAKNESS: Status: ACTIVE | Noted: 2021-03-03

## 2021-03-03 PROBLEM — T07.XXXA MULTIPLE WOUNDS: Status: ACTIVE | Noted: 2020-09-10

## 2021-03-03 PROBLEM — I10 ESSENTIAL HYPERTENSION: Status: ACTIVE | Noted: 2020-09-10

## 2021-03-03 PROBLEM — F41.9 ANXIETY DISORDER: Status: ACTIVE | Noted: 2020-08-08

## 2021-03-03 LAB
ALBUMIN SERPL-MCNC: 3.8 G/DL (ref 3.5–5)
ALBUMIN/GLOB SERPL: 0.9 {RATIO} (ref 1.2–3.5)
ALP SERPL-CCNC: 134 U/L (ref 50–130)
ALT SERPL-CCNC: 33 U/L (ref 12–65)
ANION GAP SERPL CALC-SCNC: 8 MMOL/L (ref 7–16)
AST SERPL-CCNC: 23 U/L (ref 15–37)
BACTERIA URNS QL MICRO: ABNORMAL /HPF
BASOPHILS # BLD: 0 K/UL (ref 0–0.2)
BASOPHILS NFR BLD: 0 % (ref 0–2)
BILIRUB SERPL-MCNC: 0.2 MG/DL (ref 0.2–1.1)
BUN SERPL-MCNC: 29 MG/DL (ref 6–23)
CALCIUM SERPL-MCNC: 10.3 MG/DL (ref 8.3–10.4)
CASTS URNS QL MICRO: 0 /LPF
CHLORIDE SERPL-SCNC: 106 MMOL/L (ref 98–107)
CO2 SERPL-SCNC: 28 MMOL/L (ref 21–32)
CREAT SERPL-MCNC: 0.49 MG/DL (ref 0.6–1)
CRYSTALS URNS QL MICRO: 0 /LPF
DIFFERENTIAL METHOD BLD: ABNORMAL
EOSINOPHIL # BLD: 0.1 K/UL (ref 0–0.8)
EOSINOPHIL NFR BLD: 1 % (ref 0.5–7.8)
EPI CELLS #/AREA URNS HPF: ABNORMAL /HPF
ERYTHROCYTE [DISTWIDTH] IN BLOOD BY AUTOMATED COUNT: 20.6 % (ref 11.9–14.6)
GLOBULIN SER CALC-MCNC: 4.4 G/DL (ref 2.3–3.5)
GLUCOSE SERPL-MCNC: 100 MG/DL (ref 65–100)
HCT VFR BLD AUTO: 44.6 % (ref 35.8–46.3)
HGB BLD-MCNC: 13.7 G/DL (ref 11.7–15.4)
IMM GRANULOCYTES # BLD AUTO: 0 K/UL (ref 0–0.5)
IMM GRANULOCYTES NFR BLD AUTO: 0 % (ref 0–5)
LACTATE SERPL-SCNC: 1.1 MMOL/L (ref 0.4–2)
LYMPHOCYTES # BLD: 0.3 K/UL (ref 0.5–4.6)
LYMPHOCYTES NFR BLD: 6 % (ref 13–44)
MAGNESIUM SERPL-MCNC: 1.9 MG/DL (ref 1.8–2.4)
MCH RBC QN AUTO: 25 PG (ref 26.1–32.9)
MCHC RBC AUTO-ENTMCNC: 30.7 G/DL (ref 31.4–35)
MCV RBC AUTO: 81.5 FL (ref 79.6–97.8)
MONOCYTES # BLD: 0.4 K/UL (ref 0.1–1.3)
MONOCYTES NFR BLD: 7 % (ref 4–12)
MUCOUS THREADS URNS QL MICRO: 0 /LPF
NEUTS SEG # BLD: 4.7 K/UL (ref 1.7–8.2)
NEUTS SEG NFR BLD: 85 % (ref 43–78)
NRBC # BLD: 0 K/UL (ref 0–0.2)
OTHER OBSERVATIONS,UCOM: ABNORMAL
PLATELET # BLD AUTO: 511 K/UL (ref 150–450)
PMV BLD AUTO: 11.3 FL (ref 9.4–12.3)
POTASSIUM SERPL-SCNC: 4.4 MMOL/L (ref 3.5–5.1)
PROT SERPL-MCNC: 8.2 G/DL (ref 6.3–8.2)
RBC # BLD AUTO: 5.47 M/UL (ref 4.05–5.2)
RBC #/AREA URNS HPF: >100 /HPF
SODIUM SERPL-SCNC: 142 MMOL/L (ref 136–145)
WBC # BLD AUTO: 5.5 K/UL (ref 4.3–11.1)
WBC URNS QL MICRO: ABNORMAL /HPF

## 2021-03-03 PROCEDURE — 83735 ASSAY OF MAGNESIUM: CPT

## 2021-03-03 PROCEDURE — 74011000258 HC RX REV CODE- 258: Performed by: EMERGENCY MEDICINE

## 2021-03-03 PROCEDURE — 65270000029 HC RM PRIVATE

## 2021-03-03 PROCEDURE — 81015 MICROSCOPIC EXAM OF URINE: CPT

## 2021-03-03 PROCEDURE — 74011250636 HC RX REV CODE- 250/636: Performed by: FAMILY MEDICINE

## 2021-03-03 PROCEDURE — 74011000258 HC RX REV CODE- 258: Performed by: FAMILY MEDICINE

## 2021-03-03 PROCEDURE — 87088 URINE BACTERIA CULTURE: CPT

## 2021-03-03 PROCEDURE — 74011250636 HC RX REV CODE- 250/636: Performed by: EMERGENCY MEDICINE

## 2021-03-03 PROCEDURE — 80053 COMPREHEN METABOLIC PANEL: CPT

## 2021-03-03 PROCEDURE — 93005 ELECTROCARDIOGRAM TRACING: CPT | Performed by: EMERGENCY MEDICINE

## 2021-03-03 PROCEDURE — 87186 SC STD MICRODIL/AGAR DIL: CPT

## 2021-03-03 PROCEDURE — 74011250637 HC RX REV CODE- 250/637: Performed by: FAMILY MEDICINE

## 2021-03-03 PROCEDURE — 99285 EMERGENCY DEPT VISIT HI MDM: CPT

## 2021-03-03 PROCEDURE — 96365 THER/PROPH/DIAG IV INF INIT: CPT

## 2021-03-03 PROCEDURE — 83605 ASSAY OF LACTIC ACID: CPT

## 2021-03-03 PROCEDURE — 87086 URINE CULTURE/COLONY COUNT: CPT

## 2021-03-03 PROCEDURE — 87040 BLOOD CULTURE FOR BACTERIA: CPT

## 2021-03-03 PROCEDURE — 85025 COMPLETE CBC W/AUTO DIFF WBC: CPT

## 2021-03-03 RX ORDER — SAME BUTANEDISULFONATE/BETAINE 400-600 MG
250 POWDER IN PACKET (EA) ORAL 2 TIMES DAILY
Status: DISCONTINUED | OUTPATIENT
Start: 2021-03-03 | End: 2021-03-31 | Stop reason: HOSPADM

## 2021-03-03 RX ORDER — VENLAFAXINE 75 MG/1
150 TABLET ORAL DAILY
Status: DISCONTINUED | OUTPATIENT
Start: 2021-03-04 | End: 2021-03-04

## 2021-03-03 RX ORDER — SERTRALINE HYDROCHLORIDE 100 MG/1
100 TABLET, FILM COATED ORAL DAILY
COMMUNITY
End: 2021-03-31

## 2021-03-03 RX ORDER — TRAMADOL HYDROCHLORIDE 50 MG/1
50 TABLET ORAL
Status: DISCONTINUED | OUTPATIENT
Start: 2021-03-03 | End: 2021-03-28

## 2021-03-03 RX ORDER — ACETAMINOPHEN 650 MG/1
650 SUPPOSITORY RECTAL
Status: DISCONTINUED | OUTPATIENT
Start: 2021-03-03 | End: 2021-03-31 | Stop reason: HOSPADM

## 2021-03-03 RX ORDER — AMANTADINE HYDROCHLORIDE 100 MG/1
100 CAPSULE, GELATIN COATED ORAL 2 TIMES DAILY
Status: DISCONTINUED | OUTPATIENT
Start: 2021-03-03 | End: 2021-03-31 | Stop reason: HOSPADM

## 2021-03-03 RX ORDER — PROMETHAZINE HYDROCHLORIDE 25 MG/1
12.5 TABLET ORAL
Status: DISCONTINUED | OUTPATIENT
Start: 2021-03-03 | End: 2021-03-31 | Stop reason: HOSPADM

## 2021-03-03 RX ORDER — BACLOFEN 10 MG/1
20 TABLET ORAL 3 TIMES DAILY
Status: DISCONTINUED | OUTPATIENT
Start: 2021-03-03 | End: 2021-03-20

## 2021-03-03 RX ORDER — LANOLIN ALCOHOL/MO/W.PET/CERES
400 CREAM (GRAM) TOPICAL 2 TIMES DAILY
Status: DISCONTINUED | OUTPATIENT
Start: 2021-03-03 | End: 2021-03-03

## 2021-03-03 RX ORDER — ASPIRIN 81 MG/1
81 TABLET ORAL DAILY
Status: DISCONTINUED | OUTPATIENT
Start: 2021-03-04 | End: 2021-03-31 | Stop reason: HOSPADM

## 2021-03-03 RX ORDER — ONDANSETRON 2 MG/ML
4 INJECTION INTRAMUSCULAR; INTRAVENOUS
Status: DISCONTINUED | OUTPATIENT
Start: 2021-03-03 | End: 2021-03-31 | Stop reason: HOSPADM

## 2021-03-03 RX ORDER — SODIUM CHLORIDE 0.9 % (FLUSH) 0.9 %
5-40 SYRINGE (ML) INJECTION EVERY 8 HOURS
Status: DISCONTINUED | OUTPATIENT
Start: 2021-03-03 | End: 2021-03-31 | Stop reason: HOSPADM

## 2021-03-03 RX ORDER — ACETAMINOPHEN 325 MG/1
650 TABLET ORAL
Status: DISCONTINUED | OUTPATIENT
Start: 2021-03-03 | End: 2021-03-31 | Stop reason: HOSPADM

## 2021-03-03 RX ORDER — FAMOTIDINE 20 MG/1
20 TABLET, FILM COATED ORAL 2 TIMES DAILY
Status: DISCONTINUED | OUTPATIENT
Start: 2021-03-03 | End: 2021-03-31 | Stop reason: HOSPADM

## 2021-03-03 RX ORDER — SENNOSIDES 8.6 MG/1
2 TABLET ORAL 2 TIMES DAILY
Status: DISCONTINUED | OUTPATIENT
Start: 2021-03-03 | End: 2021-03-31 | Stop reason: HOSPADM

## 2021-03-03 RX ORDER — SODIUM CHLORIDE, SODIUM LACTATE, POTASSIUM CHLORIDE, CALCIUM CHLORIDE 600; 310; 30; 20 MG/100ML; MG/100ML; MG/100ML; MG/100ML
100 INJECTION, SOLUTION INTRAVENOUS CONTINUOUS
Status: DISCONTINUED | OUTPATIENT
Start: 2021-03-03 | End: 2021-03-11

## 2021-03-03 RX ORDER — POLYETHYLENE GLYCOL 3350 17 G/17G
17 POWDER, FOR SOLUTION ORAL DAILY
Status: DISCONTINUED | OUTPATIENT
Start: 2021-03-04 | End: 2021-03-31 | Stop reason: HOSPADM

## 2021-03-03 RX ORDER — MELATONIN
1000 DAILY
Status: DISCONTINUED | OUTPATIENT
Start: 2021-03-04 | End: 2021-03-31 | Stop reason: HOSPADM

## 2021-03-03 RX ORDER — PREGABALIN 50 MG/1
50 CAPSULE ORAL DAILY
Status: DISCONTINUED | OUTPATIENT
Start: 2021-03-04 | End: 2021-03-31 | Stop reason: HOSPADM

## 2021-03-03 RX ORDER — POLYETHYLENE GLYCOL 3350 17 G/17G
17 POWDER, FOR SOLUTION ORAL DAILY PRN
Status: DISCONTINUED | OUTPATIENT
Start: 2021-03-03 | End: 2021-03-31 | Stop reason: HOSPADM

## 2021-03-03 RX ORDER — HYDROXYZINE 25 MG/1
25 TABLET, FILM COATED ORAL
Status: DISCONTINUED | OUTPATIENT
Start: 2021-03-03 | End: 2021-03-03

## 2021-03-03 RX ORDER — OXYCODONE HYDROCHLORIDE 5 MG/1
10 TABLET ORAL
Status: DISCONTINUED | OUTPATIENT
Start: 2021-03-03 | End: 2021-03-31 | Stop reason: HOSPADM

## 2021-03-03 RX ORDER — SODIUM CHLORIDE 0.9 % (FLUSH) 0.9 %
5-40 SYRINGE (ML) INJECTION AS NEEDED
Status: DISCONTINUED | OUTPATIENT
Start: 2021-03-03 | End: 2021-03-31 | Stop reason: HOSPADM

## 2021-03-03 RX ADMIN — Medication 10 ML: at 18:16

## 2021-03-03 RX ADMIN — SODIUM CHLORIDE, SODIUM LACTATE, POTASSIUM CHLORIDE, AND CALCIUM CHLORIDE 100 ML/HR: 600; 310; 30; 20 INJECTION, SOLUTION INTRAVENOUS at 21:17

## 2021-03-03 RX ADMIN — BACLOFEN 20 MG: 10 TABLET ORAL at 21:17

## 2021-03-03 RX ADMIN — RDII 250 MG CAPSULE 250 MG: at 17:30

## 2021-03-03 RX ADMIN — SODIUM CHLORIDE, SODIUM LACTATE, POTASSIUM CHLORIDE, AND CALCIUM CHLORIDE 1000 ML: 600; 310; 30; 20 INJECTION, SOLUTION INTRAVENOUS at 13:37

## 2021-03-03 RX ADMIN — MEROPENEM 500 MG: 500 INJECTION, POWDER, FOR SOLUTION INTRAVENOUS at 21:16

## 2021-03-03 RX ADMIN — CEFTRIAXONE 1 G: 1 INJECTION, POWDER, FOR SOLUTION INTRAMUSCULAR; INTRAVENOUS at 14:37

## 2021-03-03 RX ADMIN — SENNOSIDES 17.2 MG: 8.6 TABLET, FILM COATED ORAL at 21:17

## 2021-03-03 RX ADMIN — AMANTADINE HYDROCHLORIDE 100 MG: 100 CAPSULE ORAL at 21:17

## 2021-03-03 RX ADMIN — Medication 10 ML: at 21:19

## 2021-03-03 RX ADMIN — FAMOTIDINE 20 MG: 20 TABLET, FILM COATED ORAL at 17:30

## 2021-03-03 RX ADMIN — APIXABAN 5 MG: 5 TABLET, FILM COATED ORAL at 21:17

## 2021-03-03 RX ADMIN — OXYCODONE 10 MG: 5 TABLET ORAL at 17:30

## 2021-03-03 RX ADMIN — Medication 1 AMPULE: at 21:17

## 2021-03-03 NOTE — H&P
HOSPITALIST H&P/CONSULT  NAME:  Andrez Darden   Age:  62 y.o.  :   1960   MRN:   [id number]  PCP: Ava Paredes MD  Consulting MD:  Treatment Team: Attending Provider: Angelina Hand DO; : Diana Nava Primary Nurse: Bryant Erickson RN  HPI:   Patient is a 62year old CF with a PMhx of multiple sclerosis, multiple chronic wounds presented to the ED d/t back pain and concern for urinary tract infection. Pt stated she didn't want to come to the ED but her home health nurse made her. She lives by herself, mostly is bedridden, uses a wheelchair at times and stated that she had a wasserman placed months ago due to multiple sacral ulcers. Her urine had been cloudy and home health came this Am and found her on the ground covering in feces so EMS was called. She denies fever, chills, SOB, chest pain, abdominal pain, N/V, diarrhea or constipation. In ED, , hypertensive. UA c/w UTI. CBC and CMP unremarkable at baseline. LA wnl. She was given 1L LR and Ceftriaxone. Complete ROS done and is as stated in HPI or otherwise negative  Past Medical History:   Diagnosis Date    Decubitus ulcer of ischial area     Hypertension     Kidney infection     Menopause     MS (multiple sclerosis) ([facility] [address]       Past Surgical History:   Procedure Laterality Date    COLONOSCOPY N/A 2020    COLONOSCOPY /Room 424 performed by Carleen Wallace MD at [facility] ENDOSCOPY    ERCP  5/15/2020         HX CHOLECYSTECTOMY  2020      Prior to Admission Medications   Prescriptions Last Dose Informant Patient Reported? Taking? Saccharomyces boulardii (Florastor) 250 mg capsule   Yes No   Sig: Take 250 mg by mouth two (2) times a day. amantadine HCL (SYMMETREL) 100 mg capsule   Yes No   Sig: Take 100 mg by mouth two (2) times a day.    apixaban (Eliquis DVT-PE Treat 30D Start) 5 mg (74 tabs) starter pack   No No   Sig: Take 10 mg (two 5 mg tablets) by mouth twice a day for 7 days   Followed by 5 mg (one 5 mg tablet) by mouth twice a day   aspirin delayed-release 81 mg tablet   Yes No   Sig: Take 81 mg by mouth daily. baclofen (LIORESAL) 20 mg tablet   Yes No   Sig: Take 20 mg by mouth three (3) times daily. cholecalciferol (VITAMIN D3) 25 mcg (1,000 unit) cap   Yes No   Sig: Take 1,000 Units by mouth daily. fingolimod 0.5 mg cap   No No   Sig: Take 0.5 mg by mouth daily. hydrOXYzine HCL (ATARAX) 25 mg tablet   Yes No   Sig: Take 25 mg by mouth every eight (8) hours as needed. iron polysaccharides (NIFEREX) 150 mg iron capsule   Yes No   Sig: Take 150 mg by mouth daily. magnesium oxide (MAG-OX) 400 mg tablet   Yes No   Sig: Take 400 mg by mouth two (2) times a day. melatonin 5 mg cap capsule   Yes No   Sig: Take 5 mg by mouth nightly. multivitamin (ONE A DAY) tablet   Yes No   Sig: Take 1 Tab by mouth daily. nitrofurantoin, macrocrystal-monohydrate, (Macrobid) 100 mg capsule   Yes No   Sig: Take 100 mg by mouth two (2) times a day. oxyCODONE IR (ROXICODONE) 10 mg tab immediate release tablet   Yes No   Sig: Take 10 mg by mouth every six (6) hours as needed for Pain.   polyethylene glycol (Miralax) 17 gram/dose powder   Yes No   Sig: Take 17 g by mouth daily. pregabalin (LYRICA) 50 mg capsule   Yes No   Sig: Take 50 mg by mouth daily. Give 75mg at HS   senna (Senna) 8.6 mg tablet   Yes No   Sig: Take 2 Tabs by mouth two (2) times a day. traMADoL (ULTRAM) 50 mg tablet   Yes No   Sig: Take 50 mg by mouth every six (6) hours as needed for Pain. venlafaxine (EFFEXOR) 75 mg tablet   Yes No   Sig: Take 150 mg by mouth daily.       Facility-Administered Medications: None     Allergies   Allergen Reactions    Latex Rash    Norco [Hydrocodone-Acetaminophen] Other (comments)     Sees dead people    Pcn [Penicillins] Swelling      Social History     Tobacco Use    Smoking status: Former Smoker    Smokeless tobacco: Never Used   Substance Use Topics    Alcohol use: Not Currently      Family History Problem Relation Age of Onset    Breast Cancer Mother       Objective:     Visit Vitals  /71   Pulse (!) 109   Temp 98 °F (36.7 °C)   Resp 16   Ht 5' 7\" (1.702 m)   Wt 45.4 kg (100 lb)   SpO2 95%   BMI 15.66 kg/m²      Temp (24hrs), Av.2 °F (36.8 °C), Min:98 °F (36.7 °C), Max:98.5 °F (36.9 °C)    Oxygen Therapy  O2 Sat (%): 95 % (21)  Pulse via Oximetry: 103 beats per minute (21 1630)  O2 Device: Room air (21)  Physical Exam:  General:    Alert, cooperative, no distress, appears older than stated age. Frail and chronically ill appearing  Head:   Normocephalic, without obvious abnormality, atraumatic. Nose:  Nares normal. No drainage or sinus tenderness. Lungs:   Clear to auscultation bilaterally. No Wheezing or Rhonchi. No rales. Heart:   Regular rate and rhythm,  no murmur, rub or gallop. Abdomen:   Soft, non-tender. Not distended. Bowel sounds normal.   Extremities: No cyanosis. No edema. No clubbing  Skin:     Multiple ulcerated lesions on sacral region without drainage, pink tissue without surrounding cellulits. Dry black eschar on R heel.   Neurologic: Alert and oriented x 3, no focal deficits         Data Review:   Recent Results (from the past 24 hour(s))   LACTIC ACID    Collection Time: 21  1:22 PM   Result Value Ref Range    Lactic acid 1.1 0.4 - 2.0 MMOL/L   CBC WITH AUTOMATED DIFF    Collection Time: 21  1:22 PM   Result Value Ref Range    WBC 5.5 4.3 - 11.1 K/uL    RBC 5.47 (H) 4.05 - 5.2 M/uL    HGB 13.7 11.7 - 15.4 g/dL    HCT 44.6 35.8 - 46.3 %    MCV 81.5 79.6 - 97.8 FL    MCH 25.0 (L) 26.1 - 32.9 PG    MCHC 30.7 (L) 31.4 - 35.0 g/dL    RDW 20.6 (H) 11.9 - 14.6 %    PLATELET 457 (H) 618 - 450 K/uL    MPV 11.3 9.4 - 12.3 FL    ABSOLUTE NRBC 0.00 0.0 - 0.2 K/uL    DF AUTOMATED      NEUTROPHILS 85 (H) 43 - 78 %    LYMPHOCYTES 6 (L) 13 - 44 %    MONOCYTES 7 4.0 - 12.0 %    EOSINOPHILS 1 0.5 - 7.8 %    BASOPHILS 0 0.0 - 2.0 %    IMMATURE GRANULOCYTES 0 0.0 - 5.0 %    ABS. NEUTROPHILS 4.7 1.7 - 8.2 K/UL    ABS. LYMPHOCYTES 0.3 (L) 0.5 - 4.6 K/UL    ABS. MONOCYTES 0.4 0.1 - 1.3 K/UL    ABS. EOSINOPHILS 0.1 0.0 - 0.8 K/UL    ABS. BASOPHILS 0.0 0.0 - 0.2 K/UL    ABS. IMM. GRANS. 0.0 0.0 - 0.5 K/UL   METABOLIC PANEL, COMPREHENSIVE    Collection Time: 03/03/21  1:22 PM   Result Value Ref Range    Sodium 142 136 - 145 mmol/L    Potassium 4.4 3.5 - 5.1 mmol/L    Chloride 106 98 - 107 mmol/L    CO2 28 21 - 32 mmol/L    Anion gap 8 7 - 16 mmol/L    Glucose 100 65 - 100 mg/dL    BUN 29 (H) 6 - 23 MG/DL    Creatinine 0.49 (L) 0.6 - 1.0 MG/DL    GFR est AA >60 >60 ml/min/1.73m2    GFR est non-AA >60 >60 ml/min/1.73m2    Calcium 10.3 8.3 - 10.4 MG/DL    Bilirubin, total 0.2 0.2 - 1.1 MG/DL    ALT (SGPT) 33 12 - 65 U/L    AST (SGOT) 23 15 - 37 U/L    Alk. phosphatase 134 (H) 50 - 130 U/L    Protein, total 8.2 6.3 - 8.2 g/dL    Albumin 3.8 3.5 - 5.0 g/dL    Globulin 4.4 (H) 2.3 - 3.5 g/dL    A-G Ratio 0.9 (L) 1.2 - 3.5     MAGNESIUM    Collection Time: 03/03/21  1:22 PM   Result Value Ref Range    Magnesium 1.9 1.8 - 2.4 mg/dL   URINE MICROSCOPIC    Collection Time: 03/03/21  1:49 PM   Result Value Ref Range    WBC  0 /hpf    RBC >100 0 /hpf    Epithelial cells 3-5 0 /hpf    Bacteria 3+ (H) 0 /hpf    Casts 0 0 /lpf    Crystals, urine 0 0 /LPF    Mucus 0 0 /lpf    Other observations RESULTS VERIFIED MANUALLY       Imaging /Procedures /Studies   US RETROPERITONEUM COMP    (Results Pending)       Assessment and Plan:      Active Hospital Problems    Diagnosis Date Noted    Generalized weakness 03/03/2021    Acute UTI 03/03/2021    History of DVT of lower extremity 03/03/2021    Depression 09/10/2020    Essential hypertension 09/10/2020    Multiple wounds 09/10/2020    Anxiety disorder 08/08/2020    Gastroesophageal reflux disease without esophagitis 08/08/2020    Sepsis ([facility] [address] 07/23/2020    Pressure injury of sacral region, stage 4 ([facility] [address] 05/13/2020    Protein calorie malnutrition ([facility] [address] 05/13/2020    Multiple sclerosis ([facility] [address] 05/13/2020       PLAN:    Sepsis   Acute UTI  Meets sepsis criteria with HR>100 and HR>20. UA c/w UTI. Previous hx of ESBL Klebsiella and Pseudomonas UTI  Abx: meropenem (3/3-. ..)  Cont home probiotics  Replace wasserman  mIVF  Renal US to r/o obstruction  F/u UCx, BCx    Generalized weakness  Hx of MS and most likely bed-bound at baseline  PT/OT to assist    Multiple wounds, decubitus ulcers stage 3  Eschar lesion on R heel  Wound care consulted    Multiple sclerosis  Cont home baclofen and amantadine    Anxiety  Cont home Effexor and pregabalin    GERD  Cont home pepcid    Hx of DVT  Per chart review she was started on Eliquis for DVT in 12/11/2020  Cont home Eliquis    DVT PPx: Eliquis  Code Status: DNR--pt stated that being intubated and CPR will hurt and if she gets to that point, just let her go. Anticipated discharge: >2 midnights. PT/OT ordered.     Signed By: Wendie Sheldon DO     March 3, 2021

## 2021-03-03 NOTE — ED PROVIDER NOTES
Mask was worn during the entire patient examination. Anastasia Aiken is a 62 y.o. female who presents to the ED with a chief complaint of concern for urinary tract infection. Patient has history of multiple sclerosis and has mobility issues. She is on her back a lot and does have a sacral decubitus ulcer as well as a Mathew catheter. Her catheter had been cloudy and home health came this morning and found her on the ground. She lives by herself. She was covered in feces as well. Patient reports pain to the sacral area which is somewhat chronic but 9 out of 10. She does follow with wound care. The history is provided by the patient. Urinary Odor   Pertinent negatives include no chills, no nausea, no vomiting and no abdominal pain.         Past Medical History:   Diagnosis Date    Decubitus ulcer of ischial area     Hypertension     Kidney infection     Menopause     MS (multiple sclerosis) ([facility] [address]        Past Surgical History:   Procedure Laterality Date    COLONOSCOPY N/A 6/29/2020    COLONOSCOPY /Room 18 performed by Prateek Ching MD at [facility] ENDOSCOPY    ERCP  5/15/2020         HX CHOLECYSTECTOMY  05/18/2020         Family History:   Problem Relation Age of Onset    Breast Cancer Mother        Social History     Socioeconomic History    Marital status:      Spouse name: Not on file    Number of children: Not on file    Years of education: Not on file    Highest education level: Not on file   Occupational History    Not on file   Social Needs    Financial resource strain: Not on file    Food insecurity     Worry: Not on file     Inability: Not on file    Transportation needs     Medical: Not on file     Non-medical: Not on file   Tobacco Use    Smoking status: Former Smoker    Smokeless tobacco: Never Used   Substance and Sexual Activity    Alcohol use: Not Currently    Drug use: Not Currently    Sexual activity: Not on file   Lifestyle    Physical activity     Days per week: Not on file     Minutes per session: Not on file    Stress: Not on file   Relationships    Social connections     Talks on phone: Not on file     Gets together: Not on file     Attends Christian service: Not on file     Active member of club or organization: Not on file     Attends meetings of clubs or organizations: Not on file     Relationship status: Not on file    Intimate partner violence     Fear of current or ex partner: Not on file     Emotionally abused: Not on file     Physically abused: Not on file     Forced sexual activity: Not on file   Other Topics Concern     Service Not Asked    Blood Transfusions Not Asked    Caffeine Concern Not Asked    Occupational Exposure Not Asked   Halle Curlin Hazards Not Asked    Sleep Concern Not Asked    Stress Concern Not Asked    Weight Concern Not Asked    Special Diet Not Asked    Back Care Not Asked    Exercise Not Asked    Bike Helmet Not Asked   [address] Not Asked    Self-Exams Not Asked   Social History Narrative    Not on file         ALLERGIES: Latex, Norco [hydrocodone-acetaminophen], and Pcn [penicillins]    Review of Systems   Constitutional: Negative for chills and fever. Respiratory: Negative for cough, chest tightness, shortness of breath, wheezing and stridor. Cardiovascular: Negative for chest pain and palpitations. Gastrointestinal: Negative for abdominal pain, diarrhea, nausea and vomiting. Musculoskeletal: Negative for arthralgias, neck pain and neck stiffness. Skin: Negative for color change, pallor and wound. Neurological: Negative for weakness and numbness. All other systems reviewed and are negative. Vitals:    03/03/21 1224   BP: (!) 161/78   Pulse: (!) 104   Resp: 18   Temp: 98.1 °F (36.7 °C)   SpO2: 100%   Weight: 45.4 kg (100 lb)   Height: 5' 7\" (1.702 m)            Physical Exam  Vitals signs and nursing note reviewed. Constitutional:       General: She is not in acute distress. Appearance: Normal appearance. She is well-developed. She is ill-appearing. She is not toxic-appearing or diaphoretic. HENT:      Head: Normocephalic and atraumatic. Eyes:      General: No scleral icterus. Conjunctiva/sclera: Conjunctivae normal.   Neck:      Musculoskeletal: Normal range of motion. No neck rigidity or muscular tenderness. Cardiovascular:      Rate and Rhythm: Normal rate and regular rhythm. Heart sounds: No murmur. No friction rub. No gallop. Pulmonary:      Effort: Pulmonary effort is normal. No respiratory distress. Breath sounds: Normal breath sounds. No stridor. No wheezing, rhonchi or rales. Abdominal:      General: There is no distension. Tenderness: There is no abdominal tenderness. There is no guarding or rebound. Skin:     Capillary Refill: Capillary refill takes less than 2 seconds. Comments: There is large sacral decubiti that I compared to wound management. It now appears more inflamed and erythematous. There is minimal drainage present. Wound is covered in feces currently and does have foul odor. Neurological:      General: No focal deficit present. Mental Status: She is alert and oriented to person, place, and time. Mental status is at baseline. Psychiatric:         Mood and Affect: Mood normal.         Behavior: Behavior normal.          MDM  Number of Diagnoses or Management Options  Diagnosis management comments: Patient has urinary tract infection. With poor ability to self care sacral decubiti appear to be worsening. Home health aide found her on the ground this morning feces was covered on the decubital wound. Surprisingly her blood work looked okay but we are giving IV antibiotics and case management has contacted APS for evaluation. I have contacted hospitalist for evaluation. Saira Rudolph MD; 3/3/2021 @2:53 PM Voice dictation software was used during the making of this note.   This software is not perfect and grammatical and other typographical errors may be present. This note has not been proofread for errors.  ===================================================================          Amount and/or Complexity of Data Reviewed  Clinical lab tests: ordered and reviewed (Results for orders placed or performed during the hospital encounter of 03/03/21  -LACTIC ACID       Result                      Value             Ref Range           Lactic acid                 1.1               0.4 - 2.0 MM*  -CBC WITH AUTOMATED DIFF       Result                      Value             Ref Range           WBC                         5.5               4.3 - 11.1 K*       RBC                         5.47 (H)          4.05 - 5.2 M*       HGB                         13.7              11.7 - 15.4 *       HCT                         44.6              35.8 - 46.3 %       MCV                         81.5              79.6 - 97.8 *       MCH                         25.0 (L)          26.1 - 32.9 *       MCHC                        30.7 (L)          31.4 - 35.0 *       RDW                         20.6 (H)          11.9 - 14.6 %       PLATELET                    511 (H)           150 - 450 K/*       MPV                         11.3              9.4 - 12.3 FL       ABSOLUTE NRBC               0.00              0.0 - 0.2 K/*       DF                          AUTOMATED                             NEUTROPHILS                 85 (H)            43 - 78 %           LYMPHOCYTES                 6 (L)             13 - 44 %           MONOCYTES                   7                 4.0 - 12.0 %        EOSINOPHILS                 1                 0.5 - 7.8 %         BASOPHILS                   0                 0.0 - 2.0 %         IMMATURE GRANULOCYTES       0                 0.0 - 5.0 %         ABS. NEUTROPHILS            4.7               1.7 - 8.2 K/*       ABS. LYMPHOCYTES            0.3 (L)           0.5 - 4.6 K/*       ABS.  MONOCYTES              0.4 0.1 - 1.3 K/*       ABS. EOSINOPHILS            0.1               0.0 - 0.8 K/*       ABS. BASOPHILS              0.0               0.0 - 0.2 K/*       ABS. IMM. GRANS.            0.0               0.0 - 0.5 K/*  -METABOLIC PANEL, COMPREHENSIVE       Result                      Value             Ref Range           Sodium                      142               136 - 145 mm*       Potassium                   4.4               3.5 - 5.1 mm*       Chloride                    106               98 - 107 mmo*       CO2                         28                21 - 32 mmol*       Anion gap                   8                 7 - 16 mmol/L       Glucose                     100               65 - 100 mg/*       BUN                         29 (H)            6 - 23 MG/DL        Creatinine                  0.49 (L)          0.6 - 1.0 MG*       GFR est AA                  >60               >60 ml/min/1*       GFR est non-AA              >60               >60 ml/min/1*       Calcium                     10.3              8.3 - 10.4 M*       Bilirubin, total            0.2               0.2 - 1.1 MG*       ALT (SGPT)                  33                12 - 65 U/L         AST (SGOT)                  23                15 - 37 U/L         Alk.  phosphatase            134 (H)           50 - 130 U/L        Protein, total              8.2               6.3 - 8.2 g/*       Albumin                     3.8               3.5 - 5.0 g/*       Globulin                    4.4 (H)           2.3 - 3.5 g/*       A-G Ratio                   0.9 (L)           1.2 - 3.5     )  Tests in the radiology section of CPT®: ordered and reviewed           Procedures

## 2021-03-03 NOTE — ED NOTES
Patient brought from home where she lives alone with home health only coming 3 days a week, states she is completely bed confined with wasserman that was \"placed months ago\". Patient with sediment within urine and on wall of tubing. Patient unsure of last time it was emptied. Patient advises home health was suppose to come yesterday but did not so they called EMS when they found patient at this time. EMS started IV with fluids and blood cultures drawn.
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
## 2021-03-03 NOTE — ED NOTES
TRANSFER - OUT REPORT:    Verbal report given to Aleksandar Galicia on Tomas Hatchet  being transferred to 4th floor for routine progression of care       Report consisted of patients Situation, Background, Assessment and   Recommendations(SBAR). Information from the following report(s) SBAR was reviewed with the receiving nurse. Lines:   Peripheral IV 03/03/21 Right Antecubital (Active)       Peripheral IV 03/03/21 Left Forearm (Active)        Opportunity for questions and clarification was provided.       Patient transported with:   Registered Nurse

## 2021-03-04 ENCOUNTER — APPOINTMENT (OUTPATIENT)
Dept: ULTRASOUND IMAGING | Age: 61
DRG: 698 | End: 2021-03-04
Attending: FAMILY MEDICINE
Payer: MEDICARE

## 2021-03-04 LAB
ANION GAP SERPL CALC-SCNC: 8 MMOL/L (ref 7–16)
BUN SERPL-MCNC: 28 MG/DL (ref 6–23)
CALCIUM SERPL-MCNC: 8.9 MG/DL (ref 8.3–10.4)
CHLORIDE SERPL-SCNC: 106 MMOL/L (ref 98–107)
CO2 SERPL-SCNC: 27 MMOL/L (ref 21–32)
CREAT SERPL-MCNC: 0.29 MG/DL (ref 0.6–1)
ERYTHROCYTE [DISTWIDTH] IN BLOOD BY AUTOMATED COUNT: 19.9 % (ref 11.9–14.6)
GLUCOSE SERPL-MCNC: 90 MG/DL (ref 65–100)
HCT VFR BLD AUTO: 33 % (ref 35.8–46.3)
HGB BLD-MCNC: 10.2 G/DL (ref 11.7–15.4)
MAGNESIUM SERPL-MCNC: 1.9 MG/DL (ref 1.8–2.4)
MCH RBC QN AUTO: 25.2 PG (ref 26.1–32.9)
MCHC RBC AUTO-ENTMCNC: 30.9 G/DL (ref 31.4–35)
MCV RBC AUTO: 81.5 FL (ref 79.6–97.8)
NRBC # BLD: 0 K/UL (ref 0–0.2)
PLATELET # BLD AUTO: 421 K/UL (ref 150–450)
PMV BLD AUTO: 11.1 FL (ref 9.4–12.3)
POTASSIUM SERPL-SCNC: 3.6 MMOL/L (ref 3.5–5.1)
RBC # BLD AUTO: 4.05 M/UL (ref 4.05–5.2)
SODIUM SERPL-SCNC: 141 MMOL/L (ref 136–145)
WBC # BLD AUTO: 6.2 K/UL (ref 4.3–11.1)

## 2021-03-04 PROCEDURE — 74011250636 HC RX REV CODE- 250/636: Performed by: FAMILY MEDICINE

## 2021-03-04 PROCEDURE — 76770 US EXAM ABDO BACK WALL COMP: CPT

## 2021-03-04 PROCEDURE — 97535 SELF CARE MNGMENT TRAINING: CPT

## 2021-03-04 PROCEDURE — 36415 COLL VENOUS BLD VENIPUNCTURE: CPT

## 2021-03-04 PROCEDURE — 83735 ASSAY OF MAGNESIUM: CPT

## 2021-03-04 PROCEDURE — 97161 PT EVAL LOW COMPLEX 20 MIN: CPT

## 2021-03-04 PROCEDURE — 97166 OT EVAL MOD COMPLEX 45 MIN: CPT

## 2021-03-04 PROCEDURE — 74011000258 HC RX REV CODE- 258: Performed by: FAMILY MEDICINE

## 2021-03-04 PROCEDURE — 74011250637 HC RX REV CODE- 250/637: Performed by: FAMILY MEDICINE

## 2021-03-04 PROCEDURE — 97530 THERAPEUTIC ACTIVITIES: CPT

## 2021-03-04 PROCEDURE — 86580 TB INTRADERMAL TEST: CPT | Performed by: INTERNAL MEDICINE

## 2021-03-04 PROCEDURE — 74011000302 HC RX REV CODE- 302: Performed by: INTERNAL MEDICINE

## 2021-03-04 PROCEDURE — 85027 COMPLETE CBC AUTOMATED: CPT

## 2021-03-04 PROCEDURE — 80048 BASIC METABOLIC PNL TOTAL CA: CPT

## 2021-03-04 PROCEDURE — 65270000029 HC RM PRIVATE

## 2021-03-04 RX ADMIN — MEROPENEM 500 MG: 500 INJECTION, POWDER, FOR SOLUTION INTRAVENOUS at 21:58

## 2021-03-04 RX ADMIN — SODIUM CHLORIDE, SODIUM LACTATE, POTASSIUM CHLORIDE, AND CALCIUM CHLORIDE 100 ML/HR: 600; 310; 30; 20 INJECTION, SOLUTION INTRAVENOUS at 08:36

## 2021-03-04 RX ADMIN — Medication 81 MG: at 08:29

## 2021-03-04 RX ADMIN — TUBERCULIN PURIFIED PROTEIN DERIVATIVE 5 UNITS: 5 INJECTION, SOLUTION INTRADERMAL at 10:38

## 2021-03-04 RX ADMIN — FAMOTIDINE 20 MG: 20 TABLET, FILM COATED ORAL at 17:36

## 2021-03-04 RX ADMIN — VITAMIN D, TAB 1000IU (100/BT) 1000 UNITS: 25 TAB at 08:29

## 2021-03-04 RX ADMIN — FAMOTIDINE 20 MG: 20 TABLET, FILM COATED ORAL at 08:29

## 2021-03-04 RX ADMIN — RDII 250 MG CAPSULE 250 MG: at 08:29

## 2021-03-04 RX ADMIN — APIXABAN 5 MG: 5 TABLET, FILM COATED ORAL at 08:29

## 2021-03-04 RX ADMIN — MEROPENEM 500 MG: 500 INJECTION, POWDER, FOR SOLUTION INTRAVENOUS at 03:27

## 2021-03-04 RX ADMIN — Medication 10 ML: at 22:04

## 2021-03-04 RX ADMIN — AMANTADINE HYDROCHLORIDE 100 MG: 100 CAPSULE ORAL at 08:29

## 2021-03-04 RX ADMIN — BACLOFEN 20 MG: 10 TABLET ORAL at 17:36

## 2021-03-04 RX ADMIN — SENNOSIDES 17.2 MG: 8.6 TABLET, FILM COATED ORAL at 08:29

## 2021-03-04 RX ADMIN — AMANTADINE HYDROCHLORIDE 100 MG: 100 CAPSULE ORAL at 21:56

## 2021-03-04 RX ADMIN — OXYCODONE 10 MG: 5 TABLET ORAL at 13:55

## 2021-03-04 RX ADMIN — SENNOSIDES 17.2 MG: 8.6 TABLET, FILM COATED ORAL at 17:36

## 2021-03-04 RX ADMIN — POLYETHYLENE GLYCOL 3350 17 G: 17 POWDER, FOR SOLUTION ORAL at 08:29

## 2021-03-04 RX ADMIN — MEROPENEM 500 MG: 500 INJECTION, POWDER, FOR SOLUTION INTRAVENOUS at 08:30

## 2021-03-04 RX ADMIN — Medication 10 ML: at 13:57

## 2021-03-04 RX ADMIN — RDII 250 MG CAPSULE 250 MG: at 17:36

## 2021-03-04 RX ADMIN — APIXABAN 5 MG: 5 TABLET, FILM COATED ORAL at 21:56

## 2021-03-04 RX ADMIN — Medication 1 AMPULE: at 21:55

## 2021-03-04 RX ADMIN — BACLOFEN 20 MG: 10 TABLET ORAL at 08:29

## 2021-03-04 RX ADMIN — OXYCODONE 10 MG: 5 TABLET ORAL at 08:29

## 2021-03-04 RX ADMIN — Medication 1 AMPULE: at 08:29

## 2021-03-04 RX ADMIN — SODIUM CHLORIDE, SODIUM LACTATE, POTASSIUM CHLORIDE, AND CALCIUM CHLORIDE 100 ML/HR: 600; 310; 30; 20 INJECTION, SOLUTION INTRAVENOUS at 17:39

## 2021-03-04 RX ADMIN — PREGABALIN 50 MG: 50 CAPSULE ORAL at 08:29

## 2021-03-04 RX ADMIN — MEROPENEM 500 MG: 500 INJECTION, POWDER, FOR SOLUTION INTRAVENOUS at 14:31

## 2021-03-04 RX ADMIN — BACLOFEN 20 MG: 10 TABLET ORAL at 21:57

## 2021-03-04 NOTE — PROGRESS NOTES
Call to [address] [place] [address], spoke with Wanda aBrnes who advises that the patient has an open case with investigator Stanley Moore ([phone]).      Adrianne Suh, [facility]    [facility]    * [email or web address]

## 2021-03-04 NOTE — PROGRESS NOTES
Progress Note    Patient: James Messer MRN: [id number]  SSN: [id number]    YOB: 1960  Age: 62 y.o. Sex: female      Admit Date: 3/3/2021    LOS: 1 day     Hospital problems     Principal Problem:    Acute UTI (3/3/2021)    Active Problems:    Pressure injury of sacral region, stage 4 ([address] (5/13/2020)      Multiple sclerosis (HCC) (5/13/2020)      Protein calorie malnutrition ([address] (5/13/2020)      Sepsis ([address] (7/23/2020)      Generalized weakness (3/3/2021)      Depression (9/10/2020)      Anxiety disorder (8/8/2020)      Essential hypertension (9/10/2020)      Gastroesophageal reflux disease without esophagitis (8/8/2020)      Multiple wounds (9/10/2020)      History of DVT of lower extremity (3/3/2021)        Assessment and Plan:   62year old CF with a PMhx of multiple sclerosis, multiple chronic wounds presented to the ED d/t back pain and concern for urinary tract infection. Sepsis   Acute UTI  Previous hx of ESBL Klebsiella and Pseudomonas UTI  Continue meropenem  Follow UCx and BCx  US without signs of obstruction     Generalized weakness  Hx of MS and most likely bed-bound at baseline  PT/OT to assist     Multiple wounds, decubitus ulcers stage 3  Eschar lesion on R heel  Wound care consulted     Multiple sclerosis  Cont home baclofen and amantadine     Anxiety  Cont home Effexor and pregabalin     GERD  Cont home pepcid     Hx of DVT  Per chart review she was started on Eliquis for DVT in 12/11/2020  Cont home Eliquis     DVT PPx: Eliquis    Subjective:   62year old CF with a PMhx of multiple sclerosis, multiple chronic wounds presented to the ED d/t back pain and concern for urinary tract infection. Pt stated she didn't want to come to the ED but her home health nurse made her. She lives by herself, mostly is bedridden, uses a wheelchair at times and stated that she had a wasserman placed months ago due to multiple sacral ulcers.  Her urine had been cloudy and home health came this Am and found her on the ground covering in feces so EMS was called. Patient seen and examined at bedside. This morning states feeling better, but tired, denies chest pain, no SOB, no abdominal pain, no nausea.      Objective:     Vitals:    03/04/21 0342 03/04/21 0836 03/04/21 1144 03/04/21 1537   BP:  107/67 (!) 107/53 120/75   Pulse:  95 92 94   Resp:  18 18    Temp:  97.9 °F (36.6 °C) 98.2 °F (36.8 °C) 98.2 °F (36.8 °C)   SpO2:  96% 96% 96%   Weight: 45.1 kg (99 lb 8 oz)      Height:            Intake and Output:  Current Shift: 03/04 0701 - 03/04 1900  In: -   Out: 400 [Urine:400]  Last three shifts: 03/02 1901 - 03/04 0700  In: 1050 [I.V.:1050]  Out: 300 [Urine:300]    ROS  10 ROS negative except from stated on subjective    Physical Exam:   General: Alert, oriented, NAD  HEENT: NC/AT, EOM are intact  Neck: supple, no JVD  Cardiovascular: RRR, S1, S2, no murmurs  Respiratory: Lungs are clear, no wheezes or rales  Abdomen: Soft, NT, ND  Back: No CVA tenderness, no paraspinal tenderness  Extremities: LE without pedal edema, no erythema  Neuro: A&O, CN are intact, no focal deficits  Skin: no rash or ulcers  Psych: good mood and affect    Lab/Data Review:  I have personally reviewed patients laboratory data showing  Recent Results (from the past 24 hour(s))   METABOLIC PANEL, BASIC    Collection Time: 03/04/21  5:23 AM   Result Value Ref Range    Sodium 141 136 - 145 mmol/L    Potassium 3.6 3.5 - 5.1 mmol/L    Chloride 106 98 - 107 mmol/L    CO2 27 21 - 32 mmol/L    Anion gap 8 7 - 16 mmol/L    Glucose 90 65 - 100 mg/dL    BUN 28 (H) 6 - 23 MG/DL    Creatinine 0.29 (L) 0.6 - 1.0 MG/DL    GFR est AA >60 >60 ml/min/1.73m2    GFR est non-AA >60 >60 ml/min/1.73m2    Calcium 8.9 8.3 - 10.4 MG/DL   MAGNESIUM    Collection Time: 03/04/21  5:23 AM   Result Value Ref Range    Magnesium 1.9 1.8 - 2.4 mg/dL   CBC W/O DIFF    Collection Time: 03/04/21  5:23 AM   Result Value Ref Range    WBC 6.2 4.3 - 11.1 K/uL    RBC 4.05 4.05 - 5.2 M/uL    HGB 10.2 (L) 11.7 - 15.4 g/dL    HCT 33.0 (L) 35.8 - 46.3 %    MCV 81.5 79.6 - 97.8 FL    MCH 25.2 (L) 26.1 - 32.9 PG    MCHC 30.9 (L) 31.4 - 35.0 g/dL    RDW 19.9 (H) 11.9 - 14.6 %    PLATELET 639 292 - 716 K/uL    MPV 11.1 9.4 - 12.3 FL    ABSOLUTE NRBC 0.00 0.0 - 0.2 K/uL        Image:  I have personally reviewed patients imaging showing  US RETROPERITONEUM COMP   Final Result   Normal-appearing kidneys. I have reviewed, updated, and verified this note's content and spent 38 minutes of my 42 minutes visit performing counseling and coordination of care regarding medical management.        Signed By: Esther Steinberg MD     March 4, 2021

## 2021-03-04 NOTE — WOUND CARE
Patient seen for several wounds that are chronic, patient is being followed in the OP wound center. Her right heel dry eschar is close to being able to peel off. It measured 4.5x4x0 cm. Will paint with betadine daily. Sacrum wound 3.0x2.2x0.5 cm with purple rim. Draining moderate serosanguinous drainage. Right ischial wound is 5.5x5x0.5 cm. Ischial wound did bleed small amount with dressing change. Base pale pink and one area 1cm dark red. Cleansed sacral and ischial wound and packed loosely with Opticel Ag. Covered with ABD and paper tape. Patient updated on plan. Ordered low air loss bed to be delivered today. Keep turned side to side as much as possible. Float heels off bed. May benefit from using heel boots bilaterally. Dressings to be done daily for now. Wound team will monitor and adjust treatment as needed.

## 2021-03-04 NOTE — PROGRESS NOTES
Problem: Mobility Impaired (Adult and Pediatric)  Goal: *Acute Goals and Plan of Care (Insert Text)  Outcome: Progressing Towards Goal  Note: Goals:  (1.)Ms. [facility] will move from supine to sit and sit to supine with MODERATE ASSIST within 2-5 treatment day(s). (2.)Ms. [facility] will demonstrate sitting EOB with minimal assist/trunk support for 1 min within 2-5 treatment day(s). (3.)Ms. [facility] will perform rolling L<>R with mod assist within 2-5 treatment day(s). PHYSICAL THERAPY: Initial Assessment 3/4/2021  INPATIENT: PT Visit Days : 1  [place] / [place] / Product Type: Medicare /       NAME/AGE/GENDER: Nicole Santana is a 62 y.o. female   PRIMARY DIAGNOSIS: Acute UTI [N39.0]  Generalized weakness [R53.1] Acute UTI Acute UTI        ICD-10: Treatment Diagnosis:    Generalized Muscle Weakness (M62.81)  Other abnormalities of gait and mobility (R26.89)   Precaution/Allergies:  Latex, Norco [hydrocodone-acetaminophen], and Pcn [penicillins]      ASSESSMENT:     Ms. YULIA PATEL presents with history of MS, multiple chronic wounds admitted with UTI. She lives alone. Primarily bedridden although she reports that she uses scooter at times and has support from her sister and ex . She does not know the year. She is a poor and inconsistent historian and it is impossible to get a clear picture of her history and functional ability. Information was obtained thru chart review. She is familiar to this department from prior admission in July 2020. She has little to no AROM B LEs. Max assist for LEs for rolling; she does assist during rolling with trunk and UEs. Total assist for supine to sit. Sitting is painful due to chronic sacral wounds. Complex case. Adult protective services is involved. Co -treat with OT. Social work is working on short term rehab-SNF. This therapist feels that patient needs 24 hour care/ long term placement.  Will follow for 2-3 sessions for PT trial and then reassess. This section established at most recent assessment   PROBLEM LIST (Impairments causing functional limitations):  Decreased Strength  Decreased ADL/Functional Activities  Decreased Transfer Abilities  Decreased Ambulation Ability/Technique  Decreased Balance  Increased Pain  Decreased Activity Tolerance  Decreased Flexibility/Joint Mobility  Decreased Cognition   INTERVENTIONS PLANNED: (Benefits and precautions of physical therapy have been discussed with the patient.)  Bed Mobility  Neuromuscular Re-education/Strengthening  Range of Motion (ROM)  Therapeutic Activites  Therapeutic Exercise/Strengthening  Transfer Training     TREATMENT PLAN: Frequency/Duration: 3-4 times a week for duration of hospital stay  Rehabilitation Potential For Stated Goals:  guarded     REHAB RECOMMENDATIONS (at time of discharge pending progress):    Placement: It is my opinion, based on this patient's performance to date, that Ms. Jackelyn Aldridge may benefit from participating in 1-2 additional therapy sessions in order to continue to assess for rehab potential and then make recommendation for disposition at discharge. Needs long term placement  Equipment:   None at this time              HISTORY:   History of Present Injury/Illness (Reason for Referral):  Patient is a 62year old CF with a PMhx of multiple sclerosis, multiple chronic wounds presented to the ED d/t back pain and concern for urinary tract infection. Pt stated she didn't want to come to the ED but her home health nurse made her. She lives by herself, mostly is bedridden, uses a wheelchair at times and stated that she had a wasserman placed months ago due to multiple sacral ulcers. Her urine had been cloudy and home health came this Am and found her on the ground covering in feces so EMS was called. She denies fever, chills, SOB, chest pain, abdominal pain, N/V, diarrhea or constipation. In ED, , hypertensive. UA c/w UTI.  CBC and CMP unremarkable at baseline. LA wnl. She was given 1L LR and Ceftriaxone  Past Medical History/Comorbidities:   Ms. Jalil Valera  has a past medical history of Decubitus ulcer of ischial area, Hypertension, Kidney infection, Menopause, and MS (multiple sclerosis) ([facility] Ms. Jalil Valera  has a past surgical history that includes ercp (5/15/2020); hx cholecystectomy (05/18/2020); and colonoscopy (N/A, 6/29/2020). Social History/Living Environment:   Home Environment: Private residence  # Steps to Enter: 0  Wheelchair Ramp: Yes  One/Two Story Residence: One story  Living Alone: Yes  Support Systems: Family member(s), Friends \ neighbors  Patient Expects to be Discharged to[de-identified] Skilled nursing facility  Current DME Used/Available at Home: Hospital bed(scooter)  Prior Level of Function/Work/Activity:  Primarily bedridden; She is a poor and inconsistent historian and it is impossible to get a clear picture of her history and functional ability. Information was obtained thru chart review. Number of Personal Factors/Comorbidities that affect the Plan of Care: 3+: HIGH COMPLEXITY   EXAMINATION:   Most Recent Physical Functioning:   Gross Assessment:  AROM: Grossly decreased, non-functional  Strength: Grossly decreased, non-functional  Coordination: Grossly decreased, non-functional               Posture:     Balance:  Sitting: Impaired; With support  Sitting - Static: Poor (constant support)  Sitting - Dynamic: Poor (constant support)  Standing: (did not attempt) Bed Mobility:  Rolling: Maximum assistance  Supine to Sit: Total assistance  Sit to Supine: Total assistance  Scooting:  Total assistance  Wheelchair Mobility:     Transfers:  Sit to Stand: (did not attempt today but patient would be total assist)  Bed to Chair: (did not attempt today but patient would be total assist)  Gait: non ambulatory            Body Structures Involved:  Bones  Joints  Muscles Body Functions Affected:  Genitourinary  Neuromusculoskeletal  Movement Related  Skin Related Activities and Participation Affected:  General Tasks and Demands  Mobility  Self Care   Number of elements that affect the Plan of Care: 4+: HIGH COMPLEXITY   CLINICAL PRESENTATION:   Presentation: Evolving clinical presentation with changing clinical characteristics: MODERATE COMPLEXITY   CLINICAL DECISION MAKIN [facility] Mobility Inpatient Short Form  How much difficulty does the patient currently have. .. Unable A Lot A Little None   1. Turning over in bed (including adjusting bedclothes, sheets and blankets)? [] 1   [x] 2   [] 3   [] 4   2. Sitting down on and standing up from a chair with arms ( e.g., wheelchair, bedside commode, etc.)   [x] 1   [] 2   [] 3   [] 4   3. Moving from lying on back to sitting on the side of the bed? [x] 1   [] 2   [] 3   [] 4   How much help from another person does the patient currently need. .. Total A Lot A Little None   4. Moving to and from a bed to a chair (including a wheelchair)? [x] 1   [] 2   [] 3   [] 4   5. Need to walk in hospital room? [x] 1   [] 2   [] 3   [] 4   6. Climbing 3-5 steps with a railing? [x] 1   [] 2   [] 3   [] 4   © , Trustees of [facility], under license to [employer]. All rights reserved      Score:  Initial: 7 Most Recent: X (Date: -- )    Interpretation of Tool:  Represents activities that are increasingly more difficult (i.e. Bed mobility, Transfers, Gait). Medical Necessity:     Patient demonstrates   guarded   rehab potential due to higher previous functional level. Reason for Services/Other Comments:  Patient   continues to require present interventions due to patient's inability to perform functional mobility independently  .    Use of outcome tool(s) and clinical judgement create a POC that gives a: Clear prediction of patient's progress: LOW COMPLEXITY            TREATMENT:   (In addition to Assessment/Re-Assessment sessions the following treatments were rendered) Pre-treatment Symptoms/Complaints:  sacral pain  Pain: Initial:   Pain Intensity 1: 10  Pain Location 1: Buttocks, Pelvis  Pain Intervention(s) 1: Repositioned  Post Session:  10     Therapeutic Activity: (    12 minutes): Therapeutic activities including repeated bed mobility and rolling activities to assist with wound care to improve mobility and strength. Braces/Orthotics/Lines/Etc:   wasserman catheter  O2 Device: Room air  Treatment/Session Assessment:    Response to Treatment:  tolerated fair  Interdisciplinary Collaboration:   Physical Therapist  Occupational Therapist  Registered Nurse    After treatment position/precautions:   Supine in bed  Bed/Chair-wheels locked  Bed in low position  RN notified  Side rails x 2   Compliance with Program/Exercises: Will assess as treatment progresses  Recommendations/Intent for next treatment session: \"Next visit will focus on advancements to more challenging activities\".   Total Treatment Duration:  PT Patient Time In/Time Out  Time In: 1330  Time Out: Eber Leblanc

## 2021-03-04 NOTE — PROGRESS NOTES
Care Management Interventions  PCP Verified by CM: Yes(Uses neurologist for primary care)  Mode of Transport at Discharge: (EMS)  Transition of Care Consult (CM Consult): Discharge Planning, SNF  MyChart Signup: No  Discharge Durable Medical Equipment: No  Physical Therapy Consult: Yes  Occupational Therapy Consult: Yes  Speech Therapy Consult: No  Current Support Network: Own Home, Family Lives [place], Lives with Caregiver  Confirm Follow Up Transport: (EMS)  The Patient and/or Patient Representative was Provided with a Choice of Provider and Agrees with the Discharge Plan?: Yes  Freedom of Choice List was Provided with Basic Dialogue that Supports the Patient's Individualized Plan of Care/Goals, Treatment Preferences and Shares the Quality Data Associated with the Providers?: Yes  Discharge Location  Discharge Placement: Skilled nursing facility    Patient arrived via EMS from home on 3/3/21. Initial assessment completed by Tea Sarmiento in ED upon arrival.  An Adult Protective Services report was made by Carin Spencer due to concerns about patient's ability to safely care for herself. 1030: This SW met with patient while social distancing w/appropriate PPE. Patient states that her sister Brenda Suggs, [phone]) called EMS yesterday \"against my wishes. \"  Per patient, \"I knew what was going to happen. \"      Patient is completely bed confined and utilizes a scooter to get around her home. Patient's ex- Lars Luna) offers some support to patient. Otherwise, she cares for herself. Patient orders \"a lot of carry out\" for delivery as she doesn't like to cook; therefore, she doesn't require a lot of groceries. Patient reports that she's applied for Meals on Wheels in the past, \"but I never heard anything back. \"  This SW will make referral today to [facility] on Wheels. Patient is current with [facility], and she is receiving PT, OT, RN (wound care) 3x/week.   Discussed possible need for SNF placement upon discharge. Patient is agreeable to this plan and expressed preference for a facility in [place] as her sister lives there. Inpt PT/OT and PPD ordered. Referrals sent via Alberto Flora to [facility], [address] [place] [address], and [email or web address].    0973:  Patient was declined for placement at [address] [place] [address]. 1520:  Phone call to Faisal cline/[employer] at [phone]. She states that she has talked with patient on the phone, but they have not had any face-to-face interaction. However, Joshua Grossman has spoken with patient's sister on the phone. Discussed conditions which led to patient's admission. Joshua Grossman was informed that the hospital is assisting with identification of SNF for short-term rehab. Joshua Grossman would like to be notified once placement has been secured. 1545:  Patient was declined for placement at [facility]. SW will continue to follow.     ARNULFO Mckinnon  [facility]   [phone]

## 2021-03-04 NOTE — PROGRESS NOTES
Problem: Self Care Deficits Care Plan (Adult)  Goal: *Acute Goals and Plan of Care (Insert Text)  Description:   1. Patient will perform grooming in supported sitting with extra time and minimal assistance. 2. Patient will perform self feeding in supported sitting with extra time and set-up assistance (containers only). 3. Patient will perform upper body bathing in supported sitting with extra time and minimal assistance. 4. Patient will participate in 15 + minutes of ADL/ therapeutic exercise/therapeutic activity with min rest breaks to increase activity tolerance for self care. Goals to be achieved in 7 days. Outcome: Progressing Towards Goal     OCCUPATIONAL THERAPY: Initial Assessment, Daily Note, and PM 3/4/2021  INPATIENT: OT Visit Days: 1  [place] / Product Type: Medicare /      NAME/AGE/GENDER: Abhishek Bar is a 62 y.o. female   PRIMARY DIAGNOSIS:  Acute UTI [N39.0]  Generalized weakness [R53.1] Acute UTI Acute UTI        ICD-10: Treatment Diagnosis:    Generalized Muscle Weakness (M62.81)  Other lack of cordination (R27.8)   Precautions/Allergies:     Latex, Norco [hydrocodone-acetaminophen], and Pcn [penicillins]      ASSESSMENT:     Ms. Jason Seth presents with above diagnosis and was seen with PT present. This patient if familiar to me from a previous hospital visit. Her primary diagnosis is MS and it does seem to have progressed since her last visit, 7/20. She had a small sacral wound that has progressed and also has a wound on her right hip. Pt is unable to move her legs from the waist down and is stiff from extreme tone/spasms that cause pain. Pt is a poor historian and says she can do things at home that would be clearly impossible for her to do in her current condition. OT will follow pt for simple self care goals but fear patient might be at her baseline of function.   Pt might benefit from long term care depending on her home situation and how much assistance she has access to. This section established at most recent assessment   PROBLEM LIST (Impairments causing functional limitations):  Decreased Strength  Decreased ADL/Functional Activities  Decreased Transfer Abilities  Decreased Balance  Decreased Activity Tolerance  Decreased Cognition   INTERVENTIONS PLANNED: (Benefits and precautions of occupational therapy have been discussed with the patient.)  Activities of daily living training  Adaptive equipment training  Therapeutic activity  Therapeutic exercise     TREATMENT PLAN: Frequency/Duration: Follow patient 2 times per week to address above goals. Rehabilitation Potential For Stated Goals:  guarded      REHAB RECOMMENDATIONS (at time of discharge pending progress):    Placement: It is my opinion, based on this patient's performance to date, that Ms. Jose Enrique Shetty may benefit from participating in 1-2 additional therapy sessions in order to continue to assess for rehab potential and then make recommendation for disposition at discharge. Equipment:   None at this time              OCCUPATIONAL PROFILE AND HISTORY:   History of Present Injury/Illness (Reason for Referral):  See H&P  Past Medical History/Comorbidities:   Ms. Jose Enrique Shetty  has a past medical history of Decubitus ulcer of ischial area, Hypertension, Kidney infection, Menopause, and MS (multiple sclerosis) ([address] Ms. Jose Enrique Shetty  has a past surgical history that includes ercp (5/15/2020); hx cholecystectomy (05/18/2020); and colonoscopy (N/A, 6/29/2020).   Social History/Living Environment:      Prior Level of Function/Work/Activity:  Poor historian unsure of what she does for herself at home      Number of Personal Factors/Comorbidities that affect the Plan of Care: Expanded review of therapy/medical records (1-2):  MODERATE COMPLEXITY   ASSESSMENT OF OCCUPATIONAL PERFORMANCE[de-identified]   Activities of Daily Living:   Basic ADLs (From Assessment) Complex ADLs (From Assessment)   Feeding: Minimum assistance  Oral Facial Hygiene/Grooming: Moderate assistance  Bathing: Total assistance  Upper Body Dressing: Total assistance  Lower Body Dressing: Total assistance  Toileting: Total assistance     Grooming/Bathing/Dressing Activities of Daily Living     Cognitive Retraining  Safety/Judgement: Awareness of environment                       Bed/Mat Mobility  Rolling: Maximum assistance  Supine to Sit: Total assistance  Sit to Supine: Total assistance  Scooting: Total assistance     Most Recent Physical Functioning:   Gross Assessment:                  Posture:     Balance:  Sitting: Impaired(total assistance to sit edge of bed)  Sitting - Static: Poor (constant support)  Sitting - Dynamic: Poor (constant support)  Standing: (pt no lower extremity movement ) Bed Mobility:  Rolling: Maximum assistance  Supine to Sit: Total assistance  Sit to Supine: Total assistance  Scooting:  Total assistance  Wheelchair Mobility:     Transfers:               Patient Vitals for the past 6 hrs:   BP BP Patient Position SpO2 Pulse   21 1144 (!) 107/53 At rest 96 % 92       Mental Status  Neurologic State: Alert  Orientation Level: Oriented to place, Oriented to person, Disoriented to situation(disoriented to year but knew president)  Cognition: Follows commands, Impaired decision making(poor historian)  Perception: Appears intact  Perseveration: No perseveration noted  Safety/Judgement: Awareness of environment                          Physical Skills Involved:  Balance  Strength  Activity Tolerance  Fine Motor Control  Gross Motor Control  Pain (acute) Cognitive Skills Affected (resulting in the inability to perform in a timely and safe manner):  Executive Function Psychosocial Skills Affected:  Environmental Adaptation   Number of elements that affect the Plan of Care: 5+:  HIGH COMPLEXITY   CLINICAL DECISION MAKIN [facility] [address] [id number] AM-PAC 6 Clicks   Daily Activity Inpatient Short Form  How much help from another person does the patient currently need. .. Total A Lot A Little None   1. Putting on and taking off regular lower body clothing? [x] 1   [] 2   [] 3   [] 4   2. Bathing (including washing, rinsing, drying)? [x] 1   [] 2   [] 3   [] 4   3. Toileting, which includes using toilet, bedpan or urinal?   [x] 1   [] 2   [] 3   [] 4   4. Putting on and taking off regular upper body clothing? [] 1   [x] 2   [] 3   [] 4   5. Taking care of personal grooming such as brushing teeth? [] 1   [x] 2   [] 3   [] 4   6. Eating meals? [] 1   [x] 2   [] 3   [] 4   © 2007, Trustees of [facility], under license to [employer]. All rights reserved      Score:  Initial: 9 Most Recent: X (Date: -- )    Interpretation of Tool:  Represents activities that are increasingly more difficult (i.e. Bed mobility, Transfers, Gait). Use of outcome tool(s) and clinical judgement create a POC that gives a: MODERATE COMPLEXITY         TREATMENT:   (In addition to Assessment/Re-Assessment sessions the following treatments were rendered)     Pre-treatment Symptoms/Complaints:    Pain: Initial:     10 Post Session:  10     Self Care: (10 min): Procedure(s) (per grid) utilized to improve and/or restore self-care/home management as related to  bed mobility for kimberley care . Required moderate verbal, manual, and   cueing to facilitate activities of daily living skills and compensatory activities. OT evaluation completed   Braces/Orthotics/Lines/Etc:   IV  wasserman catheter  O2 Device: Room air  Treatment/Session Assessment:    Response to Treatment:  pt up to edge of bed, tolerated fair. .. increased pain to bottom and poor sitting tolerance even with total support   Interdisciplinary Collaboration:   Physical Therapist  Occupational Therapist  Registered Nurse    After treatment position/precautions:   Supine in bed  Bed/Chair-wheels locked  Bed in low position  Call light within reach  RN notified   Compliance with Program/Exercises: Compliant all of the time, Will assess as treatment progresses. Recommendations/Intent for next treatment session: \"Next visit will focus on reduction in assistance provided\".   Total Treatment Duration:  OT Patient Time In/Time Out  Time In: 1330  Time Out: Isaiah Nguyen

## 2021-03-05 LAB
ANION GAP SERPL CALC-SCNC: 4 MMOL/L (ref 7–16)
BUN SERPL-MCNC: 21 MG/DL (ref 6–23)
CALCIUM SERPL-MCNC: 9 MG/DL (ref 8.3–10.4)
CHLORIDE SERPL-SCNC: 109 MMOL/L (ref 98–107)
CO2 SERPL-SCNC: 29 MMOL/L (ref 21–32)
CREAT SERPL-MCNC: 0.3 MG/DL (ref 0.6–1)
ERYTHROCYTE [DISTWIDTH] IN BLOOD BY AUTOMATED COUNT: 19.9 % (ref 11.9–14.6)
GLUCOSE SERPL-MCNC: 98 MG/DL (ref 65–100)
HCT VFR BLD AUTO: 31.1 % (ref 35.8–46.3)
HGB BLD-MCNC: 9.7 G/DL (ref 11.7–15.4)
MAGNESIUM SERPL-MCNC: 1.8 MG/DL (ref 1.8–2.4)
MCH RBC QN AUTO: 25.5 PG (ref 26.1–32.9)
MCHC RBC AUTO-ENTMCNC: 31.2 G/DL (ref 31.4–35)
MCV RBC AUTO: 81.6 FL (ref 79.6–97.8)
MM INDURATION POC: 0 MM (ref 0–5)
NRBC # BLD: 0 K/UL (ref 0–0.2)
PLATELET # BLD AUTO: 368 K/UL (ref 150–450)
PMV BLD AUTO: 10.8 FL (ref 9.4–12.3)
POTASSIUM SERPL-SCNC: 4 MMOL/L (ref 3.5–5.1)
PPD POC: NEGATIVE NEGATIVE
RBC # BLD AUTO: 3.81 M/UL (ref 4.05–5.2)
SODIUM SERPL-SCNC: 142 MMOL/L (ref 136–145)
WBC # BLD AUTO: 5.6 K/UL (ref 4.3–11.1)

## 2021-03-05 PROCEDURE — 83735 ASSAY OF MAGNESIUM: CPT

## 2021-03-05 PROCEDURE — 65270000029 HC RM PRIVATE

## 2021-03-05 PROCEDURE — 74011000258 HC RX REV CODE- 258: Performed by: FAMILY MEDICINE

## 2021-03-05 PROCEDURE — 85027 COMPLETE CBC AUTOMATED: CPT

## 2021-03-05 PROCEDURE — 36415 COLL VENOUS BLD VENIPUNCTURE: CPT

## 2021-03-05 PROCEDURE — 74011250637 HC RX REV CODE- 250/637: Performed by: FAMILY MEDICINE

## 2021-03-05 PROCEDURE — 74011250636 HC RX REV CODE- 250/636: Performed by: FAMILY MEDICINE

## 2021-03-05 PROCEDURE — 97530 THERAPEUTIC ACTIVITIES: CPT

## 2021-03-05 PROCEDURE — 80048 BASIC METABOLIC PNL TOTAL CA: CPT

## 2021-03-05 RX ORDER — CARBOXYMETHYLCELLULOSE SODIUM 10 MG/ML
2 GEL OPHTHALMIC AS NEEDED
Status: DISCONTINUED | OUTPATIENT
Start: 2021-03-05 | End: 2021-03-31 | Stop reason: HOSPADM

## 2021-03-05 RX ADMIN — APIXABAN 5 MG: 5 TABLET, FILM COATED ORAL at 21:08

## 2021-03-05 RX ADMIN — SODIUM CHLORIDE, SODIUM LACTATE, POTASSIUM CHLORIDE, AND CALCIUM CHLORIDE 100 ML/HR: 600; 310; 30; 20 INJECTION, SOLUTION INTRAVENOUS at 15:12

## 2021-03-05 RX ADMIN — BACLOFEN 20 MG: 10 TABLET ORAL at 15:12

## 2021-03-05 RX ADMIN — PREGABALIN 50 MG: 50 CAPSULE ORAL at 09:14

## 2021-03-05 RX ADMIN — AMANTADINE HYDROCHLORIDE 100 MG: 100 CAPSULE ORAL at 21:08

## 2021-03-05 RX ADMIN — MEROPENEM 500 MG: 500 INJECTION, POWDER, FOR SOLUTION INTRAVENOUS at 03:32

## 2021-03-05 RX ADMIN — POLYETHYLENE GLYCOL 3350 17 G: 17 POWDER, FOR SOLUTION ORAL at 09:04

## 2021-03-05 RX ADMIN — RDII 250 MG CAPSULE 250 MG: at 17:27

## 2021-03-05 RX ADMIN — Medication 10 ML: at 21:14

## 2021-03-05 RX ADMIN — RDII 250 MG CAPSULE 250 MG: at 09:14

## 2021-03-05 RX ADMIN — VITAMIN D, TAB 1000IU (100/BT) 1000 UNITS: 25 TAB at 09:14

## 2021-03-05 RX ADMIN — MEROPENEM 500 MG: 500 INJECTION, POWDER, FOR SOLUTION INTRAVENOUS at 21:08

## 2021-03-05 RX ADMIN — MEROPENEM 500 MG: 500 INJECTION, POWDER, FOR SOLUTION INTRAVENOUS at 09:04

## 2021-03-05 RX ADMIN — APIXABAN 5 MG: 5 TABLET, FILM COATED ORAL at 09:14

## 2021-03-05 RX ADMIN — Medication 1 AMPULE: at 21:08

## 2021-03-05 RX ADMIN — FAMOTIDINE 20 MG: 20 TABLET, FILM COATED ORAL at 09:14

## 2021-03-05 RX ADMIN — Medication 5 ML: at 14:23

## 2021-03-05 RX ADMIN — SODIUM CHLORIDE, SODIUM LACTATE, POTASSIUM CHLORIDE, AND CALCIUM CHLORIDE 100 ML/HR: 600; 310; 30; 20 INJECTION, SOLUTION INTRAVENOUS at 03:35

## 2021-03-05 RX ADMIN — BACLOFEN 20 MG: 10 TABLET ORAL at 21:08

## 2021-03-05 RX ADMIN — FAMOTIDINE 20 MG: 20 TABLET, FILM COATED ORAL at 17:27

## 2021-03-05 RX ADMIN — Medication 81 MG: at 09:14

## 2021-03-05 RX ADMIN — BACLOFEN 20 MG: 10 TABLET ORAL at 09:14

## 2021-03-05 RX ADMIN — Medication 10 ML: at 03:37

## 2021-03-05 RX ADMIN — Medication 1 AMPULE: at 09:14

## 2021-03-05 RX ADMIN — SENNOSIDES 17.2 MG: 8.6 TABLET, FILM COATED ORAL at 09:14

## 2021-03-05 RX ADMIN — MEROPENEM 500 MG: 500 INJECTION, POWDER, FOR SOLUTION INTRAVENOUS at 15:12

## 2021-03-05 RX ADMIN — AMANTADINE HYDROCHLORIDE 100 MG: 100 CAPSULE ORAL at 09:14

## 2021-03-05 RX ADMIN — SENNOSIDES 17.2 MG: 8.6 TABLET, FILM COATED ORAL at 17:27

## 2021-03-05 NOTE — PROGRESS NOTES
Progress Note    Patient: Shirin Singleton MRN: [id number]  SSN: [id number]    YOB: 1960  Age: 62 y.o. Sex: female      Admit Date: 3/3/2021    LOS: 2 days     Hospital problems     Principal Problem:    Acute UTI (3/3/2021)    Active Problems:    Pressure injury of sacral region, stage 4 ([address] (5/13/2020)      Multiple sclerosis (HCC) (5/13/2020)      Protein calorie malnutrition ([address] (5/13/2020)      Sepsis ([address] (7/23/2020)      Generalized weakness (3/3/2021)      Depression (9/10/2020)      Anxiety disorder (8/8/2020)      Essential hypertension (9/10/2020)      Gastroesophageal reflux disease without esophagitis (8/8/2020)      Multiple wounds (9/10/2020)      History of DVT of lower extremity (3/3/2021)        Assessment and Plan:   62year old CF with a PMhx of multiple sclerosis, multiple chronic wounds presented to the ED d/t back pain and concern for urinary tract infection. Sepsis   Acute UTI  Previous hx of ESBL Klebsiella and Pseudomonas UTI  Continue meropenem  Follow UCx - GNR  F/u BCx - NGTD  US without signs of obstruction     Generalized weakness  Hx of MS and most likely bed-bound at baseline  PT/OT to assist     Multiple wounds, decubitus ulcers stage 3  Eschar lesion on R heel  Wound care consulted     Multiple sclerosis  Cont home baclofen and amantadine     Anxiety  Cont home Effexor and pregabalin     GERD  Cont home pepcid     Hx of DVT  Per chart review she was started on Eliquis for DVT in 12/11/2020  Cont home Eliquis     DVT PPx: Eliquis    Subjective:   62year old CF with a PMhx of multiple sclerosis, multiple chronic wounds presented to the ED d/t back pain and concern for urinary tract infection. Pt stated she didn't want to come to the ED but her home health nurse made her. She lives by herself, mostly is bedridden, uses a wheelchair at times and stated that she had a wasserman placed months ago due to multiple sacral ulcers.  Her urine had been cloudy and home health came this Am and found her on the ground covering in feces so EMS was called. Patient seen and examined at bedside. This morning states feeling better, but tired, denies chest pain, no SOB, no abdominal pain, no nausea. Objective:     Vitals:    03/04/21 2350 03/05/21 0329 03/05/21 0800 03/05/21 1149   BP: 112/67 132/71 131/80 126/74   Pulse: 92 87 88 84   Resp: 16 14 16 16   Temp: 97.7 °F (36.5 °C) 98 °F (36.7 °C) 98.1 °F (36.7 °C) 98 °F (36.7 °C)   SpO2: 98% 95% 95% 96%   Weight:       Height:            Intake and Output:  Current Shift: No intake/output data recorded.   Last three shifts: 03/03 1901 - 03/05 0700  In: 2421.7 [I.V.:2421.7]  Out: 700 [Urine:700]    ROS  10 ROS negative except from stated on subjective    Physical Exam:   General: Alert, oriented, NAD  HEENT: NC/AT, EOM are intact  Neck: supple, no JVD  Cardiovascular: RRR, S1, S2, no murmurs  Respiratory: Lungs are clear, no wheezes or rales  Abdomen: Soft, NT, ND  Back: No CVA tenderness, no paraspinal tenderness  Extremities: LE without pedal edema, no erythema  Neuro: A&O, CN are intact, no focal deficits  Skin: no rash or ulcers  Psych: good mood and affect    Lab/Data Review:  I have personally reviewed patients laboratory data showing  Recent Results (from the past 24 hour(s))   METABOLIC PANEL, BASIC    Collection Time: 03/05/21  5:51 AM   Result Value Ref Range    Sodium 142 136 - 145 mmol/L    Potassium 4.0 3.5 - 5.1 mmol/L    Chloride 109 (H) 98 - 107 mmol/L    CO2 29 21 - 32 mmol/L    Anion gap 4 (L) 7 - 16 mmol/L    Glucose 98 65 - 100 mg/dL    BUN 21 6 - 23 MG/DL    Creatinine 0.30 (L) 0.6 - 1.0 MG/DL    GFR est AA >60 >60 ml/min/1.73m2    GFR est non-AA >60 >60 ml/min/1.73m2    Calcium 9.0 8.3 - 10.4 MG/DL   MAGNESIUM    Collection Time: 03/05/21  5:51 AM   Result Value Ref Range    Magnesium 1.8 1.8 - 2.4 mg/dL   CBC W/O DIFF    Collection Time: 03/05/21  5:51 AM   Result Value Ref Range    WBC 5.6 4.3 - 11.1 K/uL    RBC 3.81 (L) 4.05 - 5.2 M/uL    HGB 9.7 (L) 11.7 - 15.4 g/dL    HCT 31.1 (L) 35.8 - 46.3 %    MCV 81.6 79.6 - 97.8 FL    MCH 25.5 (L) 26.1 - 32.9 PG    MCHC 31.2 (L) 31.4 - 35.0 g/dL    RDW 19.9 (H) 11.9 - 14.6 %    PLATELET 261 313 - 235 K/uL    MPV 10.8 9.4 - 12.3 FL    ABSOLUTE NRBC 0.00 0.0 - 0.2 K/uL   PLEASE READ & DOCUMENT PPD TEST IN 24 HRS    Collection Time: 03/05/21  9:28 AM   Result Value Ref Range    PPD Negative Negative    mm Induration 0 0 - 5 mm        Image:  I have personally reviewed patients imaging showing  US RETROPERITONEUM COMP   Final Result   Normal-appearing kidneys. I have reviewed, updated, and verified this note's content and spent 38 minutes of my 42 minutes visit performing counseling and coordination of care regarding medical management.        Signed By: Julia Perdomo MD     March 5, 2021

## 2021-03-05 NOTE — PROGRESS NOTES
1050:  Patient accepted for [facility]. Awaiting d/c orders. 1200: SW received phone call from Aletha Rivera with [facility]. Patient has depleted her Medicare/Private insurance days for SNF placement; therefore, she can no longer d/c to SNF. 1430:  SW follow-up with patient to inform that her Medicare/Private Insurance days for SNF Placement have been depleted. Per patient, \"I've drained everything. \"  Patient shared that she was just at [facility] in January. Discussed applying for Medicaid for potential long-term placement; patient interested. SW'r contacted VERENICE to meet with patient and assist with Medicaid application. She states that her ex- Kerry Wolf) has agreed to move back in with her to provide daily support. However, patient wants to confirm this plan with Kenton Cabrera again. If patient discharges home, then home health services with [facility] will need to be resumed (HH, PT, SW).     ARNULFO Marlowllo   [phone]

## 2021-03-05 NOTE — PROGRESS NOTES
Problem: Mobility Impaired (Adult and Pediatric)  Goal: *Acute Goals and Plan of Care (Insert Text)  Outcome: Progressing Towards Goal  Note: Goals:  (1.)Ms. Jamey Bustillo will move from supine to sit and sit to supine with MODERATE ASSIST within 2-5 treatment day(s). (2.)Ms. Jamey Bustillo will demonstrate sitting EOB with minimal assist/trunk support for 1 min within 2-5 treatment day(s). (3.)Ms. Jamey Bustillo will perform rolling L<>R with mod assist within 2-5 treatment day(s). PHYSICAL THERAPY: Daily Note and AM 3/5/2021  INPATIENT: PT Visit Days : 2  [place] / Product Type: Medicare /       NAME/AGE/GENDER: Benny Montalvo is a 62 y.o. female   PRIMARY DIAGNOSIS: Acute UTI [N39.0]  Generalized weakness [R53.1] Acute UTI Acute UTI       ICD-10: Treatment Diagnosis:    · Generalized Muscle Weakness (M62.81)  · Other abnormalities of gait and mobility (R26.89)   Precaution/Allergies:  Latex, Norco [hydrocodone-acetaminophen], and Pcn [penicillins]      ASSESSMENT:     Ms. Josué Pitts good participation but very deconditioned from hospital stay. Pt will need to have her leg , WC & roho cushion delivered to see if having these tools will allow her to be more functional. Pt's wounds & LE spasticity are her main obstacles. If no help available at home on DC pt may need a rehab stay at a SNF. This section established at most recent assessment   PROBLEM LIST (Impairments causing functional limitations):  1. Decreased Strength  2. Decreased ADL/Functional Activities  3. Decreased Transfer Abilities  4. Decreased Ambulation Ability/Technique  5. Decreased Balance  6. Increased Pain  7. Decreased Activity Tolerance  8. Decreased Flexibility/Joint Mobility  9. Decreased Cognition   INTERVENTIONS PLANNED: (Benefits and precautions of physical therapy have been discussed with the patient.)  1. Bed Mobility  2. Neuromuscular Re-education/Strengthening  3.  Range of Motion (ROM)  4. Therapeutic Activites  5. Therapeutic Exercise/Strengthening  6. Transfer Training     TREATMENT PLAN: Frequency/Duration: daily for duration of hospital stay  Rehabilitation Potential For Stated Goals:  guarded     REHAB RECOMMENDATIONS (at time of discharge pending progress):    Placement: It is my opinion, based on this patient's performance to date, that Ms. Blaze Newell may benefit from participating in 1-2 additional therapy sessions in order to continue to assess for rehab potential and then make recommendation for disposition at discharge. Needs long term placement  Equipment:    None at this time              HISTORY:   History of Present Injury/Illness (Reason for Referral):  Patient is a 62year old CF with a PMhx of multiple sclerosis, multiple chronic wounds presented to the ED d/t back pain and concern for urinary tract infection. Pt stated she didn't want to come to the ED but her home health nurse made her. She lives by herself, mostly is bedridden, uses a wheelchair at times and stated that she had a wasserman placed months ago due to multiple sacral ulcers. Her urine had been cloudy and home health came this Am and found her on the ground covering in feces so EMS was called. She denies fever, chills, SOB, chest pain, abdominal pain, N/V, diarrhea or constipation. In ED, , hypertensive. UA c/w UTI. CBC and CMP unremarkable at baseline. LA wnl. She was given 1L LR and Ceftriaxone  Past Medical History/Comorbidities:   Ms. Blaze Newell  has a past medical history of Decubitus ulcer of ischial area, Hypertension, Kidney infection, Menopause, and MS (multiple sclerosis) ([facility] [address] Ms. Blaze Newell  has a past surgical history that includes ercp (5/15/2020); hx cholecystectomy (05/18/2020); and colonoscopy (N/A, 6/29/2020).   Social History/Living Environment:   Home Environment: Private residence  # Steps to Enter: 0  Wheelchair Ramp: Yes  One/Two Story Residence: One story  Living Alone: Yes  Support Systems: Family member(s), Friends \ neighbors  Patient Expects to be Discharged to[de-identified] Skilled nursing facility  Current DME Used/Available at Home: Hospital bed(scooter)  Prior Level of Function/Work/Activity:  Primarily bedridden; She is a poor and inconsistent historian and it is impossible to get a clear picture of her history and functional ability. Information was obtained thru chart review. Number of Personal Factors/Comorbidities that affect the Plan of Care: 3+: HIGH COMPLEXITY   EXAMINATION:   Most Recent Physical Functioning:   Gross Assessment: 3/5 throughout UE's   0/5 both LE's                       Balance:  Sitting: Impaired; Without support  Sitting - Static: (fair-)  Sitting - Dynamic: (fair- to poor) Bed Mobility:  Rolling: Maximum assistance  Supine to Sit: Maximum assistance  Sit to Supine: Maximum assistance  Scooting: Total assistance  Duration: 23 Minutes(extra time to work through activity noted)       Transfers: NA at this time     Gait: non ambulatory            Body Structures Involved:  1. Bones  2. Joints  3. Muscles Body Functions Affected:  1. Genitourinary  2. Neuromusculoskeletal  3. Movement Related  4. Skin Related Activities and Participation Affected:  1. General Tasks and Demands  2. Mobility  3. Self Care   Number of elements that affect the Plan of Care: 4+: HIGH COMPLEXITY   CLINICAL PRESENTATION:   Presentation: Evolving clinical presentation with changing clinical characteristics: MODERATE COMPLEXITY   CLINICAL DECISION MAKIN [facility] Mobility Inpatient Short Form  How much difficulty does the patient currently have. .. Unable A Lot A Little None   1. Turning over in bed (including adjusting bedclothes, sheets and blankets)? [] 1   [x] 2   [] 3   [] 4   2. Sitting down on and standing up from a chair with arms ( e.g., wheelchair, bedside commode, etc.)   [x] 1   [] 2   [] 3   [] 4   3.   Moving from lying on back to sitting on the side of the bed? [x] 1   [] 2   [] 3   [] 4   How much help from another person does the patient currently need. .. Total A Lot A Little None   4. Moving to and from a bed to a chair (including a wheelchair)? [x] 1   [] 2   [] 3   [] 4   5. Need to walk in hospital room? [x] 1   [] 2   [] 3   [] 4   6. Climbing 3-5 steps with a railing? [x] 1   [] 2   [] 3   [] 4   © 2007, Trustees of [facility], under license to [employer]. All rights reserved      Score:  Initial: 7 Most Recent: X (Date: -- )    Interpretation of Tool:  Represents activities that are increasingly more difficult (i.e. Bed mobility, Transfers, Gait). Medical Necessity:     · Patient demonstrates   · guarded  ·  rehab potential due to higher previous functional level. Reason for Services/Other Comments:  · Patient   · continues to require present interventions due to patient's inability to perform functional mobility independently  · . Use of outcome tool(s) and clinical judgement create a POC that gives a: Clear prediction of patient's progress: LOW COMPLEXITY            TREATMENT:   (In addition to Assessment/Re-Assessment sessions the following treatments were rendered)   Pre-treatment Symptoms/Complaints: \"My butt hurts\"  Pain: Initial: numeric scale  Pain Intensity 1: 3  Pain Location 1: Buttocks  Pain Orientation 1: Medial  Pain Intervention(s) 1: Repositioned  Post Session:  3/0     Therapeutic Activity: (23 Minutes(extra time to work through activity noted)   ):  Therapeutic activities including bed mobility, sitting balance static with & without UE support, dynamic with wt shift R<>L, F<>B, LE ROM & stretching to improve mobility, strength, balance, coordination and dynamic movement of arm - bilateral and core to improve functional stability & endurance.    Braces/Orthotics/Lines/Etc:   · wasserman catheter  · O2 Device: Room air  Treatment/Session Assessment:    · Response to Treatment:  Pt agitated when try to assist pt with sitting balance. · Interdisciplinary Collaboration:   o Registered Nurse  · After treatment position/precautions:   o Supine in bed  o Bed/Chair-wheels locked  o Bed in low position  o RN notified  o Side rails x 3   · Compliance with Program/Exercises: Will assess as treatment progresses  · Recommendations/Intent for next treatment session: \"Next visit will focus on advancements to more challenging activities and reduction in assistance provided\".   Total Treatment Duration:  PT Patient Time In/Time Out  Time In: 1117  Time Out: CHAD Machado

## 2021-03-06 LAB
ANION GAP SERPL CALC-SCNC: 3 MMOL/L (ref 7–16)
BACTERIA SPEC CULT: ABNORMAL
BACTERIA SPEC CULT: ABNORMAL
BUN SERPL-MCNC: 16 MG/DL (ref 6–23)
CALCIUM SERPL-MCNC: 9.1 MG/DL (ref 8.3–10.4)
CHLORIDE SERPL-SCNC: 109 MMOL/L (ref 98–107)
CO2 SERPL-SCNC: 29 MMOL/L (ref 21–32)
CREAT SERPL-MCNC: 0.37 MG/DL (ref 0.6–1)
ERYTHROCYTE [DISTWIDTH] IN BLOOD BY AUTOMATED COUNT: 19.5 % (ref 11.9–14.6)
GLUCOSE SERPL-MCNC: 97 MG/DL (ref 65–100)
HCT VFR BLD AUTO: 34 % (ref 35.8–46.3)
HGB BLD-MCNC: 10.6 G/DL (ref 11.7–15.4)
MCH RBC QN AUTO: 25 PG (ref 26.1–32.9)
MCHC RBC AUTO-ENTMCNC: 31.2 G/DL (ref 31.4–35)
MCV RBC AUTO: 80.2 FL (ref 79.6–97.8)
MM INDURATION POC: 0 MM (ref 0–5)
NRBC # BLD: 0 K/UL (ref 0–0.2)
PLATELET # BLD AUTO: 403 K/UL (ref 150–450)
PMV BLD AUTO: 10.6 FL (ref 9.4–12.3)
POTASSIUM SERPL-SCNC: 3.9 MMOL/L (ref 3.5–5.1)
PPD POC: NEGATIVE NEGATIVE
RBC # BLD AUTO: 4.24 M/UL (ref 4.05–5.2)
SERVICE CMNT-IMP: ABNORMAL
SODIUM SERPL-SCNC: 141 MMOL/L (ref 136–145)
WBC # BLD AUTO: 5.3 K/UL (ref 4.3–11.1)

## 2021-03-06 PROCEDURE — 74011250637 HC RX REV CODE- 250/637: Performed by: FAMILY MEDICINE

## 2021-03-06 PROCEDURE — 65270000029 HC RM PRIVATE

## 2021-03-06 PROCEDURE — 97112 NEUROMUSCULAR REEDUCATION: CPT

## 2021-03-06 PROCEDURE — 80048 BASIC METABOLIC PNL TOTAL CA: CPT

## 2021-03-06 PROCEDURE — 74011000258 HC RX REV CODE- 258: Performed by: FAMILY MEDICINE

## 2021-03-06 PROCEDURE — 85027 COMPLETE CBC AUTOMATED: CPT

## 2021-03-06 PROCEDURE — 97530 THERAPEUTIC ACTIVITIES: CPT

## 2021-03-06 PROCEDURE — 74011250636 HC RX REV CODE- 250/636: Performed by: FAMILY MEDICINE

## 2021-03-06 PROCEDURE — 36415 COLL VENOUS BLD VENIPUNCTURE: CPT

## 2021-03-06 RX ORDER — DIPHENHYDRAMINE HCL 25 MG
25 CAPSULE ORAL
Status: DISCONTINUED | OUTPATIENT
Start: 2021-03-06 | End: 2021-03-06 | Stop reason: CLARIF

## 2021-03-06 RX ADMIN — Medication 81 MG: at 10:05

## 2021-03-06 RX ADMIN — AMANTADINE HYDROCHLORIDE 100 MG: 100 CAPSULE ORAL at 10:05

## 2021-03-06 RX ADMIN — PREGABALIN 50 MG: 50 CAPSULE ORAL at 10:05

## 2021-03-06 RX ADMIN — Medication 5 ML: at 14:00

## 2021-03-06 RX ADMIN — MEROPENEM 500 MG: 500 INJECTION, POWDER, FOR SOLUTION INTRAVENOUS at 10:05

## 2021-03-06 RX ADMIN — FAMOTIDINE 20 MG: 20 TABLET, FILM COATED ORAL at 17:11

## 2021-03-06 RX ADMIN — RDII 250 MG CAPSULE 250 MG: at 17:11

## 2021-03-06 RX ADMIN — BACLOFEN 20 MG: 10 TABLET ORAL at 21:19

## 2021-03-06 RX ADMIN — Medication 5 ML: at 05:55

## 2021-03-06 RX ADMIN — MEROPENEM 500 MG: 500 INJECTION, POWDER, FOR SOLUTION INTRAVENOUS at 02:58

## 2021-03-06 RX ADMIN — SENNOSIDES 17.2 MG: 8.6 TABLET, FILM COATED ORAL at 10:05

## 2021-03-06 RX ADMIN — SENNOSIDES 17.2 MG: 8.6 TABLET, FILM COATED ORAL at 17:11

## 2021-03-06 RX ADMIN — SODIUM CHLORIDE, SODIUM LACTATE, POTASSIUM CHLORIDE, AND CALCIUM CHLORIDE 100 ML/HR: 600; 310; 30; 20 INJECTION, SOLUTION INTRAVENOUS at 17:59

## 2021-03-06 RX ADMIN — Medication 10 ML: at 21:19

## 2021-03-06 RX ADMIN — Medication 1 AMPULE: at 20:36

## 2021-03-06 RX ADMIN — APIXABAN 5 MG: 5 TABLET, FILM COATED ORAL at 20:36

## 2021-03-06 RX ADMIN — MEROPENEM 500 MG: 500 INJECTION, POWDER, FOR SOLUTION INTRAVENOUS at 15:08

## 2021-03-06 RX ADMIN — VITAMIN D, TAB 1000IU (100/BT) 1000 UNITS: 25 TAB at 10:05

## 2021-03-06 RX ADMIN — SODIUM CHLORIDE, SODIUM LACTATE, POTASSIUM CHLORIDE, AND CALCIUM CHLORIDE 100 ML/HR: 600; 310; 30; 20 INJECTION, SOLUTION INTRAVENOUS at 02:57

## 2021-03-06 RX ADMIN — Medication 1 AMPULE: at 10:05

## 2021-03-06 RX ADMIN — POLYETHYLENE GLYCOL 3350 17 G: 17 POWDER, FOR SOLUTION ORAL at 10:23

## 2021-03-06 RX ADMIN — APIXABAN 5 MG: 5 TABLET, FILM COATED ORAL at 10:05

## 2021-03-06 RX ADMIN — FAMOTIDINE 20 MG: 20 TABLET, FILM COATED ORAL at 10:05

## 2021-03-06 RX ADMIN — RDII 250 MG CAPSULE 250 MG: at 10:06

## 2021-03-06 RX ADMIN — BACLOFEN 20 MG: 10 TABLET ORAL at 10:05

## 2021-03-06 RX ADMIN — AMANTADINE HYDROCHLORIDE 100 MG: 100 CAPSULE ORAL at 20:36

## 2021-03-06 RX ADMIN — MEROPENEM 500 MG: 500 INJECTION, POWDER, FOR SOLUTION INTRAVENOUS at 20:36

## 2021-03-06 RX ADMIN — BACLOFEN 20 MG: 10 TABLET ORAL at 15:09

## 2021-03-06 NOTE — PROGRESS NOTES
Problem: Self Care Deficits Care Plan (Adult)  Goal: *Acute Goals and Plan of Care (Insert Text)  Description:   1. Patient will perform grooming in supported sitting with extra time and minimal assistance. 2. Patient will perform self feeding in supported sitting with extra time and set-up assistance (containers only). 3. Patient will perform upper body bathing in supported sitting with extra time and minimal assistance. 4. Patient will participate in 15 + minutes of ADL/ therapeutic exercise/therapeutic activity with min rest breaks to increase activity tolerance for self care. Goals to be achieved in 7 days. Outcome: Progressing Towards Goal     OCCUPATIONAL THERAPY: Initial Assessment, Daily Note, and PM 3/6/2021  INPATIENT: OT Visit Days: 2  [place] / [place] / Product Type: Medicare /      NAME/AGE/GENDER: Beth Uriostegui is a 62 y.o. female   PRIMARY DIAGNOSIS:  Acute UTI [N39.0]  Generalized weakness [R53.1] Acute UTI Acute UTI       ICD-10: Treatment Diagnosis:    · Generalized Muscle Weakness (M62.81)  · Other lack of cordination (R27.8)   Precautions/Allergies:     Latex, Norco [hydrocodone-acetaminophen], and Pcn [penicillins]      ASSESSMENT:     Ms. Joel Morgan presents with above diagnosis and was seen with PT present. This patient if familiar to me from a previous hospital visit. Her primary diagnosis is MS and it does seem to have progressed since her last visit, 7/20. She had a small sacral wound that has progressed and also has a wound on her right hip. Pt is unable to move her legs from the waist down and is stiff from extreme tone/spasms that cause pain. Ms. Joel Morgan tried to participate with total assist bed mobility. This pt will need to qualify for long term placement where she can get the care she needs to allow wound healing & the stabilizing of her current medical situation.    D/C OT as progress is limited due to medical and physical limitations. This section established at most recent assessment   PROBLEM LIST (Impairments causing functional limitations):  1. Decreased Strength  2. Decreased ADL/Functional Activities  3. Decreased Transfer Abilities  4. Decreased Balance  5. Decreased Activity Tolerance  6. Decreased Cognition   INTERVENTIONS PLANNED: (Benefits and precautions of occupational therapy have been discussed with the patient.)  1. Activities of daily living training  2. Adaptive equipment training  3. Therapeutic activity  4. Therapeutic exercise     TREATMENT PLAN: Frequency/Duration: Follow patient 2 times per week to address above goals. Rehabilitation Potential For Stated Goals:  guarded      REHAB RECOMMENDATIONS (at time of discharge pending progress):    Placement: It is my opinion, based on this patient's performance to date, that Ms. Parrish Thurston may benefit from participating in 1-2 additional therapy sessions in order to continue to assess for rehab potential and then make recommendation for disposition at discharge. Equipment:    None at this time              OCCUPATIONAL PROFILE AND HISTORY:   History of Present Injury/Illness (Reason for Referral):  See H&P  Past Medical History/Comorbidities:   Ms. Parrish Thurston  has a past medical history of Decubitus ulcer of ischial area, Hypertension, Kidney infection, Menopause, and MS (multiple sclerosis) ([facility] [address] Ms. Parrish Thurston  has a past surgical history that includes ercp (5/15/2020); hx cholecystectomy (05/18/2020); and colonoscopy (N/A, 6/29/2020).   Social History/Living Environment:   Home Environment: Private residence  # Steps to Enter: 0  Wheelchair Ramp: Yes  One/Two Story Residence: One story  Living Alone: Yes  Support Systems: Family member(s), Friends \ neighbors  Patient Expects to be Discharged to[de-identified] Skilled nursing facility  Current DME Used/Available at Home: Hospital bed(scooter)  Prior Level of Function/Work/Activity:  Poor historian unsure of what she does for herself at home      Number of Personal Factors/Comorbidities that affect the Plan of Care: Expanded review of therapy/medical records (1-2):  MODERATE COMPLEXITY   ASSESSMENT OF OCCUPATIONAL PERFORMANCE[de-identified]   Activities of Daily Living:   Basic ADLs (From Assessment) Complex ADLs (From Assessment)   Feeding: Minimum assistance  Oral Facial Hygiene/Grooming: Moderate assistance  Bathing: Total assistance  Upper Body Dressing: Total assistance  Lower Body Dressing: Total assistance  Toileting: Total assistance     Grooming/Bathing/Dressing Activities of Daily Living                             Bed/Mat Mobility  Rolling: Total assistance  Supine to Sit: Total assistance  Sit to Supine: Total assistance  Scooting: Total assistance     Most Recent Physical Functioning:   Gross Assessment:                  Posture:     Balance:  Sitting: Impaired; Without support  Sitting - Static: (fair to fair-)  Sitting - Dynamic: (fair-)  Standing: (NA) Bed Mobility:  Rolling: Total assistance  Supine to Sit: Total assistance  Sit to Supine: Total assistance  Scooting: Total assistance  Duration: 30 Minutes(extra time to work through activity noted)  Wheelchair Mobility:     Transfers:               Patient Vitals for the past 6 hrs:   BP SpO2 Pulse   03/06/21 1236 (!) 145/80 96 % 88       Mental Status  Neurologic State: Alert  Orientation Level: Oriented X4  Cognition: Follows commands  Perception: Appears intact  Perseveration: No perseveration noted  Safety/Judgement: Awareness of environment            LLE Assessment  LLE Assessment (WDL): Exception to WDL RLE Assessment  RLE Assessment (WDL): Exceptions to [facility]           Physical Skills Involved:  1. Balance  2. Strength  3. Activity Tolerance  4. Fine Motor Control  5. Gross Motor Control  6. Pain (acute) Cognitive Skills Affected (resulting in the inability to perform in a timely and safe manner):  1. Executive Function Psychosocial Skills Affected:  1.  Environmental Adaptation Number of elements that affect the Plan of Care: 5+:  HIGH COMPLEXITY   CLINICAL DECISION MAKIN[address] [place] [address] AM-PAC 6 Clicks   Daily Activity Inpatient Short Form  How much help from another person does the patient currently need. .. Total A Lot A Little None   1. Putting on and taking off regular lower body clothing? [x] 1   [] 2   [] 3   [] 4   2. Bathing (including washing, rinsing, drying)? [x] 1   [] 2   [] 3   [] 4   3. Toileting, which includes using toilet, bedpan or urinal?   [x] 1   [] 2   [] 3   [] 4   4. Putting on and taking off regular upper body clothing? [] 1   [x] 2   [] 3   [] 4   5. Taking care of personal grooming such as brushing teeth? [] 1   [x] 2   [] 3   [] 4   6. Eating meals? [] 1   [x] 2   [] 3   [] 4   © , Trustees of [address] [place] [address], under license to [employer]. All rights reserved      Score:  Initial: 9 Most Recent: X (Date: -- )    Interpretation of Tool:  Represents activities that are increasingly more difficult (i.e. Bed mobility, Transfers, Gait). Use of outcome tool(s) and clinical judgement create a POC that gives a: MODERATE COMPLEXITY         TREATMENT:   (In addition to Assessment/Re-Assessment sessions the following treatments were rendered)     Pre-treatment Symptoms/Complaints:    Pain: Initial:     10 Post Session:  10     Neuromuscular Re-education ( 30):  Balance at edge of bed with total assist. Not able to scoot or transfers. Braces/Orthotics/Lines/Etc:   · IV  · wasserman catheter  · O2 Device: Room air  Treatment/Session Assessment:    · Response to Treatment:  pt up to edge of bed, tolerated fair. .. increased pain to bottom and poor sitting tolerance even with total support   · Interdisciplinary Collaboration:   o Physical Therapist  o Occupational Therapist  o Registered Nurse  o   · After treatment position/precautions:   o Supine in bed  o Bed/Chair-wheels locked  o Bed in low position  o Call light within reach  o RN notified   · Compliance with Program/Exercises: Compliant all of the time, Will assess as treatment progresses. · Recommendations/Intent for next treatment session: \"Next visit will focus on reduction in assistance provided\".   Total Treatment Duration:  OT Patient Time In/Time Out  Time In: 1345  Time Out: [address], OT

## 2021-03-06 NOTE — PROGRESS NOTES
Comprehensive Nutrition Assessment    Type and Reason for Visit: Initial, Wound      Nutrition Recommendations/Plan:    Continue current diet; pt ordering preferences.  Pt may need plate set up for easier access.  Added alternative milkshake supplement per pt preferences. Malnutrition Assessment:  Malnutrition Status: Severe malnutrition  Context: Chronic illness  Findings of clinical characteristics of malnutrition:   Energy Intake:  7 - 75% or less est energy requirements for 1 month or longer   Body Fat Loss:  7 - Severe body fat loss, Orbital   Muscle Mass Loss:  7 - Severe muscle mass loss, Clavicles (pectoralis &deltoids), Temples (temporalis)    Nutrition Assessment:   Nutrition History: Pt reports she has never been a [place] eater\" . Does eat meals- maybe yogurt or toast and eggs. Regarding use of commercial nutrition supplement beverages, pt notes she has grown tired of the same vanilla or strawberry options. Like peanut butter. Nutrition Background: Pt  with a medical histroy of multiple sclerosis, multiple chronic wounds presented to the ED d/t back pain and concern for urinary tract infection. Admitted with acute UTI and sepsis. Concerns for protein calorie malnutrition. Pt being followed for wound care in OP wound center. Daily Update:  Spoke with pt in room while breakfast plate at angle on lap. Repositioned plate as pt declined use of bed table. States she hasn't been eating a lot and has never been a \"heavy eater. \" Spoke to pt regarding adequate calorie and protein intake to help with wound healing. Pt notes she has tired of the taste of the vanilla and strawberry ONS. Discussed sending a milkshake which pt states she likes and that she likes peanut butter. Advised pt that a supplement João will be added at breakfast and dinner trays and can help promote wound healing.       Pt's home environment PTA may impact po intake as pt may be eating outside of social connections per CM note. Regarding d/c nutrition needs CM notes indicate pt may not have a SNF considering present insurances. CM following for options for pt. Nutrition Related Findings:   Fat wasting observed in orbital regions, muscle wasting observed in clavicular and temporal region; pt with brealfast tray on lap, no further assessment. Wound Type: Stage IV, Unstageable(sacral, ischial stage IV, heel wound unstageable,  hip wound)    Current Nutrition Therapies:  DIET REGULAR  DIET NUTRITIONAL SUPPLEMENTS Breakfast, Lunch, Dinner; Other (2 vanilla ice-cream, 3 peanut butter packs, 1 cup whole milk)  DIET NUTRITIONAL SUPPLEMENTS Breakfast, Dinner; Other (João on breakfast and dinner trays)  DIET NUTRITIONAL SUPPLEMENTS All Meals;  Ensure Enlive    Current Intake:   Average Meal Intake: Unable to assess Average Supplement Intake: (unopened on tray; pt expresses dislike)      Anthropometric Measures:  Height: 5' 7\" (170.2 cm)  Current Body Wt: 45.1 kg (99 lb 6.8 oz), Weight source: Bed scale  BMI: 15.6, Underweight (BMI less than 18.5)  Ideal Body Weight (lbs) (Calculated): 135 lbs (61 kg), 73.6 %  Edema: No data recorded     Estimated Daily Nutrient Needs:  Energy (kcal/day): 9540-4982 (Kcal/kg, Weight Used: Current(35-40kcal/kg))  Protein (g/day): 68-90(1.5- 2 g/kg) Weight Used: (Current)  Fluid (ml/day):   (1 ml/kcal)    Nutrition Diagnosis:   · Inadequate oral intake related to (inadequate meal size) as evidenced by (pt report of small volume at meals and BMI 15.58.)    · Increased nutrient needs related to (wound healing) as evidenced by (stage IV wounds)    · Severe malnutrition related to inadequate protein-energy intake as evidenced by poor intake prior to admission, wounds, severe loss of subcutaneous fat, severe muscle loss    Nutrition Interventions:   Food and/or Nutrient Delivery: Continue current diet, Start oral nutrition supplement, Modify oral nutrition supplement  Coordination of Nutrition Care: Continue to monitor while inpatient      Goals: Active Goal: Consume >75% of estimated nutritonal needs while admitted. Nutrition Monitoring and Evaluation:   Food/Nutrient Intake Outcomes: Food and nutrient intake, Supplement intake  Physical Signs/Symptoms Outcomes: Biochemical data    Discharge Planning:     Too soon to determine    Cande Gregory [address], [address], [address] [facility], [address] [facility]

## 2021-03-06 NOTE — PROGRESS NOTES
Progress Note    Patient: Jessica Armstrong MRN: [id number]  SSN: [id number]    YOB: 1960  Age: 62 y.o. Sex: female      Admit Date: 3/3/2021    LOS: 3 days     Hospital problems     Principal Problem:    Acute UTI (3/3/2021)    Active Problems:    Pressure injury of sacral region, stage 4 ([address] (5/13/2020)      Multiple sclerosis (HCC) (5/13/2020)      Protein calorie malnutrition ([address] (5/13/2020)      Sepsis ([address] (7/23/2020)      Generalized weakness (3/3/2021)      Depression (9/10/2020)      Anxiety disorder (8/8/2020)      Essential hypertension (9/10/2020)      Gastroesophageal reflux disease without esophagitis (8/8/2020)      Multiple wounds (9/10/2020)      History of DVT of lower extremity (3/3/2021)        Assessment and Plan:   62year old CF with a PMhx of multiple sclerosis, multiple chronic wounds presented to the ED d/t back pain and concern for urinary tract infection. Sepsis   Acute UTI  Previous hx of ESBL Klebsiella and Pseudomonas UTI  Continue meropenem  Follow UCx - Proteus  F/u BCx - NGTD  US without signs of obstruction     Generalized weakness  Hx of MS and most likely bed-bound at baseline  PT/OT to assist     Multiple wounds, decubitus ulcers stage 3  Eschar lesion on R heel  Wound care consulted     Multiple sclerosis  Cont home baclofen and amantadine     Anxiety  Cont home Effexor and pregabalin     GERD  Cont home pepcid     Hx of DVT  Per chart review she was started on Eliquis for DVT in 12/11/2020  Cont home Eliquis     DVT PPx: Eliquis    Subjective:   62year old CF with a PMhx of multiple sclerosis, multiple chronic wounds presented to the ED d/t back pain and concern for urinary tract infection. Pt stated she didn't want to come to the ED but her home health nurse made her. She lives by herself, mostly is bedridden, uses a wheelchair at times and stated that she had a wasserman placed months ago due to multiple sacral ulcers.  Her urine had been cloudy and home health came this Am and found her on the ground covering in feces so EMS was called. Patient seen and examined at bedside. This morning states feeling better, denies chest pain, no SOB, no abdominal pain, no nausea. Objective:     Vitals:    03/06/21 0417 03/06/21 0832 03/06/21 1028 03/06/21 1236   BP: 135/87 138/84  (!) 145/80   Pulse: 86 87  88   Resp: 18 18  18   Temp: 97.4 °F (36.3 °C) 98 °F (36.7 °C)  98.2 °F (36.8 °C)   SpO2: 97% 98%  96%   Weight:       Height:   5' 7\" (1.702 m)         Intake and Output:  Current Shift: No intake/output data recorded.   Last three shifts: 03/04 1901 - 03/06 0700  In: 50 [I.V.:50]  Out: 2895 [Urine:2895]    ROS  10 ROS negative except from stated on subjective    Physical Exam:   General: Alert, oriented, NAD  HEENT: NC/AT, EOM are intact  Neck: supple, no JVD  Cardiovascular: RRR, S1, S2, no murmurs  Respiratory: Lungs are clear, no wheezes or rales  Abdomen: Soft, NT, ND  Back: No CVA tenderness, no paraspinal tenderness  Extremities: LE without pedal edema, no erythema  Neuro: A&O, CN are intact, no focal deficits  Skin: no rash or ulcers  Psych: good mood and affect    Lab/Data Review:  I have personally reviewed patients laboratory data showing  Recent Results (from the past 24 hour(s))   METABOLIC PANEL, BASIC    Collection Time: 03/06/21  5:44 AM   Result Value Ref Range    Sodium 141 136 - 145 mmol/L    Potassium 3.9 3.5 - 5.1 mmol/L    Chloride 109 (H) 98 - 107 mmol/L    CO2 29 21 - 32 mmol/L    Anion gap 3 (L) 7 - 16 mmol/L    Glucose 97 65 - 100 mg/dL    BUN 16 6 - 23 MG/DL    Creatinine 0.37 (L) 0.6 - 1.0 MG/DL    GFR est AA >60 >60 ml/min/1.73m2    GFR est non-AA >60 >60 ml/min/1.73m2    Calcium 9.1 8.3 - 10.4 MG/DL   CBC W/O DIFF    Collection Time: 03/06/21  5:44 AM   Result Value Ref Range    WBC 5.3 4.3 - 11.1 K/uL    RBC 4.24 4.05 - 5.2 M/uL    HGB 10.6 (L) 11.7 - 15.4 g/dL    HCT 34.0 (L) 35.8 - 46.3 %    MCV 80.2 79.6 - 97.8 FL    MCH 25.0 (L) 26.1 - 32.9 PG    MCHC 31.2 (L) 31.4 - 35.0 g/dL    RDW 19.5 (H) 11.9 - 14.6 %    PLATELET 980 825 - 302 K/uL    MPV 10.6 9.4 - 12.3 FL    ABSOLUTE NRBC 0.00 0.0 - 0.2 K/uL   PLEASE READ & DOCUMENT PPD TEST IN 48 HRS    Collection Time: 03/06/21 10:24 AM   Result Value Ref Range    PPD Negative Negative    mm Induration 0 0 - 5 mm        Image:  I have personally reviewed patients imaging showing  US RETROPERITONEUM COMP   Final Result   Normal-appearing kidneys. I have reviewed, updated, and verified this note's content and spent 38 minutes of my 42 minutes visit performing counseling and coordination of care regarding medical management.        Signed By: Caleb De La Fuente MD     March 6, 2021

## 2021-03-06 NOTE — PROGRESS NOTES
Care Management Interventions  PCP Verified by CM: Yes(Uses neurologist for primary care)  Mode of Transport at Discharge: (EMS)  Transition of Care Consult (CM Consult): Discharge Planning, SNF  MyChart Signup: No  Discharge Durable Medical Equipment: No  Physical Therapy Consult: Yes  Occupational Therapy Consult: Yes  Speech Therapy Consult: No  Current Support Network: Own Home, Family Lives [place], Lives with Caregiver  Confirm Follow Up Transport: (EMS)  The Patient and/or Patient Representative was Provided with a Choice of Provider and Agrees with the Discharge Plan?: Yes  Freedom of Choice List was Provided with Basic Dialogue that Supports the Patient's Individualized Plan of Care/Goals, Treatment Preferences and Shares the Quality Data Associated with the Providers?: Yes  Discharge Location  Discharge Placement: Skilled nursing facility  Spoke with pt - she acknowledges she does not have 24/7 assistance at home therefore will not be safe going home. She will be applying for Medicaid for possible long-term SNF placement. VERENICE was contacted yesterday to meet with pt and help with application. Social work will continue to follow.

## 2021-03-06 NOTE — PROGRESS NOTES
Problem: Mobility Impaired (Adult and Pediatric)  Goal: *Acute Goals and Plan of Care (Insert Text)  Outcome: Progressing Towards Goal  Note: Goals:  (1.)Ms. Charlene Holt will move from supine to sit and sit to supine with MODERATE ASSIST within 2-5 treatment day(s). (2.)Ms. Charlene Holt will demonstrate sitting EOB with minimal assist/trunk support for 1 min within 2-5 treatment day(s). (3.)Ms. Charlene Holt will perform rolling L<>R with mod assist within 2-5 treatment day(s). PHYSICAL THERAPY: Daily Note, Discharge and PM 3/6/2021  INPATIENT: PT Visit Days : 3  [place] / [place] / Product Type: Medicare /       NAME/AGE/GENDER: Mallory Lane is a 62 y.o. female   PRIMARY DIAGNOSIS: Acute UTI [N39.0]  Generalized weakness [R53.1] Acute UTI Acute UTI       ICD-10: Treatment Diagnosis:    · Generalized Muscle Weakness (M62.81)  · Other abnormalities of gait and mobility (R26.89)   Precaution/Allergies:  Latex, Norco [hydrocodone-acetaminophen], and Pcn [penicillins]      ASSESSMENT:     Ms. Charlene Holt tried to participate with total assist bed mobility, & was able to balance EOB with close guarding, but is not able to demonstrate potential to progress beyond her current functional level due to chronic disease process & stage 4 wounds on sacral region. This pt will need to qualify for long term placement where she can get the care she needs to allow wound healing & the stabilizing of her current medical situation. PT will DC services at this time. This section established at most recent assessment   PROBLEM LIST (Impairments causing functional limitations):  1. Decreased Strength  2. Decreased ADL/Functional Activities  3. Decreased Transfer Abilities  4. Decreased Ambulation Ability/Technique  5. Decreased Balance  6. Increased Pain  7. Decreased Activity Tolerance  8. Decreased Flexibility/Joint Mobility  9.  Decreased Cognition   INTERVENTIONS PLANNED: (Benefits and precautions of physical therapy have been discussed with the patient.)  1. Bed Mobility  2. Neuromuscular Re-education/Strengthening  3. Range of Motion (ROM)  4. Therapeutic Activites  5. Therapeutic Exercise/Strengthening  6. Transfer Training     TREATMENT PLAN: Frequency/Duration: NA  Rehabilitation Potential For Stated Goals:NA     REHAB RECOMMENDATIONS (at time of discharge pending progress):    Placement: It is my opinion, based on this patient's performance to date, that Ms. Becka Villanueva will need long term placement with proper wound care. Equipment:    None at this time              HISTORY:   History of Present Injury/Illness (Reason for Referral):  Patient is a 62year old CF with a PMhx of multiple sclerosis, multiple chronic wounds presented to the ED d/t back pain and concern for urinary tract infection. Pt stated she didn't want to come to the ED but her home health nurse made her. She lives by herself, mostly is bedridden, uses a wheelchair at times and stated that she had a wasserman placed months ago due to multiple sacral ulcers. Her urine had been cloudy and home health came this Am and found her on the ground covering in feces so EMS was called. She denies fever, chills, SOB, chest pain, abdominal pain, N/V, diarrhea or constipation. In ED, , hypertensive. UA c/w UTI. CBC and CMP unremarkable at baseline. LA wnl. She was given 1L LR and Ceftriaxone  Past Medical History/Comorbidities:   Ms. Becka Villanueva  has a past medical history of Decubitus ulcer of ischial area, Hypertension, Kidney infection, Menopause, and MS (multiple sclerosis) ([facility] [address] Ms. Becka Villanueva  has a past surgical history that includes ercp (5/15/2020); hx cholecystectomy (05/18/2020); and colonoscopy (N/A, 6/29/2020).   Social History/Living Environment:   Home Environment: Private residence  # Steps to Enter: 0  Wheelchair Ramp: Yes  One/Two Story Residence: One story  Living Alone: Yes  Support Systems: Family member(s), Friends \ neighbors  Patient Expects to be Discharged to[de-identified] Skilled nursing facility  Current DME Used/Available at Home: Hospital bed(scooter)  Prior Level of Function/Work/Activity:  Primarily bedridden; She is a poor and inconsistent historian and it is impossible to get a clear picture of her history and functional ability. Information was obtained thru chart review. Number of Personal Factors/Comorbidities that affect the Plan of Care: 3+: HIGH COMPLEXITY   EXAMINATION:   Most Recent Physical Functioning:   Gross Assessment: 3/5 throughout UE's   0/5 both LE's                       Balance:  Sitting: Impaired; Without support  Sitting - Static: (fair to fair-)  Sitting - Dynamic: (fair-)  Standing: (NA) Bed Mobility:  Rolling: Total assistance  Supine to Sit: Total assistance  Sit to Supine: Total assistance  Scooting: Total assistance  Duration: 30 Minutes(extra time to work through activity noted)       Transfers: NA at this time     Gait: non ambulatory            Body Structures Involved:  1. Bones  2. Joints  3. Muscles Body Functions Affected:  1. Genitourinary  2. Neuromusculoskeletal  3. Movement Related  4. Skin Related Activities and Participation Affected:  1. General Tasks and Demands  2. Mobility  3. Self Care   Number of elements that affect the Plan of Care: 4+: HIGH COMPLEXITY   CLINICAL PRESENTATION:   Presentation: Evolving clinical presentation with changing clinical characteristics: MODERATE COMPLEXITY   CLINICAL DECISION MAKIN [facility] Inpatient Short Form  How much difficulty does the patient currently have. .. Unable A Lot A Little None   1. Turning over in bed (including adjusting bedclothes, sheets and blankets)? [] 1   [x] 2   [] 3   [] 4   2. Sitting down on and standing up from a chair with arms ( e.g., wheelchair, bedside commode, etc.)   [x] 1   [] 2   [] 3   [] 4   3. Moving from lying on back to sitting on the side of the bed?    [x] 1   [] 2   [] 3   [] 4 How much help from another person does the patient currently need. .. Total A Lot A Little None   4. Moving to and from a bed to a chair (including a wheelchair)? [x] 1   [] 2   [] 3   [] 4   5. Need to walk in hospital room? [x] 1   [] 2   [] 3   [] 4   6. Climbing 3-5 steps with a railing? [x] 1   [] 2   [] 3   [] 4   © 2007, Trustees of [address] [place] [address] [address] [address], under license to [employer]. All rights reserved      Score:  Initial: 7 Most Recent: X (Date: -- )    Interpretation of Tool:  Represents activities that are increasingly more difficult (i.e. Bed mobility, Transfers, Gait). Medical Necessity:     · Patient demonstrates   · guarded  ·  rehab potential due to higher previous functional level. Reason for Services/Other Comments:  · Patient   · continues to require present interventions due to patient's inability to perform functional mobility independently  · . Use of outcome tool(s) and clinical judgement create a POC that gives a: Clear prediction of patient's progress: LOW COMPLEXITY            TREATMENT:   (In addition to Assessment/Re-Assessment sessions the following treatments were rendered)   Pre-treatment Symptoms/Complaints: Pt agreeable to long term placement  Pain: Initial: numeric scale  Pain Intensity 1: 3  Pain Location 1: Buttocks  Pain Orientation 1: Mid  Pain Intervention(s) 1: Repositioned  Post Session:  3/0     Therapeutic Activity: (30 Minutes(extra time to work through activity noted)   ):  Therapeutic activities including attempting to roll, supine >sit, sitting balance static with UE supportt, dynamic with wt shift R<>L , WB'ing on right then left elbow followed by attempting to push up to midline, sit >supine to improve mobility, strength, balance, coordination and dynamic movement of arm - bilateral and core to improve functional stability & endurance.    Braces/Orthotics/Lines/Etc:   · wasserman catheter  · O2 Device: Room air  Treatment/Session Assessment:    · Response to Treatment:  Pt has reached a physical plateau.   · Interdisciplinary Collaboration:   o Registered Nurse  · After treatment position/precautions:   o Supine in bed  o Bed/Chair-wheels locked  o Bed in low position  o RN notified  o Side rails x 3   · Compliance with Program/Exercises: Noncompliant some of the time  Recommendations/DC PT services  Total Treatment Duration:  PT Patient Time In/Time Out  Time In: 9527  Time Out: Eber Kenny PT

## 2021-03-07 PROCEDURE — 65270000029 HC RM PRIVATE

## 2021-03-07 PROCEDURE — 74011250636 HC RX REV CODE- 250/636: Performed by: INTERNAL MEDICINE

## 2021-03-07 PROCEDURE — 74011250637 HC RX REV CODE- 250/637: Performed by: FAMILY MEDICINE

## 2021-03-07 PROCEDURE — 74011000258 HC RX REV CODE- 258: Performed by: FAMILY MEDICINE

## 2021-03-07 PROCEDURE — 74011000250 HC RX REV CODE- 250: Performed by: INTERNAL MEDICINE

## 2021-03-07 PROCEDURE — 74011250636 HC RX REV CODE- 250/636: Performed by: FAMILY MEDICINE

## 2021-03-07 RX ORDER — CEFAZOLIN SODIUM/WATER 2 G/20 ML
2 SYRINGE (ML) INTRAVENOUS EVERY 8 HOURS
Status: COMPLETED | OUTPATIENT
Start: 2021-03-07 | End: 2021-03-08

## 2021-03-07 RX ADMIN — MEROPENEM 500 MG: 500 INJECTION, POWDER, FOR SOLUTION INTRAVENOUS at 09:20

## 2021-03-07 RX ADMIN — AMANTADINE HYDROCHLORIDE 100 MG: 100 CAPSULE ORAL at 09:21

## 2021-03-07 RX ADMIN — BACLOFEN 20 MG: 10 TABLET ORAL at 21:21

## 2021-03-07 RX ADMIN — Medication 5 ML: at 05:23

## 2021-03-07 RX ADMIN — SENNOSIDES 17.2 MG: 8.6 TABLET, FILM COATED ORAL at 18:00

## 2021-03-07 RX ADMIN — MEROPENEM 500 MG: 500 INJECTION, POWDER, FOR SOLUTION INTRAVENOUS at 03:23

## 2021-03-07 RX ADMIN — Medication 81 MG: at 09:21

## 2021-03-07 RX ADMIN — BACLOFEN 20 MG: 10 TABLET ORAL at 16:07

## 2021-03-07 RX ADMIN — RDII 250 MG CAPSULE 250 MG: at 18:00

## 2021-03-07 RX ADMIN — APIXABAN 5 MG: 5 TABLET, FILM COATED ORAL at 09:21

## 2021-03-07 RX ADMIN — CEFAZOLIN SODIUM 2 G: 10 INJECTION, POWDER, FOR SOLUTION INTRAVENOUS at 16:07

## 2021-03-07 RX ADMIN — AMANTADINE HYDROCHLORIDE 100 MG: 100 CAPSULE ORAL at 21:21

## 2021-03-07 RX ADMIN — SODIUM CHLORIDE, SODIUM LACTATE, POTASSIUM CHLORIDE, AND CALCIUM CHLORIDE 100 ML/HR: 600; 310; 30; 20 INJECTION, SOLUTION INTRAVENOUS at 03:23

## 2021-03-07 RX ADMIN — RDII 250 MG CAPSULE 250 MG: at 09:21

## 2021-03-07 RX ADMIN — PREGABALIN 50 MG: 50 CAPSULE ORAL at 09:20

## 2021-03-07 RX ADMIN — BACLOFEN 20 MG: 10 TABLET ORAL at 09:21

## 2021-03-07 RX ADMIN — Medication 5 ML: at 16:08

## 2021-03-07 RX ADMIN — Medication 10 ML: at 21:22

## 2021-03-07 RX ADMIN — SENNOSIDES 17.2 MG: 8.6 TABLET, FILM COATED ORAL at 09:21

## 2021-03-07 RX ADMIN — VITAMIN D, TAB 1000IU (100/BT) 1000 UNITS: 25 TAB at 09:20

## 2021-03-07 RX ADMIN — APIXABAN 5 MG: 5 TABLET, FILM COATED ORAL at 21:21

## 2021-03-07 RX ADMIN — FAMOTIDINE 20 MG: 20 TABLET, FILM COATED ORAL at 18:00

## 2021-03-07 RX ADMIN — FAMOTIDINE 20 MG: 20 TABLET, FILM COATED ORAL at 09:21

## 2021-03-07 RX ADMIN — CEFAZOLIN SODIUM 2 G: 10 INJECTION, POWDER, FOR SOLUTION INTRAVENOUS at 21:22

## 2021-03-07 NOTE — PROGRESS NOTES
Progress Note    Patient: Stephanie Saucedo MRN: [id number]  SSN: [id number]    YOB: 1960  Age: 62 y.o. Sex: female      Admit Date: 3/3/2021    LOS: 4 days     Hospital problems     Principal Problem:    Acute UTI (3/3/2021)    Active Problems:    Pressure injury of sacral region, stage 4 ([address] (5/13/2020)      Multiple sclerosis (HCC) (5/13/2020)      Protein calorie malnutrition ([address] (5/13/2020)      Sepsis ([address] (7/23/2020)      Generalized weakness (3/3/2021)      Depression (9/10/2020)      Anxiety disorder (8/8/2020)      Essential hypertension (9/10/2020)      Gastroesophageal reflux disease without esophagitis (8/8/2020)      Multiple wounds (9/10/2020)      History of DVT of lower extremity (3/3/2021)        Assessment and Plan:   62year old CF with a PMhx of multiple sclerosis, multiple chronic wounds presented to the ED d/t back pain and concern for urinary tract infection. Sepsis   Acute UTI  Previous hx of ESBL Klebsiella and Pseudomonas UTI  D/c meropenem  Start ancef  Follow UCx - Proteus  F/u BCx - NGTD  US without signs of obstruction     Generalized weakness  Hx of MS and most likely bed-bound at baseline  PT/OT to assist     Multiple wounds, decubitus ulcers stage 3  Eschar lesion on R heel  Wound care consulted     Multiple sclerosis  Cont home baclofen and amantadine     Anxiety  Cont home Effexor and pregabalin     GERD  Cont home pepcid     Hx of DVT  Per chart review she was started on Eliquis for DVT in 12/11/2020  Cont home Eliquis     DVT PPx: Eliquis    Subjective:   62year old CF with a PMhx of multiple sclerosis, multiple chronic wounds presented to the ED d/t back pain and concern for urinary tract infection. Pt stated she didn't want to come to the ED but her home health nurse made her. She lives by herself, mostly is bedridden, uses a wheelchair at times and stated that she had a wasserman placed months ago due to multiple sacral ulcers.  Her urine had been cloudy and home health came this Am and found her on the ground covering in feces so EMS was called. Patient seen and examined at bedside. This morning states feeling better, denies chest pain, no SOB, no abdominal pain, no nausea. Objective:     Vitals:    03/06/21 1933 03/07/21 0019 03/07/21 0337 03/07/21 0747   BP: (!) 155/89 137/88 129/84 114/71   Pulse: 99 79 89 80   Resp: 18 14 16 18   Temp: 98.4 °F (36.9 °C) 98.7 °F (37.1 °C) 97.5 °F (36.4 °C) 97.6 °F (36.4 °C)   SpO2: 97% 98% 98% 97%   Weight:       Height:            Intake and Output:  Current Shift: No intake/output data recorded. Last three shifts: 03/05 1901 - 03/07 0700  In: -   Out: 6020 [Urine:6020]    ROS  10 ROS negative except from stated on subjective    Physical Exam:   General: Alert, oriented, NAD  HEENT: NC/AT, EOM are intact  Neck: supple, no JVD  Cardiovascular: RRR, S1, S2, no murmurs  Respiratory: Lungs are clear, no wheezes or rales  Abdomen: Soft, NT, ND  Back: No CVA tenderness, no paraspinal tenderness  Extremities: LE without pedal edema, no erythema  Neuro: A&O, CN are intact, no focal deficits  Skin: no rash or ulcers  Psych: good mood and affect    Lab/Data Review:  I have personally reviewed patients laboratory data showing  No results found for this or any previous visit (from the past 24 hour(s)). Image:  I have personally reviewed patients imaging showing  US RETROPERITONEUM COMP   Final Result   Normal-appearing kidneys. I have reviewed, updated, and verified this note's content and spent 38 minutes of my 42 minutes visit performing counseling and coordination of care regarding medical management.        Signed By: Hermelinda Hill MD     March 7, 2021

## 2021-03-08 PROBLEM — E43 SEVERE PROTEIN-CALORIE MALNUTRITION (HCC): Status: ACTIVE | Noted: 2020-05-13

## 2021-03-08 PROCEDURE — 74011000250 HC RX REV CODE- 250: Performed by: INTERNAL MEDICINE

## 2021-03-08 PROCEDURE — 65270000029 HC RM PRIVATE

## 2021-03-08 PROCEDURE — 74011250636 HC RX REV CODE- 250/636: Performed by: FAMILY MEDICINE

## 2021-03-08 PROCEDURE — 74011250636 HC RX REV CODE- 250/636: Performed by: INTERNAL MEDICINE

## 2021-03-08 PROCEDURE — 74011250637 HC RX REV CODE- 250/637: Performed by: FAMILY MEDICINE

## 2021-03-08 RX ADMIN — RDII 250 MG CAPSULE 250 MG: at 08:39

## 2021-03-08 RX ADMIN — BACLOFEN 20 MG: 10 TABLET ORAL at 08:40

## 2021-03-08 RX ADMIN — SODIUM CHLORIDE, SODIUM LACTATE, POTASSIUM CHLORIDE, AND CALCIUM CHLORIDE 100 ML/HR: 600; 310; 30; 20 INJECTION, SOLUTION INTRAVENOUS at 06:27

## 2021-03-08 RX ADMIN — RDII 250 MG CAPSULE 250 MG: at 17:23

## 2021-03-08 RX ADMIN — AMANTADINE HYDROCHLORIDE 100 MG: 100 CAPSULE ORAL at 08:39

## 2021-03-08 RX ADMIN — ACETAMINOPHEN 650 MG: 325 TABLET ORAL at 08:39

## 2021-03-08 RX ADMIN — PREGABALIN 50 MG: 50 CAPSULE ORAL at 08:39

## 2021-03-08 RX ADMIN — AMANTADINE HYDROCHLORIDE 100 MG: 100 CAPSULE ORAL at 21:16

## 2021-03-08 RX ADMIN — SENNOSIDES 17.2 MG: 8.6 TABLET, FILM COATED ORAL at 08:39

## 2021-03-08 RX ADMIN — BACLOFEN 20 MG: 10 TABLET ORAL at 21:16

## 2021-03-08 RX ADMIN — Medication 81 MG: at 08:39

## 2021-03-08 RX ADMIN — BACLOFEN 20 MG: 10 TABLET ORAL at 16:16

## 2021-03-08 RX ADMIN — Medication 10 ML: at 05:10

## 2021-03-08 RX ADMIN — FAMOTIDINE 20 MG: 20 TABLET, FILM COATED ORAL at 17:23

## 2021-03-08 RX ADMIN — Medication 10 ML: at 16:16

## 2021-03-08 RX ADMIN — Medication 10 ML: at 21:17

## 2021-03-08 RX ADMIN — FAMOTIDINE 20 MG: 20 TABLET, FILM COATED ORAL at 08:39

## 2021-03-08 RX ADMIN — OXYCODONE 10 MG: 5 TABLET ORAL at 21:16

## 2021-03-08 RX ADMIN — CEFAZOLIN SODIUM 2 G: 10 INJECTION, POWDER, FOR SOLUTION INTRAVENOUS at 05:07

## 2021-03-08 RX ADMIN — VITAMIN D, TAB 1000IU (100/BT) 1000 UNITS: 25 TAB at 08:39

## 2021-03-08 RX ADMIN — APIXABAN 5 MG: 5 TABLET, FILM COATED ORAL at 08:39

## 2021-03-08 RX ADMIN — APIXABAN 5 MG: 5 TABLET, FILM COATED ORAL at 21:16

## 2021-03-08 NOTE — PROGRESS NOTES
Progress Note    Patient: Nicole Santana MRN: [id number]  SSN: [id number]    YOB: 1960  Age: 62 y.o. Sex: female      Admit Date: 3/3/2021    LOS: 5 days     Hospital problems     Principal Problem:    Acute UTI (3/3/2021)    Active Problems:    Pressure injury of sacral region, stage 4 ([address] (5/13/2020)      Multiple sclerosis (HCC) (5/13/2020)      Severe protein-calorie malnutrition ([address] (5/13/2020)      Sepsis ([address] (7/23/2020)      Generalized weakness (3/3/2021)      Depression (9/10/2020)      Anxiety disorder (8/8/2020)      Essential hypertension (9/10/2020)      Gastroesophageal reflux disease without esophagitis (8/8/2020)      Multiple wounds (9/10/2020)      History of DVT of lower extremity (3/3/2021)        Assessment and Plan:   62year old CF with a PMhx of multiple sclerosis, multiple chronic wounds presented to the ED d/t back pain and concern for urinary tract infection. Sepsis   Acute UTI  Previous hx of ESBL Klebsiella and Pseudomonas UTI  Continue ancef  Follow UCx - Proteus  F/u BCx - NGTD  US without signs of obstruction     Generalized weakness  Hx of MS and most likely bed-bound at baseline  PT/OT to assist     Multiple wounds, decubitus ulcers stage 3  Eschar lesion on R heel  Wound care consulted     Multiple sclerosis  Cont home baclofen and amantadine     Anxiety  Cont home Effexor and pregabalin     GERD  Cont home pepcid     Hx of DVT  Per chart review she was started on Eliquis for DVT in 12/11/2020  Cont home Eliquis     DVT PPx: Eliquis    Subjective:   62year old CF with a PMhx of multiple sclerosis, multiple chronic wounds presented to the ED d/t back pain and concern for urinary tract infection. Pt stated she didn't want to come to the ED but her home health nurse made her. She lives by herself, mostly is bedridden, uses a wheelchair at times and stated that she had a wasserman placed months ago due to multiple sacral ulcers.  Her urine had been cloudy and home health came this Am and found her on the ground covering in feces so EMS was called. Patient seen and examined at bedside. This morning states feeling better, denies chest pain, no SOB, no abdominal pain, no nausea. Objective:     Vitals:    03/07/21 0747 03/07/21 1907 03/08/21 0505 03/08/21 0837   BP: 114/71 135/78 126/78 105/82   Pulse: 80 94 85 82   Resp: 18 16 16 16   Temp: 97.6 °F (36.4 °C) 98.2 °F (36.8 °C) 97.6 °F (36.4 °C) 97.7 °F (36.5 °C)   SpO2: 97% 98% 99% 98%   Weight:   45 kg (99 lb 3.3 oz)    Height:            Intake and Output:  Current Shift: No intake/output data recorded. Last three shifts: 03/06 1901 - 03/08 0700  In: 2573.3 [I.V.:2573.3]  Out: 5225 [Urine:5225]    ROS  10 ROS negative except from stated on subjective    Physical Exam:   General: Alert, oriented, NAD  HEENT: NC/AT, EOM are intact  Neck: supple, no JVD  Cardiovascular: RRR, S1, S2, no murmurs  Respiratory: Lungs are clear, no wheezes or rales  Abdomen: Soft, NT, ND  Back: No CVA tenderness, no paraspinal tenderness  Extremities: LE without pedal edema, no erythema  Neuro: A&O, CN are intact, no focal deficits  Skin: no rash or ulcers  Psych: good mood and affect    Lab/Data Review:  I have personally reviewed patients laboratory data showing  No results found for this or any previous visit (from the past 24 hour(s)). Image:  I have personally reviewed patients imaging showing  US RETROPERITONEUM COMP   Final Result   Normal-appearing kidneys.               Signed By: Caleb De La Fuente MD     March 8, 2021

## 2021-03-08 NOTE — PROGRESS NOTES
SARY received message that patient's sister would like to receive a phone call. Phone call to Zoey Dietrich ([phone]). Fareed Bair shared that her sister lives alone with minimal support. Patient is receiving wound care/home health services. Per Fareed Dacosta, she tries to go by patient's home after work each day, but \"I have health issues of my own. \"  SARY informed Fareed Dacosta that patient had shared this information, and we are working together to create a safe discharge plan. Fareed Dacosta expressed understanding.     ARNULFO Mars   [phone]

## 2021-03-08 NOTE — PROGRESS NOTES
SW follow-up with patient. Patient is aware that her Medicare and Blue Cross days have been exhausted for rehab placement. Patient agreeable to apply for Medicaid - referral was sent to [facility] on 3/5/21 to meet with patient. SW will continue to follow. 1205:  Phone call received from Rip Britton ([phone]) with [facility] DSS/Adult CRISTINA [facility]. Update provided on patient's plan for discharge. Danny Teresa was informed that the hospital is in the process of assisting patient with Medicaid application for placement. SW will continue to keep Danny Teresa updated.     ARNULFO Wilde  [facility]   [phone]

## 2021-03-09 PROCEDURE — 65270000029 HC RM PRIVATE

## 2021-03-09 PROCEDURE — 74011250637 HC RX REV CODE- 250/637: Performed by: FAMILY MEDICINE

## 2021-03-09 RX ADMIN — VITAMIN D, TAB 1000IU (100/BT) 1000 UNITS: 25 TAB at 08:34

## 2021-03-09 RX ADMIN — PREGABALIN 50 MG: 50 CAPSULE ORAL at 08:32

## 2021-03-09 RX ADMIN — APIXABAN 5 MG: 5 TABLET, FILM COATED ORAL at 08:34

## 2021-03-09 RX ADMIN — APIXABAN 5 MG: 5 TABLET, FILM COATED ORAL at 21:34

## 2021-03-09 RX ADMIN — OXYCODONE 10 MG: 5 TABLET ORAL at 11:38

## 2021-03-09 RX ADMIN — BACLOFEN 20 MG: 10 TABLET ORAL at 21:34

## 2021-03-09 RX ADMIN — SENNOSIDES 17.2 MG: 8.6 TABLET, FILM COATED ORAL at 09:00

## 2021-03-09 RX ADMIN — Medication 81 MG: at 08:32

## 2021-03-09 RX ADMIN — Medication 10 ML: at 05:48

## 2021-03-09 RX ADMIN — BACLOFEN 20 MG: 10 TABLET ORAL at 08:34

## 2021-03-09 RX ADMIN — RDII 250 MG CAPSULE 250 MG: at 08:34

## 2021-03-09 RX ADMIN — RDII 250 MG CAPSULE 250 MG: at 17:39

## 2021-03-09 RX ADMIN — AMANTADINE HYDROCHLORIDE 100 MG: 100 CAPSULE ORAL at 08:34

## 2021-03-09 RX ADMIN — AMANTADINE HYDROCHLORIDE 100 MG: 100 CAPSULE ORAL at 21:34

## 2021-03-09 RX ADMIN — Medication 10 ML: at 21:34

## 2021-03-09 RX ADMIN — Medication 10 ML: at 14:51

## 2021-03-09 RX ADMIN — POLYETHYLENE GLYCOL 3350 17 G: 17 POWDER, FOR SOLUTION ORAL at 08:34

## 2021-03-09 RX ADMIN — SENNOSIDES 17.2 MG: 8.6 TABLET, FILM COATED ORAL at 17:39

## 2021-03-09 RX ADMIN — BACLOFEN 20 MG: 10 TABLET ORAL at 17:39

## 2021-03-09 RX ADMIN — Medication 1 AMPULE: at 09:00

## 2021-03-09 RX ADMIN — FAMOTIDINE 20 MG: 20 TABLET, FILM COATED ORAL at 08:32

## 2021-03-09 RX ADMIN — FAMOTIDINE 20 MG: 20 TABLET, FILM COATED ORAL at 17:39

## 2021-03-09 NOTE — PROGRESS NOTES
Comprehensive Nutrition Assessment    Type and Reason for Visit: RD nutrition re-screen/LOS      Nutrition Recommendations/Plan:    Continue Regular Diet   Continue Ensure Enlive and peanut butter shake all meals   [place] [facility]; [place] had not tried [place] yet     Malnutrition Assessment:  Malnutrition Status: Severe malnutrition  Context: Chronic illness  Findings of clinical characteristics of malnutrition:   Energy Intake: 7-75% or less est energy requirements for 1 month or longer  Body Fat Loss:  7 - Severe body fat loss, Orbital  Muscle Mass Loss: 7 -Severe muscle mass loss, clavicles (pectoralis and deltoids, Temples (temporalis)  Nutrition Assessment:   Nutrition History: Pt reports she has never been a [place] eater\" . Does eat meals- maybe yogurt or toast and eggs. Regarding use of commercial nutrition supplement beverages, pt notes she has grown tired of the same vanilla or strawberry options. Like peanut butter. Nutrition Background: Pt  with a medical histroy of multiple sclerosis, multiple chronic wounds presented to the ED d/t back pain and concern for urinary tract infection. Admitted with acute UTI and sepsis. Concerns for protein calorie malnutrition. Pt being followed for wound care in OP wound center. Daily Update:  Pt reports drinking the peanut butter milkshakes at meals and generally drinks the Ensure Enlive in the evenings-usually 3/day. Pt has not tried the [place]. Pt states she typically eats % breakfast and picks at lunch and supper. Lunch tray in room; pt ate broccoli, 1/2 carrots and bites of french fries and 1 bite of chicken sandwich. Pt consuming beverages well. Nutrition Related Findings:   Fat wasting observed in orbital regions, muscle wasting observed in clavicular and temporal region; pt with brealfast tray on lap, no further assessment.  Wound Type: Stage IV, Unstageable(sacral, ischial stage IV, heel wound unstageable,  hip wound)    Current Nutrition Therapies:  DIET: Regular  DIET NUTRITIONAL SUPPLEMENT:  Peanut Butter Milk Shake all meals ((1 cup whole milk, 3 peanut butter snacks, 2 cups vanilla ice cream)  DIET NUTRITIONAL SUPPLEMENT:  Ensure Enlive all meals (no chocolate flavored)  DIET NUTRITIONAL SUPPLEMENT:  João at breakfast and Supper     Current Intake: Pt verbalizes % breakfast, < 25% lunch and supper; drinks peanut butter milkshake all meals, usually 3 Ensure Enlive / day. Anthropometric Measures:  Height: 5' 7\" (170.2 cm)  Current Body Wt: 45.1 kg (99 lb 6.8 oz), Weight source: Bed scale  BMI: 15.6, Underweight (BMI less than 18.5)  Ideal Body Weight (lbs) (Calculated): 135 lbs (61 kg), 73.6 %  Edema: No data recorded     Estimated Daily Nutrient Needs:  Energy (kcal/day): 4230-8218 (Kcal/kg, Weight Used: Current(35-40kcal/kg))  Protein (g/day): 68-90(1.5- 2 g/kg) Weight Used: (Current)  Fluid (ml/day):  (1 ml/kcal)    Nutrition Diagnosis:   Severe malnutrition related to inadequate protein-energy intake as evidenced by poor intake prior to admission, wounds, severe loss of subcutaneous fat, severe muscle loss. Increased nutrient needs related to wound healing as evidenced by stage 1V wounds. Nutrition Interventions:   Food and/or Nutrient Delivery: Continue current diet, Continue oral nutrition supplement  Coordination of Nutrition Care: Continue to monitor while inpatient    Goals:   Previous Goal Met: Goal(s) achieved  Active Goal: Consume >75% of estimated nutritonal needs while admitted. Nutrition Monitoring and Evaluation:    Food/Nutrient Intake Outcomes: Food and nutrient intake, Supplement intake  Physical Signs/Symptoms Outcomes: Biochemical data    Discharge Planning:     Too soon to determine    Electronically signed by Carlos Lindsay, MPH, RD, LD on 3/9/2021 at 4:46 PM    Contact: [phone]

## 2021-03-09 NOTE — PROGRESS NOTES
Physician Progress Note      Michelet Cohen  CSN #:                  [id number]  :                       1960  ADMIT DATE:       3/3/2021 12:18 PM  [address] [place] DATE:  RESPONDING  PROVIDER #:        Juan Antonio Simons MD          QUERY TEXT:    Pt admitted with UTI. Pt noted to have chronic indwelling urinary catheter as per H&P. If possible, please document in the progress notes and discharge summary if you are evaluating and/or treating any of the following: The medical record reflects the following:  Risk Factors: Mathew catheter  Clinical Indicators: UA with 50 to 1000 WBC, 3 bacteria. Urine culture with greater than 100,000 colonies/m; proteus mirabilis. Treatment: Ancef. Merrem. Thank Betty Pettit RN -[phone]  Options provided:  -- UTI due to chronic indwelling urinary catheter  -- UTI not due to indwelling urinary catheter  -- Other - I will add my own diagnosis  -- Disagree - Not applicable / Not valid  -- Disagree - Clinically unable to determine / Unknown  -- Refer to Clinical Documentation Reviewer    PROVIDER RESPONSE TEXT:    UTI is due to the chronic indwelling urinary catheter.     Query created by: Thomas Villasenor on 3/9/2021 1:36 PM      Electronically signed by:  Juan Antonio Simons MD 3/9/2021 3:34 PM

## 2021-03-09 NOTE — PROGRESS NOTES
Progress Note    Patient: Shaista Christensen MRN: [id number]  SSN: [id number]    YOB: 1960  Age: 62 y.o. Sex: female      Admit Date: 3/3/2021    LOS: 6 days     Hospital problems     Principal Problem:    Acute UTI (3/3/2021)    Active Problems:    Pressure injury of sacral region, stage 4 ([address] (5/13/2020)      Multiple sclerosis (HCC) (5/13/2020)      Severe protein-calorie malnutrition ([address] (5/13/2020)      Sepsis ([address] (7/23/2020)      Generalized weakness (3/3/2021)      Depression (9/10/2020)      Anxiety disorder (8/8/2020)      Essential hypertension (9/10/2020)      Gastroesophageal reflux disease without esophagitis (8/8/2020)      Multiple wounds (9/10/2020)      History of DVT of lower extremity (3/3/2021)        Assessment and Plan:   62year old CF with a PMhx of multiple sclerosis, multiple chronic wounds presented to the ED d/t back pain and concern for urinary tract infection. Sepsis   Acute UTI  Previous hx of ESBL Klebsiella and Pseudomonas UTI  S/p meropenem  Follow UCx - Proteus  F/u BCx - NGTD  US without signs of obstruction     Generalized weakness  Hx of MS and most likely bed-bound at baseline  PT/OT to assist     Multiple wounds, decubitus ulcers stage 3  Eschar lesion on R heel  Wound care consulted     Multiple sclerosis  Cont home baclofen and amantadine     Anxiety  Cont home Effexor and pregabalin     GERD  Cont home pepcid     Hx of DVT  Per chart review she was started on Eliquis for DVT in 12/11/2020  Cont home Eliquis     DVT PPx: Eliquis    Subjective:   62year old CF with a PMhx of multiple sclerosis, multiple chronic wounds presented to the ED d/t back pain and concern for urinary tract infection. Pt stated she didn't want to come to the ED but her home health nurse made her. She lives by herself, mostly is bedridden, uses a wheelchair at times and stated that she had a wasserman placed months ago due to multiple sacral ulcers.  Her urine had been cloudy and home health came this Am and found her on the ground covering in feces so EMS was called. Patient seen and examined at bedside. This morning states feeling better, denies chest pain, no SOB, no abdominal pain, no nausea. Objective:     Vitals:    03/08/21 1956 03/09/21 0452 03/09/21 0730 03/09/21 1129   BP: 131/69 118/72 (!) 143/78 127/65   Pulse: (!) 102 98 86 92   Resp: 16 18 16 18   Temp: 97.9 °F (36.6 °C) 98.1 °F (36.7 °C) 98 °F (36.7 °C) 98.2 °F (36.8 °C)   SpO2: 97% 98% 96% 97%   Weight:       Height:            Intake and Output:  Current Shift: No intake/output data recorded. Last three shifts: 03/07 1901 - 03/09 0700  In: 2573.3 [I.V.:2573.3]  Out: 2325 [Urine:2325]    ROS  10 ROS negative except from stated on subjective    Physical Exam:   General: Alert, oriented, NAD  HEENT: NC/AT, EOM are intact  Neck: supple, no JVD  Cardiovascular: RRR, S1, S2, no murmurs  Respiratory: Lungs are clear, no wheezes or rales  Abdomen: Soft, NT, ND  Back: No CVA tenderness, no paraspinal tenderness  Extremities: LE without pedal edema, no erythema  Neuro: A&O, CN are intact, no focal deficits  Skin: no rash or ulcers  Psych: good mood and affect    Lab/Data Review:  I have personally reviewed patients laboratory data showing  No results found for this or any previous visit (from the past 24 hour(s)). Image:  I have personally reviewed patients imaging showing  US RETROPERITONEUM COMP   Final Result   Normal-appearing kidneys.               Signed By: Pancho Cunningham MD     March 9, 2021

## 2021-03-09 NOTE — PROGRESS NOTES
Patient met with Cody Portillo from [facility] on 3/8 to initiate Medicaid application. VERENICE will provide this  with copy of application ASAP. Per Savi cline/Kvng, this facility may be able to accept patient with Medicaid pending, but they must review copy of Medicaid application.     ARNULFO Jacobs  [address] [facility]   [phone]

## 2021-03-10 PROCEDURE — 65270000029 HC RM PRIVATE

## 2021-03-10 PROCEDURE — 74011250637 HC RX REV CODE- 250/637: Performed by: FAMILY MEDICINE

## 2021-03-10 RX ADMIN — BACLOFEN 20 MG: 10 TABLET ORAL at 08:44

## 2021-03-10 RX ADMIN — OXYCODONE 10 MG: 5 TABLET ORAL at 23:45

## 2021-03-10 RX ADMIN — Medication 81 MG: at 08:44

## 2021-03-10 RX ADMIN — FAMOTIDINE 20 MG: 20 TABLET, FILM COATED ORAL at 17:22

## 2021-03-10 RX ADMIN — OXYCODONE 10 MG: 5 TABLET ORAL at 08:44

## 2021-03-10 RX ADMIN — APIXABAN 5 MG: 5 TABLET, FILM COATED ORAL at 21:16

## 2021-03-10 RX ADMIN — VITAMIN D, TAB 1000IU (100/BT) 1000 UNITS: 25 TAB at 08:44

## 2021-03-10 RX ADMIN — POLYETHYLENE GLYCOL 3350 17 G: 17 POWDER, FOR SOLUTION ORAL at 08:44

## 2021-03-10 RX ADMIN — Medication 5 ML: at 15:36

## 2021-03-10 RX ADMIN — BACLOFEN 20 MG: 10 TABLET ORAL at 21:16

## 2021-03-10 RX ADMIN — SENNOSIDES 17.2 MG: 8.6 TABLET, FILM COATED ORAL at 17:22

## 2021-03-10 RX ADMIN — RDII 250 MG CAPSULE 250 MG: at 17:22

## 2021-03-10 RX ADMIN — APIXABAN 5 MG: 5 TABLET, FILM COATED ORAL at 08:44

## 2021-03-10 RX ADMIN — RDII 250 MG CAPSULE 250 MG: at 08:44

## 2021-03-10 RX ADMIN — AMANTADINE HYDROCHLORIDE 100 MG: 100 CAPSULE ORAL at 08:44

## 2021-03-10 RX ADMIN — OXYCODONE 10 MG: 5 TABLET ORAL at 17:26

## 2021-03-10 RX ADMIN — BACLOFEN 20 MG: 10 TABLET ORAL at 15:35

## 2021-03-10 RX ADMIN — SENNOSIDES 17.2 MG: 8.6 TABLET, FILM COATED ORAL at 08:44

## 2021-03-10 RX ADMIN — Medication 1 AMPULE: at 10:58

## 2021-03-10 RX ADMIN — FAMOTIDINE 20 MG: 20 TABLET, FILM COATED ORAL at 08:44

## 2021-03-10 RX ADMIN — PREGABALIN 50 MG: 50 CAPSULE ORAL at 08:44

## 2021-03-10 RX ADMIN — Medication 1 AMPULE: at 21:17

## 2021-03-10 RX ADMIN — AMANTADINE HYDROCHLORIDE 100 MG: 100 CAPSULE ORAL at 21:16

## 2021-03-10 NOTE — PROGRESS NOTES
Problem: Pressure Injury - Risk of  Goal: *Prevention of pressure injury  Description: Document Kimani Scale and appropriate interventions in the flowsheet. Outcome: Progressing Towards Goal  Note: Pressure Injury Interventions:  Sensory Interventions: Keep linens dry and wrinkle-free, Float heels, Minimize linen layers, Pad between skin to skin, Turn and reposition approx.  every two hours (pillows and wedges if needed)    Moisture Interventions: Internal/External urinary devices, Maintain skin hydration (lotion/cream), Minimize layers, Absorbent underpads    Activity Interventions: Pressure redistribution bed/mattress(bed type)    Mobility Interventions: Float heels, HOB 30 degrees or less, Pressure redistribution bed/mattress (bed type)    Nutrition Interventions: Document food/fluid/supplement intake    Friction and Shear Interventions: Minimize layers, Foam dressings/transparent film/skin sealants, Feet elevated on foot rest, Lift sheet, HOB 30 degrees or less

## 2021-03-10 NOTE — PROGRESS NOTES
Progress Note    Patient: Ave Rodriguez MRN: [id number]  SSN: [id number]    YOB: 1960  Age: 62 y.o. Sex: female      Admit Date: 3/3/2021    LOS: 7 days     Hospital problems     Principal Problem:    Acute UTI (3/3/2021)    Active Problems:    Pressure injury of sacral region, stage 4 ([address] (5/13/2020)      Multiple sclerosis ([address] (5/13/2020)      Severe protein-calorie malnutrition ([address] (5/13/2020)      Sepsis ([address] (7/23/2020)      Generalized weakness (3/3/2021)      Depression (9/10/2020)      Anxiety disorder (8/8/2020)      Essential hypertension (9/10/2020)      Gastroesophageal reflux disease without esophagitis (8/8/2020)      Multiple wounds (9/10/2020)      History of DVT of lower extremity (3/3/2021)        Assessment and Plan:   62year old CF with a PMhx of multiple sclerosis, multiple chronic wounds presented to the ED d/t back pain and concern for urinary tract infection. Sepsis   Acute UTI  UCx - Proteus pansensitive   Meropenem 3/3 - 3/7   Cefazolin 3/7 - 3/8  F/u BCx - NGTD  US without signs of obstruction     Generalized weakness  Hx of MS and most likely bed-bound at baseline  PT/OT to assist     Multiple wounds, decubitus ulcers stage 3  Eschar lesion on R heel  Wound care consulted     Multiple sclerosis  Cont home baclofen and amantadine     Anxiety  Cont home Effexor and pregabalin     GERD  Cont home pepcid     Hx of DVT  Per chart review she was started on Eliquis for DVT in 12/11/2020  Cont home Eliquis     DVT PPx: Eliquis    Subjective:   62year old CF with a PMhx of multiple sclerosis, multiple chronic wounds presented to the ED d/t back pain and concern for urinary tract infection. Pt stated she didn't want to come to the ED but her home health nurse made her. She lives by herself, mostly is bedridden, uses a wheelchair at times and stated that she had a wasserman placed months ago due to multiple sacral ulcers.  Her urine had been cloudy and home health came this Am and found her on the ground covering in feces so EMS was called. Patient seen and examined at bedside. This morning states feeling well, denies chest pain, no SOB, no abdominal pain, no nausea. Objective:     Vitals:    03/09/21 2023 03/10/21 0001 03/10/21 0400 03/10/21 0825   BP: 100/63 106/61 97/71 115/79   Pulse: 97 94 92    Resp: 17 16 16 17   Temp: 98 °F (36.7 °C) 98.2 °F (36.8 °C) 98.1 °F (36.7 °C) 97.7 °F (36.5 °C)   SpO2: 97% 97% 96% 97%   Weight:       Height:            Intake and Output:  Current Shift: 03/10 0701 - 03/10 1900  In: -   Out: 800 [Urine:800]  Last three shifts: 03/08 1901 - 03/10 0700  In: -   Out: 2125 [Urine:2125]    ROS  10 ROS negative except from stated on subjective    Physical Exam:   General: Alert, oriented, NAD  HEENT: NC/AT, EOM are intact  Neck: supple, no JVD  Cardiovascular: RRR, S1, S2, no murmurs  Respiratory: Lungs are clear, no wheezes or rales  Abdomen: Soft, NT, ND  Back: No CVA tenderness, no paraspinal tenderness  Extremities: LE without pedal edema, no erythema  Neuro: A&O, CN are intact, no focal deficits  Skin: no rash or ulcers  Psych: good mood and affect    Lab/Data Review:  I have personally reviewed patients laboratory data showing  No results found for this or any previous visit (from the past 24 hour(s)). Image:  I have personally reviewed patients imaging showing  US RETROPERITONEUM COMP   Final Result   Normal-appearing kidneys.               Signed By: Hermelinda Hill MD     March 10, 2021

## 2021-03-11 PROCEDURE — 74011250637 HC RX REV CODE- 250/637: Performed by: FAMILY MEDICINE

## 2021-03-11 PROCEDURE — 65270000029 HC RM PRIVATE

## 2021-03-11 RX ADMIN — SENNOSIDES 17.2 MG: 8.6 TABLET, FILM COATED ORAL at 18:03

## 2021-03-11 RX ADMIN — Medication 5 ML: at 05:13

## 2021-03-11 RX ADMIN — Medication 10 ML: at 21:29

## 2021-03-11 RX ADMIN — POLYETHYLENE GLYCOL 3350 17 G: 17 POWDER, FOR SOLUTION ORAL at 08:03

## 2021-03-11 RX ADMIN — PREGABALIN 50 MG: 50 CAPSULE ORAL at 08:02

## 2021-03-11 RX ADMIN — SENNOSIDES 17.2 MG: 8.6 TABLET, FILM COATED ORAL at 08:02

## 2021-03-11 RX ADMIN — RDII 250 MG CAPSULE 250 MG: at 18:03

## 2021-03-11 RX ADMIN — Medication 1 AMPULE: at 08:02

## 2021-03-11 RX ADMIN — APIXABAN 5 MG: 5 TABLET, FILM COATED ORAL at 21:26

## 2021-03-11 RX ADMIN — BACLOFEN 20 MG: 10 TABLET ORAL at 08:02

## 2021-03-11 RX ADMIN — BACLOFEN 20 MG: 10 TABLET ORAL at 15:32

## 2021-03-11 RX ADMIN — AMANTADINE HYDROCHLORIDE 100 MG: 100 CAPSULE ORAL at 21:26

## 2021-03-11 RX ADMIN — RDII 250 MG CAPSULE 250 MG: at 08:02

## 2021-03-11 RX ADMIN — VITAMIN D, TAB 1000IU (100/BT) 1000 UNITS: 25 TAB at 08:02

## 2021-03-11 RX ADMIN — Medication 1 AMPULE: at 21:26

## 2021-03-11 RX ADMIN — BACLOFEN 20 MG: 10 TABLET ORAL at 21:26

## 2021-03-11 RX ADMIN — Medication 10 ML: at 14:16

## 2021-03-11 RX ADMIN — FAMOTIDINE 20 MG: 20 TABLET, FILM COATED ORAL at 18:03

## 2021-03-11 RX ADMIN — AMANTADINE HYDROCHLORIDE 100 MG: 100 CAPSULE ORAL at 08:02

## 2021-03-11 RX ADMIN — APIXABAN 5 MG: 5 TABLET, FILM COATED ORAL at 08:02

## 2021-03-11 RX ADMIN — Medication 81 MG: at 08:02

## 2021-03-11 RX ADMIN — FAMOTIDINE 20 MG: 20 TABLET, FILM COATED ORAL at 08:02

## 2021-03-11 NOTE — PROGRESS NOTES
Problem: Pressure Injury - Risk of  Goal: *Prevention of pressure injury  Description: Document Kimani Scale and appropriate interventions in the flowsheet. Outcome: Progressing Towards Goal  Note: Pressure Injury Interventions:  Sensory Interventions: Keep linens dry and wrinkle-free    Moisture Interventions: Internal/External urinary devices    Activity Interventions: Pressure redistribution bed/mattress(bed type)    Mobility Interventions: HOB 30 degrees or less    Nutrition Interventions: Document food/fluid/supplement intake    Friction and Shear Interventions: Minimize layers                Problem: Patient Education: Go to Patient Education Activity  Goal: Patient/Family Education  Outcome: Progressing Towards Goal     Problem: Falls - Risk of  Goal: *Absence of Falls  Description: Document Danial Fall Risk and appropriate interventions in the flowsheet.   Outcome: Progressing Towards Goal  Note: Fall Risk Interventions:            Medication Interventions: Evaluate medications/consider consulting pharmacy    Elimination Interventions: Call light in reach, Patient to call for help with toileting needs    History of Falls Interventions: Room close to nurse's station         Problem: Nutrition Deficit  Goal: *Optimize nutritional status  Outcome: Progressing Towards Goal

## 2021-03-11 NOTE — PROGRESS NOTES
Hospitalist Progress Note     Admit Date:  3/3/2021 12:18 PM   Name:  Senora Boxer   Age:  62 y.o.  :  1960   MRN:  [id number]   PCP:  Tiki Wetzel MD  Treatment Team: Attending Provider: Jai Murdock MD; : Lj Hurd; Care Manager: Erika Aguilar; Utilization Review: Robbie Villavicencio; Care Manager: Makenna High LMSW; Primary Nurse: Haley Carreno RN; Staff Nurse: Nick Francisco RN  Presenting Complaint: Urinary Odor      Hospital Course:   Mrs. Blaze Newell is a nice 63 y/o WF with a h/o MS, multiple chronic wounds who presented to the ED on 3/3 d/t back pain and concern for UTI. Pt stated she didn't want to come to the ED but her home health nurse made her. She lives by herself, mostly is bedridden, uses a wheelchair at times and stated that she had a wasserman placed months ago due to multiple sacral ulcers. Her urine had been cloudy and home health came this Am and found her on the ground covering in feces so EMS was called. Wound care consulted for heel/sacral wounds. Urine culture grew pan-sensitive proteus mirabilis and she has completed antibiotics. Previously living by herself and has no insurance days left for rehab so has applied for Medicaid which is currently pending. 24hr Events/Subjective:   3/11: No complaints, appetite is good, has chocolate cake. No CP, SOB, N/V/D or abdo pain.      No other complaints  Assessment and Plan:     Hospital Problems as of 3/11/2021 Date Reviewed: 2020          Codes Class Noted - Resolved POA    Generalized weakness ICD-10-CM: R53.1  ICD-9-CM: 780.79  3/3/2021 - Present Yes        * (Principal) Acute UTI ICD-10-CM: N39.0  ICD-9-CM: 599.0  3/3/2021 - Present Yes        History of DVT of lower extremity ICD-10-CM: Z86.718  ICD-9-CM: V12.51  3/3/2021 - Present Yes        Depression ICD-10-CM: F32.9  ICD-9-CM: 661  9/10/2020 - Present Yes        Essential hypertension ICD-10-CM: I10  ICD-9-CM: 401.9  9/10/2020 - Present Yes Multiple wounds ICD-10-CM: T07. Isai Kirill  ICD-9-CM: 879.8  9/10/2020 - Present Yes        Anxiety disorder ICD-10-CM: F41.9  ICD-9-CM: 300.00  8/8/2020 - Present Yes        Gastroesophageal reflux disease without esophagitis ICD-10-CM: K21.9  ICD-9-CM: 530.81  8/8/2020 - Present Yes        Sepsis ([facility] ICD-10-CM: A41.9  ICD-9-CM: 038.9, 995.91  7/23/2020 - Present Yes        Pressure injury of sacral region, stage 4 (HCC) ICD-10-CM: L89.154  ICD-9-CM: 707.03, 707.24  5/13/2020 - Present Yes        Multiple sclerosis ([facility] ICD-10-CM: G35  ICD-9-CM: 764  5/13/2020 - Present Yes        Severe protein-calorie malnutrition ([facility] ICD-10-CM: E43  ICD-9-CM: 949  5/13/2020 - Present Unknown              Plan:  # Generalized weakness   - Con't therapy efforts. Planning for rehab/SNF, Medicaid applied for and pending. # Sepsis 2/2 UTI   - Resolved. Has completed antibiotics for pan-sensitive proteus mirabilis. Blood cxs neg. # Multiple chronic wounds   - Wound care. # H/o MS   - Home meds    # Anxiety   - Home meds    # GERD   - Pepcid    # Hx of DVT   - Eliquis    Other listed chronic conditions stable, continue current management. DC planning: Medicaid authorization pending.      Diet:  DIET REGULAR  DIET NUTRITIONAL SUPPLEMENTS  DIET NUTRITIONAL SUPPLEMENTS  DIET NUTRITIONAL SUPPLEMENTS  DVT ppx: Eliquis    Objective:     Patient Vitals for the past 24 hrs:   Temp Pulse Resp BP SpO2   03/11/21 1130 98.2 °F (36.8 °C) 96 18 102/60 97 %   03/11/21 0730 97.5 °F (36.4 °C) 85 16 (!) 103/49 97 %   03/11/21 0427 98.4 °F (36.9 °C) 75 18 (!) 105/59 98 %   03/11/21 0028 97.6 °F (36.4 °C) 87 20 125/73 96 %   03/10/21 2117 98.1 °F (36.7 °C) 90 20 133/71 97 %   03/10/21 1730  85 20 122/68 97 %   03/10/21 1500 97.9 °F (36.6 °C) 90 18 124/71 97 %     Oxygen Therapy  O2 Sat (%): 97 % (03/11/21 1130)  Pulse via Oximetry: 103 beats per minute (03/03/21 1630)  O2 Device: Room air (03/10/21 0825)    Estimated body mass index is 16.48 kg/m² as calculated from the following:    Height as of this encounter: 5' 7\" (1.702 m). Weight as of this encounter: 47.7 kg (105 lb 3.2 oz). Intake/Output Summary (Last 24 hours) at 3/11/2021 1229  Last data filed at 3/11/2021 0517  Gross per 24 hour   Intake    Output 1350 ml   Net -1350 ml       *Note that automatically entered I/Os may not be accurate; dependent on patient compliance with collection and accurate  by techs. General:    Underweight. No overt distress  CV:   RRR. No m/r/g. No edema. No JVD  Lungs:   CTAB. No wheezing, rhonchi, or rales. Unlabored  Abdomen:   Soft, nontender, nondistended. Extremities: Warm and dry. No cyanosis. Bandage to right heel. Skin:     No rashes. Normal coloration  Neuro:  No gross focal deficits. Alert    Data Reviewed:  I have reviewed all labs, meds, and studies from the last 24 hours:  No results found for this or any previous visit (from the past 24 hour(s)).     Current Meds:  Current Facility-Administered Medications   Medication Dose Route Frequency    carboxymethylcellulose sodium (REFRESH LIQUIGEL) 1 % ophthalmic solution 2 Drop  2 Drop Both Eyes PRN    amantadine HCL (SYMMETREL) capsule 100 mg  100 mg Oral BID    apixaban (ELIQUIS) tablet 5 mg  5 mg Oral BID    aspirin delayed-release tablet 81 mg  81 mg Oral DAILY    baclofen (LIORESAL) tablet 20 mg  20 mg Oral TID    cholecalciferol (VITAMIN D3) (1000 Units /25 mcg) tablet 1,000 Units  1,000 Units Oral DAILY    oxyCODONE IR (ROXICODONE) tablet 10 mg  10 mg Oral Q6H PRN    polyethylene glycol (MIRALAX) packet 17 g  17 g Oral DAILY    pregabalin (LYRICA) capsule 50 mg  50 mg Oral DAILY    Saccharomyces boulardii (FLORASTOR) capsule 250 mg  250 mg Oral BID    senna (SENOKOT) tablet 17.2 mg  2 Tab Oral BID    traMADoL (ULTRAM) tablet 50 mg  50 mg Oral Q6H PRN    sodium chloride (NS) flush 5-40 mL  5-40 mL IntraVENous Q8H    sodium chloride (NS) flush 5-40 mL  5-40 mL IntraVENous PRN    acetaminophen (TYLENOL) tablet 650 mg  650 mg Oral Q6H PRN    Or    acetaminophen (TYLENOL) suppository 650 mg  650 mg Rectal Q6H PRN    polyethylene glycol (MIRALAX) packet 17 g  17 g Oral DAILY PRN    promethazine (PHENERGAN) tablet 12.5 mg  12.5 mg Oral Q6H PRN    Or    ondansetron (ZOFRAN) injection 4 mg  4 mg IntraVENous Q6H PRN    famotidine (PEPCID) tablet 20 mg  20 mg Oral BID    alcohol 62% (NOZIN) nasal  1 Ampule  1 Ampule Topical Q12H       Other Studies:  No results found for this visit on 03/03/21. No results found.     All Micro Results     Procedure Component Value Units Date/Time    BLOOD CULTURE [[id number]] Collected: 03/03/21 1322    Order Status: Completed Specimen: Blood Updated: 03/08/21 0745     Special Requests: --        RIGHT  Antecubital       Culture result: NO GROWTH 5 DAYS       BLOOD CULTURE [[id number]] Collected: 03/03/21 1201    Order Status: Completed Specimen: Blood Updated: 03/08/21 0745     Special Requests: LEFT FOREARM        Culture result: NO GROWTH 5 DAYS       CULTURE, URINE [[id number]]  (Abnormal)  (Susceptibility) Collected: 03/03/21 1349    Order Status: Completed Specimen: Urine from Clean catch Updated: 03/06/21 0817     Special Requests: NO SPECIAL REQUESTS        Culture result:       >100,000 COLONIES/mL PROTEUS MIRABILIS                  50,000-100,000 COLONIES/mL MIXED SKIN ANDREW ISOLATED                SARS-CoV-2 Lab Results  \"Novel Coronavirus\" Test:   Lab Results   Component Value Date/Time    COV2NT Not Detected 06/29/2020 04:25 PM      \"Emergent Disease\" Test:   Lab Results   Component Value Date/Time    EDPR Not detected 05/13/2020 05:09 PM     \"SARS-COV-2\" Test: No results found for: XGCOVT  Rapid Test:   Lab Results   Component Value Date/Time    COVR Not detected 07/23/2020 02:23 AM            Signed:  Carmen Barnard MD

## 2021-03-12 PROCEDURE — 74011250637 HC RX REV CODE- 250/637: Performed by: FAMILY MEDICINE

## 2021-03-12 PROCEDURE — 65270000029 HC RM PRIVATE

## 2021-03-12 RX ADMIN — SENNOSIDES 17.2 MG: 8.6 TABLET, FILM COATED ORAL at 08:15

## 2021-03-12 RX ADMIN — Medication 1 AMPULE: at 08:15

## 2021-03-12 RX ADMIN — Medication 81 MG: at 08:15

## 2021-03-12 RX ADMIN — Medication 10 ML: at 21:23

## 2021-03-12 RX ADMIN — SENNOSIDES 17.2 MG: 8.6 TABLET, FILM COATED ORAL at 17:23

## 2021-03-12 RX ADMIN — FAMOTIDINE 20 MG: 20 TABLET, FILM COATED ORAL at 17:23

## 2021-03-12 RX ADMIN — RDII 250 MG CAPSULE 250 MG: at 08:15

## 2021-03-12 RX ADMIN — POLYETHYLENE GLYCOL 3350 17 G: 17 POWDER, FOR SOLUTION ORAL at 08:15

## 2021-03-12 RX ADMIN — FAMOTIDINE 20 MG: 20 TABLET, FILM COATED ORAL at 08:15

## 2021-03-12 RX ADMIN — Medication 1 AMPULE: at 21:22

## 2021-03-12 RX ADMIN — PREGABALIN 50 MG: 50 CAPSULE ORAL at 08:15

## 2021-03-12 RX ADMIN — BACLOFEN 20 MG: 10 TABLET ORAL at 15:36

## 2021-03-12 RX ADMIN — OXYCODONE 10 MG: 5 TABLET ORAL at 17:30

## 2021-03-12 RX ADMIN — BACLOFEN 20 MG: 10 TABLET ORAL at 21:22

## 2021-03-12 RX ADMIN — OXYCODONE 10 MG: 5 TABLET ORAL at 00:40

## 2021-03-12 RX ADMIN — BACLOFEN 20 MG: 10 TABLET ORAL at 08:16

## 2021-03-12 RX ADMIN — AMANTADINE HYDROCHLORIDE 100 MG: 100 CAPSULE ORAL at 21:22

## 2021-03-12 RX ADMIN — Medication 10 ML: at 06:03

## 2021-03-12 RX ADMIN — APIXABAN 5 MG: 5 TABLET, FILM COATED ORAL at 08:15

## 2021-03-12 RX ADMIN — APIXABAN 5 MG: 5 TABLET, FILM COATED ORAL at 21:22

## 2021-03-12 RX ADMIN — Medication 10 ML: at 14:00

## 2021-03-12 RX ADMIN — VITAMIN D, TAB 1000IU (100/BT) 1000 UNITS: 25 TAB at 08:15

## 2021-03-12 RX ADMIN — TRAMADOL HYDROCHLORIDE 50 MG: 50 TABLET ORAL at 08:15

## 2021-03-12 RX ADMIN — RDII 250 MG CAPSULE 250 MG: at 17:23

## 2021-03-12 RX ADMIN — AMANTADINE HYDROCHLORIDE 100 MG: 100 CAPSULE ORAL at 08:15

## 2021-03-12 RX ADMIN — TRAMADOL HYDROCHLORIDE 50 MG: 50 TABLET ORAL at 15:36

## 2021-03-12 NOTE — PROGRESS NOTES
Hourly rounds performed this shift, needs met at this time. Bed in low/locked position and call light within reach. Will  give bedside report to oncoming nurse.

## 2021-03-12 NOTE — PROGRESS NOTES
Care Management Interventions  PCP Verified by CM: Yes(Uses neurologist for primary care)  Mode of Transport at Discharge: (EMS)  Transition of Care Consult (CM Consult): Discharge Planning, SNF  MyChart Signup: No  Discharge Durable Medical Equipment: No  Physical Therapy Consult: Yes  Occupational Therapy Consult: Yes  Speech Therapy Consult: No  Current Support Network: Own Home, Family Lives [place], Lives with Caregiver  Confirm Follow Up Transport: (EMS)  The Patient and/or Patient Representative was Provided with a Choice of Provider and Agrees with the Discharge Plan?: Yes  Freedom of Choice List was Provided with Basic Dialogue that Supports the Patient's Individualized Plan of Care/Goals, Treatment Preferences and Shares the Quality Data Associated with the Providers?: Yes  Discharge Location  Discharge Placement: Skilled nursing facility      Awaiting medicaid for SNF placement    [address]   [phone]

## 2021-03-12 NOTE — PROGRESS NOTES
Hospitalist Progress Note     Admit Date:  3/3/2021 12:18 PM   Name:  Alison Ann   Age:  62 y.o.  :  1960   MRN:  [id number]   PCP:  Clyde Amato MD  Treatment Team: Attending Provider: Jamal Banks MD; : Lou Nguyen; Care Manager: Magaly Red; Utilization Review: Quirino Issa; Care Manager: Pattie Toledo LMSW; Primary Nurse: Diona Boeck, RN  Presenting Complaint: Urinary Odor      Hospital Course:   Mrs. Parrish Thurston is a nice 63 y/o WF with a h/o MS, multiple chronic wounds who presented to the ED on 3/3 d/t back pain and concern for UTI. Pt stated she didn't want to come to the ED but her home health nurse made her. She lives by herself, mostly is bedridden, uses a wheelchair at times and stated that she had a wasserman placed months ago due to multiple sacral ulcers. Her urine had been cloudy and home health came this Am and found her on the ground covering in feces so EMS was called. Wound care consulted for heel/sacral wounds. Urine culture grew pan-sensitive proteus mirabilis and she has completed antibiotics. Previously living by herself and has no insurance days left for rehab so has applied for Medicaid which is currently pending. 24hr Events/Subjective:   3/12: No changes, says she's turning and offloading as best as able but still winds up back \"on my butt\". Appetite is good. No new complaints.      No other complaints  Assessment and Plan:     Hospital Problems as of 3/12/2021 Date Reviewed: 2020          Codes Class Noted - Resolved POA    Generalized weakness ICD-10-CM: R53.1  ICD-9-CM: 780.79  3/3/2021 - Present Yes        * (Principal) Acute UTI ICD-10-CM: N39.0  ICD-9-CM: 599.0  3/3/2021 - Present Yes        History of DVT of lower extremity ICD-10-CM: Z86.718  ICD-9-CM: V12.51  3/3/2021 - Present Yes        Depression ICD-10-CM: F32.9  ICD-9-CM: 781  9/10/2020 - Present Yes        Essential hypertension ICD-10-CM: I10  ICD-9-CM: 401.9  9/10/2020 - Present Yes        Multiple wounds ICD-10-CM: T07Feng Oliva Right  ICD-9-CM: 879.8  9/10/2020 - Present Yes        Anxiety disorder ICD-10-CM: F41.9  ICD-9-CM: 300.00  8/8/2020 - Present Yes        Gastroesophageal reflux disease without esophagitis ICD-10-CM: K21.9  ICD-9-CM: 530.81  8/8/2020 - Present Yes        Sepsis ([facility] ICD-10-CM: A41.9  ICD-9-CM: 038.9, 995.91  7/23/2020 - Present Yes        Pressure injury of sacral region, stage 4 (HCC) ICD-10-CM: L89.154  ICD-9-CM: 707.03, 707.24  5/13/2020 - Present Yes        Multiple sclerosis ([facility] ICD-10-CM: G35  ICD-9-CM: 988  5/13/2020 - Present Yes        Severe protein-calorie malnutrition ([facility] ICD-10-CM: E43  ICD-9-CM: 339  5/13/2020 - Present Unknown              Plan:  # Generalized weakness              - Con't therapy efforts. Planning for rehab/SNF     - Medicaid applied for and pending.     # Sepsis 2/2 UTI              - Resolved. Has completed antibiotics for pan-sensitive proteus mirabilis. Blood cxs neg.     # Multiple chronic wounds              - Wound care.     # H/o MS              - Home meds     # Anxiety              - Home meds     # GERD              - Pepcid     # Hx of DVT              - Eliquis    Other listed chronic conditions stable, continue current management. DC planning: Pending. Diet:  DIET REGULAR  DIET NUTRITIONAL SUPPLEMENTS  DIET NUTRITIONAL SUPPLEMENTS  DIET NUTRITIONAL SUPPLEMENTS  DVT ppx: Eliquis.     Objective:     Patient Vitals for the past 24 hrs:   Temp Pulse Resp BP SpO2   03/12/21 0920  81 20 124/77 97 %   03/12/21 0426 97.7 °F (36.5 °C) 79 20 (!) 125/99 96 %   03/12/21 0056 97.7 °F (36.5 °C) 83 22 120/76 98 %   03/11/21 1925 98.3 °F (36.8 °C) 98 22 (!) 130/92 100 %   03/11/21 1605 98.2 °F (36.8 °C) (!) 103 22 121/81 94 %     Oxygen Therapy  O2 Sat (%): 97 % (03/12/21 0920)  Pulse via Oximetry: 103 beats per minute (03/03/21 1630)  O2 Device: Room air (03/12/21 0730)    Estimated body mass index is 16.62 kg/m² as calculated from the following:    Height as of this encounter: 5' 7\" (1.702 m). Weight as of this encounter: 48.1 kg (106 lb 1.6 oz). Intake/Output Summary (Last 24 hours) at 3/12/2021 1248  Last data filed at 3/12/2021 0930  Gross per 24 hour   Intake 360 ml   Output 1950 ml   Net -1590 ml       *Note that automatically entered I/Os may not be accurate; dependent on patient compliance with collection and accurate  by techs. General:    Thin, underweight, NAD. CV:   RRR. No m/r/g. No edema. No JVD  Lungs:   CTAB. No wheezing, rhonchi, or rales. Unlabored  Abdomen:   Soft, nontender, nondistended. Extremities: Warm and dry. No cyanosis. Bandage to right heel and left hip. Skin:     No rashes. Normal coloration  Neuro:  No gross focal deficits. Alert    Data Reviewed:  I have reviewed all labs, meds, and studies from the last 24 hours:  No results found for this or any previous visit (from the past 24 hour(s)).     Current Meds:  Current Facility-Administered Medications   Medication Dose Route Frequency    carboxymethylcellulose sodium (REFRESH LIQUIGEL) 1 % ophthalmic solution 2 Drop  2 Drop Both Eyes PRN    amantadine HCL (SYMMETREL) capsule 100 mg  100 mg Oral BID    apixaban (ELIQUIS) tablet 5 mg  5 mg Oral BID    aspirin delayed-release tablet 81 mg  81 mg Oral DAILY    baclofen (LIORESAL) tablet 20 mg  20 mg Oral TID    cholecalciferol (VITAMIN D3) (1000 Units /25 mcg) tablet 1,000 Units  1,000 Units Oral DAILY    oxyCODONE IR (ROXICODONE) tablet 10 mg  10 mg Oral Q6H PRN    polyethylene glycol (MIRALAX) packet 17 g  17 g Oral DAILY    pregabalin (LYRICA) capsule 50 mg  50 mg Oral DAILY    Saccharomyces boulardii (FLORASTOR) capsule 250 mg  250 mg Oral BID    senna (SENOKOT) tablet 17.2 mg  2 Tab Oral BID    traMADoL (ULTRAM) tablet 50 mg  50 mg Oral Q6H PRN    sodium chloride (NS) flush 5-40 mL  5-40 mL IntraVENous Q8H    sodium chloride (NS) flush 5-40 mL  5-40 mL IntraVENous PRN    acetaminophen (TYLENOL) tablet 650 mg  650 mg Oral Q6H PRN    Or    acetaminophen (TYLENOL) suppository 650 mg  650 mg Rectal Q6H PRN    polyethylene glycol (MIRALAX) packet 17 g  17 g Oral DAILY PRN    promethazine (PHENERGAN) tablet 12.5 mg  12.5 mg Oral Q6H PRN    Or    ondansetron (ZOFRAN) injection 4 mg  4 mg IntraVENous Q6H PRN    famotidine (PEPCID) tablet 20 mg  20 mg Oral BID    alcohol 62% (NOZIN) nasal  1 Ampule  1 Ampule Topical Q12H       Other Studies:  No results found for this visit on 03/03/21. No results found.     All Micro Results     Procedure Component Value Units Date/Time    BLOOD CULTURE [[id number]] Collected: 03/03/21 1322    Order Status: Completed Specimen: Blood Updated: 03/08/21 0745     Special Requests: --        RIGHT  Antecubital       Culture result: NO GROWTH 5 DAYS       BLOOD CULTURE [[id number]] Collected: 03/03/21 1201    Order Status: Completed Specimen: Blood Updated: 03/08/21 0745     Special Requests: LEFT FOREARM        Culture result: NO GROWTH 5 DAYS       CULTURE, URINE [[id number]]  (Abnormal)  (Susceptibility) Collected: 03/03/21 1349    Order Status: Completed Specimen: Urine from Clean catch Updated: 03/06/21 0817     Special Requests: NO SPECIAL REQUESTS        Culture result:       >100,000 COLONIES/mL PROTEUS MIRABILIS                  50,000-100,000 COLONIES/mL MIXED SKIN ANDREW ISOLATED                SARS-CoV-2 Lab Results  \"Novel Coronavirus\" Test:   Lab Results   Component Value Date/Time    COV2NT Not Detected 06/29/2020 04:25 PM      \"Emergent Disease\" Test:   Lab Results   Component Value Date/Time    EDPR Not detected 05/13/2020 05:09 PM     \"SARS-COV-2\" Test: No results found for: XGCOVT  Rapid Test:   Lab Results   Component Value Date/Time    COVR Not detected 07/23/2020 02:23 AM            Signed:  Mandie Grady MD

## 2021-03-13 PROCEDURE — 65270000029 HC RM PRIVATE

## 2021-03-13 PROCEDURE — 74011250637 HC RX REV CODE- 250/637: Performed by: FAMILY MEDICINE

## 2021-03-13 RX ADMIN — POLYETHYLENE GLYCOL 3350 17 G: 17 POWDER, FOR SOLUTION ORAL at 08:48

## 2021-03-13 RX ADMIN — APIXABAN 5 MG: 5 TABLET, FILM COATED ORAL at 08:47

## 2021-03-13 RX ADMIN — FAMOTIDINE 20 MG: 20 TABLET, FILM COATED ORAL at 08:47

## 2021-03-13 RX ADMIN — PREGABALIN 50 MG: 50 CAPSULE ORAL at 08:47

## 2021-03-13 RX ADMIN — AMANTADINE HYDROCHLORIDE 100 MG: 100 CAPSULE ORAL at 08:47

## 2021-03-13 RX ADMIN — RDII 250 MG CAPSULE 250 MG: at 17:10

## 2021-03-13 RX ADMIN — AMANTADINE HYDROCHLORIDE 100 MG: 100 CAPSULE ORAL at 21:34

## 2021-03-13 RX ADMIN — OXYCODONE 10 MG: 5 TABLET ORAL at 05:00

## 2021-03-13 RX ADMIN — Medication 81 MG: at 08:47

## 2021-03-13 RX ADMIN — BACLOFEN 20 MG: 10 TABLET ORAL at 21:34

## 2021-03-13 RX ADMIN — BACLOFEN 20 MG: 10 TABLET ORAL at 15:51

## 2021-03-13 RX ADMIN — SENNOSIDES 17.2 MG: 8.6 TABLET, FILM COATED ORAL at 08:47

## 2021-03-13 RX ADMIN — Medication 10 ML: at 15:54

## 2021-03-13 RX ADMIN — BACLOFEN 20 MG: 10 TABLET ORAL at 08:47

## 2021-03-13 RX ADMIN — APIXABAN 5 MG: 5 TABLET, FILM COATED ORAL at 21:34

## 2021-03-13 RX ADMIN — Medication 10 ML: at 05:50

## 2021-03-13 RX ADMIN — Medication 1 AMPULE: at 08:47

## 2021-03-13 RX ADMIN — Medication 1 AMPULE: at 21:33

## 2021-03-13 RX ADMIN — VITAMIN D, TAB 1000IU (100/BT) 1000 UNITS: 25 TAB at 08:47

## 2021-03-13 RX ADMIN — SENNOSIDES 17.2 MG: 8.6 TABLET, FILM COATED ORAL at 17:10

## 2021-03-13 RX ADMIN — FAMOTIDINE 20 MG: 20 TABLET, FILM COATED ORAL at 17:10

## 2021-03-13 RX ADMIN — RDII 250 MG CAPSULE 250 MG: at 08:47

## 2021-03-13 RX ADMIN — OXYCODONE 10 MG: 5 TABLET ORAL at 21:38

## 2021-03-13 NOTE — ROUTINE PROCESS
[facility]. Assumed care of patient.

## 2021-03-13 NOTE — PROGRESS NOTES
Problem: Pressure Injury - Risk of  Goal: *Prevention of pressure injury  Description: Document Kimani Scale and appropriate interventions in the flowsheet. Outcome: Progressing Towards Goal  Note: Pressure Injury Interventions:  Sensory Interventions: Monitor skin under medical devices, Check visual cues for pain    Moisture Interventions: Absorbent underpads, Apply protective barrier, creams and emollients, Internal/External urinary devices    Activity Interventions: Assess need for specialty bed, Pressure redistribution bed/mattress(bed type)    Mobility Interventions: Pressure redistribution bed/mattress (bed type), PT/OT evaluation    Nutrition Interventions: Document food/fluid/supplement intake    Friction and Shear Interventions: Apply protective barrier, creams and emollients, HOB 30 degrees or less                Problem: Falls - Risk of  Goal: *Absence of Falls  Description: Document Danial Fall Risk and appropriate interventions in the flowsheet.   Outcome: Progressing Towards Goal  Note: Fall Risk Interventions:       Mentation Interventions: Door open when patient unattended, Adequate sleep, hydration, pain control, Bed/chair exit alarm, More frequent rounding, Room close to nurse's station    Medication Interventions: Patient to call before getting OOB, Teach patient to arise slowly, Bed/chair exit alarm    Elimination Interventions: Bed/chair exit alarm, Call light in reach, Patient to call for help with toileting needs, Toileting schedule/hourly rounds    History of Falls Interventions: Bed/chair exit alarm, Door open when patient unattended, Room close to nurse's station, Investigate reason for fall         Problem: Patient Education: Go to Patient Education Activity  Goal: Patient/Family Education  Outcome: Progressing Towards Goal     Problem: Patient Education: Go to Patient Education Activity  Goal: Patient/Family Education  Outcome: Progressing Towards Goal     Problem: Patient Education: Go to Patient Education Activity  Goal: Patient/Family Education  Outcome: Progressing Towards Goal

## 2021-03-13 NOTE — PROGRESS NOTES
Hospitalist Progress Note     Admit Date:  3/3/2021 12:18 PM   Name:  Karla Avalos   Age:  62 y.o.  :  1960   MRN:  [id number]   PCP:  Jossue Park MD  Treatment Team: Attending Provider: Sean Ray MD; : Bora Garber; Care Manager: Jacqui Pitts; Utilization Review: Josh Lopez; Care Manager: Nuris Guerrero LMSW; Primary Nurse: Gaby Petersen RN  Presenting Complaint: Urinary Odor      Hospital Course:   Mrs. Jessica Moreland is a nice 59 y/o WF with a h/o MS, multiple chronic wounds who presented to the ED on 3/3 d/t back pain and concern for UTI. Pt stated she didn't want to come to the ED but her home health nurse made her. She lives by herself, mostly is bedridden, uses a wheelchair at times and stated that she had a wasserman placed months ago due to multiple sacral ulcers. Her urine had been cloudy and home health came this Am and found her on the ground covering in feces so EMS was called. Wound care consulted for heel/sacral wounds. Urine culture grew pan-sensitive proteus mirabilis and she has completed antibiotics. Previously living by herself and has no insurance days left for rehab so has applied for Medicaid which is currently pending. 24hr Events/Subjective:   3/13: In bed eating Romanian toast, wants more syrup. No changes otherwise. No SOB or chest pain.      No other complaints  Assessment and Plan:     Hospital Problems as of 3/13/2021 Date Reviewed: 2020          Codes Class Noted - Resolved POA    Generalized weakness ICD-10-CM: R53.1  ICD-9-CM: 780.79  3/3/2021 - Present Yes        * (Principal) Acute UTI ICD-10-CM: N39.0  ICD-9-CM: 599.0  3/3/2021 - Present Yes        History of DVT of lower extremity ICD-10-CM: Z86.718  ICD-9-CM: V12.51  3/3/2021 - Present Yes        Depression ICD-10-CM: F32.9  ICD-9-CM: 729  9/10/2020 - Present Yes        Essential hypertension ICD-10-CM: I10  ICD-9-CM: 401.9  9/10/2020 - Present Yes        Multiple wounds ICD-10-CM: T07. Maicol Quijano  ICD-9-CM: 879.8  9/10/2020 - Present Yes        Anxiety disorder ICD-10-CM: F41.9  ICD-9-CM: 300.00  8/8/2020 - Present Yes        Gastroesophageal reflux disease without esophagitis ICD-10-CM: K21.9  ICD-9-CM: 530.81  8/8/2020 - Present Yes        Sepsis ([facility] ICD-10-CM: A41.9  ICD-9-CM: 038.9, 995.91  7/23/2020 - Present Yes        Pressure injury of sacral region, stage 4 (HCC) ICD-10-CM: L89.154  ICD-9-CM: 707.03, 707.24  5/13/2020 - Present Yes        Multiple sclerosis ([facility] ICD-10-CM: G35  ICD-9-CM: 594  5/13/2020 - Present Yes        Severe protein-calorie malnutrition ([facility] ICD-10-CM: E43  ICD-9-CM: 429  5/13/2020 - Present Unknown              Plan:  # Generalized weakness              - Con't therapy efforts. Planning for rehab/SNF                       - Medicaid applied for and pending.     # Sepsis 2/2 UTI              - Resolved. Has completed antibiotics for pan-sensitive proteus mirabilis. Blood cxs neg.     # Multiple chronic wounds              - Wound care.     # H/o MS              - Home meds     # Anxiety              - Home meds     # GERD              - Pepcid     # Hx of DVT              - Eliquis    Other listed chronic conditions stable, continue current management. DC planning: Medicaid auth pending for placement as she has run out of primary insurance rehab days.   Diet:  DIET REGULAR  DIET NUTRITIONAL SUPPLEMENTS  DIET NUTRITIONAL SUPPLEMENTS  DIET NUTRITIONAL SUPPLEMENTS  DVT ppx: Eliquis      Objective:     Patient Vitals for the past 24 hrs:   Temp Pulse Resp BP SpO2   03/13/21 0724 98 °F (36.7 °C) 87 18 134/65 100 %   03/13/21 0431 98 °F (36.7 °C) 79 18 101/65 95 %   03/13/21 0145 97.9 °F (36.6 °C) 82 18 (!) 101/59 98 %   03/12/21 2200 98 °F (36.7 °C) 89 16 112/67 96 %   03/12/21 1530  91 18 114/65 94 %     Oxygen Therapy  O2 Sat (%): 100 % (03/13/21 0724)  Pulse via Oximetry: 103 beats per minute (03/03/21 1630)  O2 Device: Room air (03/13/21 5577)    Estimated body mass index is 12.21 kg/m² as calculated from the following:    Height as of this encounter: 5' 7\" (1.702 m). Weight as of this encounter: 35.4 kg (77 lb 15.3 oz). Intake/Output Summary (Last 24 hours) at 3/13/2021 1029  Last data filed at 3/13/2021 0431  Gross per 24 hour   Intake 610 ml   Output 1350 ml   Net -740 ml       *Note that automatically entered I/Os may not be accurate; dependent on patient compliance with collection and accurate  by techs. General:    Thin, underweight, NAD. CV:   RRR. No m/r/g. No edema. No JVD  Lungs:   CTAB. No wheezing, rhonchi, or rales. Unlabored  Abdomen:   Soft, nontender, nondistended. Extremities: Warm and dry. No cyanosis. Skin:     No rashes. Normal coloration. Bandage to left hip and right ankle. Neuro:  No gross focal deficits. Alert    Data Reviewed:  I have reviewed all labs, meds, and studies from the last 24 hours:  No results found for this or any previous visit (from the past 24 hour(s)).     Current Meds:  Current Facility-Administered Medications   Medication Dose Route Frequency    carboxymethylcellulose sodium (REFRESH LIQUIGEL) 1 % ophthalmic solution 2 Drop  2 Drop Both Eyes PRN    amantadine HCL (SYMMETREL) capsule 100 mg  100 mg Oral BID    apixaban (ELIQUIS) tablet 5 mg  5 mg Oral BID    aspirin delayed-release tablet 81 mg  81 mg Oral DAILY    baclofen (LIORESAL) tablet 20 mg  20 mg Oral TID    cholecalciferol (VITAMIN D3) (1000 Units /25 mcg) tablet 1,000 Units  1,000 Units Oral DAILY    oxyCODONE IR (ROXICODONE) tablet 10 mg  10 mg Oral Q6H PRN    polyethylene glycol (MIRALAX) packet 17 g  17 g Oral DAILY    pregabalin (LYRICA) capsule 50 mg  50 mg Oral DAILY    Saccharomyces boulardii (FLORASTOR) capsule 250 mg  250 mg Oral BID    senna (SENOKOT) tablet 17.2 mg  2 Tab Oral BID    traMADoL (ULTRAM) tablet 50 mg  50 mg Oral Q6H PRN    sodium chloride (NS) flush 5-40 mL  5-40 mL IntraVENous Q8H    sodium chloride (NS) flush 5-40 mL  5-40 mL IntraVENous PRN    acetaminophen (TYLENOL) tablet 650 mg  650 mg Oral Q6H PRN    Or    acetaminophen (TYLENOL) suppository 650 mg  650 mg Rectal Q6H PRN    polyethylene glycol (MIRALAX) packet 17 g  17 g Oral DAILY PRN    promethazine (PHENERGAN) tablet 12.5 mg  12.5 mg Oral Q6H PRN    Or    ondansetron (ZOFRAN) injection 4 mg  4 mg IntraVENous Q6H PRN    famotidine (PEPCID) tablet 20 mg  20 mg Oral BID    alcohol 62% (NOZIN) nasal  1 Ampule  1 Ampule Topical Q12H       Other Studies:  No results found for this visit on 03/03/21. No results found.     All Micro Results     Procedure Component Value Units Date/Time    BLOOD CULTURE [[id number]] Collected: 03/03/21 1322    Order Status: Completed Specimen: Blood Updated: 03/08/21 0745     Special Requests: --        RIGHT  Antecubital       Culture result: NO GROWTH 5 DAYS       BLOOD CULTURE [[id number]] Collected: 03/03/21 1201    Order Status: Completed Specimen: Blood Updated: 03/08/21 0745     Special Requests: LEFT FOREARM        Culture result: NO GROWTH 5 DAYS       CULTURE, URINE [[id number]]  (Abnormal)  (Susceptibility) Collected: 03/03/21 1349    Order Status: Completed Specimen: Urine from Clean catch Updated: 03/06/21 0817     Special Requests: NO SPECIAL REQUESTS        Culture result:       >100,000 COLONIES/mL PROTEUS MIRABILIS                  50,000-100,000 COLONIES/mL MIXED SKIN ANDREW ISOLATED                SARS-CoV-2 Lab Results  \"Novel Coronavirus\" Test:   Lab Results   Component Value Date/Time    COV2NT Not Detected 06/29/2020 04:25 PM      \"Emergent Disease\" Test:   Lab Results   Component Value Date/Time    EDPR Not detected 05/13/2020 05:09 PM     \"SARS-COV-2\" Test: No results found for: XGCOVT  Rapid Test:   Lab Results   Component Value Date/Time    COVR Not detected 07/23/2020 02:23 AM            Signed:  Sruesh Connelly MD

## 2021-03-14 PROCEDURE — 77030018836 HC SOL IRR NACL ICUM -A

## 2021-03-14 PROCEDURE — 2709999900 HC NON-CHARGEABLE SUPPLY

## 2021-03-14 PROCEDURE — 74011250637 HC RX REV CODE- 250/637: Performed by: FAMILY MEDICINE

## 2021-03-14 PROCEDURE — 65270000029 HC RM PRIVATE

## 2021-03-14 RX ADMIN — Medication 81 MG: at 10:17

## 2021-03-14 RX ADMIN — SENNOSIDES 17.2 MG: 8.6 TABLET, FILM COATED ORAL at 10:17

## 2021-03-14 RX ADMIN — TRAMADOL HYDROCHLORIDE 50 MG: 50 TABLET ORAL at 18:12

## 2021-03-14 RX ADMIN — RDII 250 MG CAPSULE 250 MG: at 17:16

## 2021-03-14 RX ADMIN — SENNOSIDES 17.2 MG: 8.6 TABLET, FILM COATED ORAL at 17:15

## 2021-03-14 RX ADMIN — VITAMIN D, TAB 1000IU (100/BT) 1000 UNITS: 25 TAB at 10:17

## 2021-03-14 RX ADMIN — FAMOTIDINE 20 MG: 20 TABLET, FILM COATED ORAL at 10:17

## 2021-03-14 RX ADMIN — POLYETHYLENE GLYCOL 3350 17 G: 17 POWDER, FOR SOLUTION ORAL at 10:17

## 2021-03-14 RX ADMIN — BACLOFEN 20 MG: 10 TABLET ORAL at 17:16

## 2021-03-14 RX ADMIN — AMANTADINE HYDROCHLORIDE 100 MG: 100 CAPSULE ORAL at 22:02

## 2021-03-14 RX ADMIN — AMANTADINE HYDROCHLORIDE 100 MG: 100 CAPSULE ORAL at 10:17

## 2021-03-14 RX ADMIN — PREGABALIN 50 MG: 50 CAPSULE ORAL at 10:17

## 2021-03-14 RX ADMIN — BACLOFEN 20 MG: 10 TABLET ORAL at 22:02

## 2021-03-14 RX ADMIN — APIXABAN 5 MG: 5 TABLET, FILM COATED ORAL at 10:17

## 2021-03-14 RX ADMIN — BACLOFEN 20 MG: 10 TABLET ORAL at 10:17

## 2021-03-14 RX ADMIN — Medication 10 ML: at 13:04

## 2021-03-14 RX ADMIN — APIXABAN 5 MG: 5 TABLET, FILM COATED ORAL at 22:02

## 2021-03-14 RX ADMIN — Medication 1 AMPULE: at 22:01

## 2021-03-14 RX ADMIN — RDII 250 MG CAPSULE 250 MG: at 10:17

## 2021-03-14 RX ADMIN — FAMOTIDINE 20 MG: 20 TABLET, FILM COATED ORAL at 17:16

## 2021-03-14 RX ADMIN — Medication 1 AMPULE: at 10:17

## 2021-03-14 NOTE — PROGRESS NOTES
Shift assessment complete. Pt sitting up in bed eating breakfast. Mathew catheter draining, no abnormalities. Lung sounds clear bilaterally. No abdominal complaints. Bed locked, in lowest position, call light within reach.

## 2021-03-14 NOTE — PROGRESS NOTES
Hospitalist Progress Note     Admit Date:  3/3/2021 12:18 PM   Name:  Crystal Torres   Age:  62 y.o.  :  1960   MRN:  [id number]   PCP:  Brooke Whitney MD  Treatment Team: Attending Provider: Priscilla Bales MD; : Joseph Auguste; Care Manager: Little Mejia; Utilization Review: Batsheva Mann; Care Manager: Jeffery Galo LMSW; Staff Nurse: Aston Barahona; Primary Nurse: Hebert Zazueta RN  Presenting Complaint: Urinary Odor      Hospital Course:   Mrs. Jose Eduardo Daniel is a nice 61 y/o WF with a h/o MS, multiple chronic wounds who presented to the ED on 3/3 d/t back pain and concern for UTI. Pt stated she didn't want to come to the ED but her home health nurse made her. She lives by herself, mostly is bedridden, uses a wheelchair at times and stated that she had a wasserman placed months ago due to multiple sacral ulcers. Her urine had been cloudy and home health came this Am and found her on the ground covering in feces so EMS was called. Wound care consulted for heel/sacral wounds. Urine culture grew pan-sensitive proteus mirabilis and she has completed antibiotics. Previously living by herself and has no insurance days left for rehab so has applied for Medicaid which is currently pending. 24hr Events/Subjective:   3/14: In bed, no complaints, slept ok, says she'll eat breakfast later. A bit confused, saying she sees a \"man with his hand stuck in the door\".      No other complaints  Assessment and Plan:     Hospital Problems as of 3/14/2021 Date Reviewed: 2020          Codes Class Noted - Resolved POA    Generalized weakness ICD-10-CM: R53.1  ICD-9-CM: 780.79  3/3/2021 - Present Yes        * (Principal) Acute UTI ICD-10-CM: N39.0  ICD-9-CM: 599.0  3/3/2021 - Present Yes        History of DVT of lower extremity ICD-10-CM: Z86.718  ICD-9-CM: V12.51  3/3/2021 - Present Yes        Depression ICD-10-CM: F32.9  ICD-9-CM: 623  9/10/2020 - Present Yes        Essential hypertension ICD-10-CM: I10  ICD-9-CM: 401.9  9/10/2020 - Present Yes        Multiple wounds ICD-10-CM: T07. Donnal Lax  ICD-9-CM: 879.8  9/10/2020 - Present Yes        Anxiety disorder ICD-10-CM: F41.9  ICD-9-CM: 300.00  8/8/2020 - Present Yes        Gastroesophageal reflux disease without esophagitis ICD-10-CM: K21.9  ICD-9-CM: 530.81  8/8/2020 - Present Yes        Sepsis ([facility] ICD-10-CM: A41.9  ICD-9-CM: 038.9, 995.91  7/23/2020 - Present Yes        Pressure injury of sacral region, stage 4 (HCC) ICD-10-CM: L89.154  ICD-9-CM: 707.03, 707.24  5/13/2020 - Present Yes        Multiple sclerosis ([facility] ICD-10-CM: G35  ICD-9-CM: 875  5/13/2020 - Present Yes        Severe protein-calorie malnutrition ([facility] ICD-10-CM: E43  ICD-9-CM: 127  5/13/2020 - Present Unknown              Plan:  # Confusion   - Mild, no changes otherwise. Will monitor, expect to improve later today. # Generalized weakness              - Con't therapy efforts. Planning for rehab/SNF                       - [facility] applied for and pending.     # Sepsis 2/2 UTI              - Resolved. Has completed antibiotics for pan-sensitive proteus mirabilis. Blood cxs neg.     # Multiple chronic wounds              - Wound care.     # H/o MS              - Home meds     # Anxiety              - Home meds     # GERD              - Pepcid     # Hx of DVT              - Eliquis    Other listed chronic conditions stable, continue current management. DC planning: Medicaid pending.   Diet:  DIET REGULAR  DIET NUTRITIONAL SUPPLEMENTS  DIET NUTRITIONAL SUPPLEMENTS  DIET NUTRITIONAL SUPPLEMENTS  DVT ppx: Eliquis    Objective:     Patient Vitals for the past 24 hrs:   Temp Pulse Resp BP SpO2   03/14/21 0309 97.8 °F (36.6 °C) 78 18 116/70 96 %   03/13/21 1953 98.1 °F (36.7 °C) 97 18 (!) 156/67 93 %   03/13/21 1715  94 18 126/82 100 %     Oxygen Therapy  O2 Sat (%): 96 % (03/14/21 0309)  Pulse via Oximetry: 103 beats per minute (03/03/21 1630)  O2 Device: Room air (03/13/21 1801)    Estimated body mass index is 12.21 kg/m² as calculated from the following:    Height as of this encounter: 5' 7\" (1.702 m). Weight as of this encounter: 35.4 kg (77 lb 15.3 oz). Intake/Output Summary (Last 24 hours) at 3/14/2021 0800  Last data filed at 3/13/2021 1804  Gross per 24 hour   Intake 840 ml   Output 500 ml   Net 340 ml       *Note that automatically entered I/Os may not be accurate; dependent on patient compliance with collection and accurate  by techs. General:    Frail, underweight, chronically ill appearing. CV:   RRR. No m/r/g. No edema. No JVD  Lungs:   CTAB. No wheezing, rhonchi, or rales. Unlabored  Abdomen:   Soft, nontender, nondistended. Extremities: Warm and dry. No cyanosis. Bandages left hip, right ankle. Skin:     No rashes. Normal coloration  Neuro:  No gross focal deficits. Awake, but confused. Data Reviewed:  I have reviewed all labs, meds, and studies from the last 24 hours:  No results found for this or any previous visit (from the past 24 hour(s)).     Current Meds:  Current Facility-Administered Medications   Medication Dose Route Frequency    carboxymethylcellulose sodium (REFRESH LIQUIGEL) 1 % ophthalmic solution 2 Drop  2 Drop Both Eyes PRN    amantadine HCL (SYMMETREL) capsule 100 mg  100 mg Oral BID    apixaban (ELIQUIS) tablet 5 mg  5 mg Oral BID    aspirin delayed-release tablet 81 mg  81 mg Oral DAILY    baclofen (LIORESAL) tablet 20 mg  20 mg Oral TID    cholecalciferol (VITAMIN D3) (1000 Units /25 mcg) tablet 1,000 Units  1,000 Units Oral DAILY    oxyCODONE IR (ROXICODONE) tablet 10 mg  10 mg Oral Q6H PRN    polyethylene glycol (MIRALAX) packet 17 g  17 g Oral DAILY    pregabalin (LYRICA) capsule 50 mg  50 mg Oral DAILY    Saccharomyces boulardii (FLORASTOR) capsule 250 mg  250 mg Oral BID    senna (SENOKOT) tablet 17.2 mg  2 Tab Oral BID    traMADoL (ULTRAM) tablet 50 mg  50 mg Oral Q6H PRN    sodium chloride (NS) flush 5-40 mL  5-40 mL IntraVENous Q8H    sodium chloride (NS) flush 5-40 mL  5-40 mL IntraVENous PRN    acetaminophen (TYLENOL) tablet 650 mg  650 mg Oral Q6H PRN    Or    acetaminophen (TYLENOL) suppository 650 mg  650 mg Rectal Q6H PRN    polyethylene glycol (MIRALAX) packet 17 g  17 g Oral DAILY PRN    promethazine (PHENERGAN) tablet 12.5 mg  12.5 mg Oral Q6H PRN    Or    ondansetron (ZOFRAN) injection 4 mg  4 mg IntraVENous Q6H PRN    famotidine (PEPCID) tablet 20 mg  20 mg Oral BID    alcohol 62% (NOZIN) nasal  1 Ampule  1 Ampule Topical Q12H       Other Studies:  No results found for this visit on 03/03/21. No results found.     All Micro Results     Procedure Component Value Units Date/Time    BLOOD CULTURE [[id number]] Collected: 03/03/21 1322    Order Status: Completed Specimen: Blood Updated: 03/08/21 0745     Special Requests: --        RIGHT  Antecubital       Culture result: NO GROWTH 5 DAYS       BLOOD CULTURE [[id number]] Collected: 03/03/21 1201    Order Status: Completed Specimen: Blood Updated: 03/08/21 0745     Special Requests: LEFT FOREARM        Culture result: NO GROWTH 5 DAYS       CULTURE, URINE [[id number]]  (Abnormal)  (Susceptibility) Collected: 03/03/21 1349    Order Status: Completed Specimen: Urine from Clean catch Updated: 03/06/21 0817     Special Requests: NO SPECIAL REQUESTS        Culture result:       >100,000 COLONIES/mL PROTEUS MIRABILIS                  50,000-100,000 COLONIES/mL MIXED SKIN ANDREW ISOLATED                SARS-CoV-2 Lab Results  \"Novel Coronavirus\" Test:   Lab Results   Component Value Date/Time    COV2NT Not Detected 06/29/2020 04:25 PM      \"Emergent Disease\" Test:   Lab Results   Component Value Date/Time    EDPR Not detected 05/13/2020 05:09 PM     \"SARS-COV-2\" Test: No results found for: XGCOVT  Rapid Test:   Lab Results   Component Value Date/Time    COVR Not detected 07/23/2020 02:23 AM            Signed:  Marky Rendon MD

## 2021-03-14 NOTE — PROGRESS NOTES
Right hip, sacral wound dressing changed per order. Right heel cleansed with betadine and left open to air to dry.

## 2021-03-14 NOTE — PROGRESS NOTES
Patient to d/c to [facility] rehab once Medicaid has been obtained and LOC.  CM following

## 2021-03-15 PROCEDURE — 74011250637 HC RX REV CODE- 250/637: Performed by: FAMILY MEDICINE

## 2021-03-15 PROCEDURE — 65270000029 HC RM PRIVATE

## 2021-03-15 RX ADMIN — POLYETHYLENE GLYCOL 3350 17 G: 17 POWDER, FOR SOLUTION ORAL at 08:28

## 2021-03-15 RX ADMIN — PREGABALIN 50 MG: 50 CAPSULE ORAL at 08:28

## 2021-03-15 RX ADMIN — AMANTADINE HYDROCHLORIDE 100 MG: 100 CAPSULE ORAL at 20:17

## 2021-03-15 RX ADMIN — APIXABAN 5 MG: 5 TABLET, FILM COATED ORAL at 08:28

## 2021-03-15 RX ADMIN — Medication 10 ML: at 21:31

## 2021-03-15 RX ADMIN — AMANTADINE HYDROCHLORIDE 100 MG: 100 CAPSULE ORAL at 08:28

## 2021-03-15 RX ADMIN — BACLOFEN 20 MG: 10 TABLET ORAL at 08:28

## 2021-03-15 RX ADMIN — SENNOSIDES 17.2 MG: 8.6 TABLET, FILM COATED ORAL at 08:28

## 2021-03-15 RX ADMIN — FAMOTIDINE 20 MG: 20 TABLET, FILM COATED ORAL at 08:28

## 2021-03-15 RX ADMIN — Medication 81 MG: at 08:28

## 2021-03-15 RX ADMIN — Medication 1 AMPULE: at 08:28

## 2021-03-15 RX ADMIN — OXYCODONE 10 MG: 5 TABLET ORAL at 17:16

## 2021-03-15 RX ADMIN — SENNOSIDES 17.2 MG: 8.6 TABLET, FILM COATED ORAL at 17:16

## 2021-03-15 RX ADMIN — Medication 1 AMPULE: at 20:17

## 2021-03-15 RX ADMIN — APIXABAN 5 MG: 5 TABLET, FILM COATED ORAL at 20:17

## 2021-03-15 RX ADMIN — FAMOTIDINE 20 MG: 20 TABLET, FILM COATED ORAL at 17:16

## 2021-03-15 RX ADMIN — RDII 250 MG CAPSULE 250 MG: at 17:16

## 2021-03-15 RX ADMIN — VITAMIN D, TAB 1000IU (100/BT) 1000 UNITS: 25 TAB at 08:28

## 2021-03-15 RX ADMIN — RDII 250 MG CAPSULE 250 MG: at 08:28

## 2021-03-15 RX ADMIN — OXYCODONE 10 MG: 5 TABLET ORAL at 08:34

## 2021-03-15 RX ADMIN — TRAMADOL HYDROCHLORIDE 50 MG: 50 TABLET ORAL at 20:17

## 2021-03-15 RX ADMIN — BACLOFEN 20 MG: 10 TABLET ORAL at 21:02

## 2021-03-15 RX ADMIN — BACLOFEN 20 MG: 10 TABLET ORAL at 17:16

## 2021-03-15 NOTE — PROGRESS NOTES
SW continuing to follow - awaiting medicaid application to pursue placement.     ARNULFO Reeder  [place]   [phone]

## 2021-03-15 NOTE — PROGRESS NOTES
Problem: Pressure Injury - Risk of  Goal: *Prevention of pressure injury  Description: Document Kimani Scale and appropriate interventions in the flowsheet. Outcome: Progressing Towards Goal  Note: Pressure Injury Interventions:  Sensory Interventions: Assess changes in LOC    Moisture Interventions: Absorbent underpads, Apply protective barrier, creams and emollients    Activity Interventions: Increase time out of bed    Mobility Interventions: HOB 30 degrees or less    Nutrition Interventions: Offer support with meals,snacks and hydration    Friction and Shear Interventions: Apply protective barrier, creams and emollients                Problem: Falls - Risk of  Goal: *Absence of Falls  Description: Document Danial Fall Risk and appropriate interventions in the flowsheet.   Outcome: Progressing Towards Goal  Note: Fall Risk Interventions:  Mobility Interventions: Bed/chair exit alarm    Mentation Interventions: Bed/chair exit alarm    Medication Interventions: Bed/chair exit alarm    Elimination Interventions: Call light in reach    History of Falls Interventions: Bed/chair exit alarm, Door open when patient unattended, Room close to nurse's station, Investigate reason for fall         Problem: Nutrition Deficit  Goal: *Optimize nutritional status  Outcome: Progressing Towards Goal     Problem: Delirium Treatment  Goal: *Level of consciousness restored to baseline  Outcome: Progressing Towards Goal  Goal: *Level of environmental perceptions restored to baseline  Outcome: Progressing Towards Goal  Goal: *Sensory perception restored to baseline  Outcome: Progressing Towards Goal  Goal: *Emotional stability restored to baseline  Outcome: Progressing Towards Goal  Goal: *Functional assessment restored to baseline  Outcome: Progressing Towards Goal  Goal: *Absence of falls  Outcome: Progressing Towards Goal  Goal: *Will remain free of delirium, CAM Score negative  Outcome: Progressing Towards Goal  Goal: *Cognitive status will be restored to baseline  Outcome: Progressing Towards Goal  Goal: Interventions  Outcome: Progressing Towards Goal

## 2021-03-15 NOTE — PROGRESS NOTES
Hospitalist Progress Note     Admit Date:  3/3/2021 12:18 PM   Name:  Jessica Armstrong   Age:  62 y.o.  :  1960   MRN:  [id number]   PCP:  Fareed Ibarra MD  Treatment Team: Attending Provider: Clark Rachel MD; : Mushtaq Ga; Care Manager: Lonny Bear; Utilization Review: Chasidy Villagomez; Care Manager: Miguelina Blackmon SARY; Staff Nurse: Raman Talbert  Presenting Complaint: Urinary Odor      Hospital Course:   Mrs. Alicia Laura is a nice 61 y/o WF with a h/o MS, multiple chronic wounds who presented to the ED on 3/3 d/t back pain and concern for UTI. Pt stated she didn't want to come to the ED but her home health nurse made her. She lives by herself, mostly is bedridden, uses a wheelchair at times and stated that she had a wasserman placed months ago due to multiple sacral ulcers. Her urine had been cloudy and home health came this Am and found her on the ground covering in feces so EMS was called. Wound care consulted for heel/sacral wounds. Urine culture grew pan-sensitive proteus mirabilis and she has completed antibiotics. Previously living by herself and has no insurance days left for rehab so has applied for Medicaid which is currently pending. 24hr Events/Subjective:   3/15: In bed, alert and oriented today. Was confused yesterday AM and does not remember our encounter, but did well through the rest of the day. Good appetite, slept better last night, no pain. ROS neg otherwise.     No other complaints  Assessment and Plan:     Hospital Problems as of 3/15/2021 Date Reviewed: 2020          Codes Class Noted - Resolved POA    Generalized weakness ICD-10-CM: R53.1  ICD-9-CM: 780.79  3/3/2021 - Present Yes        * (Principal) Acute UTI ICD-10-CM: N39.0  ICD-9-CM: 599.0  3/3/2021 - Present Yes        History of DVT of lower extremity ICD-10-CM: Z86.718  ICD-9-CM: V12.51  3/3/2021 - Present Yes        Depression ICD-10-CM: F32.9  ICD-9-CM: 581  9/10/2020 - Present Yes Essential hypertension ICD-10-CM: I10  ICD-9-CM: 401.9  9/10/2020 - Present Yes        Multiple wounds ICD-10-CM: T07Feng Anna Avi  ICD-9-CM: 879.8  9/10/2020 - Present Yes        Anxiety disorder ICD-10-CM: F41.9  ICD-9-CM: 300.00  8/8/2020 - Present Yes        Gastroesophageal reflux disease without esophagitis ICD-10-CM: K21.9  ICD-9-CM: 530.81  8/8/2020 - Present Yes        Sepsis ([facility] ICD-10-CM: A41.9  ICD-9-CM: 038.9, 995.91  7/23/2020 - Present Yes        Pressure injury of sacral region, stage 4 (HCC) ICD-10-CM: L89.154  ICD-9-CM: 707.03, 707.24  5/13/2020 - Present Yes        Multiple sclerosis ([facility] ICD-10-CM: G35  ICD-9-CM: 039  5/13/2020 - Present Yes        Severe protein-calorie malnutrition ([facility] ICD-10-CM: E43  ICD-9-CM: 706  5/13/2020 - Present Unknown              Plan:    # Generalized weakness              - Con't therapy efforts. Planning for rehab/SNF. Ashley Michael              - [id number] applied for and pending.     # Sepsis 2/2 UTI              - Resolved. Has completed antibiotics for pan-sensitive proteus mirabilis. Blood cxs neg.     # Multiple chronic wounds              - Con't wound care efforts. # Confusion              - Transient, resolved. No work up needed. # H/o MS              - Home meds     # Anxiety              - Home meds     # GERD              - Pepcid     # Hx of DVT              - Eliquis    Other listed chronic conditions stable, continue current management. DC planning: Medicaid approval pending.   Diet:  DIET REGULAR  DIET NUTRITIONAL SUPPLEMENTS  DIET NUTRITIONAL SUPPLEMENTS  DIET NUTRITIONAL SUPPLEMENTS  DVT ppx: Eliquis    Objective:     Patient Vitals for the past 24 hrs:   Temp Pulse Resp BP SpO2   03/15/21 0227 97.5 °F (36.4 °C) 73 16 117/75 96 %   03/14/21 2340 98 °F (36.7 °C) 90 12 125/75 96 %   03/14/21 1945 98 °F (36.7 °C) 87 16 119/71 96 %   03/14/21 1522 97.8 °F (36.6 °C) 92 18 115/79 93 %   03/14/21 1129 98.3 °F (36.8 °C) 88 18 113/75 94 %     Oxygen Therapy  O2 Sat (%): 96 % (03/15/21 0227)  Pulse via Oximetry: 103 beats per minute (03/03/21 1630)  O2 Device: Room air (03/13/21 2035)    Estimated body mass index is 12.21 kg/m² as calculated from the following:    Height as of this encounter: 5' 7\" (1.702 m). Weight as of this encounter: 35.4 kg (77 lb 15.3 oz). Intake/Output Summary (Last 24 hours) at 3/15/2021 0809  Last data filed at 3/14/2021 1948  Gross per 24 hour   Intake    Output 400 ml   Net -400 ml       *Note that automatically entered I/Os may not be accurate; dependent on patient compliance with collection and accurate  by techs. General:    Thin, underweight. NAD. Pleasant. CV:   RRR. No m/r/g. No edema. No JVD  Lungs:   CTAB. No wheezing, rhonchi, or rales. Unlabored  Abdomen:   Soft, nontender, nondistended. Extremities: Warm and dry. No cyanosis   Skin:     No rashes. Normal coloration  Neuro:  No gross focal deficits. Alert and oriented x3. Data Reviewed:  I have reviewed all labs, meds, and studies from the last 24 hours:  No results found for this or any previous visit (from the past 24 hour(s)).     Current Meds:  Current Facility-Administered Medications   Medication Dose Route Frequency    carboxymethylcellulose sodium (REFRESH LIQUIGEL) 1 % ophthalmic solution 2 Drop  2 Drop Both Eyes PRN    amantadine HCL (SYMMETREL) capsule 100 mg  100 mg Oral BID    apixaban (ELIQUIS) tablet 5 mg  5 mg Oral BID    aspirin delayed-release tablet 81 mg  81 mg Oral DAILY    baclofen (LIORESAL) tablet 20 mg  20 mg Oral TID    cholecalciferol (VITAMIN D3) (1000 Units /25 mcg) tablet 1,000 Units  1,000 Units Oral DAILY    oxyCODONE IR (ROXICODONE) tablet 10 mg  10 mg Oral Q6H PRN    polyethylene glycol (MIRALAX) packet 17 g  17 g Oral DAILY    pregabalin (LYRICA) capsule 50 mg  50 mg Oral DAILY    Saccharomyces boulardii (FLORASTOR) capsule 250 mg  250 mg Oral BID    senna (SENOKOT) tablet 17.2 mg  2 Tab Oral BID  traMADoL (ULTRAM) tablet 50 mg  50 mg Oral Q6H PRN    sodium chloride (NS) flush 5-40 mL  5-40 mL IntraVENous Q8H    sodium chloride (NS) flush 5-40 mL  5-40 mL IntraVENous PRN    acetaminophen (TYLENOL) tablet 650 mg  650 mg Oral Q6H PRN    Or    acetaminophen (TYLENOL) suppository 650 mg  650 mg Rectal Q6H PRN    polyethylene glycol (MIRALAX) packet 17 g  17 g Oral DAILY PRN    promethazine (PHENERGAN) tablet 12.5 mg  12.5 mg Oral Q6H PRN    Or    ondansetron (ZOFRAN) injection 4 mg  4 mg IntraVENous Q6H PRN    famotidine (PEPCID) tablet 20 mg  20 mg Oral BID    alcohol 62% (NOZIN) nasal  1 Ampule  1 Ampule Topical Q12H       Other Studies:  No results found for this visit on 03/03/21. No results found.     All Micro Results     Procedure Component Value Units Date/Time    BLOOD CULTURE [[id number]] Collected: 03/03/21 1322    Order Status: Completed Specimen: Blood Updated: 03/08/21 0745     Special Requests: --        RIGHT  Antecubital       Culture result: NO GROWTH 5 DAYS       BLOOD CULTURE [[id number]] Collected: 03/03/21 1201    Order Status: Completed Specimen: Blood Updated: 03/08/21 0745     Special Requests: LEFT FOREARM        Culture result: NO GROWTH 5 DAYS       CULTURE, URINE [[id number]]  (Abnormal)  (Susceptibility) Collected: 03/03/21 1349    Order Status: Completed Specimen: Urine from Clean catch Updated: 03/06/21 0817     Special Requests: NO SPECIAL REQUESTS        Culture result:       >100,000 COLONIES/mL PROTEUS MIRABILIS                  50,000-100,000 COLONIES/mL MIXED SKIN ANDREW ISOLATED                SARS-CoV-2 Lab Results  \"Novel Coronavirus\" Test:   Lab Results   Component Value Date/Time    COV2NT Not Detected 06/29/2020 04:25 PM      \"Emergent Disease\" Test:   Lab Results   Component Value Date/Time    EDPR Not detected 05/13/2020 05:09 PM     \"SARS-COV-2\" Test: No results found for: XGCOVT  Rapid Test:   Lab Results   Component Value Date/Time    COVR Not detected 07/23/2020 02:23 AM            Signed:  Catalino Kruger MD

## 2021-03-15 NOTE — PROGRESS NOTES
Shift assessment complete. Pt resting in bed. A&Ox4. Stage 4 pressure ulcer to sacrum with dressing present. Pt complains of pain, will medicate. IV capped and patent. Mathew in place. Bed alarm in place, bed in lowest position, call light within reach, side rails x3. Encouraged to call for help when needed.

## 2021-03-16 PROCEDURE — 65270000029 HC RM PRIVATE

## 2021-03-16 PROCEDURE — 74011250637 HC RX REV CODE- 250/637: Performed by: FAMILY MEDICINE

## 2021-03-16 RX ADMIN — Medication 10 ML: at 05:05

## 2021-03-16 RX ADMIN — SENNOSIDES 17.2 MG: 8.6 TABLET, FILM COATED ORAL at 17:14

## 2021-03-16 RX ADMIN — BACLOFEN 20 MG: 10 TABLET ORAL at 08:37

## 2021-03-16 RX ADMIN — RDII 250 MG CAPSULE 250 MG: at 17:14

## 2021-03-16 RX ADMIN — POLYETHYLENE GLYCOL 3350 17 G: 17 POWDER, FOR SOLUTION ORAL at 08:36

## 2021-03-16 RX ADMIN — RDII 250 MG CAPSULE 250 MG: at 08:37

## 2021-03-16 RX ADMIN — OXYCODONE 10 MG: 5 TABLET ORAL at 18:38

## 2021-03-16 RX ADMIN — TRAMADOL HYDROCHLORIDE 50 MG: 50 TABLET ORAL at 11:45

## 2021-03-16 RX ADMIN — APIXABAN 5 MG: 5 TABLET, FILM COATED ORAL at 08:37

## 2021-03-16 RX ADMIN — AMANTADINE HYDROCHLORIDE 100 MG: 100 CAPSULE ORAL at 21:22

## 2021-03-16 RX ADMIN — BACLOFEN 20 MG: 10 TABLET ORAL at 15:17

## 2021-03-16 RX ADMIN — APIXABAN 5 MG: 5 TABLET, FILM COATED ORAL at 21:23

## 2021-03-16 RX ADMIN — Medication 10 ML: at 22:00

## 2021-03-16 RX ADMIN — FAMOTIDINE 20 MG: 20 TABLET, FILM COATED ORAL at 17:14

## 2021-03-16 RX ADMIN — Medication 1 AMPULE: at 08:37

## 2021-03-16 RX ADMIN — PREGABALIN 50 MG: 50 CAPSULE ORAL at 08:37

## 2021-03-16 RX ADMIN — Medication 10 ML: at 13:50

## 2021-03-16 RX ADMIN — SENNOSIDES 17.2 MG: 8.6 TABLET, FILM COATED ORAL at 08:37

## 2021-03-16 RX ADMIN — BACLOFEN 20 MG: 10 TABLET ORAL at 21:23

## 2021-03-16 RX ADMIN — AMANTADINE HYDROCHLORIDE 100 MG: 100 CAPSULE ORAL at 08:37

## 2021-03-16 RX ADMIN — Medication 81 MG: at 08:37

## 2021-03-16 RX ADMIN — OXYCODONE 10 MG: 5 TABLET ORAL at 08:44

## 2021-03-16 RX ADMIN — VITAMIN D, TAB 1000IU (100/BT) 1000 UNITS: 25 TAB at 08:37

## 2021-03-16 RX ADMIN — FAMOTIDINE 20 MG: 20 TABLET, FILM COATED ORAL at 08:37

## 2021-03-16 RX ADMIN — Medication 1 AMPULE: at 21:22

## 2021-03-16 NOTE — PROGRESS NOTES
Pt had an IV in her R arm that has been in for 12 days. Went to flush this IV and it is painful when flushed, and is leaking out. Asked Dr. Barbara Amaral for orders to have pt without IV. She is here waiting on placement. Dr. Barbara Amaral placed this order.

## 2021-03-16 NOTE — ROUTINE PROCESS
Bedside and Verbal shift change report given to RADHA Booker (oncoming nurse) by self (offgoing nurse). Report included the following information SBAR, Kardex, ED Summary, Intake/Output, MAR and Recent Results.

## 2021-03-16 NOTE — ROUTINE PROCESS
TRANSFER - IN REPORT: 
 
Verbal report received from [address] on Tiffany Corporal being received from 3rd floor Ortho for routine progression of care. Report consisted of patients Situation, Background, Assessment and Recommendations(SBAR). Information from the following report(s) SBAR, Kardex, ED Summary, Intake/Output, MAR and Recent Results was reviewed. Opportunity for questions and clarification was provided. Assessment completed upon patients arrival to unit and care assumed. Patient received to room 441. Patient connected to monitor and assessment completed. Plan of care reviewed. Patient oriented to room and call light. Patient aware to use call light to communicate any chest pain or needs. Admission skin assessment completed with second RN and reveals the following:  
Pt's BLE pale and contracted. Left foot more swollen than R. R foot with small bruise on ankle bone. Foot drop on both feet. R heel has betadine dried on it but no dressing. Sacrum/coccyx with Stage 4 pressure wound. Cleaned and placed dressing/allevyn on this spot. Also on R buttocks there is a wound that needed cleaning and an allevyn was placed. R hip has a spot that was open and bleeding when the dressing was peeled back. This spot pt says she got hit against the doorknob and it open up the skin. Cleaned and replaced the dressing on this hip.

## 2021-03-16 NOTE — PROGRESS NOTES
0915:  SARY met with patient who moved to the third floor over the weekend. Per patient, she will be coming back to the 4th floor med/surg unit today. Discussed how patient is doing at this time as well as pending discharge plans. Patient is aware that we are waiting for Medicaid application from [facility] before we can further pursue placement. Patient expressed understanding. 1320:  Copy of Medicaid application received from [facility]. Application faxed to [facility] [address] [facility] [address] for possible placement. SARY will continue to follow.     ARNULFO Farooq  [facility]   [phone]

## 2021-03-16 NOTE — PROGRESS NOTES
OT note: order received and chart reviewed. This pt is familiar to this OT from a previous hospital stay and was evaluated by me on 3/4 and picked up on a trial basis with concern that she is at her baseline of function due to her medical condiditon and wounds. Pt was transferred to 3rd floor from 4th floor for a short period of time and OT was reordered. It is noted that another OT had dicharged pt from OT on 3/6 due to \" Ms. Morocho tried to participate with total assist bed mobility. This pt will need to qualify for long term placement where she can get the care she needs to allow wound healing & the stabilizing of her current medical situation. D/C OT as progress is limited due to medical and physical limitations. \" According to CM notes from 4th floor today medicaid is being pursued for placement.    Ponce Winter, OT

## 2021-03-16 NOTE — PROGRESS NOTES
Problem: Pressure Injury - Risk of  Goal: *Prevention of pressure injury  Description: Document Kimani Scale and appropriate interventions in the flowsheet. Outcome: Progressing Towards Goal  Note: Pressure Injury Interventions:  Sensory Interventions: Assess changes in LOC, Check visual cues for pain, Avoid rigorous massage over bony prominences, Float heels, Keep linens dry and wrinkle-free, Minimize linen layers, Monitor skin under medical devices, Pad between skin to skin    Moisture Interventions: Absorbent underpads, Internal/External urinary devices, Apply protective barrier, creams and emollients, Minimize layers, Maintain skin hydration (lotion/cream), Moisture barrier    Activity Interventions: Pressure redistribution bed/mattress(bed type), Increase time out of bed, Trapeze to reposition    Mobility Interventions: Pressure redistribution bed/mattress (bed type), PT/OT evaluation, Trapeze to reposition    Nutrition Interventions: Document food/fluid/supplement intake, Offer support with meals,snacks and hydration    Friction and Shear Interventions: Apply protective barrier, creams and emollients, Trapeze to reposition, Transferring/repositioning devices, Minimize layers                Problem: Falls - Risk of  Goal: *Absence of Falls  Description: Document Danial Fall Risk and appropriate interventions in the flowsheet.   Outcome: Progressing Towards Goal  Note: Fall Risk Interventions:  Mobility Interventions: Bed/chair exit alarm, Patient to call before getting OOB, PT Consult for assist device competence, Communicate number of staff needed for ambulation/transfer    Mentation Interventions: Adequate sleep, hydration, pain control, Bed/chair exit alarm, Door open when patient unattended, Eyeglasses and hearing aids, Evaluate medications/consider consulting pharmacy, Increase mobility, More frequent rounding, Toileting rounds    Medication Interventions: Bed/chair exit alarm, Evaluate medications/consider consulting pharmacy, Patient to call before getting OOB, Teach patient to arise slowly    Elimination Interventions: Call light in reach, Bed/chair exit alarm, Patient to call for help with toileting needs, Stay With Me (per policy), Toilet paper/wipes in reach, Toileting schedule/hourly rounds    History of Falls Interventions: Bed/chair exit alarm, Door open when patient unattended, Room close to nurse's station, Investigate reason for fall

## 2021-03-16 NOTE — PROGRESS NOTES
Problem: Pressure Injury - Risk of  Goal: *Prevention of pressure injury  Description: Document Kimani Scale and appropriate interventions in the flowsheet. Outcome: Progressing Towards Goal  Note: Pressure Injury Interventions:  Sensory Interventions: Assess changes in LOC    Moisture Interventions: Absorbent underpads, Apply protective barrier, creams and emollients    Activity Interventions: Increase time out of bed    Mobility Interventions: Pressure redistribution bed/mattress (bed type)    Nutrition Interventions: Offer support with meals,snacks and hydration    Friction and Shear Interventions: Apply protective barrier, creams and emollients                Problem: Falls - Risk of  Goal: *Absence of Falls  Description: Document Danial Fall Risk and appropriate interventions in the flowsheet.   Outcome: Progressing Towards Goal  Note: Fall Risk Interventions:  Mobility Interventions: Bed/chair exit alarm    Mentation Interventions: Bed/chair exit alarm    Medication Interventions: Bed/chair exit alarm    Elimination Interventions: Call light in reach    History of Falls Interventions: Bed/chair exit alarm, Door open when patient unattended, Room close to nurse's station, Investigate reason for fall         Problem: Nutrition Deficit  Goal: *Optimize nutritional status  Outcome: Progressing Towards Goal     Problem: Delirium Treatment  Goal: *Level of consciousness restored to baseline  Outcome: Progressing Towards Goal  Goal: *Level of environmental perceptions restored to baseline  Outcome: Progressing Towards Goal  Goal: *Sensory perception restored to baseline  Outcome: Progressing Towards Goal  Goal: *Emotional stability restored to baseline  Outcome: Progressing Towards Goal  Goal: *Functional assessment restored to baseline  Outcome: Progressing Towards Goal  Goal: *Absence of falls  Outcome: Progressing Towards Goal  Goal: *Will remain free of delirium, CAM Score negative  Outcome: Progressing Towards Goal  Goal: *Cognitive status will be restored to baseline  Outcome: Progressing Towards Goal  Goal: Interventions  Outcome: Progressing Towards Goal

## 2021-03-16 NOTE — PROGRESS NOTES
Hospitalist Progress Note     Admit Date:  3/3/2021 12:18 PM   Name:  Thai Piña   Age:  62 y.o.  :  1960   MRN:  [id number]   PCP:  Lore Woo MD  Treatment Team: Attending Provider: Bernie Irizarry MD; : Rosi Michelle; Care Manager: Teja Petersen; Utilization Review: Brianna Donahue; Care Manager: Viola Darden LMSW  Presenting Complaint: Urinary Odor      Hospital Course:   Mrs. Becka Villanueva is a nice 59 y/o WF with a h/o MS, multiple chronic wounds who presented to the ED on 3/3 d/t back pain and concern for UTI. Pt stated she didn't want to come to the ED but her home health nurse made her. She lives by herself, mostly is bedridden, uses a wheelchair at times and stated that she had a wasserman placed months ago due to multiple sacral ulcers. Her urine had been cloudy and home health came this Am and found her on the ground covering in feces so EMS was called. Wound care consulted for heel/sacral wounds. Urine culture grew pan-sensitive proteus mirabilis and she has completed antibiotics. Previously living by herself and has no insurance days left for rehab so has applied for Medicaid which is currently pending. 24hr Events/Subjective:   3/16: No change, appetite still good, slept well. No pain, N/V/D. No other complaints  Assessment and Plan:     Hospital Problems as of 3/16/2021 Date Reviewed: 2020          Codes Class Noted - Resolved POA    Generalized weakness ICD-10-CM: R53.1  ICD-9-CM: 780.79  3/3/2021 - Present Yes        * (Principal) Acute UTI ICD-10-CM: N39.0  ICD-9-CM: 599.0  3/3/2021 - Present Yes        History of DVT of lower extremity ICD-10-CM: Z86.718  ICD-9-CM: V12.51  3/3/2021 - Present Yes        Depression ICD-10-CM: F32.9  ICD-9-CM: 260  9/10/2020 - Present Yes        Essential hypertension ICD-10-CM: I10  ICD-9-CM: 401.9  9/10/2020 - Present Yes        Multiple wounds ICD-10-CM: T07. Jori Cones  ICD-9-CM: 879.8  9/10/2020 - Present Yes        Anxiety disorder ICD-10-CM: F41.9  ICD-9-CM: 300.00  8/8/2020 - Present Yes        Gastroesophageal reflux disease without esophagitis ICD-10-CM: K21.9  ICD-9-CM: 530.81  8/8/2020 - Present Yes        Sepsis ([facility] ICD-10-CM: A41.9  ICD-9-CM: 038.9, 995.91  7/23/2020 - Present Yes        Pressure injury of sacral region, stage 4 (HCC) ICD-10-CM: X49.767  ICD-9-CM: 707.03, 707.24  5/13/2020 - Present Yes        Multiple sclerosis ([facility] ICD-10-CM: G35  ICD-9-CM: 376  5/13/2020 - Present Yes        Severe protein-calorie malnutrition ([facility] ICD-10-CM: E43  ICD-9-CM: 262  5/13/2020 - Present Unknown              Plan:     # Generalized weakness              - Con't therapy efforts. Planning for rehab/SNF. Lurene Essex - [facility] applied for and pending.     # Sepsis 2/2 UTI              - Resolved. Has completed antibiotics for pan-sensitive proteus mirabilis. Blood cxs neg.     # Multiple chronic wounds              - Con't wound care efforts.     # Confusion              - Transient, resolved. No work up needed.     # H/o [address]              - Home meds     # Anxiety              - Home meds     # GERD              - Pepcid     # Hx of DVT              - Eliquis    Other listed chronic conditions stable, continue current management. DC planning: No changes overall. Medicaid approval pending.   Diet:  DIET REGULAR  DIET NUTRITIONAL SUPPLEMENTS  DIET NUTRITIONAL SUPPLEMENTS  DIET NUTRITIONAL SUPPLEMENTS  DVT ppx: Eliquis     Objective:     Patient Vitals for the past 24 hrs:   Temp Pulse Resp BP SpO2   03/16/21 0722 97.6 °F (36.4 °C) 88 16 106/68 96 %   03/16/21 0339 99.2 °F (37.3 °C) 84 18 103/66 97 %   03/15/21 2333 98.3 °F (36.8 °C) 96 18 117/73 96 %   03/15/21 1948 98.1 °F (36.7 °C) 95 18 112/76 93 %   03/15/21 1530 97.7 °F (36.5 °C) 80 16 124/71 98 %   03/15/21 1126 97.9 °F (36.6 °C) 82 16 111/70 97 %     Oxygen Therapy  O2 Sat (%): 96 % (03/16/21 0722)  Pulse via Oximetry: 103 beats per minute (03/03/21 1630)  O2 Device: Room air (03/16/21 0844)    Estimated body mass index is 12.21 kg/m² as calculated from the following:    Height as of this encounter: 5' 7\" (1.702 m). Weight as of this encounter: 35.4 kg (77 lb 15.3 oz). Intake/Output Summary (Last 24 hours) at 3/16/2021 1006  Last data filed at 3/16/2021 0844  Gross per 24 hour   Intake    Output 1800 ml   Net -1800 ml       *Note that automatically entered I/Os may not be accurate; dependent on patient compliance with collection and accurate  by techs. General:    Thin, underweight, NAD. CV:   RRR. No m/r/g. No edema. No JVD  Lungs:   CTAB. No wheezing, rhonchi, or rales. Unlabored  Abdomen:   Soft, nontender, nondistended. Extremities: Warm and dry. No cyanosis   Skin:     No rashes. Normal coloration  Neuro:  No gross focal deficits. A/O x3. Data Reviewed:  I have reviewed all labs, meds, and studies from the last 24 hours:  No results found for this or any previous visit (from the past 24 hour(s)).     Current Meds:  Current Facility-Administered Medications   Medication Dose Route Frequency    carboxymethylcellulose sodium (REFRESH LIQUIGEL) 1 % ophthalmic solution 2 Drop  2 Drop Both Eyes PRN    amantadine HCL (SYMMETREL) capsule 100 mg  100 mg Oral BID    apixaban (ELIQUIS) tablet 5 mg  5 mg Oral BID    aspirin delayed-release tablet 81 mg  81 mg Oral DAILY    baclofen (LIORESAL) tablet 20 mg  20 mg Oral TID    cholecalciferol (VITAMIN D3) (1000 Units /25 mcg) tablet 1,000 Units  1,000 Units Oral DAILY    oxyCODONE IR (ROXICODONE) tablet 10 mg  10 mg Oral Q6H PRN    polyethylene glycol (MIRALAX) packet 17 g  17 g Oral DAILY    pregabalin (LYRICA) capsule 50 mg  50 mg Oral DAILY    Saccharomyces boulardii (FLORASTOR) capsule 250 mg  250 mg Oral BID    senna (SENOKOT) tablet 17.2 mg  2 Tab Oral BID    traMADoL (ULTRAM) tablet 50 mg  50 mg Oral Q6H PRN    sodium chloride (NS) flush 5-40 mL  5-40 mL IntraVENous Q8H    sodium chloride (NS) flush 5-40 mL  5-40 mL IntraVENous PRN    acetaminophen (TYLENOL) tablet 650 mg  650 mg Oral Q6H PRN    Or    acetaminophen (TYLENOL) suppository 650 mg  650 mg Rectal Q6H PRN    polyethylene glycol (MIRALAX) packet 17 g  17 g Oral DAILY PRN    promethazine (PHENERGAN) tablet 12.5 mg  12.5 mg Oral Q6H PRN    Or    ondansetron (ZOFRAN) injection 4 mg  4 mg IntraVENous Q6H PRN    famotidine (PEPCID) tablet 20 mg  20 mg Oral BID    alcohol 62% (NOZIN) nasal  1 Ampule  1 Ampule Topical Q12H       Other Studies:  No results found for this visit on 03/03/21. No results found.     All Micro Results     Procedure Component Value Units Date/Time    BLOOD CULTURE [[id number]] Collected: 03/03/21 1322    Order Status: Completed Specimen: Blood Updated: 03/08/21 0745     Special Requests: --        RIGHT  Antecubital       Culture result: NO GROWTH 5 DAYS       BLOOD CULTURE [[id number]] Collected: 03/03/21 1201    Order Status: Completed Specimen: Blood Updated: 03/08/21 0745     Special Requests: LEFT FOREARM        Culture result: NO GROWTH 5 DAYS       CULTURE, URINE [[id number]]  (Abnormal)  (Susceptibility) Collected: 03/03/21 1349    Order Status: Completed Specimen: Urine from Clean catch Updated: 03/06/21 0817     Special Requests: NO SPECIAL REQUESTS        Culture result:       >100,000 COLONIES/mL PROTEUS MIRABILIS                  50,000-100,000 COLONIES/mL MIXED SKIN ANDREW ISOLATED                SARS-CoV-2 Lab Results  \"Novel Coronavirus\" Test:   Lab Results   Component Value Date/Time    COV2NT Not Detected 06/29/2020 04:25 PM      \"Emergent Disease\" Test:   Lab Results   Component Value Date/Time    EDPR Not detected 05/13/2020 05:09 PM     \"SARS-COV-2\" Test: No results found for: XGCOVT  Rapid Test:   Lab Results   Component Value Date/Time    COVR Not detected 07/23/2020 02:23 AM            Signed:  Mignon Syed MD

## 2021-03-16 NOTE — PROGRESS NOTES
Comprehensive Nutrition Assessment    Type and Reason for Visit: RD nutrition re-screen/LOS      Nutrition Recommendations/Plan:    Continue Regular Diet   Continue Ensure Enlive on all meal trays; continue peanut butter shake all meals   Discontinue João; r/t patient not accepting. Malnutrition Assessment:  Malnutrition Status: Severe malnutrition  Context: Chronic illness  Findings of clinical characteristics of malnutrition:   Energy Intake: 7-75% or less est energy requirements for 1 month or longer  Body Fat Loss: 7-Severe body fat loss, Orbital  Muscle Mass Loss: 7-Severe muscle mass loss, clavicles (pectoralis and deltoids, temples (temporalis)    Nutrition Assessment:   Nutrition History: Pt reports she has never been a [place] eater\" . Does eat meals- maybe yogurt or toast and eggs. Regarding use of commercial nutrition supplement beverages, pt notes she has grown tired of the same vanilla or strawberry options. Like peanut butter. Nutrition Background: Pt  with a medical histroy of multiple sclerosis, multiple chronic wounds presented to the ED d/t back pain and concern for urinary tract infection. Admitted with acute UTI and sepsis. Concerns for protein calorie malnutrition. Pt being followed for wound care in OP wound center. Daily Update:  Pt reports eating well at breakfast and picks at lunch and supper meals. Pt has been drinking 1-2 Ensure Enlive/day in the evening, 100% peanut butter milkshake on all meal trays, has not been drinking João r/t dislike of taste. Pt ate bag of chips and peanut butter shake at lunch today; sipping on bottled water. Nutrition Related Findings:   Fat wasting observed in orbital regions, muscle wasting observed in clavicular and temporal region; pt with brealfast tray on lap, no further assessment.  Wound Type: Stage IV, Unstageable(sacral, ischial stage IV, heel wound unstageable,  hip wound)    Current Nutrition Therapies:  Diet:  Regular  Diet Nutritional Supplements:  Ensure Enlive on all meal trays (no chocolate),  Peanut Butter Milk Shake all meals (1 cup whole milk, 3 peanut butter packages, 2 cups vanilla ice cream), João to be discontinued due to not accepting. Current Intake:   Average Meal Intake: 51-75%(po varies; accepts liquids better than solids) Average Supplement Intake: 51-75%(drinks 1-2 ensure enlive, 3 peanut butter shakes and no joão)      Anthropometric Measures:  Height: 5' 7\" (170.2 cm)  Current Body Wt: 38.3 kg (84 lb 8 oz), Weight source: Bed scale  BMI: 13.2, Underweight (BMI less than 18.5)  Ideal Body Weight (lbs) (Calculated): 135 lbs (61 kg), 73.6 %   Usual Weight:  Pt reports weight varies for  lb; usually heavier in the winter months. Edema: No data recorded     Estimated Daily Nutrient Needs:  Energy (kcal/day): 9673-7760 (Kcal/kg, Weight Used: Current(35-40kcal/kg))  Protein (g/day): 68-90(1.5- 2 g/kg) Weight Used: (Current)  Fluid (ml/day):  (1 ml/kcal)    Nutrition Diagnosis:   Inadequate oral intake r/t increased need for kcal/protein related to wound healing as evidenced by stage 1V wounds. Severe malnutrition related to inadequate protein-energy intake as evidence by poor intake prior to admission, wounds, severe loss of subcutaneous fat, severe muscle loss    Nutrition Interventions:   Food and/or Nutrient Delivery: Continue current diet  Modify oral nutrition supplement. Continue with Ensure Enlive on all meal trays, continue with peanut butter shake on all meal trays, discontinue João. Coordination of Nutrition Care: Continue to monitor while inpatient    Goals:   Previous Goal: Progressing toward goal(s)  Active Goal: Consume >75% of estimated nutritonal needs within next 5-7 days. Nutrition Monitoring and Evaluation:   Food/Nutrient Intake Outcomes: Food and nutrient intake, Supplement intake  Physical Signs/Symptoms Outcomes: Weight    Discharge Planning:     Too soon to determine    Electronically signed by Mery Gomez, MPH, RD, LD on 3/16/2021 at 2:56 PM    Contact: [phone]

## 2021-03-16 NOTE — PROGRESS NOTES
TRANSFER - OUT REPORT:    Verbal report given to Cm Jordan RN on Stephanie Saucedo  being transferred to . [facility] [address] for routine progression of care       Report consisted of patients Situation, Background, Assessment and   Recommendations(SBAR). Information from the following report(s) SBAR, Kardex, ED Summary, Intake/Output, MAR and Recent Results was reviewed with the receiving nurse. Lines:   Peripheral IV 03/03/21 Right Antecubital (Active)   Site Assessment Clean, dry, & intact 03/16/21 0844   Phlebitis Assessment 0 03/16/21 0844   Infiltration Assessment 0 03/16/21 0844   Dressing Status Clean, dry, & intact 03/16/21 0844   Dressing Type Transparent;Tape 03/16/21 0844   Hub Color/Line Status Capped; Patent 03/16/21 0844   Action Taken Catheter retaped;Dressing changed 03/10/21 1005   Alcohol Cap Used No 03/13/21 0431        Opportunity for questions and clarification was provided.       Patient transported with:   Registered Nurse  Tech

## 2021-03-17 PROCEDURE — 65270000029 HC RM PRIVATE

## 2021-03-17 PROCEDURE — 74011250637 HC RX REV CODE- 250/637: Performed by: FAMILY MEDICINE

## 2021-03-17 PROCEDURE — 97164 PT RE-EVAL EST PLAN CARE: CPT

## 2021-03-17 RX ADMIN — POLYETHYLENE GLYCOL 3350 17 G: 17 POWDER, FOR SOLUTION ORAL at 08:41

## 2021-03-17 RX ADMIN — RDII 250 MG CAPSULE 250 MG: at 16:34

## 2021-03-17 RX ADMIN — RDII 250 MG CAPSULE 250 MG: at 08:40

## 2021-03-17 RX ADMIN — OXYCODONE 10 MG: 5 TABLET ORAL at 08:40

## 2021-03-17 RX ADMIN — AMANTADINE HYDROCHLORIDE 100 MG: 100 CAPSULE ORAL at 21:19

## 2021-03-17 RX ADMIN — BACLOFEN 20 MG: 10 TABLET ORAL at 08:40

## 2021-03-17 RX ADMIN — Medication 1 AMPULE: at 21:19

## 2021-03-17 RX ADMIN — FAMOTIDINE 20 MG: 20 TABLET, FILM COATED ORAL at 16:34

## 2021-03-17 RX ADMIN — BACLOFEN 20 MG: 10 TABLET ORAL at 16:34

## 2021-03-17 RX ADMIN — Medication 81 MG: at 08:40

## 2021-03-17 RX ADMIN — SENNOSIDES 17.2 MG: 8.6 TABLET, FILM COATED ORAL at 16:34

## 2021-03-17 RX ADMIN — AMANTADINE HYDROCHLORIDE 100 MG: 100 CAPSULE ORAL at 08:40

## 2021-03-17 RX ADMIN — APIXABAN 5 MG: 5 TABLET, FILM COATED ORAL at 21:19

## 2021-03-17 RX ADMIN — SENNOSIDES 17.2 MG: 8.6 TABLET, FILM COATED ORAL at 08:40

## 2021-03-17 RX ADMIN — FAMOTIDINE 20 MG: 20 TABLET, FILM COATED ORAL at 08:40

## 2021-03-17 RX ADMIN — Medication 10 ML: at 06:00

## 2021-03-17 RX ADMIN — Medication 1 AMPULE: at 08:40

## 2021-03-17 RX ADMIN — VITAMIN D, TAB 1000IU (100/BT) 1000 UNITS: 25 TAB at 08:40

## 2021-03-17 RX ADMIN — PREGABALIN 50 MG: 50 CAPSULE ORAL at 08:40

## 2021-03-17 RX ADMIN — BACLOFEN 20 MG: 10 TABLET ORAL at 21:19

## 2021-03-17 RX ADMIN — Medication 10 ML: at 22:00

## 2021-03-17 RX ADMIN — APIXABAN 5 MG: 5 TABLET, FILM COATED ORAL at 08:40

## 2021-03-17 RX ADMIN — Medication 10 ML: at 14:00

## 2021-03-17 NOTE — PROGRESS NOTES
Hospitalist Progress Note     Admit Date:  3/3/2021 12:18 PM   Name:  Salbador Ibarra   Age:  62 y.o.  :  1960   MRN:  [id number]   PCP:  Jeana Vance MD  Treatment Team: Attending Provider: Neo Johnson MD; : Mitzi Ramírez; Care Manager: Carmelo Garcia; Utilization Review: Sami Yarbrough; Care Manager: Kushal José LMSW; Physical Therapist: Salena Chong PT; Staff Nurse: Marsha Chi RN  Presenting Complaint: Urinary Odor      Hospital Course:   Mrs. Yolis Humphries is a nice 59 y/o WF with a h/o MS, multiple chronic wounds who presented to the ED on 3/3 d/t back pain and concern for UTI. Pt stated she didn't want to come to the ED but her home health nurse made her. She lives by herself, mostly is bedridden, uses a wheelchair at times and stated that she had a wasserman placed months ago due to multiple sacral ulcers. Her urine had been cloudy and home health came this Am and found her on the ground covering in feces so EMS was called. Wound care consulted for heel/sacral wounds. Urine culture grew pan-sensitive proteus mirabilis and she has completed antibiotics. Previously living by herself and has no insurance days left for rehab so has applied for Medicaid which is currently pending. 24hr Events/Subjective:   3/17: In bed, not much appetite today, otherwise no major changes.      No other complaints  Assessment and Plan:     Hospital Problems as of 3/17/2021 Date Reviewed: 2020          Codes Class Noted - Resolved POA    Generalized weakness ICD-10-CM: R53.1  ICD-9-CM: 780.79  3/3/2021 - Present Yes        * (Principal) Acute UTI ICD-10-CM: N39.0  ICD-9-CM: 599.0  3/3/2021 - Present Yes        History of DVT of lower extremity ICD-10-CM: Z86.718  ICD-9-CM: V12.51  3/3/2021 - Present Yes        Depression ICD-10-CM: F32.9  ICD-9-CM: 554  9/10/2020 - Present Yes        Essential hypertension ICD-10-CM: I10  ICD-9-CM: 401.9  9/10/2020 - Present Yes        Multiple wounds ICD-10-CM: T07. Robbie Lemos  ICD-9-CM: 879.8  9/10/2020 - Present Yes        Anxiety disorder ICD-10-CM: F41.9  ICD-9-CM: 300.00  8/8/2020 - Present Yes        Gastroesophageal reflux disease without esophagitis ICD-10-CM: K21.9  ICD-9-CM: 530.81  8/8/2020 - Present Yes        Sepsis ([facility] ICD-10-CM: A41.9  ICD-9-CM: 038.9, 995.91  7/23/2020 - Present Yes        Pressure injury of sacral region, stage 4 (HCC) ICD-10-CM: L89.154  ICD-9-CM: 707.03, 707.24  5/13/2020 - Present Yes        Multiple sclerosis ([facility] ICD-10-CM: G35  ICD-9-CM: 555  5/13/2020 - Present Yes        Severe protein-calorie malnutrition ([facility] ICD-10-CM: E43  ICD-9-CM: 349  5/13/2020 - Present Unknown              Plan:  # Generalized weakness              - Con't therapy efforts. Planning for rehab/SNF. [facility] [place]              - [facility] applied for and pending.     # Sepsis 2/2 UTI              - Resolved. Has completed antibiotics for pan-sensitive proteus mirabilis. Blood cxs neg.     # Multiple chronic wounds              - Con't wound care efforts.     # Confusion              - PHYUNTKIC, resolved. No work up needed.     # H/o [address]              - Home meds     # Anxiety              - Home meds     # GERD              - Pepcid     # Hx of DVT              - Eliquis    Other listed chronic conditions stable, continue current management. DC planning: Pending Medicaid authorization.   Diet:  DIET REGULAR  DIET NUTRITIONAL SUPPLEMENTS  DIET NUTRITIONAL SUPPLEMENTS  DVT ppx: Eliquis      Objective:     Patient Vitals for the past 24 hrs:   Temp Pulse Resp BP SpO2   03/17/21 1116 97.6 °F (36.4 °C) 94 16 125/73 97 %   03/17/21 0726 98.2 °F (36.8 °C) 78 16 108/62 98 %   03/17/21 0445 97.9 °F (36.6 °C) 86 20 123/66 96 %   03/17/21 0005 98 °F (36.7 °C) 90 20 112/64 94 %   03/16/21 1953 97.7 °F (36.5 °C) 95 18 120/74 99 %   03/16/21 1641 97.8 °F (36.6 °C) 90 16 (!) 97/51 97 %     Oxygen Therapy  O2 Sat (%): 97 % (03/17/21 1116)  Pulse via Oximetry: 103 beats per minute (03/03/21 1630)  O2 Device: Room air (03/17/21 0205)    Estimated body mass index is 13.23 kg/m² as calculated from the following:    Height as of this encounter: 5' 7\" (1.702 m). Weight as of this encounter: 38.3 kg (84 lb 8 oz). Intake/Output Summary (Last 24 hours) at 3/17/2021 1320  Last data filed at 3/17/2021 0620  Gross per 24 hour   Intake 250 ml   Output 1250 ml   Net -1000 ml       *Note that automatically entered I/Os may not be accurate; dependent on patient compliance with collection and accurate  by techs. General:    Thin, frail, underweight. NAD. CV:   RRR. No m/r/g. No edema. No JVD  Lungs:   CTAB. No wheezing, rhonchi, or rales. Unlabored  Abdomen:   Soft, nontender, nondistended. Extremities: Warm and dry. No cyanosis. B/l foot drop boots, trace pedal edema. Skin:     No rashes. Normal coloration  Neuro:  No gross focal deficits. Alert    Data Reviewed:  I have reviewed all labs, meds, and studies from the last 24 hours:  No results found for this or any previous visit (from the past 24 hour(s)).     Current Meds:  Current Facility-Administered Medications   Medication Dose Route Frequency    carboxymethylcellulose sodium (REFRESH LIQUIGEL) 1 % ophthalmic solution 2 Drop  2 Drop Both Eyes PRN    amantadine HCL (SYMMETREL) capsule 100 mg  100 mg Oral BID    apixaban (ELIQUIS) tablet 5 mg  5 mg Oral BID    aspirin delayed-release tablet 81 mg  81 mg Oral DAILY    baclofen (LIORESAL) tablet 20 mg  20 mg Oral TID    cholecalciferol (VITAMIN D3) (1000 Units /25 mcg) tablet 1,000 Units  1,000 Units Oral DAILY    oxyCODONE IR (ROXICODONE) tablet 10 mg  10 mg Oral Q6H PRN    polyethylene glycol (MIRALAX) packet 17 g  17 g Oral DAILY    pregabalin (LYRICA) capsule 50 mg  50 mg Oral DAILY    Saccharomyces boulardii (FLORASTOR) capsule 250 mg  250 mg Oral BID    senna (SENOKOT) tablet 17.2 mg  2 Tab Oral BID    traMADoL (ULTRAM) tablet 50 mg  50 mg Oral Q6H PRN    sodium chloride (NS) flush 5-40 mL  5-40 mL IntraVENous Q8H    sodium chloride (NS) flush 5-40 mL  5-40 mL IntraVENous PRN    acetaminophen (TYLENOL) tablet 650 mg  650 mg Oral Q6H PRN    Or    acetaminophen (TYLENOL) suppository 650 mg  650 mg Rectal Q6H PRN    polyethylene glycol (MIRALAX) packet 17 g  17 g Oral DAILY PRN    promethazine (PHENERGAN) tablet 12.5 mg  12.5 mg Oral Q6H PRN    Or    ondansetron (ZOFRAN) injection 4 mg  4 mg IntraVENous Q6H PRN    famotidine (PEPCID) tablet 20 mg  20 mg Oral BID    alcohol 62% (NOZIN) nasal  1 Ampule  1 Ampule Topical Q12H       Other Studies:  No results found for this visit on 03/03/21. No results found.     All Micro Results     Procedure Component Value Units Date/Time    BLOOD CULTURE [[id number]] Collected: 03/03/21 1322    Order Status: Completed Specimen: Blood Updated: 03/08/21 0745     Special Requests: --        RIGHT  Antecubital       Culture result: NO GROWTH 5 DAYS       BLOOD CULTURE [[id number]] Collected: 03/03/21 1201    Order Status: Completed Specimen: Blood Updated: 03/08/21 0745     Special Requests: LEFT FOREARM        Culture result: NO GROWTH 5 DAYS       CULTURE, URINE [[id number]]  (Abnormal)  (Susceptibility) Collected: 03/03/21 1349    Order Status: Completed Specimen: Urine from Clean catch Updated: 03/06/21 0817     Special Requests: NO SPECIAL REQUESTS        Culture result:       >100,000 COLONIES/mL PROTEUS MIRABILIS                  50,000-100,000 COLONIES/mL MIXED SKIN ANDREW ISOLATED                SARS-CoV-2 Lab Results  \"Novel Coronavirus\" Test:   Lab Results   Component Value Date/Time    COV2NT Not Detected 06/29/2020 04:25 PM      \"Emergent Disease\" Test:   Lab Results   Component Value Date/Time    EDPR Not detected 05/13/2020 05:09 PM     \"SARS-COV-2\" Test: No results found for: XGCOVT  Rapid Test:   Lab Results   Component Value Date/Time    COVR Not detected 07/23/2020 02:23 AM Signed:  Queta Coffman MD

## 2021-03-17 NOTE — WOUND CARE
Patient seen for follow up on wounds. Noted some redness and maceration around sacral and right ischial wound. Right heel remains dry. Will switch from AG (silver) packing to plain opticel. Continue to cover with foam. Recommend using barrier prep (sure prep) to kimberley wound skin to help resolve any moisture issues. Is on Envision low air loss mattress. Explained to patient that she still needs to be turned side to side to offload pressure even in a low air loss bed. She is in agreement. Turned to right side and pillow placed above and below sacral buttock region. Heels have boots but removed for short time, floated off bed with pillow. Answered all patient questions. Will continue to monitor and adjust treatment as needed.

## 2021-03-17 NOTE — PROGRESS NOTES
Problem: Mobility Impaired (Adult and Pediatric)  Goal: *Acute Goals and Plan of Care (Insert Text)  AT this point, I feel her priority to heal the wounds vs. Mobility which will cause increased friction/pressure on area that need relief. Outcome: Progressing Towards Goal  Note: Goals:  (1.)Ms. Parrish Thurston will move from supine to sit and sit to supine with MODERATE ASSIST within 2-5 treatment day(s). (2.)Ms. Parrish Thurston will demonstrate sitting EOB with minimal assist/trunk support for 1 min within 2-5 treatment day(s). (3.)Ms. Parrish Thurston will perform rolling L<>R with mod assist within 2-5 treatment day(s). PHYSICAL THERAPY: Re-evaluation, Discharge and AM 3/17/2021  INPATIENT: PT Visit Days : 1  [place] / [place] / Product Type: Medicare /       NAME/AGE/GENDER: Alison Ann is a 62 y.o. female   PRIMARY DIAGNOSIS: Acute UTI [N39.0]  Generalized weakness [R53.1] Acute UTI Acute UTI       ICD-10: Treatment Diagnosis:    · Generalized Muscle Weakness (M62.81)  · Other abnormalities of gait and mobility (R26.89)   Precaution/Allergies:  Latex, Norco [hydrocodone-acetaminophen], and Pcn [penicillins]      ASSESSMENT:     Ms. Parrish Thurston is seen for re-eval for PT. She is pleasant. She has pain in her buttock region that is \"excruciating\". She is trying to stay off this area for healing purposes. I discussed her mobility as she is dependent and has been for a while. Her goal is for pain relief of the sacral wound. She states she is capable of performing UE exs. At this point , I don't feel PT will offer her much benefit, as we will increase friction in the buttock area with mobility. Her LEs are non functional. I feel that the best course for her is placement with proper  Wound care and mattress. I encouraged her to continue using arms for ex and rolling. I did get her a toothbrush and toothpaste and she is performs this on her own.  She is repositioned in R sidelying to relief pressure off buttocks. Heel boots are in place for pressure relief. We will DC PT at this point. Her RN is aware of this. This section established at most recent assessment   PROBLEM LIST (Impairments causing functional limitations):  1. Decreased Strength  2. Decreased ADL/Functional Activities  3. Decreased Transfer Abilities  4. Decreased Ambulation Ability/Technique  5. Decreased Balance  6. Increased Pain  7. Decreased Activity Tolerance  8. Decreased Flexibility/Joint Mobility  9. Decreased Cognition   INTERVENTIONS PLANNED: (Benefits and precautions of physical therapy have been discussed with the patient.)  1. Bed Mobility  2. Neuromuscular Re-education/Strengthening  3. Range of Motion (ROM)  4. Therapeutic Activites  5. Therapeutic Exercise/Strengthening  6. Transfer Training     TREATMENT PLAN: Frequency/Duration: NA  Rehabilitation Potential For Stated Goals:NA     REHAB RECOMMENDATIONS (at time of discharge pending progress):    Placement: It is my opinion, based on this patient's performance to date, that Ms. Sachin Juarez will need long term placement with proper wound care. Equipment:    None at this time              HISTORY:   History of Present Injury/Illness (Reason for Referral):  Patient is a 62year old CF with a PMhx of multiple sclerosis, multiple chronic wounds presented to the ED d/t back pain and concern for urinary tract infection. Pt stated she didn't want to come to the ED but her home health nurse made her. She lives by herself, mostly is bedridden, uses a wheelchair at times and stated that she had a wasserman placed months ago due to multiple sacral ulcers. Her urine had been cloudy and home health came this Am and found her on the ground covering in feces so EMS was called. She denies fever, chills, SOB, chest pain, abdominal pain, N/V, diarrhea or constipation. In ED, , hypertensive. UA c/w UTI. CBC and CMP unremarkable at baseline. [place] wnl.  She was given 1L LR and Ceftriaxone  Past Medical History/Comorbidities:   Ms. Viviane Wesley  has a past medical history of Decubitus ulcer of ischial area, Hypertension, Kidney infection, Menopause, and MS (multiple sclerosis) ([facility] Ms. Viviane Wesley  has a past surgical history that includes ercp (5/15/2020); hx cholecystectomy (05/18/2020); and colonoscopy (N/A, 6/29/2020). Social History/Living Environment:   Home Environment: Private residence  # Steps to Enter: 0  Wheelchair Ramp: Yes  One/Two Story Residence: One story  Living Alone: Yes  Support Systems: Family member(s)  Patient Expects to be Discharged to[de-identified] Skilled nursing facility  Current DME Used/Available at Home: Hospital bed  Prior Level of Function/Work/Activity:  Primarily bedridden; She is a poor and inconsistent historian and it is impossible to get a clear picture of her history and functional ability. Information was obtained thru chart review. Number of Personal Factors/Comorbidities that affect the Plan of Care: 3+: HIGH COMPLEXITY   EXAMINATION:   Most Recent Physical Functioning:   Gross Assessment: 3-3+/5 throughout UE's   0/5 both LE's  PROM: Grossly decreased, non-functional(B LEs)  Strength: Grossly decreased, non-functional(B LEs)  Coordination: Grossly decreased, non-functional(B LEs)  Tone: Abnormal(increased extensor tone)  Sensation: Impaired(B LEs: some burning parasthesias in feet, otherwise diminish)                    Balance:    Bed Mobility:  Rolling: Maximum assistance       Transfers: NA at this time     Gait: non ambulatory            Body Structures Involved:  1. Bones  2. Joints  3. Muscles Body Functions Affected:  1. Genitourinary  2. Neuromusculoskeletal  3. Movement Related  4. Skin Related Activities and Participation Affected:  1. General Tasks and Demands  2. Mobility  3.  Self Care   Number of elements that affect the Plan of Care: 4+: HIGH COMPLEXITY   CLINICAL PRESENTATION:   Presentation: Evolving clinical presentation with changing clinical characteristics: MODERATE COMPLEXITY   CLINICAL DECISION MAKIN[address] [place] [place] [address] AM-PAC 6 Clicks   Basic Mobility Inpatient Short Form  How much difficulty does the patient currently have. .. Unable A Lot A Little None   1. Turning over in bed (including adjusting bedclothes, sheets and blankets)? [] 1   [x] 2   [] 3   [] 4   2. Sitting down on and standing up from a chair with arms ( e.g., wheelchair, bedside commode, etc.)   [x] 1   [] 2   [] 3   [] 4   3. Moving from lying on back to sitting on the side of the bed? [x] 1   [] 2   [] 3   [] 4   How much help from another person does the patient currently need. .. Total A Lot A Little None   4. Moving to and from a bed to a chair (including a wheelchair)? [x] 1   [] 2   [] 3   [] 4   5. Need to walk in hospital room? [x] 1   [] 2   [] 3   [] 4   6. Climbing 3-5 steps with a railing? [x] 1   [] 2   [] 3   [] 4   © 2007, Trustees of [address] [place] [id number], under license to [employer]. All rights reserved      Score:  Initial: 7 Most Recent: 7 (Date:3/17/21)    Interpretation of Tool:  Represents activities that are increasingly more difficult (i.e. Bed mobility, Transfers, Gait). Medical Necessity:     · Patient demonstrates   · guarded  ·  rehab potential due to higher previous functional level. Reason for Services/Other Comments:  · Pt. requires skilled nursing care for management /healing of her pressure ulcers.    Use of outcome tool(s) and clinical judgement create a POC that gives a: Clear prediction of patient's progress: LOW COMPLEXITY            TREATMENT:   (In addition to Assessment/Re-Assessment sessions the following treatments were rendered)   Pre-treatment Symptoms/Complaints: Bottom hurts and wants the healing to begin  Pain: Initial: numeric scale 3/10 buttocks     Post Session:  3/0       Braces/Orthotics/Lines/Etc:   · wasserman catheter  · O2 Device: Room air  Treatment/Session Assessment:    · Response to Treatment:  Pt aware of need to stay active with UEs and stay off bottom for wound healing  · Interdisciplinary Collaboration:   o Physical Therapist  o Registered Nurse  · After treatment position/precautions:   o Bed/Chair-wheels locked  o Bed in low position  o RN notified  o Side rails x 3  o sidelying in bed   · Compliance with Program/Exercises: verbally expressed awareness of importance of UE exs  Recommendations/DC PT services  Total Treatment Duration:   845a-910a    Colleen Napier, PT

## 2021-03-18 ENCOUNTER — APPOINTMENT (OUTPATIENT)
Dept: MRI IMAGING | Age: 61
DRG: 698 | End: 2021-03-18
Attending: INTERNAL MEDICINE
Payer: MEDICARE

## 2021-03-18 PROCEDURE — A9575 INJ GADOTERATE MEGLUMI 0.1ML: HCPCS | Performed by: INTERNAL MEDICINE

## 2021-03-18 PROCEDURE — 74011250636 HC RX REV CODE- 250/636: Performed by: INTERNAL MEDICINE

## 2021-03-18 PROCEDURE — 65270000029 HC RM PRIVATE

## 2021-03-18 PROCEDURE — 74011250637 HC RX REV CODE- 250/637: Performed by: FAMILY MEDICINE

## 2021-03-18 PROCEDURE — 72156 MRI NECK SPINE W/O & W/DYE: CPT

## 2021-03-18 PROCEDURE — 70553 MRI BRAIN STEM W/O & W/DYE: CPT

## 2021-03-18 RX ORDER — SODIUM CHLORIDE 0.9 % (FLUSH) 0.9 %
10 SYRINGE (ML) INJECTION
Status: COMPLETED | OUTPATIENT
Start: 2021-03-18 | End: 2021-03-18

## 2021-03-18 RX ORDER — GADOTERATE MEGLUMINE 376.9 MG/ML
7 INJECTION INTRAVENOUS
Status: COMPLETED | OUTPATIENT
Start: 2021-03-18 | End: 2021-03-18

## 2021-03-18 RX ADMIN — APIXABAN 5 MG: 5 TABLET, FILM COATED ORAL at 21:10

## 2021-03-18 RX ADMIN — OXYCODONE 10 MG: 5 TABLET ORAL at 21:10

## 2021-03-18 RX ADMIN — RDII 250 MG CAPSULE 250 MG: at 08:49

## 2021-03-18 RX ADMIN — BACLOFEN 20 MG: 10 TABLET ORAL at 21:10

## 2021-03-18 RX ADMIN — SENNOSIDES 17.2 MG: 8.6 TABLET, FILM COATED ORAL at 08:49

## 2021-03-18 RX ADMIN — GADOTERATE MEGLUMINE 7 ML: 376.9 INJECTION INTRAVENOUS at 18:41

## 2021-03-18 RX ADMIN — Medication 10 ML: at 18:41

## 2021-03-18 RX ADMIN — Medication 81 MG: at 08:49

## 2021-03-18 RX ADMIN — Medication 10 ML: at 14:00

## 2021-03-18 RX ADMIN — Medication 5 ML: at 21:14

## 2021-03-18 RX ADMIN — BACLOFEN 20 MG: 10 TABLET ORAL at 08:49

## 2021-03-18 RX ADMIN — SENNOSIDES 17.2 MG: 8.6 TABLET, FILM COATED ORAL at 21:10

## 2021-03-18 RX ADMIN — FAMOTIDINE 20 MG: 20 TABLET, FILM COATED ORAL at 08:49

## 2021-03-18 RX ADMIN — Medication 1 AMPULE: at 08:49

## 2021-03-18 RX ADMIN — Medication 10 ML: at 06:00

## 2021-03-18 RX ADMIN — POLYETHYLENE GLYCOL 3350 17 G: 17 POWDER, FOR SOLUTION ORAL at 08:49

## 2021-03-18 RX ADMIN — AMANTADINE HYDROCHLORIDE 100 MG: 100 CAPSULE ORAL at 21:10

## 2021-03-18 RX ADMIN — AMANTADINE HYDROCHLORIDE 100 MG: 100 CAPSULE ORAL at 11:02

## 2021-03-18 RX ADMIN — Medication 1 AMPULE: at 21:10

## 2021-03-18 RX ADMIN — FAMOTIDINE 20 MG: 20 TABLET, FILM COATED ORAL at 17:00

## 2021-03-18 RX ADMIN — VITAMIN D, TAB 1000IU (100/BT) 1000 UNITS: 25 TAB at 08:49

## 2021-03-18 RX ADMIN — RDII 250 MG CAPSULE 250 MG: at 16:57

## 2021-03-18 RX ADMIN — BACLOFEN 20 MG: 10 TABLET ORAL at 17:00

## 2021-03-18 RX ADMIN — PREGABALIN 50 MG: 50 CAPSULE ORAL at 08:49

## 2021-03-18 RX ADMIN — APIXABAN 5 MG: 5 TABLET, FILM COATED ORAL at 08:49

## 2021-03-18 NOTE — PROGRESS NOTES
Patient could only tolerate Brain and c-spine with and without contrast. Contrast given at 6:30pm. Nurse aware.

## 2021-03-18 NOTE — PROGRESS NOTES
Hospitalist Progress Note     Admit Date:  3/3/2021 12:18 PM   Name:  Richard Malhotra   Age:  62 y.o.  :  1960   MRN:  [id number]   PCP:  Jae Driscoll MD  Treatment Team: Attending Provider: Elroy Bumpers, MD; : Edd Gomes; Care Manager: Faye Spicer; Utilization Review: Flor Cooper; Care Manager: Mary Martinez [facility] [place]; Staff Nurse: Shila Logan RN; Primary Nurse: Shelly Bernard RN  Presenting Complaint: Urinary Odor      Hospital Course:   Mrs. Monica Beltran is a nice 59 y/o WF with a h/o MS, multiple chronic wounds who presented to the ED on 3/3 d/t back pain and concern for UTI. Pt stated she didn't want to come to the ED but her home health nurse made her. She lives by herself, mostly is bedridden, uses a wheelchair at times and stated that she had a wasserman placed months ago due to multiple sacral ulcers. Her urine had been cloudy and home health came this Am and found her on the ground covering in feces so EMS was called. Wound care consulted for heel/sacral wounds. Urine culture grew pan-sensitive proteus mirabilis and she has completed antibiotics. Previously living by herself and has no insurance days left for rehab so has applied for Medicaid which is currently pending. 24hr Events/Subjective:   3/17: In bed, not much appetite today, otherwise no major changes. : As per patient she could not move her lower extremities specifically tooth which she was easily able to move before hospitalization. Per patient she is due for her MRI for MS and she is worried that her MS is flaring up. Otherwise she denies any pain, chest pain, shortness of breath, palpitation, nausea or vomiting. Rest review of system negative except mentioned above.   Assessment and Plan:     Hospital Problems as of 3/18/2021 Date Reviewed: 2020          Codes Class Noted - Resolved POA    Generalized weakness ICD-10-CM: R53.1  ICD-9-CM: 780.79  3/3/2021 - Present Yes        * (Principal) Acute UTI ICD-10-CM: N39.0  ICD-9-CM: 599.0  3/3/2021 - Present Yes        History of DVT of lower extremity ICD-10-CM: Z86.718  ICD-9-CM: V12.51  3/3/2021 - Present Yes        Depression ICD-10-CM: F32.9  ICD-9-CM: 723  9/10/2020 - Present Yes        Essential hypertension ICD-10-CM: I10  ICD-9-CM: 401.9  9/10/2020 - Present Yes        Multiple wounds ICD-10-CM: T07. [place]  ICD-9-CM: 879.8  9/10/2020 - Present Yes        Anxiety disorder ICD-10-CM: F41.9  ICD-9-CM: 300.00  8/8/2020 - Present Yes        Gastroesophageal reflux disease without esophagitis ICD-10-CM: K21.9  ICD-9-CM: 530.81  8/8/2020 - Present Yes        Sepsis ([facility] ICD-10-CM: A41.9  ICD-9-CM: 038.9, 995.91  7/23/2020 - Present Yes        Pressure injury of sacral region, stage 4 (HCC) ICD-10-CM: L89.154  ICD-9-CM: 707.03, 707.24  5/13/2020 - Present Yes        Multiple sclerosis ([facility] ICD-10-CM: G35  ICD-9-CM: 987  5/13/2020 - Present Yes        Severe protein-calorie malnutrition ([facility] ICD-10-CM: E43  ICD-9-CM: 451  5/13/2020 - Present Unknown              Plan:  # Generalized weakness              - Con't therapy efforts. Planning for rehab/SNF. Carm[facility] DEL VALLEYSBECKY applied for and pending. March 18: Waiting for rehab placement. Given history of MS and patient describing worsening of moderate symptoms. Side effects and benefit of MRI with contrast discussed with the patient. MRI brain and spine ordered. Neurology consulted. # Sepsis 2/2 UTI              - Resolved. Has completed antibiotics for pan-sensitive proteus mirabilis. Blood cxs neg.     # Multiple chronic wounds              - Con't wound care efforts. IMarch 18: Patient refused wound examination today. Advised nurse to text me whenever they change the dressing so I can examine the wound. # Confusion              - [id number], resolved. No work up needed. March 18: Resolved.   # H/o MS              - Home meds     # Anxiety              - Home meds     # GERD              - Pepcid     # Hx of DVT              - Eliquis    Other listed chronic conditions stable, continue current management. DC planning: Pending Medicaid authorization. Diet:  DIET REGULAR  DIET NUTRITIONAL SUPPLEMENTS  DIET NUTRITIONAL SUPPLEMENTS  DVT ppx: Eliquis      Patient is AOx3. Patient verbalized understanding that her condition may deteriorate in spite of treatment. Patient wants her sister to be power of  and does not want me to update any of her family. Will monitor. Objective:     Patient Vitals for the past 24 hrs:   Temp Pulse Resp BP SpO2   03/18/21 1200 97.7 °F (36.5 °C) (!) 103 18 120/72 98 %   03/18/21 0725 97.8 °F (36.6 °C) 80 20 111/64 99 %   03/17/21 2005 98.2 °F (36.8 °C) 99 18 114/70 96 %   03/17/21 1502 97.4 °F (36.3 °C) 90 18 109/66 96 %     Oxygen Therapy  O2 Sat (%): 98 % (03/18/21 1200)  Pulse via Oximetry: 103 beats per minute (03/03/21 1630)  O2 Device: Room air (03/18/21 1200)    Estimated body mass index is 13.23 kg/m² as calculated from the following:    Height as of this encounter: 5' 7\" (1.702 m). Weight as of this encounter: 38.3 kg (84 lb 8 oz). Intake/Output Summary (Last 24 hours) at 3/18/2021 1254  Last data filed at 3/18/2021 0205  Gross per 24 hour   Intake    Output 2500 ml   Net -2500 ml       *Note that automatically entered I/Os may not be accurate; dependent on patient compliance with collection and accurate  by [employer]. General:    Thin, frail, underweight. NAD. CV:   RRR. No m/r/g. No edema. No JVD  Lungs:   CTAB. No wheezing, rhonchi, or rales. Unlabored  Abdomen:   Soft, nontender, nondistended. Extremities: Warm and dry. No cyanosis. B/l foot drop boots, trace pedal edema. Full to move lower extremities. As per patient she was able to move her toes without any problem before hospitalization. Skin:     No rashes. Normal coloration  Neuro:  Alert. Not able to move lower extremities.   T    Data Reviewed:  I have reviewed all labs, meds, and studies from the last 24 hours:  No results found for this or any previous visit (from the past 24 hour(s)). Current Meds:  Current Facility-Administered Medications   Medication Dose Route Frequency    [START ON 3/19/2021] OTHER(NON-FORMULARY) 0.5 mg  0.5 mg Oral DAILY    carboxymethylcellulose sodium (REFRESH LIQUIGEL) 1 % ophthalmic solution 2 Drop  2 Drop Both Eyes PRN    amantadine HCL (SYMMETREL) capsule 100 mg  100 mg Oral BID    apixaban (ELIQUIS) tablet 5 mg  5 mg Oral BID    aspirin delayed-release tablet 81 mg  81 mg Oral DAILY    baclofen (LIORESAL) tablet 20 mg  20 mg Oral TID    cholecalciferol (VITAMIN D3) (1000 Units /25 mcg) tablet 1,000 Units  1,000 Units Oral DAILY    oxyCODONE IR (ROXICODONE) tablet 10 mg  10 mg Oral Q6H PRN    polyethylene glycol (MIRALAX) packet 17 g  17 g Oral DAILY    pregabalin (LYRICA) capsule 50 mg  50 mg Oral DAILY    Saccharomyces boulardii (FLORASTOR) capsule 250 mg  250 mg Oral BID    senna (SENOKOT) tablet 17.2 mg  2 Tab Oral BID    traMADoL (ULTRAM) tablet 50 mg  50 mg Oral Q6H PRN    sodium chloride (NS) flush 5-40 mL  5-40 mL IntraVENous Q8H    sodium chloride (NS) flush 5-40 mL  5-40 mL IntraVENous PRN    acetaminophen (TYLENOL) tablet 650 mg  650 mg Oral Q6H PRN    Or    acetaminophen (TYLENOL) suppository 650 mg  650 mg Rectal Q6H PRN    polyethylene glycol (MIRALAX) packet 17 g  17 g Oral DAILY PRN    promethazine (PHENERGAN) tablet 12.5 mg  12.5 mg Oral Q6H PRN    Or    ondansetron (ZOFRAN) injection 4 mg  4 mg IntraVENous Q6H PRN    famotidine (PEPCID) tablet 20 mg  20 mg Oral BID    alcohol 62% (NOZIN) nasal  1 Ampule  1 Ampule Topical Q12H       Other Studies:  No results found for this visit on 03/03/21. No results found.     All Micro Results     Procedure Component Value Units Date/Time    BLOOD CULTURE [[id number]] Collected: 03/03/21 1322    Order Status: Completed Specimen: Blood Updated: 03/08/21 0745     Special Requests: --        RIGHT  Antecubital       Culture result: NO GROWTH 5 DAYS       BLOOD CULTURE [[id number]] Collected: 03/03/21 1201    Order Status: Completed Specimen: Blood Updated: 03/08/21 0745     Special Requests: LEFT FOREARM        Culture result: NO GROWTH 5 DAYS       CULTURE, URINE [[id number]]  (Abnormal)  (Susceptibility) Collected: 03/03/21 1349    Order Status: Completed Specimen: Urine from Clean catch Updated: 03/06/21 0817     Special Requests: NO SPECIAL REQUESTS        Culture result:       >100,000 COLONIES/mL PROTEUS MIRABILIS                  50,000-100,000 COLONIES/mL MIXED SKIN ANDREW ISOLATED                SARS-CoV-2 Lab Results  \"Novel Coronavirus\" Test:   Lab Results   Component Value Date/Time    COV2NT Not Detected 06/29/2020 04:25 PM      \"Emergent Disease\" Test:   Lab Results   Component Value Date/Time    EDPR Not detected 05/13/2020 05:09 PM     \"SARS-COV-2\" Test: No results found for: XGCOVT  Rapid Test:   Lab Results   Component Value Date/Time    COVR Not detected 07/23/2020 02:23 AM            Signed:  Ruthy Reich MD

## 2021-03-19 ENCOUNTER — APPOINTMENT (OUTPATIENT)
Dept: MRI IMAGING | Age: 61
DRG: 698 | End: 2021-03-19
Attending: INTERNAL MEDICINE
Payer: MEDICARE

## 2021-03-19 LAB
ANION GAP SERPL CALC-SCNC: 4 MMOL/L (ref 7–16)
BASOPHILS # BLD: 0.1 K/UL (ref 0–0.2)
BASOPHILS NFR BLD: 1 % (ref 0–2)
BUN SERPL-MCNC: 30 MG/DL (ref 6–23)
CALCIUM SERPL-MCNC: 9.6 MG/DL (ref 8.3–10.4)
CHLORIDE SERPL-SCNC: 109 MMOL/L (ref 98–107)
CO2 SERPL-SCNC: 27 MMOL/L (ref 21–32)
CREAT SERPL-MCNC: 0.48 MG/DL (ref 0.6–1)
DIFFERENTIAL METHOD BLD: ABNORMAL
EOSINOPHIL # BLD: 0.4 K/UL (ref 0–0.8)
EOSINOPHIL NFR BLD: 5 % (ref 0.5–7.8)
ERYTHROCYTE [DISTWIDTH] IN BLOOD BY AUTOMATED COUNT: 19.9 % (ref 11.9–14.6)
GLUCOSE SERPL-MCNC: 88 MG/DL (ref 65–100)
HCT VFR BLD AUTO: 36.3 % (ref 35.8–46.3)
HGB BLD-MCNC: 11 G/DL (ref 11.7–15.4)
IMM GRANULOCYTES # BLD AUTO: 0 K/UL (ref 0–0.5)
IMM GRANULOCYTES NFR BLD AUTO: 0 % (ref 0–5)
LYMPHOCYTES # BLD: 1.5 K/UL (ref 0.5–4.6)
LYMPHOCYTES NFR BLD: 22 % (ref 13–44)
MAGNESIUM SERPL-MCNC: 2.2 MG/DL (ref 1.8–2.4)
MCH RBC QN AUTO: 24.9 PG (ref 26.1–32.9)
MCHC RBC AUTO-ENTMCNC: 30.3 G/DL (ref 31.4–35)
MCV RBC AUTO: 82.3 FL (ref 79.6–97.8)
MONOCYTES # BLD: 0.5 K/UL (ref 0.1–1.3)
MONOCYTES NFR BLD: 7 % (ref 4–12)
NEUTS SEG # BLD: 4.3 K/UL (ref 1.7–8.2)
NEUTS SEG NFR BLD: 64 % (ref 43–78)
NRBC # BLD: 0 K/UL (ref 0–0.2)
PHOSPHATE SERPL-MCNC: 3.8 MG/DL (ref 2.5–4.5)
PLATELET # BLD AUTO: 440 K/UL (ref 150–450)
PMV BLD AUTO: 10.5 FL (ref 9.4–12.3)
POTASSIUM SERPL-SCNC: 4.3 MMOL/L (ref 3.5–5.1)
RBC # BLD AUTO: 4.41 M/UL (ref 4.05–5.2)
SODIUM SERPL-SCNC: 140 MMOL/L (ref 136–145)
WBC # BLD AUTO: 6.7 K/UL (ref 4.3–11.1)

## 2021-03-19 PROCEDURE — 74011250636 HC RX REV CODE- 250/636: Performed by: INTERNAL MEDICINE

## 2021-03-19 PROCEDURE — 84100 ASSAY OF PHOSPHORUS: CPT

## 2021-03-19 PROCEDURE — A9575 INJ GADOTERATE MEGLUMI 0.1ML: HCPCS | Performed by: INTERNAL MEDICINE

## 2021-03-19 PROCEDURE — 80048 BASIC METABOLIC PNL TOTAL CA: CPT

## 2021-03-19 PROCEDURE — 36415 COLL VENOUS BLD VENIPUNCTURE: CPT

## 2021-03-19 PROCEDURE — 85025 COMPLETE CBC W/AUTO DIFF WBC: CPT

## 2021-03-19 PROCEDURE — 83735 ASSAY OF MAGNESIUM: CPT

## 2021-03-19 PROCEDURE — 65270000029 HC RM PRIVATE

## 2021-03-19 PROCEDURE — 72157 MRI CHEST SPINE W/O & W/DYE: CPT

## 2021-03-19 PROCEDURE — 74011250637 HC RX REV CODE- 250/637: Performed by: FAMILY MEDICINE

## 2021-03-19 PROCEDURE — 2709999900 HC NON-CHARGEABLE SUPPLY

## 2021-03-19 RX ORDER — GADOTERATE MEGLUMINE 376.9 MG/ML
7 INJECTION INTRAVENOUS
Status: COMPLETED | OUTPATIENT
Start: 2021-03-19 | End: 2021-03-19

## 2021-03-19 RX ORDER — SODIUM CHLORIDE 0.9 % (FLUSH) 0.9 %
10 SYRINGE (ML) INJECTION
Status: COMPLETED | OUTPATIENT
Start: 2021-03-19 | End: 2021-03-19

## 2021-03-19 RX ADMIN — Medication 5 ML: at 14:00

## 2021-03-19 RX ADMIN — GADOTERATE MEGLUMINE 7 ML: 376.9 INJECTION INTRAVENOUS at 19:28

## 2021-03-19 RX ADMIN — APIXABAN 5 MG: 5 TABLET, FILM COATED ORAL at 21:01

## 2021-03-19 RX ADMIN — BACLOFEN 20 MG: 10 TABLET ORAL at 15:51

## 2021-03-19 RX ADMIN — OXYCODONE 10 MG: 5 TABLET ORAL at 12:44

## 2021-03-19 RX ADMIN — VITAMIN D, TAB 1000IU (100/BT) 1000 UNITS: 25 TAB at 09:30

## 2021-03-19 RX ADMIN — BACLOFEN 20 MG: 10 TABLET ORAL at 09:30

## 2021-03-19 RX ADMIN — BACLOFEN 20 MG: 10 TABLET ORAL at 21:00

## 2021-03-19 RX ADMIN — APIXABAN 5 MG: 5 TABLET, FILM COATED ORAL at 09:30

## 2021-03-19 RX ADMIN — RDII 250 MG CAPSULE 250 MG: at 18:33

## 2021-03-19 RX ADMIN — Medication 5 ML: at 06:04

## 2021-03-19 RX ADMIN — AMANTADINE HYDROCHLORIDE 100 MG: 100 CAPSULE ORAL at 09:29

## 2021-03-19 RX ADMIN — Medication 81 MG: at 09:29

## 2021-03-19 RX ADMIN — Medication 10 ML: at 21:37

## 2021-03-19 RX ADMIN — FAMOTIDINE 20 MG: 20 TABLET, FILM COATED ORAL at 18:33

## 2021-03-19 RX ADMIN — Medication 1 AMPULE: at 09:30

## 2021-03-19 RX ADMIN — Medication 10 ML: at 19:28

## 2021-03-19 RX ADMIN — RDII 250 MG CAPSULE 250 MG: at 09:30

## 2021-03-19 RX ADMIN — AMANTADINE HYDROCHLORIDE 100 MG: 100 CAPSULE ORAL at 21:00

## 2021-03-19 RX ADMIN — FAMOTIDINE 20 MG: 20 TABLET, FILM COATED ORAL at 09:30

## 2021-03-19 RX ADMIN — PREGABALIN 50 MG: 50 CAPSULE ORAL at 09:30

## 2021-03-19 RX ADMIN — OXYCODONE 10 MG: 5 TABLET ORAL at 06:11

## 2021-03-19 RX ADMIN — Medication 1 AMPULE: at 21:01

## 2021-03-19 RX ADMIN — OXYCODONE 10 MG: 5 TABLET ORAL at 18:33

## 2021-03-19 NOTE — PROGRESS NOTES
03/18/21 2113   Patient Observation   Patient Turned Turn on left side   Observations Oxycodone given for pain   Activity In bed   Pain 1   Pain Scale 1 Numeric (0 - 10)   Pain Intensity 1 10   Patient Stated Pain Goal 6   Pain Location 1 Buttocks   Pain Orientation 1 Mid   Pain Description 1 Aching; Sore   Pain Intervention(s) 1 Medication (see MAR)

## 2021-03-19 NOTE — PROGRESS NOTES
Hospitalist Progress Note     Admit Date:  3/3/2021 12:18 PM   Name:  Crystal Torres   Age:  62 y.o.  :  1960   MRN:  [id number]   PCP:  Brooke Whitney MD  Treatment Team: Attending Provider: Yu Puga MD; : Joseph Auguste; Care Manager: Little Mejia; Utilization Review: Batsheva Mann; Care Manager: Jeffery Galo LMSW; Primary Nurse: Ric Trejo RN  Presenting Complaint: Urinary Odor      Hospital Course:   Mrs. Jose Eduardo Daniel is a nice 61 y/o WF with a h/o MS, multiple chronic wounds who presented to the ED on 3/3 d/t back pain and concern for UTI. Pt stated she didn't want to come to the ED but her home health nurse made her. She lives by herself, mostly is bedridden, uses a wheelchair at times and stated that she had a wasserman placed months ago due to multiple sacral ulcers. Her urine had been cloudy and home health came this Am and found her on the ground covering in feces so EMS was called. Wound care consulted for heel/sacral wounds. Urine culture grew pan-sensitive proteus mirabilis and she has completed antibiotics. Previously living by herself and has no insurance days left for rehab so has applied for Medicaid which is currently pending. 24hr Events/Subjective:   3/17: In bed, not much appetite today, otherwise no major changes. : As per patient she could not move her lower extremities specifically tooth which she was easily able to move before hospitalization. Per patient she is due for her MRI for MS and she is worried that her MS is flaring up. : Again her major complaint is that cannot move her lower extremities e specifically toes. As per patient she could bend her knees somewhat before. Doing better than yesterday. Otherwise she denies any pain, chest pain, shortness of breath, palpitation, nausea or vomiting. Rest review of system negative except mentioned above.   Assessment and Plan:     Hospital Problems as of 3/19/2021 Date Reviewed: 6/29/2020          Codes Class Noted - Resolved POA    Generalized weakness ICD-10-CM: R53.1  ICD-9-CM: 780.79  3/3/2021 - Present Yes        * (Principal) Acute UTI ICD-10-CM: N39.0  ICD-9-CM: 599.0  3/3/2021 - Present Yes        History of DVT of lower extremity ICD-10-CM: A62.348  ICD-9-CM: V12.51  3/3/2021 - Present Yes        Depression ICD-10-CM: F32.9  ICD-9-CM: 934  9/10/2020 - Present Yes        Essential hypertension ICD-10-CM: I10  ICD-9-CM: 401.9  9/10/2020 - Present Yes        Multiple wounds ICD-10-CM: T07. Sabina Crank  ICD-9-CM: 879.8  9/10/2020 - Present Yes        Anxiety disorder ICD-10-CM: F41.9  ICD-9-CM: 300.00  8/8/2020 - Present Yes        Gastroesophageal reflux disease without esophagitis ICD-10-CM: K21.9  ICD-9-CM: 530.81  8/8/2020 - Present Yes        Sepsis ([facility] ICD-10-CM: A41.9  ICD-9-CM: 038.9, 995.91  7/23/2020 - Present Yes        Pressure injury of sacral region, stage 4 (HCC) ICD-10-CM: L89.154  ICD-9-CM: 707.03, 707.24  5/13/2020 - Present Yes        Multiple sclerosis ([facility] ICD-10-CM: G35  ICD-9-CM: 388  5/13/2020 - Present Yes        Severe protein-calorie malnutrition ([facility] ICD-10-CM: E43  ICD-9-CM: 511  5/13/2020 - Present Unknown              Plan:  # Generalized weakness              - Con't therapy efforts. Planning for rehab/SNF. Alyx Mead              - [id number] applied for and pending. March 18: Waiting for rehab placement. Given history of MS and patient describing worsening of moderate symptoms. Side effects and benefit of MRI with contrast discussed with the patient. MRI brain and spine ordered. Neurology consulted. March 19: Per nursing staff, neurology wants to see imaging first.  Will monitor. # Sepsis 2/2 UTI              - Resolved. Has completed antibiotics for pan-sensitive proteus mirabilis. Blood cxs neg. March 19: We will remove Mathew catheter. # Multiple chronic wounds              - Con't wound care efforts.   IMarch 18: Patient refused wound examination today. Advised nurse to text me whenever they change the dressing so I can examine the wound. March 19: Wound examined: Right gluteal and sacral wound probably stage II to stage III without any signs of infection. Left heel [unstageable pressure injury]. Continue wound care. # Confusion              - [id number], resolved. No work up needed. March 18: Resolved. # H/o MS              - Home meds     # Anxiety              - Home meds     # GERD              - Pepcid     # Hx of DVT              - Eliquis    Other listed chronic conditions stable, continue current management. DC planning: Pending Medicaid authorization. Diet:  DIET REGULAR  DIET NUTRITIONAL SUPPLEMENTS  DIET NUTRITIONAL SUPPLEMENTS  DVT ppx: Eliquis      Patient is AOx3. Patient verbalized understanding that her condition may deteriorate in spite of treatment. Patient wants her sister to be power of  and does not want me to update any of her family. Will monitor. Objective:     Patient Vitals for the past 24 hrs:   Temp Pulse Resp BP SpO2   03/19/21 1225 97.7 °F (36.5 °C) 92 18 116/67 94 %   03/19/21 0800 98.2 °F (36.8 °C) 88 16 124/62 96 %   03/19/21 0005 98.4 °F (36.9 °C) 94 16 124/72 97 %   03/18/21 1930 97.6 °F (36.4 °C) 91 16 139/70    03/18/21 1500 98 °F (36.7 °C) 96 16 118/73 97 %     Oxygen Therapy  O2 Sat (%): 94 % (03/19/21 1225)  Pulse via Oximetry: 103 beats per minute (03/03/21 1630)  O2 Device: Room air (03/19/21 0800)    Estimated body mass index is 13.23 kg/m² as calculated from the following:    Height as of this encounter: 5' 7\" (1.702 m). Weight as of this encounter: 38.3 kg (84 lb 8 oz).     Intake/Output Summary (Last 24 hours) at 3/19/2021 1317  Last data filed at 3/19/2021 0800  Gross per 24 hour   Intake 360 ml   Output 1575 ml   Net -1215 ml       *Note that automatically entered I/Os may not be accurate; dependent on patient compliance with collection and accurate  by techs.    General:    Thin, frail, underweight. NAD. CV:   RRR. No m/r/g. No edema. No JVD  Lungs:   CTAB. No wheezing, rhonchi, or rales. Unlabored  Abdomen:   Soft, nontender, nondistended. Extremities: Warm and dry. No cyanosis. B/l foot drop boots, trace pedal edema. Full to move lower extremities. As per patient she was able to move her toes without any problem before hospitalization. Skin:     No rashes. Normal coloration  Neuro:  Alert. Not able to move lower extremities. T    Data Reviewed:  I have reviewed all labs, meds, and studies from the last 24 hours:  Recent Results (from the past 24 hour(s))   CBC WITH AUTOMATED DIFF    Collection Time: 03/19/21  6:04 AM   Result Value Ref Range    WBC 6.7 4.3 - 11.1 K/uL    RBC 4.41 4.05 - 5.2 M/uL    HGB 11.0 (L) 11.7 - 15.4 g/dL    HCT 36.3 35.8 - 46.3 %    MCV 82.3 79.6 - 97.8 FL    MCH 24.9 (L) 26.1 - 32.9 PG    MCHC 30.3 (L) 31.4 - 35.0 g/dL    RDW 19.9 (H) 11.9 - 14.6 %    PLATELET 034 148 - 688 K/uL    MPV 10.5 9.4 - 12.3 FL    ABSOLUTE NRBC 0.00 0.0 - 0.2 K/uL    DF AUTOMATED      NEUTROPHILS 64 43 - 78 %    LYMPHOCYTES 22 13 - 44 %    MONOCYTES 7 4.0 - 12.0 %    EOSINOPHILS 5 0.5 - 7.8 %    BASOPHILS 1 0.0 - 2.0 %    IMMATURE GRANULOCYTES 0 0.0 - 5.0 %    ABS. NEUTROPHILS 4.3 1.7 - 8.2 K/UL    ABS. LYMPHOCYTES 1.5 0.5 - 4.6 K/UL    ABS. MONOCYTES 0.5 0.1 - 1.3 K/UL    ABS. EOSINOPHILS 0.4 0.0 - 0.8 K/UL    ABS. BASOPHILS 0.1 0.0 - 0.2 K/UL    ABS. IMM.  GRANS. 0.0 0.0 - 0.5 K/UL   METABOLIC PANEL, BASIC    Collection Time: 03/19/21  6:04 AM   Result Value Ref Range    Sodium 140 136 - 145 mmol/L    Potassium 4.3 3.5 - 5.1 mmol/L    Chloride 109 (H) 98 - 107 mmol/L    CO2 27 21 - 32 mmol/L    Anion gap 4 (L) 7 - 16 mmol/L    Glucose 88 65 - 100 mg/dL    BUN 30 (H) 6 - 23 MG/DL    Creatinine 0.48 (L) 0.6 - 1.0 MG/DL    GFR est AA >60 >60 ml/min/1.73m2    GFR est non-AA >60 >60 ml/min/1.73m2    Calcium 9.6 8.3 - 10.4 MG/DL   MAGNESIUM Collection Time: 03/19/21  6:04 AM   Result Value Ref Range    Magnesium 2.2 1.8 - 2.4 mg/dL   PHOSPHORUS    Collection Time: 03/19/21  6:04 AM   Result Value Ref Range    Phosphorus 3.8 2.5 - 4.5 MG/DL       Current Meds:  Current Facility-Administered Medications   Medication Dose Route Frequency    fingolimod cap 0.5 mg (Patient Supplied)  0.5 mg Oral DAILY    carboxymethylcellulose sodium (REFRESH LIQUIGEL) 1 % ophthalmic solution 2 Drop  2 Drop Both Eyes PRN    amantadine HCL (SYMMETREL) capsule 100 mg  100 mg Oral BID    apixaban (ELIQUIS) tablet 5 mg  5 mg Oral BID    aspirin delayed-release tablet 81 mg  81 mg Oral DAILY    baclofen (LIORESAL) tablet 20 mg  20 mg Oral TID    cholecalciferol (VITAMIN D3) (1000 Units /25 mcg) tablet 1,000 Units  1,000 Units Oral DAILY    oxyCODONE IR (ROXICODONE) tablet 10 mg  10 mg Oral Q6H PRN    polyethylene glycol (MIRALAX) packet 17 g  17 g Oral DAILY    pregabalin (LYRICA) capsule 50 mg  50 mg Oral DAILY    Saccharomyces boulardii (FLORASTOR) capsule 250 mg  250 mg Oral BID    senna (SENOKOT) tablet 17.2 mg  2 Tab Oral BID    traMADoL (ULTRAM) tablet 50 mg  50 mg Oral Q6H PRN    sodium chloride (NS) flush 5-40 mL  5-40 mL IntraVENous Q8H    sodium chloride (NS) flush 5-40 mL  5-40 mL IntraVENous PRN    acetaminophen (TYLENOL) tablet 650 mg  650 mg Oral Q6H PRN    Or    acetaminophen (TYLENOL) suppository 650 mg  650 mg Rectal Q6H PRN    polyethylene glycol (MIRALAX) packet 17 g  17 g Oral DAILY PRN    promethazine (PHENERGAN) tablet 12.5 mg  12.5 mg Oral Q6H PRN    Or    ondansetron (ZOFRAN) injection 4 mg  4 mg IntraVENous Q6H PRN    famotidine (PEPCID) tablet 20 mg  20 mg Oral BID    alcohol 62% (NOZIN) nasal  1 Ampule  1 Ampule Topical Q12H       Other Studies:  No results found for this visit on 03/03/21. Mri Brain W Wo Cont    Result Date: 3/19/2021  Exam: MRI BRAIN W WO CONT on 3/19/2021 11:48 AM Clinical History:  The Female patient is 62years old presenting for MS with motor symptoms with generalized weakness -No surgery -No hx of cancer - Comparison:  Head CT 6/1/2020 Technique:   T2-weighted, T1-weighted, FLAIR, and diffusion-weighted transaxial , T1 sagittal, and gradient echo coronal pulse sequences were initially performed. Following  the administration of contrast, additional T1-weighted transaxial and coronal sequences were performed. 7 mL of gadoterate meglumine (DOTAREM) 0.5 mmol/mL (376.9 mg/mL) contrast was administered intravenously. Findings: Cortical involutional changes are seen with stable chronic predominantly pericallosal and periventricular white matter lesions in keeping with history of demyelinating process. No evidence of diffusion signal abnormality abnormal enhancement is seen to suggest active plaque. There are no abnormal extra-axial fluid collections. No evidence of mass or mass effect is seen. Expected flow voids are maintained in the major intracranial vessels. A focal encephalomalacic changes are seen in the superior left cerebellar hemisphere. . There is no evidence of Chiari malformation. The ventricular system and CSF containing spaces are unremarkable in appearance. Visualized extracranial soft tissues are unremarkable. The paranasal sinuses are well pneumatized and aerated. 1. Subtle cortical involutional changes with chronic predominantly pericallosal and periventricular white matter lesions in keeping with history of multiple sclerosis. No evidence of restricted diffusion or abnormal enhancement is seen to suggest active plaque. 2. Focal encephalomalacia changes along superior margin of left cerebellar hemisphere CPT Code:  10159     Mri Cerv Spine W Wo Cont    Result Date: 3/19/2021  MRI of the Cervical Spine INDICATION: Multiple sclerosis, generalized weakness Standard MRI sequences were obtained through the cervical spine in multiple planes.   Images were obtained before and after intravenous injection of 7 mL of Dotarem. FINDINGS: C2-C3: Unremarkable. C3-C4: Unremarkable. C4-C5: Mild degenerative disc and right facet disease. Associated right foraminal stenosis. C5-C6: Degenerative disc disease and small right sided disc herniation. Associated mild right-sided spinal stenosis and asymmetric right foraminal stenosis. C6-C7: Degenerative disc disease and small left foraminal disc herniation. Bilateral foraminal stenosis, left greater than right. C7-T1: Unremarkable. There is no evidence of fracture or subluxation. No bony lesions are seen. There is increased signal in the right side of the spinal cord at the C5 level. No other spinal cord abnormalities are seen. There are no areas of abnormal enhancement. Upper thoracic spine is unremarkable. 1.  Asymmetric right-sided degenerative change at C5-6. Increased signal in the spinal cord at this level is probably due to chronic myelomalacia. Difficult to completely exclude multiple sclerosis. 2.  Small left foraminal disc herniation at C6-7, left foraminal stenosis.        All Micro Results     Procedure Component Value Units Date/Time    BLOOD CULTURE [[id number]] Collected: 03/03/21 1322    Order Status: Completed Specimen: Blood Updated: 03/08/21 0745     Special Requests: --        RIGHT  Antecubital       Culture result: NO GROWTH 5 DAYS       BLOOD CULTURE [[id number]] Collected: 03/03/21 1201    Order Status: Completed Specimen: Blood Updated: 03/08/21 0745     Special Requests: LEFT FOREARM        Culture result: NO GROWTH 5 DAYS       CULTURE, URINE [[id number]]  (Abnormal)  (Susceptibility) Collected: 03/03/21 1349    Order Status: Completed Specimen: Urine from Clean catch Updated: 03/06/21 0817     Special Requests: NO SPECIAL REQUESTS        Culture result:       >100,000 COLONIES/mL PROTEUS MIRABILIS                  50,000-100,000 COLONIES/mL MIXED SKIN ANDREW ISOLATED                SARS-CoV-2 Lab Results  \"Novel Coronavirus\" Test:   Lab Results   Component Value Date/Time    COV2NT Not Detected 06/29/2020 04:25 PM      \"Emergent Disease\" Test:   Lab Results   Component Value Date/Time    EDPR Not detected 05/13/2020 05:09 PM     \"SARS-COV-2\" Test: No results found for: XGCOVT  Rapid Test:   Lab Results   Component Value Date/Time    COVR Not detected 07/23/2020 02:23 AM            Signed:  Mike Mccormick MD

## 2021-03-20 LAB
ANION GAP SERPL CALC-SCNC: 1 MMOL/L (ref 7–16)
BASOPHILS # BLD: 0.1 K/UL (ref 0–0.2)
BASOPHILS NFR BLD: 2 % (ref 0–2)
BUN SERPL-MCNC: 27 MG/DL (ref 6–23)
CALCIUM SERPL-MCNC: 10.1 MG/DL (ref 8.3–10.4)
CHLORIDE SERPL-SCNC: 106 MMOL/L (ref 98–107)
CO2 SERPL-SCNC: 33 MMOL/L (ref 21–32)
CREAT SERPL-MCNC: 0.43 MG/DL (ref 0.6–1)
DIFFERENTIAL METHOD BLD: ABNORMAL
EOSINOPHIL # BLD: 0.6 K/UL (ref 0–0.8)
EOSINOPHIL NFR BLD: 10 % (ref 0.5–7.8)
ERYTHROCYTE [DISTWIDTH] IN BLOOD BY AUTOMATED COUNT: 20 % (ref 11.9–14.6)
GLUCOSE SERPL-MCNC: 88 MG/DL (ref 65–100)
HCT VFR BLD AUTO: 38.4 % (ref 35.8–46.3)
HGB BLD-MCNC: 11.7 G/DL (ref 11.7–15.4)
IMM GRANULOCYTES # BLD AUTO: 0 K/UL (ref 0–0.5)
IMM GRANULOCYTES NFR BLD AUTO: 1 % (ref 0–5)
LYMPHOCYTES # BLD: 1.4 K/UL (ref 0.5–4.6)
LYMPHOCYTES NFR BLD: 23 % (ref 13–44)
MCH RBC QN AUTO: 25.2 PG (ref 26.1–32.9)
MCHC RBC AUTO-ENTMCNC: 30.5 G/DL (ref 31.4–35)
MCV RBC AUTO: 82.6 FL (ref 79.6–97.8)
MONOCYTES # BLD: 0.6 K/UL (ref 0.1–1.3)
MONOCYTES NFR BLD: 9 % (ref 4–12)
NEUTS SEG # BLD: 3.4 K/UL (ref 1.7–8.2)
NEUTS SEG NFR BLD: 56 % (ref 43–78)
NRBC # BLD: 0 K/UL (ref 0–0.2)
PLATELET # BLD AUTO: 425 K/UL (ref 150–450)
PMV BLD AUTO: 10.7 FL (ref 9.4–12.3)
POTASSIUM SERPL-SCNC: 4.1 MMOL/L (ref 3.5–5.1)
RBC # BLD AUTO: 4.65 M/UL (ref 4.05–5.2)
SODIUM SERPL-SCNC: 140 MMOL/L (ref 136–145)
VIT B12 SERPL-MCNC: 372 PG/ML (ref 193–986)
WBC # BLD AUTO: 6 K/UL (ref 4.3–11.1)

## 2021-03-20 PROCEDURE — 65270000029 HC RM PRIVATE

## 2021-03-20 PROCEDURE — 80048 BASIC METABOLIC PNL TOTAL CA: CPT

## 2021-03-20 PROCEDURE — 36415 COLL VENOUS BLD VENIPUNCTURE: CPT

## 2021-03-20 PROCEDURE — 51798 US URINE CAPACITY MEASURE: CPT

## 2021-03-20 PROCEDURE — 74011250637 HC RX REV CODE- 250/637: Performed by: INTERNAL MEDICINE

## 2021-03-20 PROCEDURE — 74011250637 HC RX REV CODE- 250/637: Performed by: FAMILY MEDICINE

## 2021-03-20 PROCEDURE — 2709999900 HC NON-CHARGEABLE SUPPLY

## 2021-03-20 PROCEDURE — 85025 COMPLETE CBC W/AUTO DIFF WBC: CPT

## 2021-03-20 PROCEDURE — 77030038269 HC DRN EXT URIN PURWCK BARD -A

## 2021-03-20 PROCEDURE — 82607 VITAMIN B-12: CPT

## 2021-03-20 PROCEDURE — 77030019895 HC PCKNG STRP IODO -A

## 2021-03-20 PROCEDURE — 77030019905 HC CATH URETH INTMIT MDII -A

## 2021-03-20 RX ORDER — BACLOFEN 10 MG/1
10 TABLET ORAL DAILY
Status: DISCONTINUED | OUTPATIENT
Start: 2021-03-21 | End: 2021-03-21

## 2021-03-20 RX ORDER — LANOLIN ALCOHOL/MO/W.PET/CERES
1000 CREAM (GRAM) TOPICAL DAILY
Status: DISCONTINUED | OUTPATIENT
Start: 2021-03-20 | End: 2021-03-31 | Stop reason: HOSPADM

## 2021-03-20 RX ORDER — BACLOFEN 10 MG/1
20 TABLET ORAL 3 TIMES DAILY
Status: DISCONTINUED | OUTPATIENT
Start: 2021-03-21 | End: 2021-03-21

## 2021-03-20 RX ORDER — BACLOFEN 10 MG/1
25 TABLET ORAL 3 TIMES DAILY
Status: COMPLETED | OUTPATIENT
Start: 2021-03-20 | End: 2021-03-20

## 2021-03-20 RX ADMIN — OXYCODONE 10 MG: 5 TABLET ORAL at 14:23

## 2021-03-20 RX ADMIN — PREGABALIN 50 MG: 50 CAPSULE ORAL at 08:32

## 2021-03-20 RX ADMIN — AMANTADINE HYDROCHLORIDE 100 MG: 100 CAPSULE ORAL at 20:30

## 2021-03-20 RX ADMIN — RDII 250 MG CAPSULE 250 MG: at 17:14

## 2021-03-20 RX ADMIN — FAMOTIDINE 20 MG: 20 TABLET, FILM COATED ORAL at 08:31

## 2021-03-20 RX ADMIN — APIXABAN 5 MG: 5 TABLET, FILM COATED ORAL at 08:31

## 2021-03-20 RX ADMIN — CYANOCOBALAMIN TAB 1000 MCG 1000 MCG: 1000 TAB at 16:13

## 2021-03-20 RX ADMIN — BACLOFEN 25 MG: 10 TABLET ORAL at 16:13

## 2021-03-20 RX ADMIN — FAMOTIDINE 20 MG: 20 TABLET, FILM COATED ORAL at 17:14

## 2021-03-20 RX ADMIN — TRAMADOL HYDROCHLORIDE 50 MG: 50 TABLET ORAL at 17:14

## 2021-03-20 RX ADMIN — BACLOFEN 20 MG: 10 TABLET ORAL at 08:31

## 2021-03-20 RX ADMIN — Medication 10 ML: at 21:17

## 2021-03-20 RX ADMIN — POLYETHYLENE GLYCOL 3350 17 G: 17 POWDER, FOR SOLUTION ORAL at 08:32

## 2021-03-20 RX ADMIN — VITAMIN D, TAB 1000IU (100/BT) 1000 UNITS: 25 TAB at 08:32

## 2021-03-20 RX ADMIN — APIXABAN 5 MG: 5 TABLET, FILM COATED ORAL at 20:31

## 2021-03-20 RX ADMIN — Medication 1 AMPULE: at 20:30

## 2021-03-20 RX ADMIN — BACLOFEN 25 MG: 10 TABLET ORAL at 20:30

## 2021-03-20 RX ADMIN — AMANTADINE HYDROCHLORIDE 100 MG: 100 CAPSULE ORAL at 08:31

## 2021-03-20 RX ADMIN — Medication 10 ML: at 05:21

## 2021-03-20 RX ADMIN — OXYCODONE 10 MG: 5 TABLET ORAL at 08:32

## 2021-03-20 RX ADMIN — SENNOSIDES 17.2 MG: 8.6 TABLET, FILM COATED ORAL at 08:31

## 2021-03-20 RX ADMIN — SENNOSIDES 17.2 MG: 8.6 TABLET, FILM COATED ORAL at 17:14

## 2021-03-20 RX ADMIN — RDII 250 MG CAPSULE 250 MG: at 08:32

## 2021-03-20 RX ADMIN — Medication 81 MG: at 08:32

## 2021-03-20 RX ADMIN — Medication 10 ML: at 14:23

## 2021-03-20 RX ADMIN — Medication 1 AMPULE: at 08:32

## 2021-03-20 NOTE — PROGRESS NOTES
MD Marleny Mora paged:  Patient is complaining of back spasming, not necessarily pain. Already gave oxy. She does have tramadol as well but not sure that will help with spasm. Garry Lopez has so far been unsuccessful. Pads/dressing are getting wet. Did bladder scan and showed 233. Any new orders.  Thanks

## 2021-03-20 NOTE — PROGRESS NOTES
Patient Vitals for the past 12 hrs:   Temp Pulse Resp BP SpO2   03/20/21 1519 97.4 °F (36.3 °C) 90 18 98/83 96 %   03/20/21 1147 97.7 °F (36.5 °C) 98 16 130/74 100 %   03/20/21 0712 97.5 °F (36.4 °C) 77 16 127/84 99 %     PRN oxy 10mg po x's 2 given for pain. PRN tramadol po x's 1 given for pain. Patient complained of spasming in buttocks area. MD notified and baclofen increased. Attempted to have patient use pure-wick per MD request.  Was not successful as would not  patient's urine. Bed and wound dressings became saturated. MD notified and wasserman re-inserted. Wound dressings changed. MD ordered MRI lumbar/spine w/o contrast.  Per MRI cannot be completed until tomorrow due to timing of last contrast received. MD Tyrell Argueta notified.     Bedside shift report given to [employer] [place], on-coming RN

## 2021-03-20 NOTE — PROGRESS NOTES
Hospitalist Progress Note     Admit Date:  3/3/2021 12:18 PM   Name:  Chago Escudero   Age:  62 y.o.  :  1960   MRN:  [id number]   PCP:  Staci Cain MD  Treatment Team: Attending Provider: Evelyn Schmitz MD; : Bijal Perez; Care Manager: Nael Arshad; Utilization Review: Susana Longo; Care Manager: Emmanuel Kern [facility]; Consulting Provider: Mellissa Collins MD  Presenting Complaint: Urinary Odor      Hospital Course:   Mrs. Shanita Pereira is a nice 59 y/o WF with a h/o MS, multiple chronic wounds who presented to the ED on 3/3 d/t back pain and concern for UTI. Pt stated she didn't want to come to the ED but her home health nurse made her. She lives by herself, mostly is bedridden, uses a wheelchair at times and stated that she had a wasserman placed months ago due to multiple sacral ulcers. Her urine had been cloudy and home health came this Am and found her on the ground covering in feces so EMS was called. Wound care consulted for heel/sacral wounds. Urine culture grew pan-sensitive proteus mirabilis and she has completed antibiotics. Previously living by herself and has no insurance days left for rehab so has applied for Medicaid which is currently pending. 24hr Events/Subjective:   3/17: In bed, not much appetite today, otherwise no major changes. : As per patient she could not move her lower extremities specifically tooth which she was easily able to move before hospitalization. Per patient she is due for her MRI for MS and she is worried that her MS is flaring up. : Again her major complaint is that cannot move her lower extremities e specifically toes. As per patient she could bend her knees somewhat before. Doing better than yesterday. Otherwise she denies any pain, chest pain, shortness of breath, palpitation, nausea or vomiting. : As per patient she is feeling same as of yesterday. Again she describes lower extremity weakness. Denies any chest pain, shortness of breath, palpitation, vomiting, nausea or abdominal pain. Rest review of system negative except mentioned above. Assessment and Plan:     Hospital Problems as of 3/20/2021 Date Reviewed: 6/29/2020          Codes Class Noted - Resolved POA    Generalized weakness ICD-10-CM: R53.1  ICD-9-CM: 780.79  3/3/2021 - Present Yes        * (Principal) Acute UTI ICD-10-CM: N39.0  ICD-9-CM: 599.0  3/3/2021 - Present Yes        History of DVT of lower extremity ICD-10-CM: Z86.718  ICD-9-CM: V12.51  3/3/2021 - Present Yes        Depression ICD-10-CM: F32.9  ICD-9-CM: 457  9/10/2020 - Present Yes        Essential hypertension ICD-10-CM: I10  ICD-9-CM: 401.9  9/10/2020 - Present Yes        Multiple wounds ICD-10-CM: T07. Robbie Cárdenass  ICD-9-CM: 879.8  9/10/2020 - Present Yes        Anxiety disorder ICD-10-CM: F41.9  ICD-9-CM: 300.00  8/8/2020 - Present Yes        Gastroesophageal reflux disease without esophagitis ICD-10-CM: K21.9  ICD-9-CM: 530.81  8/8/2020 - Present Yes        Sepsis ([facility] [address] ICD-10-CM: A41.9  ICD-9-CM: 038.9, 995.91  7/23/2020 - Present Yes        Pressure injury of sacral region, stage 4 (HCC) ICD-10-CM: L89.154  ICD-9-CM: 707.03, 707.24  5/13/2020 - Present Yes        Multiple sclerosis ([facility] [address] ICD-10-CM: G35  ICD-9-CM: 952  5/13/2020 - Present Yes        Severe protein-calorie malnutrition ([facility] [address] ICD-10-CM: E43  ICD-9-CM: 223  5/13/2020 - Present Unknown              Plan:  # Generalized weakness              - Con't therapy efforts. Planning for rehab/SNF. Tejal AYONLSANJANA applied for and pending. March 18: Waiting for rehab placement. Given history of MS and patient describing worsening of moderate symptoms. Side effects and benefit of MRI with contrast discussed with the patient. MRI brain and spine ordered. Neurology consulted. March 19: Per nursing staff, neurology wants to see imaging first.  Will monitor. March 20: Case discussed with on-call neurology. Images reviewed with neurology. Neurology recommended getting lumbar spine MRI without contrast and checking vitamin B12 level. No acute finding concerning for exacerbation of MS at present. # Sepsis 2/2 UTI              - Resolved. Has completed antibiotics for pan-sensitive proteus mirabilis. Blood cxs neg. March 19: We will remove Mathew catheter. # Multiple chronic wounds              - Con't wound care efforts. IMarch 18: Patient refused wound examination today. Advised nurse to text me whenever they change the dressing so I can examine the wound. March 19: Wound examined: Right gluteal and sacral wound probably stage II to stage III without any signs of infection. Left heel [unstageable pressure injury]. Continue wound care. March 20: Discussed with the nurse, will remove Mathew and will try pure wick. If there is concern about urine contaminating the wound then we might have to place Mathew back. # Confusion              - KUYULQHHV, resolved. No work up needed. March 18: Resolved. # H/o MS              - Home meds     # Anxiety              - Home meds     # GERD              - Pepcid     # Hx of DVT              - Eliquis    Other listed chronic conditions stable, continue current management. DC planning: Pending Medicaid authorization. Diet:  DIET REGULAR  DIET NUTRITIONAL SUPPLEMENTS  DIET NUTRITIONAL SUPPLEMENTS  DVT ppx: Eliquis      Again, patient is AOx3. Patient verbalized understanding that her condition may deteriorate in spite of treatment. Patient wants her sister to be power of  and does not want me to update any of her family. Will monitor.     Objective:     Patient Vitals for the past 24 hrs:   Temp Pulse Resp BP SpO2   03/20/21 0712 97.5 °F (36.4 °C) 77 16 127/84 99 %   03/20/21 0317 97.5 °F (36.4 °C) 71 14 135/78 100 %   03/19/21 2329 97.8 °F (36.6 °C) 73 14 128/77 100 %   03/19/21 1944 98.3 °F (36.8 °C) 88 16 127/77 98 %   03/19/21 1555 97.8 °F (36.6 °C) 94 16 106/68 97 %   03/19/21 1225 97.7 °F (36.5 °C) 92 18 116/67 94 %     Oxygen Therapy  O2 Sat (%): 99 % (03/20/21 0712)  Pulse via Oximetry: 103 beats per minute (03/03/21 1630)  O2 Device: Room air (03/20/21 0831)    Estimated body mass index is 13.23 kg/m² as calculated from the following:    Height as of this encounter: 5' 7\" (1.702 m). Weight as of this encounter: 38.3 kg (84 lb 8 oz). Intake/Output Summary (Last 24 hours) at 3/20/2021 1052  Last data filed at 3/20/2021 5932  Gross per 24 hour   Intake    Output 1950 ml   Net -1950 ml       *Note that automatically entered I/Os may not be accurate; dependent on patient compliance with collection and accurate  by techs. General:    Thin, frail, underweight. NAD. CV:   RRR. No m/r/g. No edema. No JVD  Lungs:   CTAB. No wheezing, rhonchi, or rales. Unlabored  Abdomen:   Soft, nontender, nondistended. Extremities: Warm and dry. No cyanosis. B/l foot drop boots, trace pedal edema. Full to move lower extremities. As per patient she was able to move her toes without any problem before hospitalization. Skin:     No rashes. Normal coloration  Neuro:  Alert. Not able to move lower extremities.   T    Data Reviewed:  I have reviewed all labs, meds, and studies from the last 24 hours:  Recent Results (from the past 24 hour(s))   CBC WITH AUTOMATED DIFF    Collection Time: 03/20/21  4:58 AM   Result Value Ref Range    WBC 6.0 4.3 - 11.1 K/uL    RBC 4.65 4.05 - 5.2 M/uL    HGB 11.7 11.7 - 15.4 g/dL    HCT 38.4 35.8 - 46.3 %    MCV 82.6 79.6 - 97.8 FL    MCH 25.2 (L) 26.1 - 32.9 PG    MCHC 30.5 (L) 31.4 - 35.0 g/dL    RDW 20.0 (H) 11.9 - 14.6 %    PLATELET 895 711 - 353 K/uL    MPV 10.7 9.4 - 12.3 FL    ABSOLUTE NRBC 0.00 0.0 - 0.2 K/uL    DF AUTOMATED      NEUTROPHILS 56 43 - 78 %    LYMPHOCYTES 23 13 - 44 %    MONOCYTES 9 4.0 - 12.0 %    EOSINOPHILS 10 (H) 0.5 - 7.8 %    BASOPHILS 2 0.0 - 2.0 %    IMMATURE GRANULOCYTES 1 0.0 - 5.0 % ABS. NEUTROPHILS 3.4 1.7 - 8.2 K/UL    ABS. LYMPHOCYTES 1.4 0.5 - 4.6 K/UL    ABS. MONOCYTES 0.6 0.1 - 1.3 K/UL    ABS. EOSINOPHILS 0.6 0.0 - 0.8 K/UL    ABS. BASOPHILS 0.1 0.0 - 0.2 K/UL    ABS. IMM.  GRANS. 0.0 0.0 - 0.5 K/UL   METABOLIC PANEL, BASIC    Collection Time: 03/20/21  4:58 AM   Result Value Ref Range    Sodium 140 136 - 145 mmol/L    Potassium 4.1 3.5 - 5.1 mmol/L    Chloride 106 98 - 107 mmol/L    CO2 33 (H) 21 - 32 mmol/L    Anion gap 1 (L) 7 - 16 mmol/L    Glucose 88 65 - 100 mg/dL    BUN 27 (H) 6 - 23 MG/DL    Creatinine 0.43 (L) 0.6 - 1.0 MG/DL    GFR est AA >60 >60 ml/min/1.73m2    GFR est non-AA >60 >60 ml/min/1.73m2    Calcium 10.1 8.3 - 10.4 MG/DL       Current Meds:  Current Facility-Administered Medications   Medication Dose Route Frequency    fingolimod cap 0.5 mg (Patient Supplied)  0.5 mg Oral DAILY    carboxymethylcellulose sodium (REFRESH LIQUIGEL) 1 % ophthalmic solution 2 Drop  2 Drop Both Eyes PRN    amantadine HCL (SYMMETREL) capsule 100 mg  100 mg Oral BID    apixaban (ELIQUIS) tablet 5 mg  5 mg Oral BID    aspirin delayed-release tablet 81 mg  81 mg Oral DAILY    baclofen (LIORESAL) tablet 20 mg  20 mg Oral TID    cholecalciferol (VITAMIN D3) (1000 Units /25 mcg) tablet 1,000 Units  1,000 Units Oral DAILY    oxyCODONE IR (ROXICODONE) tablet 10 mg  10 mg Oral Q6H PRN    polyethylene glycol (MIRALAX) packet 17 g  17 g Oral DAILY    pregabalin (LYRICA) capsule 50 mg  50 mg Oral DAILY    Saccharomyces boulardii (FLORASTOR) capsule 250 mg  250 mg Oral BID    senna (SENOKOT) tablet 17.2 mg  2 Tab Oral BID    traMADoL (ULTRAM) tablet 50 mg  50 mg Oral Q6H PRN    sodium chloride (NS) flush 5-40 mL  5-40 mL IntraVENous Q8H    sodium chloride (NS) flush 5-40 mL  5-40 mL IntraVENous PRN    acetaminophen (TYLENOL) tablet 650 mg  650 mg Oral Q6H PRN    Or    acetaminophen (TYLENOL) suppository 650 mg  650 mg Rectal Q6H PRN    polyethylene glycol (MIRALAX) packet 17 g  17 g Oral DAILY PRN    promethazine (PHENERGAN) tablet 12.5 mg  12.5 mg Oral Q6H PRN    Or    ondansetron (ZOFRAN) injection 4 mg  4 mg IntraVENous Q6H PRN    famotidine (PEPCID) tablet 20 mg  20 mg Oral BID    alcohol 62% (NOZIN) nasal  1 Ampule  1 Ampule Topical Q12H       Other Studies:  No results found for this visit on 03/03/21. Mri Thorac Spine W Wo Cont    Result Date: 3/20/2021  MRI of the thoracic spine INDICATION: Multiple sclerosis, increasing generalized weakness Standard MRI sequences were obtained through the thoracic spine in multiple planes. Images were obtained before and after intravenous infusion of  7 mL of Dotarem. FINDINGS: No areas of focal abnormal signal are seen in the thoracic spinal cord. No lesions are seen on pre or postcontrast images. There is no significant disc disease. There are no disc herniations. There is no spinal stenosis. Thoracic vertebrae are intact. There is no fracture or subluxation.      Unremarkable MRI of the thoracic spine If there are any questions about this report, I can be reached on [employer] or at [phone]       All Micro Results     Procedure Component Value Units Date/Time    BLOOD CULTURE [[id number]] Collected: 03/03/21 1322    Order Status: Completed Specimen: Blood Updated: 03/08/21 0745     Special Requests: --        RIGHT  Antecubital       Culture result: NO GROWTH 5 DAYS       BLOOD CULTURE [[id number]] Collected: 03/03/21 1201    Order Status: Completed Specimen: Blood Updated: 03/08/21 0745     Special Requests: LEFT FOREARM        Culture result: NO GROWTH 5 DAYS       CULTURE, URINE [[id number]]  (Abnormal)  (Susceptibility) Collected: 03/03/21 1349    Order Status: Completed Specimen: Urine from Clean catch Updated: 03/06/21 0817     Special Requests: NO SPECIAL REQUESTS        Culture result:       >100,000 COLONIES/mL PROTEUS MIRABILIS                  50,000-100,000 COLONIES/mL MIXED SKIN ANDREW ISOLATED SARS-CoV-2 Lab Results  \"Novel Coronavirus\" Test:   Lab Results   Component Value Date/Time    COV2NT Not Detected 06/29/2020 04:25 PM      \"Emergent Disease\" Test:   Lab Results   Component Value Date/Time    EDPR Not detected 05/13/2020 05:09 PM     \"SARS-COV-2\" Test: No results found for: XGCOVT  Rapid Test:   Lab Results   Component Value Date/Time    COVR Not detected 07/23/2020 02:23 AM            Signed:  Valerie Spencer MD

## 2021-03-20 NOTE — PROGRESS NOTES
Problem: Falls - Risk of  Goal: *Absence of Falls  Description: Document Amadou Dunaway Fall Risk and appropriate interventions in the flowsheet.   Outcome: Progressing Towards Goal  Note: Fall Risk Interventions:  Mobility Interventions: Bed/chair exit alarm, Communicate number of staff needed for ambulation/transfer, PT Consult for mobility concerns, PT Consult for assist device competence, Strengthening exercises (ROM-active/passive)    Mentation Interventions: Adequate sleep, hydration, pain control    Medication Interventions: Bed/chair exit alarm    Elimination Interventions: Call light in reach, Patient to call for help with toileting needs, Toileting schedule/hourly rounds    History of Falls Interventions: Bed/chair exit alarm, Door open when patient unattended, Room close to nurse's station

## 2021-03-20 NOTE — PROGRESS NOTES
03/19/21 1928   Dual Skin Pressure Injury Assessment   Dual Skin Pressure Injury Assessment X     Pressure sores on right heel, buttocks/ishium, and mid sacrum.

## 2021-03-21 ENCOUNTER — APPOINTMENT (OUTPATIENT)
Dept: MRI IMAGING | Age: 61
DRG: 698 | End: 2021-03-21
Attending: INTERNAL MEDICINE
Payer: MEDICARE

## 2021-03-21 LAB
ANION GAP SERPL CALC-SCNC: 2 MMOL/L (ref 7–16)
BASOPHILS # BLD: 0.1 K/UL (ref 0–0.2)
BASOPHILS NFR BLD: 1 % (ref 0–2)
BUN SERPL-MCNC: 24 MG/DL (ref 6–23)
CALCIUM SERPL-MCNC: 9.9 MG/DL (ref 8.3–10.4)
CHLORIDE SERPL-SCNC: 107 MMOL/L (ref 98–107)
CO2 SERPL-SCNC: 31 MMOL/L (ref 21–32)
CREAT SERPL-MCNC: 0.49 MG/DL (ref 0.6–1)
DIFFERENTIAL METHOD BLD: ABNORMAL
EOSINOPHIL # BLD: 0.5 K/UL (ref 0–0.8)
EOSINOPHIL NFR BLD: 9 % (ref 0.5–7.8)
ERYTHROCYTE [DISTWIDTH] IN BLOOD BY AUTOMATED COUNT: 19.9 % (ref 11.9–14.6)
GLUCOSE SERPL-MCNC: 91 MG/DL (ref 65–100)
HCT VFR BLD AUTO: 36.4 % (ref 35.8–46.3)
HGB BLD-MCNC: 11.4 G/DL (ref 11.7–15.4)
IMM GRANULOCYTES # BLD AUTO: 0 K/UL (ref 0–0.5)
IMM GRANULOCYTES NFR BLD AUTO: 0 % (ref 0–5)
LYMPHOCYTES # BLD: 1.2 K/UL (ref 0.5–4.6)
LYMPHOCYTES NFR BLD: 20 % (ref 13–44)
MCH RBC QN AUTO: 25.3 PG (ref 26.1–32.9)
MCHC RBC AUTO-ENTMCNC: 31.3 G/DL (ref 31.4–35)
MCV RBC AUTO: 80.7 FL (ref 79.6–97.8)
MONOCYTES # BLD: 0.4 K/UL (ref 0.1–1.3)
MONOCYTES NFR BLD: 8 % (ref 4–12)
NEUTS SEG # BLD: 3.6 K/UL (ref 1.7–8.2)
NEUTS SEG NFR BLD: 62 % (ref 43–78)
NRBC # BLD: 0 K/UL (ref 0–0.2)
PLATELET # BLD AUTO: 436 K/UL (ref 150–450)
PMV BLD AUTO: 9.9 FL (ref 9.4–12.3)
POTASSIUM SERPL-SCNC: 3.9 MMOL/L (ref 3.5–5.1)
RBC # BLD AUTO: 4.51 M/UL (ref 4.05–5.2)
SODIUM SERPL-SCNC: 140 MMOL/L (ref 136–145)
WBC # BLD AUTO: 5.8 K/UL (ref 4.3–11.1)

## 2021-03-21 PROCEDURE — 36415 COLL VENOUS BLD VENIPUNCTURE: CPT

## 2021-03-21 PROCEDURE — 72148 MRI LUMBAR SPINE W/O DYE: CPT

## 2021-03-21 PROCEDURE — 77030040393 HC DRSG OPTIFOAM GENT MDII -B

## 2021-03-21 PROCEDURE — 80048 BASIC METABOLIC PNL TOTAL CA: CPT

## 2021-03-21 PROCEDURE — 74011250637 HC RX REV CODE- 250/637: Performed by: FAMILY MEDICINE

## 2021-03-21 PROCEDURE — 85025 COMPLETE CBC W/AUTO DIFF WBC: CPT

## 2021-03-21 PROCEDURE — 65270000029 HC RM PRIVATE

## 2021-03-21 PROCEDURE — 74011250637 HC RX REV CODE- 250/637: Performed by: INTERNAL MEDICINE

## 2021-03-21 PROCEDURE — 83921 ORGANIC ACID SINGLE QUANT: CPT

## 2021-03-21 RX ORDER — BACLOFEN 10 MG/1
20 TABLET ORAL EVERY 8 HOURS
Status: DISCONTINUED | OUTPATIENT
Start: 2021-03-21 | End: 2021-03-31 | Stop reason: HOSPADM

## 2021-03-21 RX ADMIN — AMANTADINE HYDROCHLORIDE 100 MG: 100 CAPSULE ORAL at 10:05

## 2021-03-21 RX ADMIN — TRAMADOL HYDROCHLORIDE 50 MG: 50 TABLET ORAL at 08:17

## 2021-03-21 RX ADMIN — APIXABAN 5 MG: 5 TABLET, FILM COATED ORAL at 20:46

## 2021-03-21 RX ADMIN — AMANTADINE HYDROCHLORIDE 100 MG: 100 CAPSULE ORAL at 20:46

## 2021-03-21 RX ADMIN — RDII 250 MG CAPSULE 250 MG: at 10:05

## 2021-03-21 RX ADMIN — Medication 1 AMPULE: at 10:04

## 2021-03-21 RX ADMIN — OXYCODONE 10 MG: 5 TABLET ORAL at 23:43

## 2021-03-21 RX ADMIN — CYANOCOBALAMIN TAB 1000 MCG 1000 MCG: 1000 TAB at 10:05

## 2021-03-21 RX ADMIN — BACLOFEN 20 MG: 10 TABLET ORAL at 14:05

## 2021-03-21 RX ADMIN — SENNOSIDES 17.2 MG: 8.6 TABLET, FILM COATED ORAL at 17:09

## 2021-03-21 RX ADMIN — FAMOTIDINE 20 MG: 20 TABLET, FILM COATED ORAL at 17:09

## 2021-03-21 RX ADMIN — VITAMIN D, TAB 1000IU (100/BT) 1000 UNITS: 25 TAB at 10:05

## 2021-03-21 RX ADMIN — Medication 10 ML: at 14:05

## 2021-03-21 RX ADMIN — Medication 1 AMPULE: at 20:46

## 2021-03-21 RX ADMIN — RDII 250 MG CAPSULE 250 MG: at 17:09

## 2021-03-21 RX ADMIN — Medication 81 MG: at 10:05

## 2021-03-21 RX ADMIN — SENNOSIDES 17.2 MG: 8.6 TABLET, FILM COATED ORAL at 10:05

## 2021-03-21 RX ADMIN — ACETAMINOPHEN 650 MG: 325 TABLET ORAL at 10:05

## 2021-03-21 RX ADMIN — FAMOTIDINE 20 MG: 20 TABLET, FILM COATED ORAL at 10:05

## 2021-03-21 RX ADMIN — BACLOFEN 20 MG: 10 TABLET ORAL at 20:46

## 2021-03-21 RX ADMIN — Medication 10 ML: at 05:22

## 2021-03-21 RX ADMIN — PREGABALIN 50 MG: 50 CAPSULE ORAL at 10:05

## 2021-03-21 RX ADMIN — Medication 10 ML: at 21:55

## 2021-03-21 RX ADMIN — APIXABAN 5 MG: 5 TABLET, FILM COATED ORAL at 10:05

## 2021-03-21 NOTE — PROGRESS NOTES
Hospitalist Progress Note     Admit Date:  3/3/2021 12:18 PM   Name:  Danny Tolentino   Age:  62 y.o.  :  1960   MRN:  [id number]   PCP:  Dee Dee Doan MD  Treatment Team: Attending Provider: Karma Skiff, MD; : Sanjuanita Ward; Care Manager: German Oreilly; Utilization Review: Padmini Parish; [place] Manager[place] Ferrell [facility] [place] Consulting Provider: Gagan Heller MD  Presenting Complaint: Urinary Odor      Hospital Course:   Mrs. Annette Moore is a nice 61 y/o WF with a h/o MS, multiple chronic wounds who presented to the ED on 3/3 d/t back pain and concern for UTI. Pt stated she didn't want to come to the ED but her home health nurse made her. She lives by herself, mostly is bedridden, uses a wheelchair at times and stated that she had a wasserman placed months ago due to multiple sacral ulcers. Her urine had been cloudy and home health came this Am and found her on the ground covering in feces so EMS was called. Wound care consulted for heel/sacral wounds. Urine culture grew pan-sensitive proteus mirabilis and she has completed antibiotics. Previously living by herself and has no insurance days left for rehab so has applied for Medicaid which is currently pending. 24hr Events/Subjective:   3/17: In bed, not much appetite today, otherwise no major changes. : As per patient she could not move her lower extremities specifically tooth which she was easily able to move before hospitalization. Per patient she is due for her MRI for MS and she is worried that her MS is flaring up. : Again her major complaint is that cannot move her lower extremities e specifically toes. As per patient she could bend her knees somewhat before. Doing better than yesterday. Otherwise she denies any pain, chest pain, shortness of breath, palpitation, nausea or vomiting. : As per patient she is feeling same as of yesterday. Again she describes lower extremity weakness. Denies any chest pain, shortness of breath, palpitation, vomiting, nausea or abdominal pain. March 21: Her baclofen was increased after talking to telemetry neurologist but patient had slight confusion after that. Now patient is AOx3. She got MRI of lumbar spine done which was negative for any acute event. Started on vitamin B12 supplementation and MMA level is pending. Patient major complaint is now headache. Other than that she denies any other complaint. Denies any nausea, vomiting, chest pain, shortness of breath. Rest review of system negative except mentioned above. Assessment and Plan:     Hospital Problems as of 3/21/2021 Date Reviewed: 6/29/2020          Codes Class Noted - Resolved POA    Generalized weakness ICD-10-CM: R53.1  ICD-9-CM: 780.79  3/3/2021 - Present Yes        * (Principal) Acute UTI ICD-10-CM: N39.0  ICD-9-CM: 599.0  3/3/2021 - Present Yes        History of DVT of lower extremity ICD-10-CM: Z86.718  ICD-9-CM: V12.51  3/3/2021 - Present Yes        Depression ICD-10-CM: F32.9  ICD-9-CM: 560  9/10/2020 - Present Yes        Essential hypertension ICD-10-CM: I10  ICD-9-CM: 401.9  9/10/2020 - Present Yes        Multiple wounds ICD-10-CM: T07. [place]  ICD-9-CM: 879.8  9/10/2020 - Present Yes        Anxiety disorder ICD-10-CM: F41.9  ICD-9-CM: 300.00  8/8/2020 - Present Yes        Gastroesophageal reflux disease without esophagitis ICD-10-CM: K21.9  ICD-9-CM: 530.81  8/8/2020 - Present Yes        Sepsis ([facility] [address] ICD-10-CM: A41.9  ICD-9-CM: 038.9, 995.91  7/23/2020 - Present Yes        Pressure injury of sacral region, stage 4 (HCC) ICD-10-CM: L89.154  ICD-9-CM: 707.03, 707.24  5/13/2020 - Present Yes        Multiple sclerosis ([facility] [address] ICD-10-CM: G35  ICD-9-CM: 304  5/13/2020 - Present Yes        Severe protein-calorie malnutrition ([facility] [address] ICD-10-CM: E43  ICD-9-CM: 495  5/13/2020 - Present Unknown              Plan:  # Generalized weakness              - Con't therapy efforts.  Planning for rehab/SNF. Carleen Garcia              - [id number] applied for and pending. March 18: Waiting for rehab placement. Given history of MS and patient describing worsening of moderate symptoms. Side effects and benefit of MRI with contrast discussed with the patient. MRI brain and spine ordered. Neurology consulted. March 19: Per nursing staff, neurology wants to see imaging first.  Will monitor. March 20: Case discussed with on-call neurology. Images reviewed with neurology. Neurology recommended getting lumbar spine MRI without contrast and checking vitamin B12 level. No acute finding concerning for exacerbation of MS at present. March 21: MS extubation ruled out. Likely deconditioning. Waiting for placement. # Sepsis 2/2 UTI              - Resolved. Has completed antibiotics for pan-sensitive proteus mirabilis. Blood cxs neg. March 19: We will remove Mathew catheter. March 21: Sepsis already resolved. Patient wound with get saturated with urine with purvick. Placed Mathew back. .    # Multiple chronic wounds              - Con't wound care efforts. IMarch 18: Patient refused wound examination today. Advised nurse to text me whenever they change the dressing so I can examine the wound. March 19: Wound examined: Right gluteal and sacral wound probably stage II to stage III without any signs of infection. Left heel [unstageable pressure injury]. Continue wound care. March 20: Discussed with the nurse, will remove Mathew and will try pure wick. If there is concern about urine contaminating the wound then we might have to place Mathew back. March 21:Patient wound with get saturated with urine with purvick. Placed Mathew back. Continue wound care. # Confusion              - CXXQDDRNC, resolved. No work up needed. March 18: Resolved. March 21: AOx3 with some flat affect. Denies any signs or symptoms of depression.       # H/o MS              - Home meds     # Anxiety              - Home meds     # GERD              - Pepcid     # Hx of DVT              - Eliquis        DC planning: Pending Medicaid authorization. Diet:  DIET REGULAR  DIET NUTRITIONAL SUPPLEMENTS  DIET NUTRITIONAL SUPPLEMENTS  DVT ppx: Eliquis      Again, patient is AOx3. Patient verbalized understanding that her condition may deteriorate in spite of treatment. Patient wants her sister to be power of  and does not want me to update any of her family. Will monitor. March 21:  Patient verified status of DNR/DNI in presence of the nurse. Does not want me to call family. Objective:     Patient Vitals for the past 24 hrs:   Temp Pulse Resp BP SpO2   03/21/21 0710 97.7 °F (36.5 °C) 79 16 130/79 100 %   03/20/21 2314 98.2 °F (36.8 °C) 80 16 113/77 100 %   03/20/21 1829 98.1 °F (36.7 °C) 88 18 116/79 99 %   03/20/21 1519 97.4 °F (36.3 °C) 90 18 98/83 96 %   03/20/21 1147 97.7 °F (36.5 °C) 98 16 130/74 100 %     Oxygen Therapy  O2 Sat (%): 100 % (03/21/21 0710)  Pulse via Oximetry: 103 beats per minute (03/03/21 1630)  O2 Device: Room air (03/20/21 1917)    Estimated body mass index is 13.33 kg/m² as calculated from the following:    Height as of 3/18/21: 5' 7\" (1.702 m). Weight as of this encounter: 38.6 kg (85 lb 1.6 oz). Intake/Output Summary (Last 24 hours) at 3/21/2021 1055  Last data filed at 3/21/2021 0710  Gross per 24 hour   Intake    Output 750 ml   Net -750 ml       *Note that automatically entered I/Os may not be accurate; dependent on patient compliance with collection and accurate  by techs. General:    Thin, frail, underweight. NAD. CV:   RRR. No m/r/g. No edema. No JVD  Lungs:   CTAB. No wheezing, rhonchi, or rales. Unlabored  Abdomen:   Soft, nontender, nondistended. Extremities: Warm and dry. No cyanosis. B/l foot drop boots, trace pedal edema. Skin:     No rashes. Normal coloration  Neuro:  Alert. Not able to move lower extremities.       Data Reviewed:  I have reviewed all labs, meds, and studies from the last 24 hours:  Recent Results (from the past 24 hour(s))   CBC WITH AUTOMATED DIFF    Collection Time: 03/21/21  3:13 AM   Result Value Ref Range    WBC 5.8 4.3 - 11.1 K/uL    RBC 4.51 4.05 - 5.2 M/uL    HGB 11.4 (L) 11.7 - 15.4 g/dL    HCT 36.4 35.8 - 46.3 %    MCV 80.7 79.6 - 97.8 FL    MCH 25.3 (L) 26.1 - 32.9 PG    MCHC 31.3 (L) 31.4 - 35.0 g/dL    RDW 19.9 (H) 11.9 - 14.6 %    PLATELET 832 335 - 252 K/uL    MPV 9.9 9.4 - 12.3 FL    ABSOLUTE NRBC 0.00 0.0 - 0.2 K/uL    DF AUTOMATED      NEUTROPHILS 62 43 - 78 %    LYMPHOCYTES 20 13 - 44 %    MONOCYTES 8 4.0 - 12.0 %    EOSINOPHILS 9 (H) 0.5 - 7.8 %    BASOPHILS 1 0.0 - 2.0 %    IMMATURE GRANULOCYTES 0 0.0 - 5.0 %    ABS. NEUTROPHILS 3.6 1.7 - 8.2 K/UL    ABS. LYMPHOCYTES 1.2 0.5 - 4.6 K/UL    ABS. MONOCYTES 0.4 0.1 - 1.3 K/UL    ABS. EOSINOPHILS 0.5 0.0 - 0.8 K/UL    ABS. BASOPHILS 0.1 0.0 - 0.2 K/UL    ABS. IMM.  GRANS. 0.0 0.0 - 0.5 K/UL   METABOLIC PANEL, BASIC    Collection Time: 03/21/21  3:13 AM   Result Value Ref Range    Sodium 140 136 - 145 mmol/L    Potassium 3.9 3.5 - 5.1 mmol/L    Chloride 107 98 - 107 mmol/L    CO2 31 21 - 32 mmol/L    Anion gap 2 (L) 7 - 16 mmol/L    Glucose 91 65 - 100 mg/dL    BUN 24 (H) 6 - 23 MG/DL    Creatinine 0.49 (L) 0.6 - 1.0 MG/DL    GFR est AA >60 >60 ml/min/1.73m2    GFR est non-AA >60 >60 ml/min/1.73m2    Calcium 9.9 8.3 - 10.4 MG/DL       Current Meds:  Current Facility-Administered Medications   Medication Dose Route Frequency    baclofen (LIORESAL) tablet 20 mg  20 mg Oral Q8H    cyanocobalamin tablet 1,000 mcg  1,000 mcg Oral DAILY    fingolimod cap 0.5 mg (Patient Supplied)  0.5 mg Oral DAILY    carboxymethylcellulose sodium (REFRESH LIQUIGEL) 1 % ophthalmic solution 2 Drop  2 Drop Both Eyes PRN    amantadine HCL (SYMMETREL) capsule 100 mg  100 mg Oral BID    apixaban (ELIQUIS) tablet 5 mg  5 mg Oral BID    aspirin delayed-release tablet 81 mg  81 mg Oral DAILY  cholecalciferol (VITAMIN D3) (1000 Units /25 mcg) tablet 1,000 Units  1,000 Units Oral DAILY    oxyCODONE IR (ROXICODONE) tablet 10 mg  10 mg Oral Q6H PRN    polyethylene glycol (MIRALAX) packet 17 g  17 g Oral DAILY    pregabalin (LYRICA) capsule 50 mg  50 mg Oral DAILY    Saccharomyces boulardii (FLORASTOR) capsule 250 mg  250 mg Oral BID    senna (SENOKOT) tablet 17.2 mg  2 Tab Oral BID    traMADoL (ULTRAM) tablet 50 mg  50 mg Oral Q6H PRN    sodium chloride (NS) flush 5-40 mL  5-40 mL IntraVENous Q8H    sodium chloride (NS) flush 5-40 mL  5-40 mL IntraVENous PRN    acetaminophen (TYLENOL) tablet 650 mg  650 mg Oral Q6H PRN    Or    acetaminophen (TYLENOL) suppository 650 mg  650 mg Rectal Q6H PRN    polyethylene glycol (MIRALAX) packet 17 g  17 g Oral DAILY PRN    promethazine (PHENERGAN) tablet 12.5 mg  12.5 mg Oral Q6H PRN    Or    ondansetron (ZOFRAN) injection 4 mg  4 mg IntraVENous Q6H PRN    famotidine (PEPCID) tablet 20 mg  20 mg Oral BID    alcohol 62% (NOZIN) nasal  1 Ampule  1 Ampule Topical Q12H       Other Studies:  No results found for this visit on 03/03/21. Mri Lumb Spine Wo Cont    Result Date: 3/21/2021  MRI LUMBAR SPINE WITHOUT CONTRAST HISTORY: Bilateral lumbar radiculopathy COMPARISON: None TECHNIQUE: Multiplanar imaging was performed on a 1.5 Melissa magnet utilizing multiple pulse sequences. FINDINGS: Diffuse disc desiccation. Dextrocurvature. Otherwise normal alignment. Marrow signal is a small small Schmorl's node within the superior endplate of L4. The conus terminates at L1. L1-L2: No focal or diffuse disc abnormalities. Mild symmetric facet arthropathy. L2-L3: No focal or diffuse disc abnormalities. Mild symmetric facet arthropathy. L3-L4: Diffuse bulging disc without evidence of significant central canal or foraminal stenosis. Mild symmetric facet arthropathy. L4-L5: No focal or diffuse disc abnormalities. Mild symmetric facet arthropathy.  L5-S1: No focal or diffuse disc abnormalities are seen. No significant central canal and neural foraminal stenosis. Dextrocurvature. Small Schmorl's node in superior endplate of L4. Mild symmetric facet arthropathy from L1-2 through L4-5. Mild diffuse bulging disc at L3-4 without evidence of significant central canal or foraminal stenosis.  Date Of Dictation: 3/21/2021 10:02 AM       All Micro Results     Procedure Component Value Units Date/Time    BLOOD CULTURE [[id number]] Collected: 03/03/21 1322    Order Status: Completed Specimen: Blood Updated: 03/08/21 0745     Special Requests: --        RIGHT  Antecubital       Culture result: NO GROWTH 5 DAYS       BLOOD CULTURE [[id number]] Collected: 03/03/21 1201    Order Status: Completed Specimen: Blood Updated: 03/08/21 0745     Special Requests: LEFT FOREARM        Culture result: NO GROWTH 5 DAYS       CULTURE, URINE [[id number]]  (Abnormal)  (Susceptibility) Collected: 03/03/21 1349    Order Status: Completed Specimen: Urine from Clean catch Updated: 03/06/21 0817     Special Requests: NO SPECIAL REQUESTS        Culture result:       >100,000 COLONIES/mL PROTEUS MIRABILIS                  50,000-100,000 COLONIES/mL MIXED SKIN ANDREW ISOLATED                SARS-CoV-2 Lab Results  \"Novel Coronavirus\" Test:   Lab Results   Component Value Date/Time    COV2NT Not Detected 06/29/2020 04:25 PM      \"Emergent Disease\" Test:   Lab Results   Component Value Date/Time    EDPR Not detected 05/13/2020 05:09 PM     \"SARS-COV-2\" Test: No results found for: XGCOVT  Rapid Test:   Lab Results   Component Value Date/Time    COVR Not detected 07/23/2020 02:23 AM            Signed:  Jack Seay MD

## 2021-03-21 NOTE — PROGRESS NOTES
END OF SHIFT NOTE:    Intake/Output  03/21 0701 - 03/21 1900  In: -   Out: 601 [Urine:600]   Voiding: YES  Catheter: YES  Drain:              Stool:  1 occurrences. Stool Assessment  Stool Color: Brown (03/13/21 1552)  Stool Appearance: Formed(per patient) (03/19/21 1928)  Stool Amount: Medium (03/13/21 1552)  Stool Source/Status: Rectum (03/13/21 1552)    Emesis:  0 occurrences. VITAL SIGNS  Patient Vitals for the past 12 hrs:   Temp Pulse Resp BP SpO2   03/21/21 1526 98.2 °F (36.8 °C) (!) 101 18 99/72 99 %   03/21/21 1141 98.1 °F (36.7 °C) 89 18 121/78 99 %   03/21/21 0710 97.7 °F (36.5 °C) 79 16 130/79 100 %       Pain Assessment  Pain 1  Pain Scale 1: FLACC (03/21/21 1056)  Pain Intensity 1: 0 (03/21/21 1056)  Patient Stated Pain Goal: 0 (03/21/21 0307)  Pain Reassessment 1: Yes (03/21/21 1056)  Pain Onset 1: ongoing (03/16/21 1838)  Pain Location 1: Head (03/21/21 1006)  Pain Orientation 1: Lower (03/20/21 3309)  Pain Description 1: Stabbing (03/20/21 1714)  Pain Intervention(s) 1: Medication (see MAR) (03/21/21 1006)    Ambulating  No    Additional Information: MRI performed today, patient tolerated. Consult to Psychiatry tomorrow morning for possible botox injections. Patient medicated for pain x1 this shift with ultram. Baclofen orders modified, med given as prescribed. Patient did have a large bowel movement. Wounds to sacral/buttocks area cleansed and redressed, patient tolerated well. Shift report given to oncoming nurse at the bedside.     Angela Gutierres RN

## 2021-03-21 NOTE — PROGRESS NOTES
Problem: Pressure Injury - Risk of  Goal: *Prevention of pressure injury  Outcome: Progressing Towards Goal  Note: Pressure Injury Interventions:  Sensory Interventions: Assess changes in LOC    Moisture Interventions: Absorbent underpads    Activity Interventions: Trapeze to reposition    Mobility Interventions: Float heels, Pressure redistribution bed/mattress (bed type), Trapeze to reposition    Nutrition Interventions: Document food/fluid/supplement intake    Friction and Shear Interventions: Apply protective barrier, creams and emollients

## 2021-03-21 NOTE — PROGRESS NOTES
@3693 MRI @0830, 3/21/20. Attending nurse to bring patient down to MRI. Tele neuro f\u between 2-5 p.m. after MRI.    @5219 patient complaining of nausea. Phenergan given. Patient refuses to be turned. @2642 Patient sleeping. END OF SHIFT NOTE:    Intake/Output  03/20 1901 - 03/21 0700  In: -   Out: 250 [Urine:250]   Voiding: YES  Catheter: YES  Drain:    Stool:  0 occurrences. Stool Assessment  Stool Color: Brown (03/13/21 1552)  Stool Appearance: Formed(per patient) (03/19/21 1928)  Stool Amount: Medium (03/13/21 1552)  Stool Source/Status: Rectum (03/13/21 1552)    Emesis:  0 occurrences. VITAL SIGNS  Patient Vitals for the past 12 hrs:   Temp Pulse Resp BP SpO2   03/20/21 2314 98.2 °F (36.8 °C) 80 16 113/77 100 %   03/20/21 1829 98.1 °F (36.7 °C) 88 18 116/79 99 %   03/20/21 1519 97.4 °F (36.3 °C) 90 18 98/83 96 %       Pain Assessment  Pain 1  Pain Scale 1: Numeric (0 - 10) (03/20/21 1917)  Pain Intensity 1: 0 (03/20/21 1917)  Patient Stated Pain Goal: 0 (03/20/21 1917)  Pain Reassessment 1: Yes (03/20/21 1531)  Pain Onset 1: ongoing (03/16/21 1838)  Pain Location 1: Buttocks;Back (03/20/21 1714)  Pain Orientation 1: Lower (03/20/21 0215)  Pain Description 1: Stabbing (03/20/21 1714)  Pain Intervention(s) 1: Medication (see MAR) (03/20/21 1714)    Ambulating  No    Additional Information:     Shift report given to oncoming nurse at the bedside.     Rosi Michelle

## 2021-03-22 LAB
ANION GAP SERPL CALC-SCNC: 4 MMOL/L (ref 7–16)
BASOPHILS # BLD: 0.1 K/UL (ref 0–0.2)
BASOPHILS NFR BLD: 1 % (ref 0–2)
BUN SERPL-MCNC: 30 MG/DL (ref 6–23)
CALCIUM SERPL-MCNC: 9.5 MG/DL (ref 8.3–10.4)
CHLORIDE SERPL-SCNC: 108 MMOL/L (ref 98–107)
CO2 SERPL-SCNC: 28 MMOL/L (ref 21–32)
CREAT SERPL-MCNC: 0.36 MG/DL (ref 0.6–1)
DIFFERENTIAL METHOD BLD: ABNORMAL
EOSINOPHIL # BLD: 0.4 K/UL (ref 0–0.8)
EOSINOPHIL NFR BLD: 7 % (ref 0.5–7.8)
ERYTHROCYTE [DISTWIDTH] IN BLOOD BY AUTOMATED COUNT: 19.8 % (ref 11.9–14.6)
GLUCOSE SERPL-MCNC: 89 MG/DL (ref 65–100)
HCT VFR BLD AUTO: 32.9 % (ref 35.8–46.3)
HGB BLD-MCNC: 10.6 G/DL (ref 11.7–15.4)
IMM GRANULOCYTES # BLD AUTO: 0 K/UL (ref 0–0.5)
IMM GRANULOCYTES NFR BLD AUTO: 1 % (ref 0–5)
LYMPHOCYTES # BLD: 1.4 K/UL (ref 0.5–4.6)
LYMPHOCYTES NFR BLD: 25 % (ref 13–44)
MCH RBC QN AUTO: 25.9 PG (ref 26.1–32.9)
MCHC RBC AUTO-ENTMCNC: 32.2 G/DL (ref 31.4–35)
MCV RBC AUTO: 80.4 FL (ref 79.6–97.8)
MONOCYTES # BLD: 0.5 K/UL (ref 0.1–1.3)
MONOCYTES NFR BLD: 8 % (ref 4–12)
NEUTS SEG # BLD: 3.3 K/UL (ref 1.7–8.2)
NEUTS SEG NFR BLD: 58 % (ref 43–78)
NRBC # BLD: 0 K/UL (ref 0–0.2)
PLATELET # BLD AUTO: 430 K/UL (ref 150–450)
PMV BLD AUTO: 10.2 FL (ref 9.4–12.3)
POTASSIUM SERPL-SCNC: 4 MMOL/L (ref 3.5–5.1)
RBC # BLD AUTO: 4.09 M/UL (ref 4.05–5.2)
SODIUM SERPL-SCNC: 140 MMOL/L (ref 136–145)
WBC # BLD AUTO: 5.7 K/UL (ref 4.3–11.1)

## 2021-03-22 PROCEDURE — 80048 BASIC METABOLIC PNL TOTAL CA: CPT

## 2021-03-22 PROCEDURE — 65270000029 HC RM PRIVATE

## 2021-03-22 PROCEDURE — 74011250637 HC RX REV CODE- 250/637: Performed by: FAMILY MEDICINE

## 2021-03-22 PROCEDURE — 74011250637 HC RX REV CODE- 250/637: Performed by: INTERNAL MEDICINE

## 2021-03-22 PROCEDURE — 36415 COLL VENOUS BLD VENIPUNCTURE: CPT

## 2021-03-22 PROCEDURE — 85025 COMPLETE CBC W/AUTO DIFF WBC: CPT

## 2021-03-22 RX ADMIN — BACLOFEN 20 MG: 10 TABLET ORAL at 06:35

## 2021-03-22 RX ADMIN — APIXABAN 5 MG: 5 TABLET, FILM COATED ORAL at 08:43

## 2021-03-22 RX ADMIN — BACLOFEN 20 MG: 10 TABLET ORAL at 21:35

## 2021-03-22 RX ADMIN — AMANTADINE HYDROCHLORIDE 100 MG: 100 CAPSULE ORAL at 08:42

## 2021-03-22 RX ADMIN — APIXABAN 5 MG: 5 TABLET, FILM COATED ORAL at 21:35

## 2021-03-22 RX ADMIN — TRAMADOL HYDROCHLORIDE 50 MG: 50 TABLET ORAL at 21:40

## 2021-03-22 RX ADMIN — Medication 1 AMPULE: at 21:34

## 2021-03-22 RX ADMIN — FAMOTIDINE 20 MG: 20 TABLET, FILM COATED ORAL at 17:15

## 2021-03-22 RX ADMIN — RDII 250 MG CAPSULE 250 MG: at 17:15

## 2021-03-22 RX ADMIN — CYANOCOBALAMIN TAB 1000 MCG 1000 MCG: 1000 TAB at 08:42

## 2021-03-22 RX ADMIN — SENNOSIDES 17.2 MG: 8.6 TABLET, FILM COATED ORAL at 08:42

## 2021-03-22 RX ADMIN — RDII 250 MG CAPSULE 250 MG: at 08:42

## 2021-03-22 RX ADMIN — FAMOTIDINE 20 MG: 20 TABLET, FILM COATED ORAL at 08:42

## 2021-03-22 RX ADMIN — BACLOFEN 20 MG: 10 TABLET ORAL at 15:07

## 2021-03-22 RX ADMIN — Medication 81 MG: at 08:42

## 2021-03-22 RX ADMIN — Medication 10 ML: at 06:36

## 2021-03-22 RX ADMIN — VITAMIN D, TAB 1000IU (100/BT) 1000 UNITS: 25 TAB at 08:42

## 2021-03-22 RX ADMIN — Medication 1 AMPULE: at 08:44

## 2021-03-22 RX ADMIN — AMANTADINE HYDROCHLORIDE 100 MG: 100 CAPSULE ORAL at 21:35

## 2021-03-22 RX ADMIN — Medication 10 ML: at 15:05

## 2021-03-22 RX ADMIN — PREGABALIN 50 MG: 50 CAPSULE ORAL at 08:42

## 2021-03-22 RX ADMIN — SENNOSIDES 17.2 MG: 8.6 TABLET, FILM COATED ORAL at 17:15

## 2021-03-22 RX ADMIN — Medication 10 ML: at 21:35

## 2021-03-22 NOTE — PROGRESS NOTES
Hospitalist Progress Note     Admit Date:  3/3/2021 12:18 PM   Name:  Nicole Santana   Age:  62 y.o.  :  1960   MRN:  [id number]   PCP:  Herrera Holt MD  Treatment Team: Attending Provider: Catrachito Robledo MD; : Em Eldridge; Care Manager: Shady Barriga; Utilization Review: Ngozi Quispe; Care Manager: Marlon Contreras LMSW; Consulting Provider: Christine Bryan MD; Staff Nurse: Carol Abdi RN  Presenting Complaint: Urinary Odor      Hospital Course:   Mrs. Araceli Singleton is a nice 59 y/o WF with a h/o MS, multiple chronic wounds who presented to the ED on 3/3 d/t back pain and concern for UTI. Pt stated she didn't want to come to the ED but her home health nurse made her. She lives by herself, mostly is bedridden, uses a wheelchair at times and stated that she had a wasserman placed months ago due to multiple sacral ulcers. Her urine had been cloudy and home health came this Am and found her on the ground covering in feces so EMS was called. Wound care consulted for heel/sacral wounds. Urine culture grew pan-sensitive proteus mirabilis and she has completed antibiotics. Previously living by herself and has no insurance days left for rehab so has applied for Medicaid which is currently pending. 24hr Events/Subjective:   3/17: In bed, not much appetite today, otherwise no major changes. : As per patient she could not move her lower extremities specifically tooth which she was easily able to move before hospitalization. Per patient she is due for her MRI for MS and she is worried that her MS is flaring up. : Again her major complaint is that cannot move her lower extremities e specifically toes. As per patient she could bend her knees somewhat before. Doing better than yesterday. Otherwise she denies any pain, chest pain, shortness of breath, palpitation, nausea or vomiting. : As per patient she is feeling same as of yesterday.   Again she describes lower extremity weakness. Denies any chest pain, shortness of breath, palpitation, vomiting, nausea or abdominal pain. March 21: Her baclofen was increased after talking to telemetry neurologist but patient had slight confusion after that. Now patient is AOx3. She got MRI of lumbar spine done which was negative for any acute event. Started on vitamin B12 supplementation and MMA level is pending. March 22: Patient mood has improved again significantly. Her mood is back to normal.  No headache. Denies any nausea, vomiting, chest pain, shortness of breath. Rest review of system negative except mentioned above. Assessment and Plan:     Hospital Problems as of 3/22/2021 Date Reviewed: 6/29/2020          Codes Class Noted - Resolved POA    Generalized weakness ICD-10-CM: R53.1  ICD-9-CM: 780.79  3/3/2021 - Present Yes        * (Principal) Acute UTI ICD-10-CM: N39.0  ICD-9-CM: 599.0  3/3/2021 - Present Yes        History of DVT of lower extremity ICD-10-CM: Z86.718  ICD-9-CM: V12.51  3/3/2021 - Present Yes        Depression ICD-10-CM: F32.9  ICD-9-CM: 335  9/10/2020 - Present Yes        Essential hypertension ICD-10-CM: I10  ICD-9-CM: 401.9  9/10/2020 - Present Yes        Multiple wounds ICD-10-CM: T07. Swazi Bolls  ICD-9-CM: 879.8  9/10/2020 - Present Yes        Anxiety disorder ICD-10-CM: F41.9  ICD-9-CM: 300.00  8/8/2020 - Present Yes        Gastroesophageal reflux disease without esophagitis ICD-10-CM: K21.9  ICD-9-CM: 530.81  8/8/2020 - Present Yes        Sepsis ([facility] ICD-10-CM: A41.9  ICD-9-CM: 038.9, 995.91  7/23/2020 - Present Yes        Pressure injury of sacral region, stage 4 (HCC) ICD-10-CM: L89.154  ICD-9-CM: 707.03, 707.24  5/13/2020 - Present Yes        Multiple sclerosis ([facility] ICD-10-CM: G35  ICD-9-CM: 323  5/13/2020 - Present Yes        Severe protein[facility]calorie malnutrition ([facility] ICD-10-CM: G00  ICD-9-CM: 953  5/13/2020 - Present Unknown              Plan:  # Generalized weakness              - Con't therapy efforts. Planning for rehab/SNF. Orman Favre              - [id number] applied for and pending. March 18: Waiting for rehab placement. Given history of MS and patient describing worsening of moderate symptoms. Side effects and benefit of MRI with contrast discussed with the patient. MRI brain and spine ordered. Neurology consulted. March 19: Per nursing staff, neurology wants to see imaging first.  Will monitor. March 20: Case discussed with on-call neurology. Images reviewed with neurology. Neurology recommended getting lumbar spine MRI without contrast and checking vitamin B12 level. No acute finding concerning for exacerbation of MS at present. March 21: MS extubation ruled out. Likely deconditioning. Waiting for placement. March 22: Waiting for placement. # Sepsis 2/2 UTI              - Resolved. Has completed antibiotics for pan-sensitive proteus mirabilis. Blood cxs neg. March 19: We will remove Mathew catheter. March 21: Sepsis already resolved. Patient wound with get saturated with urine with purvick. Placed Mathew back. .    # Multiple chronic wounds              - Con't wound care efforts. IMarch 18: Patient refused wound examination today. Advised nurse to text me whenever they change the dressing so I can examine the wound. March 19: Wound examined: Right gluteal and sacral wound probably stage II to stage III without any signs of infection. Left heel [unstageable pressure injury]. Continue wound care. March 20: Discussed with the nurse, will remove Mathew and will try pure wick. If there is concern about urine contaminating the wound then we might have to place Mathew back. March 21:Patient wound with get saturated with urine with purvick. Placed Mathew back. Continue wound care. March 22: Continue wound care. Mathew is to be placed back given open wound soiled by urine. # Confusion              - [id number], resolved.  No work up needed. March 18: Resolved. March 21: AOx3 with some flat affect. Denies any signs or symptoms of depression. March 22: Back to normal.    # H/o MS              - Home meds     # Anxiety              - Home meds     # GERD              - Pepcid     # Hx of DVT              - Eliquis        DC planning: Pending Medicaid authorization. Diet:  DIET REGULAR  DIET NUTRITIONAL SUPPLEMENTS  DIET NUTRITIONAL SUPPLEMENTS  DVT ppx: Eliquis      Again, patient is AOx3. Patient verbalized understanding that her condition may deteriorate in spite of treatment. Patient wants her sister to be power of  and does not want me to update any of her family. Will monitor. March 21:  Patient verified status of DNR/DNI in presence of the nurse. Does not want me to call family. March 22: Again patient does not want me to call her family. She verbalized understanding that her condition meditated in spite of treatment. Objective:     Patient Vitals for the past 24 hrs:   Temp Pulse Resp BP SpO2   03/22/21 0814 97.3 °F (36.3 °C) 79 18 107/77 99 %   03/22/21 0404 98.1 °F (36.7 °C) 87 17 108/72 98 %   03/21/21 2308 98 °F (36.7 °C) 98 19 107/80 99 %   03/21/21 1912 98.3 °F (36.8 °C) (!) 103 20 (!) 111/96 97 %   03/21/21 1526 98.2 °F (36.8 °C) (!) 101 18 99/72 99 %   03/21/21 1141 98.1 °F (36.7 °C) 89 18 121/78 99 %     Oxygen Therapy  O2 Sat (%): 99 % (03/22/21 0814)  Pulse via Oximetry: 103 beats per minute (03/03/21 1630)  O2 Device: Room air (03/22/21 0814)    Estimated body mass index is 13.33 kg/m² as calculated from the following:    Height as of 3/18/21: 5' 7\" (1.702 m). Weight as of this encounter: 38.6 kg (85 lb 1.6 oz).     Intake/Output Summary (Last 24 hours) at 3/22/2021 1115  Last data filed at 3/22/2021 0327  Gross per 24 hour   Intake 120 ml   Output 1051 ml   Net -931 ml       *Note that automatically entered I/Os may not be accurate; dependent on patient compliance with collection and accurate  by [employer]. General:    Thin, frail, underweight. NAD. CV:   RRR. No m/r/g. No edema. No JVD  Lungs:   CTAB. No wheezing, rhonchi, or rales. Unlabored  Abdomen:   Soft, nontender, nondistended. Extremities: Warm and dry. No cyanosis. B/l foot drop boots, trace pedal edema. Skin:     No rashes. Normal coloration  Neuro:  Alert. Not able to move lower extremities. Data Reviewed:  I have reviewed all labs, meds, and studies from the last 24 hours:  Recent Results (from the past 24 hour(s))   CBC WITH AUTOMATED DIFF    Collection Time: 03/22/21  5:17 AM   Result Value Ref Range    WBC 5.7 4.3 - 11.1 K/uL    RBC 4.09 4.05 - 5.2 M/uL    HGB 10.6 (L) 11.7 - 15.4 g/dL    HCT 32.9 (L) 35.8 - 46.3 %    MCV 80.4 79.6 - 97.8 FL    MCH 25.9 (L) 26.1 - 32.9 PG    MCHC 32.2 31.4 - 35.0 g/dL    RDW 19.8 (H) 11.9 - 14.6 %    PLATELET 680 983 - 755 K/uL    MPV 10.2 9.4 - 12.3 FL    ABSOLUTE NRBC 0.00 0.0 - 0.2 K/uL    DF AUTOMATED      NEUTROPHILS 58 43 - 78 %    LYMPHOCYTES 25 13 - 44 %    MONOCYTES 8 4.0 - 12.0 %    EOSINOPHILS 7 0.5 - 7.8 %    BASOPHILS 1 0.0 - 2.0 %    IMMATURE GRANULOCYTES 1 0.0 - 5.0 %    ABS. NEUTROPHILS 3.3 1.7 - 8.2 K/UL    ABS. LYMPHOCYTES 1.4 0.5 - 4.6 K/UL    ABS. MONOCYTES 0.5 0.1 - 1.3 K/UL    ABS. EOSINOPHILS 0.4 0.0 - 0.8 K/UL    ABS. BASOPHILS 0.1 0.0 - 0.2 K/UL    ABS. IMM.  GRANS. 0.0 0.0 - 0.5 K/UL   METABOLIC PANEL, BASIC    Collection Time: 03/22/21  5:17 AM   Result Value Ref Range    Sodium 140 136 - 145 mmol/L    Potassium 4.0 3.5 - 5.1 mmol/L    Chloride 108 (H) 98 - 107 mmol/L    CO2 28 21 - 32 mmol/L    Anion gap 4 (L) 7 - 16 mmol/L    Glucose 89 65 - 100 mg/dL    BUN 30 (H) 6 - 23 MG/DL    Creatinine 0.36 (L) 0.6 - 1.0 MG/DL    GFR est AA >60 >60 ml/min/1.73m2    GFR est non-AA >60 >60 ml/min/1.73m2    Calcium 9.5 8.3 - 10.4 MG/DL       Current Meds:  Current Facility-Administered Medications   Medication Dose Route Frequency    baclofen (LIORESAL) tablet 20 mg  20 mg Oral Q8H    cyanocobalamin tablet 1,000 mcg  1,000 mcg Oral DAILY    fingolimod cap 0.5 mg (Patient Supplied)  0.5 mg Oral DAILY    carboxymethylcellulose sodium (REFRESH LIQUIGEL) 1 % ophthalmic solution 2 Drop  2 Drop Both Eyes PRN    amantadine HCL (SYMMETREL) capsule 100 mg  100 mg Oral BID    apixaban (ELIQUIS) tablet 5 mg  5 mg Oral BID    aspirin delayed-release tablet 81 mg  81 mg Oral DAILY    cholecalciferol (VITAMIN D3) (1000 Units /25 mcg) tablet 1,000 Units  1,000 Units Oral DAILY    oxyCODONE IR (ROXICODONE) tablet 10 mg  10 mg Oral Q6H PRN    polyethylene glycol (MIRALAX) packet 17 g  17 g Oral DAILY    pregabalin (LYRICA) capsule 50 mg  50 mg Oral DAILY    Saccharomyces boulardii (FLORASTOR) capsule 250 mg  250 mg Oral BID    senna (SENOKOT) tablet 17.2 mg  2 Tab Oral BID    traMADoL (ULTRAM) tablet 50 mg  50 mg Oral Q6H PRN    sodium chloride (NS) flush 5-40 mL  5-40 mL IntraVENous Q8H    sodium chloride (NS) flush 5-40 mL  5-40 mL IntraVENous PRN    acetaminophen (TYLENOL) tablet 650 mg  650 mg Oral Q6H PRN    Or    acetaminophen (TYLENOL) suppository 650 mg  650 mg Rectal Q6H PRN    polyethylene glycol (MIRALAX) packet 17 g  17 g Oral DAILY PRN    promethazine (PHENERGAN) tablet 12.5 mg  12.5 mg Oral Q6H PRN    Or    ondansetron (ZOFRAN) injection 4 mg  4 mg IntraVENous Q6H PRN    famotidine (PEPCID) tablet 20 mg  20 mg Oral BID    alcohol 62% (NOZIN) nasal  1 Ampule  1 Ampule Topical Q12H       Other Studies:  No results found for this visit on 03/03/21. No results found.     All Micro Results     Procedure Component Value Units Date/Time    BLOOD CULTURE [[id number]] Collected: 03/03/21 1322    Order Status: Completed Specimen: Blood Updated: 03/08/21 2079     Special Requests: --        RIGHT  Antecubital       Culture result: NO GROWTH 5 DAYS       BLOOD CULTURE [[id number]] Collected: 03/03/21 1201    Order Status: Completed Specimen: Blood Updated: 03/08/21 0745     Special Requests: LEFT FOREARM        Culture result: NO GROWTH 5 DAYS       CULTURE, URINE [[id number]]  (Abnormal)  (Susceptibility) Collected: 03/03/21 1349    Order Status: Completed Specimen: Urine from Clean catch Updated: 03/06/21 0817     Special Requests: NO SPECIAL REQUESTS        Culture result:       >100,000 COLONIES/mL PROTEUS MIRABILIS                  50,000-100,000 COLONIES/mL MIXED SKIN ANDREW ISOLATED                SARS-CoV-2 Lab Results  \"Novel Coronavirus\" Test:   Lab Results   Component Value Date/Time    COV2NT Not Detected 06/29/2020 04:25 PM      \"Emergent Disease\" Test:   Lab Results   Component Value Date/Time    EDPR Not detected 05/13/2020 05:09 PM     \"SARS-COV-2\" Test: No results found for: XGCOVT  Rapid Test:   Lab Results   Component Value Date/Time    COVR Not detected 07/23/2020 02:23 AM            Signed:  Reinier Dorman MD

## 2021-03-22 NOTE — PROGRESS NOTES
Chart reviewed. SARY called Chitra Smith with [phone] left  message requesting call back.

## 2021-03-22 NOTE — PROGRESS NOTES
1820-END OF SHIFT NOTE:    -pt rested well throughout the shift  -awaiting placement   -wounds changed per order   -vss, no needs voiced at this time     Intake/Output  03/22 0701 - 03/22 1900  In: 1340 [P.O.:1340]  Out: 600 [Urine:600]   Voiding: YES  Catheter: YES  Drain:          Stool:  1 occurrences. Stool Assessment  Stool Color: Brown (03/13/21 1552)  Stool Appearance: Formed(per patient) (03/19/21 1928)  Stool Amount: Medium (03/13/21 1552)  Stool Source/Status: Rectum (03/13/21 1552)    Emesis:  0 occurrences. VITAL SIGNS  Patient Vitals for the past 12 hrs:   Temp Pulse Resp BP SpO2   03/22/21 1527 97.6 °F (36.4 °C) 92 18 121/75 99 %   03/22/21 1208 97.8 °F (36.6 °C) (!) 104 20 (!) 113/91 100 %   03/22/21 0814 97.3 °F (36.3 °C) 79 18 107/77 99 %       Pain Assessment  Pain 1  Pain Scale 1: Numeric (0 - 10) (03/22/21 0830)  Pain Intensity 1: 0 (03/22/21 0830)  Patient Stated Pain Goal: 0 (03/22/21 0830)  Pain Reassessment 1: Patient resting w/respiratory rate greater than 10 (03/22/21 0048)  Pain Onset 1: ongoing (03/16/21 1838)  Pain Location 1: Generalized (03/21/21 2343)  Pain Orientation 1: Lower (03/20/21 2846)  Pain Description 1: Stabbing (03/20/21 1714)  Pain Intervention(s) 1: Distraction (03/22/21 0015)    Ambulating  No    Additional Information:     Shift report given to oncoming nurse at the bedside.     Sae Coley RN

## 2021-03-22 NOTE — PROGRESS NOTES
SW follow-up with SNF ([address]). They have reviewed patient's Medicaid application and are requesting patient's financial statements. SARY met with patient who is agreeable for her sister Perez López) to provide her checking/savings statements for the last 3 months. Per patient, Carolyn Bello has access to these documents. Phone call to Carolyn Bello at [phone]. Carolyn Bello is agreeable to find these documents and fax them to this  tomorrow. SARY will continue to follow.     ENEIDA Baird-STEWART  [facility]   [phone]

## 2021-03-23 PROCEDURE — 74011250637 HC RX REV CODE- 250/637: Performed by: FAMILY MEDICINE

## 2021-03-23 PROCEDURE — 2709999900 HC NON-CHARGEABLE SUPPLY

## 2021-03-23 PROCEDURE — 65270000029 HC RM PRIVATE

## 2021-03-23 PROCEDURE — 74011250637 HC RX REV CODE- 250/637: Performed by: INTERNAL MEDICINE

## 2021-03-23 RX ADMIN — TRAMADOL HYDROCHLORIDE 50 MG: 50 TABLET ORAL at 05:46

## 2021-03-23 RX ADMIN — FAMOTIDINE 20 MG: 20 TABLET, FILM COATED ORAL at 16:47

## 2021-03-23 RX ADMIN — APIXABAN 5 MG: 5 TABLET, FILM COATED ORAL at 08:10

## 2021-03-23 RX ADMIN — AMANTADINE HYDROCHLORIDE 100 MG: 100 CAPSULE ORAL at 21:11

## 2021-03-23 RX ADMIN — Medication 81 MG: at 08:10

## 2021-03-23 RX ADMIN — RDII 250 MG CAPSULE 250 MG: at 16:47

## 2021-03-23 RX ADMIN — FAMOTIDINE 20 MG: 20 TABLET, FILM COATED ORAL at 08:10

## 2021-03-23 RX ADMIN — PREGABALIN 50 MG: 50 CAPSULE ORAL at 08:10

## 2021-03-23 RX ADMIN — BACLOFEN 20 MG: 10 TABLET ORAL at 05:36

## 2021-03-23 RX ADMIN — Medication 10 ML: at 21:11

## 2021-03-23 RX ADMIN — BACLOFEN 20 MG: 10 TABLET ORAL at 13:54

## 2021-03-23 RX ADMIN — VITAMIN D, TAB 1000IU (100/BT) 1000 UNITS: 25 TAB at 08:10

## 2021-03-23 RX ADMIN — SENNOSIDES 17.2 MG: 8.6 TABLET, FILM COATED ORAL at 16:47

## 2021-03-23 RX ADMIN — Medication 1 AMPULE: at 08:09

## 2021-03-23 RX ADMIN — Medication 10 ML: at 05:36

## 2021-03-23 RX ADMIN — SENNOSIDES 17.2 MG: 8.6 TABLET, FILM COATED ORAL at 08:10

## 2021-03-23 RX ADMIN — OXYCODONE 10 MG: 5 TABLET ORAL at 23:07

## 2021-03-23 RX ADMIN — AMANTADINE HYDROCHLORIDE 100 MG: 100 CAPSULE ORAL at 08:10

## 2021-03-23 RX ADMIN — POLYETHYLENE GLYCOL 3350 17 G: 17 POWDER, FOR SOLUTION ORAL at 08:20

## 2021-03-23 RX ADMIN — CYANOCOBALAMIN TAB 1000 MCG 1000 MCG: 1000 TAB at 08:10

## 2021-03-23 RX ADMIN — RDII 250 MG CAPSULE 250 MG: at 08:10

## 2021-03-23 RX ADMIN — OXYCODONE 10 MG: 5 TABLET ORAL at 16:58

## 2021-03-23 RX ADMIN — Medication 10 ML: at 13:54

## 2021-03-23 RX ADMIN — APIXABAN 5 MG: 5 TABLET, FILM COATED ORAL at 21:11

## 2021-03-23 RX ADMIN — BACLOFEN 20 MG: 10 TABLET ORAL at 21:11

## 2021-03-23 RX ADMIN — Medication 1 AMPULE: at 21:11

## 2021-03-23 NOTE — PROGRESS NOTES
1800-END OF SHIFT NOTE:    -pt rested well throughout the shift  -pt complains of bladder spasms through shift   -vss, no needs voiced at this time     Intake/Output  03/23 0701 - 03/23 1900  In: 1260 [P.O.:1260]  Out: 175 [Urine:175]   Voiding: YES  Catheter: YES  Drain:          Stool:  0 occurrences. Stool Assessment  Stool Color: Brown (03/13/21 1552)  Stool Appearance: Formed(per patient) (03/19/21 1928)  Stool Amount: Medium (03/13/21 1552)  Stool Source/Status: Rectum (03/13/21 1552)    Emesis:  0 occurrences. VITAL SIGNS  Patient Vitals for the past 12 hrs:   Temp Pulse Resp BP SpO2   03/23/21 1434 97.7 °F (36.5 °C) 77 16 112/68 100 %   03/23/21 1148 97.9 °F (36.6 °C) 76 18 119/73 100 %   03/23/21 0730 97.8 °F (36.6 °C) 81 20 119/73 100 %       Pain Assessment  Pain 1  Pain Scale 1: Numeric (0 - 10) (03/23/21 0830)  Pain Intensity 1: 0 (03/23/21 0830)  Patient Stated Pain Goal: 0 (03/23/21 0830)  Pain Reassessment 1: Patient resting w/respiratory rate greater than 10 (03/22/21 0048)  Pain Onset 1: ongoing (03/16/21 1838)  Pain Location 1: Generalized (03/21/21 2343)  Pain Orientation 1: Lower (03/20/21 3296)  Pain Description 1: Stabbing (03/20/21 1714)  Pain Intervention(s) 1: Distraction (03/22/21 0015)    Ambulating  No    Additional Information:     Shift report given to oncoming nurse at the bedside.     Mary Jane Tadeo RN

## 2021-03-23 NOTE — PROGRESS NOTES
Comprehensive Nutrition Assessment    Type and Reason for Visit: Reassess, RD nutrition re-screen/LOS      Nutrition Recommendations/Plan:    Regular Diet   Nutrition Supplement:  Ensure Enlive 2 or more per day   Peanut Butter shake on all meal trays     Malnutrition Assessment:  Malnutrition Status: Severe malnutrition  Context: Chronic illness  Findings of clinical characteristics of malnutrition:   Energy Intake: 7-75% of less est. Energy requirements for 1 month or longer (assessed on admission)  Body Fat Loss:  7-Severe body fat loss, orbital  Muscle Mass Loss:  7-Severe muscle mass loss, clavicles (pectoralis and deltoids, temples (temporalis). Nutrition Assessment:   Nutrition History: Pt reports she has never been a [place] eater\" . Does eat meals- maybe yogurt or toast and eggs. Regarding use of commercial nutrition supplement beverages, pt notes she has grown tired of the same vanilla or strawberry options. Like peanut butter. Nutrition Background: Pt  with a medical histroy of multiple sclerosis, multiple chronic wounds presented to the ED d/t back pain and concern for urinary tract infection. Admitted with acute UTI and sepsis. Concerns for protein calorie malnutrition. Pt being followed for wound care in OP wound center. Daily Update:  Pt in very good spirits. Reports eating >95% of breakfast and  lunch today; choose chicken tenders and steamed broccoli at lunch; typically has decreased appetite for solid food at supper; has been consuming a peanut butter milkshake at all meals and drinks 1-on occasion 2 Ensure Enlive late evening. Noted numerous unopened Ensure Enlive in room > 12. Nutrition Related Findings:   Fat wasting observed in orbital regions, muscle wasting observed in clavicular and temporal region; lunch tray in room with >95% consumed.  Wound Type: Stage IV-sacral region    Current Nutrition Therapies:  Diet:  Regular  Nutrition Supplements:  Ensure Enlive on all meal trays  Shake:  Peanut butter shake on all meal trays (1 cup whole milk, 3 peanut butter packages, 2 cups vanilla ice cream)    Current Intake:   Average Meal Intake: % (average intake past week;) Average Supplement Intake: 100% peanut butter milkshakes/day (3) 1-2 ensure enlive/day     Anthropometric Measures:  Height: 5' 7\" (170.2 cm)  Current Body Wt: 38.6 kg (85 lb 1.6 oz)(taken 3/20), Weight source: Bed scale  BMI: 13.3, Underweight (BMI less than 18.5)  Ideal Body Weight (lbs) (Calculated): 135 lbs (61 kg), 73.6 %  Edema: LLE: Trace (3/22/2021  7:57 PM)  RLE: Trace (3/22/2021  7:57 PM)     Estimated Daily Nutrient Needs:  Energy: 0772-1734 kcal/day (35-40 kcal/kg/BW of 38.6 kg)-current weight   Protein (g/day):  58-77 gm/day (1.5- 2 g/kg/BW 38.6 kg)-current weight  Fluid (ml/day):   (1 ml/kcal)    Nutrition Diagnosis:   Increased nutrient needs r/t catabolic illness as evidenced by wounds. Nutrition Interventions:   Food and/or Nutrient Delivery: Continue current diet, Continue oral nutrition supplement. Encouraged patient to drink at least 2 Ensure Enlive/day. Coordination of Nutrition Care: Continue to monitor while inpatient    Goals:   Previous Goal Met: Goal(s) achieved  Active Goal: Continued ongoing po intake >75% + supplements of Ensure Enlive to meet estimated nutritional needs while inpatient    Nutrition Monitoring and Evaluation:    Food/Nutrient Intake Outcomes: Food and nutrient intake, Supplement intake  Physical Signs/Symptoms Outcomes: Biochemical data    Discharge Planning:     Too soon to determine    Electronically signed by Titus Garvey, MPH, RD, LD on 3/23/2021 at 2:58 PM    Contact: [phone]

## 2021-03-23 NOTE — PROGRESS NOTES
Banking records received via fax from patient's sister Robert Jama). Documents forwarded to [facility] and [address] [place] [address] Venancio.     ARNULFO Cardenas  [facility]   [phone]

## 2021-03-23 NOTE — PROGRESS NOTES
1030- provider notified of pts malodorous urine and sediment in wasserman. No new orders at this time.

## 2021-03-23 NOTE — PROGRESS NOTES
Hospitalist Progress Note     Admit Date:  3/3/2021 12:18 PM   Name:  Shirin Singleton   Age:  62 y.o.  :  1960   MRN:  [id number]   PCP:  Matt Hester MD  Treatment Team: Attending Provider: Brody White MD; : Shira Tarango; Care Manager: Kayley Kathleen; Utilization Review: Emeka Flores; Care Manager: [facility]; Consulting Provider: Sonali Santana MD; Staff Nurse: Mandie Alonso RN  Presenting Complaint: Urinary Odor      Hospital Course:   Mrs. Jackelyn Aldridge is a nice 59 y/o WF with a h/o MS, multiple chronic wounds who presented to the ED on 3/3 d/t back pain and concern for UTI. Pt stated she didn't want to come to the ED but her home health nurse made her. She lives by herself, mostly is bedridden, uses a wheelchair at times and stated that she had a wasserman placed months ago due to multiple sacral ulcers. Her urine had been cloudy and home health came this Am and found her on the ground covering in feces so EMS was called. Wound care consulted for heel/sacral wounds. Urine culture grew pan-sensitive proteus mirabilis and she has completed antibiotics. Previously living by herself and has no insurance days left for rehab so has applied for Medicaid which is currently pending. Week of : Patient complained of lower extreme weakness and there was concern about MS flare. MRI with contrast ordered for brain cervical and thoracic spine. Also she got lumbar spine MRI without contrast per neurology recommendation. MRI negative for acute finding. Vitamin B12 low normal and patient started on vitamin B12 supplementation. Now patient back to baseline. Wound does not look infected. Waiting for Medicaid approval and rehab placement. 24hr Events/Subjective:   3/17: In bed, not much appetite today, otherwise no major changes. :  As per patient she could not move her lower extremities specifically tooth which she was easily able to move before hospitalization. Per patient she is due for her MRI for MS and she is worried that her MS is flaring up. March 19: Again her major complaint is that cannot move her lower extremities e specifically toes. As per patient she could bend her knees somewhat before. Doing better than yesterday. Otherwise she denies any pain, chest pain, shortness of breath, palpitation, nausea or vomiting. March 20: As per patient she is feeling same as of yesterday. Again she describes lower extremity weakness. Denies any chest pain, shortness of breath, palpitation, vomiting, nausea or abdominal pain. March 21: Her baclofen was increased after talking to telemetry neurologist but patient had slight confusion after that. Now patient is AOx3. She got MRI of lumbar spine done which was negative for any acute event. Started on vitamin B12 supplementation and MMA level is pending. March 22: Patient mood has improved again significantly. Her mood is back to normal.  No headache. March 23: Back to normal.  Denies any complaint. No headache. Denies any nausea, vomiting, chest pain, shortness of breath. Rest review of system negative except mentioned above. Assessment and Plan:     Hospital Problems as of 3/23/2021 Date Reviewed: 6/29/2020          Codes Class Noted - Resolved POA    Generalized weakness ICD-10-CM: R53.1  ICD-9-CM: 780.79  3/3/2021 - Present Yes        * (Principal) Acute UTI ICD-10-CM: N39.0  ICD-9-CM: 599.0  3/3/2021 - Present Yes        History of DVT of lower extremity ICD-10-CM: Z86.718  ICD-9-CM: V12.51  3/3/2021 - Present Yes        Depression ICD-10-CM: F32.9  ICD-9-CM: 965  9/10/2020 - Present Yes        Essential hypertension ICD-10-CM: I10  ICD-9-CM: 401.9  9/10/2020 - Present Yes        Multiple wounds ICD-10-CM: T07. Damion Haw  ICD-9-CM: 879.8  9/10/2020 - Present Yes        Anxiety disorder ICD-10-CM: F41.9  ICD-9-CM: 300.00  8/8/2020 - Present Yes        Gastroesophageal reflux disease without esophagitis ICD-10-CM: K21.9  ICD-9-CM: 530.81  8/8/2020 - Present Yes        Sepsis ([facility] [address] ICD-10-CM: A41.9  ICD-9-CM: 038.9, 995.91  7/23/2020 - Present Yes        Pressure injury of sacral region, stage 4 (HCC) ICD-10-CM: A25.103  ICD-9-CM: 707.03, 707.24  5/13/2020 - Present Yes        Multiple sclerosis ([facility] [address] ICD-10-CM: G35  ICD-9-CM: 638  5/13/2020 - Present Yes        Severe protein-calorie malnutrition ([facility] [address] ICD-10-CM: X23  ICD-9-CM: 262  5/13/2020 - Present Unknown              Plan:  # Generalized weakness              - Con't therapy efforts. Planning for rehab/SNF. Lurene Essex              [facility] applied for and pending. March 18: Waiting for rehab placement. Given history of MS and patient describing worsening of moderate symptoms. Side effects and benefit of MRI with contrast discussed with the patient. MRI brain and spine ordered. Neurology consulted. March 19: Per nursing staff, neurology wants to see imaging first.  Will monitor. March 20: Case discussed with on-call neurology. Images reviewed with neurology. Neurology recommended getting lumbar spine MRI without contrast and checking vitamin B12 level. No acute finding concerning for exacerbation of MS at present. March 21: MS extubation ruled out. Likely deconditioning. Waiting for placement. March 22: Waiting for placement. March 23: Waiting for placement. # Sepsis 2/2 UTI              - Resolved. Has completed antibiotics for pan-sensitive proteus mirabilis. Blood cxs neg. March 19: We will remove Mathew catheter. March 21: Sepsis already resolved. Patient wound with get saturated with urine with purvick. Placed Mathew back. # Multiple chronic wounds              - Con't wound care efforts. IMarch 18: Patient refused wound examination today. Advised nurse to text me whenever they change the dressing so I can examine the wound. March 19:  Wound examined: Right gluteal and sacral wound probably stage II to stage III without any signs of infection. Left heel [unstageable pressure injury]. Continue wound care. March 20: Discussed with the nurse, will remove Mathew and will try pure wick. If there is concern about urine contaminating the wound then we might have to place Mathew back. March 21:Patient wound with get saturated with urine with purvick. Placed Mathew back. Continue wound care. March 22: Continue wound care. Mathew is to be placed back given open wound soiled by urine. March 23: Continue wound care with  # Confusion              - [id number], resolved. No work up needed. March 18: Resolved. March 21: AOx3 with some flat affect. Denies any signs or symptoms of depression. March 22: Back to normal.    # H/o MS              - Home meds     # Anxiety              - Home meds     # GERD              - Pepcid     # Hx of DVT              - Eliquis        DC planning: Pending Medicaid authorization. Diet:  DIET REGULAR  DIET NUTRITIONAL SUPPLEMENTS  DIET NUTRITIONAL SUPPLEMENTS  DVT ppx: Eliquis      Again, patient is AOx3. Patient verbalized understanding that her condition may deteriorate in spite of treatment. Patient wants her sister to be power of  and does not want me to update any of her family. Will monitor. March 21:  Patient verified status of DNR/DNI in presence of the nurse. Does not want me to call family. March 22: Again patient does not want me to call her family. She verbalized understanding that her condition meditated in spite of treatment. March 23: Again patient did not give me permission to call her family. She will ask her sister to bring medication for her MS today or tomorrow as it should be delivered by now to her house from special [facility].     Objective:     Patient Vitals for the past 24 hrs:   Temp Pulse Resp BP SpO2   03/23/21 1148 97.9 °F (36.6 °C) 76 18 119/73 100 %   03/23/21 0730 97.8 °F (36.6 °C) 81 20 119/73 100 %   03/23/21 0423 97.4 °F (36.3 °C) 76 18 123/70 100 %   03/23/21 0007 97.6 °F (36.4 °C) 88 16 115/77 98 %   03/22/21 2042 98.4 °F (36.9 °C) 99 16 124/83 100 %   03/22/21 1527 97.6 °F (36.4 °C) 92 18 121/75 99 %     Oxygen Therapy  O2 Sat (%): 100 % (03/23/21 1148)  Pulse via Oximetry: 103 beats per minute (03/03/21 1630)  O2 Device: Room air (03/23/21 0730)    Estimated body mass index is 13.33 kg/m² as calculated from the following:    Height as of 3/18/21: 5' 7\" (1.702 m). Weight as of this encounter: 38.6 kg (85 lb 1.6 oz). Intake/Output Summary (Last 24 hours) at 3/23/2021 1228  Last data filed at 3/23/2021 1148  Gross per 24 hour   Intake 1880 ml   Output 1100 ml   Net 780 ml       *Note that automatically entered I/Os may not be accurate; dependent on patient compliance with collection and accurate  by techs. General:    Thin, frail, underweight. NAD. CV:   RRR. No m/r/g. No edema. No JVD  Lungs:   CTAB. No wheezing, rhonchi, or rales. Unlabored  Abdomen:   Soft, nontender, nondistended. Extremities: Warm and dry. No cyanosis. B/l foot drop boots, trace pedal edema. Skin:     No rashes. Normal coloration  Neuro:  Alert. Not able to move lower extremities. Data Reviewed:  I have reviewed all labs, meds, and studies from the last 24 hours:  No results found for this or any previous visit (from the past 24 hour(s)).     Current Meds:  Current Facility-Administered Medications   Medication Dose Route Frequency    baclofen (LIORESAL) tablet 20 mg  20 mg Oral Q8H    cyanocobalamin tablet 1,000 mcg  1,000 mcg Oral DAILY    fingolimod cap 0.5 mg (Patient Supplied)  0.5 mg Oral DAILY    carboxymethylcellulose sodium (REFRESH LIQUIGEL) 1 % ophthalmic solution 2 Drop  2 Drop Both Eyes PRN    amantadine HCL (SYMMETREL) capsule 100 mg  100 mg Oral BID    apixaban (ELIQUIS) tablet 5 mg  5 mg Oral BID    aspirin delayed-release tablet 81 mg  81 mg Oral DAILY    cholecalciferol (VITAMIN D3) (1000 Units /25 mcg) tablet 1,000 Units  1,000 Units Oral DAILY    oxyCODONE IR (ROXICODONE) tablet 10 mg  10 mg Oral Q6H PRN    polyethylene glycol (MIRALAX) packet 17 g  17 g Oral DAILY    pregabalin (LYRICA) capsule 50 mg  50 mg Oral DAILY    Saccharomyces boulardii (FLORASTOR) capsule 250 mg  250 mg Oral BID    senna (SENOKOT) tablet 17.2 mg  2 Tab Oral BID    traMADoL (ULTRAM) tablet 50 mg  50 mg Oral Q6H PRN    sodium chloride (NS) flush 5-40 mL  5-40 mL IntraVENous Q8H    sodium chloride (NS) flush 5-40 mL  5-40 mL IntraVENous PRN    acetaminophen (TYLENOL) tablet 650 mg  650 mg Oral Q6H PRN    Or    acetaminophen (TYLENOL) suppository 650 mg  650 mg Rectal Q6H PRN    polyethylene glycol (MIRALAX) packet 17 g  17 g Oral DAILY PRN    promethazine (PHENERGAN) tablet 12.5 mg  12.5 mg Oral Q6H PRN    Or    ondansetron (ZOFRAN) injection 4 mg  4 mg IntraVENous Q6H PRN    famotidine (PEPCID) tablet 20 mg  20 mg Oral BID    alcohol 62% (NOZIN) nasal  1 Ampule  1 Ampule Topical Q12H       Other Studies:  No results found for this visit on 03/03/21. No results found.     All Micro Results     Procedure Component Value Units Date/Time    BLOOD CULTURE [[id number]] Collected: 03/03/21 1322    Order Status: Completed Specimen: Blood Updated: 03/08/21 0745     Special Requests: --        RIGHT  Antecubital       Culture result: NO GROWTH 5 DAYS       BLOOD CULTURE [[id number]] Collected: 03/03/21 1201    Order Status: Completed Specimen: Blood Updated: 03/08/21 0745     Special Requests: LEFT FOREARM        Culture result: NO GROWTH 5 DAYS       CULTURE, URINE [[id number]]  (Abnormal)  (Susceptibility) Collected: 03/03/21 1349    Order Status: Completed Specimen: Urine from Clean catch Updated: 03/06/21 0817     Special Requests: NO SPECIAL REQUESTS        Culture result:       >100,000 COLONIES/mL PROTEUS MIRABILIS                  50,000-100,000 COLONIES/mL MIXED SKIN ANDREW ISOLATED SARS-CoV-2 Lab Results  \"Novel Coronavirus\" Test:   Lab Results   Component Value Date/Time    COV2NT Not Detected 06/29/2020 04:25 PM      \"Emergent Disease\" Test:   Lab Results   Component Value Date/Time    EDPR Not detected 05/13/2020 05:09 PM     \"SARS-COV-2\" Test: No results found for: XGCOVT  Rapid Test:   Lab Results   Component Value Date/Time    COVR Not detected 07/23/2020 02:23 AM            Signed:  Jori Reed MD

## 2021-03-24 PROCEDURE — 65270000029 HC RM PRIVATE

## 2021-03-24 PROCEDURE — 77030040393 HC DRSG OPTIFOAM GENT MDII -B

## 2021-03-24 PROCEDURE — 77030019905 HC CATH URETH INTMIT MDII -A

## 2021-03-24 PROCEDURE — 74011250637 HC RX REV CODE- 250/637: Performed by: FAMILY MEDICINE

## 2021-03-24 PROCEDURE — 74011250637 HC RX REV CODE- 250/637: Performed by: INTERNAL MEDICINE

## 2021-03-24 RX ADMIN — BACLOFEN 20 MG: 10 TABLET ORAL at 12:29

## 2021-03-24 RX ADMIN — RDII 250 MG CAPSULE 250 MG: at 17:00

## 2021-03-24 RX ADMIN — VITAMIN D, TAB 1000IU (100/BT) 1000 UNITS: 25 TAB at 08:48

## 2021-03-24 RX ADMIN — SENNOSIDES 17.2 MG: 8.6 TABLET, FILM COATED ORAL at 08:48

## 2021-03-24 RX ADMIN — BACLOFEN 20 MG: 10 TABLET ORAL at 06:00

## 2021-03-24 RX ADMIN — Medication 10 ML: at 06:01

## 2021-03-24 RX ADMIN — Medication 81 MG: at 08:48

## 2021-03-24 RX ADMIN — Medication 10 ML: at 12:29

## 2021-03-24 RX ADMIN — CYANOCOBALAMIN TAB 1000 MCG 1000 MCG: 1000 TAB at 08:48

## 2021-03-24 RX ADMIN — TRAMADOL HYDROCHLORIDE 50 MG: 50 TABLET ORAL at 17:00

## 2021-03-24 RX ADMIN — APIXABAN 5 MG: 5 TABLET, FILM COATED ORAL at 21:19

## 2021-03-24 RX ADMIN — FAMOTIDINE 20 MG: 20 TABLET, FILM COATED ORAL at 17:00

## 2021-03-24 RX ADMIN — Medication 10 ML: at 22:00

## 2021-03-24 RX ADMIN — APIXABAN 5 MG: 5 TABLET, FILM COATED ORAL at 08:48

## 2021-03-24 RX ADMIN — OXYCODONE 10 MG: 5 TABLET ORAL at 12:29

## 2021-03-24 RX ADMIN — OXYCODONE 10 MG: 5 TABLET ORAL at 20:30

## 2021-03-24 RX ADMIN — Medication 1 AMPULE: at 21:19

## 2021-03-24 RX ADMIN — RDII 250 MG CAPSULE 250 MG: at 08:48

## 2021-03-24 RX ADMIN — SENNOSIDES 17.2 MG: 8.6 TABLET, FILM COATED ORAL at 17:00

## 2021-03-24 RX ADMIN — AMANTADINE HYDROCHLORIDE 100 MG: 100 CAPSULE ORAL at 21:19

## 2021-03-24 RX ADMIN — Medication 1 AMPULE: at 08:48

## 2021-03-24 RX ADMIN — FAMOTIDINE 20 MG: 20 TABLET, FILM COATED ORAL at 08:48

## 2021-03-24 RX ADMIN — PREGABALIN 50 MG: 50 CAPSULE ORAL at 08:48

## 2021-03-24 RX ADMIN — AMANTADINE HYDROCHLORIDE 100 MG: 100 CAPSULE ORAL at 08:48

## 2021-03-24 RX ADMIN — BACLOFEN 20 MG: 10 TABLET ORAL at 21:19

## 2021-03-24 NOTE — PROGRESS NOTES
[facility] Hospitalist Progress Note     Name: Cindy Parekh   Age: 62 y.o. Sex: female  : 1960    MRN:     [id number]    Admit Date:  3/3/2021    Reason for Admission:  Acute UTI [N39.0]  Generalized weakness [R53.1]    Assessment & Plan     Generalized weakness  MRI by telemetry neurology. No acute finding concerning for exacerbation of MS at this present time. Still with some weakness but improving.  -Continue with physical therapy.  -Pending Medicaid application for rehab placement    B12 deficiency: Continue vitamin B12 supplement    Multiple chronic wounds  -Continue with wound care  -continue with Wasserman to avoid saturating wound care       MS: continue with home meds  Anxiety:continue with home meds  Severe PCM: continue with nutritional supplements  GERD: Pepcid  Hx of DVT:  Eliquis  Sepsis 2/2 UTI(resolved)       Diet:  DIET REGULAR  DIET NUTRITIONAL SUPPLEMENTS  DIET NUTRITIONAL SUPPLEMENTS  DVT PPx: eliquis  GI Ppx: pepcid  Code: DNR    Dispo / Discharge Planning: pending medicaid for rehab placement      Hospital Course/Subjective:     Please refer to the admission H&P for details of presentation. In summary, Cindy Parekh is a 62 y.o. female with medical history significant for MS, multiple chronic wounds who presented to the ED on 3/3 d/t back pain and concern for UTI. She lives by herself, mostly is bedridden, uses a wheelchair at times and stated that she had a wasserman placed months ago due to multiple sacral ulcers. She was found to have UTI and was started on antibiotics. During her stay, patient complained of lower extremity weakness. MRI of brain, cervical and thoracic spine was ordered due to concern for MS flare. MRI shows no acute findings. B12 was noted to be low and patient was started on B12 supplementation. Patient is now back to her baseline. Waiting for Medicaid approval for rehab placement. Wasserman was reinserted due to open wounds being soaked with urine. Subjective/24 hr Events (03/24/21) :  Patient is seen and examined at bedside. No acute events reported overnight by nursing staff. Reports feeling well and weakness has improved. Patient denies fever, chills, chest pains, shortness of breath, n/v, abdominal pain. Tolerating diet and having BM. Review of Systems: 14 point review of systems is otherwise negative with the exception of the elements mentioned above. Objective:     Patient Vitals for the past 24 hrs:   Temp Pulse Resp BP SpO2   03/24/21 1135 98.1 °F (36.7 °C) 80 18 129/69 100 %   03/24/21 0757 97.7 °F (36.5 °C) 72 20 114/71 100 %   03/24/21 0312 97.8 °F (36.6 °C) 82 18 105/67 96 %   03/23/21 2239 98.2 °F (36.8 °C) 72 18 117/73 98 %   03/23/21 1924 98.3 °F (36.8 °C) 78 17 114/71 96 %   03/23/21 1434 97.7 °F (36.5 °C) 77 16 112/68 100 %     Oxygen Therapy  O2 Sat (%): 100 % (03/24/21 1135)  Pulse via Oximetry: 103 beats per minute (03/03/21 1630)  O2 Device: Room air (03/24/21 0757)    Body mass index is 13.17 kg/m². Physical Exam:   General:     alert, awake, no acute distress. Thin and cachetic '  Head:   normocephalic, atraumatic  Eyes, Ears, nose: PERRL, EOMI. Normal conjunctiva  Neck:    supple, non-tender. Trachea midline. Lungs:   CTAB, no wheezing, rhonchi, rales  Cardiac:   RRR, Normal S1 and S2. Abdomen:   Soft, non distended, nontender, +BS  Extremities:   Warm, dry. No edema   Skin:   No rashes, no jaundice  Neuro:  AAOx3. No gross focal neurological deficit  Psychiatric:  No anxiety, calm, cooperative    Data Review:  I have reviewed all labs, meds, and studies from the last 24 hours:    Labs:    No results found for this or any previous visit (from the past 24 hour(s)).     All Micro Results     Procedure Component Value Units Date/Time    BLOOD CULTURE [[id number]] Collected: 03/03/21 1322    Order Status: Completed Specimen: Blood Updated: 03/08/21 0745     Special Requests: --        RIGHT  Antecubital       Culture result: NO GROWTH 5 DAYS       BLOOD CULTURE [[id number]] Collected: 03/03/21 1201    Order Status: Completed Specimen: Blood Updated: 03/08/21 0745     Special Requests: LEFT FOREARM        Culture result: NO GROWTH 5 DAYS       CULTURE, URINE [[id number]]  (Abnormal)  (Susceptibility) Collected: 03/03/21 1349    Order Status: Completed Specimen: Urine from Clean catch Updated: 03/06/21 0817     Special Requests: NO SPECIAL REQUESTS        Culture result:       >100,000 COLONIES/mL PROTEUS MIRABILIS                  50,000-100,000 COLONIES/mL MIXED SKIN ANDREW ISOLATED                EKG Results     Procedure 720 Value Units Date/Time    EKG, 12 LEAD, INITIAL [[id number]]     Order Status: Completed           Other Studies:  Us Retroperitoneum Comp    Result Date: 3/4/2021  EXAM: Ultrasound of the kidneys. INDICATION: Urinary tract infection. COMPARISON: None. TECHNIQUE: A standard protocol kidney ultrasound was performed. FINDINGS: Both kidneys have a normal ultrasound appearance, measuring 10.5 cm on the right and 10.4 cm on the left. No shadowing kidney stone, hydronephrosis or perinephric fluid is seen. The urinary bladder is decompressed around a Mathew catheter. Normal-appearing kidneys.           Current Meds:   Current Facility-Administered Medications   Medication Dose Route Frequency    baclofen (LIORESAL) tablet 20 mg  20 mg Oral Q8H    cyanocobalamin tablet 1,000 mcg  1,000 mcg Oral DAILY    fingolimod cap 0.5 mg (Patient Supplied)  0.5 mg Oral DAILY    carboxymethylcellulose sodium (REFRESH LIQUIGEL) 1 % ophthalmic solution 2 Drop  2 Drop Both Eyes PRN    amantadine HCL (SYMMETREL) capsule 100 mg  100 mg Oral BID    apixaban (ELIQUIS) tablet 5 mg  5 mg Oral BID    aspirin delayed-release tablet 81 mg  81 mg Oral DAILY    cholecalciferol (VITAMIN D3) (1000 Units /25 mcg) tablet 1,000 Units  1,000 Units Oral DAILY    oxyCODONE IR (ROXICODONE) tablet 10 mg  10 mg Oral Q6H PRN    polyethylene glycol (MIRALAX) packet 17 g  17 g Oral DAILY    pregabalin (LYRICA) capsule 50 mg  50 mg Oral DAILY    Saccharomyces boulardii (FLORASTOR) capsule 250 mg  250 mg Oral BID    senna (SENOKOT) tablet 17.2 mg  2 Tab Oral BID    traMADoL (ULTRAM) tablet 50 mg  50 mg Oral Q6H PRN    sodium chloride (NS) flush 5-40 mL  5-40 mL IntraVENous Q8H    sodium chloride (NS) flush 5-40 mL  5-40 mL IntraVENous PRN    acetaminophen (TYLENOL) tablet 650 mg  650 mg Oral Q6H PRN    Or    acetaminophen (TYLENOL) suppository 650 mg  650 mg Rectal Q6H PRN    polyethylene glycol (MIRALAX) packet 17 g  17 g Oral DAILY PRN    promethazine (PHENERGAN) tablet 12.5 mg  12.5 mg Oral Q6H PRN    Or    ondansetron (ZOFRAN) injection 4 mg  4 mg IntraVENous Q6H PRN    famotidine (PEPCID) tablet 20 mg  20 mg Oral BID    alcohol 62% (NOZIN) nasal  1 Ampule  1 Ampule Topical Q12H       Problem List:  Hospital Problems as of 3/24/2021 Date Reviewed: 6/29/2020          Codes Class Noted - Resolved POA    Generalized weakness ICD-10-CM: R53.1  ICD-9-CM: 780.79  3/3/2021 - Present Yes        * (Principal) Acute UTI ICD-10-CM: N39.0  ICD-9-CM: 599.0  3/3/2021 - Present Yes        History of DVT of lower extremity ICD-10-CM: Z86.718  ICD-9-CM: V12.51  3/3/2021 - Present Yes        Depression ICD-10-CM: F32.9  ICD-9-CM: 465  9/10/2020 - Present Yes        Essential hypertension ICD-10-CM: I10  ICD-9-CM: 401.9  9/10/2020 - Present Yes        Multiple wounds ICD-10-CM: T07. Bethwolf Crank  ICD-9-CM: 879.8  9/10/2020 - Present Yes        Anxiety disorder ICD-10-CM: F41.9  ICD-9-CM: 300.00  8/8/2020 - Present Yes        Gastroesophageal reflux disease without esophagitis ICD-10-CM: K21.9  ICD-9-CM: 530.81  8/8/2020 - Present Yes        Sepsis ([facility] [address] ICD-10-CM: A41.9  ICD-9-CM: 038.9, 995.91  7/23/2020 - Present Yes        Pressure injury of sacral region, stage 4 (HCC) ICD-10-CM: A99.356  ICD-9-CM: 707.03, 707.24  5/13/2020 - Present Yes        Multiple sclerosis ([facility] ICD-10-CM: G35  ICD-9-CM: 967  5/13/2020 - Present Yes        Severe protein-calorie malnutrition ([facility] ICD-10-CM: E43  ICD-9-CM: 919  5/13/2020 - Present Unknown               Part of this note was written by using a voice dictation software and the note has been proof read but may still contain some grammatical/other typographical errors.     Signed By: Luke Echols MD   [facility]    March 24, 2021  11:44 AM

## 2021-03-24 NOTE — PROGRESS NOTES
Additional banking records received via fax from patient's sister David Moseley).   [facility] and [address]  [facility]   [phone]

## 2021-03-24 NOTE — PROGRESS NOTES
1815-END OF SHIFT NOTE:    -pt rested well throughout the shift  -wasserman changed due to leakage  -pt complaining of bladder spasms during shift, worse with movement. Pt refusing frequent turning.  -vss, no needs voiced at this time    Intake/Output  03/24 0701 - 03/24 1900  In: 840 [P.O.:840]  Out: 90 [Urine:90] (saturating briefs)   Voiding: YES  Catheter: YES  Drain:          Stool:  0 occurrences. Stool Assessment  Stool Color: Brown (03/13/21 1552)  Stool Appearance: Formed(per patient) (03/19/21 1928)  Stool Amount: Medium (03/13/21 1552)  Stool Source/Status: Rectum (03/13/21 1552)    Emesis:  0 occurrences. VITAL SIGNS  Patient Vitals for the past 12 hrs:   Temp Pulse Resp BP SpO2   03/24/21 1538 98.2 °F (36.8 °C) 87 20 116/73 95 %   03/24/21 1135 98.1 °F (36.7 °C) 80 18 129/69 100 %   03/24/21 0757 97.7 °F (36.5 °C) 72 20 114/71 100 %       Pain Assessment  Pain 1  Pain Scale 1: Numeric (0 - 10) (03/24/21 0830)  Pain Intensity 1: 0 (03/24/21 0830)  Patient Stated Pain Goal: 0 (03/24/21 0830)  Pain Reassessment 1: Yes (03/23/21 2341)  Pain Onset 1: ongoing (03/16/21 1838)  Pain Location 1: Generalized (03/23/21 2307)  Pain Orientation 1: Lower (03/20/21 4255)  Pain Description 1: Aching (03/23/21 2307)  Pain Intervention(s) 1: Medication (see MAR) (03/23/21 2307)    Ambulating  No    Additional Information:     Shift report given to oncoming nurse at the bedside.     Alecia Gutierrez RN

## 2021-03-25 PROCEDURE — 74011250637 HC RX REV CODE- 250/637: Performed by: FAMILY MEDICINE

## 2021-03-25 PROCEDURE — 74011250637 HC RX REV CODE- 250/637: Performed by: INTERNAL MEDICINE

## 2021-03-25 PROCEDURE — 65270000029 HC RM PRIVATE

## 2021-03-25 PROCEDURE — 2709999900 HC NON-CHARGEABLE SUPPLY

## 2021-03-25 PROCEDURE — 74011250636 HC RX REV CODE- 250/636: Performed by: HOSPITALIST

## 2021-03-25 PROCEDURE — 74011250637 HC RX REV CODE- 250/637: Performed by: HOSPITALIST

## 2021-03-25 RX ORDER — KETOROLAC TROMETHAMINE 15 MG/ML
15 INJECTION, SOLUTION INTRAMUSCULAR; INTRAVENOUS ONCE
Status: COMPLETED | OUTPATIENT
Start: 2021-03-25 | End: 2021-03-25

## 2021-03-25 RX ORDER — MORPHINE SULFATE 15 MG/1
15 TABLET, FILM COATED, EXTENDED RELEASE ORAL EVERY 12 HOURS
Status: DISCONTINUED | OUTPATIENT
Start: 2021-03-25 | End: 2021-03-31 | Stop reason: HOSPADM

## 2021-03-25 RX ADMIN — MICONAZOLE NITRATE: 20 CREAM TOPICAL at 09:36

## 2021-03-25 RX ADMIN — BACLOFEN 20 MG: 10 TABLET ORAL at 14:05

## 2021-03-25 RX ADMIN — BACLOFEN 20 MG: 10 TABLET ORAL at 21:23

## 2021-03-25 RX ADMIN — MORPHINE SULFATE 15 MG: 15 TABLET, FILM COATED, EXTENDED RELEASE ORAL at 21:24

## 2021-03-25 RX ADMIN — FAMOTIDINE 20 MG: 20 TABLET, FILM COATED ORAL at 09:35

## 2021-03-25 RX ADMIN — Medication 10 ML: at 21:25

## 2021-03-25 RX ADMIN — APIXABAN 5 MG: 5 TABLET, FILM COATED ORAL at 09:35

## 2021-03-25 RX ADMIN — APIXABAN 5 MG: 5 TABLET, FILM COATED ORAL at 21:23

## 2021-03-25 RX ADMIN — KETOROLAC TROMETHAMINE 15 MG: 15 INJECTION, SOLUTION INTRAMUSCULAR; INTRAVENOUS at 17:10

## 2021-03-25 RX ADMIN — BACLOFEN 20 MG: 10 TABLET ORAL at 05:26

## 2021-03-25 RX ADMIN — PREGABALIN 50 MG: 50 CAPSULE ORAL at 09:35

## 2021-03-25 RX ADMIN — Medication 20 ML: at 06:00

## 2021-03-25 RX ADMIN — Medication 1 AMPULE: at 09:35

## 2021-03-25 RX ADMIN — Medication 10 ML: at 14:05

## 2021-03-25 RX ADMIN — AMANTADINE HYDROCHLORIDE 100 MG: 100 CAPSULE ORAL at 21:23

## 2021-03-25 RX ADMIN — RDII 250 MG CAPSULE 250 MG: at 17:10

## 2021-03-25 RX ADMIN — CYANOCOBALAMIN TAB 1000 MCG 1000 MCG: 1000 TAB at 09:35

## 2021-03-25 RX ADMIN — RDII 250 MG CAPSULE 250 MG: at 09:35

## 2021-03-25 RX ADMIN — VITAMIN D, TAB 1000IU (100/BT) 1000 UNITS: 25 TAB at 09:35

## 2021-03-25 RX ADMIN — Medication 1 AMPULE: at 21:22

## 2021-03-25 RX ADMIN — FAMOTIDINE 20 MG: 20 TABLET, FILM COATED ORAL at 17:10

## 2021-03-25 RX ADMIN — AMANTADINE HYDROCHLORIDE 100 MG: 100 CAPSULE ORAL at 09:35

## 2021-03-25 RX ADMIN — Medication 81 MG: at 09:35

## 2021-03-25 NOTE — PROGRESS NOTES
End of Shift Note:    - Cleansed and changed dressing on sacrum/coccyx. - Norco x1 for pain, specifically muscle spasms.   - Pt slept a majority of the night. Report given to oncoming RN, Senia Villarreal.     Veronica Luke, RN

## 2021-03-25 NOTE — PROGRESS NOTES
Patient compliant with POC. Rylan Reyes RN      Problem: Pressure Injury - Risk of  Goal: *Prevention of pressure injury  Description: Document Kimani Scale and appropriate interventions in the flowsheet. Outcome: Progressing Towards Goal  Note: Pressure Injury Interventions:  Sensory Interventions: Assess changes in LOC, Assess need for specialty bed    Moisture Interventions: Apply protective barrier, creams and emollients, Limit adult briefs, Check for incontinence Q2 hours and as needed    Activity Interventions: Assess need for specialty bed, Pressure redistribution bed/mattress(bed type)    Mobility Interventions: Pressure redistribution bed/mattress (bed type)    Nutrition Interventions: Offer support with meals,snacks and hydration, Document food/fluid/supplement intake    Friction and Shear Interventions: Apply protective barrier, creams and emollients, Minimize layers                Problem: Patient Education: Go to Patient Education Activity  Goal: Patient/Family Education  Outcome: Progressing Towards Goal     Problem: Falls - Risk of  Goal: *Absence of Falls  Description: Document Danial Fall Risk and appropriate interventions in the flowsheet.   Outcome: Progressing Towards Goal  Note: Fall Risk Interventions:  Mobility Interventions: Assess mobility with egress test, Communicate number of staff needed for ambulation/transfer    Mentation Interventions: Door open when patient unattended, Evaluate medications/consider consulting pharmacy, Adequate sleep, hydration, pain control    Medication Interventions: Assess postural VS orthostatic hypotension, Evaluate medications/consider consulting pharmacy    Elimination Interventions: Call light in reach    History of Falls Interventions: Door open when patient unattended, Consult care management for discharge planning         Problem: Patient Education: Go to Patient Education Activity  Goal: Patient/Family Education  Outcome: Progressing Towards Goal     Problem: Patient Education: Go to Patient Education Activity  Goal: Patient/Family Education  Outcome: Progressing Towards Goal     Problem: Nutrition Deficit  Goal: *Optimize nutritional status  Outcome: Progressing Towards Goal     Problem: Patient Education: Go to Patient Education Activity  Goal: Patient/Family Education  Outcome: Progressing Towards Goal     Problem: Delirium Treatment  Goal: *Level of consciousness restored to baseline  Outcome: Progressing Towards Goal  Goal: *Level of environmental perceptions restored to baseline  Outcome: Progressing Towards Goal  Goal: *Sensory perception restored to baseline  Outcome: Progressing Towards Goal  Goal: *Emotional stability restored to baseline  Outcome: Progressing Towards Goal  Goal: *Functional assessment restored to baseline  Outcome: Progressing Towards Goal  Goal: *Absence of falls  Outcome: Progressing Towards Goal  Goal: *Will remain free of delirium, CAM Score negative  Outcome: Progressing Towards Goal  Goal: *Cognitive status will be restored to baseline  Outcome: Progressing Towards Goal  Goal: Interventions  Outcome: Progressing Towards Goal     Problem: Patient Education: Go to Patient Education Activity  Goal: Patient/Family Education  Outcome: Progressing Towards Goal

## 2021-03-25 NOTE — PROGRESS NOTES
[facility]ist Progress Note     Name: Tomas Hatchet   Age: 62 y.o. Sex: female  : 1960    MRN:     [id number]    Admit Date:  3/3/2021    Reason for Admission:  Acute UTI [N39.0]  Generalized weakness [R53.1]    Assessment & Plan     Generalized weakness  MRI by telemetry neurology. No acute finding concerning for exacerbation of MS at this present time. Still with some weakness but improving.  -Continue with physical therapy.  -Pending Medicaid application for rehab placement    B12 deficiency: Continue vitamin B12 supplement    Multiple chronic wounds  -Continue with wound care  -continue with Wasserman to avoid saturating wound care    Chronic Pain 2/2 Chronic wounds   -  Start Oxycontin and with PRN oxycodone for breakthrough        MS: continue with home meds  Anxiety:continue with home meds  Severe PCM: continue with nutritional supplements  GERD: Pepcid  Hx of DVT:  Eliquis  Sepsis 2/2 UTI(resolved)       Diet:  DIET REGULAR  DIET NUTRITIONAL SUPPLEMENTS  DIET NUTRITIONAL SUPPLEMENTS  DVT PPx: eliquis  GI Ppx: pepcid  Code: DNR    Dispo / Discharge Planning: pending medicaid for rehab placement      Hospital Course/Subjective:     Please refer to the admission H&P for details of presentation. In summary, Tomas Hatchet is a 62 y.o. female with medical history significant for MS, multiple chronic wounds who presented to the ED on 3/3 d/t back pain and concern for UTI. She lives by herself, mostly is bedridden, uses a wheelchair at times and stated that she had a wasserman placed months ago due to multiple sacral ulcers. She was found to have UTI and was started on antibiotics. During her stay, patient complained of lower extremity weakness. MRI of brain, cervical and thoracic spine was ordered due to concern for MS flare. MRI shows no acute findings. B12 was noted to be low and patient was started on B12 supplementation. Patient is now back to her baseline.     Waiting for Medicaid approval for rehab placement. Mathew was reinserted due to open wounds being soaked with urine. Subjective/24 hr Events (03/25/21) :  Patient is seen and examined at bedside. No acute events reported overnight by nursing staff. Reports feeling well and weakness has improved. Patient denies fever, chills, chest pains, shortness of breath, n/v, abdominal pain. Tolerating diet and having BM. Review of Systems: 14 point review of systems is otherwise negative with the exception of the elements mentioned above. Objective:     Patient Vitals for the past 24 hrs:   Temp Pulse Resp BP SpO2   03/25/21 1525 97.8 °F (36.6 °C) 98 18 128/83 95 %   03/25/21 1125 97.8 °F (36.6 °C) 80 18 119/67 99 %   03/25/21 0735 97.8 °F (36.6 °C) 85 18 119/80 98 %   03/25/21 0225 97.9 °F (36.6 °C) 89 17 106/73 96 %   03/24/21 2305 98.2 °F (36.8 °C) 80 16 122/72 97 %   03/24/21 1944 98.3 °F (36.8 °C) 83 18 123/74 97 %     Oxygen Therapy  O2 Sat (%): 95 % (03/25/21 1525)  Pulse via Oximetry: 103 beats per minute (03/03/21 1630)  O2 Device: Room air (03/25/21 1525)    Body mass index is 13.17 kg/m². Physical Exam:   General:     alert, awake, no acute distress. Thin and cachetic '  Head:   normocephalic, atraumatic  Eyes, Ears, nose: PERRL, EOMI. Normal conjunctiva  Neck:    supple, non-tender. Trachea midline. Lungs:   CTAB, no wheezing, rhonchi, rales  Cardiac:   RRR, Normal S1 and S2. Abdomen:   Soft, non distended, nontender, +BS  Extremities:   Warm, dry. No edema   Skin:   No rashes, no jaundice  Neuro:  AAOx3. No gross focal neurological deficit  Psychiatric:  No anxiety, calm, cooperative    Data Review:  I have reviewed all labs, meds, and studies from the last 24 hours:    Labs:    No results found for this or any previous visit (from the past 24 hour(s)).     All Micro Results     Procedure Component Value Units Date/Time    BLOOD CULTURE [[id number]] Collected: 03/03/21 1322    Order Status: Completed Specimen: Blood Updated: 03/08/21 0745     Special Requests: --        RIGHT  Antecubital       Culture result: NO GROWTH 5 DAYS       BLOOD CULTURE [[id number]] Collected: 03/03/21 1201    Order Status: Completed Specimen: Blood Updated: 03/08/21 0745     Special Requests: LEFT FOREARM        Culture result: NO GROWTH 5 DAYS       CULTURE, URINE [[id number]]  (Abnormal)  (Susceptibility) Collected: 03/03/21 1349    Order Status: Completed Specimen: Urine from Clean catch Updated: 03/06/21 0817     Special Requests: NO SPECIAL REQUESTS        Culture result:       >100,000 COLONIES/mL PROTEUS MIRABILIS                  50,000-100,000 COLONIES/mL MIXED SKIN ANDREW ISOLATED                EKG Results     Procedure 720 Value Units Date/Time    EKG, 12 LEAD, INITIAL [[id number]]     Order Status: Completed           Other Studies:  Us Retroperitoneum Comp    Result Date: 3/4/2021  EXAM: Ultrasound of the kidneys. INDICATION: Urinary tract infection. COMPARISON: None. TECHNIQUE: A standard protocol kidney ultrasound was performed. FINDINGS: Both kidneys have a normal ultrasound appearance, measuring 10.5 cm on the right and 10.4 cm on the left. No shadowing kidney stone, hydronephrosis or perinephric fluid is seen. The urinary bladder is decompressed around a Mathew catheter. Normal-appearing kidneys.           Current Meds:   Current Facility-Administered Medications   Medication Dose Route Frequency    metroNIDAZOLE (FLAGYL) 1,000 mg, miconazole (MICOTIN) 2 % 30 g topical cream   Topical DAILY    ketorolac (TORADOL) injection 15 mg  15 mg IntraVENous ONCE    morphine CR (MS CONTIN) tablet 15 mg  15 mg Oral Q12H    baclofen (LIORESAL) tablet 20 mg  20 mg Oral Q8H    cyanocobalamin tablet 1,000 mcg  1,000 mcg Oral DAILY    fingolimod cap 0.5 mg (Patient Supplied)  0.5 mg Oral DAILY    carboxymethylcellulose sodium (REFRESH LIQUIGEL) 1 % ophthalmic solution 2 Drop  2 Drop Both Eyes PRN    amantadine HCL (SYMMETREL) capsule 100 mg  100 mg Oral BID    apixaban (ELIQUIS) tablet 5 mg  5 mg Oral BID    aspirin delayed-release tablet 81 mg  81 mg Oral DAILY    cholecalciferol (VITAMIN D3) (1000 Units /25 mcg) tablet 1,000 Units  1,000 Units Oral DAILY    oxyCODONE IR (ROXICODONE) tablet 10 mg  10 mg Oral Q6H PRN    polyethylene glycol (MIRALAX) packet 17 g  17 g Oral DAILY    pregabalin (LYRICA) capsule 50 mg  50 mg Oral DAILY    Saccharomyces boulardii (FLORASTOR) capsule 250 mg  250 mg Oral BID    senna (SENOKOT) tablet 17.2 mg  2 Tab Oral BID    traMADoL (ULTRAM) tablet 50 mg  50 mg Oral Q6H PRN    sodium chloride (NS) flush 5-40 mL  5-40 mL IntraVENous Q8H    sodium chloride (NS) flush 5-40 mL  5-40 mL IntraVENous PRN    acetaminophen (TYLENOL) tablet 650 mg  650 mg Oral Q6H PRN    Or    acetaminophen (TYLENOL) suppository 650 mg  650 mg Rectal Q6H PRN    polyethylene glycol (MIRALAX) packet 17 g  17 g Oral DAILY PRN    promethazine (PHENERGAN) tablet 12.5 mg  12.5 mg Oral Q6H PRN    Or    ondansetron (ZOFRAN) injection 4 mg  4 mg IntraVENous Q6H PRN    famotidine (PEPCID) tablet 20 mg  20 mg Oral BID    alcohol 62% (NOZIN) nasal  1 Ampule  1 Ampule Topical Q12H       Problem List:  Hospital Problems as of 3/25/2021 Date Reviewed: 6/29/2020          Codes Class Noted - Resolved POA    Generalized weakness ICD-10-CM: R53.1  ICD-9-CM: 780.79  3/3/2021 - Present Yes        * (Principal) Acute UTI ICD-10-CM: N39.0  ICD-9-CM: 599.0  3/3/2021 - Present Yes        History of DVT of lower extremity ICD-10-CM: Y55.523  ICD-9-CM: V12.51  3/3/2021 - Present Yes        Depression ICD-10-CM: F32.9  ICD-9-CM: 199  9/10/2020 - Present Yes        Essential hypertension ICD-10-CM: I10  ICD-9-CM: 401.9  9/10/2020 - Present Yes        Multiple wounds ICD-10-CM: T07. Robbie Lemos  ICD-9-CM: 879.8  9/10/2020 - Present Yes        Anxiety disorder ICD-10-CM: F41.9  ICD-9-CM: 300.00  8/8/2020 - Present Yes        Gastroesophageal reflux disease without esophagitis ICD-10-CM: K21.9  ICD-9-CM: 530.81  8/8/2020 - Present Yes        Sepsis ([facility] ICD-10-CM: A41.9  ICD-9-CM: 038.9, 995.91  7/23/2020 - Present Yes        Pressure injury of sacral region, stage 4 (HCC) ICD-10-CM: N28.786  ICD-9-CM: 707.03, 707.24  5/13/2020 - Present Yes        Multiple sclerosis ([facility] ICD-10-CM: G35  ICD-9-CM: 483  5/13/2020 - Present Yes        Severe protein-calorie malnutrition ([facility] ICD-10-CM: Y44  ICD-9-CM: 262  5/13/2020 - Present Unknown               Part of this note was written by using a voice dictation software and the note has been proof read but may still contain some grammatical/other typographical errors.     Signed By: Josh Berger MD   [facility] Hospitalist Service    March 25, 2021  11:44 AM

## 2021-03-25 NOTE — PROGRESS NOTES
Spoke with Sara cline/Oscar Araya about pt's Medicaid application. I was informed that pt's income showed that her income may be over the allotted amount for Medicaid approval. SO I relayed to [facility] that they may need to do a \"income trust\". Yumiko Ball is forwarding this info to the appropriate party.

## 2021-03-26 PROCEDURE — 74011250637 HC RX REV CODE- 250/637: Performed by: INTERNAL MEDICINE

## 2021-03-26 PROCEDURE — 65270000029 HC RM PRIVATE

## 2021-03-26 PROCEDURE — 74011250637 HC RX REV CODE- 250/637: Performed by: HOSPITALIST

## 2021-03-26 PROCEDURE — 74011250637 HC RX REV CODE- 250/637: Performed by: FAMILY MEDICINE

## 2021-03-26 RX ORDER — UREA 10 %
220 LOTION (ML) TOPICAL DAILY
Status: DISCONTINUED | OUTPATIENT
Start: 2021-03-27 | End: 2021-03-31 | Stop reason: HOSPADM

## 2021-03-26 RX ORDER — ZINC SULFATE 50(220)MG
1 CAPSULE ORAL DAILY
Status: DISCONTINUED | OUTPATIENT
Start: 2021-03-27 | End: 2021-03-26 | Stop reason: SDUPTHER

## 2021-03-26 RX ORDER — ASCORBIC ACID 250 MG
250 TABLET ORAL DAILY
Status: DISCONTINUED | OUTPATIENT
Start: 2021-03-27 | End: 2021-03-31 | Stop reason: HOSPADM

## 2021-03-26 RX ADMIN — Medication 81 MG: at 08:03

## 2021-03-26 RX ADMIN — FAMOTIDINE 20 MG: 20 TABLET, FILM COATED ORAL at 08:03

## 2021-03-26 RX ADMIN — PREGABALIN 50 MG: 50 CAPSULE ORAL at 08:03

## 2021-03-26 RX ADMIN — PROMETHAZINE HYDROCHLORIDE 12.5 MG: 25 TABLET ORAL at 10:25

## 2021-03-26 RX ADMIN — Medication 10 ML: at 05:29

## 2021-03-26 RX ADMIN — BACLOFEN 20 MG: 10 TABLET ORAL at 13:44

## 2021-03-26 RX ADMIN — Medication 1 AMPULE: at 21:55

## 2021-03-26 RX ADMIN — MORPHINE SULFATE 15 MG: 15 TABLET, FILM COATED, EXTENDED RELEASE ORAL at 21:55

## 2021-03-26 RX ADMIN — MICONAZOLE NITRATE: 20 CREAM TOPICAL at 10:29

## 2021-03-26 RX ADMIN — APIXABAN 5 MG: 5 TABLET, FILM COATED ORAL at 21:56

## 2021-03-26 RX ADMIN — BACLOFEN 20 MG: 10 TABLET ORAL at 21:55

## 2021-03-26 RX ADMIN — Medication 1 AMPULE: at 08:03

## 2021-03-26 RX ADMIN — CYANOCOBALAMIN TAB 1000 MCG 1000 MCG: 1000 TAB at 08:03

## 2021-03-26 RX ADMIN — SENNOSIDES 17.2 MG: 8.6 TABLET, FILM COATED ORAL at 17:14

## 2021-03-26 RX ADMIN — AMANTADINE HYDROCHLORIDE 100 MG: 100 CAPSULE ORAL at 21:55

## 2021-03-26 RX ADMIN — AMANTADINE HYDROCHLORIDE 100 MG: 100 CAPSULE ORAL at 08:03

## 2021-03-26 RX ADMIN — OXYCODONE 10 MG: 5 TABLET ORAL at 17:14

## 2021-03-26 RX ADMIN — APIXABAN 5 MG: 5 TABLET, FILM COATED ORAL at 08:03

## 2021-03-26 RX ADMIN — RDII 250 MG CAPSULE 250 MG: at 08:03

## 2021-03-26 RX ADMIN — POLYETHYLENE GLYCOL 3350 17 G: 17 POWDER, FOR SOLUTION ORAL at 08:04

## 2021-03-26 RX ADMIN — SENNOSIDES 17.2 MG: 8.6 TABLET, FILM COATED ORAL at 08:03

## 2021-03-26 RX ADMIN — MORPHINE SULFATE 15 MG: 15 TABLET, FILM COATED, EXTENDED RELEASE ORAL at 08:03

## 2021-03-26 RX ADMIN — BACLOFEN 20 MG: 10 TABLET ORAL at 05:26

## 2021-03-26 RX ADMIN — VITAMIN D, TAB 1000IU (100/BT) 1000 UNITS: 25 TAB at 08:03

## 2021-03-26 RX ADMIN — FAMOTIDINE 20 MG: 20 TABLET, FILM COATED ORAL at 17:14

## 2021-03-26 RX ADMIN — RDII 250 MG CAPSULE 250 MG: at 17:14

## 2021-03-26 RX ADMIN — OXYCODONE 10 MG: 5 TABLET ORAL at 08:12

## 2021-03-26 NOTE — PROGRESS NOTES
[facility] Progress Note     Name: Regine Vanessa   Age: 62 y.o. Sex: female  : 1960    MRN:     [id number]    Admit Date:  3/3/2021    Reason for Admission:  Acute UTI [N39.0]  Generalized weakness [R53.1]    Assessment & Plan     Generalized weakness  MRI by telemetry neurology. No acute finding concerning for exacerbation of MS at this present time. Still with some weakness but improving.  -Continue with physical therapy.  -Pending Medicaid application for rehab placement    B12 deficiency: Continue vitamin B12 supplement    Multiple chronic wounds  -Continue with wound care  -continue with Wasserman to avoid saturating wound care    Chronic Pain 2/2 Chronic wounds   - - Started Oxycontin and with PRN oxycodone for breakthrough , wound care ordered, start PO Zinc sulfate , ascorbic acid and multivitamin        MS: continue with home meds  Anxiety:continue with home meds  Severe PCM: continue with nutritional supplements  GERD: Pepcid  Hx of DVT:  Eliquis  Sepsis 2/2 UTI(resolved)       Diet:  DIET REGULAR  DIET NUTRITIONAL SUPPLEMENTS  DIET NUTRITIONAL SUPPLEMENTS  DVT PPx: eliquis  GI Ppx: pepcid  Code: DNR    Dispo / Discharge Planning: pending medicaid for rehab placement      Hospital Course/Subjective:     Please refer to the admission H&P for details of presentation. In summary, Regine Vanessa is a 62 y.o. female with medical history significant for MS, multiple chronic wounds who presented to the ED on 3/3 d/t back pain and concern for UTI. She lives by herself, mostly is bedridden, uses a wheelchair at times and stated that she had a wasserman placed months ago due to multiple sacral ulcers. She was found to have UTI and was started on antibiotics. During her stay, patient complained of lower extremity weakness. MRI of brain, cervical and thoracic spine was ordered due to concern for MS flare. MRI shows no acute findings.   B12 was noted to be low and patient was started on B12 supplementation. Patient is now back to her baseline. Waiting for Medicaid approval for rehab placement. Mathew was reinserted due to open wounds being soaked with urine. Subjective/24 hr Events (03/26/21) :  Nausea today, pain is well controlled       Review of Systems: 14 point review of systems is otherwise negative with the exception of the elements mentioned above. Objective:     Patient Vitals for the past 24 hrs:   Temp Pulse Resp BP SpO2   03/26/21 0800 97.5 °F (36.4 °C) 76 20 108/67 100 %   03/26/21 0240 98 °F (36.7 °C) 78 18 111/71 100 %   03/25/21 2300 97.8 °F (36.6 °C) 78 18 125/79 100 %   03/25/21 1910 98.4 °F (36.9 °C) 97 18 108/78 97 %   03/25/21 1525 97.8 °F (36.6 °C) 98 18 128/83 95 %   03/25/21 1125 97.8 °F (36.6 °C) 80 18 119/67 99 %     Oxygen Therapy  O2 Sat (%): 100 % (03/26/21 0800)  Pulse via Oximetry: 103 beats per minute (03/03/21 1630)  O2 Device: Room air (03/26/21 0803)    Body mass index is 13.17 kg/m². Physical Exam:   General:     alert, awake, no acute distress. Thin and cachetic '  Head:   normocephalic, atraumatic  Eyes, Ears, nose: PERRL, EOMI. Normal conjunctiva  Neck:    supple, non-tender. Trachea midline. Lungs:   CTAB, no wheezing, rhonchi, rales  Cardiac:   RRR, Normal S1 and S2. Abdomen:   Soft, non distended, nontender, +BS  Extremities:   Warm, dry. No edema   Skin:   No rashes, no jaundice  Neuro:  AAOx3. No gross focal neurological deficit  Psychiatric:  No anxiety, calm, cooperative    Data Review:  I have reviewed all labs, meds, and studies from the last 24 hours:    Labs:    No results found for this or any previous visit (from the past 24 hour(s)).     All Micro Results     Procedure Component Value Units Date/Time    BLOOD CULTURE [[id number]] Collected: 03/03/21 1322    Order Status: Completed Specimen: Blood Updated: 03/08/21 0745     Special Requests: --        RIGHT  Antecubital       Culture result: NO GROWTH 5 DAYS       BLOOD CULTURE [[id number]] Collected: 03/03/21 1201    Order Status: Completed Specimen: Blood Updated: 03/08/21 0745     Special Requests: LEFT FOREARM        Culture result: NO GROWTH 5 DAYS       CULTURE, URINE [[id number]]  (Abnormal)  (Susceptibility) Collected: 03/03/21 1349    Order Status: Completed Specimen: Urine from Clean catch Updated: 03/06/21 0817     Special Requests: NO SPECIAL REQUESTS        Culture result:       >100,000 COLONIES/mL PROTEUS MIRABILIS                  50,000-100,000 COLONIES/mL MIXED SKIN ANDREW ISOLATED                EKG Results     Procedure 720 Value Units Date/Time    EKG, 12 LEAD, INITIAL [[id number]]     Order Status: Completed           Other Studies:  Us Retroperitoneum Comp    Result Date: 3/4/2021  EXAM: Ultrasound of the kidneys. INDICATION: Urinary tract infection. COMPARISON: None. TECHNIQUE: A standard protocol kidney ultrasound was performed. FINDINGS: Both kidneys have a normal ultrasound appearance, measuring 10.5 cm on the right and 10.4 cm on the left. No shadowing kidney stone, hydronephrosis or perinephric fluid is seen. The urinary bladder is decompressed around a Mathew catheter. Normal-appearing kidneys.           Current Meds:   Current Facility-Administered Medications   Medication Dose Route Frequency    metroNIDAZOLE (FLAGYL) 1,000 mg, miconazole (MICOTIN) 2 % 30 g topical cream   Topical DAILY    morphine CR (MS CONTIN) tablet 15 mg  15 mg Oral Q12H    baclofen (LIORESAL) tablet 20 mg  20 mg Oral Q8H    cyanocobalamin tablet 1,000 mcg  1,000 mcg Oral DAILY    fingolimod cap 0.5 mg (Patient Supplied)  0.5 mg Oral DAILY    carboxymethylcellulose sodium (REFRESH LIQUIGEL) 1 % ophthalmic solution 2 Drop  2 Drop Both Eyes PRN    amantadine HCL (SYMMETREL) capsule 100 mg  100 mg Oral BID    apixaban (ELIQUIS) tablet 5 mg  5 mg Oral BID    aspirin delayed-release tablet 81 mg  81 mg Oral DAILY    cholecalciferol (VITAMIN D3) (1000 Units /25 mcg) tablet 1,000 Units 1,000 Units Oral DAILY    oxyCODONE IR (ROXICODONE) tablet 10 mg  10 mg Oral Q6H PRN    polyethylene glycol (MIRALAX) packet 17 g  17 g Oral DAILY    pregabalin (LYRICA) capsule 50 mg  50 mg Oral DAILY    Saccharomyces boulardii (FLORASTOR) capsule 250 mg  250 mg Oral BID    senna (SENOKOT) tablet 17.2 mg  2 Tab Oral BID    traMADoL (ULTRAM) tablet 50 mg  50 mg Oral Q6H PRN    sodium chloride (NS) flush 5-40 mL  5-40 mL IntraVENous Q8H    sodium chloride (NS) flush 5-40 mL  5-40 mL IntraVENous PRN    acetaminophen (TYLENOL) tablet 650 mg  650 mg Oral Q6H PRN    Or    acetaminophen (TYLENOL) suppository 650 mg  650 mg Rectal Q6H PRN    polyethylene glycol (MIRALAX) packet 17 g  17 g Oral DAILY PRN    promethazine (PHENERGAN) tablet 12.5 mg  12.5 mg Oral Q6H PRN    Or    ondansetron (ZOFRAN) injection 4 mg  4 mg IntraVENous Q6H PRN    famotidine (PEPCID) tablet 20 mg  20 mg Oral BID    alcohol 62% (NOZIN) nasal  1 Ampule  1 Ampule Topical Q12H       Problem List:  Hospital Problems as of 3/26/2021 Date Reviewed: 6/29/2020          Codes Class Noted - Resolved POA    Generalized weakness ICD-10-CM: R53.1  ICD-9-CM: 780.79  3/3/2021 - Present Yes        * (Principal) Acute UTI ICD-10-CM: N39.0  ICD-9-CM: 599.0  3/3/2021 - Present Yes        History of DVT of lower extremity ICD-10-CM: Z86.718  ICD-9-CM: V12.51  3/3/2021 - Present Yes        Depression ICD-10-CM: F32.9  ICD-9-CM: 010  9/10/2020 - Present Yes        Essential hypertension ICD-10-CM: I10  ICD-9-CM: 401.9  9/10/2020 - Present Yes        Multiple wounds ICD-10-CM: T07. [place] Bolls  ICD-9-CM: 879.8  9/10/2020 - Present Yes        Anxiety disorder ICD-10-CM: F41.9  ICD-9-CM: 300.00  8/8/2020 - Present Yes        Gastroesophageal reflux disease without esophagitis ICD-10-CM: K21.9  ICD-9-CM: 530.81  8/8/2020 - Present Yes        Sepsis ([facility] ICD-10-CM: A41.9  ICD-9-CM: 038.9, 995.91  7/23/2020 - Present Yes        Pressure injury of sacral region, stage 4 [facility]) ICD-10-CM: T64.195  ICD-9-CM: 707.03, 707.24  5/13/2020 - Present Yes        Multiple sclerosis ([facility] ICD-10-CM: G35  ICD-9-CM: 105  5/13/2020 - Present Yes        Severe protein-calorie malnutrition ([facility] ICD-10-CM: E43  ICD-9-CM: 062  5/13/2020 - Present Unknown               Part of this note was written by using a voice dictation software and the note has been proof read but may still contain some grammatical/other typographical errors.     Signed By: Solange Dunlap MD   [employer] Hospitalist Service    March 26, 2021  11:44 AM

## 2021-03-26 NOTE — PROGRESS NOTES
Problem: Falls - Risk of  Goal: *Absence of Falls  Description: Document Giovany Orellana Fall Risk and appropriate interventions in the flowsheet.   Outcome: Progressing Towards Goal  Note: Fall Risk Interventions:  Mobility Interventions: Communicate number of staff needed for ambulation/transfer, Bed/chair exit alarm    Mentation Interventions: Door open when patient unattended, Bed/chair exit alarm, Adequate sleep, hydration, pain control    Medication Interventions: Evaluate medications/consider consulting pharmacy, Bed/chair exit alarm    Elimination Interventions: Call light in reach, Bed/chair exit alarm    History of Falls Interventions: Room close to nurse's station, Evaluate medications/consider consulting pharmacy, Door open when patient unattended

## 2021-03-26 NOTE — PROGRESS NOTES
Patient Vitals for the past 12 hrs:   Temp Pulse Resp BP SpO2   03/26/21 1200 98 °F (36.7 °C) 75 14 (!) 127/92 98 %   03/26/21 0800 97.5 °F (36.4 °C) 76 20 108/67 100 %     PRN oxy 10mg po given x's 2 for pain. PRN phenergan 12.5 po given x's 1 for nausea. Wound care complete.     Bedside shift report given to Vernon Weaver, on-coming RN

## 2021-03-26 NOTE — PROGRESS NOTES
SARY spoke with Arnold Arguelles from [facility] to verify referral status. Arnold Arguelles states pt has completed medicaid application but may be over income therfore, she needs to have an \"income trust\" set up. Barrett Crystalmagalis george pt's sister has been contacted to arrange this. Pt has used all her medicare days and has no payor source for the NH currently. Arnold Arguelles states they will take her with medicaid pending however, all things need to be in place (trust set up and any other financial qualifiers). Per MD pt is medically stable.   DEBBIE GayW

## 2021-03-26 NOTE — PROGRESS NOTES
2055 Sacral/coccyx  Area cleansed with wound cleanser, iodoform packing strip loosely applied , covered with 4 X 4 dressing, 4 X 4 Allevyn. Turned, repositioned. Juan Monk

## 2021-03-26 NOTE — PROGRESS NOTES
SW was informed that patient exceeds income criteria to be approved for Medicaid. Per SNF representative, patient will need to establish an \"Income Trust\" before she will be approved to go to SNF. SNF business office will reach out to patient's sister Oralia Perez) to provide guidance with setting-up this account. Phone call to Mondenton Smith at [phone] [facility]/Department of ). No answer; message left. SW follow-up with patient. Patient was informed that she does not qualify for Medicaid. Additionally, she would need to have an \"Income Trust\" set-up before going to SNF for long-term care. Patient verbalized preference to go home with home health as she does not want to go to SNF for long-term care. She states that her ex- will be able to come by twice a day for assistance. She has all needed equipment (scooter, wheelchair), and her home is already handicapped accessible. SW will order PT/OT eval to assess for appropriateness of patient to discharge home. SW will follow-up with patient.       ARNULFO Kaur   [phone]

## 2021-03-27 PROCEDURE — 74011250637 HC RX REV CODE- 250/637: Performed by: FAMILY MEDICINE

## 2021-03-27 PROCEDURE — 74011250637 HC RX REV CODE- 250/637: Performed by: HOSPITALIST

## 2021-03-27 PROCEDURE — 65270000029 HC RM PRIVATE

## 2021-03-27 PROCEDURE — 74011250637 HC RX REV CODE- 250/637: Performed by: INTERNAL MEDICINE

## 2021-03-27 RX ORDER — KETOROLAC TROMETHAMINE 15 MG/ML
15 INJECTION, SOLUTION INTRAMUSCULAR; INTRAVENOUS
Status: DISCONTINUED | OUTPATIENT
Start: 2021-03-27 | End: 2021-03-31 | Stop reason: HOSPADM

## 2021-03-27 RX ADMIN — FAMOTIDINE 20 MG: 20 TABLET, FILM COATED ORAL at 09:10

## 2021-03-27 RX ADMIN — SENNOSIDES 17.2 MG: 8.6 TABLET, FILM COATED ORAL at 09:10

## 2021-03-27 RX ADMIN — Medication 1 AMPULE: at 20:46

## 2021-03-27 RX ADMIN — MORPHINE SULFATE 15 MG: 15 TABLET, FILM COATED, EXTENDED RELEASE ORAL at 09:10

## 2021-03-27 RX ADMIN — BACLOFEN 20 MG: 10 TABLET ORAL at 05:41

## 2021-03-27 RX ADMIN — CYANOCOBALAMIN TAB 1000 MCG 1000 MCG: 1000 TAB at 09:10

## 2021-03-27 RX ADMIN — FAMOTIDINE 20 MG: 20 TABLET, FILM COATED ORAL at 17:41

## 2021-03-27 RX ADMIN — AMANTADINE HYDROCHLORIDE 100 MG: 100 CAPSULE ORAL at 09:10

## 2021-03-27 RX ADMIN — POLYETHYLENE GLYCOL 3350 17 G: 17 POWDER, FOR SOLUTION ORAL at 09:13

## 2021-03-27 RX ADMIN — Medication 1 AMPULE: at 09:09

## 2021-03-27 RX ADMIN — APIXABAN 5 MG: 5 TABLET, FILM COATED ORAL at 09:10

## 2021-03-27 RX ADMIN — Medication 81 MG: at 09:10

## 2021-03-27 RX ADMIN — TRAMADOL HYDROCHLORIDE 50 MG: 50 TABLET ORAL at 05:41

## 2021-03-27 RX ADMIN — SENNOSIDES 17.2 MG: 8.6 TABLET, FILM COATED ORAL at 17:41

## 2021-03-27 RX ADMIN — OXYCODONE 10 MG: 5 TABLET ORAL at 12:27

## 2021-03-27 RX ADMIN — APIXABAN 5 MG: 5 TABLET, FILM COATED ORAL at 20:46

## 2021-03-27 RX ADMIN — Medication 220 MG: at 09:10

## 2021-03-27 RX ADMIN — MICONAZOLE NITRATE: 20 CREAM TOPICAL at 09:00

## 2021-03-27 RX ADMIN — VITAMIN D, TAB 1000IU (100/BT) 1000 UNITS: 25 TAB at 09:10

## 2021-03-27 RX ADMIN — BACLOFEN 20 MG: 10 TABLET ORAL at 20:47

## 2021-03-27 RX ADMIN — PROMETHAZINE HYDROCHLORIDE 12.5 MG: 25 TABLET ORAL at 12:28

## 2021-03-27 RX ADMIN — RDII 250 MG CAPSULE 250 MG: at 09:10

## 2021-03-27 RX ADMIN — Medication 250 MG: at 09:10

## 2021-03-27 RX ADMIN — PREGABALIN 50 MG: 50 CAPSULE ORAL at 09:09

## 2021-03-27 RX ADMIN — RDII 250 MG CAPSULE 250 MG: at 17:42

## 2021-03-27 RX ADMIN — MORPHINE SULFATE 15 MG: 15 TABLET, FILM COATED, EXTENDED RELEASE ORAL at 20:46

## 2021-03-27 RX ADMIN — BACLOFEN 20 MG: 10 TABLET ORAL at 14:49

## 2021-03-27 RX ADMIN — AMANTADINE HYDROCHLORIDE 100 MG: 100 CAPSULE ORAL at 20:46

## 2021-03-27 NOTE — PROGRESS NOTES
[facility]ist Progress Note     Name: Chago Escudero   Age: 62 y.o. Sex: female  : 1960    MRN:     [id number]    Admit Date:  3/3/2021    Reason for Admission:  Acute UTI [N39.0]  Generalized weakness [R53.1]    Assessment & Plan     Generalized weakness  MRI by telemetry neurology. No acute finding concerning for exacerbation of MS at this present time. Still with some weakness but improving.  -Continue with physical therapy. Discharge Disposition  -Pending Medicaid application for rehab placement    B12 deficiency: Continue vitamin B12 supplement    Multiple chronic wounds  -Continue with wound care  -continue with Wasserman to avoid saturating wound care    Chronic Pain 2/2 Chronic wounds   -  Started Oxycontin and with PRN oxycodone for breakthrough , wound care ordered, start PO Zinc sulfate , ascorbic acid and multivitamin        MS: continue with home meds  Anxiety:continue with home meds  Severe PCM: continue with nutritional supplements  GERD: Pepcid  Hx of DVT:  Eliquis  Sepsis 2/2 UTI(resolved)       Diet:  DIET REGULAR  DIET NUTRITIONAL SUPPLEMENTS  DIET NUTRITIONAL SUPPLEMENTS  DVT PPx: eliquis  GI Ppx: pepcid  Code: DNR    Dispo / Discharge Planning: pending medicaid for rehab placement      Hospital Course/Subjective:     Please refer to the admission H&P for details of presentation. In summary, Chago Escudero is a 62 y.o. female with medical history significant for MS, multiple chronic wounds who presented to the ED on 3/3 d/t back pain and concern for UTI. She lives by herself, mostly is bedridden, uses a wheelchair at times and stated that she had a wasserman placed months ago due to multiple sacral ulcers. She was found to have UTI and was started on antibiotics. During her stay, patient complained of lower extremity weakness. MRI of brain, cervical and thoracic spine was ordered due to concern for MS flare. MRI shows no acute findings.   B12 was noted to be low and patient was started on B12 supplementation. Patient is now back to her baseline. Waiting for Medicaid approval for rehab placement. Mathew was reinserted due to open wounds being soaked with urine. Subjective/24 hr Events (03/27/21) :  C/O headache today       Review of Systems: 14 point review of systems is otherwise negative with the exception of the elements mentioned above. Objective:     Patient Vitals for the past 24 hrs:   Temp Pulse Resp BP SpO2   03/27/21 1108 98.2 °F (36.8 °C) 83 18 129/81 97 %   03/27/21 0750 97.5 °F (36.4 °C) 81 18 102/69 95 %   03/27/21 0300 98.2 °F (36.8 °C) 90 20 103/77 99 %   03/26/21 2328 98.2 °F (36.8 °C) 100 16 107/74 96 %   03/26/21 1944 98.1 °F (36.7 °C) 95 16 109/71 98 %   03/26/21 1555 98 °F (36.7 °C) 76 18 137/78 97 %     Oxygen Therapy  O2 Sat (%): 97 % (03/27/21 1108)  Pulse via Oximetry: 103 beats per minute (03/03/21 1630)  O2 Device: Room air (03/27/21 0750)    Body mass index is 13.17 kg/m². Physical Exam:   General:     alert, awake, no acute distress. Thin and cachetic '  Head:   normocephalic, atraumatic  Eyes, Ears, nose: PERRL, EOMI. Normal conjunctiva  Neck:    supple, non-tender. Trachea midline. Lungs:   CTAB, no wheezing, rhonchi, rales  Cardiac:   RRR, Normal S1 and S2. Abdomen:   Soft, non distended, nontender, +BS  Extremities:   Warm, dry. No edema   Skin:   No rashes, no jaundice  Neuro:  AAOx3. No gross focal neurological deficit  Psychiatric:  No anxiety, calm, cooperative    Data Review:  I have reviewed all labs, meds, and studies from the last 24 hours:    Labs:    No results found for this or any previous visit (from the past 24 hour(s)).     All Micro Results     Procedure Component Value Units Date/Time    BLOOD CULTURE [[id number]] Collected: 03/03/21 1322    Order Status: Completed Specimen: Blood Updated: 03/08/21 0745     Special Requests: --        RIGHT  Antecubital       Culture result: NO GROWTH 5 DAYS       BLOOD CULTURE [[id number]] Collected: 03/03/21 1201    Order Status: Completed Specimen: Blood Updated: 03/08/21 0745     Special Requests: LEFT FOREARM        Culture result: NO GROWTH 5 DAYS       CULTURE, URINE [[id number]]  (Abnormal)  (Susceptibility) Collected: 03/03/21 1349    Order Status: Completed Specimen: Urine from Clean catch Updated: 03/06/21 0817     Special Requests: NO SPECIAL REQUESTS        Culture result:       >100,000 COLONIES/mL PROTEUS MIRABILIS                  50,000-100,000 COLONIES/mL MIXED SKIN ANDREW ISOLATED                EKG Results     Procedure 720 Value Units Date/Time    EKG, 12 LEAD, INITIAL [[id number]]     Order Status: Completed           Other Studies:  Us Retroperitoneum Comp    Result Date: 3/4/2021  EXAM: Ultrasound of the kidneys. INDICATION: Urinary tract infection. COMPARISON: None. TECHNIQUE: A standard protocol kidney ultrasound was performed. FINDINGS: Both kidneys have a normal ultrasound appearance, measuring 10.5 cm on the right and 10.4 cm on the left. No shadowing kidney stone, hydronephrosis or perinephric fluid is seen. The urinary bladder is decompressed around a Mathew catheter. Normal-appearing kidneys.           Current Meds:   Current Facility-Administered Medications   Medication Dose Route Frequency    ascorbic acid (vitamin C) (VITAMIN C) tablet 250 mg  250 mg Oral DAILY    zinc sulfate tablet 220 mg  220 mg Oral DAILY    metroNIDAZOLE (FLAGYL) 1,000 mg, miconazole (MICOTIN) 2 % 30 g topical cream   Topical DAILY    morphine CR (MS CONTIN) tablet 15 mg  15 mg Oral Q12H    baclofen (LIORESAL) tablet 20 mg  20 mg Oral Q8H    cyanocobalamin tablet 1,000 mcg  1,000 mcg Oral DAILY    fingolimod cap 0.5 mg (Patient Supplied)  0.5 mg Oral DAILY    carboxymethylcellulose sodium (REFRESH LIQUIGEL) 1 % ophthalmic solution 2 Drop  2 Drop Both Eyes PRN    amantadine HCL (SYMMETREL) capsule 100 mg  100 mg Oral BID    apixaban (ELIQUIS) tablet 5 mg  5 mg Oral BID    aspirin delayed-release tablet 81 mg  81 mg Oral DAILY    cholecalciferol (VITAMIN D3) (1000 Units /25 mcg) tablet 1,000 Units  1,000 Units Oral DAILY    oxyCODONE IR (ROXICODONE) tablet 10 mg  10 mg Oral Q6H PRN    polyethylene glycol (MIRALAX) packet 17 g  17 g Oral DAILY    pregabalin (LYRICA) capsule 50 mg  50 mg Oral DAILY    Saccharomyces boulardii (FLORASTOR) capsule 250 mg  250 mg Oral BID    senna (SENOKOT) tablet 17.2 mg  2 Tab Oral BID    traMADoL (ULTRAM) tablet 50 mg  50 mg Oral Q6H PRN    sodium chloride (NS) flush 5-40 mL  5-40 mL IntraVENous Q8H    sodium chloride (NS) flush 5-40 mL  5-40 mL IntraVENous PRN    acetaminophen (TYLENOL) tablet 650 mg  650 mg Oral Q6H PRN    Or    acetaminophen (TYLENOL) suppository 650 mg  650 mg Rectal Q6H PRN    polyethylene glycol (MIRALAX) packet 17 g  17 g Oral DAILY PRN    promethazine (PHENERGAN) tablet 12.5 mg  12.5 mg Oral Q6H PRN    Or    ondansetron (ZOFRAN) injection 4 mg  4 mg IntraVENous Q6H PRN    famotidine (PEPCID) tablet 20 mg  20 mg Oral BID    alcohol 62% (NOZIN) nasal  1 Ampule  1 Ampule Topical Q12H       Problem List:  Hospital Problems as of 3/27/2021 Date Reviewed: 6/29/2020          Codes Class Noted - Resolved POA    Generalized weakness ICD-10-CM: R53.1  ICD-9-CM: 780.79  3/3/2021 - Present Yes        * (Principal) Acute UTI ICD-10-CM: N39.0  ICD-9-CM: 599.0  3/3/2021 - Present Yes        History of DVT of lower extremity ICD-10-CM: W29.634  ICD-9-CM: V12.51  3/3/2021 - Present Yes        Depression ICD-10-CM: F32.9  ICD-9-CM: 891  9/10/2020 - Present Yes        Essential hypertension ICD-10-CM: I10  ICD-9-CM: 401.9  9/10/2020 - Present Yes        Multiple wounds ICD-10-CM: T07. Berry Bojorquez  ICD-9-CM: 879.8  9/10/2020 - Present Yes        Anxiety disorder ICD-10-CM: F41.9  ICD-9-CM: 300.00  8/8/2020 - Present Yes        Gastroesophageal reflux disease without esophagitis ICD-10-CM: K21.9  ICD-9-CM: 530.81  8/8/2020 - Present Yes        Sepsis ([facility] ICD-10-CM: A41.9  ICD-9-CM: 038.9, 995.91  7/23/2020 - Present Yes        Pressure injury of sacral region, stage 4 (HCC) ICD-10-CM: O40.900  ICD-9-CM: 707.03, 707.24  5/13/2020 - Present Yes        Multiple sclerosis ([facility] ICD-10-CM: G35  ICD-9-CM: 015  5/13/2020 - Present Yes        Severe protein-calorie malnutrition ([facility] ICD-10-CM: D53  ICD-9-CM: 262  5/13/2020 - Present Unknown               Part of this note was written by using a voice dictation software and the note has been proof read but may still contain some grammatical/other typographical errors.     Signed By: Jori Yoder MD   [employer] [facility]    March 27, 2021  11:44 AM

## 2021-03-27 NOTE — PROGRESS NOTES
END OF SHIFT NOTE:    Intake/Output  03/26 1901 - 03/27 0700  In: -   Out: 450 [Urine:450]   Voiding: YES  Catheter: YES  Drain:              Stool:  0 occurrences. Stool Assessment  Stool Color: Brown (03/13/21 1552)  Stool Appearance: Formed(per patient) (03/19/21 1928)  Stool Amount: Medium (03/13/21 1552)  Stool Source/Status: Rectum (03/13/21 1552)    Emesis:  0 occurrences. VITAL SIGNS  Patient Vitals for the past 12 hrs:   Temp Pulse Resp BP SpO2   03/27/21 0300 98.2 °F (36.8 °C) 90 20 103/77 99 %   03/26/21 2328 98.2 °F (36.8 °C) 100 16 107/74 96 %   03/26/21 1944 98.1 °F (36.7 °C) 95 16 109/71 98 %       Pain Assessment  Pain 1  Pain Scale 1: Numeric (0 - 10) (03/26/21 2004)  Pain Intensity 1: 4 (03/26/21 2004)  Patient Stated Pain Goal: 3 (03/26/21 2004)  Pain Reassessment 1: Patient resting w/respiratory rate greater than 10 (03/26/21 2004)  Pain Onset 1: ongoing (03/24/21 1956)  Pain Location 1: Back;Buttocks (03/26/21 1934)  Pain Orientation 1: Lower (03/26/21 1934)  Pain Description 1: Oswaldo Cate; Stabbing (03/26/21 1934)  Pain Intervention(s) 1: Repositioned (03/26/21 1934)    Ambulating  No    Additional Information: Patient has slept well overnight. Awaiting for PT/OT evaluation for home health. Will continue to monitor. Shift report given to oncoming nurse at the bedside.     Loraine Joshi RN

## 2021-03-27 NOTE — PROGRESS NOTES
PT recommendations for patient discharge :    Miss Alicia Laura is not safe to return home without 24/7 care & not to be left alone. This pt is full care other than she can feed herself. She will require special equipment to lift her out of bed if needed, an air mattress to decrease pressure & encourage wound healing along with frequent visits from a wound care nurse to monitor healing & progression of care. This pt is not a candidate for follow up PT at this time due to severe decubitus ulcers present.  Gunner Hall, PT, [facility]

## 2021-03-27 NOTE — PROGRESS NOTES
OT recommendations for patient discharge :     Miss Melvin Santos is familiar to this OT and this therapy department. She is not safe to return home without significant care. She is total assistance for all mobility and self care, she can feed herself after being set-up. This pt would also benefit from special equipment and wound care.      Sadaf Hughes, OT

## 2021-03-28 LAB
APPEARANCE UR: ABNORMAL
BACTERIA URNS QL MICRO: ABNORMAL /HPF
BILIRUB UR QL: ABNORMAL
CASTS URNS QL MICRO: ABNORMAL /LPF
COLOR UR: ABNORMAL
EPI CELLS #/AREA URNS HPF: ABNORMAL /HPF
GLUCOSE UR STRIP.AUTO-MCNC: NEGATIVE MG/DL
HGB UR QL STRIP: ABNORMAL
KETONES UR QL STRIP.AUTO: 40 MG/DL
LEUKOCYTE ESTERASE UR QL STRIP.AUTO: ABNORMAL
NITRITE UR QL STRIP.AUTO: POSITIVE
PH UR STRIP: 6 [PH] (ref 5–9)
PROT UR STRIP-MCNC: 100 MG/DL
RBC #/AREA URNS HPF: >100 /HPF
SP GR UR REFRACTOMETRY: 1.02 (ref 1–1.02)
UROBILINOGEN UR QL STRIP.AUTO: 1 EU/DL (ref 0.2–1)
WBC URNS QL MICRO: >100 /HPF

## 2021-03-28 PROCEDURE — 74011250637 HC RX REV CODE- 250/637: Performed by: FAMILY MEDICINE

## 2021-03-28 PROCEDURE — 77030040393 HC DRSG OPTIFOAM GENT MDII -B

## 2021-03-28 PROCEDURE — 87086 URINE CULTURE/COLONY COUNT: CPT

## 2021-03-28 PROCEDURE — 65270000029 HC RM PRIVATE

## 2021-03-28 PROCEDURE — 74011250637 HC RX REV CODE- 250/637: Performed by: INTERNAL MEDICINE

## 2021-03-28 PROCEDURE — 87088 URINE BACTERIA CULTURE: CPT

## 2021-03-28 PROCEDURE — 87186 SC STD MICRODIL/AGAR DIL: CPT

## 2021-03-28 PROCEDURE — 74011250637 HC RX REV CODE- 250/637: Performed by: HOSPITALIST

## 2021-03-28 PROCEDURE — 2709999900 HC NON-CHARGEABLE SUPPLY

## 2021-03-28 PROCEDURE — 81001 URINALYSIS AUTO W/SCOPE: CPT

## 2021-03-28 RX ADMIN — BACLOFEN 20 MG: 10 TABLET ORAL at 05:18

## 2021-03-28 RX ADMIN — Medication 81 MG: at 10:00

## 2021-03-28 RX ADMIN — SENNOSIDES 17.2 MG: 8.6 TABLET, FILM COATED ORAL at 18:01

## 2021-03-28 RX ADMIN — VITAMIN D, TAB 1000IU (100/BT) 1000 UNITS: 25 TAB at 09:59

## 2021-03-28 RX ADMIN — Medication 220 MG: at 10:00

## 2021-03-28 RX ADMIN — MORPHINE SULFATE 15 MG: 15 TABLET, FILM COATED, EXTENDED RELEASE ORAL at 10:00

## 2021-03-28 RX ADMIN — SENNOSIDES 17.2 MG: 8.6 TABLET, FILM COATED ORAL at 09:59

## 2021-03-28 RX ADMIN — Medication 1 AMPULE: at 10:00

## 2021-03-28 RX ADMIN — PREGABALIN 50 MG: 50 CAPSULE ORAL at 09:59

## 2021-03-28 RX ADMIN — APIXABAN 5 MG: 5 TABLET, FILM COATED ORAL at 10:01

## 2021-03-28 RX ADMIN — BACLOFEN 20 MG: 10 TABLET ORAL at 21:10

## 2021-03-28 RX ADMIN — CYANOCOBALAMIN TAB 1000 MCG 1000 MCG: 1000 TAB at 10:01

## 2021-03-28 RX ADMIN — MICONAZOLE NITRATE: 20 CREAM TOPICAL at 09:00

## 2021-03-28 RX ADMIN — FAMOTIDINE 20 MG: 20 TABLET, FILM COATED ORAL at 10:00

## 2021-03-28 RX ADMIN — MORPHINE SULFATE 15 MG: 15 TABLET, FILM COATED, EXTENDED RELEASE ORAL at 21:11

## 2021-03-28 RX ADMIN — Medication 1 AMPULE: at 21:09

## 2021-03-28 RX ADMIN — AMANTADINE HYDROCHLORIDE 100 MG: 100 CAPSULE ORAL at 10:00

## 2021-03-28 RX ADMIN — Medication 250 MG: at 10:00

## 2021-03-28 RX ADMIN — RDII 250 MG CAPSULE 250 MG: at 10:00

## 2021-03-28 RX ADMIN — BACLOFEN 20 MG: 10 TABLET ORAL at 14:47

## 2021-03-28 RX ADMIN — AMANTADINE HYDROCHLORIDE 100 MG: 100 CAPSULE ORAL at 21:11

## 2021-03-28 RX ADMIN — APIXABAN 5 MG: 5 TABLET, FILM COATED ORAL at 21:10

## 2021-03-28 RX ADMIN — RDII 250 MG CAPSULE 250 MG: at 18:01

## 2021-03-28 RX ADMIN — FAMOTIDINE 20 MG: 20 TABLET, FILM COATED ORAL at 18:01

## 2021-03-28 RX ADMIN — POLYETHYLENE GLYCOL 3350 17 G: 17 POWDER, FOR SOLUTION ORAL at 09:59

## 2021-03-28 NOTE — PROGRESS NOTES
[facility] Progress Note     Name: Mallory Lane   Age: 62 y.o. Sex: female  : 1960    MRN:     [id number]    Admit Date:  3/3/2021    Reason for Admission:  Acute UTI [N39.0]  Generalized weakness [R53.1]    Assessment & Plan       Discharge Disposition  - Pending Medicaid application for rehab placement    Acute Metabolic Encephalopathy  -  Hospital induced delirium and mind altering medications such as opioids, baclofen , recheck UA today    Chronic Pain 2/2 Chronic wounds   - -- Started Oxycontin and with PRN oxycodone for breakthrough , wound care ordered, start PO Zinc sulfate , ascorbic acid and multivitamin appears to be an effective regiment        Generalized weakness  MRI by telemetry neurology. No acute finding concerning for exacerbation of MS at this present time. Still with some weakness but improving.  -Continue with physical therapy. B12 deficiency: Continue vitamin B12 supplement    Multiple chronic wounds  -Continue with wound care  -continue with Wasserman to avoid saturating wound care    MS: continue with home meds  Anxiety:continue with home meds  Severe PCM: continue with nutritional supplements  GERD: Pepcid  Hx of DVT:  Eliquis  Sepsis 2/2 UTI(resolved)       Diet:  DIET REGULAR  DIET NUTRITIONAL SUPPLEMENTS  DIET NUTRITIONAL SUPPLEMENTS  DVT PPx: eliquis  GI Ppx: pepcid  Code: DNR          Hospital Course/Subjective:     Please refer to the admission H&P for details of presentation. In summary, Mallory Lane is a 62 y.o. female with medical history significant for MS, multiple chronic wounds who presented to the ED on 3/3 d/t back pain and concern for UTI. She lives by herself, mostly is bedridden, uses a wheelchair at times and stated that she had a wasserman placed months ago due to multiple sacral ulcers. She was found to have UTI and was started on antibiotics. During her stay, patient complained of lower extremity weakness.   MRI of brain, cervical and thoracic spine was ordered due to concern for MS flare. MRI shows no acute findings. B12 was noted to be low and patient was started on B12 supplementation. Patient is now back to her baseline. Waiting for Medicaid approval for rehab placement. Mathew was reinserted due to open wounds being soaked with urine. Subjective/24 hr Events (03/28/21) : Some delirium noted today       Review of Systems: 14 point review of systems is otherwise negative with the exception of the elements mentioned above. Objective:     Patient Vitals for the past 24 hrs:   Temp Pulse Resp BP SpO2   03/28/21 0740 98 °F (36.7 °C) 86 18 128/74 100 %   03/28/21 0337 98.4 °F (36.9 °C) 82 18 113/77 100 %   03/27/21 2338 97.6 °F (36.4 °C) 78 12 102/62 94 %   03/27/21 2011 97.3 °F (36.3 °C) 73 14 124/72 99 %   03/27/21 1537 98.4 °F (36.9 °C) 79 18 119/76 99 %   03/27/21 1108 98.2 °F (36.8 °C) 83 18 129/81 97 %     Oxygen Therapy  O2 Sat (%): 100 % (03/28/21 0740)  Pulse via Oximetry: 103 beats per minute (03/03/21 1630)  O2 Device: Room air (03/28/21 0337)    Body mass index is 13.12 kg/m². Physical Exam:   General:     alert, awake, no acute distress. Thin and cachetic '  Head:   normocephalic, atraumatic  Eyes, Ears, nose: PERRL, EOMI. Normal conjunctiva  Neck:    supple, non-tender. Trachea midline. Lungs:   CTAB, no wheezing, rhonchi, rales  Cardiac:   RRR, Normal S1 and S2. Abdomen:   Soft, non distended, nontender, +BS  Extremities:   Warm, dry. No edema   Skin:   No rashes, no jaundice  Neuro:  AAOx3. No gross focal neurological deficit  Psychiatric:  No anxiety, calm, cooperative    Data Review:  I have reviewed all labs, meds, and studies from the last 24 hours:    Labs:    No results found for this or any previous visit (from the past 24 hour(s)).     All Micro Results     Procedure Component Value Units Date/Time    BLOOD CULTURE [[id number]] Collected: 03/03/21 1322    Order Status: Completed Specimen: Blood Updated: 03/08/21 0745     Special Requests: --        RIGHT  Antecubital       Culture result: NO GROWTH 5 DAYS       BLOOD CULTURE [[id number]] Collected: 03/03/21 1201    Order Status: Completed Specimen: Blood Updated: 03/08/21 0745     Special Requests: LEFT FOREARM        Culture result: NO GROWTH 5 DAYS       CULTURE, URINE [[id number]]  (Abnormal)  (Susceptibility) Collected: 03/03/21 1349    Order Status: Completed Specimen: Urine from Clean catch Updated: 03/06/21 0817     Special Requests: NO SPECIAL REQUESTS        Culture result:       >100,000 COLONIES/mL PROTEUS MIRABILIS                  50,000-100,000 COLONIES/mL MIXED SKIN ANDREW ISOLATED                EKG Results     Procedure 720 Value Units Date/Time    EKG, 12 LEAD, INITIAL [[id number]]     Order Status: Completed           Other Studies:  Us Retroperitoneum Comp    Result Date: 3/4/2021  EXAM: Ultrasound of the kidneys. INDICATION: Urinary tract infection. COMPARISON: None. TECHNIQUE: A standard protocol kidney ultrasound was performed. FINDINGS: Both kidneys have a normal ultrasound appearance, measuring 10.5 cm on the right and 10.4 cm on the left. No shadowing kidney stone, hydronephrosis or perinephric fluid is seen. The urinary bladder is decompressed around a Mathew catheter. Normal-appearing kidneys.           Current Meds:   Current Facility-Administered Medications   Medication Dose Route Frequency    ketorolac (TORADOL) injection 15 mg  15 mg IntraVENous Q6H PRN    ascorbic acid (vitamin C) (VITAMIN C) tablet 250 mg  250 mg Oral DAILY    zinc sulfate tablet 220 mg  220 mg Oral DAILY    metroNIDAZOLE (FLAGYL) 1,000 mg, miconazole (MICOTIN) 2 % 30 g topical cream   Topical DAILY    morphine CR (MS CONTIN) tablet 15 mg  15 mg Oral Q12H    baclofen (LIORESAL) tablet 20 mg  20 mg Oral Q8H    cyanocobalamin tablet 1,000 mcg  1,000 mcg Oral DAILY    fingolimod cap 0.5 mg (Patient Supplied)  0.5 mg Oral DAILY    carboxymethylcellulose sodium (REFRESH LIQUIGEL) 1 % ophthalmic solution 2 Drop  2 Drop Both Eyes PRN    amantadine HCL (SYMMETREL) capsule 100 mg  100 mg Oral BID    apixaban (ELIQUIS) tablet 5 mg  5 mg Oral BID    aspirin delayed-release tablet 81 mg  81 mg Oral DAILY    cholecalciferol (VITAMIN D3) (1000 Units /25 mcg) tablet 1,000 Units  1,000 Units Oral DAILY    oxyCODONE IR (ROXICODONE) tablet 10 mg  10 mg Oral Q6H PRN    polyethylene glycol (MIRALAX) packet 17 g  17 g Oral DAILY    pregabalin (LYRICA) capsule 50 mg  50 mg Oral DAILY    Saccharomyces boulardii (FLORASTOR) capsule 250 mg  250 mg Oral BID    senna (SENOKOT) tablet 17.2 mg  2 Tab Oral BID    traMADoL (ULTRAM) tablet 50 mg  50 mg Oral Q6H PRN    sodium chloride (NS) flush 5-40 mL  5-40 mL IntraVENous Q8H    sodium chloride (NS) flush 5-40 mL  5-40 mL IntraVENous PRN    acetaminophen (TYLENOL) tablet 650 mg  650 mg Oral Q6H PRN    Or    acetaminophen (TYLENOL) suppository 650 mg  650 mg Rectal Q6H PRN    polyethylene glycol (MIRALAX) packet 17 g  17 g Oral DAILY PRN    promethazine (PHENERGAN) tablet 12.5 mg  12.5 mg Oral Q6H PRN    Or    ondansetron (ZOFRAN) injection 4 mg  4 mg IntraVENous Q6H PRN    famotidine (PEPCID) tablet 20 mg  20 mg Oral BID    alcohol 62% (NOZIN) nasal  1 Ampule  1 Ampule Topical Q12H       Problem List:  Hospital Problems as of 3/28/2021 Date Reviewed: 6/29/2020          Codes Class Noted - Resolved POA    Generalized weakness ICD-10-CM: R53.1  ICD-9-CM: 780.79  3/3/2021 - Present Yes        * (Principal) Acute UTI ICD-10-CM: N39.0  ICD-9-CM: 599.0  3/3/2021 - Present Yes        History of DVT of lower extremity ICD-10-CM: D27.111  ICD-9-CM: V12.51  3/3/2021 - Present Yes        Depression ICD-10-CM: F32.9  ICD-9-CM: 391  9/10/2020 - Present Yes        Essential hypertension ICD-10-CM: I10  ICD-9-CM: 401.9  9/10/2020 - Present Yes        Multiple wounds ICD-10-CM: T07. Suzanne Leak  ICD-9-CM: 879.8 9/10/2020 - Present Yes        Anxiety disorder ICD-10-CM: F41.9  ICD-9-CM: 300.00  8/8/2020 - Present Yes        Gastroesophageal reflux disease without esophagitis ICD-10-CM: K21.9  ICD-9-CM: 530.81  8/8/2020 - Present Yes        Sepsis ([facility] ICD-10-CM: A41.9  ICD-9-CM: 038.9, 995.91  7/23/2020 - Present Yes        Pressure injury of sacral region, stage 4 (HCC) ICD-10-CM: G73.332  ICD-9-CM: 707.03, 707.24  5/13/2020 - Present Yes        Multiple sclerosis ([facility] ICD-10-CM: G35  ICD-9-CM: 692  5/13/2020 - Present Yes        Severe protein-calorie malnutrition ([facility] ICD-10-CM: E43  ICD-9-CM: 869  5/13/2020 - Present Unknown               Part of this note was written by using a voice dictation software and the note has been proof read but may still contain some grammatical/other typographical errors.     Signed By: Hazel Vallecillo MD   [facility]    March 28, 2021  11:44 AM

## 2021-03-29 PROCEDURE — 74011250637 HC RX REV CODE- 250/637: Performed by: INTERNAL MEDICINE

## 2021-03-29 PROCEDURE — 74011250637 HC RX REV CODE- 250/637: Performed by: FAMILY MEDICINE

## 2021-03-29 PROCEDURE — 65270000029 HC RM PRIVATE

## 2021-03-29 PROCEDURE — 77030040393 HC DRSG OPTIFOAM GENT MDII -B

## 2021-03-29 PROCEDURE — 74011250637 HC RX REV CODE- 250/637: Performed by: HOSPITALIST

## 2021-03-29 RX ADMIN — Medication 81 MG: at 08:13

## 2021-03-29 RX ADMIN — BACLOFEN 20 MG: 10 TABLET ORAL at 20:01

## 2021-03-29 RX ADMIN — APIXABAN 5 MG: 5 TABLET, FILM COATED ORAL at 08:13

## 2021-03-29 RX ADMIN — FAMOTIDINE 20 MG: 20 TABLET, FILM COATED ORAL at 17:02

## 2021-03-29 RX ADMIN — AMANTADINE HYDROCHLORIDE 100 MG: 100 CAPSULE ORAL at 08:13

## 2021-03-29 RX ADMIN — Medication 10 ML: at 20:07

## 2021-03-29 RX ADMIN — POLYETHYLENE GLYCOL 3350 17 G: 17 POWDER, FOR SOLUTION ORAL at 08:14

## 2021-03-29 RX ADMIN — BACLOFEN 20 MG: 10 TABLET ORAL at 14:32

## 2021-03-29 RX ADMIN — MICONAZOLE NITRATE: 20 CREAM TOPICAL at 08:16

## 2021-03-29 RX ADMIN — MORPHINE SULFATE 15 MG: 15 TABLET, FILM COATED, EXTENDED RELEASE ORAL at 20:01

## 2021-03-29 RX ADMIN — PREGABALIN 50 MG: 50 CAPSULE ORAL at 08:14

## 2021-03-29 RX ADMIN — RDII 250 MG CAPSULE 250 MG: at 08:13

## 2021-03-29 RX ADMIN — SENNOSIDES 17.2 MG: 8.6 TABLET, FILM COATED ORAL at 17:02

## 2021-03-29 RX ADMIN — SENNOSIDES 17.2 MG: 8.6 TABLET, FILM COATED ORAL at 08:14

## 2021-03-29 RX ADMIN — Medication 250 MG: at 08:14

## 2021-03-29 RX ADMIN — AMANTADINE HYDROCHLORIDE 100 MG: 100 CAPSULE ORAL at 20:01

## 2021-03-29 RX ADMIN — CYANOCOBALAMIN TAB 1000 MCG 1000 MCG: 1000 TAB at 08:14

## 2021-03-29 RX ADMIN — OXYCODONE 10 MG: 5 TABLET ORAL at 00:20

## 2021-03-29 RX ADMIN — VITAMIN D, TAB 1000IU (100/BT) 1000 UNITS: 25 TAB at 08:13

## 2021-03-29 RX ADMIN — Medication 1 AMPULE: at 20:00

## 2021-03-29 RX ADMIN — APIXABAN 5 MG: 5 TABLET, FILM COATED ORAL at 20:01

## 2021-03-29 RX ADMIN — BACLOFEN 20 MG: 10 TABLET ORAL at 05:12

## 2021-03-29 RX ADMIN — MORPHINE SULFATE 15 MG: 15 TABLET, FILM COATED, EXTENDED RELEASE ORAL at 08:13

## 2021-03-29 RX ADMIN — Medication 1 AMPULE: at 08:14

## 2021-03-29 RX ADMIN — FAMOTIDINE 20 MG: 20 TABLET, FILM COATED ORAL at 08:13

## 2021-03-29 RX ADMIN — Medication 220 MG: at 08:14

## 2021-03-29 RX ADMIN — RDII 250 MG CAPSULE 250 MG: at 17:02

## 2021-03-29 NOTE — PROGRESS NOTES
1300:  Phone call to Karma cline/DONTRELL ([phone], [phone]). Andrzej Mcduffie was informed about patient's obstacles with discharge. Andrzej Mcduffie states that she's familiar with \"Medicaid [employer]\" account. Although DSS doesn't provide support with getting this account established, Andrzej Mcduffie will e-mail this  a copy of appropriate form that needs to be completed by patient's sister. 1400:  SW met with patient to discuss discharge plans. Explored options, including long-term placement at SNF or home with additional support. Patient confirms that she does not want to go to SNF for long-term placement nor does she want to pursue a [facility].  She states that she has all needed DME at home (scooter, wheelchair, bedside commode, handicapped accessible throughout the home). Additionally, her ex- Valentín Dawkins) may be able to stay with her for several days. Discussed needing additional private duty nurses. Per patient, \"I can afford that for a little bit. \"  List of private duty nursing agencies provided. Patient is aware that she's responsible for contacting and arranging private duty nurses for home. Patient denied the need for assistance with transportation home. She states that a friend will provide transportation. Patient in agreement to discharge tomorrow (MD also on board). Orders faxed to [address] [place] [address] to resume services (PT, OT, RN, aide). SW will follow-up with patient tomorrow.       ARNULFO Hall  [facility]   [phone]

## 2021-03-29 NOTE — PROGRESS NOTES
1815-END OF SHIFT NOTE:    -pt rested well throughout the shift  -turning preiodically  -wounds changed per order   -possible d/c tomorrow   -vss, no needs voiced at this time    Intake/Output  03/29 0701 - 03/29 1900  In: 740 [P.O.:740]  Out: 225 [Urine:225]   Voiding: YES  Catheter: YES  Drain:          Stool:  0 occurrences. Stool Assessment  Stool Color: Brown (03/13/21 1552)  Stool Appearance: Formed(per patient) (03/19/21 1928)  Stool Amount: Medium (03/13/21 1552)  Stool Source/Status: Rectum (03/13/21 1552)    Emesis:  0 occurrences. VITAL SIGNS  Patient Vitals for the past 12 hrs:   Temp Pulse Resp BP SpO2   03/29/21 1456 97.6 °F (36.4 °C) 80 18 103/67 100 %   03/29/21 1123 97.5 °F (36.4 °C) 84 18 108/76 98 %   03/29/21 0839 98.3 °F (36.8 °C) 90 18 102/69 96 %       Pain Assessment  Pain 1  Pain Scale 1: Numeric (0 - 10) (03/29/21 0810)  Pain Intensity 1: 0 (03/29/21 0810)  Patient Stated Pain Goal: 0 (03/29/21 0810)  Pain Reassessment 1: Patient resting w/respiratory rate greater than 10 (03/28/21 0945)  Pain Onset 1: chronic (03/28/21 0900)  Pain Location 1: Back;Buttocks (03/28/21 0900)  Pain Orientation 1: Mid;Lower (03/28/21 0900)  Pain Description 1: Aching;Stabbing (03/28/21 0900)  Pain Intervention(s) 1: Medication (see MAR) (03/28/21 0900)    Ambulating  No    Additional Information:     Shift report given to oncoming nurse at the bedside.     Rosales Nava RN

## 2021-03-29 NOTE — PROGRESS NOTES
[facility] Progress Note     Name: Stephanie Saucedo   Age: 62 y.o. Sex: female  : 1960    MRN:     [id number]    Admit Date:  3/3/2021    Reason for Admission:  Acute UTI [N39.0]  Generalized weakness [R53.1]    Assessment & Plan       Discharge Disposition  - She is now agreeable to be discharged to home because there has been issues regarding her used up medicare benefits and she also has too much income via social security disability. This is planned for 2021     Acute Metabolic Encephalopathy  -  Hospital induced delirium and mind altering medications such as opioids, baclofen , recheck UA today  -  Resolved, UA and reflex urine culture no growth so far, hold off     Chronic Pain 2/2 Chronic wounds   - 18-20-37-- Started Oxycontin and with PRN oxycodone for breakthrough , wound care ordered, start PO Zinc sulfate , ascorbic acid and multivitamin appears to be an effective regiment        Generalized weakness  MRI by telemetry neurology. No acute finding concerning for exacerbation of MS at this present time. Still with some weakness but improving.  -Continue with physical therapy. B12 deficiency: Continue vitamin B12 supplement    Multiple chronic wounds  -Continue with wound care  -continue with Mathew to avoid saturating wound care    MS: continue with home meds  Anxiety:continue with home meds  Severe PCM: continue with nutritional supplements  GERD: Pepcid  Hx of DVT:  Eliquis  Sepsis 2/2 UTI(resolved)       Diet:  DIET REGULAR  DIET NUTRITIONAL SUPPLEMENTS  DIET NUTRITIONAL SUPPLEMENTS  DVT PPx: eliquis  GI Ppx: pepcid  Code: DNR          Hospital Course/Subjective:     Please refer to the admission H&P for details of presentation. In summary, Stephanie Saucedo is a 62 y.o. female with medical history significant for MS, multiple chronic wounds who presented to the ED on 3/3 d/t back pain and concern for UTI.   She lives by herself, mostly is bedridden, uses a wheelchair at times and stated that she had a wasserman placed months ago due to multiple sacral ulcers. She was found to have UTI and was started on antibiotics. During her stay, patient complained of lower extremity weakness. MRI of brain, cervical and thoracic spine was ordered due to concern for MS flare. MRI shows no acute findings. B12 was noted to be low and patient was started on B12 supplementation. Patient is now back to her baseline. Waiting for Medicaid approval for rehab placement. Wasserman was reinserted due to open wounds being soaked with urine. Subjective/24 hr Events (03/29/21) : Some delirium noted today       Review of Systems: 14 point review of systems is otherwise negative with the exception of the elements mentioned above. Objective:     Patient Vitals for the past 24 hrs:   Temp Pulse Resp BP SpO2   03/29/21 1123 97.5 °F (36.4 °C) 84 18 108/76 98 %   03/29/21 0839 98.3 °F (36.8 °C) 90 18 102/69 96 %   03/29/21 0400 97.1 °F (36.2 °C) 69 17 (!) 103/59 97 %   03/28/21 2344 97.5 °F (36.4 °C) 88 15 111/75 97 %   03/28/21 1923 98.7 °F (37.1 °C) 98 18 122/80 94 %   03/28/21 1531 98.2 °F (36.8 °C) 89 18 108/75 97 %     Oxygen Therapy  O2 Sat (%): 98 % (03/29/21 1123)  Pulse via Oximetry: 103 beats per minute (03/03/21 1630)  O2 Device: Room air (03/29/21 0839)    Body mass index is 13.12 kg/m². Physical Exam:   General:     alert, awake, no acute distress. Thin and cachetic '  Head:   normocephalic, atraumatic  Eyes, Ears, nose: PERRL, EOMI. Normal conjunctiva  Neck:    supple, non-tender. Trachea midline. Lungs:   CTAB, no wheezing, rhonchi, rales  Cardiac:   RRR, Normal S1 and S2. Abdomen:   Soft, non distended, nontender, +BS  Extremities:   Warm, dry. No edema   Skin:   No rashes, no jaundice  Neuro:  AAOx3.  No gross focal neurological deficit  Psychiatric:  No anxiety, calm, cooperative    Data Review:  I have reviewed all labs, meds, and studies from the last 24 hours:    Labs:    No results found for this or any previous visit (from the past 24 hour(s)). All Micro Results     Procedure Component Value Units Date/Time    CULTURE, URINE [[id number]] Collected: 03/28/21 1047    Order Status: Completed Specimen: Cath Urine Updated: 03/29/21 0919     Special Requests: NO SPECIAL REQUESTS        Culture result:       NO GROWTH AFTER SHORT PERIOD OF INCUBATION. FURTHER RESULTS TO FOLLOW AFTER OVERNIGHT INCUBATION. BLOOD CULTURE [[id number]] Collected: 03/03/21 1322    Order Status: Completed Specimen: Blood Updated: 03/08/21 0745     Special Requests: --        RIGHT  Antecubital       Culture result: NO GROWTH 5 DAYS       BLOOD CULTURE [[id number]] Collected: 03/03/21 1201    Order Status: Completed Specimen: Blood Updated: 03/08/21 0745     Special Requests: LEFT FOREARM        Culture result: NO GROWTH 5 DAYS       CULTURE, URINE [[id number]]  (Abnormal)  (Susceptibility) Collected: 03/03/21 1349    Order Status: Completed Specimen: Urine from Clean catch Updated: 03/06/21 0817     Special Requests: NO SPECIAL REQUESTS        Culture result:       >100,000 COLONIES/mL PROTEUS MIRABILIS                  50,000-100,000 COLONIES/mL MIXED SKIN ANDREW ISOLATED                EKG Results     Procedure 720 Value Units Date/Time    EKG, 12 LEAD, INITIAL [[id number]]     Order Status: Completed           Other Studies:  Us Retroperitoneum Comp    Result Date: 3/4/2021  EXAM: Ultrasound of the kidneys. INDICATION: Urinary tract infection. COMPARISON: None. TECHNIQUE: A standard protocol kidney ultrasound was performed. FINDINGS: Both kidneys have a normal ultrasound appearance, measuring 10.5 cm on the right and 10.4 cm on the left. No shadowing kidney stone, hydronephrosis or perinephric fluid is seen. The urinary bladder is decompressed around a Mathew catheter. Normal-appearing kidneys.           Current Meds:   Current Facility-Administered Medications   Medication Dose Route Frequency    ketorolac (TORADOL) injection 15 mg  15 mg IntraVENous Q6H PRN    ascorbic acid (vitamin C) (VITAMIN C) tablet 250 mg  250 mg Oral DAILY    zinc sulfate tablet 220 mg  220 mg Oral DAILY    metroNIDAZOLE (FLAGYL) 1,000 mg, miconazole (MICOTIN) 2 % 30 g topical cream   Topical DAILY    morphine CR (MS CONTIN) tablet 15 mg  15 mg Oral Q12H    baclofen (LIORESAL) tablet 20 mg  20 mg Oral Q8H    cyanocobalamin tablet 1,000 mcg  1,000 mcg Oral DAILY    fingolimod cap 0.5 mg (Patient Supplied)  0.5 mg Oral DAILY    carboxymethylcellulose sodium (REFRESH LIQUIGEL) 1 % ophthalmic solution 2 Drop  2 Drop Both Eyes PRN    amantadine HCL (SYMMETREL) capsule 100 mg  100 mg Oral BID    apixaban (ELIQUIS) tablet 5 mg  5 mg Oral BID    aspirin delayed-release tablet 81 mg  81 mg Oral DAILY    cholecalciferol (VITAMIN D3) (1000 Units /25 mcg) tablet 1,000 Units  1,000 Units Oral DAILY    oxyCODONE IR (ROXICODONE) tablet 10 mg  10 mg Oral Q6H PRN    polyethylene glycol (MIRALAX) packet 17 g  17 g Oral DAILY    pregabalin (LYRICA) capsule 50 mg  50 mg Oral DAILY    Saccharomyces boulardii (FLORASTOR) capsule 250 mg  250 mg Oral BID    senna (SENOKOT) tablet 17.2 mg  2 Tab Oral BID    sodium chloride (NS) flush 5-40 mL  5-40 mL IntraVENous Q8H    sodium chloride (NS) flush 5-40 mL  5-40 mL IntraVENous PRN    acetaminophen (TYLENOL) tablet 650 mg  650 mg Oral Q6H PRN    Or    acetaminophen (TYLENOL) suppository 650 mg  650 mg Rectal Q6H PRN    polyethylene glycol (MIRALAX) packet 17 g  17 g Oral DAILY PRN    promethazine (PHENERGAN) tablet 12.5 mg  12.5 mg Oral Q6H PRN    Or    ondansetron (ZOFRAN) injection 4 mg  4 mg IntraVENous Q6H PRN    famotidine (PEPCID) tablet 20 mg  20 mg Oral BID    alcohol 62% (NOZIN) nasal  1 Ampule  1 Ampule Topical Q12H       Problem List:  Hospital Problems as of 3/29/2021 Date Reviewed: 6/29/2020          Codes Class Noted - Resolved POA Generalized weakness ICD-10-CM: R53.1  ICD-9-CM: 780.79  3/3/2021 - Present Yes        * (Principal) Acute UTI ICD-10-CM: N39.0  ICD-9-CM: 599.0  3/3/2021 - Present Yes        History of DVT of lower extremity ICD-10-CM: Z86.718  ICD-9-CM: V12.51  3/3/2021 - Present Yes        Depression ICD-10-CM: F32.9  ICD-9-CM: 465  9/10/2020 - Present Yes        Essential hypertension ICD-10-CM: I10  ICD-9-CM: 401.9  9/10/2020 - Present Yes        Multiple wounds ICD-10-CM: T07. Berry Romanck  ICD-9-CM: 879.8  9/10/2020 - Present Yes        Anxiety disorder ICD-10-CM: F41.9  ICD-9-CM: 300.00  8/8/2020 - Present Yes        Gastroesophageal reflux disease without esophagitis ICD-10-CM: K21.9  ICD-9-CM: 530.81  8/8/2020 - Present Yes        Sepsis ([facility] ICD-10-CM: A41.9  ICD-9-CM: 038.9, 995.91  7/23/2020 - Present Yes        Pressure injury of sacral region, stage 4 (HCC) ICD-10-CM: T93.719  ICD-9-CM: 707.03, 707.24  5/13/2020 - Present Yes        Multiple sclerosis ([facility] ICD-10-CM: G35  ICD-9-CM: 051  5/13/2020 - Present Yes        Severe protein-calorie malnutrition ([facility] ICD-10-CM: E43  ICD-9-CM: 186  5/13/2020 - Present Unknown               Part of this note was written by using a voice dictation software and the note has been proof read but may still contain some grammatical/other typographical errors.     Signed By: Brenda Flores MD   [facility]    March 29, 2021  11:44 AM

## 2021-03-30 PROCEDURE — 74011250637 HC RX REV CODE- 250/637: Performed by: FAMILY MEDICINE

## 2021-03-30 PROCEDURE — 74011250637 HC RX REV CODE- 250/637: Performed by: INTERNAL MEDICINE

## 2021-03-30 PROCEDURE — 77030040393 HC DRSG OPTIFOAM GENT MDII -B

## 2021-03-30 PROCEDURE — 74011250637 HC RX REV CODE- 250/637: Performed by: HOSPITALIST

## 2021-03-30 PROCEDURE — 65270000029 HC RM PRIVATE

## 2021-03-30 PROCEDURE — 2709999900 HC NON-CHARGEABLE SUPPLY

## 2021-03-30 RX ORDER — UREA 10 %
2 LOTION (ML) TOPICAL DAILY
Status: DISCONTINUED | OUTPATIENT
Start: 2021-03-30 | End: 2021-03-30 | Stop reason: ALTCHOICE

## 2021-03-30 RX ADMIN — RDII 250 MG CAPSULE 250 MG: at 17:06

## 2021-03-30 RX ADMIN — APIXABAN 5 MG: 5 TABLET, FILM COATED ORAL at 21:31

## 2021-03-30 RX ADMIN — AMANTADINE HYDROCHLORIDE 100 MG: 100 CAPSULE ORAL at 08:20

## 2021-03-30 RX ADMIN — Medication 81 MG: at 08:21

## 2021-03-30 RX ADMIN — BACLOFEN 20 MG: 10 TABLET ORAL at 13:14

## 2021-03-30 RX ADMIN — Medication 1 AMPULE: at 08:19

## 2021-03-30 RX ADMIN — Medication 220 MG: at 08:22

## 2021-03-30 RX ADMIN — RDII 250 MG CAPSULE 250 MG: at 08:22

## 2021-03-30 RX ADMIN — Medication 10 ML: at 05:18

## 2021-03-30 RX ADMIN — MICONAZOLE NITRATE: 20 CREAM TOPICAL at 08:33

## 2021-03-30 RX ADMIN — Medication 250 MG: at 08:21

## 2021-03-30 RX ADMIN — FAMOTIDINE 20 MG: 20 TABLET, FILM COATED ORAL at 17:06

## 2021-03-30 RX ADMIN — AMANTADINE HYDROCHLORIDE 100 MG: 100 CAPSULE ORAL at 21:30

## 2021-03-30 RX ADMIN — Medication 1 AMPULE: at 21:29

## 2021-03-30 RX ADMIN — PREGABALIN 50 MG: 50 CAPSULE ORAL at 08:21

## 2021-03-30 RX ADMIN — POLYETHYLENE GLYCOL 3350 17 G: 17 POWDER, FOR SOLUTION ORAL at 08:22

## 2021-03-30 RX ADMIN — BACLOFEN 20 MG: 10 TABLET ORAL at 21:31

## 2021-03-30 RX ADMIN — FAMOTIDINE 20 MG: 20 TABLET, FILM COATED ORAL at 08:21

## 2021-03-30 RX ADMIN — MORPHINE SULFATE 15 MG: 15 TABLET, FILM COATED, EXTENDED RELEASE ORAL at 08:21

## 2021-03-30 RX ADMIN — CYANOCOBALAMIN TAB 1000 MCG 1000 MCG: 1000 TAB at 08:21

## 2021-03-30 RX ADMIN — APIXABAN 5 MG: 5 TABLET, FILM COATED ORAL at 08:20

## 2021-03-30 RX ADMIN — VITAMIN D, TAB 1000IU (100/BT) 1000 UNITS: 25 TAB at 08:21

## 2021-03-30 RX ADMIN — SENNOSIDES 17.2 MG: 8.6 TABLET, FILM COATED ORAL at 17:06

## 2021-03-30 RX ADMIN — BACLOFEN 20 MG: 10 TABLET ORAL at 05:18

## 2021-03-30 RX ADMIN — MORPHINE SULFATE 15 MG: 15 TABLET, FILM COATED, EXTENDED RELEASE ORAL at 21:31

## 2021-03-30 RX ADMIN — SENNOSIDES 17.2 MG: 8.6 TABLET, FILM COATED ORAL at 08:22

## 2021-03-30 NOTE — PROGRESS NOTES
SW follow-up with patient due to tentative d/c for today. Patient still feels that this is a safe discharge plan. She states that her ex- Shakira Giron) will be able to stay with her for \"a few days. \"  She was provided with a list of private duty nursing/sitter services yesterday. She has not yet contacted any of these agencies as she \"wants to make sure I'm going today. It's on my to-do list.\"  Patient confirms having all needed DME. SW will follow-up with [facility] to resume services once discharge orders are in. Patient was provided with this 's contact information for any needs/questions after discharge.     ARNULFO Mars  [address] [facility]   [phone]

## 2021-03-30 NOTE — PROGRESS NOTES
Wound care : sacrum and right lower upper posterior thigh wound cleanse with Dermal wound cleanser and Allvyn applied to both sites. Both sites look clean. Betadine solution apply to Right heel, dark tissue  Intact . Bilateral space boots in place for pressure prevention. Specialty be in use.

## 2021-03-30 NOTE — PROGRESS NOTES
Problem: Pressure Injury - Risk of  Goal: *Prevention of pressure injury  Description: Document Kimani Scale and appropriate interventions in the flowsheet. Outcome: Progressing Towards Goal  Note: Pressure Injury Interventions:  Sensory Interventions: Assess changes in LOC, Maintain/enhance activity level, Pressure redistribution bed/mattress (bed type), Float heels    Moisture Interventions: Absorbent underpads, Internal/External urinary devices, Maintain skin hydration (lotion/cream)    Activity Interventions: Pressure redistribution bed/mattress(bed type)    Mobility Interventions: HOB 30 degrees or less, Pressure redistribution bed/mattress (bed type), PT/OT evaluation    Nutrition Interventions: Offer support with meals,snacks and hydration, Document food/fluid/supplement intake    Friction and Shear Interventions: Foam dressings/transparent film/skin sealants, HOB 30 degrees or less                Problem: Falls - Risk of  Goal: *Absence of Falls  Description: Document Danial Fall Risk and appropriate interventions in the flowsheet.   Outcome: Progressing Towards Goal  Note: Fall Risk Interventions:  Mobility Interventions: Communicate number of staff needed for ambulation/transfer    Mentation Interventions: Adequate sleep, hydration, pain control, Bed/chair exit alarm, Door open when patient unattended, Evaluate medications/consider consulting pharmacy, More frequent rounding, Reorient patient    Medication Interventions: Evaluate medications/consider consulting pharmacy    Elimination Interventions: Call light in reach, Patient to call for help with toileting needs, Toilet paper/wipes in reach, Toileting schedule/hourly rounds    History of Falls Interventions: Consult care management for discharge planning, Evaluate medications/consider consulting pharmacy

## 2021-03-30 NOTE — PROGRESS NOTES
END OF SHIFT NOTE:  Patient resting in bed , quiet uneventful day. No distress , turn every 2-3 hours . Fair po intake. Remain on specialty bed. Urine culture 3/28/ > 100,000 colonies Gram Negative Rods ID and Sensitivity to follow MD aware. Intake/Output  03/30 0701 - 03/30 1900  In: 360 [P.O.:360]  Out: 400 [Urine:400]   Voiding: yes   Catheter: yes   Drain:              Stool:  No occurrences. Stool Assessment  Stool Color: Brown (03/13/21 1552)  Stool Appearance: Formed(per patient) (03/19/21 1928)  Stool Amount: Medium (03/13/21 1552)  Stool Source/Status: Rectum (03/13/21 1552)    Emesis:  No  occurrences. VITAL SIGNS  Patient Vitals for the past 12 hrs:   Temp Pulse Resp BP SpO2   03/30/21 1457 98 °F (36.7 °C) 76 18 126/82 98 %   03/30/21 1122 97.8 °F (36.6 °C) 82 18 (!) 141/82 98 %   03/30/21 0753 97.4 °F (36.3 °C) 67 18 123/73 100 %       Pain Assessment  Pain 1  Pain Scale 1: Visual (03/29/21 2009)  Pain Intensity 1: 0 (03/29/21 2009)  Patient Stated Pain Goal: 0 (03/29/21 2009)  Pain Reassessment 1: Patient resting w/respiratory rate greater than 10 (03/28/21 0945)  Pain Onset 1: chronic (03/28/21 0900)  Pain Location 1: Back;Buttocks (03/28/21 0900)  Pain Orientation 1: Mid;Lower (03/28/21 0900)  Pain Description 1: Aching;Stabbing (03/28/21 0900)  Pain Intervention(s) 1: Medication (see MAR) (03/28/21 0900)    Ambulating  Bedrest     Additional Information:  See above     Shift report given to oncoming nurse Shannon Martin RN  at the bedside.     Norma Gramajo RN

## 2021-03-30 NOTE — PROGRESS NOTES
Pt c/w possible wasserman leakage. Upon assessment this RN found thick off-white drainage inside the line. Repositioning, flushing, and milking line were performed w/o success as staff witnessed urine draining into pt's brief. Wasserman removed. New system applied. 50 cc of serosanguinous urine drained into wasserman. Pt repositioned. Brief , Allevyns, and linen changed. Pt resting quietly. No visible s/sx of distress. Pt denies discomfort/pain.

## 2021-03-30 NOTE — PROGRESS NOTES
[facility] Progress Note     Name: Benny Montalvo   Age: 62 y.o. Sex: female  : 1960    MRN:     [id number]    Admit Date:  3/3/2021    Reason for Admission:  Acute UTI [N39.0]  Generalized weakness [R53.1]    Assessment & Plan     Discharge Disposition  - She is now agreeable to be discharged to home because there has been issues regarding her used up medicare benefits and she also has too much income via social security disability. This is planned for 2021     Urinary Colonization  -Urine cultures showing gram-negative rods, however asymptomatic of active infection, and plan: Exertion, cultures are collected and finalizing. Note that she has had prior history of urine cultures with Proteus mirabilis    Chronic Pain 2/2 Chronic wounds   - 79-25-23-- Started Oxycontin and with PRN oxycodone for breakthrough , wound care ordered, start PO Zinc sulfate , ascorbic acid and multivitamin appears to be an effective regiment        Generalized weakness  MRI by telemetry neurology. No acute finding concerning for exacerbation of MS at this present time. Still with some weakness but improving.  -Continue with physical therapy. B12 deficiency: Continue vitamin B12 supplement    Multiple chronic wounds  -Continue with wound care  -continue with Mathew to avoid saturating wound care    MS: continue with home meds  Anxiety:continue with home meds  Severe PCM: continue with nutritional supplements  GERD: Pepcid  Hx of DVT:  Eliquis  Sepsis 2/2 UTI(resolved)       Diet:  DIET REGULAR  DIET NUTRITIONAL SUPPLEMENTS  DIET NUTRITIONAL SUPPLEMENTS  DVT PPx: eliquis  GI Ppx: pepcid  Code: DNR          Hospital Course/Subjective:     Please refer to the admission H&P for details of presentation. In summary, Benny Montalvo is a 62 y.o. female with medical history significant for MS, multiple chronic wounds who presented to the ED on 3/3 d/t back pain and concern for UTI.   She lives by herself, mostly is bedridden, uses a wheelchair at times and stated that she had a wasserman placed months ago due to multiple sacral ulcers. She was found to have UTI and was started on antibiotics. During her stay, patient complained of lower extremity weakness. MRI of brain, cervical and thoracic spine was ordered due to concern for MS flare. MRI shows no acute findings. B12 was noted to be low and patient was started on B12 supplementation. Patient is now back to her baseline. Waiting for Medicaid approval for rehab placement. Wasserman was reinserted due to open wounds being soaked with urine. Subjective/24 hr Events (03/30/21) : Some delirium noted today       Review of Systems: 14 point review of systems is otherwise negative with the exception of the elements mentioned above. Objective:     Patient Vitals for the past 24 hrs:   Temp Pulse Resp BP SpO2   03/30/21 1457 98 °F (36.7 °C) 76 18 126/82 98 %   03/30/21 1122 97.8 °F (36.6 °C) 82 18 (!) 141/82 98 %   03/30/21 0753 97.4 °F (36.3 °C) 67 18 123/73 100 %   03/30/21 0315 97.2 °F (36.2 °C) 84 17 111/63 96 %   03/29/21 2324 97.3 °F (36.3 °C) 94 18 103/63 96 %   03/29/21 2240 98 °F (36.7 °C) 64 17 124/68 100 %   03/29/21 1931 97.9 °F (36.6 °C) 90 17 134/81 100 %     Oxygen Therapy  O2 Sat (%): 98 % (03/30/21 1457)  Pulse via Oximetry: 103 beats per minute (03/03/21 1630)  O2 Device: Room air (03/29/21 2009)    Body mass index is 13.41 kg/m². Physical Exam:   General:     alert, awake, no acute distress. Thin and cachetic '  Head:   normocephalic, atraumatic  Eyes, Ears, nose: PERRL, EOMI. Normal conjunctiva  Neck:    supple, non-tender. Trachea midline. Lungs:   CTAB, no wheezing, rhonchi, rales  Cardiac:   RRR, Normal S1 and S2. Abdomen:   Soft, non distended, nontender, +BS  Extremities:   Warm, dry. No edema   Skin:   No rashes, no jaundice  Neuro:  AAOx3.  No gross focal neurological deficit  Psychiatric:  No anxiety, calm, cooperative    Data Review:  I have reviewed all labs, meds, and studies from the last 24 hours:    Labs:    No results found for this or any previous visit (from the past 24 hour(s)). All Micro Results     Procedure Component Value Units Date/Time    CULTURE, URINE [[id number]]  (Abnormal) Collected: 03/28/21 1047    Order Status: Completed Specimen: Cath Urine Updated: 03/30/21 0848     Special Requests: NO SPECIAL REQUESTS        Culture result:       >100,000 COLONIES/mL GRAM NEGATIVE RODS IDENTIFICATION AND SUSCEPTIBILITY TO FOLLOW                  CULTURE IN PROGRESS,FURTHER UPDATES TO FOLLOW          BLOOD CULTURE [[id number]] Collected: 03/03/21 1322    Order Status: Completed Specimen: Blood Updated: 03/08/21 0745     Special Requests: --        RIGHT  Antecubital       Culture result: NO GROWTH 5 DAYS       BLOOD CULTURE [[id number]] Collected: 03/03/21 1201    Order Status: Completed Specimen: Blood Updated: 03/08/21 0745     Special Requests: LEFT FOREARM        Culture result: NO GROWTH 5 DAYS       CULTURE, URINE [[id number]]  (Abnormal)  (Susceptibility) Collected: 03/03/21 1349    Order Status: Completed Specimen: Urine from Clean catch Updated: 03/06/21 0817     Special Requests: NO SPECIAL REQUESTS        Culture result:       >100,000 COLONIES/mL PROTEUS MIRABILIS                  50,000-100,000 COLONIES/mL MIXED SKIN ANDREW ISOLATED                EKG Results     Procedure 720 Value Units Date/Time    EKG, 12 LEAD, INITIAL [[id number]]     Order Status: Completed           Other Studies:  Us Retroperitoneum Comp    Result Date: 3/4/2021  EXAM: Ultrasound of the kidneys. INDICATION: Urinary tract infection. COMPARISON: None. TECHNIQUE: A standard protocol kidney ultrasound was performed. FINDINGS: Both kidneys have a normal ultrasound appearance, measuring 10.5 cm on the right and 10.4 cm on the left. No shadowing kidney stone, hydronephrosis or perinephric fluid is seen.  The urinary bladder is decompressed around a Mathew catheter. Normal-appearing kidneys.           Current Meds:   Current Facility-Administered Medications   Medication Dose Route Frequency    ketorolac (TORADOL) injection 15 mg  15 mg IntraVENous Q6H PRN    ascorbic acid (vitamin C) (VITAMIN C) tablet 250 mg  250 mg Oral DAILY    zinc sulfate tablet 220 mg  220 mg Oral DAILY    metroNIDAZOLE (FLAGYL) 1,000 mg, miconazole (MICOTIN) 2 % 30 g topical cream   Topical DAILY    morphine CR (MS CONTIN) tablet 15 mg  15 mg Oral Q12H    baclofen (LIORESAL) tablet 20 mg  20 mg Oral Q8H    cyanocobalamin tablet 1,000 mcg  1,000 mcg Oral DAILY    fingolimod cap 0.5 mg (Patient Supplied)  0.5 mg Oral DAILY    carboxymethylcellulose sodium (REFRESH LIQUIGEL) 1 % ophthalmic solution 2 Drop  2 Drop Both Eyes PRN    amantadine HCL (SYMMETREL) capsule 100 mg  100 mg Oral BID    apixaban (ELIQUIS) tablet 5 mg  5 mg Oral BID    aspirin delayed-release tablet 81 mg  81 mg Oral DAILY    cholecalciferol (VITAMIN D3) (1000 Units /25 mcg) tablet 1,000 Units  1,000 Units Oral DAILY    oxyCODONE IR (ROXICODONE) tablet 10 mg  10 mg Oral Q6H PRN    polyethylene glycol (MIRALAX) packet 17 g  17 g Oral DAILY    pregabalin (LYRICA) capsule 50 mg  50 mg Oral DAILY    Saccharomyces boulardii (FLORASTOR) capsule 250 mg  250 mg Oral BID    senna (SENOKOT) tablet 17.2 mg  2 Tab Oral BID    sodium chloride (NS) flush 5-40 mL  5-40 mL IntraVENous Q8H    sodium chloride (NS) flush 5-40 mL  5-40 mL IntraVENous PRN    acetaminophen (TYLENOL) tablet 650 mg  650 mg Oral Q6H PRN    Or    acetaminophen (TYLENOL) suppository 650 mg  650 mg Rectal Q6H PRN    polyethylene glycol (MIRALAX) packet 17 g  17 g Oral DAILY PRN    promethazine (PHENERGAN) tablet 12.5 mg  12.5 mg Oral Q6H PRN    Or    ondansetron (ZOFRAN) injection 4 mg  4 mg IntraVENous Q6H PRN    famotidine (PEPCID) tablet 20 mg  20 mg Oral BID    alcohol 62% (NOZIN) nasal  1 Ampule  1 Ampule Topical Q12H       Problem List:  Hospital Problems as of 3/30/2021 Date Reviewed: 6/29/2020          Codes Class Noted - Resolved POA    Generalized weakness ICD-10-CM: R53.1  ICD-9-CM: 780.79  3/3/2021 - Present Yes        * (Principal) Acute UTI ICD-10-CM: N39.0  ICD-9-CM: 599.0  3/3/2021 - Present Yes        History of DVT of lower extremity ICD-10-CM: Z86.718  ICD-9-CM: V12.51  3/3/2021 - Present Yes        Depression ICD-10-CM: F32.9  ICD-9-CM: 489  9/10/2020 - Present Yes        Essential hypertension ICD-10-CM: I10  ICD-9-CM: 401.9  9/10/2020 - Present Yes        Multiple wounds ICD-10-CM: T07. Dolph Bitter  ICD-9-CM: 879.8  9/10/2020 - Present Yes        Anxiety disorder ICD-10-CM: F41.9  ICD-9-CM: 300.00  8/8/2020 - Present Yes        Gastroesophageal reflux disease without esophagitis ICD-10-CM: K21.9  ICD-9-CM: 530.81  8/8/2020 - Present Yes        Sepsis ([facility] ICD-10-CM: A41.9  ICD-9-CM: 038.9, 995.91  7/23/2020 - Present Yes        Pressure injury of sacral region, stage 4 (HCC) ICD-10-CM: L91.926  ICD-9-CM: 707.03, 707.24  5/13/2020 - Present Yes        Multiple sclerosis ([facility] ICD-10-CM: G35  ICD-9-CM: 524  5/13/2020 - Present Yes        Severe protein-calorie malnutrition ([facility] ICD-10-CM: E43  ICD-9-CM: 564  5/13/2020 - Present Unknown               Part of this note was written by using a voice dictation software and the note has been proof read but may still contain some grammatical/other typographical errors.     Signed By: Reggie Daniel MD   [facility]    March 30, 2021  11:44 AM

## 2021-03-30 NOTE — PROGRESS NOTES
Comprehensive Nutrition Assessment    Type and Reason for Visit: Reassess, RD nutrition re-screen/LOS      Nutrition Recommendations/Plan:    Regular Diet   Ensure Enlive on all meal trays   Peanut Butter Shake all meal trays     Malnutrition Assessment:  Malnutrition Status: Severe malnutrition  Context: Chronic illness  Findings of clinical characteristics of malnutrition:   Energy Intake:  7-75% or less estimate energy requirements for 1 month or longer (assessed on admission). Body Fat Loss:  7-Severe body fat loss, orbital  Muscle Mass Loss:  7-Severe muscle mass loss, clavicles (pectoralis and deltoids, temples (temporalis)    Nutrition Assessment:   Nutrition History: Pt reports she has never been a [place] eater\" . Does eat meals- maybe yogurt or toast and eggs. Regarding use of commercial nutrition supplement beverages, pt notes she has grown tired of the same vanilla or strawberry options. Like peanut butter. Nutrition Background: Pt  with a medical histroy of multiple sclerosis, multiple chronic wounds presented to the ED d/t back pain and concern for urinary tract infection. Admitted with acute UTI and sepsis. Concerns for protein calorie malnutrition. Pt being followed for wound care in OP wound center. Pt continues with fair po intake; reports eating Telugu toast for breakfast and broccoli and carrots for lunch today. Has been drinking peanut butter milkshakes and ~1 Ensure Enlive/day. Noted 14 unopened bottles of Ensure Enlive. Pt saves them to drink if she does not eat well at meals. Weight has been stable past week. Nutrition Related Findings:   Fat wasting observed in orbital regions, muscle wasting observed in clavicular and temporal region; pt with brealfast tray on lap, no further assessment. Wound Type: Stage IV    Current Nutrition Therapies:  Continue Regula Diet with Ensure Enlive on all meal trays.  Continue Peanut Butter shake on all meal trays (1 cup whole milk, 3 peanut butter packages, 2 cups vanilla ice cream)    Current Intake:   Average Meal Intake: 51-75% Average Supplement Intake: Unable to assess(1 ensure enlive daily; sometimes more if po poor, peanut butter shake on all meal trays)     Anthropometric Measures:  Height: 5' 7\" (170.2 cm)  Current Body Wt: 38.8 kg (85 lb 8.6 oz), Weight source: Bed scale  BMI: 13.4, Underweight (BMI less than 18.5)  Ideal Body Weight (lbs) (Calculated): 135 lbs (61 kg), 73.6 %  Edema: LLE: No Edema (3/30/2021  8:15 AM)  RLE: No Edema (3/30/2021  8:15 AM)     Estimated Daily Nutrient Needs:  Energy (kcal/day): 4041-2602 (Kcal/kg, Weight Used: Current(35-40kcal/kg))  Protein (g/day): 68-90(1.5- 2 g/kg) Weight Used: (Current)  Fluid (ml/day):   (1 ml/kcal)    Nutrition Diagnosis:   Increased nutrient needs r/t catabolic illness as evidenced by wounds. Nutrition Interventions:   Food and/or Nutrient Delivery: Continue current diet, Continue oral nutrition supplement  Coordination of Nutrition Care: Continue to monitor while inpatient    Goals:   Previous Goal: Progressing toward goal  Active Goal: PO intake > 75% to meet estimated nutritional needs. Nutrition Monitoring and Evaluation:   Food/Nutrient Intake Outcomes: Food and nutrient intake, Supplement intake  Physical Signs/Symptoms Outcomes: Biochemical data    Discharge Planning:     Too soon to determine    Electronically signed by Michael Dietrich, MPH, RD, LD on 3/30/2021 at 5:50 PM    Contact: [phone]

## 2021-03-31 VITALS
HEART RATE: 69 BPM | BODY MASS INDEX: 13.44 KG/M2 | RESPIRATION RATE: 16 BRPM | SYSTOLIC BLOOD PRESSURE: 126 MMHG | TEMPERATURE: 97.5 F | DIASTOLIC BLOOD PRESSURE: 77 MMHG | WEIGHT: 85.6 LBS | HEIGHT: 67 IN | OXYGEN SATURATION: 95 %

## 2021-03-31 LAB
BACTERIA SPEC CULT: ABNORMAL
SERVICE CMNT-IMP: ABNORMAL

## 2021-03-31 PROCEDURE — 2709999900 HC NON-CHARGEABLE SUPPLY

## 2021-03-31 PROCEDURE — 74011250637 HC RX REV CODE- 250/637: Performed by: INTERNAL MEDICINE

## 2021-03-31 PROCEDURE — 74011250637 HC RX REV CODE- 250/637: Performed by: HOSPITALIST

## 2021-03-31 PROCEDURE — 77030040393 HC DRSG OPTIFOAM GENT MDII -B

## 2021-03-31 PROCEDURE — 74011250637 HC RX REV CODE- 250/637: Performed by: FAMILY MEDICINE

## 2021-03-31 RX ORDER — LANOLIN ALCOHOL/MO/W.PET/CERES
1000 CREAM (GRAM) TOPICAL DAILY
Qty: 30 TAB | Refills: 0 | Status: SHIPPED | OUTPATIENT
Start: 2021-04-01

## 2021-03-31 RX ORDER — GRANULES FOR ORAL 3 G/1
3 POWDER ORAL
Qty: 1 PACKET | Refills: 0 | Status: SHIPPED | OUTPATIENT
Start: 2021-03-31 | End: 2021-03-31

## 2021-03-31 RX ORDER — FAMOTIDINE 20 MG/1
20 TABLET, FILM COATED ORAL DAILY
Qty: 30 TAB | Refills: 0 | Status: SHIPPED | OUTPATIENT
Start: 2021-03-31

## 2021-03-31 RX ORDER — MORPHINE SULFATE 15 MG/1
15 TABLET, FILM COATED, EXTENDED RELEASE ORAL EVERY 12 HOURS
Qty: 20 TAB | Refills: 0 | OUTPATIENT
Start: 2021-03-31 | End: 2021-04-11

## 2021-03-31 RX ORDER — ASCORBIC ACID 250 MG
250 TABLET ORAL DAILY
Qty: 30 TAB | Refills: 0 | Status: SHIPPED | OUTPATIENT
Start: 2021-04-01

## 2021-03-31 RX ORDER — NALOXONE HYDROCHLORIDE 4 MG/.1ML
SPRAY NASAL
Qty: 1 EACH | Refills: 0 | Status: SHIPPED | OUTPATIENT
Start: 2021-03-31

## 2021-03-31 RX ADMIN — AMANTADINE HYDROCHLORIDE 100 MG: 100 CAPSULE ORAL at 08:57

## 2021-03-31 RX ADMIN — POLYETHYLENE GLYCOL 3350 17 G: 17 POWDER, FOR SOLUTION ORAL at 08:59

## 2021-03-31 RX ADMIN — Medication 220 MG: at 09:00

## 2021-03-31 RX ADMIN — Medication 81 MG: at 08:58

## 2021-03-31 RX ADMIN — SENNOSIDES 17.2 MG: 8.6 TABLET, FILM COATED ORAL at 09:00

## 2021-03-31 RX ADMIN — BACLOFEN 20 MG: 10 TABLET ORAL at 06:10

## 2021-03-31 RX ADMIN — FAMOTIDINE 20 MG: 20 TABLET, FILM COATED ORAL at 08:58

## 2021-03-31 RX ADMIN — RDII 250 MG CAPSULE 250 MG: at 09:00

## 2021-03-31 RX ADMIN — MORPHINE SULFATE 15 MG: 15 TABLET, FILM COATED, EXTENDED RELEASE ORAL at 08:59

## 2021-03-31 RX ADMIN — VITAMIN D, TAB 1000IU (100/BT) 1000 UNITS: 25 TAB at 08:58

## 2021-03-31 RX ADMIN — Medication 1 AMPULE: at 08:57

## 2021-03-31 RX ADMIN — PREGABALIN 50 MG: 50 CAPSULE ORAL at 08:59

## 2021-03-31 RX ADMIN — MICONAZOLE NITRATE: 20 CREAM TOPICAL at 09:40

## 2021-03-31 RX ADMIN — APIXABAN 5 MG: 5 TABLET, FILM COATED ORAL at 08:57

## 2021-03-31 RX ADMIN — CYANOCOBALAMIN TAB 1000 MCG 1000 MCG: 1000 TAB at 08:58

## 2021-03-31 RX ADMIN — Medication 250 MG: at 08:58

## 2021-03-31 NOTE — PROGRESS NOTES
Patient discharge home via [facility]'s ambulance service. No distress noted. [phone]

## 2021-03-31 NOTE — PROGRESS NOTES
Wound care : sacrum wound and Right lower upper thigh region cleanse with Dermal wound cleanser. Both sires look clean pink grandular tissue noted. Allvyn dressing x 2 applied to site. Right heel clean with soap and water, Betadine solution applied to site. Skin intact with dark tissue noted.

## 2021-03-31 NOTE — DISCHARGE SUMMARY
[address] [facility] Discharge Summary    Patient ID:  Jose Ortiz female. 1960.  [id number]    Admit date: 3/3/2021 12:18 PM    Discharge date: 03/31/21     Admitting Physician: Linda Gilbert DO  Attending Physician: Dilshad Valle MD  Primary Care Physician: Marga Jackman MD     Discharge Physician: Aminah Best MD    Discharged Condition: Stable    Indication for Admission:   Chief Complaint   Patient presents with    Urinary Odor        Reason for hospitalization:   Patient Active Problem List   Diagnosis Code    GLENROY (acute kidney injury) ([facility] [address] N17.9    Pressure injury of sacral region, stage 4 ([facility] [address] L89.154    Transaminitis R74.01    Acute metabolic encephalopathy U09.04    Multiple sclerosis ([facility] [address] G35    Severe protein-calorie malnutrition ([facility] [address] E43    Leukocytosis D72.829    Choledocholithiasis K80.50    Osteomyelitis of sacroiliac region ([facility] [address] M46.28    Sepsis ([facility] [address] A41.9    Coagulase negative Staphylococcus bacteremia R78.81, B95.7    Hypokalemia E87.6    Hypomagnesemia E83.42    Generalized weakness R53.1    Acute UTI N39.0    Depression F32.9    Anxiety disorder F41.9    Essential hypertension I10    Gastroesophageal reflux disease without esophagitis K21.9    Multiple wounds T07. Lurlean Cho History of DVT of lower extremity Z86.718       Discharge Diagnosis: Sepsis due to proteus UTI, Multiple sclerosis and generalized weakness, B12 deficiency, functional quadriplegia, chronic pain with opioid dependence due to chronic sacral wounds, severe protein calorie malnutrition.   Discharge Disposition:   Follow up Instructions: If she is to have symptoms or collateral sequelae of acute infection in her case the probable culprit would be urinary tract infection she is instructed to take as needed antibiotic and contact her primary care physician or report to emergency department  Did Patient have Sepsis (YES OR NO): Sepsis due to UTI           Hospital Course:    61 y/o WF with a h/o MS, multiple chronic wounds who presented to the ED on 3/3 d/t back pain and concern for UTI. Pt stated she didn't want to come to the ED but her home health nurse made her. She lives by herself, mostly is bedridden, uses a wheelchair at times and stated that she had a wasserman placed months ago due to multiple sacral ulcers. Her urine had been cloudy and home health came this Am and found her on the ground covering in feces so EMS was called. Wound care consulted for heel/sacral wounds. Urine culture grew pan-sensitive proteus mirabilis and she has completed antibiotics with Merropenem for pervious h/o pseudomonas. Regarding generalized weakness MRI by telemetry neurology. No acute finding concerning for exacerbation of MS at this present time. Still with some weakness but improving. Discharge Disposition  -03/31 She is now agreeable to be discharged to home because there has been issues regarding her used up medicare benefits and she also has too much income via social security disability.      Urinary Colonization  -Urine cultures were collected over the weekend prior to admission to a transient episode of  Change in mental status, though again she is also on opioids for chronic pain which more then Likely contributed to the transient AMS. The urine culture  showing gram-negative rods, however asymptomatic of active infection,  and Note that she has had prior history of urine cultures with Proteus mirabilis, and ESBL Klebsiella.  She was instructed  If she is to have symptoms or collateral sequelae of acute infection in her case the probable culprit would be urinary tract infection she is instructed to take as needed antibiotic and contact her primary care physician or report to emergency department     Chronic Pain 2/2 Chronic wounds, chronic Wasserman catheter  - She was  Started Oxycontin and with PRN oxycodone for breakthrough , wound care ordered, start PO Zinc sulfate , ascorbic acid and multivitamin appears to be an effective regiment  -Mathew catheter replaced during this admission      Multiple chronic wounds  -Continue with wound care  -continue with Mathew to avoid saturating wound care     Discharge Exam:  Visit Vitals  /77 (BP 1 Location: Left upper arm, BP Patient Position: At rest)   Pulse 69   Temp 97.5 °F (36.4 °C)   Resp 16   Ht 5' 7\" (1.702 m)   Wt 38.8 kg (85 lb 9.6 oz)   SpO2 95%   BMI 13.41 kg/m²      General:                     alert, awake, no acute distress. Thin and cachetic '  Head:                          normocephalic, atraumatic  Eyes, Ears, nose:      PERRL, EOMI. Normal conjunctiva  Neck:                          supple, non-tender. Trachea midline. Lungs:                        CTAB, no wheezing, rhonchi, rales  Cardiac:                      RRR, Normal S1 and S2. Abdomen:                  Soft, non distended, nontender, +BS  Extremities:               Warm, dry. No edema   Skin:                           No rashes, no jaundice  Neuro:             AAOx3.  No gross focal neurological deficit  Psychiatric:                No anxiety, calm, cooperative    Consults: IP CONSULT TO NEUROLOGY    Code Status: DNR    Significant Diagnostic Studies:   CMP: No results found for: NA, K, CL, CO2, AGAP, GLU, BUN, CREA, GFRAA, GFRNA, CA, MG, PHOS, ALB, TBIL, TBILI, TP, ALB, GLOB, AGRAT, ALT      CBC:  No results found for: WBC, HGB, HCT, PLT, HGBEXT, HCTEXT, PLTEXT    Lab Results   Component Value Date/Time    INR 1.5 12/11/2020 02:38 PM    Prothrombin time 18.2 (H) 12/11/2020 02:38 PM       ABG:  No results found for: PH, PHI, PCO2, PCO2I, PO2, PO2I, HCO3, HCO3I, FIO2, FIO2I        Lab Results   Component Value Date/Time     (H) 05/13/2020 01:53 AM    Troponin-I, Qt. <0.02 (L) 05/13/2020 01:53 AM           Radiology Reports :   [unfilled]      Discharge Medications:  Current Discharge Medication List      START taking these medications    Details   ascorbic acid, vitamin C, (VITAMIN C) 250 mg tablet Take 1 Tab by mouth daily. Qty: 30 Tab, Refills: 0      cyanocobalamin 1,000 mcg tablet Take 1 Tab by mouth daily. Qty: 30 Tab, Refills: 0      famotidine (PEPCID) 20 mg tablet Take 1 Tab by mouth daily. Qty: 30 Tab, Refills: 0      morphine CR (MS CONTIN) 15 mg CR tablet Take 1 Tab by mouth every twelve (12) hours for 10 days. Max Daily Amount: 30 mg.  Qty: 20 Tab, Refills: 0    Associated Diagnoses: Pressure injury of sacral region, stage 4 (HCC)      zinc sulfate 66 mg tab Take 220 mg by mouth daily for 30 days. Qty: 30 Tab, Refills: 0      fosfomycin (MONUROL) 3 gram pack oral packet Take 1 Packet by mouth once as needed for Other (Urinary Tract Infection Symptoms) for up to 1 dose. Indications: ESBL  Qty: 1 Packet, Refills: 0      naloxone (NARCAN) 4 mg/actuation nasal spray Use 1 spray intranasally, then discard. Repeat with new spray every 2 min as needed for opioid overdose symptoms, alternating nostrils. Qty: 1 Each, Refills: 0         CONTINUE these medications which have NOT CHANGED    Details   aspirin delayed-release 81 mg tablet Take 81 mg by mouth daily. baclofen (LIORESAL) 20 mg tablet Take 20 mg by mouth three (3) times daily. cholecalciferol (VITAMIN D3) 25 mcg (1,000 unit) cap Take 1,000 Units by mouth daily. Saccharomyces boulardii (Florastor) 250 mg capsule Take 250 mg by mouth two (2) times a day. melatonin 5 mg cap capsule Take 5 mg by mouth nightly. polyethylene glycol (Miralax) 17 gram/dose powder Take 17 g by mouth daily. oxyCODONE IR (ROXICODONE) 10 mg tab immediate release tablet Take 10 mg by mouth every six (6) hours as needed for Pain. pregabalin (LYRICA) 50 mg capsule Take 50 mg by mouth daily. Give 75mg at HS      senna (Senna) 8.6 mg tablet Take 2 Tabs by mouth two (2) times a day. multivitamin (ONE A DAY) tablet Take 1 Tab by mouth daily.       apixaban (Eliquis DVT-PE Treat 30D Start) 5 mg (74 tabs) starter pack Take 10 mg (two 5 mg tablets) by mouth twice a day for 7 days   Followed by 5 mg (one 5 mg tablet) by mouth twice a day  Qty: 1 Dose Pack, Refills: 0      amantadine HCL (SYMMETREL) 100 mg capsule Take 100 mg by mouth two (2) times a day.      hydrOXYzine HCL (ATARAX) 25 mg tablet Take 25 mg by mouth every eight (8) hours as needed. magnesium oxide (MAG-OX) 400 mg tablet Take 400 mg by mouth two (2) times a day. iron polysaccharides (NIFEREX) 150 mg iron capsule Take 150 mg by mouth daily. fingolimod 0.5 mg cap Take 0.5 mg by mouth daily. Qty: 1 Cap, Refills: 0         STOP taking these medications       sertraline (ZOLOFT) 100 mg tablet Comments:   Reason for Stopping:         nitrofurantoin, macrocrystal-monohydrate, (Macrobid) 100 mg capsule Comments:   Reason for Stopping:         traMADoL (ULTRAM) 50 mg tablet Comments:   Reason for Stopping:         venlafaxine (EFFEXOR) 75 mg tablet Comments:   Reason for Stopping:               Medications Discontinued during this hospitalization:   Medications Discontinued During This Encounter   Medication Reason    . PHARMACY TO SUBSTITUTE PER PROTOCOL (Reordered from: melatonin 5 mg cap capsule) Discontinued During Hospital Stay Per P&T Protocol    magnesium oxide (MAG-OX) tablet 400 mg Not A Current Medication    hydrOXYzine HCL (ATARAX) tablet 25 mg Not A Current Medication    venlafaxine (EFFEXOR) tablet 150 mg Not A Current Medication    diphenhydrAMINE (BENADRYL) capsule 25 mg ERROR    meropenem (MERREM) 500 mg in 0.9% sodium chloride (MBP/ADV) 50 mL MBP Alternate Therapy    lactated Ringers infusion     OTHER(NON-FORMULARY) 0.5 mg REORDER    baclofen (LIORESAL) tablet 20 mg     baclofen (LIORESAL) tablet 10 mg     baclofen (LIORESAL) tablet 20 mg     zinc sulfate (ZINCATE) 50 mg zinc (220 mg) capsule 1 Cap REORDER    traMADoL (ULTRAM) tablet 50 mg     Lactobacillus Acidoph & Bulgar [facility]) tablet 2 Tab Alternate Therapy       Patient Instructions: Activity: as tolerated. Diet:   DIET REGULAR  DIET NUTRITIONAL SUPPLEMENTS Breakfast, Lunch, Dinner; Other (2 vanilla ice-cream, 3 peanut butter packs, 1 cup whole milk)  DIET NUTRITIONAL SUPPLEMENTS All Meals; Ensure Enlive    Therapy Ordered:  No therapy plan of the specified type found. Follow-up appointments: Follow-up Appointments   Procedures    FOLLOW UP VISIT Appointment in: One Week Primary care     Primary care     Standing Status:   Standing     Number of Occurrences:   1     Order Specific Question:   Appointment in     Answer: One Week       Time Spent on Discharge greater than 30 minutes.     Ulices Hand MD  3/31/2021 11:53 AM

## 2021-04-02 LAB
Lab: ABNORMAL
METHYLMALONATE SERPL-SCNC: 383 NMOL/L (ref 0–378)

## 2021-04-04 ENCOUNTER — HOSPITAL ENCOUNTER (EMERGENCY)
Age: 61
Discharge: HOME HEALTH | End: 2021-04-04
Attending: EMERGENCY MEDICINE
Payer: MEDICARE

## 2021-04-04 VITALS
DIASTOLIC BLOOD PRESSURE: 65 MMHG | TEMPERATURE: 98.5 F | SYSTOLIC BLOOD PRESSURE: 122 MMHG | HEART RATE: 92 BPM | RESPIRATION RATE: 18 BRPM | OXYGEN SATURATION: 95 %

## 2021-04-04 DIAGNOSIS — N30.00 ACUTE CYSTITIS WITHOUT HEMATURIA: Primary | ICD-10-CM

## 2021-04-04 DIAGNOSIS — T83.9XXA PROBLEM WITH FOLEY CATHETER, INITIAL ENCOUNTER (HCC): ICD-10-CM

## 2021-04-04 DIAGNOSIS — L89.159 PRESSURE INJURY OF SKIN OF SACRAL REGION, UNSPECIFIED INJURY STAGE: ICD-10-CM

## 2021-04-04 DIAGNOSIS — R53.81 DEBILITY: ICD-10-CM

## 2021-04-04 LAB
ALBUMIN SERPL-MCNC: 3.3 G/DL (ref 3.5–5)
ALBUMIN/GLOB SERPL: 0.8 {RATIO} (ref 1.2–3.5)
ALP SERPL-CCNC: 129 U/L (ref 50–136)
ALT SERPL-CCNC: 53 U/L (ref 12–65)
AMORPH CRY URNS QL MICRO: ABNORMAL
ANION GAP SERPL CALC-SCNC: 5 MMOL/L (ref 7–16)
APPEARANCE UR: ABNORMAL
AST SERPL-CCNC: 34 U/L (ref 15–37)
BACTERIA URNS QL MICRO: ABNORMAL /HPF
BASOPHILS # BLD: 0.1 K/UL (ref 0–0.2)
BASOPHILS NFR BLD: 1 % (ref 0–2)
BILIRUB SERPL-MCNC: 0.2 MG/DL (ref 0.2–1.1)
BILIRUB UR QL: NEGATIVE
BUN SERPL-MCNC: 29 MG/DL (ref 6–23)
CALCIUM SERPL-MCNC: 9.7 MG/DL (ref 8.3–10.4)
CASTS URNS QL MICRO: 0 /LPF
CHLORIDE SERPL-SCNC: 106 MMOL/L (ref 98–107)
CO2 SERPL-SCNC: 27 MMOL/L (ref 21–32)
COLOR UR: YELLOW
CREAT SERPL-MCNC: 0.46 MG/DL (ref 0.6–1)
CRYSTALS URNS QL MICRO: ABNORMAL /LPF
DIFFERENTIAL METHOD BLD: ABNORMAL
EOSINOPHIL # BLD: 0.3 K/UL (ref 0–0.8)
EOSINOPHIL NFR BLD: 4 % (ref 0.5–7.8)
EPI CELLS #/AREA URNS HPF: 0 /HPF
ERYTHROCYTE [DISTWIDTH] IN BLOOD BY AUTOMATED COUNT: 19.3 % (ref 11.9–14.6)
GLOBULIN SER CALC-MCNC: 3.9 G/DL (ref 2.3–3.5)
GLUCOSE SERPL-MCNC: 92 MG/DL (ref 65–100)
GLUCOSE UR STRIP.AUTO-MCNC: NEGATIVE MG/DL
HCT VFR BLD AUTO: 37.2 % (ref 35.8–46.3)
HGB BLD-MCNC: 11.5 G/DL (ref 11.7–15.4)
HGB UR QL STRIP: ABNORMAL
IMM GRANULOCYTES # BLD AUTO: 0 K/UL (ref 0–0.5)
IMM GRANULOCYTES NFR BLD AUTO: 0 % (ref 0–5)
KETONES UR QL STRIP.AUTO: NEGATIVE MG/DL
LACTATE SERPL-SCNC: 0.7 MMOL/L (ref 0.4–2)
LEUKOCYTE ESTERASE UR QL STRIP.AUTO: ABNORMAL
LYMPHOCYTES # BLD: 0.7 K/UL (ref 0.5–4.6)
LYMPHOCYTES NFR BLD: 9 % (ref 13–44)
MAGNESIUM SERPL-MCNC: 2.1 MG/DL (ref 1.8–2.4)
MCH RBC QN AUTO: 25.5 PG (ref 26.1–32.9)
MCHC RBC AUTO-ENTMCNC: 30.9 G/DL (ref 31.4–35)
MCV RBC AUTO: 82.5 FL (ref 79.6–97.8)
MONOCYTES # BLD: 0.6 K/UL (ref 0.1–1.3)
MONOCYTES NFR BLD: 8 % (ref 4–12)
MUCOUS THREADS URNS QL MICRO: ABNORMAL /LPF
NEUTS SEG # BLD: 5.5 K/UL (ref 1.7–8.2)
NEUTS SEG NFR BLD: 78 % (ref 43–78)
NITRITE UR QL STRIP.AUTO: POSITIVE
NRBC # BLD: 0 K/UL (ref 0–0.2)
OTHER OBSERVATIONS,UCOM: ABNORMAL
PH UR STRIP: 8.5 [PH] (ref 5–9)
PLATELET # BLD AUTO: 443 K/UL (ref 150–450)
PMV BLD AUTO: 10.9 FL (ref 9.4–12.3)
POTASSIUM SERPL-SCNC: 4.4 MMOL/L (ref 3.5–5.1)
PROCALCITONIN SERPL-MCNC: <0.05 NG/ML
PROT SERPL-MCNC: 7.2 G/DL (ref 6.3–8.2)
PROT UR STRIP-MCNC: 100 MG/DL
RBC # BLD AUTO: 4.51 M/UL (ref 4.05–5.2)
RBC #/AREA URNS HPF: ABNORMAL /HPF
SODIUM SERPL-SCNC: 138 MMOL/L (ref 136–145)
SP GR UR REFRACTOMETRY: 1.01 (ref 1–1.02)
UROBILINOGEN UR QL STRIP.AUTO: 0.2 EU/DL (ref 0.2–1)
WBC # BLD AUTO: 7.1 K/UL (ref 4.3–11.1)
WBC URNS QL MICRO: >100 /HPF

## 2021-04-04 PROCEDURE — 81003 URINALYSIS AUTO W/O SCOPE: CPT

## 2021-04-04 PROCEDURE — 51702 INSERT TEMP BLADDER CATH: CPT

## 2021-04-04 PROCEDURE — 83605 ASSAY OF LACTIC ACID: CPT

## 2021-04-04 PROCEDURE — 81015 MICROSCOPIC EXAM OF URINE: CPT

## 2021-04-04 PROCEDURE — 99285 EMERGENCY DEPT VISIT HI MDM: CPT

## 2021-04-04 PROCEDURE — 87086 URINE CULTURE/COLONY COUNT: CPT

## 2021-04-04 PROCEDURE — 84145 PROCALCITONIN (PCT): CPT

## 2021-04-04 PROCEDURE — 87186 SC STD MICRODIL/AGAR DIL: CPT

## 2021-04-04 PROCEDURE — 80053 COMPREHEN METABOLIC PANEL: CPT

## 2021-04-04 PROCEDURE — 85025 COMPLETE CBC W/AUTO DIFF WBC: CPT

## 2021-04-04 PROCEDURE — 87040 BLOOD CULTURE FOR BACTERIA: CPT

## 2021-04-04 PROCEDURE — 83735 ASSAY OF MAGNESIUM: CPT

## 2021-04-04 PROCEDURE — 87088 URINE BACTERIA CULTURE: CPT

## 2021-04-04 RX ORDER — CEFPODOXIME PROXETIL 100 MG/1
100 TABLET, FILM COATED ORAL 2 TIMES DAILY
Qty: 20 TAB | Refills: 0 | Status: SHIPPED | OUTPATIENT
Start: 2021-04-04 | End: 2021-04-14

## 2021-04-04 NOTE — ED NOTES
I have reviewed discharge instructions with the patient . The patient verbalized understanding. Patient left ED via stretcher to home with Rere Cornea. Opportunities for questions and clarification provided. Patient given 1 scripts.

## 2021-04-04 NOTE — ED PROVIDER NOTES
Patient presents the ER for generalized weakness and fatigue. Patient states she has been feeling more weak the past couple days. Has longstanding history of EMS, essentially bedbound. Has home health. Has not seen home health for the past couple days. EMS was called out today for worsening weakness. Patient was found saturating her feces and urine. Mathew catheter was in place but did not appear to be functioning. Appear to be somewhat confused    The history is provided by the patient. The history is limited by the condition of the patient. Fatigue  This is a new problem. The current episode started more than 2 days ago. The problem has not changed since onset. Pertinent negatives include no loss of sensation, no loss of balance, no slurred speech, no speech difficulty, no memory loss and no visual change. Patient reports a subjective fever - was not measured. Associated symptoms include altered mental status. Pertinent negatives include no shortness of breath, no chest pain, no vomiting and no choking.         Past Medical History:   Diagnosis Date    Decubitus ulcer of ischial area     Hypertension     Kidney infection     Menopause     MS (multiple sclerosis) ([facility] [address]        Past Surgical History:   Procedure Laterality Date    COLONOSCOPY N/A 6/29/2020    COLONOSCOPY /Room 424 performed by [facility]ael [facility] at [facility] ENDOSCOPY    ERCP  5/15/2020         HX CHOLECYSTECTOMY  05/18/2020         Family History:   Problem Relation Age of Onset    Breast Cancer Mother        Social History     Socioeconomic History    Marital status:      Spouse name: Not on file    Number of children: Not on file    Years of education: Not on file    Highest education level: Not on file   Occupational History    Not on file   Social Needs    Financial resource strain: Not on file    Food insecurity     Worry: Not on file     Inability: Not on file    Transportation needs     Medical: Not on file Non-medical: Not on file   Tobacco Use    Smoking status: Former Smoker    Smokeless tobacco: Never Used   Substance and Sexual Activity    Alcohol use: Not Currently    Drug use: Not Currently    Sexual activity: Not on file   Lifestyle    Physical activity     Days per week: Not on file     Minutes per session: Not on file    Stress: Not on file   Relationships    Social connections     Talks on phone: Not on file     Gets together: Not on file     Attends Confucianist service: Not on file     Active member of club or organization: Not on file     Attends meetings of clubs or organizations: Not on file     Relationship status: Not on file    Intimate partner violence     Fear of current or ex partner: Not on file     Emotionally abused: Not on file     Physically abused: Not on file     Forced sexual activity: Not on file   Other Topics Concern     Service Not Asked    Blood Transfusions Not Asked    Caffeine Concern Not Asked    Occupational Exposure Not Asked   Royanne Nicolle Hazards Not Asked    Sleep Concern Not Asked    Stress Concern Not Asked    Weight Concern Not Asked    Special Diet Not Asked    Back Care Not Asked    Exercise Not Asked    Bike Helmet Not Asked   [address] Not Asked    Self-Exams Not Asked   Social History Narrative    Not on file         ALLERGIES: Latex, Norco [hydrocodone-acetaminophen], and Pcn [penicillins]    Review of Systems   Constitutional: Positive for fatigue. Negative for fever. HENT: Negative for congestion, dental problem, trouble swallowing and voice change. Eyes: Negative for photophobia and redness. Respiratory: Negative for choking, chest tightness, shortness of breath and stridor. Cardiovascular: Negative for chest pain. Gastrointestinal: Negative for anal bleeding and vomiting. Genitourinary: Negative for decreased urine volume and urgency. Skin: Positive for wound. Neurological: Positive for weakness.  Negative for speech difficulty and loss of balance. Hematological: Negative for adenopathy. Psychiatric/Behavioral: Negative for memory loss. All other systems reviewed and are negative. Vitals:    04/04/21 0520 04/04/21 0524   BP: 125/62 125/62   Pulse: (!) 102 (!) 104   Resp:  18   Temp:  98.5 °F (36.9 °C)   SpO2: 99% 100%            Physical Exam  Vitals signs and nursing note reviewed. Constitutional:       General: She is not in acute distress. Appearance: Normal appearance. She is ill-appearing. HENT:      Head: Normocephalic and atraumatic. Nose: Nose normal. No congestion or rhinorrhea. Mouth/Throat:      Mouth: Mucous membranes are moist.   Eyes:      Extraocular Movements: Extraocular movements intact. Conjunctiva/sclera: Conjunctivae normal.      Pupils: Pupils are equal, round, and reactive to light. Neck:      Musculoskeletal: Normal range of motion and neck supple. Cardiovascular:      Rate and Rhythm: Normal rate and regular rhythm. Pulses: Normal pulses. Heart sounds: Normal heart sounds. No murmur. No friction rub. Pulmonary:      Effort: Pulmonary effort is normal.      Breath sounds: Normal breath sounds. Abdominal:      General: Abdomen is flat. There is no distension. Palpations: Abdomen is soft. There is no mass. Skin:     General: Skin is warm. Coloration: Skin is not jaundiced. Findings: No bruising. Neurological:      General: No focal deficit present. Mental Status: She is alert. Psychiatric:         Mood and Affect: Mood normal.         Behavior: Behavior normal.              MDM  Number of Diagnoses or Management Options  Diagnosis management comments: Chronically ill-appearing patient. Mathew catheter was changed out by nursing staff with flow of cloudy appearing urine now.   Recent hospitalization secondary to sepsis and UTI.    9:46 AM  No obvious signs of sepsis this patient has normal procalcitonin, normal white blood cell count    She does have a wound in her sacral decubitus area but no gross signs of infection. She feels better since Mathew catheter has been changed. Discussed that she may have some new UTI versus Coloniization. States would rather not be admitted to the hospital.  States she has a home health nurse will be coming to see her on a daily basis starting tomorrow. I feel it is reasonable to discharge her home with a course of antibiotic pills. Amount and/or Complexity of Data Reviewed  Clinical lab tests: ordered and reviewed  Review and summarize past medical records: yes  Independent visualization of images, tracings, or specimens: yes    Risk of Complications, Morbidity, and/or Mortality  Presenting problems: moderate  Diagnostic procedures: low  Management options: moderate    Patient Progress  Patient progress: stable         Procedures          Results Include:    Recent Results (from the past 24 hour(s))   CBC WITH AUTOMATED DIFF    Collection Time: 04/04/21  5:27 AM   Result Value Ref Range    WBC 7.1 4.3 - 11.1 K/uL    RBC 4.51 4.05 - 5.2 M/uL    HGB 11.5 (L) 11.7 - 15.4 g/dL    HCT 37.2 35.8 - 46.3 %    MCV 82.5 79.6 - 97.8 FL    MCH 25.5 (L) 26.1 - 32.9 PG    MCHC 30.9 (L) 31.4 - 35.0 g/dL    RDW 19.3 (H) 11.9 - 14.6 %    PLATELET 934 986 - 702 K/uL    MPV 10.9 9.4 - 12.3 FL    ABSOLUTE NRBC 0.00 0.0 - 0.2 K/uL    DF AUTOMATED      NEUTROPHILS 78 43 - 78 %    LYMPHOCYTES 9 (L) 13 - 44 %    MONOCYTES 8 4.0 - 12.0 %    EOSINOPHILS 4 0.5 - 7.8 %    BASOPHILS 1 0.0 - 2.0 %    IMMATURE GRANULOCYTES 0 0.0 - 5.0 %    ABS. NEUTROPHILS 5.5 1.7 - 8.2 K/UL    ABS. LYMPHOCYTES 0.7 0.5 - 4.6 K/UL    ABS. MONOCYTES 0.6 0.1 - 1.3 K/UL    ABS. EOSINOPHILS 0.3 0.0 - 0.8 K/UL    ABS. BASOPHILS 0.1 0.0 - 0.2 K/UL    ABS. IMM.  GRANS. 0.0 0.0 - 0.5 K/UL   METABOLIC PANEL, COMPREHENSIVE    Collection Time: 04/04/21  5:27 AM   Result Value Ref Range    Sodium 138 136 - 145 mmol/L    Potassium 4.4 3.5 - 5.1 mmol/L Chloride 106 98 - 107 mmol/L    CO2 27 21 - 32 mmol/L    Anion gap 5 (L) 7 - 16 mmol/L    Glucose 92 65 - 100 mg/dL    BUN 29 (H) 6 - 23 MG/DL    Creatinine 0.46 (L) 0.6 - 1.0 MG/DL    GFR est AA >60 >60 ml/min/1.73m2    GFR est non-AA >60 >60 ml/min/1.73m2    Calcium 9.7 8.3 - 10.4 MG/DL    Bilirubin, total 0.2 0.2 - 1.1 MG/DL    ALT (SGPT) 53 12 - 65 U/L    AST (SGOT) 34 15 - 37 U/L    Alk. phosphatase 129 50 - 136 U/L    Protein, total 7.2 6.3 - 8.2 g/dL    Albumin 3.3 (L) 3.5 - 5.0 g/dL    Globulin 3.9 (H) 2.3 - 3.5 g/dL    A-G Ratio 0.8 (L) 1.2 - 3.5     PROCALCITONIN    Collection Time: 04/04/21  5:27 AM   Result Value Ref Range    Procalcitonin <0.05 ng/mL   MAGNESIUM    Collection Time: 04/04/21  5:27 AM   Result Value Ref Range    Magnesium 2.1 1.8 - 2.4 mg/dL   URINALYSIS W/ RFLX MICROSCOPIC    Collection Time: 04/04/21  6:28 AM   Result Value Ref Range    Color YELLOW      Appearance TURBID      Specific gravity 1.015 1.001 - 1.023      pH (UA) 8.5 5.0 - 9.0      Protein 100 (A) NEG mg/dL    Glucose Negative mg/dL    Ketone Negative NEG mg/dL    Bilirubin Negative NEG      Blood SMALL (A) NEG      Urobilinogen 0.2 0.2 - 1.0 EU/dL    Nitrites Positive (A) NEG      Leukocyte Esterase SMALL (A) NEG     URINE MICROSCOPIC    Collection Time: 04/04/21  6:28 AM   Result Value Ref Range    WBC >100 0 /hpf    RBC  0 /hpf    Epithelial cells 0 0 /hpf    Bacteria 4+ (H) 0 /hpf    Casts 0 0 /lpf    Crystals, urine TRIPLE PHOSPHATE 0 /LPF    Amorphous Crystals 4+ (H) 0    Mucus 4+ (H) 0 /lpf    Other observations RESULTS VERIFIED MANUALLY       Voice dictation software was used during the making of this note. This software is not perfect and grammatical and other typographical errors may be present. This note has been proofread, but may still contain errors.   Ajit Coppola MD; 4/4/2021 @9:48 AM   ===================================================================

## 2021-04-04 NOTE — ED TRIAGE NOTES
Pt comes in with EMS c/o foul smelling urine and \" displaced leaky wasserman catheter\" Pt recently hospitalized for 1 month for UTI, discharged home with a wasserman cath from 3/30/21 per pt. Pt AAOx4, denied fever, dyspnea or any other complaints at this time.

## 2021-04-04 NOTE — DISCHARGE INSTRUCTIONS
Take antibiotics as prescribed  Follow-up with wound care as well as your primary care physician  Return to the ER for any new, worsening or life-threatening symptoms

## 2021-04-07 ENCOUNTER — PATIENT OUTREACH (OUTPATIENT)
Dept: CASE MANAGEMENT | Age: 61
End: 2021-04-07

## 2021-04-07 LAB
BACTERIA SPEC CULT: ABNORMAL
BACTERIA SPEC CULT: ABNORMAL
SERVICE CMNT-IMP: ABNORMAL

## 2021-04-07 NOTE — PROGRESS NOTES
Patient Final DRG not qualifying DRG for sepsis bundle. Patient removed from Sepsis bundle and episode type changed.

## 2021-04-07 NOTE — PROGRESS NOTES
ED pharmacist reviewed recent results of urine culture. The patient received a prescription for cefpodoxime 100 mg PO BID x10 days upon discharge. Based on culture results, the patient is being adequately treated for the identified infection with existing antimicrobial therapy. No further intervention needed. Allergies as of 04/04/2021 - Review Complete 04/04/2021   Allergen Reaction Noted    Latex Rash 01/13/2012    Norco [hydrocodone-acetaminophen] Other (comments) 05/21/2020    Pcn [penicillins] Swelling 01/13/2012     CULTURE, URINE  Order: [id number]  Status:  Final result   Visible to patient:  Yes (MyChart)  Specimen Information: Clean catch; Urine        (important suggestion)  Newer results are available. Click to view them now.     Ref Range & Units 3d ago 10d ago 1mo ago   Special Requests:   NO SPECIAL REQUESTS  NO SPECIAL REQUESTS  NO SPECIAL REQUESTS    Culture result:   >100,000 COLONIES/mL PROTEUS MIRABILISAbnormal   >100,000 COLONIES/mL PROTEUS MIRABILISAbnormal   >100,000 COLONIES/mL PROTEUS MIRABILISAbnormal     Culture result:   <10,000 COLONIES/mL NORMAL SKIN ANDREW ISOLATED  >100,000 COLONIES/mL KLEBSIELLA PNEUMONIAE ** (EXTENDED SPECTRUM BETA LACTAMASE ) **Abnormal   50,000-100,000 COLONIES/mL MIXED SKIN ANDREW ISOLATED    Culture result:   ** MULTI-DRUG RESISTANT ORGANISM ** R     Culture result:   ESBL CALLED TO AND CORRECTLY REPEATED BY:   Ghanshyam Mcnulty MD FOR DR. Elsi Nassar IN PERFECT SERVE. [id number] [facility]  [address] [place] [address], [address], [address]   Susceptibility     Proteus mirabilis     JHONNY     Ampicillin/sulbactam ($) Susceptible     Aztreonam ($$$$) Susceptible     Cefazolin ($) Susceptible     Cefepime ($$) Susceptible     Cefoxitin Susceptible     Ceftriaxone ($) Susceptible     Gentamicin ($) Susceptible     Piperacillin/Tazobac ($) Susceptible     Tobramycin ($) Susceptible     Trimeth-Sulfamethoxa Susceptible                 Specimen Collected: 04/04/21  6:28 AM Last Resulted: 04/07/21  7:58 AM

## 2021-04-09 LAB
BACTERIA SPEC CULT: NORMAL
SERVICE CMNT-IMP: NORMAL

## 2021-04-11 ENCOUNTER — APPOINTMENT (OUTPATIENT)
Dept: GENERAL RADIOLOGY | Age: 61
End: 2021-04-11
Attending: EMERGENCY MEDICINE
Payer: OTHER MISCELLANEOUS

## 2021-04-11 ENCOUNTER — HOSPITAL ENCOUNTER (OUTPATIENT)
Age: 61
Setting detail: OBSERVATION
Discharge: HOME HEALTH | End: 2021-04-11
Attending: EMERGENCY MEDICINE | Admitting: FAMILY MEDICINE
Payer: OTHER MISCELLANEOUS

## 2021-04-11 VITALS
WEIGHT: 85 LBS | OXYGEN SATURATION: 98 % | DIASTOLIC BLOOD PRESSURE: 70 MMHG | SYSTOLIC BLOOD PRESSURE: 136 MMHG | TEMPERATURE: 98.4 F | BODY MASS INDEX: 13.31 KG/M2 | HEART RATE: 74 BPM | RESPIRATION RATE: 16 BRPM

## 2021-04-11 DIAGNOSIS — L98.429 SKIN ULCER OF SACRUM, UNSPECIFIED ULCER STAGE (HCC): ICD-10-CM

## 2021-04-11 DIAGNOSIS — G35 MS (MULTIPLE SCLEROSIS) (HCC): Primary | ICD-10-CM

## 2021-04-11 DIAGNOSIS — L89.609 PRESSURE INJURY OF SKIN OF HEEL, UNSPECIFIED INJURY STAGE, UNSPECIFIED LATERALITY: ICD-10-CM

## 2021-04-11 LAB
ALBUMIN SERPL-MCNC: 3.2 G/DL (ref 3.5–5)
ALBUMIN/GLOB SERPL: 1 {RATIO} (ref 1.2–3.5)
ALP SERPL-CCNC: 111 U/L (ref 50–130)
ALT SERPL-CCNC: 26 U/L (ref 12–65)
ANION GAP SERPL CALC-SCNC: 4 MMOL/L (ref 7–16)
AST SERPL-CCNC: 19 U/L (ref 15–37)
BASOPHILS # BLD: 0 K/UL (ref 0–0.2)
BASOPHILS NFR BLD: 1 % (ref 0–2)
BILIRUB SERPL-MCNC: 0.2 MG/DL (ref 0.2–1.1)
BUN SERPL-MCNC: 15 MG/DL (ref 6–23)
CALCIUM SERPL-MCNC: 9.1 MG/DL (ref 8.3–10.4)
CHLORIDE SERPL-SCNC: 107 MMOL/L (ref 98–107)
CO2 SERPL-SCNC: 31 MMOL/L (ref 21–32)
CREAT SERPL-MCNC: 0.29 MG/DL (ref 0.6–1)
DIFFERENTIAL METHOD BLD: ABNORMAL
EOSINOPHIL # BLD: 0.2 K/UL (ref 0–0.8)
EOSINOPHIL NFR BLD: 4 % (ref 0.5–7.8)
ERYTHROCYTE [DISTWIDTH] IN BLOOD BY AUTOMATED COUNT: 19.6 % (ref 11.9–14.6)
GLOBULIN SER CALC-MCNC: 3.3 G/DL (ref 2.3–3.5)
GLUCOSE SERPL-MCNC: 90 MG/DL (ref 65–100)
HCT VFR BLD AUTO: 36.2 % (ref 35.8–46.3)
HGB BLD-MCNC: 11.4 G/DL (ref 11.7–15.4)
IMM GRANULOCYTES # BLD AUTO: 0 K/UL (ref 0–0.5)
IMM GRANULOCYTES NFR BLD AUTO: 0 % (ref 0–5)
LYMPHOCYTES # BLD: 1 K/UL (ref 0.5–4.6)
LYMPHOCYTES NFR BLD: 16 % (ref 13–44)
MCH RBC QN AUTO: 26.1 PG (ref 26.1–32.9)
MCHC RBC AUTO-ENTMCNC: 31.5 G/DL (ref 31.4–35)
MCV RBC AUTO: 82.8 FL (ref 79.6–97.8)
MONOCYTES # BLD: 0.5 K/UL (ref 0.1–1.3)
MONOCYTES NFR BLD: 8 % (ref 4–12)
NEUTS SEG # BLD: 4.3 K/UL (ref 1.7–8.2)
NEUTS SEG NFR BLD: 71 % (ref 43–78)
NRBC # BLD: 0 K/UL (ref 0–0.2)
PLATELET # BLD AUTO: 405 K/UL (ref 150–450)
PMV BLD AUTO: 10.5 FL (ref 9.4–12.3)
POTASSIUM SERPL-SCNC: 4 MMOL/L (ref 3.5–5.1)
PROT SERPL-MCNC: 6.5 G/DL (ref 6.3–8.2)
RBC # BLD AUTO: 4.37 M/UL (ref 4.05–5.2)
SODIUM SERPL-SCNC: 142 MMOL/L (ref 136–145)
WBC # BLD AUTO: 6.1 K/UL (ref 4.3–11.1)

## 2021-04-11 PROCEDURE — 99285 EMERGENCY DEPT VISIT HI MDM: CPT

## 2021-04-11 PROCEDURE — 99218 HC RM OBSERVATION: CPT

## 2021-04-11 PROCEDURE — 80053 COMPREHEN METABOLIC PANEL: CPT

## 2021-04-11 PROCEDURE — 71045 X-RAY EXAM CHEST 1 VIEW: CPT

## 2021-04-11 PROCEDURE — 73630 X-RAY EXAM OF FOOT: CPT

## 2021-04-11 PROCEDURE — 85025 COMPLETE CBC W/AUTO DIFF WBC: CPT

## 2021-04-11 RX ORDER — POLYETHYLENE GLYCOL 3350 17 G/17G
17 POWDER, FOR SOLUTION ORAL DAILY PRN
Status: DISCONTINUED | OUTPATIENT
Start: 2021-04-11 | End: 2021-04-11 | Stop reason: HOSPADM

## 2021-04-11 RX ORDER — ONDANSETRON 2 MG/ML
4 INJECTION INTRAMUSCULAR; INTRAVENOUS
Status: DISCONTINUED | OUTPATIENT
Start: 2021-04-11 | End: 2021-04-11 | Stop reason: HOSPADM

## 2021-04-11 RX ORDER — SODIUM CHLORIDE 0.9 % (FLUSH) 0.9 %
5-40 SYRINGE (ML) INJECTION EVERY 8 HOURS
Status: DISCONTINUED | OUTPATIENT
Start: 2021-04-11 | End: 2021-04-11 | Stop reason: HOSPADM

## 2021-04-11 RX ORDER — SODIUM CHLORIDE 0.9 % (FLUSH) 0.9 %
5-40 SYRINGE (ML) INJECTION AS NEEDED
Status: DISCONTINUED | OUTPATIENT
Start: 2021-04-11 | End: 2021-04-11 | Stop reason: HOSPADM

## 2021-04-11 RX ORDER — TROSPIUM CHLORIDE 20 MG/1
20 TABLET, FILM COATED ORAL
Status: DISCONTINUED | OUTPATIENT
Start: 2021-04-11 | End: 2021-04-11 | Stop reason: HOSPADM

## 2021-04-11 NOTE — PROGRESS NOTES
SARY is familiar with patient from recent admission. SARY met with patient in the ED while social distancing w/appropriate PPE. Patient states that she had a bad dream last night where she thought someone was in her house. She became scared and called her sister Riya Todd) who told her to push her life-alert button. Subsequently, EMS brought patient to the hospital.      Patient states that she has been paying for sitter services thru [email or web address]  since discharging from the hospital.  Additionally, she has been receiving home health thru [facility] (RN, PT, OT, aide). Per patient, she has met with a hospice company, but she does not know which hospice. Phone call placed to patient's sister Riya Todd, [phone]). Josejosette Anne provided name/phone #s for [facility] (P: [phone]). Phone call with [facility] ([phone]). Eusebio Lowe confirms that patient was admitted to hospice services on Friday (4/9) and was discharged from  [address]  on Thursday (4/8). Patient is receiving the following services thru [facility]:  Nurse 3/week (wound care), aide 5/week, , & . Lengthy discussion with patient regarding readmission versus discharging home today. Patient was adamant that she does not want to be admitted to the hospital.  Per patient, \"I want to go home. I don't want to go to a nursing home. \"  This  feels that patient is of sound mind to make these decisions for herself. Patient states that she has access to adequate food as she orders food & groceries online and has them delivered to the home. Additionally, patient reports that she's able to get around her house with a scooter. Phone call placed to [facility] who will have a nurse come to the patient's home this afternoon. Phone call to [address] [place] [address] ([phone]) who confirmed that patient has services from 8am - noon 7 days a week.   [facility] does not have a sitter who can come out today - patient is aware of this. Phone call placed to patient's ex- Jose R Patrick, [phone]) who states that he cannot come to patient's home today for support. Phone call placed to Lucio Dumont (sister) who confirmed that she can stay with patient for the remainder of the day. Patient given written contact information for both [facility]. 1300:  Phone call to [employer] ([phone]). Concerns reported to [employer].     ARNULFO Ramirez  [address] [facility]   [phone]

## 2021-04-11 NOTE — H&P
Hospitalist Admission History and Physical     NAME:  Richard Malhotra   Age:  62 y.o.  :   1960   MRN:   [id number]  PCP: None  Consulting MD:  Treatment Team: Attending Provider: Lynette Estrella DO; Primary Nurse: Evita GUY    Chief Complaint   Patient presents with    Wound Check         HPI:   Patient is a 62 y.o. female who presented to the ED for help with daily living needs. Lost her comfort keepers today and came ER for help. Has exhausted her insurance days at rehab already. Lives at home alone, bed bound, chronic sacral/heel wounds and multiple admissions. Hx of MS and has no family support. She is trying to get hospice, but then states she is not ready to be made comfortable. Vitals - Stable    Labs - Benign    Right foot x ray - no evidence of osteo  Past Medical History:   Diagnosis Date    Decubitus ulcer of ischial area     Hypertension     Kidney infection     Menopause     MS (multiple sclerosis) ([address]         Past Surgical History:   Procedure Laterality Date    COLONOSCOPY N/A 2020    COLONOSCOPY /Room 18 performed by Waqas Jimenez MD [facility] ENDOSCOPY    ERCP  5/15/2020         HX CHOLECYSTECTOMY  2020        Family History   Problem Relation Age of Onset    Breast Cancer Mother        Social History     Social History Narrative    Not on file        Social History     Tobacco Use    Smoking status: Former Smoker    Smokeless tobacco: Never Used   Substance Use Topics    Alcohol use: Not Currently        Social History     Substance and Sexual Activity   Drug Use Not Currently         Allergies   Allergen Reactions    Latex Rash    Norco [Hydrocodone-Acetaminophen] Other (comments)     Sees dead people    Pcn [Penicillins] Swelling       Prior to Admission medications    Medication Sig Start Date End Date Taking? Authorizing Provider   cefpodoxime (VANTIN) 100 mg tablet Take 1 Tab by mouth two (2) times a day for 10 days.  21 Chong Nunez MD   ascorbic acid, vitamin C, (VITAMIN C) 250 mg tablet Take 1 Tab by mouth daily. 4/1/21   Jessy Acosta MD   cyanocobalamin 1,000 mcg tablet Take 1 Tab by mouth daily. 4/1/21   Jessy Acosta MD   famotidine (PEPCID) 20 mg tablet Take 1 Tab by mouth daily. 3/31/21   Jessy Acosta MD   morphine CR (MS CONTIN) 15 mg CR tablet Take 1 Tab by mouth every twelve (12) hours for 10 days. Max Daily Amount: 30 mg. 3/31/21 4/10/21  Jessy Acosta MD   zinc sulfate 66 mg tab Take 220 mg by mouth daily for 30 days. 4/1/21 5/1/21  Jessy Acosta MD   naloxone [facility]) 4 mg/actuation nasal spray Use 1 spray intranasally, then discard. Repeat with new spray every 2 min as needed for opioid overdose symptoms, alternating nostrils. 3/31/21   Jessy Acosta MD   aspirin delayed-release 81 mg tablet Take 81 mg by mouth daily. Provider, Historical   baclofen (LIORESAL) 20 mg tablet Take 20 mg by mouth three (3) times daily. Provider, Historical   cholecalciferol (VITAMIN D3) 25 mcg (1,000 unit) cap Take 1,000 Units by mouth daily. Provider, Historical   Saccharomyces boulardii (Florastor) 250 mg capsule Take 250 mg by mouth two (2) times a day. Provider, Historical   hydrOXYzine HCL (ATARAX) 25 mg tablet Take 25 mg by mouth every eight (8) hours as needed. Provider, Historical   magnesium oxide (MAG-OX) 400 mg tablet Take 400 mg by mouth two (2) times a day. Provider, Historical   melatonin 5 mg cap capsule Take 5 mg by mouth nightly. Provider, Historical   polyethylene glycol (Miralax) 17 gram/dose powder Take 17 g by mouth daily. Provider, Historical   oxyCODONE IR (ROXICODONE) 10 mg tab immediate release tablet Take 10 mg by mouth every six (6) hours as needed for Pain. Provider, Historical   iron polysaccharides (NIFEREX) 150 mg iron capsule Take 150 mg by mouth daily. Provider, Historical   pregabalin (LYRICA) 50 mg capsule Take 50 mg by mouth daily.  Give 75mg at [facility]    Provider, Historical   senna (Senna) 8.6 mg tablet Take 2 Tabs by mouth two (2) times a day. Provider, Historical   multivitamin (ONE A DAY) tablet Take 1 Tab by mouth daily. Provider, Historical   apixaban (Eliquis DVT-PE Treat 30D Start) 5 mg (74 tabs) starter pack Take 10 mg (two 5 mg tablets) by mouth twice a day for 7 days   Followed by 5 mg (one 5 mg tablet) by mouth twice a day 12/11/20   Niecy Montiel MD   fingolimod 0.5 mg cap Take 0.5 mg by mouth daily. 5/23/20   Steve Levy DO   amantadine HCL (SYMMETREL) 100 mg capsule Take 100 mg by mouth two (2) times a day. Other, MD Marga           Review of Systems    Constitutional:  NAD  Eyes:  no change in visual acuity, no photophobia  Ears, nose, mouth, throat, and face: no  Odynphagia, dysphagia, no thrush or exudate, negative for chronic sinus congestion, recurrent headaches  Respiratory: negative for SOB, hemoptysis or cough  Cardiovascular: negative for CP, palpitations, or PND  Gastrointestinal: negative for abdominal pain, no hematemesis, hematochezia or BRBPR  Genitourinary: no urgency, frequency, or dysuria, no nocturia  Integument/breast: chronic sacral and heel wounds  Hematologic/lymphatic: negative for known bleeding disorder  Musculoskeletal:bedbound  Neurological: negative for lightheadedness, syncope or presyncopal events, no seizure or CVA history  Behavioral/Psych: negative for depression or chronic anxiety,   Endocrine: negative for polydyspia, polyuria or intolerance to heat or cold  Allergic/Immunologic: negative for chronic allergic rhinitis, or known connective tissue disorder      Objective:     Visit Vitals  /70   Pulse 74   Temp 98.4 °F (36.9 °C)   Resp 16   Wt 38.6 kg (85 lb)   SpO2 98%   BMI 13.31 kg/m²        No intake/output data recorded. No intake/output data recorded.     Data Review:   Recent Results (from the past 24 hour(s))   CBC WITH AUTOMATED DIFF    Collection Time: 04/11/21  9:25 AM   Result Value Ref Range    WBC 6.1 4.3 - 11.1 K/uL    RBC 4.37 4.05 - 5.2 M/uL    HGB 11.4 (L) 11.7 - 15.4 g/dL    HCT 36.2 35.8 - 46.3 %    MCV 82.8 79.6 - 97.8 FL    MCH 26.1 26.1 - 32.9 PG    MCHC 31.5 31.4 - 35.0 g/dL    RDW 19.6 (H) 11.9 - 14.6 %    PLATELET 400 264 - 833 K/uL    MPV 10.5 9.4 - 12.3 FL    ABSOLUTE NRBC 0.00 0.0 - 0.2 K/uL    DF AUTOMATED      NEUTROPHILS 71 43 - 78 %    LYMPHOCYTES 16 13 - 44 %    MONOCYTES 8 4.0 - 12.0 %    EOSINOPHILS 4 0.5 - 7.8 %    BASOPHILS 1 0.0 - 2.0 %    IMMATURE GRANULOCYTES 0 0.0 - 5.0 %    ABS. NEUTROPHILS 4.3 1.7 - 8.2 K/UL    ABS. LYMPHOCYTES 1.0 0.5 - 4.6 K/UL    ABS. MONOCYTES 0.5 0.1 - 1.3 K/UL    ABS. EOSINOPHILS 0.2 0.0 - 0.8 K/UL    ABS. BASOPHILS 0.0 0.0 - 0.2 K/UL    ABS. IMM. GRANS. 0.0 0.0 - 0.5 K/UL   METABOLIC PANEL, COMPREHENSIVE    Collection Time: 04/11/21  9:25 AM   Result Value Ref Range    Sodium 142 136 - 145 mmol/L    Potassium 4.0 3.5 - 5.1 mmol/L    Chloride 107 98 - 107 mmol/L    CO2 31 21 - 32 mmol/L    Anion gap 4 (L) 7 - 16 mmol/L    Glucose 90 65 - 100 mg/dL    BUN 15 6 - 23 MG/DL    Creatinine 0.29 (L) 0.6 - 1.0 MG/DL    GFR est AA >60 >60 ml/min/1.73m2    GFR est non-AA >60 >60 ml/min/1.73m2    Calcium 9.1 8.3 - 10.4 MG/DL    Bilirubin, total 0.2 0.2 - 1.1 MG/DL    ALT (SGPT) 26 12 - 65 U/L    AST (SGOT) 19 15 - 37 U/L    Alk. phosphatase 111 50 - 130 U/L    Protein, total 6.5 6.3 - 8.2 g/dL    Albumin 3.2 (L) 3.5 - 5.0 g/dL    Globulin 3.3 2.3 - 3.5 g/dL    A-G Ratio 1.0 (L) 1.2 - 3.5         Physical Exam:     General:  Alert, cooperative, very talkative, frail appearing. Eyes:  Conjunctivae/corneas clear. Ears:  Normal TMs and external ear canals both ears. Nose: Nares normal. Septum midline. Mucosa normal. No drainage or sinus tenderness. Mouth/Throat: Wearing mask   Neck:  no JVD. Back:   deferred   Lungs:   Clear to auscultation bilaterally. Heart:  Regular rate and rhythm, S1, S2 normal   Abdomen:   Soft, non-tender.  Bowel sounds normal. No masses,  No organomegaly. Extremities: Muscle wasting, limited ROM of extremities    Pulses: 2+ and symmetric all extremities. Skin: Scab to right heel. Soft tissue to heels. Did not assess sacrum-refer to photos taken in ER   Lymph nodes: Cervical, supraclavicular, and axillary nodes normal.   Neurologic: CNII-XII intact. Assessment and Plan     Principal Problem:    MS (multiple sclerosis) ([facility] (4/11/2021)    Active Problems:    Severe protein-calorie malnutrition ([facility] (5/13/2020)      Generalized weakness (3/3/2021)      Poor historian. She is unable to tell me what she takes at home. Called pharmacy and has not filled medications since January. Also noted to have Eliquis --but I seen no findings of PE from previous scans. Spoken to ER nurse who will further investigate, will add medications as indicated. Poor social situation. Will keep as obs to see if case management can help with way for her to have help at home. She declines nursing home which I think would be best option for her. Chronic sacral, heel wounds - No evidence of osteo.  Wound care to follow    Bladder spasms - sanctura    MS- waiting to see what meds she takes at home    She would like to be DNR    DVT prophylaxis - SCDs  Signed By: Yaz Lopez DO   April 11, 2021

## 2021-04-11 NOTE — DISCHARGE SUMMARY
Hospitalist Discharge Summary     Patient ID:  Cindy Parekh  [id number]  44 y.o.  1960  Admit date: 4/11/2021  9:00 AM  Discharge date and time: 4/11/2021  Attending: Jose Antonio Chaves DO  PCP:  None  Treatment Team: Attending Provider: Jose Antonio Chaves DO; Primary Nurse: Mamta GUY    Principal Diagnosis MS (multiple sclerosis) [facility])   Principal Problem:    MS (multiple sclerosis) ([facility] (4/11/2021)    Active Problems:    Severe protein-calorie malnutrition ([facility] (5/13/2020)      Generalized weakness (3/3/2021)     Hospital Course: 62 y.o. female who presented to the ED for help with daily living needs. Lost her comfort keepers today and came ER for help. Has exhausted her insurance days at rehab already. Lives at home alone, bed bound, chronic sacral/heel wounds and multiple admissions. Hx of MS and has no family support. She is trying to get hospice, but then states she is not ready to be made comfortable. Was to be admitted for further social work care but now patient is wanting to go back home. I have dc. Social work will keep working on case and reach out as outpatient. Please refer to the admission H&P for details of presentation. In summary, the patient is stable for discharge. Significant Diagnostic Studies:       Labs: Results:       Chemistry Recent Labs     04/11/21  0925   GLU 90      K 4.0      CO2 31   BUN 15   CREA 0.29*   CA 9.1   AGAP 4*      TP 6.5   ALB 3.2*   GLOB 3.3   AGRAT 1.0*      CBC w/Diff Recent Labs     04/11/21  0925   WBC 6.1   RBC 4.37   HGB 11.4*   HCT 36.2      GRANS 71   LYMPH 16   EOS 4      Cardiac Enzymes No results for input(s): CPK, CKND1, LORI in the last 72 hours. No lab exists for component: CKRMB, TROIP   Coagulation No results for input(s): PTP, INR, APTT, INREXT in the last 72 hours.     Lipid Panel No results found for: CHOL, CHOLPOCT, CHOLX, CHLST, CHOLV, 521412, HDL, HDLP, LDL, LDLC, DLDLP, 588584, VLDLC, VLDL, TGLX, TRIGL, TRIGP, TGLPOCT, CHHD, CHHDX   BNP No results for input(s): BNPP in the last 72 hours. Liver Enzymes Recent Labs     04/11/21  0925   TP 6.5   ALB 3.2*         Thyroid Studies No results found for: T4, T3U, TSH, TSHEXT         Discharge Exam:  Visit Vitals  /70   Pulse 74   Temp 98.4 °F (36.9 °C)   Resp 16   Wt 38.6 kg (85 lb)   SpO2 98%   BMI 13.31 kg/m²     General appearance: alert, cooperative,frail  Lungs: clear to auscultation bilaterally  Heart: regular rate and rhythm, S1, S2 normal  Abdomen: soft, non-tender. Bowel sounds normal.  Extremities: Scab to right heel. Soft tissue to heels. Did not assess sacrum-refer to photos taken in ER  Neurologic: Bedbound     Disposition:Home   Discharge Condition: stable  Patient Instructions: As above   Current Discharge Medication List      CONTINUE these medications which have NOT CHANGED    Details   cefpodoxime (VANTIN) 100 mg tablet Take 1 Tab by mouth two (2) times a day for 10 days. Qty: 20 Tab, Refills: 0      ascorbic acid, vitamin C, (VITAMIN C) 250 mg tablet Take 1 Tab by mouth daily. Qty: 30 Tab, Refills: 0      cyanocobalamin 1,000 mcg tablet Take 1 Tab by mouth daily. Qty: 30 Tab, Refills: 0      famotidine (PEPCID) 20 mg tablet Take 1 Tab by mouth daily. Qty: 30 Tab, Refills: 0      zinc sulfate 66 mg tab Take 220 mg by mouth daily for 30 days. Qty: 30 Tab, Refills: 0      naloxone (NARCAN) 4 mg/actuation nasal spray Use 1 spray intranasally, then discard. Repeat with new spray every 2 min as needed for opioid overdose symptoms, alternating nostrils. Qty: 1 Each, Refills: 0      aspirin delayed-release 81 mg tablet Take 81 mg by mouth daily. baclofen (LIORESAL) 20 mg tablet Take 20 mg by mouth three (3) times daily. cholecalciferol (VITAMIN D3) 25 mcg (1,000 unit) cap Take 1,000 Units by mouth daily. Saccharomyces boulardii (Florastor) 250 mg capsule Take 250 mg by mouth two (2) times a day. hydrOXYzine HCL (ATARAX) 25 mg tablet Take 25 mg by mouth every eight (8) hours as needed. magnesium oxide (MAG-OX) 400 mg tablet Take 400 mg by mouth two (2) times a day. melatonin 5 mg cap capsule Take 5 mg by mouth nightly. polyethylene glycol (Miralax) 17 gram/dose powder Take 17 g by mouth daily. oxyCODONE IR (ROXICODONE) 10 mg tab immediate release tablet Take 10 mg by mouth every six (6) hours as needed for Pain.      iron polysaccharides (NIFEREX) 150 mg iron capsule Take 150 mg by mouth daily. pregabalin (LYRICA) 50 mg capsule Take 50 mg by mouth daily. Give 75mg at HS      senna (Senna) 8.6 mg tablet Take 2 Tabs by mouth two (2) times a day. multivitamin (ONE A DAY) tablet Take 1 Tab by mouth daily. apixaban (Eliquis DVT-PE Treat 30D Start) 5 mg (74 tabs) starter pack Take 10 mg (two 5 mg tablets) by mouth twice a day for 7 days   Followed by 5 mg (one 5 mg tablet) by mouth twice a day  Qty: 1 Dose Pack, Refills: 0      fingolimod 0.5 mg cap Take 0.5 mg by mouth daily. Qty: 1 Cap, Refills: 0      amantadine HCL (SYMMETREL) 100 mg capsule Take 100 mg by mouth two (2) times a day. STOP taking these medications       morphine CR (MS CONTIN) 15 mg CR tablet Comments:   Reason for Stopping:               Activity:bedbound  Diet:regular   Wound Care:wound care at home for sacral and heel ulcers    Follow-up PCP in one week.    ·     Time spent to discharge patient 35 minutes  Signed:  Marci Lackey DO  4/11/2021  11:50 AM

## 2021-04-11 NOTE — ED NOTES
TRANSFER - OUT REPORT:    Verbal report given to [facility] RN on Chago Escudero  being transferred to [facility] for routine progression of care       Report consisted of patients Situation, Background, Assessment and   Recommendations(SBAR). Information from the following report(s) SBAR was reviewed with the receiving nurse. Lines:   Peripheral IV 04/11/21 Left Antecubital (Active)   Site Assessment Clean, dry, & intact 04/11/21 0932   Phlebitis Assessment 0 04/11/21 0932   Infiltration Assessment 0 04/11/21 0932        Opportunity for questions and clarification was provided.       Patient transported with:   [employer]

## 2021-04-11 NOTE — ED NOTES
I have reviewed discharge instructions with the patient. The patient verbalized understanding. Patient left ED via Discharge Method: stretcher to Home with ems. Opportunity for questions and clarification provided. Patient given 0 scripts. To continue your aftercare when you leave the hospital, you may receive an automated call from our care team to check in on how you are doing. This is a free service and part of our promise to provide the best care and service to meet your aftercare needs.  If you have questions, or wish to unsubscribe from this service please call [phone]. Thank you for Choosing our [address] [place] [address] Emergency Department.

## 2021-04-11 NOTE — ED TRIAGE NOTES
Patient comes via ems from home co unable to care for herself, patient has wounds to back due to being bed bound.

## 2021-04-11 NOTE — Clinical Note
Patient Class[de-identified] OBSERVATION [272]   Type of Bed: Medical [8]   Reason for Observation: MS - Social situation   Admitting Diagnosis: MS (multiple sclerosis) ([facility] [276399]   Admitting Physician: Jose Aviles   Attending Physician: Bety Pantoja [[id number]]

## 2021-04-11 NOTE — ED PROVIDER NOTES
Patient is a 64-year-old female with a history of MS who is bedbound presenting to the emergency department today unable to care for herself at home. She had home care up until Saturday but could no longer afford it. She has consulted hospice and has been evaluated however she has not found out if she is qualified for hospice or not. The patient does not currently have any insurance and has applied for Medicaid but been denied. The patient says that she is following with wound care secondary to bilateral heel and sacral ulcers. The patient notes that the one on the right foot has started to smell. She has pain to the areas where her ulcers are present. The patient denies any fevers or chills.            Past Medical History:   Diagnosis Date    Decubitus ulcer of ischial area     Hypertension     Kidney infection     Menopause     MS (multiple sclerosis) ([facility]        Past Surgical History:   Procedure Laterality Date    COLONOSCOPY N/A 6/29/2020    COLONOSCOPY /Room 18 performed by Prateek Ching MD at [facility] ENDOSCOPY    ERCP  5/15/2020         HX CHOLECYSTECTOMY  05/18/2020         Family History:   Problem Relation Age of Onset    Breast Cancer Mother        Social History     Socioeconomic History    Marital status:      Spouse name: Not on file    Number of children: Not on file    Years of education: Not on file    Highest education level: Not on file   Occupational History    Not on file   Social Needs    Financial resource strain: Not on file    Food insecurity     Worry: Not on file     Inability: Not on file   [employer] needs     Medical: Not on file     Non-medical: Not on file   Tobacco Use    Smoking status: Former Smoker    Smokeless tobacco: Never Used   Substance and Sexual Activity    Alcohol use: Not Currently    Drug use: Not Currently    Sexual activity: Not on file   Lifestyle    Physical activity     Days per week: Not on file     Minutes per session: Not on file    Stress: Not on file   Relationships    Social connections     Talks on phone: Not on file     Gets together: Not on file     Attends Mandaen service: Not on file     Active member of club or organization: Not on file     Attends meetings of clubs or organizations: Not on file     Relationship status: Not on file    Intimate partner violence     Fear of current or ex partner: Not on file     Emotionally abused: Not on file     Physically abused: Not on file     Forced sexual activity: Not on file   Other Topics Concern     Service Not Asked    Blood Transfusions Not Asked    Caffeine Concern Not Asked    Occupational Exposure Not Asked   Keon Bateman Hazards Not Asked    Sleep Concern Not Asked    Stress Concern Not Asked    Weight Concern Not Asked    Special Diet Not Asked    Back Care Not Asked    Exercise Not Asked    Bike Helmet Not Asked   [address] Not Asked    Self-Exams Not Asked   Social History Narrative    Not on file         ALLERGIES: Latex, Norco [hydrocodone-acetaminophen], and Pcn [penicillins]    Review of Systems   Skin: Positive for color change and wound. All other systems reviewed and are negative. Vitals:    04/11/21 0902   BP: 139/66   Pulse: 85   Resp: 16   Temp: 98 °F (36.7 °C)   SpO2: 98%   Weight: 38.6 kg (85 lb)            Physical Exam     GENERAL:The patient has Body mass index is 13.31 kg/m². Well-hydrated. No acute distress. VITAL SIGNS: Heart rate, blood pressure, respiratory rate reviewed as recorded in  nurse's notes  EYES: Pupils reactive. Extraocular motion intact. No conjunctival redness or drainage. NECK: Supple, no meningeal signs. Trachea midline. No masses or thyromegaly. LUNGS: Breath sounds clear and equal bilaterally no accessory muscle use  CHEST: No deformity  CARDIOVASCULAR: Regular rate and rhythm  ABDOMEN: Soft without tenderness. No palpable masses or organomegaly. No  peritoneal signs. No rigidity.    EXTREMITIES: No clubbing or cyanosis. No joint swelling. Normal muscle tone. No  restricted range of motion appreciated. NEUROLOGIC: Sensation is grossly intact. Cranial nerve exam reveals face is  symmetrical, tongue is midline speech is clear. SKIN: No petechiae. Good skin turgor palpated. Please see photos of pressure ulcers and sacral ulcers. PSYCHIATRIC: Alert and oriented. Appropriate behavior and judgment. SACRAL ULCER      RIGHT FOOT/HEEL        LEFT FOOT/HEEL    MDM  Number of Diagnoses or Management Options  Diagnosis management comments: Sepsis, chronic debility, electrolyte abnormality, renal failure, osteomyelitis, sacral ulcer, pressure ulcer       Amount and/or Complexity of Data Reviewed  Clinical lab tests: reviewed and ordered  Tests in the radiology section of CPT®: ordered and reviewed  Tests in the medicine section of CPT®: ordered and reviewed  Review and summarize past medical records: yes  Independent visualization of images, tracings, or specimens: yes      ED Course as of Apr 11 1031   Sun Apr 11, 2021   0956 I spoke with Elissa Carvajal the  who is very familiar with this patient. She will come and see her and start working on the case management placement side of things    [KH]   1020 IMPRESSION  No acute disease. XR CHEST PORT [KH]   1021 IMPRESSION  No acute osseous abnormality. XR FOOT RT MIN 3 V [KH]   1029 Dr. Cecelia Blanco with light duty hospitalist was consulted and she will come and see the patient in the emergency department.     []      ED Course User Index  [KH] Shaard Garibay, DO       Procedures

## 2021-04-12 ENCOUNTER — PATIENT OUTREACH (OUTPATIENT)
Dept: CASE MANAGEMENT | Age: 61
End: 2021-04-12

## 2021-04-12 NOTE — PROGRESS NOTES
Patient DRG changed her enrollment status in sepsis bundle. CTN attempted follow up for PEPE JARRETT , patient with 2 ED visits after losing home care. Patient now enrolled in hospice care. No transition of care outreach indicated due to patient discharge to hospice care.

## 2021-05-31 ENCOUNTER — APPOINTMENT (OUTPATIENT)
Dept: CT IMAGING | Age: 61
DRG: 091 | End: 2021-05-31
Attending: STUDENT IN AN ORGANIZED HEALTH CARE EDUCATION/TRAINING PROGRAM
Payer: MEDICARE

## 2021-05-31 ENCOUNTER — APPOINTMENT (OUTPATIENT)
Dept: GENERAL RADIOLOGY | Age: 61
DRG: 091 | End: 2021-05-31
Attending: STUDENT IN AN ORGANIZED HEALTH CARE EDUCATION/TRAINING PROGRAM
Payer: MEDICARE

## 2021-05-31 ENCOUNTER — HOSPITAL ENCOUNTER (INPATIENT)
Age: 61
LOS: 5 days | Discharge: NURSING, REHAB OR LONG-TERM CARE | DRG: 091 | End: 2021-06-05
Attending: STUDENT IN AN ORGANIZED HEALTH CARE EDUCATION/TRAINING PROGRAM | Admitting: HOSPITALIST
Payer: MEDICARE

## 2021-05-31 DIAGNOSIS — G89.29 CHRONIC LOW BACK PAIN WITHOUT SCIATICA, UNSPECIFIED BACK PAIN LATERALITY: ICD-10-CM

## 2021-05-31 DIAGNOSIS — N39.0 URINARY TRACT INFECTION WITHOUT HEMATURIA, SITE UNSPECIFIED: ICD-10-CM

## 2021-05-31 DIAGNOSIS — R41.82 ALTERED MENTAL STATUS, UNSPECIFIED ALTERED MENTAL STATUS TYPE: Primary | ICD-10-CM

## 2021-05-31 DIAGNOSIS — M54.50 CHRONIC LOW BACK PAIN WITHOUT SCIATICA, UNSPECIFIED BACK PAIN LATERALITY: ICD-10-CM

## 2021-05-31 PROBLEM — E46 PROTEIN CALORIE MALNUTRITION (HCC): Status: ACTIVE | Noted: 2021-05-31

## 2021-05-31 PROBLEM — G93.40 ACUTE ENCEPHALOPATHY: Status: ACTIVE | Noted: 2021-05-31

## 2021-05-31 PROBLEM — L98.429 SACRAL ULCER (HCC): Status: ACTIVE | Noted: 2021-05-31

## 2021-05-31 LAB
ALBUMIN SERPL-MCNC: 3.5 G/DL (ref 3.5–5)
ALBUMIN/GLOB SERPL: 0.9 {RATIO} (ref 1.2–3.5)
ALP SERPL-CCNC: 104 U/L (ref 50–130)
ALT SERPL-CCNC: 34 U/L (ref 12–65)
AMMONIA PLAS-SCNC: <10 UMOL/L (ref 24.9–68)
AMPHET UR QL SCN: NEGATIVE
ANION GAP SERPL CALC-SCNC: 8 MMOL/L (ref 7–16)
APAP SERPL-MCNC: <2 UG/ML (ref 10–30)
APPEARANCE UR: ABNORMAL
AST SERPL-CCNC: 24 U/L (ref 15–37)
BACTERIA URNS QL MICRO: ABNORMAL /HPF
BARBITURATES UR QL SCN: NEGATIVE
BASOPHILS # BLD: 0 K/UL (ref 0–0.2)
BASOPHILS NFR BLD: 1 % (ref 0–2)
BENZODIAZ UR QL: NEGATIVE
BILIRUB SERPL-MCNC: 0.4 MG/DL (ref 0.2–1.1)
BILIRUB UR QL: ABNORMAL
BUN SERPL-MCNC: 23 MG/DL (ref 6–23)
CALCIUM SERPL-MCNC: 9.5 MG/DL (ref 8.3–10.4)
CANNABINOIDS UR QL SCN: NEGATIVE
CASTS URNS QL MICRO: 0 /LPF
CHLORIDE SERPL-SCNC: 114 MMOL/L (ref 98–107)
CK SERPL-CCNC: 148 U/L (ref 21–215)
CO2 SERPL-SCNC: 23 MMOL/L (ref 21–32)
COCAINE UR QL SCN: NEGATIVE
COLOR UR: ABNORMAL
CREAT SERPL-MCNC: 0.28 MG/DL (ref 0.6–1)
DIFFERENTIAL METHOD BLD: ABNORMAL
EOSINOPHIL # BLD: 0.1 K/UL (ref 0–0.8)
EOSINOPHIL NFR BLD: 3 % (ref 0.5–7.8)
EPI CELLS #/AREA URNS HPF: ABNORMAL /HPF
ERYTHROCYTE [DISTWIDTH] IN BLOOD BY AUTOMATED COUNT: 17.4 % (ref 11.9–14.6)
ETHANOL SERPL-MCNC: <3 MG/DL
GLOBULIN SER CALC-MCNC: 4 G/DL (ref 2.3–3.5)
GLUCOSE SERPL-MCNC: 107 MG/DL (ref 65–100)
GLUCOSE UR STRIP.AUTO-MCNC: NEGATIVE MG/DL
HCT VFR BLD AUTO: 46.2 % (ref 35.8–46.3)
HGB BLD-MCNC: 14.2 G/DL (ref 11.7–15.4)
HGB UR QL STRIP: ABNORMAL
IMM GRANULOCYTES # BLD AUTO: 0 K/UL (ref 0–0.5)
IMM GRANULOCYTES NFR BLD AUTO: 0 % (ref 0–5)
KETONES UR QL STRIP.AUTO: 15 MG/DL
LACTATE SERPL-SCNC: 1.2 MMOL/L (ref 0.4–2)
LEUKOCYTE ESTERASE UR QL STRIP.AUTO: ABNORMAL
LYMPHOCYTES # BLD: 0.3 K/UL (ref 0.5–4.6)
LYMPHOCYTES NFR BLD: 5 % (ref 13–44)
MAGNESIUM SERPL-MCNC: 2 MG/DL (ref 1.8–2.4)
MCH RBC QN AUTO: 28.6 PG (ref 26.1–32.9)
MCHC RBC AUTO-ENTMCNC: 30.7 G/DL (ref 31.4–35)
MCV RBC AUTO: 93 FL (ref 79.6–97.8)
METHADONE UR QL: NEGATIVE
MONOCYTES # BLD: 0.4 K/UL (ref 0.1–1.3)
MONOCYTES NFR BLD: 9 % (ref 4–12)
NEUTS SEG # BLD: 3.9 K/UL (ref 1.7–8.2)
NEUTS SEG NFR BLD: 82 % (ref 43–78)
NITRITE UR QL STRIP.AUTO: POSITIVE
NRBC # BLD: 0 K/UL (ref 0–0.2)
OPIATES UR QL: POSITIVE
PCP UR QL: NEGATIVE
PH UR STRIP: 5 [PH] (ref 5–9)
PLATELET # BLD AUTO: 415 K/UL (ref 150–450)
PMV BLD AUTO: 10.6 FL (ref 9.4–12.3)
POTASSIUM SERPL-SCNC: 4.2 MMOL/L (ref 3.5–5.1)
PROT SERPL-MCNC: 7.5 G/DL (ref 6.3–8.2)
PROT UR STRIP-MCNC: ABNORMAL MG/DL
RBC # BLD AUTO: 4.97 M/UL (ref 4.05–5.2)
RBC #/AREA URNS HPF: ABNORMAL /HPF
SALICYLATES SERPL-MCNC: <1.7 MG/DL (ref 2.8–20)
SODIUM SERPL-SCNC: 145 MMOL/L (ref 136–145)
SP GR UR REFRACTOMETRY: 1.02 (ref 1–1.02)
TSH SERPL DL<=0.005 MIU/L-ACNC: 1.56 UIU/ML
UROBILINOGEN UR QL STRIP.AUTO: 1 EU/DL (ref 0.2–1)
WBC # BLD AUTO: 4.7 K/UL (ref 4.3–11.1)
WBC URNS QL MICRO: >100 /HPF

## 2021-05-31 PROCEDURE — 65270000029 HC RM PRIVATE

## 2021-05-31 PROCEDURE — 2709999900 HC NON-CHARGEABLE SUPPLY

## 2021-05-31 PROCEDURE — 87186 SC STD MICRODIL/AGAR DIL: CPT

## 2021-05-31 PROCEDURE — 74011250636 HC RX REV CODE- 250/636: Performed by: STUDENT IN AN ORGANIZED HEALTH CARE EDUCATION/TRAINING PROGRAM

## 2021-05-31 PROCEDURE — 83605 ASSAY OF LACTIC ACID: CPT

## 2021-05-31 PROCEDURE — 99284 EMERGENCY DEPT VISIT MOD MDM: CPT

## 2021-05-31 PROCEDURE — 82140 ASSAY OF AMMONIA: CPT

## 2021-05-31 PROCEDURE — 70450 CT HEAD/BRAIN W/O DYE: CPT

## 2021-05-31 PROCEDURE — 74011250636 HC RX REV CODE- 250/636: Performed by: HOSPITALIST

## 2021-05-31 PROCEDURE — 82077 ASSAY SPEC XCP UR&BREATH IA: CPT

## 2021-05-31 PROCEDURE — 74011000258 HC RX REV CODE- 258: Performed by: STUDENT IN AN ORGANIZED HEALTH CARE EDUCATION/TRAINING PROGRAM

## 2021-05-31 PROCEDURE — 80307 DRUG TEST PRSMV CHEM ANLYZR: CPT

## 2021-05-31 PROCEDURE — 87086 URINE CULTURE/COLONY COUNT: CPT

## 2021-05-31 PROCEDURE — 80179 DRUG ASSAY SALICYLATE: CPT

## 2021-05-31 PROCEDURE — 80053 COMPREHEN METABOLIC PANEL: CPT

## 2021-05-31 PROCEDURE — 96365 THER/PROPH/DIAG IV INF INIT: CPT

## 2021-05-31 PROCEDURE — 81001 URINALYSIS AUTO W/SCOPE: CPT

## 2021-05-31 PROCEDURE — 85025 COMPLETE CBC W/AUTO DIFF WBC: CPT

## 2021-05-31 PROCEDURE — 82550 ASSAY OF CK (CPK): CPT

## 2021-05-31 PROCEDURE — 51702 INSERT TEMP BLADDER CATH: CPT

## 2021-05-31 PROCEDURE — 84443 ASSAY THYROID STIM HORMONE: CPT

## 2021-05-31 PROCEDURE — 87088 URINE BACTERIA CULTURE: CPT

## 2021-05-31 PROCEDURE — 71045 X-RAY EXAM CHEST 1 VIEW: CPT

## 2021-05-31 PROCEDURE — 74011250637 HC RX REV CODE- 250/637: Performed by: HOSPITALIST

## 2021-05-31 PROCEDURE — 80143 DRUG ASSAY ACETAMINOPHEN: CPT

## 2021-05-31 PROCEDURE — 83735 ASSAY OF MAGNESIUM: CPT

## 2021-05-31 RX ORDER — OXYCODONE HYDROCHLORIDE 5 MG/1
10 TABLET ORAL
Status: DISCONTINUED | OUTPATIENT
Start: 2021-05-31 | End: 2021-06-05 | Stop reason: HOSPADM

## 2021-05-31 RX ORDER — SODIUM CHLORIDE 0.9 % (FLUSH) 0.9 %
5-40 SYRINGE (ML) INJECTION AS NEEDED
Status: DISCONTINUED | OUTPATIENT
Start: 2021-05-31 | End: 2021-06-05 | Stop reason: HOSPADM

## 2021-05-31 RX ORDER — PROMETHAZINE HYDROCHLORIDE 25 MG/1
12.5 TABLET ORAL
Status: DISCONTINUED | OUTPATIENT
Start: 2021-05-31 | End: 2021-06-05 | Stop reason: HOSPADM

## 2021-05-31 RX ORDER — AMANTADINE HYDROCHLORIDE 100 MG/1
100 CAPSULE, GELATIN COATED ORAL 2 TIMES DAILY
Status: DISCONTINUED | OUTPATIENT
Start: 2021-05-31 | End: 2021-06-05 | Stop reason: HOSPADM

## 2021-05-31 RX ORDER — FAMOTIDINE 20 MG/1
20 TABLET, FILM COATED ORAL DAILY
Status: DISCONTINUED | OUTPATIENT
Start: 2021-06-01 | End: 2021-06-05 | Stop reason: HOSPADM

## 2021-05-31 RX ORDER — CHOLECALCIFEROL (VITAMIN D3) 125 MCG
5 CAPSULE ORAL
Status: DISCONTINUED | OUTPATIENT
Start: 2021-05-31 | End: 2021-05-31 | Stop reason: RX

## 2021-05-31 RX ORDER — HYDROXYZINE 25 MG/1
25 TABLET, FILM COATED ORAL
Status: DISCONTINUED | OUTPATIENT
Start: 2021-05-31 | End: 2021-06-05 | Stop reason: HOSPADM

## 2021-05-31 RX ORDER — ASCORBIC ACID 500 MG
250 TABLET ORAL DAILY
Status: DISCONTINUED | OUTPATIENT
Start: 2021-06-01 | End: 2021-06-05 | Stop reason: HOSPADM

## 2021-05-31 RX ORDER — OXYCODONE HYDROCHLORIDE 5 MG/1
5 TABLET ORAL
Status: DISCONTINUED | OUTPATIENT
Start: 2021-05-31 | End: 2021-06-05 | Stop reason: HOSPADM

## 2021-05-31 RX ORDER — ONDANSETRON 2 MG/ML
4 INJECTION INTRAMUSCULAR; INTRAVENOUS
Status: DISCONTINUED | OUTPATIENT
Start: 2021-05-31 | End: 2021-06-05 | Stop reason: HOSPADM

## 2021-05-31 RX ORDER — PREGABALIN 50 MG/1
50 CAPSULE ORAL
Status: DISCONTINUED | OUTPATIENT
Start: 2021-05-31 | End: 2021-06-05 | Stop reason: HOSPADM

## 2021-05-31 RX ORDER — ACETAMINOPHEN 650 MG/1
650 SUPPOSITORY RECTAL
Status: DISCONTINUED | OUTPATIENT
Start: 2021-05-31 | End: 2021-06-05 | Stop reason: HOSPADM

## 2021-05-31 RX ORDER — DEXTROSE, SODIUM CHLORIDE, SODIUM LACTATE, POTASSIUM CHLORIDE, AND CALCIUM CHLORIDE 5; .6; .31; .03; .02 G/100ML; G/100ML; G/100ML; G/100ML; G/100ML
100 INJECTION, SOLUTION INTRAVENOUS CONTINUOUS
Status: DISPENSED | OUTPATIENT
Start: 2021-05-31 | End: 2021-06-01

## 2021-05-31 RX ORDER — SAME BUTANEDISULFONATE/BETAINE 400-600 MG
250 POWDER IN PACKET (EA) ORAL 2 TIMES DAILY
Status: DISCONTINUED | OUTPATIENT
Start: 2021-05-31 | End: 2021-06-02

## 2021-05-31 RX ORDER — LANOLIN ALCOHOL/MO/W.PET/CERES
1000 CREAM (GRAM) TOPICAL DAILY
Status: DISCONTINUED | OUTPATIENT
Start: 2021-06-01 | End: 2021-06-05 | Stop reason: HOSPADM

## 2021-05-31 RX ORDER — BACLOFEN 10 MG/1
20 TABLET ORAL 3 TIMES DAILY
Status: DISCONTINUED | OUTPATIENT
Start: 2021-05-31 | End: 2021-06-05 | Stop reason: HOSPADM

## 2021-05-31 RX ORDER — SODIUM CHLORIDE 0.9 % (FLUSH) 0.9 %
5-40 SYRINGE (ML) INJECTION EVERY 8 HOURS
Status: DISCONTINUED | OUTPATIENT
Start: 2021-05-31 | End: 2021-06-05 | Stop reason: HOSPADM

## 2021-05-31 RX ORDER — ACETAMINOPHEN 325 MG/1
650 TABLET ORAL
Status: DISCONTINUED | OUTPATIENT
Start: 2021-05-31 | End: 2021-06-05 | Stop reason: HOSPADM

## 2021-05-31 RX ORDER — POLYETHYLENE GLYCOL 3350 17 G/17G
17 POWDER, FOR SOLUTION ORAL DAILY PRN
Status: DISCONTINUED | OUTPATIENT
Start: 2021-05-31 | End: 2021-06-05 | Stop reason: HOSPADM

## 2021-05-31 RX ADMIN — PREGABALIN 50 MG: 50 CAPSULE ORAL at 20:21

## 2021-05-31 RX ADMIN — CEFTRIAXONE 1 G: 1 INJECTION, POWDER, FOR SOLUTION INTRAMUSCULAR; INTRAVENOUS at 14:20

## 2021-05-31 RX ADMIN — BACLOFEN 20 MG: 10 TABLET ORAL at 22:20

## 2021-05-31 RX ADMIN — Medication 10 ML: at 18:00

## 2021-05-31 RX ADMIN — BACLOFEN 20 MG: 10 TABLET ORAL at 18:19

## 2021-05-31 RX ADMIN — Medication 10 ML: at 22:21

## 2021-05-31 RX ADMIN — SODIUM CHLORIDE, SODIUM LACTATE, POTASSIUM CHLORIDE, CALCIUM CHLORIDE, AND DEXTROSE MONOHYDRATE 100 ML/HR: 600; 310; 30; 20; 5 INJECTION, SOLUTION INTRAVENOUS at 18:05

## 2021-05-31 RX ADMIN — AMANTADINE HYDROCHLORIDE 100 MG: 100 CAPSULE ORAL at 18:19

## 2021-05-31 RX ADMIN — RDII 250 MG CAPSULE 250 MG: at 18:19

## 2021-05-31 RX ADMIN — OXYCODONE 5 MG: 5 TABLET ORAL at 20:21

## 2021-05-31 NOTE — ED PROVIDER NOTES
72-year-old female patient with a history of end-stage multiple sclerosis presents to the emergency department with reported altered mentation noted by her ex- who checked on her today. Patient was last seen greater than 24 hours ago. She has regular home health care and hosp[facility] to come to the house however they have been off for the holiday. EMS states they were contacted by patient's ex- who checked on her today and noted her to appear altered. Normally she carries on reliable conversations and is unable to do so at this time. EMS states there were pills strewn about the living space. Ex- states patient has been out of her liquid morphine which she uses regularly for pain. EMS reports no evidence of traumatic injury or fall as patient was lying in bed when they arrived. Patient is able to give her name but is confused with any other questioning. Speech sounds clear.            Past Medical History:   Diagnosis Date    Decubitus ulcer of ischial area     Hypertension     Kidney infection     Menopause     MS (multiple sclerosis) ([facility] [address]        Past Surgical History:   Procedure Laterality Date    COLONOSCOPY N/A 6/29/2020    COLONOSCOPY /Room 18 performed by Sari Makrham MD at [facility] ENDOSCOPY    ERCP  5/15/2020         HX CHOLECYSTECTOMY  05/18/2020         Family History:   Problem Relation Age of Onset    Breast Cancer Mother        Social History     Socioeconomic History    Marital status:      Spouse name: Not on file    Number of children: Not on file    Years of education: Not on file    Highest education level: Not on file   Occupational History    Not on file   Tobacco Use    Smoking status: Former Smoker    Smokeless tobacco: Never Used   Substance and Sexual Activity    Alcohol use: Not Currently    Drug use: Not Currently    Sexual activity: Not on file   Other Topics Concern   [address] Service Not Asked    Blood Transfusions Not Asked    Caffeine Concern Not Asked    Occupational Exposure Not Asked    Hobby Hazards Not Asked    Sleep Concern Not Asked    Stress Concern Not Asked    Weight Concern Not Asked    Special Diet Not Asked    Back Care Not Asked    Exercise Not Asked    Bike Helmet Not Asked   [address] Not Asked    Self-Exams Not Asked   Social History Narrative    Not on file     Social Determinants of Health     Financial Resource Strain:     Difficulty of Paying Living Expenses:    Food Insecurity:     Worried About Running Out of Food in the Last Year:     [address] in the Last Year:    Transportation Needs:     Lack of Transportation (Medical):  Lack of Transportation (Non-Medical):    Physical Activity:     Days of Exercise per Week:     Minutes of Exercise per Session:    Stress:     Feeling of Stress :    Social Connections:     Frequency of Communication with Friends and Family:     Frequency of Social Gatherings with Friends and Family:     Attends Zoroast[employer] Services:     Active Member of Clubs or Organizations:     Attends Club or Organization Meetings:     Marital Status:    Intimate Partner Violence:     Fear of Current or Ex-Partner:     Emotionally Abused:     Physically Abused:     Sexually Abused: ALLERGIES: Latex, Norco [hydrocodone-acetaminophen], and Pcn [penicillins]    Review of Systems   Unable to perform ROS: Mental status change       There were no vitals filed for this visit. Physical Exam  Vitals and nursing note reviewed. Exam conducted with a chaperone present. Constitutional:       General: She is not in acute distress. Appearance: She is well-developed. She is not diaphoretic. Comments: Appears older than stated age, somewhat disheveled appearing female patient. Alert and oriented to person place and time. No acute distress, speaks in clear, fluid sentences. HENT:      Head: Normocephalic and atraumatic.       Right Ear: External ear normal.      Left Ear: External ear normal.      Nose: Nose normal.   Eyes:      Pupils: Pupils are equal, round, and reactive to light. Cardiovascular:      Rate and Rhythm: Normal rate and regular rhythm. Heart sounds: Normal heart sounds. No murmur heard. No friction rub. No gallop. Pulmonary:      Effort: Pulmonary effort is normal. No respiratory distress. Breath sounds: Normal breath sounds. No stridor. No decreased breath sounds, wheezing, rhonchi or rales. Chest:      Chest wall: No tenderness. Abdominal:      General: There is no distension. Palpations: Abdomen is soft. There is no mass. Tenderness: There is no abdominal tenderness. There is no guarding or rebound. Hernia: No hernia is present. Genitourinary:         Comments: Evaluation of reported decubitus ulcer performed at bedside with female RN chaperone. Stage II decubitus ulcer appears to be healing well, surrounding redness though inconsistent with cellulitic change, no active discharge or drainage. Mathew catheter in place with dark, cloudy appearing emily-colored urine. Evidence of hematuria in Mathew catheter bag. Musculoskeletal:         General: No tenderness or deformity. Normal range of motion. Cervical back: Normal range of motion. Skin:     General: Skin is warm and dry. Neurological:      Mental Status: She is alert and oriented to person, place, and time. Cranial Nerves: No cranial nerve deficit. MDM  Number of Diagnoses or Management Options  Diagnosis management comments: Patient arrives with stable vital signs though visibly altered. Her speech is clear, she has a history of MS and takes several medications for pain and spasticity. She apparently lives alone with outpatient home health care and hospice resources. Orders placed for full labs, CT and chest x-ray.        Amount and/or Complexity of Data Reviewed  Clinical lab tests: ordered and reviewed  Tests in the radiology section of CPT®: ordered and reviewed  Tests in the medicine section of CPT®: ordered and reviewed  Independent visualization of images, tracings, or specimens: yes    Risk of Complications, Morbidity, and/or Mortality  Presenting problems: high  Diagnostic procedures: high  Management options: high           Procedures

## 2021-05-31 NOTE — H&P
[facility]  History and Physical    Patient ID:  Shaista Christensen  female  1960  [id number]    Admission Date: 5/31/2021  Chief Complaint: Altered mental status  Reason for Admission: Acute metabolic encephalopathy    ASSESSMENT & PLAN:  HPI: 63 y/o WF with a h/o MS, multiple chronic wounds, she is  wheelchair bound andhas chronic indwelling wasserman catheter  Placed due to multiple sacral ulcers. She Has exhausted her insurance days at rehab already and continues to Live at home alone. Was found by her ex  confused. EMS called to her residence and noted medication pills scattered all over the residence. Urine Drug Screen + opiates. CT head shows left encephalomalacia. Note: She had an admission 03/2021 here for similar presentation. Urine culture grew pan-sensitive proteus mirabilis, also Klebsiella/ESBL. And several ER visits since last of admission        Acute metabolic encephalopathy  -Also noted to have urinary colonization, she has a chronic indwelling Wasserman catheter placed for chronic sacral wound  --prior history of urine cultures with Proteus mirabilis, and ESBL Klebsiella. --she is also on opioids for chronic pain which more then Likely contributed to the transient AMS. -- 2/2 Medications such as opioids and baclofen and possible acute UTI  --Start IV Fluids D5LR, and IV Antibiotics     Chronic Pain 2/2 Chronic wounds/healing sacral ulcers, chronic Wasserman catheter  - She was  Started Oxycontin and with PRN oxycodone for breakthrough , wound care ordered, start PO Zinc sulfate , ascorbic acid and multivitamin appears to be an effective regiment  -Wasserman catheter replaced during this admission      Multiple chronic wounds/ Heel and Sacral Ulcers   -Continue with wound care  -continue with Wasserman to avoid saturating wound care    Severe protein calorie malnutrition  -BMI 13, and nutritional supplements, multivitamins with minerals      Disposition:  In patient   Diet: Regular VTE ppx: Lovenox   GI ppx: pepcid   CODE STATUS:Prior   Surrogate decision-maker: Her ex- Saurabh Torres, her Sister Lucio Dumont        Allergies   Allergen Reactions    Latex Rash    Norco [Hydrocodone-Acetaminophen] Other (comments)     Sees dead people    Pcn [Penicillins] Swelling       Prior to Admission Medications   Prescriptions Last Dose Informant Patient Reported? Taking? Saccharomyces boulardii (Florastor) 250 mg capsule   Yes No   Sig: Take 250 mg by mouth two (2) times a day. amantadine HCL (SYMMETREL) 100 mg capsule   Yes No   Sig: Take 100 mg by mouth two (2) times a day. apixaban (Eliquis DVT-PE Treat 30D Start) 5 mg (74 tabs) starter pack   No No   Sig: Take 10 mg (two 5 mg tablets) by mouth twice a day for 7 days   Followed by 5 mg (one 5 mg tablet) by mouth twice a day   ascorbic acid, vitamin C, (VITAMIN C) 250 mg tablet   No No   Sig: Take 1 Tab by mouth daily. aspirin delayed-release 81 mg tablet   Yes No   Sig: Take 81 mg by mouth daily. baclofen (LIORESAL) 20 mg tablet   Yes No   Sig: Take 20 mg by mouth three (3) times daily. cholecalciferol (VITAMIN D3) 25 mcg (1,000 unit) cap   Yes No   Sig: Take 1,000 Units by mouth daily. cyanocobalamin 1,000 mcg tablet   No No   Sig: Take 1 Tab by mouth daily. famotidine (PEPCID) 20 mg tablet   No No   Sig: Take 1 Tab by mouth daily. fingolimod 0.5 mg cap   No No   Sig: Take 0.5 mg by mouth daily. hydrOXYzine HCL (ATARAX) 25 mg tablet   Yes No   Sig: Take 25 mg by mouth every eight (8) hours as needed. iron polysaccharides (NIFEREX) 150 mg iron capsule   Yes No   Sig: Take 150 mg by mouth daily. magnesium oxide (MAG-OX) 400 mg tablet   Yes No   Sig: Take 400 mg by mouth two (2) times a day. melatonin 5 mg cap capsule   Yes No   Sig: Take 5 mg by mouth nightly. multivitamin (ONE A DAY) tablet   Yes No   Sig: Take 1 Tab by mouth daily.    naloxone (NARCAN) 4 mg/actuation nasal spray   No No   Sig: Use 1 spray intranasally, then discard. Repeat with new spray every 2 min as needed for opioid overdose symptoms, alternating nostrils. oxyCODONE IR (ROXICODONE) 10 mg tab immediate release tablet   Yes No   Sig: Take 10 mg by mouth every six (6) hours as needed for Pain.   polyethylene glycol (Miralax) 17 gram/dose powder   Yes No   Sig: Take 17 g by mouth daily. pregabalin (LYRICA) 50 mg capsule   Yes No   Sig: Take 50 mg by mouth daily. Give 75mg at HS   senna (Senna) 8.6 mg tablet   Yes No   Sig: Take 2 Tabs by mouth two (2) times a day. Facility-Administered Medications: None       Past Medical History:   Diagnosis Date    Decubitus ulcer of ischial area     Hypertension     Kidney infection     Menopause     MS (multiple sclerosis) ([facility] [address]      Past Surgical History:   Procedure Laterality Date    COLONOSCOPY N/A 6/29/2020    COLONOSCOPY /Room 424 performed by Connee Klinefelter, MD at [facility] ENDOSCOPY    ERCP  5/15/2020         HX CHOLECYSTECTOMY  05/18/2020       Social History     Tobacco Use    Smoking status: Former Smoker    Smokeless tobacco: Never Used   Substance Use Topics    Alcohol use: Not Currently       FAMILY HISTORY:    Family History   Problem Relation Age of Onset    Breast Cancer Mother        REVIEW OF SYSTEMS:  A 14 point review of systems was taken and pertinent positive as per HPI.       PHYSICAL EXAM:    Visit Vitals  BP (!) 147/72 (BP 1 Location: Left upper arm, BP Patient Position: At rest)   Pulse 88   Temp 97.4 °F (36.3 °C)   Resp 18   SpO2 99%       General: Confused, AAO X  Person   HEENT: Pupils equal and reactive to light and accommodation, oropharynx is clear   Neck: Supple, no lymphadenopathy, no JVD   Lungs: Clear to auscultation bilaterally   Cardiovascular: Regular rate and rhythm with normal S1 and S2   Abdomen: Soft, nontender, nondistended, normoactive bowel sounds   Extremities: No cyanosis clubbing or edema   Neuro: Nonfocal, A&O x person   Psych:confused      Intake/Output last 3 shifts:        Labs:  CMP:   Lab Results   Component Value Date/Time     05/31/2021 12:22 PM    K 4.2 05/31/2021 12:22 PM     (H) 05/31/2021 12:22 PM    CO2 23 05/31/2021 12:22 PM    AGAP 8 05/31/2021 12:22 PM     (H) 05/31/2021 12:22 PM    BUN 23 05/31/2021 12:22 PM    CREA 0.28 (L) 05/31/2021 12:22 PM    GFRAA >60 05/31/2021 12:22 PM    GFRNA >60 05/31/2021 12:22 PM    CA 9.5 05/31/2021 12:22 PM    MG 2.0 05/31/2021 12:22 PM    ALB 3.5 05/31/2021 12:22 PM    TBILI 0.4 05/31/2021 12:22 PM    TP 7.5 05/31/2021 12:22 PM    GLOB 4.0 (H) 05/31/2021 12:22 PM    AGRAT 0.9 (L) 05/31/2021 12:22 PM    ALT 34 05/31/2021 12:22 PM         CBC:    Lab Results   Component Value Date/Time    WBC 4.7 05/31/2021 12:22 PM    HGB 14.2 05/31/2021 12:22 PM    HCT 46.2 05/31/2021 12:22 PM     05/31/2021 12:22 PM       Lab Results   Component Value Date/Time    INR 1.5 12/11/2020 02:38 PM    Prothrombin time 18.2 (H) 12/11/2020 02:38 PM       ABG:  No results found for: PH, PHI, PCO2, PCO2I, PO2, PO2I, HCO3, HCO3I, FIO2, FIO2I        Lab Results   Component Value Date/Time     05/31/2021 12:22 PM    Troponin-I, Qt. <0.02 (L) 05/13/2020 01:53 AM         Imaging & Other Studies:    XR Results (maximum last 3): Results from Hospital Encounter encounter on 05/31/21    XR CHEST PORT    Narrative  AP PORTABLE CHEST X-RAY 5/31/2021 12:42 PM    HISTORY: AMS  altered level of consciousness. COMPARISON: April 11, 2021    FINDINGS: There is no lobar consolidation, pleural effusions or pulmonary edema. EKG leads are present. Impression  No consolidation.       Results from Hospital Encounter encounter on 04/11/21    XR CHEST PORT    Narrative  Chest portable    CLINICAL INDICATION: Patient found with diminished responsiveness, ulcerations  along her chest wall, severe generalized weakness, unable to walk or take care  of herself, lower extremity swelling; multiple sclerosis, hypertension, bed  bound. COMPARISON: 7/23/2020    TECHNIQUE: single AP portable view chest at 10:00 AM upright    FINDINGS:  There is no evidence of consolidation, pneumothorax, pleural effusion  or pulmonary edema. The mediastinal and hilar contours are stable, normal.  Unremarkable central airways as seen. Bone density appears low. Impression  No acute disease. XR FOOT RT MIN 3 V    Narrative  Right foot    Clinical indication: Chronic persisting nonhealing wound plantar posterior foot,  difficulty walking, evaluate for osteomyelitis; chronic back pain and bilateral  lumbar radiculopathy, multiple sclerosis    Comparison: none    Technique: 3 views    Findings: The bone density is diffusely low which limits the sensitivity for  osseous lesions. There is soft tissue swelling. No evidence of any erosion,  fracture or dislocation. Mild chronic appearing arthropathic changes throughout  the foot. Radiographs can be insensitive in early osteomyelitis. If this is a persistent  clinical concern, consider followup radiographs in 7-10 days, MRI or nuclear  medicine bone scan. Impression  No acute osseous abnormality. CT Results (maximum last 3): Results from [place] encounter on 05/31/21    CT HEAD WO CONT    Narrative  HEAD CT WITHOUT CONTRAST  5/31/2021    HISTORY:    End stage MS ;p t is bedbound  Decrease in mental status last 24 hrs per family    TECHNIQUE: Noncontrast axial images were obtained through the brain. All CT  scans at this facility used dose modulation, interactive reconstruction and/or  weight based dosing when appropriate to reduce radiation dose to as low as  reasonably achievable. COMPARISON: None    FINDINGS: There is no acute intracranial hemorrhage, significant mass effect or  CT evidence of acute large artery territorial infarction. Please note that a  hyperacute infarct or small vessel infarct may not be apparent on initial CT  imaging.     Encephalomalacia is present in the left cerebellar hemisphere. Cerebral volume  loss is present with compensatory ventricular enlargement. There is a decreased  attenuation in the supratentorial white matter consistent with the history of  demyelinating disease. There is no hydrocephalus , intra-axial mass or abnormal extra-axial fluid  collection. There are no displaced skull fractures. The mastoid air cells and  paranasal sinuses are clear where imaged. Impression  1. No acute intracranial hemorrhage. 2. Left cerebellar encephalomalacia. 3. Cerebral volume loss with compensatory ventricular enlargement. Results from [place] encounter on 05/22/20    CT PELV WO CONT    Narrative  EXAM: Noncontrast CT pelvis. INDICATION: Pain after falling. COMPARISON: None. TECHNIQUE: Axial noncontrast CT images of the pelvis were obtained. Sagittal and  coronal reformatted images were performed. Radiation dose reduction techniques  were used for this study. Our CT scanners use one or all of the following:  Automated exposure control, adjustment of the mA and/or kV according to patient  size, iterative reconstruction. FINDINGS: No acute fracture or dislocation is seen. The pelvic ring is intact,  with no diastases of the pubic symphysis or sacroiliac joints. There is a right  ischial decubitus ulcer extending to the bone, with irregularity of the  underlying ischial tuberosity cortex suspect for osteomyelitis. No focal abscess  is seen. There is a Mathew catheter in the urinary bladder. A 5.3 x 4.0 cm cyst  is noted in the left adnexa. There are chronic calcifications along the right  rectus muscle, with a small amount of air in the adjacent right lower quadrant  abdominal wall subcutaneous tissue. No associated fluid or inflammatory  stranding is seen. Impression  IMPRESSION:  1. No acute fracture.   2. Right ischial decubitus ulcer, with irregularity/erosion of the underlying  ischial tuberosity cortex suspect for osteomyelitis. 3. 5.3 x 4 cm simple cyst in the left adnexa of the pelvis, probably an ovarian  or paraovarian cyst. Nonemergent pelvic ultrasound is recommended for further  evaluation if this is not a known finding. 4. Small amount of air in the right lower quadrant subcutaneous tissue, possibly  related to a recent injection. Correlate with clinical history. CT HEAD WO CONT    Narrative  EXAM: Noncontrast CT head. INDICATION: Increased confusion. COMPARISON: Prior MRI brain on May 14, 2020. TECHNIQUE: Axial noncontrast CT images of the head were obtained. Radiation  dose reduction techniques were used for this study. Our CT scanners use one or  all of the following:  Automated exposure control, adjustment of the mA and/or  kV according to patient size, iterative reconstruction. FINDINGS: Moderate volume loss and scattered periventricular white matter  hypodensities are unchanged, likely related to the reported history of multiple  sclerosis. There is also a small focus of chronic encephalomalacia in the left  cerebellar hemisphere. No acute infarct, hemorrhage, mass effect or midline  shift is seen. The skull and skull base are intact. Impression  IMPRESSION: No acute process or interval change. MRI Results (maximum last 3): Results from Hospital Encounter encounter on 03/03/21    MRI LUMB SPINE WO CONT    Narrative  MRI LUMBAR SPINE WITHOUT CONTRAST    HISTORY: Bilateral lumbar radiculopathy    COMPARISON: None    TECHNIQUE: Multiplanar imaging was performed on a 1.5 Melissa magnet utilizing  multiple pulse sequences. FINDINGS:  Diffuse disc desiccation. Dextrocurvature. Otherwise normal alignment. Marrow  signal is a small small Schmorl's node within the superior endplate of L4. The  conus terminates at L1. L1-L2: No focal or diffuse disc abnormalities. Mild symmetric facet arthropathy. L2-L3: No focal or diffuse disc abnormalities. Mild symmetric facet arthropathy.   L3-L4: Diffuse bulging disc without evidence of significant central canal or  foraminal stenosis. Mild symmetric facet arthropathy. L4-L5: No focal or diffuse disc abnormalities. Mild symmetric facet arthropathy. L5-S1: No focal or diffuse disc abnormalities are seen. No significant central  canal and neural foraminal stenosis. Impression  Dextrocurvature. Small Schmorl's node in superior endplate of L4. Mild symmetric facet arthropathy from L1-2 through L4-5. Mild diffuse bulging disc at L3-4 without evidence of significant central canal  or foraminal stenosis. Date Of Dictation: 3/21/2021 10:02 AM      MRI [facility] SPINE W WO CONT    Narrative  MRI of the thoracic spine    INDICATION: Multiple sclerosis, increasing generalized weakness    Standard MRI sequences were obtained through the thoracic spine in multiple  planes. Images were obtained before and after intravenous infusion of  7 mL of  Dotarem. FINDINGS:  No areas of focal abnormal signal are seen in the thoracic spinal cord. No  lesions are seen on pre or postcontrast images. There is no significant disc disease. There are no disc herniations. There is  no spinal stenosis. Thoracic vertebrae are intact. There is no fracture or  subluxation. Impression  Unremarkable MRI of the thoracic spine      If there are any questions about this report, I can be reached on [employer]  or at [phone]      MRI CERV SPINE W WO CONT    Narrative  MRI of the Cervical Spine    INDICATION: Multiple sclerosis, generalized weakness    Standard MRI sequences were obtained through the cervical spine in multiple  planes. Images were obtained before and after intravenous injection of 7 mL of  Dotarem. FINDINGS:    C2-C3: Unremarkable. C3-C4: Unremarkable. C4-C5: Mild degenerative disc and right facet disease. Associated right  foraminal stenosis. C5-C6: Degenerative disc disease and small right sided disc herniation.   Associated mild right-sided spinal stenosis and asymmetric right foraminal  stenosis. C6-C7: Degenerative disc disease and small left foraminal disc herniation. Bilateral foraminal stenosis, left greater than right. C7-T1: Unremarkable. There is no evidence of fracture or subluxation. No bony lesions are seen. There is increased signal in the right side of the spinal cord at the C5 level. No other spinal cord abnormalities are seen. There are no areas of abnormal  enhancement. Upper thoracic spine is unremarkable. Impression  1. Asymmetric right-sided degenerative change at C5-6. Increased signal in the  spinal cord at this level is probably due to chronic myelomalacia. Difficult to  completely exclude multiple sclerosis. 2.  Small left foraminal disc herniation at C6-7, left foraminal stenosis. Nuclear Medicine Results (maximum last 3): No results found for this or any previous visit. US Results (maximum last 3): Results from [place] encounter on 03/03/21    US RETROPERITONEUM COMP    Narrative  EXAM: Ultrasound of the kidneys. INDICATION: Urinary tract infection. COMPARISON: None. TECHNIQUE: A standard protocol kidney ultrasound was performed. FINDINGS: Both kidneys have a normal ultrasound appearance, measuring 10.5 cm on  the right and 10.4 cm on the left. No shadowing kidney stone, hydronephrosis or  perinephric fluid is seen. The urinary bladder is decompressed around a Mathew  catheter. Impression  Normal-appearing kidneys. Results from [place] encounter on 05/13/20    US ABD LTD    Narrative  Right Upper Quadrant Ultrasound    INDICATION:  Transaminitis, hyponatremia, leukocytosis, abnormal urinalysis;  generalized weakness and multiple recent falls, acute kidney injury. Cachexia,  multiple sclerosis. COMPARISON: none    TECHNIQUE:  Sonographic gray scale and Doppler images were obtained of the right  upper quadrant of the abdomen.     FINDINGS: There are no discrete lesions in the visualized portions of the liver. Liver size is 15 cm, within normal limits. Main portal vein is patent on Doppler imaging. The common bile duct diameter is abnormally dilated at 1.2 cm. The gallbladder  demonstrates intraluminal mobile debris and sludge. No gallstones or  pericholecystic fluid are evident. There is no wall thickening. Sonographic  Barboza's sign is negative. There are no discrete lesions in the limited visualized portions of the  pancreas. Pancreatic duct is normal in caliber as seen. The right kidney measures 10.8 cm in length. There is no hydronephrosis. There  is no evidence of a solid renal mass. There is no evidence of ascites. The aorta and IVC are patent on Doppler imaging. Aortic diameter is within  normal limits. Atherosclerotic changes are noted of the aorta. Impression  IMPRESSION:  1. Gallbladder sludge. 2. Common bile duct dilatation. DEXA Results (maximum last 3): No results found for this or any previous visit. EMILY Results (maximum last 3): Results from [place] encounter on 05/10/19    [facility] 3D SEBASTIAN W MAMMO BI SCREENING INCL CAD    Narrative  STUDY:  Bilateral digital mammogram  and tomosynthesis    INDICATION:  Screening    COMPARISON:  01/20/2012    FINDINGS: Bilateral digital mammography was performed, and is interpreted in  conjunction with a computer assisted detection (CAD) system. Bilateral breast  tomography was also performed. The breast parenchyma is heterogeneously dense. No significant masses or  suspicious calcifications are identified. There is no skin thickening or nipple  retraction. There is no architectural distortion. Impression  IMPRESSION:  No mammographic evidence of malignancy. BI-RADS Assessment Category 1: Negative. Annual mammograms are recommended for all women over the age of 36. A reminder  letter will be sent to the patient.     We are mailing breast density information to the patient along with the  mammogram report. BD3      Results from [place] encounter on 12    EMILY MAMMOGRAM [id number],[facility] [address]  [id number] [facility]  [place] Painter    NAME: Cristina France : 1960               SEX: F         MR#: [id number]  [facility]                 AGE: 51Y      ACCT: [id number]  ORDERING: Oren HDZ           PT TYPE: O  RADIOLOGIST: Vanessa De Leon MD ([id number])  **Final Report**      ICD Codes / Adm. Diagnosis:    /  Examination:  SCREENING DIGITAL BILATERAL MAMM  - [id number] - 2012  3:46PM    Reason:  SCREENING    REPORT:  BILATERAL SCREENING DIGITAL MAMMOGRAPHY, 2012    CLINICAL HISTORY:     Family history of breast cancer. TECHNIQUE:  Craniocaudal and mediolateral oblique views of both breasts were  performed and are interpreted in conjunction with a computer assisted  detection (CAD) system. COMPARISON:  2008    FINDINGS:  CC and MLO views of both breasts demonstrate moderately dense,  heterogenous fibroglandular tissue in a fairly symmetric pattern  bilaterally. No new or enlarging soft tissue mass, suspicious  calcifications, or other definite evidence for malignancy is seen. No  significant change is seen from prior study. IMPRESSION:    NO SPECIFIC MAMMOGRAPHIC EVIDENCE FOR MALIGNANCY. FOLLOW-UP  MAMMOGRAPHY IS RECOMMENDED IN ONE YEAR. BI-RADS ASSESSMENT CATEGORY 1:  NEGATIVE. IMAGES WERE REVIEWED WITH THE [address] OF THE [place] IMAGEC[employer] DEVICE        Signing/Reading Doctor: Vanessa De Leon MD ([id number])  Approved: Vanessa De Leon MD ([id number])  2012      IR Results (maximum last 3): No results found for this or any previous visit. VAS/US Results (maximum last 3): No results found for this or any previous visit. PET Results (maximum last 3): No results found for this or any previous visit. EKG Results     Procedure 720 Value Units Date/Time    EKG, 12 LEAD, INITIAL [[id number]]     Order Status: Sent           Active Problems:  Patient Active Problem List    Diagnosis Date Noted    MS (multiple sclerosis) ([facility] [address] 2021    Generalized weakness 2021    Acute UTI 2021    History of DVT of lower extremity 2021    Depression 09/10/2020    Essential hypertension 09/10/2020    Multiple wounds 09/10/2020    Anxiety disorder 2020    Gastroesophageal reflux disease without esophagitis 2020    Hypokalemia 2020    Hypomagnesemia 2020    Coagulase negative Staphylococcus bacteremia 2020    Sepsis ([address] 2020    Osteomyelitis of sacroiliac region ([address] 2020    Choledocholithiasis 2020    GLENROY (acute kidney injury) ([address] 2020    Pressure injury of sacral region, stage 4 ([address] 2020    Transaminitis 2020    Acute metabolic encephalopathy     Multiple sclerosis ([address] 2020    Severe protein-calorie malnutrition ([address] 2020    Leukocytosis 2020         Jorja Apgar, MD  [facility]  2021 2:56 PM

## 2021-05-31 NOTE — PROGRESS NOTES
TRANSFER - IN REPORT:    Verbal report received from park nunes (name) on James Messer  being received from ER(unit) for routine progression of care      Report consisted of patients Situation, Background, Assessment and   Recommendations(SBAR). Information from the following report(s) SBAR, ED Summary, [facility] and Recent Results was reviewed with the receiving nurse. Opportunity for questions and clarification was provided. Assessment completed upon patients arrival to unit and care assumed.

## 2021-05-31 NOTE — ED NOTES
Patient presented alert to person with normal vital signs and multiple pressure ulcers. Wasserman in place with emily urine draining with sediment. No date visible on wasserman bag. Patient is normally alert and oriented at baseline. Patient answers minimal questions appropriately at this time.

## 2021-05-31 NOTE — ED TRIAGE NOTES
Pt arrives by EMS, has been by herself since noon yesterday, normally has home care for MS and lux, ex  called bc patient was asking \"who am I, who are you\". Pt was found with pills spilled all near her. Lux appears to have blood in it. Has decubitus ulcer on buttox. Mask applied to pt.

## 2021-06-01 PROCEDURE — 2709999900 HC NON-CHARGEABLE SUPPLY

## 2021-06-01 PROCEDURE — 65270000029 HC RM PRIVATE

## 2021-06-01 PROCEDURE — 74011250637 HC RX REV CODE- 250/637: Performed by: HOSPITALIST

## 2021-06-01 PROCEDURE — 74011000258 HC RX REV CODE- 258: Performed by: HOSPITALIST

## 2021-06-01 PROCEDURE — 77030040393 HC DRSG OPTIFOAM GENT MDII -B

## 2021-06-01 PROCEDURE — 74011250636 HC RX REV CODE- 250/636: Performed by: HOSPITALIST

## 2021-06-01 RX ORDER — DEXTROSE, SODIUM CHLORIDE, SODIUM LACTATE, POTASSIUM CHLORIDE, AND CALCIUM CHLORIDE 5; .6; .31; .03; .02 G/100ML; G/100ML; G/100ML; G/100ML; G/100ML
100 INJECTION, SOLUTION INTRAVENOUS CONTINUOUS
Status: DISPENSED | OUTPATIENT
Start: 2021-06-01 | End: 2021-06-02

## 2021-06-01 RX ADMIN — SODIUM CHLORIDE, SODIUM LACTATE, POTASSIUM CHLORIDE, CALCIUM CHLORIDE, AND DEXTROSE MONOHYDRATE 100 ML/HR: 600; 310; 30; 20; 5 INJECTION, SOLUTION INTRAVENOUS at 03:58

## 2021-06-01 RX ADMIN — Medication 10 ML: at 05:17

## 2021-06-01 RX ADMIN — SODIUM CHLORIDE, SODIUM LACTATE, POTASSIUM CHLORIDE, CALCIUM CHLORIDE, AND DEXTROSE MONOHYDRATE 100 ML/HR: 600; 310; 30; 20; 5 INJECTION, SOLUTION INTRAVENOUS at 18:26

## 2021-06-01 RX ADMIN — AMANTADINE HYDROCHLORIDE 100 MG: 100 CAPSULE ORAL at 16:58

## 2021-06-01 RX ADMIN — FAMOTIDINE 20 MG: 20 TABLET, FILM COATED ORAL at 08:34

## 2021-06-01 RX ADMIN — MULTIPLE VITAMINS W/ MINERALS TAB 1 TABLET: TAB at 08:34

## 2021-06-01 RX ADMIN — BACLOFEN 20 MG: 10 TABLET ORAL at 16:58

## 2021-06-01 RX ADMIN — OXYCODONE 10 MG: 5 TABLET ORAL at 21:17

## 2021-06-01 RX ADMIN — ONDANSETRON 4 MG: 2 INJECTION INTRAMUSCULAR; INTRAVENOUS at 03:58

## 2021-06-01 RX ADMIN — POLYETHYLENE GLYCOL 3350 17 G: 17 POWDER, FOR SOLUTION ORAL at 22:03

## 2021-06-01 RX ADMIN — OXYCODONE 5 MG: 5 TABLET ORAL at 13:33

## 2021-06-01 RX ADMIN — OXYCODONE 5 MG: 5 TABLET ORAL at 05:39

## 2021-06-01 RX ADMIN — RDII 250 MG CAPSULE 250 MG: at 16:58

## 2021-06-01 RX ADMIN — OXYCODONE 5 MG: 5 TABLET ORAL at 17:29

## 2021-06-01 RX ADMIN — BACLOFEN 20 MG: 10 TABLET ORAL at 08:34

## 2021-06-01 RX ADMIN — CYANOCOBALAMIN TAB 1000 MCG 1000 MCG: 1000 TAB at 08:34

## 2021-06-01 RX ADMIN — OXYCODONE HYDROCHLORIDE AND ACETAMINOPHEN 250 MG: 500 TABLET ORAL at 08:34

## 2021-06-01 RX ADMIN — RDII 250 MG CAPSULE 250 MG: at 08:34

## 2021-06-01 RX ADMIN — Medication 10 ML: at 21:18

## 2021-06-01 RX ADMIN — CEFTRIAXONE 1 G: 1 INJECTION, POWDER, FOR SOLUTION INTRAMUSCULAR; INTRAVENOUS at 13:24

## 2021-06-01 RX ADMIN — AMANTADINE HYDROCHLORIDE 100 MG: 100 CAPSULE ORAL at 08:34

## 2021-06-01 RX ADMIN — Medication 10 ML: at 14:00

## 2021-06-01 RX ADMIN — PREGABALIN 50 MG: 50 CAPSULE ORAL at 21:17

## 2021-06-01 RX ADMIN — BACLOFEN 20 MG: 10 TABLET ORAL at 21:17

## 2021-06-01 RX ADMIN — OXYCODONE 5 MG: 5 TABLET ORAL at 00:21

## 2021-06-01 NOTE — PROGRESS NOTES
END OF SHIFT NOTE:    Intake/Output  06/01 0701 - 06/01 1900  In: 2001 [P.O.:860; I.V.:417]  Out: 1000 [Urine:1000]   Voiding: YES  Catheter: YES  Drain:              Stool:  0 occurrences. Stool Assessment  Stool Color: Loc Honey (05/31/21 1651)  Stool Appearance: Hard (05/31/21 1651)  Stool Amount: Medium (05/31/21 1651)  Stool Source/Status:  (Patient incontinent of stool upon arrival. Stool was removed from rectum resulting in large hard marble-like bowel movement. Brief changed. ) (05/31/21 1651)    Emesis:  0 occurrences. VITAL SIGNS  Patient Vitals for the past 12 hrs:   Temp Pulse Resp BP SpO2   06/01/21 1500 97.4 °F (36.3 °C) 98 18 122/72 100 %   06/01/21 1118 97.9 °F (36.6 °C) 92 18 113/72 100 %   06/01/21 0755 98 °F (36.7 °C) 89 18 107/65 97 %       Pain Assessment  Pain 1  Pain Scale 1: Numeric (0 - 10) (06/01/21 1731)  Pain Intensity 1: 10 (06/01/21 1731)  Patient Stated Pain Goal: 0 (06/01/21 1333)  Pain Reassessment 1: Other (comment) (eating breakfast) (06/01/21 0820)  Pain Onset 1: now (06/01/21 0539)  Pain Location 1: Buttocks (06/01/21 1731)  Pain Orientation 1: Anterior;Mid (06/01/21 0539)  Pain Description 1: Aching (06/01/21 1333)  Pain Intervention(s) 1: Medication (see MAR) (06/01/21 1731)    Ambulating  No    Additional Information: placed new rook boots. Pain meds given     Shift report given to oncoming nurse at the bedside.     Mario Gibbs RN

## 2021-06-01 NOTE — PROGRESS NOTES
Care Management Interventions  Transition of Care Consult (CM Consult): Home Hospice ([facility])  [facility]: No  Reason Outside [place]: Patient already serviced by other home care/hospice agency  Current Support Network: Own Home  Confirm Follow Up Transport: Family  Discharge Location  Discharge Placement: Home with hospice  SW reviewed chart and met with pt who was not completed clear about her home agencies. Pt was admitted in MArch of 2021 and hired private sitters from [address] [place] [address] and had [facility] [address] ordered. Pt reamitted in April 2021 at which time she was still current with [facility] but had dc'd [facility]  to start [address] Inocente Yates Pt admitted to [facility] services on 4/9/21. From previous CM notes it seems as if pt's ex Aleda Batoolh, and pt's sister assist her. SW called [facility] today and spoke with, Elena Ferrari, who reports pt does have their services, she has a sitter 5 days / week and a nurse makes visits 2-3 times/week. Elena Ferrari thinks pt's private duty sitters have just need. Pt was paying sitters from 8am-12 noon 7 days / week. Hospice aide was coming jsut after noon. Pt has used all used all benefits for SNF and will need to be out of the hospital for 60 days after this admission dc before she can get days back. I did read in CM notes from a previous admission that pt did not want [facility] , they had a facility to take pt but she would have to get a \"[facility] income trust\" which pt did not want to do. Per records pt has a chronic indwelling catheter and multiple chronic wounds. Also per records pt has a scooter she uses in the home. Pt has WC, Scooter and BSC. Pt's home is also handicap accessible. SARY following for dc needs. Will resume [employer] [facility] [employer]. Will confirm if pt still has private duty services or if she can or will arrange them for dc.    TAVARES Delong

## 2021-06-01 NOTE — PROGRESS NOTES
[facility] Progress Note    INTERVAL HISTORY  / Subjective:  Improved mental status today she is aao x person, hospital, and year. No Longer confused as of yesterday. Assessment / Plan:  59 y/o WF with a h/o MS, multiple chronic wounds, she is  wheelchair bound andhas chronic indwelling wasserman catheter  Placed due to multiple sacral ulcers. She Has exhausted her insurance days at rehab already and continues to Live at home alone. Was found by her ex  confused. EMS called to her residence and noted medication pills scattered all over the residence. Urine Drug Screen + opiates. CT head shows left encephalomalacia. Note: She had an admission 03/2021 here for similar presentation. Urine culture grew pan-sensitive proteus mirabilis, also Klebsiella/ESBL. And several ER visits since last of admission.        Discharge Disposition   --06/01 She has been established with hospice, our social work/case management is assisting in     Acute metabolic encephalopathy  --2/2 Medications such as opioids and baclofen and possible acute UTI, in addition to CT scan showing encephalomalacia that is a component of vascular dementia also  --Also noted to have urinary colonization, she has a chronic indwelling Wasserman catheter placed for chronic sacral wound  --prior history of urine cultures most recent 04/2021  with Proteus mirabilis, and ESBL Klebsiella. --she is also on opioids for chronic pain which more then Likely contributed to the transient AMS.   --Start IV Fluids D5LR, and IV Antibiotics / Ceftriaxone ( 05/31-)  --06/01 Improved Mental Status, Urine Cultures requiring overnight incubation, resuming IV ceftriaxone ( 05/31- ) , IV Fluids D5LR      Chronic Pain 2/2 Chronic wounds/healing sacral ulcers, chronic Wasserman catheter  - She was  Started Oxycontin and with PRN oxycodone for breakthrough , wound care ordered, start PO Zinc sulfate , ascorbic acid and multivitamin appears to be an effective regiment  06/01 Mathew catheter replaced during this admission 05/31      Multiple chronic wounds/ Heel and Sacral Ulcers   -Continue with wound care  -continue with Mathew to avoid saturating wound care     Severe protein calorie malnutrition  BMI 13, and nutritional supplements, multivitamins with minerals      Objective:  Visit Vitals  /72 (BP 1 Location: Left upper arm, BP Patient Position: At rest)   Pulse 92   Temp 97.9 °F (36.6 °C)   Resp 18   Ht 5' 7\" (1.702 m)   Wt 45.7 kg (100 lb 12.8 oz)   SpO2 100%   BMI 15.79 kg/m²                 Physical Exam:  General: Chronically ill-appearing female, she is alert. HEENT: Pupils equal and reactive to light and accommodation, oropharynx is clear   Neck: Supple, no lymphadenopathy, no JVD   Lungs: Clear to auscultation bilaterally   Cardiovascular: Regular rate and rhythm with normal S1 and S2   Abdomen: Soft, nontender, nondistended, normoactive bowel sounds   Extremities: No cyanosis clubbing or edema   Neuro: Nonfocal, A&O x3   Psych: Normal affect     Intake and Output:  Date 05/31/21 0700 - 06/01/21 0659 06/01/21 0700 - 06/02/21 0659   Shift 4668-0712 5496-2732 24 Hour Total 8008-2490 8831-1858 24 Hour Total   INTAKE   P.O. 150 120 270 360  360     P. O. 150 120 270 360  360   I. V.(mL/kg/hr) 50(0.1) 1305(2.4) 1355(1.2) 417  417     I.V.  1305 1305 417  417     Volume (cefTRIAXone (ROCEPHIN) 1 g in 0.9% sodium chloride (MBP/ADV) 50 mL MBP) 50  50      Shift Total(mL/kg) 200(4.4) 1425(31.2) 1625(35.5) 777(17)  777(17)   OUTPUT   Urine(mL/kg/hr)  145(0.3) 145(0.1)        Urine Output (mL) (Urinary Catheter 05/31/21 Mathew)  145 145      Stool           Stool Occurrence(s)    1 x  1 x   Shift Total(mL/kg)  145(3.2) 145(3.2)       1280 1480 777  777   Weight (kg) 45.7 45.7 45.7 45.7 45.7 45.7       LAB:  Admission on 05/31/2021   Component Date Value    Lactic acid 05/31/2021 1.2     WBC 05/31/2021 4.7     RBC 05/31/2021 4.97     HGB 05/31/2021 14.2  HCT 05/31/2021 46.2     MCV 05/31/2021 93.0     MCH 05/31/2021 28.6     MCHC 05/31/2021 30.7*    RDW 05/31/2021 17.4*    PLATELET 59/16/0039 838     MPV 05/31/2021 10.6     ABSOLUTE NRBC 05/31/2021 0.00     DF 05/31/2021 AUTOMATED     NEUTROPHILS 05/31/2021 82*    LYMPHOCYTES 05/31/2021 5*    MONOCYTES 05/31/2021 9     EOSINOPHILS 05/31/2021 3     BASOPHILS 05/31/2021 1     IMMATURE GRANULOCYTES 05/31/2021 0     ABS. NEUTROPHILS 05/31/2021 3.9     ABS. LYMPHOCYTES 05/31/2021 0.3*    ABS. MONOCYTES 05/31/2021 0.4     ABS. EOSINOPHILS 05/31/2021 0.1     ABS. BASOPHILS 05/31/2021 0.0     ABS. IMM. GRANS. 05/31/2021 0.0     Magnesium 05/31/2021 2.0     Sodium 05/31/2021 145     Potassium 05/31/2021 4.2     Chloride 05/31/2021 114*    CO2 05/31/2021 23     Anion gap 05/31/2021 8     Glucose 05/31/2021 107*    BUN 05/31/2021 23     Creatinine 05/31/2021 0.28*    GFR est AA 05/31/2021 >60     GFR est non-AA 05/31/2021 >60     Calcium 05/31/2021 9.5     Bilirubin, total 05/31/2021 0.4     ALT (SGPT) 05/31/2021 34     AST (SGOT) 05/31/2021 24     Alk.  phosphatase 05/31/2021 104     Protein, total 05/31/2021 7.5     Albumin 05/31/2021 3.5     Globulin 05/31/2021 4.0*    A-G Ratio 05/31/2021 0.9*    Ammonia 05/31/2021 <10*    TSH 05/31/2021 1.560     Acetaminophen level 65/50/5259 <2*    Salicylate level 91/65/7912 <1.7*    ALCOHOL(ETHYL),SERUM 05/31/2021 <3     CK 05/31/2021 148     PCP(PHENCYCLIDINE) 05/31/2021 Negative     BENZODIAZEPINES 05/31/2021 Negative     COCAINE 05/31/2021 Negative     AMPHETAMINES 05/31/2021 Negative     METHADONE 05/31/2021 Negative     THC (TH-CANNABINOL) 05/31/2021 Negative     OPIATES 05/31/2021 Positive     BARBITURATES 05/31/2021 Negative     Color 05/31/2021 ORANGE     Appearance 05/31/2021 TURBID     Specific gravity 05/31/2021 1.023     pH (UA) 05/31/2021 5.0     Protein 05/31/2021 TRACE*    Glucose 05/31/2021 Negative     Ketone 05/31/2021 15*    Bilirubin 05/31/2021 SMALL*    Blood 05/31/2021 TRACE*    Urobilinogen 05/31/2021 1.0     Nitrites 05/31/2021 Positive*    Leukocyte Esterase 05/31/2021 LARGE*    WBC 05/31/2021 >100*    RBC 05/31/2021 5-10     Epithelial cells 05/31/2021 0-3     Bacteria 05/31/2021 4+*    Casts 05/31/2021 0     Special Requests: 05/31/2021 NO SPECIAL REQUESTS     Culture result: 05/31/2021 NO GROWTH AFTER SHORT PERIOD OF INCUBATION. FURTHER RESULTS TO FOLLOW AFTER OVERNIGHT INCUBATION. IMAGING:  CT HEAD WO CONT    Result Date: 5/31/2021  1. No acute intracranial hemorrhage. 2. Left cerebellar encephalomalacia. 3. Cerebral volume loss with compensatory ventricular enlargement. XR CHEST PORT    Result Date: 5/31/2021  No consolidation. EKG:  No results found for this or any previous visit.           Tish Brito MD  6/1/2021 11:34 AM

## 2021-06-01 NOTE — PROGRESS NOTES
Problem: Risk for Spread of Infection  Goal: Prevent transmission of infectious organism to others  Description: Prevent the transmission of infectious organisms to other patients, staff members, and visitors. Outcome: Progressing Towards Goal     Problem: Patient Education:  Go to Education Activity  Goal: Patient/Family Education  Outcome: Progressing Towards Goal     Problem: Falls - Risk of  Goal: *Absence of Falls  Description: Document Onalee Gum Fall Risk and appropriate interventions in the flowsheet. Outcome: Progressing Towards Goal  Note: Fall Risk Interventions:       Mentation Interventions: Bed/chair exit alarm    Medication Interventions: Bed/chair exit alarm    Elimination Interventions: Bed/chair exit alarm              Problem: Patient Education: Go to Patient Education Activity  Goal: Patient/Family Education  Outcome: Progressing Towards Goal     Problem: Pressure Injury - Risk of  Goal: *Prevention of pressure injury  Description: Document Kimani Scale and appropriate interventions in the flowsheet.   Outcome: Progressing Towards Goal  Note: Pressure Injury Interventions:  Sensory Interventions: Assess need for specialty bed, Assess changes in LOC    Moisture Interventions: Absorbent underpads, Internal/External urinary devices    Activity Interventions: Pressure redistribution bed/mattress(bed type), PT/OT evaluation    Mobility Interventions: Pressure redistribution bed/mattress (bed type)    Nutrition Interventions: Document food/fluid/supplement intake    Friction and Shear Interventions: Foam dressings/transparent film/skin sealants, Minimize layers                Problem: Patient Education: Go to Patient Education Activity  Goal: Patient/Family Education  Outcome: Progressing Towards Goal

## 2021-06-01 NOTE — PROGRESS NOTES
05/31/21 1801   Dual Skin Pressure Injury Assessment   Dual Skin Pressure Injury Assessment X   Second Care Provider (Based on [address] [place] [address]) Krysta GUY, RN   Heel  Right   Buttocks/[place]  Right   Sacrum  Mid   Skin Integumentary   Skin Integumentary (WDL) X    Pressure  Injury Documentation Pressure Injury Noted-See Wound LDA to Document   Skin Color Ecchymosis (comment); Pale   Skin Condition/Temp Dry;Fragile   Skin Integrity Wound (add Wound LDA)  (sacrum, R buttocks, R heel)    Placed a wound consult:  - Sacrum wound (pad applied)  - R buttock wound (pad applied)  - R heel wound  - R pinky toe   - L 2nd toe

## 2021-06-01 NOTE — PROGRESS NOTES
END OF SHIFT NOTE:    - Pt very confused and lethargic; unable to complete database  - Pt meds in top dresser drawer. Night shift RNSaadia and Charge RN, luciana Ponce. - Pt's RLQ hard and rigid, pt unable to communicate when last BM was    Intake/Output  05/31 1901 - 06/01 0700  In: 570 [I.V.:570]  Out: -    Voiding: NO  Catheter: YES  Drain:        Stool:  0 occurrences. Stool Assessment  Stool Color: Elvin Josh (05/31/21 1651)  Stool Appearance: Hard (05/31/21 1651)  Stool Amount: Medium (05/31/21 1651)  Stool Source/Status:  (Patient incontinent of stool upon arrival. Stool was removed from rectum resulting in large hard marble-like bowel movement. Brief changed. ) (05/31/21 1651)    Emesis:  0 occurrences. VITAL SIGNS  Patient Vitals for the past 12 hrs:   Temp Pulse Resp BP SpO2   05/31/21 1918 98 °F (36.7 °C) (!) 102 18 138/78 100 %   05/31/21 1755 97.8 °F (36.6 °C) (!) 103 18 139/78 100 %   05/31/21 1736  (!) 103 20  95 %   05/31/21 1600  98 (!) 32 126/70 98 %   05/31/21 1208 97.4 °F (36.3 °C) 88 18 (!) 147/72 99 %       Pain Assessment  Pain 1  Pain Scale 1: Numeric (0 - 10) (05/31/21 2021)  Pain Intensity 1: 10 (05/31/21 2021)  Patient Stated Pain Goal: 0 (05/31/21 2021)  Pain Onset 1: now (05/31/21 2021)  Pain Location 1: Abdomen (05/31/21 2021)  Pain Orientation 1: Anterior;Mid (05/31/21 2021)  Pain Description 1: Sharlotte Player; Constant (05/31/21 2021)  Pain Intervention(s) 1: Medication (see MAR) (05/31/21 2021)    Ambulating  No      Shift report given to oncoming nurse, Saadia Morgan.     Sea Cordova

## 2021-06-01 NOTE — WOUND CARE
Left posterior heel with 2x2cm deep purple area, non-blanchable, DTI, leave open to air. Right plantar heel with loose 3x3cm eschar, recommend betadine paint around eschar daily. Will order new rooke boots the boots patient has from home are old and loosing cushion. Right lateral ankle with scattered non-blanchable erythema, recommend foam dressing. Left hip with scattered non-blanchable erythema in 3x4cm area. Left ischial area 2x2cm slough/ soft eschar unstageable pressure injury. Sacral/ coccyx with healing stage 3, wound is 3x2x0.4cm, recommend foam dressing. Right ischial wound healing stage IV, scar is large, 3 shallow open areas remain in scar, recommend foam dressing. Patiens goals of care are not clear through conversation, she \"wants to heal\" however does not want a nursing home, she accepts hospice, however had more help needs than hospice has to offer, states she can not afford private pay. Noted case management notes plans for home with hospice again. Bandages will be as simple as possible for hospice. Will monitor.

## 2021-06-02 LAB
BACTERIA SPEC CULT: ABNORMAL
BACTERIA SPEC CULT: ABNORMAL
SERVICE CMNT-IMP: ABNORMAL

## 2021-06-02 PROCEDURE — 74011250637 HC RX REV CODE- 250/637: Performed by: HOSPITALIST

## 2021-06-02 PROCEDURE — 74011250636 HC RX REV CODE- 250/636: Performed by: HOSPITALIST

## 2021-06-02 PROCEDURE — 74011000258 HC RX REV CODE- 258: Performed by: HOSPITALIST

## 2021-06-02 PROCEDURE — 65270000029 HC RM PRIVATE

## 2021-06-02 PROCEDURE — 2709999900 HC NON-CHARGEABLE SUPPLY

## 2021-06-02 RX ORDER — LEVOFLOXACIN 500 MG/1
500 TABLET, FILM COATED ORAL EVERY 24 HOURS
Status: DISCONTINUED | OUTPATIENT
Start: 2021-06-02 | End: 2021-06-05 | Stop reason: HOSPADM

## 2021-06-02 RX ORDER — SAME BUTANEDISULFONATE/BETAINE 400-600 MG
500 POWDER IN PACKET (EA) ORAL 2 TIMES DAILY
Status: DISCONTINUED | OUTPATIENT
Start: 2021-06-02 | End: 2021-06-05 | Stop reason: HOSPADM

## 2021-06-02 RX ADMIN — Medication 10 ML: at 21:03

## 2021-06-02 RX ADMIN — BACLOFEN 20 MG: 10 TABLET ORAL at 08:04

## 2021-06-02 RX ADMIN — FAMOTIDINE 20 MG: 20 TABLET, FILM COATED ORAL at 08:04

## 2021-06-02 RX ADMIN — OXYCODONE 10 MG: 5 TABLET ORAL at 20:54

## 2021-06-02 RX ADMIN — PREGABALIN 50 MG: 50 CAPSULE ORAL at 20:54

## 2021-06-02 RX ADMIN — BACLOFEN 20 MG: 10 TABLET ORAL at 16:49

## 2021-06-02 RX ADMIN — RDII 250 MG CAPSULE 500 MG: at 16:49

## 2021-06-02 RX ADMIN — OXYCODONE 10 MG: 5 TABLET ORAL at 14:59

## 2021-06-02 RX ADMIN — AMANTADINE HYDROCHLORIDE 100 MG: 100 CAPSULE ORAL at 08:04

## 2021-06-02 RX ADMIN — CEFTRIAXONE 1 G: 1 INJECTION, POWDER, FOR SOLUTION INTRAMUSCULAR; INTRAVENOUS at 12:42

## 2021-06-02 RX ADMIN — BACLOFEN 20 MG: 10 TABLET ORAL at 21:03

## 2021-06-02 RX ADMIN — OXYCODONE 10 MG: 5 TABLET ORAL at 02:55

## 2021-06-02 RX ADMIN — LEVOFLOXACIN 500 MG: 500 TABLET, FILM COATED ORAL at 09:17

## 2021-06-02 RX ADMIN — Medication 10 ML: at 14:00

## 2021-06-02 RX ADMIN — AMANTADINE HYDROCHLORIDE 100 MG: 100 CAPSULE ORAL at 16:49

## 2021-06-02 RX ADMIN — POLYETHYLENE GLYCOL 3350 17 G: 17 POWDER, FOR SOLUTION ORAL at 20:54

## 2021-06-02 RX ADMIN — MULTIPLE VITAMINS W/ MINERALS TAB 1 TABLET: TAB at 09:12

## 2021-06-02 RX ADMIN — OXYCODONE HYDROCHLORIDE AND ACETAMINOPHEN 250 MG: 500 TABLET ORAL at 08:04

## 2021-06-02 RX ADMIN — ONDANSETRON 4 MG: 2 INJECTION INTRAMUSCULAR; INTRAVENOUS at 12:42

## 2021-06-02 RX ADMIN — Medication 10 ML: at 05:13

## 2021-06-02 RX ADMIN — CYANOCOBALAMIN TAB 1000 MCG 1000 MCG: 1000 TAB at 08:04

## 2021-06-02 RX ADMIN — RDII 250 MG CAPSULE 250 MG: at 08:04

## 2021-06-02 NOTE — PROGRESS NOTES
END OF SHIFT NOTE:    Intake/Output  06/01 1901 - 06/02 0700  In: 4913 [P.O.:480; I.V.:1126]  Out: 200 [Urine:200]   Voiding: YES  Catheter: YES  Drain:              Stool:  0 occurrences. Stool Assessment  Stool Color: Deion Barone (05/31/21 1651)  Stool Appearance: Hard (05/31/21 1651)  Stool Amount: Medium (05/31/21 1651)  Stool Source/Status:  (Patient incontinent of stool upon arrival. Stool was removed from rectum resulting in large hard marble-like bowel movement. Brief changed. ) (05/31/21 1651)    Emesis:  0 occurrences. VITAL SIGNS  Patient Vitals for the past 12 hrs:   Temp Pulse Resp BP SpO2   06/02/21 0200 98.1 °F (36.7 °C) 91 18 110/66 94 %   06/01/21 2200 97.7 °F (36.5 °C) 87 18 109/68 98 %   06/01/21 1905 98.2 °F (36.8 °C) 90 18 121/82 95 %       Pain Assessment  Pain 1  Pain Scale 1: Numeric (0 - 10) (06/02/21 0355)  Pain Intensity 1: 5 (06/02/21 0355)  Patient Stated Pain Goal: 3 (06/02/21 0355)  Pain Reassessment 1: Yes (06/02/21 0355)  Pain Onset 1: pta (06/02/21 0255)  Pain Location 1: Buttocks (06/02/21 0255)  Pain Orientation 1: Right (06/02/21 0255)  Pain Description 1: Burning; Sharp (06/02/21 0255)  Pain Intervention(s) 1: Medication (see MAR); Repositioned (06/02/21 0255)    Ambulating  No    Additional Information:   Turned/repositioned q2. Oxyir given for pain x 2. Shift report given to oncoming nurse RADHA Zamora at the bedside.     Aditya Holder RN

## 2021-06-02 NOTE — PROGRESS NOTES
END OF SHIFT NOTE:    Intake/Output  06/02 0701 - 06/02 1900  In: 360 [P.O.:360]  Out: 400 [Urine:400]   Voiding: YES  Catheter: YES  Drain:              Stool:  0 occurrences. Stool Assessment  Stool Color: Emmanuelle Chavez (05/31/21 1651)  Stool Appearance: Hard (05/31/21 1651)  Stool Amount: Medium (05/31/21 1651)  Stool Source/Status:  (Patient incontinent of stool upon arrival. Stool was removed from rectum resulting in large hard marble-like bowel movement. Brief changed. ) (05/31/21 1651)    Emesis:  0 occurrences. VITAL SIGNS  Patient Vitals for the past 12 hrs:   Temp Pulse Resp BP SpO2   06/02/21 1516 98.5 °F (36.9 °C) 75 18 106/68 96 %   06/02/21 1116 97.8 °F (36.6 °C) 80 18 116/74 98 %   06/02/21 0734 98 °F (36.7 °C) 74 18 (!) 98/57 100 %       Pain Assessment  Pain 1  Pain Scale 1: Numeric (0 - 10) (06/02/21 1502)  Pain Intensity 1: 10 (06/02/21 1502)  Patient Stated Pain Goal: 0 (06/02/21 0755)  Pain Reassessment 1: Patient resting w/respiratory rate greater than 10 (06/02/21 1516)  Pain Onset 1: pta (06/02/21 0255)  Pain Location 1: Buttocks (06/02/21 1502)  Pain Orientation 1: Right (06/02/21 0255)  Pain Description 1: Aching (06/02/21 1502)  Pain Intervention(s) 1: Medication (see MAR) (06/02/21 1502)    Ambulating  No    Additional Information:     Shift report given to oncoming nurse at the bedside.     Chanel Carson RN

## 2021-06-02 NOTE — PROGRESS NOTES
Physician Progress Note      Kota Lomas  CSN #:                  [id number]  :                       1960  ADMIT DATE:       2021 12:06 PM  DISCH DATE:  RESPONDING  PROVIDER #:        Sugey Sparrow MD          QUERY TEXT:    Pt admitted with UTI. Pt noted to have chronic indwelling urinary catheter, previous procedure/urinary catheter or other urinary drainage device). If possible, please document in the progress notes and discharge summary if you are evaluating and/or treating any of the following: The medical record reflects the following:  Risk Factors:  End stage MS, Pressure ulcers with Chronic Mathew  Clinical Indicators: KLEBSIELLA PNEUMONIAE UTI, Chronic Mathew in place on this admission  Treatment: Levaquin IV, Ceftriaxone IV          Thank you. Lisa Poon RN CDI, CCS  My office phone [phone]  Options provided:  -- UTI due to chronic indwelling urinary catheter  -- UTI not due to indwelling urinary catheter  -- Other - I will add my own diagnosis  -- Disagree - Not applicable / Not valid  -- Disagree - Clinically unable to determine / Unknown  -- Refer to Clinical Documentation Reviewer    PROVIDER RESPONSE TEXT:    UTI is due to the chronic indwelling urinary catheter.     Query created by: Ashlee Shields on 2021 2:31 PM      Electronically signed by:  Sugey Sparrow MD 2021 3:00 PM

## 2021-06-02 NOTE — PROGRESS NOTES
[facility] Hospitalist Service Progress Note    INTERVAL HISTORY  / Subjective:  Improved mental status today she is aao x person, hospital, and year. No Longer confused as of yesterday. Assessment / Plan:  61 y/o WF with a h/o MS, multiple chronic wounds, she is  wheelchair bound andhas chronic indwelling wasserman catheter  Placed due to multiple sacral ulcers. She Has exhausted her insurance days at rehab already and continues to Live at home alone. Was found by her ex  confused. EMS called to her residence and noted medication pills scattered all over the residence. Urine Drug Screen + opiates. CT head shows left encephalomalacia. Note: She had an admission 03/2021 here for similar presentation. Urine culture grew pan-sensitive proteus mirabilis, also Klebsiella/ESBL. And several ER visits since last of admission.        Discharge Disposition   --06/02 plan is for patient to go home with hospice.  is assisting with discharge planning. Patient has used on her rehab days and hence cannot be sent to SNF for short-term rehab. Ordered for PT and OT eval today.     Acute metabolic encephalopathy: Resolving  --2/2 Medications such as opioids and baclofen and possible acute UTI, in addition to CT scan showing encephalomalacia that is a component of vascular dementia also  --Also noted to have urinary colonization, she has a chronic indwelling Wasserman catheter placed for chronic sacral wound  --prior history of urine cultures most recent 04/2021  with Proteus mirabilis, and ESBL Klebsiella. --she is also on opioids for chronic pain which more then Likely contributed to the transient AMS. --Start IV Fluids D5LR, and IV Antibiotics / Ceftriaxone ( 05/31-)  --06/01 Improved Mental Status, Urine Cultures requiring overnight incubation, resuming IV ceftriaxone ( 05/31- ) , IV Fluids D5LR   6/2:  Mentation is improving, urine culture positive for ESBL Klebsiella.   Starting on Levaquin based on sensitivity 500 mg p.o. daily for 7 days. Increasing Florastor to 500 mg p.o. twice daily     Chronic Pain 2/2 Chronic wounds/healing sacral ulcers, chronic Mathew catheter  - She was  Started Oxycontin and with PRN oxycodone for breakthrough , wound care ordered, start PO Zinc sulfate , ascorbic acid and multivitamin appears to be an effective regiment  06/01 Mathew catheter replaced during this admission 05/31      Multiple chronic wounds/ Heel and Sacral Ulcers   -Continue with wound care  -continue with Mathew to avoid saturating wound care  6/2:  Wound care is on board, continuing recommended wound care dressing.       Severe protein calorie malnutrition  BMI 13, and nutritional supplements, multivitamins with minerals    DVT prophylaxis: SCD  GI prophylaxis with Pepcid  PT and OT eval ordered for today. Disposition: Pending PT and OT eval.  Hopefully patient can be discharged home with hospice. CODE STATUS: Full code    Objective:  Visit Vitals  BP (!) 98/57 (BP 1 Location: Right upper arm, BP Patient Position: At rest;Lying left side)   Pulse 74   Temp 98 °F (36.7 °C)   Resp 18   Ht 5' 7\" (1.702 m)   Wt 45.7 kg (100 lb 12.8 oz)   SpO2 100%   BMI 15.79 kg/m²                 Physical Exam:  General: Frail, elderly, alert awake, NAD, on room air  HEENT: Pupils equal and reactive to light and accommodation, oropharynx is clear   Neck: Supple, no lymphadenopathy, no JVD   Lungs: Clear to auscultation bilaterally   Cardiovascular: Regular rate and rhythm with normal S1 and S2   Abdomen: Soft, nontender, nondistended, normoactive bowel sounds   Extremities: No cyanosis clubbing or edema   Neuro: GCS 15, moving all 4 extremities spontaneously, following commands, no motor deficits  Psych: Flat affect    Intake and Output:  Date 06/01/21 0700 - 06/02/21 0659 06/02/21 0700 - 06/03/21 0659   Shift 1285-9475 0038-4864 24 Hour Total 5259-4034 1599-0895 24 Hour Total   INTAKE   P.O.         P. O.  I.V.(mL/kg/hr) 417(0.8) 1126(2.1) 1543(1.4)        I.V. 417 1126 1543      Shift Total(mL/kg) 7711(51.6) 3330(36.2) 5169(60.0)      OUTPUT   Urine(mL/kg/hr) 1000(1.8) 450(0.8) 1450(1.3)        Urine Voided  0 0        Urine Occurrence(s)  0 x 0 x        Urine Output (mL) (Urinary Catheter 05/31/21 Mathew) 7882 524 3411      Emesis/NG output           Emesis Occurrence(s)  0 x 0 x      Stool           Stool Occurrence(s) 1 x 0 x 1 x      Shift Total(mL/kg) 1000(21.9) 450(9.8) 1450(31.7)       1156 1433      Weight (kg) 45.7 45.7 45.7 45.7 45.7 45.7       LAB:      IMAGING:  CT HEAD WO CONT    Result Date: 5/31/2021  1. No acute intracranial hemorrhage. 2. Left cerebellar encephalomalacia. 3. Cerebral volume loss with compensatory ventricular enlargement. XR CHEST PORT    Result Date: 5/31/2021  No consolidation. EKG:  No results found for this or any previous visit.           Joseph Molina MD  6/2/2021 11:34 AM

## 2021-06-02 NOTE — PROGRESS NOTES
Sary rec a call from pt's sister Hardik Hannon this am. She was voicing much concern about pt going home. Sary informed her we have reinstated [facility] [place]. Sister informed she works and has Parkinson so can't do much to assist. She said pt's ex husb helps but has issues of his own. Sary asked Hardik Riddhi if pt still had [employer] and she said no that she was broke and couldn't cont to pay them. Sister very concerned but stated pt was making all these decisions to stay in here home. Informed her Md plan was potentially to d/c home with hospice on Fri after urine cultures/treatment determined. Morro Mehta rec call from AVNI Aragon ([phone]) [facility] liaison. Long discussion with him about pt's less than optimal home situation. Mahesh Samuels has an Aide going out to the home 5 days a week and an Rn 2-3 times depending on pt's needs. Pt has SARY and Ananth visiting as well. SARY strongly suggested hospice SW have some strong counseling sessions about the reality of pt's situation. Pt is not following hospice protocol by notifying the Rn first. Unsure why pt had no visits over holiday wkend. Sister only stops by in evenings and does not provide any care and ex galo checks in on her (unsure of frequency). Sw to cont to follow and assist with this VERY DIFFICULT situation.  Morro Rivera

## 2021-06-03 LAB
ANION GAP SERPL CALC-SCNC: 3 MMOL/L (ref 7–16)
BASOPHILS # BLD: 0 K/UL (ref 0–0.2)
BASOPHILS NFR BLD: 1 % (ref 0–2)
BUN SERPL-MCNC: 16 MG/DL (ref 6–23)
CALCIUM SERPL-MCNC: 9.9 MG/DL (ref 8.3–10.4)
CHLORIDE SERPL-SCNC: 105 MMOL/L (ref 98–107)
CO2 SERPL-SCNC: 32 MMOL/L (ref 21–32)
CREAT SERPL-MCNC: 0.32 MG/DL (ref 0.6–1)
DIFFERENTIAL METHOD BLD: ABNORMAL
EOSINOPHIL # BLD: 0.3 K/UL (ref 0–0.8)
EOSINOPHIL NFR BLD: 6 % (ref 0.5–7.8)
ERYTHROCYTE [DISTWIDTH] IN BLOOD BY AUTOMATED COUNT: 16.9 % (ref 11.9–14.6)
GLUCOSE SERPL-MCNC: 93 MG/DL (ref 65–100)
HCT VFR BLD AUTO: 37.4 % (ref 35.8–46.3)
HGB BLD-MCNC: 11.4 G/DL (ref 11.7–15.4)
IMM GRANULOCYTES # BLD AUTO: 0 K/UL (ref 0–0.5)
IMM GRANULOCYTES NFR BLD AUTO: 0 % (ref 0–5)
LYMPHOCYTES # BLD: 0.3 K/UL (ref 0.5–4.6)
LYMPHOCYTES NFR BLD: 7 % (ref 13–44)
MCH RBC QN AUTO: 28.9 PG (ref 26.1–32.9)
MCHC RBC AUTO-ENTMCNC: 30.5 G/DL (ref 31.4–35)
MCV RBC AUTO: 94.7 FL (ref 79.6–97.8)
MONOCYTES # BLD: 0.3 K/UL (ref 0.1–1.3)
MONOCYTES NFR BLD: 9 % (ref 4–12)
NEUTS SEG # BLD: 3 K/UL (ref 1.7–8.2)
NEUTS SEG NFR BLD: 77 % (ref 43–78)
NRBC # BLD: 0 K/UL (ref 0–0.2)
PLATELET # BLD AUTO: 380 K/UL (ref 150–450)
PMV BLD AUTO: 10.1 FL (ref 9.4–12.3)
POTASSIUM SERPL-SCNC: 4 MMOL/L (ref 3.5–5.1)
RBC # BLD AUTO: 3.95 M/UL (ref 4.05–5.2)
SODIUM SERPL-SCNC: 140 MMOL/L (ref 136–145)
WBC # BLD AUTO: 3.9 K/UL (ref 4.3–11.1)

## 2021-06-03 PROCEDURE — 97161 PT EVAL LOW COMPLEX 20 MIN: CPT

## 2021-06-03 PROCEDURE — 85025 COMPLETE CBC W/AUTO DIFF WBC: CPT

## 2021-06-03 PROCEDURE — 65270000029 HC RM PRIVATE

## 2021-06-03 PROCEDURE — 97165 OT EVAL LOW COMPLEX 30 MIN: CPT

## 2021-06-03 PROCEDURE — 36415 COLL VENOUS BLD VENIPUNCTURE: CPT

## 2021-06-03 PROCEDURE — 74011250637 HC RX REV CODE- 250/637: Performed by: HOSPITALIST

## 2021-06-03 PROCEDURE — 80048 BASIC METABOLIC PNL TOTAL CA: CPT

## 2021-06-03 PROCEDURE — 97530 THERAPEUTIC ACTIVITIES: CPT

## 2021-06-03 PROCEDURE — 2709999900 HC NON-CHARGEABLE SUPPLY

## 2021-06-03 RX ADMIN — OXYCODONE HYDROCHLORIDE AND ACETAMINOPHEN 250 MG: 500 TABLET ORAL at 07:47

## 2021-06-03 RX ADMIN — RDII 250 MG CAPSULE 500 MG: at 07:46

## 2021-06-03 RX ADMIN — OXYCODONE 5 MG: 5 TABLET ORAL at 21:09

## 2021-06-03 RX ADMIN — Medication 10 ML: at 21:01

## 2021-06-03 RX ADMIN — AMANTADINE HYDROCHLORIDE 100 MG: 100 CAPSULE ORAL at 07:47

## 2021-06-03 RX ADMIN — BACLOFEN 20 MG: 10 TABLET ORAL at 16:23

## 2021-06-03 RX ADMIN — OXYCODONE 10 MG: 5 TABLET ORAL at 06:16

## 2021-06-03 RX ADMIN — Medication 10 ML: at 05:06

## 2021-06-03 RX ADMIN — AMANTADINE HYDROCHLORIDE 100 MG: 100 CAPSULE ORAL at 16:23

## 2021-06-03 RX ADMIN — RDII 250 MG CAPSULE 500 MG: at 16:23

## 2021-06-03 RX ADMIN — FAMOTIDINE 20 MG: 20 TABLET, FILM COATED ORAL at 07:46

## 2021-06-03 RX ADMIN — LEVOFLOXACIN 500 MG: 500 TABLET, FILM COATED ORAL at 10:36

## 2021-06-03 RX ADMIN — CYANOCOBALAMIN TAB 1000 MCG 1000 MCG: 1000 TAB at 07:47

## 2021-06-03 RX ADMIN — PREGABALIN 50 MG: 50 CAPSULE ORAL at 21:01

## 2021-06-03 RX ADMIN — BACLOFEN 20 MG: 10 TABLET ORAL at 07:47

## 2021-06-03 RX ADMIN — OXYCODONE 10 MG: 5 TABLET ORAL at 16:29

## 2021-06-03 RX ADMIN — BACLOFEN 20 MG: 10 TABLET ORAL at 21:01

## 2021-06-03 NOTE — PROGRESS NOTES
[facility] Hospitalist Service Progress Note    INTERVAL HISTORY  / Subjective:  6/3:  Patient seen at bedside, is alert and awake and coherent. She reports feeling better each day and feels like she is ready to go home with hospice in next 24 to 48 hours. No nausea, vomiting, abdominal pain, fever, chills. Assessment / Plan:  61 y/o WF with a h/o MS, multiple chronic wounds, she is  wheelchair bound andhas chronic indwelling wasserman catheter  Placed due to multiple sacral ulcers. She Has exhausted her insurance days at rehab already and continues to Live at home alone. Was found by her ex  confused. EMS called to her residence and noted medication pills scattered all over the residence. Urine Drug Screen + opiates. CT head shows left encephalomalacia. Note: She had an admission 03/2021 here for similar presentation. Urine culture grew pan-sensitive proteus mirabilis, also Klebsiella/ESBL. And several ER visits since last of admission.        Discharge Disposition   --06/03 plan to discharge patient home with hospice tomorrow morning.     Acute metabolic encephalopathy: Resolving  --2/2 Medications such as opioids and baclofen and possible acute UTI, in addition to CT scan showing encephalomalacia that is a component of vascular dementia also  --Also noted to have urinary colonization, she has a chronic indwelling Wasserman catheter placed for chronic sacral wound  --prior history of urine cultures most recent 04/2021  with Proteus mirabilis, and ESBL Klebsiella. --she is also on opioids for chronic pain which more then Likely contributed to the transient AMS. --Start IV Fluids D5LR, and IV Antibiotics / Ceftriaxone ( 05/31-)  --06/01 Improved Mental Status, Urine Cultures requiring overnight incubation, resuming IV ceftriaxone ( 05/31- ) , IV Fluids D5LR   6/3:  Mentation is improving, urine culture positive for ESBL Klebsiella. Continue Levaquin 500 mg p.o. daily for 7 days (D2).   Continue Florastor  500 mg p.o. twice daily     Chronic Pain 2/2 Chronic wounds/healing sacral ulcers, chronic Mathew catheter  - She was  Started Oxycontin and with PRN oxycodone for breakthrough , wound care ordered, start PO Zinc sulfate , ascorbic acid and multivitamin appears to be an effective regiment  06/01 Mathew catheter replaced during this admission 05/31      Multiple chronic wounds/ Heel and Sacral Ulcers   -Continue with wound care  -continue with Mathew to avoid saturating wound care  6/3:  Wound care is on board, continuing recommended wound care dressing.       Severe protein calorie malnutrition  BMI 15, and nutritional supplements, multivitamins with minerals    DVT prophylaxis: SCD  GI prophylaxis with Pepcid  PT and OT eval  Disposition: Discharge to home with hospice tomorrow  CODE STATUS: Full code    Objective:  Visit Vitals  /77 (BP 1 Location: Right upper arm, BP Patient Position: At rest;Lying right side)   Pulse 76   Temp 97.3 °F (36.3 °C)   Resp 16   Ht 5' 7\" (1.702 m)   Wt 45.7 kg (100 lb 12.8 oz)   SpO2 95%   BMI 15.79 kg/m²                 Physical Exam:  General: Elderly, NAD, on room air, frail  HEENT: Head NCAT, PERRLA  Neck: Supple, no lymphadenopathy, no JVD   Lungs: CTA B  Cardiovascular: Regular rate and rhythm with normal S1 and S2   Abdomen: Soft, nontender, nondistended, normoactive bowel sounds   Extremities: No cyanosis clubbing or edema   Neuro: GCS 15, moving all 4 extremities spontaneously, following commands, no motor deficits  Psych: Flat affect    Intake and Output:  Date 06/02/21 0700 - 06/03/21 0659 06/03/21 0700 - 06/04/21 0659   Shift 4421-3142 8824-8249 24 Hour Total 9638-2233 8794-2459 24 Hour Total   INTAKE   P.O.         P. O.       I. V.(mL/kg/hr) 0(0) 0(0) 0(0)        I.V. 0 0 0      Shift Total(mL/kg) 360(7.9) 215(53.7) 1020(22.3)      OUTPUT   Urine(mL/kg/hr) 400(0.7) 1475(2.7) 1875(1.7)        Urine Voided  0 0        Urine Occurrence(s)  0 x 0 x Urine Output (mL) (Urinary Catheter 05/31/21 Mathew) 400 1475 1875      Emesis/NG output           Emesis Occurrence(s)  0 x 0 x      Stool           Stool Occurrence(s)  0 x 0 x      Shift Total(mL/kg) 400(8.7) 1475(32.3) 1875(41)      NET -40 -815 -855      Weight (kg) 45.7 45.7 45.7 45.7 45.7 45.7       LAB:      IMAGING:  CT HEAD WO CONT    Result Date: 5/31/2021  1. No acute intracranial hemorrhage. 2. Left cerebellar encephalomalacia. 3. Cerebral volume loss with compensatory ventricular enlargement. XR CHEST PORT    Result Date: 5/31/2021  No consolidation. EKG:  No results found for this or any previous visit.           Jim Maria MD  6/3/2021 11:34 AM

## 2021-06-03 NOTE — PROGRESS NOTES
Problem: Self Care Deficits Care Plan (Adult)  Goal: *Acute Goals and Plan of Care (Insert Text)  Outcome: Resolved/Met     OCCUPATIONAL THERAPY: Initial Assessment, Discharge, and AM 6/3/2021  INPATIENT:    [place] / [place] / Product Type: Medicare /      NAME/AGE/GENDER: Jose Becker is a 62 y.o. female   PRIMARY DIAGNOSIS:  Acute encephalopathy [G93.40]  Acute UTI [N39.0]  MS (multiple sclerosis) ([facility] [address] [G35]  Sacral ulcer ([facility] [address] [L98.429]  Protein calorie malnutrition ([facility] [address] [E46] Acute encephalopathy Acute encephalopathy        ICD-10: Treatment Diagnosis:    Generalized Muscle Weakness (M62.81)  Other lack of cordination (R27.8)   Precautions/Allergies:     Latex, Norco [hydrocodone-acetaminophen], and Pcn [penicillins]      ASSESSMENT:     Ms. Viviane Wesley presents with above diagnosis and was seen in room with PT. Pt is familiar to this therapist from 2 different hospital stays over the last year and a half. Pt was discharged last hospital stay to [facility] with the plan to transition to long term care but ended up going home with hospice and aide service. She has been bed bound at home and still has wounds that are being cared for. Pt was evaluated and attempted some theraputic treatment by sitting edge of bed and working on balance. She did attempt to help but she was total assist for bed mobility and could not sit unsupported. She looks better today then on last hospital stay but functionally is the same as when last seen by therapy, will not pick pt up for therapy interventions as she is at her baseline. Per SW the plan is for her to discharge tomorrow with hospice and continued services with aides. No therapy follow up recommended. Note from 3/16/21 OT note: order received and chart reviewed.  This pt is familiar to this OT from a previous hospital stay and was evaluated by me on 3/4 and picked up on a trial basis with concern that she is at her baseline of function due to her medical condiditon and wounds. Pt was transferred to 3rd floor from 4th floor for a short period of time and OT was reordered. It is noted that another OT had dicharged pt from OT on 3/6 due to \" Ms. Monica Beltran tried to participate with total assist bed mobility. This pt will need to qualify for long term placement where she can get the care she needs to allow wound healing & the stabilizing of her current medical situation. D/C OT as progress is limited due to medical and physical limitations. \" According to CM notes from 4th floor today medicaid is being pursued for placement. Sandy Trinh, OT                OCCUPATIONAL PROFILE AND HISTORY:   History of Present Injury/Illness (Reason for Referral):  See H&P  Past Medical History/Comorbidities:   Ms. Monica Beltran  has a past medical history of Decubitus ulcer of ischial area, Hypertension, Kidney infection, Menopause, and MS (multiple sclerosis) ([facility] [address] Ms. Monica Beltran  has a past surgical history that includes ercp (5/15/2020); hx cholecystectomy (05/18/2020); and colonoscopy (N/A, 6/29/2020). Social History/Living Environment:   Home Environment: Private residence  One/Two Story Residence: One story  Living Alone: No  Support Systems: Family member(s)  Patient Expects to be Discharged to[facility] Cor[de-identified]ration (with hospice)  Current DME Used/Available at Home: Hospital bed, Wheelchair, power  Prior Level of Function/Work/Activity:  Total assistance with self care and mobility except she can feed herself after set up     Number of Personal Factors/Comorbidities that affect the Plan of Care: Brief history (0):  LOW COMPLEXITY   ASSESSMENT OF OCCUPATIONAL PERFORMANCE[de-identified]   Activities of Daily Living:   Basic ADLs (From Assessment) Complex ADLs (From Assessment)   Feeding: Setup (container mainagement extra time)  Oral Facial Hygiene/Grooming: Moderate assistance  Bathing: Total assistance  Upper Body Dressing: Total assistance  Lower Body Dressing: Total assistance  Toileting:  Total assistance     Grooming/Bathing/Dressing Activities of Daily Living                             Bed/Mat Mobility  Supine to Sit: Total assistance  Sit to Supine: Total assistance     Most Recent Physical Functioning:   Gross Assessment:                  Posture:  Posture (WDL): Exceptions to WDL  Posture Assessment:  (pt unable to stand)  Balance:  Sitting: Impaired  Sitting - Static: Poor (constant support); Prop sitting  Sitting - Dynamic: Poor (constant support)  Standing:  (unable) Bed Mobility:  Supine to Sit: Total assistance  Sit to Supine: Total assistance  Wheelchair Mobility:     Transfers:               Patient Vitals for the past 6 hrs:   BP BP Patient Position SpO2 Pulse   21 0812 124/72 At rest;Supine 98 % 74   21 1109 (!) 107/91 Sitting 100 % 85       Mental Status  Neurologic State: Alert  Orientation Level: Oriented X4  Cognition: Appropriate decision making                          Physical Skills Involved:  Range of Motion  Balance  Strength  Fine Motor Control  Gross Motor Control Cognitive Skills Affected (resulting in the inability to perform in a timely and safe manner): Short Term Recall  Long Term Memory Psychosocial Skills Affected:  Environmental Adaptation   Number of elements that affect the Plan of Care: 5+:  HIGH COMPLEXITY   CLINICAL DECISION MAKIN [facility] [address] [id number] AM-PAC 6 Clicks   Daily Activity Inpatient Short Form  How much help from another person does the patient currently need. .. Total A Lot A Little None   1. Putting on and taking off regular lower body clothing? [x] 1   [] 2   [] 3   [] 4   2. Bathing (including washing, rinsing, drying)? [x] 1   [] 2   [] 3   [] 4   3. Toileting, which includes using toilet, bedpan or urinal?   [x] 1   [] 2   [] 3   [] 4   4. Putting on and taking off regular upper body clothing? [x] 1   [] 2   [] 3   [] 4   5. Taking care of personal grooming such as brushing teeth? [x] 1   [] 2   [] 3   [] 4   6.   Eating meals?   [] 1   [] 2   [x] 3   [] 4   © 2007, Trustees of [address] [place] [address] [address] [address], under license to [employer]. All rights reserved      Score:  Initial: 8 Most Recent: X (Date: -- )    Interpretation of Tool:  Represents activities that are increasingly more difficult (i.e. Bed mobility, Transfers, Gait). Use of outcome tool(s) and clinical judgement create a POC that gives a: LOW COMPLEXITY         TREATMENT:   (In addition to Assessment/Re-Assessment sessions the following treatments were rendered)     Pre-treatment Symptoms/Complaints:    Pain: Initial:     0 Post Session:  0     Therapeutic Activity: (10 min): Therapeutic activities including Bed transfers and edge of bed balance to improve mobility, strength, and balance. Required maximal   to promote static balance in sitting. OT evaluation completed     Braces/Orthotics/Lines/Etc:   wasserman catheter  O2 Device: None (Room air)  Treatment/Session Assessment:    Response to Treatment:  pt up to edge of bed tolerated well  Interdisciplinary Collaboration:   Physical Therapist  Occupational Therapist    After treatment position/precautions:   Supine in bed  Bed alarm/tab alert on  Bed in low position  Call light within reach  RN notified   Compliance with Program/Exercises: Compliant all of the time.     Total Treatment Duration:  OT Patient Time In/Time Out  Time In: 1045  Time Out: [facility],

## 2021-06-03 NOTE — PROGRESS NOTES
Problem: Falls - Risk of  Goal: *Absence of Falls  Description: Document Nilo Bakeravan Fall Risk and appropriate interventions in the flowsheet.   Outcome: Progressing Towards Goal  Note: Fall Risk Interventions:  Mobility Interventions: Communicate number of staff needed for ambulation/transfer    Mentation Interventions: Adequate sleep, hydration, pain control    Medication Interventions: Bed/chair exit alarm    Elimination Interventions: Bed/chair exit alarm    History of Falls Interventions: Room close to nurse's station

## 2021-06-03 NOTE — PROGRESS NOTES
END OF SHIFT NOTE:    Intake/Output  06/02 1901 - 06/03 0700  In: 600 [P.O.:600]  Out: 1475 [Urine:1475]   Voiding: YES  Catheter: YES  Drain:              Stool:  0 occurrences. Stool Assessment  Stool Color: Robins Oyster (05/31/21 1651)  Stool Appearance: Hard (05/31/21 1651)  Stool Amount: Medium (05/31/21 1651)  Stool Source/Status:  (Patient incontinent of stool upon arrival. Stool was removed from rectum resulting in large hard marble-like bowel movement. Brief changed. ) (05/31/21 1651)    Emesis:  0 occurrences. VITAL SIGNS  Patient Vitals for the past 12 hrs:   Temp Pulse Resp BP SpO2   06/03/21 0255 97.3 °F (36.3 °C) 76 16 119/77 95 %   06/02/21 2240 98.6 °F (37 °C) 88 18 123/86 96 %   06/02/21 1845 97.9 °F (36.6 °C) 93 20 115/75 100 %       Pain Assessment  Pain 1  Pain Scale 1: Numeric (0 - 10) (06/03/21 0110)  Pain Intensity 1: 0 (06/03/21 0110)  Patient Stated Pain Goal: 0 (06/03/21 0110)  Pain Reassessment 1: Yes (06/02/21 2150)  Pain Onset 1: pta (06/02/21 2054)  Pain Location 1: Buttocks (06/02/21 2054)  Pain Orientation 1: Right;Left (06/02/21 2054)  Pain Description 1: Aching;Burning (06/02/21 2054)  Pain Intervention(s) 1: Medication (see MAR); Repositioned (06/02/21 2054)    Ambulating  No    Additional Information:   Turning q2 hours done. Had x2 po Oxyir prn. Shift report given to oncoming nurse Antonietta RN at the bedside.     Svetlana Lindsey RN

## 2021-06-03 NOTE — PROGRESS NOTES
PHYSICAL THERAPY: Initial Assessment, Discharge, and AM 6/3/2021  INPATIENT: PT Visit Days : 1  [place] / [place] / Product Type: Medicare /       NAME/AGE/GENDER: Cindy Parekh is a 62 y.o. female   PRIMARY DIAGNOSIS: Acute encephalopathy [G93.40]  Acute UTI [N39.0]  MS (multiple sclerosis) ([facility] [address] [G35]  Sacral ulcer ([facility] [address] [L98.429]  Protein calorie malnutrition ([facility] [address] [E46] Acute encephalopathy Acute encephalopathy        ICD-10: Treatment Diagnosis:    Generalized Muscle Weakness (M62.81)  Difficulty in walking, Not elsewhere classified (R26.2)   Precaution/Allergies:  Latex, Norco [hydrocodone-acetaminophen], and Pcn [penicillins]      ASSESSMENT:     Ms. Nicki Ruvalcaba presents supine on contact and agreeable to therapy assessment. Per chart her plan is to discharge back to her home with hospice services. She is well known to the hospital and been seen by many of the therapists in our rehab department as well. She has been bed bound at home and still has wounds that are being cared for. She did attempt to help but she was total assist for bed mobility and could not sit unsupported for any length of time. She is essentially the same as when last seen by PT, will not pick pt up for therapy interventions as she is at her baseline. Per SW the plan is for her to discharge tomorrow with hospice and continued services with aides. No therapy follow up recommended. Copied from PT note on 3/6/2021- Ms. Nicki Ruvalcaba tried to participate with total assist bed mobility, & was able to balance EOB with close guarding, but is not able to demonstrate potential to progress beyond her current functional level due to chronic disease process & stage 4 wounds on sacral region. This pt will need to qualify for long term placement where she can get the care she needs to allow wound healing & the stabilizing of her current medical situation. PT will DC services at this time.     This section established at most recent assessment         TREATMENT PLAN: Frequency/Duration: one time visit for assessment        REHAB RECOMMENDATIONS (at time of discharge pending progress):    Placement:  IPer chart pt is to go home with [facility] services  Equipment:   None at this time              HISTORY:   History of Present Injury/Illness (Reason for Referral):  PER MD NOTE: 61 y/o WF with a h/o MS, multiple chronic wounds, she is  wheelchair bound andhas chronic indwelling wasserman catheter  Placed due to multiple sacral ulcers. She Has exhausted her insurance days at rehab already and continues to Live at home alone. Was found by her ex  confused. EMS called to her residence and noted medication pills scattered all over the residence. Urine Drug Screen + opiates. CT head shows left encephalomalacia. Note: She had an admission 03/2021 here for similar presentation. Urine culture grew pan-sensitive proteus mirabilis, also Klebsiella/ESBL. And several ER visits since last of admission  Past Medical History/Comorbidities:   Ms. Elliot Mays  has a past medical history of Decubitus ulcer of ischial area, Hypertension, Kidney infection, Menopause, and MS (multiple sclerosis) ([facility] [address] Ms. Elliot Mays  has a past surgical history that includes ercp (5/15/2020); hx cholecystectomy (05/18/2020); and colonoscopy (N/A, 6/29/2020).   Social History/Living Environment:   Home Environment: Private residence  One/Two Story Residence: One story  Living Alone: No  Support Systems: Family member(s)  Patient Expects to be Discharged to[facility] (with hospice)  Current DME Used/Available at Home: Hospital bed, Wheelchair, power  Prior Level of Function/Work/Activity:  Pt lives at home, bed bound and has an aide that comes to the house, sister and ex  that check on her, per SARY note it is complicated     Number of Personal Factors/Comorbidities that affect the Plan of Care: 3+: HIGH COMPLEXITY   EXAMINATION:   Most Recent Physical Functioning:   Gross Assessment:  AROM: Grossly decreased, non-functional  Strength: Grossly decreased, non-functional               Posture:  Posture (WDL): Exceptions to WDL  Posture Assessment:  (pt unable to stand)  Balance:  Sitting: Impaired  Sitting - Static: Poor (constant support); Prop sitting  Sitting - Dynamic: Poor (constant support)  Standing:  (unable) Bed Mobility:  Supine to Sit: Total assistance  Sit to Supine: Total assistance  Wheelchair Mobility:     Transfers:     Gait:            Body Structures Involved:  Muscles Body Functions Affected: Movement Related Activities and Participation Affected: Mobility  Self Care   Number of elements that affect the Plan of Care: 4+: HIGH COMPLEXITY   CLINICAL PRESENTATION:   Presentation: Stable and uncomplicated: LOW COMPLEXITY   CLINICAL DECISION MAKING:   [facility] AM-PAC 6 Clicks   Basic Mobility Inpatient Short Form  How much difficulty does the patient currently have. .. Unable A Lot A Little None   1. Turning over in bed (including adjusting bedclothes, sheets and blankets)? [] 1   [x] 2   [] 3   [] 4   2. Sitting down on and standing up from a chair with arms ( e.g., wheelchair, bedside commode, etc.)   [x] 1   [] 2   [] 3   [] 4   3. Moving from lying on back to sitting on the side of the bed? [x] 1   [] 2   [] 3   [] 4   How much help from another person does the patient currently need. .. Total A Lot A Little None   4. Moving to and from a bed to a chair (including a wheelchair)? [x] 1   [] 2   [] 3   [] 4   5. Need to walk in hospital room? [x] 1   [] 2   [] 3   [] 4   6. Climbing 3-5 steps with a railing? [x] 1   [] 2   [] 3   [] 4   © 2007, Trustees of [facility], under license to [employer]. All rights reserved      Score:  Initial: 7 Most Recent: X (Date: -- )    Interpretation of Tool:  Represents activities that are increasingly more difficult (i.e. Bed mobility, Transfers, Gait).     Medical Necessity:     None, pt is at her baseline. Reason for Services/Other Comments:  none. TREATMENT:   (In addition to Assessment/Re-Assessment sessions the following treatments were rendered)   Pre-treatment Symptoms/Complaints: none  Pain: Initial: 0/10     Post Session:  0/10     Assessment/Reassessment only, no treatment provided today    Braces/Orthotics/Lines/Etc:   wasserman catheter  O2 Device: None (Room air)  Treatment/Session Assessment:    Response to Treatment:  pt willing but total assist  Interdisciplinary Collaboration:   Occupational Therapist  Registered Nurse    After treatment position/precautions:   Supine in bed  Bed/Chair-wheels locked  Bed in low position     Recommendations/Intent for next treatment session:  will discharge PT services at this time.   Total Treatment Duration:  PT Patient Time In/Time Out  Time In: 1045  Time Out: CHAD Woods

## 2021-06-04 LAB
ANION GAP SERPL CALC-SCNC: 4 MMOL/L (ref 7–16)
BASOPHILS # BLD: 0 K/UL (ref 0–0.2)
BASOPHILS NFR BLD: 1 % (ref 0–2)
BUN SERPL-MCNC: 17 MG/DL (ref 6–23)
CALCIUM SERPL-MCNC: 10 MG/DL (ref 8.3–10.4)
CHLORIDE SERPL-SCNC: 105 MMOL/L (ref 98–107)
CO2 SERPL-SCNC: 30 MMOL/L (ref 21–32)
CREAT SERPL-MCNC: 0.3 MG/DL (ref 0.6–1)
DIFFERENTIAL METHOD BLD: ABNORMAL
EOSINOPHIL # BLD: 0.3 K/UL (ref 0–0.8)
EOSINOPHIL NFR BLD: 7 % (ref 0.5–7.8)
ERYTHROCYTE [DISTWIDTH] IN BLOOD BY AUTOMATED COUNT: 16.7 % (ref 11.9–14.6)
GLUCOSE SERPL-MCNC: 99 MG/DL (ref 65–100)
HCT VFR BLD AUTO: 38.2 % (ref 35.8–46.3)
HGB BLD-MCNC: 11.9 G/DL (ref 11.7–15.4)
IMM GRANULOCYTES # BLD AUTO: 0 K/UL (ref 0–0.5)
IMM GRANULOCYTES NFR BLD AUTO: 1 % (ref 0–5)
LYMPHOCYTES # BLD: 0.3 K/UL (ref 0.5–4.6)
LYMPHOCYTES NFR BLD: 8 % (ref 13–44)
MCH RBC QN AUTO: 28.7 PG (ref 26.1–32.9)
MCHC RBC AUTO-ENTMCNC: 31.2 G/DL (ref 31.4–35)
MCV RBC AUTO: 92 FL (ref 79.6–97.8)
MONOCYTES # BLD: 0.4 K/UL (ref 0.1–1.3)
MONOCYTES NFR BLD: 11 % (ref 4–12)
NEUTS SEG # BLD: 2.8 K/UL (ref 1.7–8.2)
NEUTS SEG NFR BLD: 72 % (ref 43–78)
NRBC # BLD: 0 K/UL (ref 0–0.2)
PLATELET # BLD AUTO: 333 K/UL (ref 150–450)
PMV BLD AUTO: 10.5 FL (ref 9.4–12.3)
POTASSIUM SERPL-SCNC: 3.9 MMOL/L (ref 3.5–5.1)
RBC # BLD AUTO: 4.15 M/UL (ref 4.05–5.2)
SODIUM SERPL-SCNC: 139 MMOL/L (ref 136–145)
WBC # BLD AUTO: 3.9 K/UL (ref 4.3–11.1)

## 2021-06-04 PROCEDURE — 74011250637 HC RX REV CODE- 250/637: Performed by: HOSPITALIST

## 2021-06-04 PROCEDURE — 36415 COLL VENOUS BLD VENIPUNCTURE: CPT

## 2021-06-04 PROCEDURE — 77030040393 HC DRSG OPTIFOAM GENT MDII -B

## 2021-06-04 PROCEDURE — 65270000029 HC RM PRIVATE

## 2021-06-04 PROCEDURE — 2709999900 HC NON-CHARGEABLE SUPPLY

## 2021-06-04 PROCEDURE — 86580 TB INTRADERMAL TEST: CPT | Performed by: HOSPITALIST

## 2021-06-04 PROCEDURE — 74011000302 HC RX REV CODE- 302: Performed by: HOSPITALIST

## 2021-06-04 PROCEDURE — 80048 BASIC METABOLIC PNL TOTAL CA: CPT

## 2021-06-04 PROCEDURE — 85025 COMPLETE CBC W/AUTO DIFF WBC: CPT

## 2021-06-04 RX ORDER — LEVOFLOXACIN 500 MG/1
500 TABLET, FILM COATED ORAL EVERY 24 HOURS
Qty: 4 TABLET | Refills: 0 | Status: SHIPPED | OUTPATIENT
Start: 2021-06-04 | End: 2021-06-05

## 2021-06-04 RX ORDER — OXYCODONE HYDROCHLORIDE 5 MG/1
5 TABLET ORAL
Qty: 20 TABLET | Refills: 0 | Status: SHIPPED | OUTPATIENT
Start: 2021-06-04 | End: 2021-06-05

## 2021-06-04 RX ADMIN — AMANTADINE HYDROCHLORIDE 100 MG: 100 CAPSULE ORAL at 08:33

## 2021-06-04 RX ADMIN — RDII 250 MG CAPSULE 500 MG: at 08:32

## 2021-06-04 RX ADMIN — BACLOFEN 20 MG: 10 TABLET ORAL at 21:58

## 2021-06-04 RX ADMIN — OXYCODONE 10 MG: 5 TABLET ORAL at 21:58

## 2021-06-04 RX ADMIN — Medication 10 ML: at 14:22

## 2021-06-04 RX ADMIN — TUBERCULIN PURIFIED PROTEIN DERIVATIVE 5 UNITS: 5 INJECTION, SOLUTION INTRADERMAL at 14:18

## 2021-06-04 RX ADMIN — OXYCODONE HYDROCHLORIDE AND ACETAMINOPHEN 250 MG: 500 TABLET ORAL at 08:35

## 2021-06-04 RX ADMIN — Medication 10 ML: at 06:18

## 2021-06-04 RX ADMIN — BACLOFEN 20 MG: 10 TABLET ORAL at 17:52

## 2021-06-04 RX ADMIN — Medication 10 ML: at 22:01

## 2021-06-04 RX ADMIN — OXYCODONE 10 MG: 5 TABLET ORAL at 14:17

## 2021-06-04 RX ADMIN — LEVOFLOXACIN 500 MG: 500 TABLET, FILM COATED ORAL at 08:34

## 2021-06-04 RX ADMIN — RDII 250 MG CAPSULE 500 MG: at 17:52

## 2021-06-04 RX ADMIN — FAMOTIDINE 20 MG: 20 TABLET, FILM COATED ORAL at 08:32

## 2021-06-04 RX ADMIN — AMANTADINE HYDROCHLORIDE 100 MG: 100 CAPSULE ORAL at 17:52

## 2021-06-04 RX ADMIN — BACLOFEN 20 MG: 10 TABLET ORAL at 08:33

## 2021-06-04 RX ADMIN — PREGABALIN 50 MG: 50 CAPSULE ORAL at 21:58

## 2021-06-04 RX ADMIN — CYANOCOBALAMIN TAB 1000 MCG 1000 MCG: 1000 TAB at 08:32

## 2021-06-04 NOTE — ACP (ADVANCE CARE PLANNING)
Advance care planninginitial encounter      Face to face time - 18 minutes face-to-face time spent discussion the care       Patient is able to make his/her own decisions:  [X] Yes  [ ] NO    Names of POA/surrogate decision maker when patient is altered  :NONE    Other members present in the meeting : NONE    Patient / surrogate decision-maker ultimately chose. .. [] Full code- full aggressive medical and surgical interventions, including intubations, resuscitations, pressors, artificial tube feeding  [ ] DO NOT RESUSCITATE -okay to intubate,  and other aggressive medical and surgical intervention   [X] Not resuscitate, DO NOT INTUBATE, but okay for other aggressive medical and surgical intervention   [ ] DNR/DNI, hospice, comfort focus care to maximize patient's quality of life  [ ] DNR/DNI, strict comfort care ONLY        Further discussion regarding principle disease process.  prognosis, plans of care, and treatment goals

## 2021-06-04 NOTE — PROGRESS NOTES
Spiritual Care Visit, initial visit. Visited with patient at bedside. Prayed for patient's healing and health. Visit by Niko Gaxiola, Staff .  Tristan., Filippo.B., B.A.

## 2021-06-04 NOTE — PROGRESS NOTES
END OF SHIFT NOTE:  - possible d/c today  - prn oxy given x1, pain controlled  - resting quietly and stable at this time, no needs voiced  Intake/Output  06/03 1901 - 06/04 0700  In: 610 [P.O.:610]  Out: 1400 [Urine:1400]   Voiding: NO  Catheter: YES  Drain:              Stool:  0 occurrences. Stool Assessment  Stool Color: Lauren Hotter (06/03/21 2255)  Stool Appearance: Hard (06/03/21 2255)  Stool Amount: Small (06/03/21 2255)  Stool Source/Status:  (Patient incontinent of stool upon arrival. Stool was removed from rectum resulting in large hard marble-like bowel movement. Brief changed. ) (05/31/21 1651)    Emesis:  0 occurrences. VITAL SIGNS  Patient Vitals for the past 12 hrs:   Temp Pulse Resp BP SpO2   06/04/21 0312 97.7 °F (36.5 °C) 74 18 112/72 96 %   06/03/21 2255 98.3 °F (36.8 °C) 84 17 (!) 146/85 100 %   06/03/21 1930 97.9 °F (36.6 °C) 80 18 133/76 100 %       Pain Assessment  Pain 1  Pain Scale 1: Visual (06/04/21 0249)  Pain Intensity 1: 0 (06/04/21 0249)  Patient Stated Pain Goal: 0 (06/04/21 0249)  Pain Reassessment 1: Patient resting w/respiratory rate greater than 10 (06/03/21 2208)  Pain Onset 1: pta (06/03/21 0616)  Pain Location 1: Buttocks (06/03/21 2108)  Pain Orientation 1: Mid (06/03/21 2108)  Pain Description 1: Burning;Aching (06/03/21 2108)  Pain Intervention(s) 1: Medication (see MAR) (06/03/21 2108)    Ambulating  No    Additional Information:     Shift report given to oncoming nurse at the bedside.     Cony Kulkarni RN

## 2021-06-04 NOTE — PROGRESS NOTES
[facility]ist Service Progress Note    INTERVAL HISTORY  / Subjective:  6/4:  Patient is resting in bedside. She denies having any new complaints. Discussed about discharge planning to home hospice versus short-term rehab. Patient stated that she would want us to try to get her to rehab if possible, she does understand if she gets decline then she would have to go to home with hospice. No chest pain, cough, shortness of breath, fever, nausea vomiting or diarrhea. Assessment / Plan:  59 y/o WF with a h/o MS, multiple chronic wounds, she is  wheelchair bound andhas chronic indwelling wasserman catheter  Placed due to multiple sacral ulcers. She Has exhausted her insurance days at rehab already and continues to Live at home alone. Was found by her ex  confused. EMS called to her residence and noted medication pills scattered all over the residence. Urine Drug Screen + opiates. CT head shows left encephalomalacia. Note: She had an admission 03/2021 here for similar presentation. Urine culture grew pan-sensitive proteus mirabilis, also Klebsiella/ESBL. And several ER visits since last of admission.        Discharge Disposition   --06/04 plan to discharge patient to home with hospice has been canceled as patient is requesting for short-term rehab.      Acute metabolic encephalopathy: Resolving  --2/2 Medications such as opioids and baclofen and possible acute UTI, in addition to CT scan showing encephalomalacia that is a component of vascular dementia also  --Also noted to have urinary colonization, she has a chronic indwelling Wasserman catheter placed for chronic sacral wound  --prior history of urine cultures most recent 04/2021  with Proteus mirabilis, and ESBL Klebsiella. --she is also on opioids for chronic pain which more then Likely contributed to the transient AMS.   --Start IV Fluids D5LR, and IV Antibiotics / Ceftriaxone ( 05/31-)  --06/01 Improved Mental Status, Urine Cultures requiring overnight incubation, resuming IV ceftriaxone ( 05/31- ) , IV Fluids D5LR   6/4:  Mentation is at baseline, urine culture positive for ESBL Klebsiella. Continue Levaquin 500 mg p.o. daily for 7 days (D3). Continue Florastor  500 mg p.o. twice daily     Chronic Pain 2/2 Chronic wounds/healing sacral ulcers, chronic Mathew catheter  - She was  Started Oxycontin and with PRN oxycodone for breakthrough , wound care ordered, start PO Zinc sulfate , ascorbic acid and multivitamin appears to be an effective regiment  06/01 Mathew catheter replaced during this admission 05/31      Multiple chronic wounds/ Heel and Sacral Ulcers   -Continue with wound care  -continue with Mathew to avoid saturating wound care  6/4:   We will follow with wound care recommendations       Severe protein calorie malnutrition  BMI 15, and nutritional supplements, multivitamins with minerals    DVT prophylaxis: SCD  GI prophylaxis with Pepcid  PT and OT eval  Disposition: D discharge is pending,  is making referrals for short-term rehab, will follow up  CODE STATUS: DNR/DNI    Objective:  Visit Vitals  /77 (BP 1 Location: Right upper arm, BP Patient Position: Supine)   Pulse 84   Temp 98.2 °F (36.8 °C)   Resp 16   Ht 5' 7\" (1.702 m)   Wt 45.7 kg (100 lb 12.8 oz)   SpO2 97%   BMI 15.79 kg/m²                 Physical Exam:  General: Elderly, NAD, on room air, frail  HEENT: Head NCAT, PERRLA  Neck: Supple, no lymphadenopathy, no JVD   Lungs: CTA B  Cardiovascular: Regular rate and rhythm with normal S1 and S2   Abdomen: Soft, nontender, nondistended, normoactive bowel sounds   Extremities: No cyanosis clubbing or edema   Neuro: GCS 15, moving all 4 extremities spontaneously, following commands, no motor deficits  Psych: Flat affect, AOx3    Intake and Output:  Date 06/03/21 0700 - 06/04/21 0659 06/04/21 0700 - 06/05/21 0659   Shift 6902-9323 3667-0441 24 Hour Total 8664-9054 9795-4441 24 Hour Total   INTAKE   P.O. 9833 779 7525 420 420     P.O. 5989 326 1816 575  318   Shift Total(mL/kg) 1580(34.6) 830(18.2) 2410(52.7) 420(9.2)  420(9.2)   OUTPUT   Urine(mL/kg/hr) 600(1.1) 2100(3.8) 2700(2.5)        Urine Output (mL) (Urinary Catheter 05/31/21 Mathew) 600 2100 2700      Stool           Stool Occurrence(s)  1 x 1 x      Shift Total(mL/kg) 600(13.1) 2100(45.9) 2700(59.1)       -1270 -290 420  420   Weight (kg) 45.7 45.7 45.7 45.7 45.7 45.7       LAB:      IMAGING:  CT HEAD WO CONT    Result Date: 5/31/2021  1. No acute intracranial hemorrhage. 2. Left cerebellar encephalomalacia. 3. Cerebral volume loss with compensatory ventricular enlargement. XR CHEST PORT    Result Date: 5/31/2021  No consolidation. EKG:  No results found for this or any previous visit.           Penelope Cockayne, MD  6/4/2021 11:34 AM

## 2021-06-04 NOTE — WOUND CARE
Pt seen for reconsult on bilat ischial wounds, sacral wound right lateral ankle and right heel wounds. Seen with RN. Stable eschar noted to right heel, blanchable erythema with partial thickness opening to right lateral ankle. Left ischial wound, sacral wound and right ischial wound all similar to previous assessment by Bonnie Carson RN on 6/1/21. Changed dressings per orders, recommend to continue current wound care. Spoke with primary RN about plan of care, pt still wanting to try and get into in patient rehab if able if not plan is home with hospice. Wound team will continue to follow while in acute care setting.

## 2021-06-04 NOTE — PROGRESS NOTES
END OF SHIFT NOTE:    Intake/Output  06/04 0701 - 06/04 1900  In: 780 [P.O.:780]  Out: 400 [Urine:400]   Voiding: YES  Catheter: YES  Drain:              Stool:  3 occurrences. Stool Assessment  Stool Color: Elpidio Soledad (06/04/21 1445)  Stool Appearance: Hard (06/04/21 1445)  Stool Amount: Small (06/04/21 1445)  Stool Source/Status:  (Patient incontinent of stool upon arrival. Stool was removed from rectum resulting in large hard marble-like bowel movement. Brief changed. ) (05/31/21 1651)    Emesis:  0 occurrences. VITAL SIGNS  Patient Vitals for the past 12 hrs:   Temp Pulse Resp BP SpO2   06/04/21 1605 98.3 °F (36.8 °C) 78 18 116/74 96 %   06/04/21 1107 98.2 °F (36.8 °C) 84 16 109/77 97 %   06/04/21 0815 98.4 °F (36.9 °C) 78 18 116/74 95 %       Pain Assessment  Pain 1  Pain Scale 1: Visual (06/04/21 1600)  Pain Intensity 1: 2 (06/04/21 1600)  Patient Stated Pain Goal: 0 (06/04/21 1600)  Pain Reassessment 1: Patient resting w/respiratory rate greater than 10 (06/04/21 1600)  Pain Onset 1: pta (06/03/21 0616)  Pain Location 1: Buttocks (06/04/21 1445)  Pain Orientation 1: Mid (06/04/21 1445)  Pain Description 1: Aching (06/04/21 1445)  Pain Intervention(s) 1: Medication (see MAR) (06/04/21 1445)    Ambulating  Yes    Additional Information: wound dressings changed per orders, PPD placed LFA- would like to rehab at [facility]  report given to oncoming nurse at the bedside.     Elyssa Leon

## 2021-06-04 NOTE — PROGRESS NOTES
Talked with patient at bedside. She requested I send out a referral to [facility] to determine if she would qualify for a skilled nursing bed and that his insurance will pay for a SNF  stay. Sent the referral to [facility] and will follow up with a phone call.

## 2021-06-05 VITALS
OXYGEN SATURATION: 95 % | WEIGHT: 100.8 LBS | HEIGHT: 67 IN | RESPIRATION RATE: 17 BRPM | BODY MASS INDEX: 15.82 KG/M2 | TEMPERATURE: 97.9 F | HEART RATE: 82 BPM | SYSTOLIC BLOOD PRESSURE: 108 MMHG | DIASTOLIC BLOOD PRESSURE: 69 MMHG

## 2021-06-05 LAB
ANION GAP SERPL CALC-SCNC: 8 MMOL/L (ref 7–16)
BASOPHILS # BLD: 0 K/UL (ref 0–0.2)
BASOPHILS NFR BLD: 1 % (ref 0–2)
BUN SERPL-MCNC: 20 MG/DL (ref 6–23)
CALCIUM SERPL-MCNC: 9.3 MG/DL (ref 8.3–10.4)
CHLORIDE SERPL-SCNC: 107 MMOL/L (ref 98–107)
CO2 SERPL-SCNC: 27 MMOL/L (ref 21–32)
COVID-19 RAPID TEST, COVR: NOT DETECTED
CREAT SERPL-MCNC: 0.31 MG/DL (ref 0.6–1)
DIFFERENTIAL METHOD BLD: ABNORMAL
EOSINOPHIL # BLD: 0.3 K/UL (ref 0–0.8)
EOSINOPHIL NFR BLD: 6 % (ref 0.5–7.8)
ERYTHROCYTE [DISTWIDTH] IN BLOOD BY AUTOMATED COUNT: 16.8 % (ref 11.9–14.6)
GLUCOSE SERPL-MCNC: 95 MG/DL (ref 65–100)
HCT VFR BLD AUTO: 35.3 % (ref 35.8–46.3)
HGB BLD-MCNC: 11.1 G/DL (ref 11.7–15.4)
IMM GRANULOCYTES # BLD AUTO: 0 K/UL (ref 0–0.5)
IMM GRANULOCYTES NFR BLD AUTO: 1 % (ref 0–5)
LYMPHOCYTES # BLD: 0.4 K/UL (ref 0.5–4.6)
LYMPHOCYTES NFR BLD: 8 % (ref 13–44)
MCH RBC QN AUTO: 29 PG (ref 26.1–32.9)
MCHC RBC AUTO-ENTMCNC: 31.4 G/DL (ref 31.4–35)
MCV RBC AUTO: 92.2 FL (ref 79.6–97.8)
MM INDURATION POC: 0 MM (ref 0–5)
MONOCYTES # BLD: 0.5 K/UL (ref 0.1–1.3)
MONOCYTES NFR BLD: 10 % (ref 4–12)
NEUTS SEG # BLD: 3.4 K/UL (ref 1.7–8.2)
NEUTS SEG NFR BLD: 74 % (ref 43–78)
NRBC # BLD: 0 K/UL (ref 0–0.2)
PLATELET # BLD AUTO: 358 K/UL (ref 150–450)
PMV BLD AUTO: 10.5 FL (ref 9.4–12.3)
POTASSIUM SERPL-SCNC: 3.6 MMOL/L (ref 3.5–5.1)
PPD POC: NEGATIVE NEGATIVE
RBC # BLD AUTO: 3.83 M/UL (ref 4.05–5.2)
SARS-COV-2, COV2: NORMAL
SODIUM SERPL-SCNC: 142 MMOL/L (ref 136–145)
SOURCE, COVRS: NORMAL
WBC # BLD AUTO: 4.6 K/UL (ref 4.3–11.1)

## 2021-06-05 PROCEDURE — 85025 COMPLETE CBC W/AUTO DIFF WBC: CPT

## 2021-06-05 PROCEDURE — 36415 COLL VENOUS BLD VENIPUNCTURE: CPT

## 2021-06-05 PROCEDURE — 80048 BASIC METABOLIC PNL TOTAL CA: CPT

## 2021-06-05 PROCEDURE — 87635 SARS-COV-2 COVID-19 AMP PRB: CPT

## 2021-06-05 PROCEDURE — 74011250637 HC RX REV CODE- 250/637: Performed by: HOSPITALIST

## 2021-06-05 RX ORDER — OXYCODONE HYDROCHLORIDE 5 MG/1
5 TABLET ORAL
Qty: 20 TABLET | Refills: 0 | Status: SHIPPED | OUTPATIENT
Start: 2021-06-05 | End: 2021-06-10

## 2021-06-05 RX ORDER — LEVOFLOXACIN 500 MG/1
500 TABLET, FILM COATED ORAL EVERY 24 HOURS
Qty: 3 TABLET | Refills: 0 | Status: SHIPPED
Start: 2021-06-05 | End: 2021-06-08

## 2021-06-05 RX ADMIN — Medication 10 ML: at 05:58

## 2021-06-05 RX ADMIN — LEVOFLOXACIN 500 MG: 500 TABLET, FILM COATED ORAL at 08:37

## 2021-06-05 RX ADMIN — AMANTADINE HYDROCHLORIDE 100 MG: 100 CAPSULE ORAL at 08:37

## 2021-06-05 RX ADMIN — BACLOFEN 20 MG: 10 TABLET ORAL at 08:37

## 2021-06-05 RX ADMIN — FAMOTIDINE 20 MG: 20 TABLET, FILM COATED ORAL at 08:37

## 2021-06-05 RX ADMIN — RDII 250 MG CAPSULE 500 MG: at 08:37

## 2021-06-05 RX ADMIN — OXYCODONE HYDROCHLORIDE AND ACETAMINOPHEN 250 MG: 500 TABLET ORAL at 08:37

## 2021-06-05 RX ADMIN — OXYCODONE 10 MG: 5 TABLET ORAL at 10:36

## 2021-06-05 RX ADMIN — CYANOCOBALAMIN TAB 1000 MCG 1000 MCG: 1000 TAB at 08:37

## 2021-06-05 NOTE — PROGRESS NOTES
Problem: Risk for Spread of Infection  Goal: Prevent transmission of infectious organism to others  Description: Prevent the transmission of infectious organisms to other patients, staff members, and visitors. Outcome: Progressing Towards Goal     Problem: Patient Education:  Go to Education Activity  Goal: Patient/Family Education  Outcome: Progressing Towards Goal     Problem: Falls - Risk of  Goal: *Absence of Falls  Description: Document Bo Gillette Fall Risk and appropriate interventions in the flowsheet. Outcome: Progressing Towards Goal  Note: Fall Risk Interventions:  Mobility Interventions: Patient to call before getting OOB    Mentation Interventions: Toileting rounds, Reorient patient    Medication Interventions: Evaluate medications/consider consulting pharmacy    Elimination Interventions: Call light in reach    History of Falls Interventions: Evaluate medications/consider consulting pharmacy         Problem: Pressure Injury - Risk of  Goal: *Prevention of pressure injury  Description: Document Kimani Scale and appropriate interventions in the flowsheet.   Outcome: Progressing Towards Goal  Note: Pressure Injury Interventions:  Sensory Interventions: Assess changes in LOC, Keep linens dry and wrinkle-free, Maintain/enhance activity level, Minimize linen layers, Pressure redistribution bed/mattress (bed type)    Moisture Interventions: Internal/External urinary devices, Apply protective barrier, creams and emollients, Minimize layers, Moisture barrier    Activity Interventions: Pressure redistribution bed/mattress(bed type)    Mobility Interventions: HOB 30 degrees or less, Pressure redistribution bed/mattress (bed type)    Nutrition Interventions: Document food/fluid/supplement intake    Friction and Shear Interventions: HOB 30 degrees or less

## 2021-06-05 NOTE — PROGRESS NOTES
1022- name and number left with  for report  [phone]- report given to [facility], opportunity for questions and clarification provided  [address] transporting pt to [facility]

## 2021-06-05 NOTE — PROGRESS NOTES
Care Management Interventions  Palliative Care Criteria Met (RRAT>21 & CHF Dx)?: No (Dx UTI, Risk 25% )  Mode of Transport at Discharge: [facility] )  Transition of Care Consult (CM Consult): SNF  [facility]: No  Reason Outside [place]: Patient already serviced by other home care/hospice agency  Partner SNF: Yes  Discharge Durable Medical Equipment: No  Physical Therapy Consult: Yes  Occupational Therapy Consult: Yes  Speech Therapy Consult: No  Current Support Network: Lives Alone  Confirm Follow Up Transport: Other (see comment) (ambulance)  Discharge Location  Discharge Placement: Skilled nursing facility  Patient medically stable for d/c. Patient alert and oriented x 3 and she is agreeable to d/c plan. Patient sister Kerline López [phone] notified of d/c today to [address] [place] [address]. Transport set up for 1130 am with [employer]. Patient nurse Kenia Canas has report number to call to [facility]. Patient d/c to [facility] rehab by [facility] ambulance.

## 2021-06-05 NOTE — DISCHARGE SUMMARY
Hospitalist Discharge Summary     Patient ID:  Benny Montalvo  [id number]  73 y.o.  1960  Admit date: 5/31/2021 12:06 PM  Discharge date and time: 6/5/2021  Attending: Dee Silva MD  PCP:  None  Treatment Team: Attending Provider: Dee Silva MD; Utilization Review: John Camacho RN; Care Manager: Rebecca Schmitz; Care Manager: Femi Lemus LMSW; Primary Nurse: Regina Barnes RN; Primary Nurse: Carlitos Campoverde    Principal Diagnosis Acute encephalopathy   Principal Problem:    Acute encephalopathy (5/31/2021)    Active Problems:    Acute UTI (3/3/2021)      MS (multiple sclerosis) ([facility] (4/11/2021)      Protein calorie malnutrition ([facility] (5/31/2021)      Sacral ulcer ([facility] (5/31/2021)       59 y/o WF with a h/o MS, multiple chronic wounds, she is  wheelchair bound andhas chronic indwelling wasserman catheter placed due to multiple sacral ulcers. She Has exhausted her insurance days at rehab already and continues to Live at home alone. Was found by her ex  confused. EMS called to her residence and noted medication pills scattered all over the residence. Urine Drug Screen + opiates. CT head shows left encephalomalacia. Note: She had an admission 03/2021 here for similar presentation.  Urine culture grew pan-sensitive proteus mirabilis, also Klebsiella/ESBL.  And several ER visits since last of admission. Hospital Course:  Acute metabolic encephalopathy: Resolved  --2/2 Medications such as opioids and baclofen and possible acute UTI, in addition to CT scan showing encephalomalacia that is a component of vascular dementia also  --Also noted to have urinary colonization, she has a chronic indwelling Wasserman catheter placed for chronic sacral wound  --prior history of urine cultures most recent 04/2021  with Proteus mirabilis, and ESBL Klebsiella.   --she is also on opioids for chronic pain which more then Likely contributed to the transient AMS.   --Start IV Fluids D5LR, and IV Antibiotics / Ceftriaxone ( 05/31-)  --06/01 Improved Mental Status, Urine Cultures requiring overnight incubation, resuming IV ceftriaxone ( 05/31- ) , IV Fluids D5LR   6/5:  Mentation is at baseline, urine culture positive for ESBL Klebsiella. Continue Levaquin 500 mg p.o. daily for 7 days (D4). Continue Florastor  500 mg p.o. twice daily     Chronic Pain 2/2 Chronic wounds/healing sacral ulcers, chronic Mathew catheter  - She was  Started Oxycontin and with PRN oxycodone for breakthrough , wound care ordered, start PO Zinc sulfate , ascorbic acid and multivitamin appears to be an effective regiment  -06/01 Mathew catheter replaced during this admission 05/31 6/5: should be exchanged every 2-3 weeks     Multiple chronic wounds/ Heel and Sacral Ulcers   -Continue with wound care  -continue with Mathew to avoid saturating wound care  6/5:  We will follow with wound care recommendations  Left hip, right and left ischial wounds, sacral wound and right lateral ankle cleanse with NS apply silicone foam border every 3 days. Right plantar heel, paint with betadine daily around the eschar.     Severe protein calorie malnutrition  -BMI 15.7, and nutritional supplements, multivitamins with minerals     DVT prophylaxis: SCD  GI prophylaxis with Pepcid  PT and OT eval  Disposition: patient will be discharged to [facility] today. CODE STATUS: DNR/DNI    Advance care planning-initial encounter        Face to face time - 18 minutes face-to-face time spent discussion the care         Patient is able to make his/her own decisions:  [X] Yes  [ ] NO     Names of POA/surrogate decision maker when patient is altered  :NONE     Other members present in the meeting : NONE     Patient / surrogate decision-maker ultimately chose. ..     [] Full code- full aggressive medical and surgical interventions, including intubations, resuscitations, pressors, artificial tube feeding  [ ] DO NOT RESUSCITATE -okay to intubate,  and other aggressive medical and surgical intervention   [X] Not resuscitate, DO NOT INTUBATE, but okay for other aggressive medical and surgical intervention   [ ] DNR/DNI, hospice, comfort focus care to maximize patient's quality of life  [ ] DNR/DNI, strict comfort care ONLY    Significant Diagnostic Studies:   HEAD CT WITHOUT CONTRAST  5/31/2021      HISTORY:    End stage MS ;p t is bedbound   Decrease in mental status last 24 hrs per family     TECHNIQUE: Noncontrast axial images were obtained through the brain. All CT  scans at this facility used dose modulation, interactive reconstruction and/or  weight based dosing when appropriate to reduce radiation dose to as low as  reasonably achievable.     COMPARISON: None     FINDINGS: There is no acute intracranial hemorrhage, significant mass effect or  CT evidence of acute large artery territorial infarction. Please note that a  hyperacute infarct or small vessel infarct may not be apparent on initial CT  imaging.     Encephalomalacia is present in the left cerebellar hemisphere. Cerebral volume  loss is present with compensatory ventricular enlargement. There is a decreased  attenuation in the supratentorial white matter consistent with the history of  demyelinating disease.     There is no hydrocephalus , intra-axial mass or abnormal extra-axial fluid  collection. There are no displaced skull fractures. The mastoid air cells and  paranasal sinuses are clear where imaged.     IMPRESSION     1. No acute intracranial hemorrhage.     2. Left cerebellar encephalomalacia.     3. Cerebral volume loss with compensatory ventricular enlargement. AP PORTABLE CHEST X-RAY 5/31/2021 12:42 PM     HISTORY: AMS  altered level of consciousness.     COMPARISON: April 11, 2021     FINDINGS: There is no lobar consolidation, pleural effusions or pulmonary edema. EKG leads are present.     IMPRESSION  No consolidation.     Labs: Results:       Chemistry Recent Labs     06/05/21  8585 06/04/21  9157 06/03/21  0810   GLU 95 99 93    139 140   K 3.6 3.9 4.0    105 105   CO2 27 30 32   BUN 20 17 16   CREA 0.31* 0.30* 0.32*   CA 9.3 10.0 9.9   AGAP 8 4* 3*      CBC w/Diff Recent Labs     06/05/21  0538 06/04/21  0558 06/03/21  0810   WBC 4.6 3.9* 3.9*   RBC 3.83* 4.15 3.95*   HGB 11.1* 11.9 11.4*   HCT 35.3* 38.2 37.4    333 380   GRANS 74 72 77   LYMPH 8* 8* 7*   EOS 6 7 6      Cardiac Enzymes No results for input(s): CPK, CKND1, LORI in the last 72 hours. No lab exists for component: CKRMB, TROIP   Coagulation No results for input(s): PTP, INR, APTT, INREXT in the last 72 hours. Lipid Panel No results found for: CHOL, CHOLPOCT, CHOLX, CHLST, CHOLV, 520037, HDL, HDLP, LDL, LDLC, DLDLP, 968005, VLDLC, VLDL, TGLX, TRIGL, TRIGP, TGLPOCT, CHHD, CHHDX   BNP No results for input(s): BNPP in the last 72 hours. Liver Enzymes No results for input(s): TP, ALB, TBIL, AP in the last 72 hours. No lab exists for component: SGOT, GPT, DBIL   Thyroid Studies Lab Results   Component Value Date/Time    TSH 1.560 05/31/2021 12:22 PM            Discharge Exam:  Visit Vitals  /61 (BP 1 Location: Right upper arm, BP Patient Position: At rest;Lying left side)   Pulse 77   Temp 97.9 °F (36.6 °C)   Resp 18   Ht 5' 7\" (1.702 m)   Wt 45.7 kg (100 lb 12.8 oz)   SpO2 96%   BMI 15.79 kg/m²     General appearance: elderly, frail, on RA, NAD  Lungs: ctabl  Heart: regular rate and rhythm, S1, S2 normal, no murmur, click, rub or gallop  Abdomen: soft, non-tender. Bowel sounds normal. No masses,  no organomegaly  Extremities: no cyanosis or edema  Neurologic: Grossly normal  Skin: bilat ischial wounds, sacral wound right lateral ankle and right heel wounds. Stable eschar noted to right heel, blanchable erythema with partial thickness opening to right lateral ankle.     Disposition: STR  Discharge Condition: stable  Patient Instructions:   Current Discharge Medication List      START taking these medications Details   levoFLOXacin (LEVAQUIN) 500 mg tablet Take 1 Tablet by mouth every twenty-four (24) hours for 4 days. Qty: 4 Tablet, Refills: 0  Start date: 6/4/2021, End date: 6/8/2021         CONTINUE these medications which have CHANGED    Details   oxyCODONE IR (ROXICODONE) 5 mg immediate release tablet Take 1 Tablet by mouth every six (6) hours as needed for Pain for up to 5 days. Max Daily Amount: 20 mg.  Qty: 20 Tablet, Refills: 0  Start date: 6/4/2021, End date: 6/9/2021    Associated Diagnoses: Chronic low back pain without sciatica, unspecified back pain laterality         CONTINUE these medications which have NOT CHANGED    Details   ascorbic acid, vitamin C, (VITAMIN C) 250 mg tablet Take 1 Tab by mouth daily. Qty: 30 Tab, Refills: 0      cyanocobalamin 1,000 mcg tablet Take 1 Tab by mouth daily. Qty: 30 Tab, Refills: 0      famotidine (PEPCID) 20 mg tablet Take 1 Tab by mouth daily. Qty: 30 Tab, Refills: 0      naloxone (NARCAN) 4 mg/actuation nasal spray Use 1 spray intranasally, then discard. Repeat with new spray every 2 min as needed for opioid overdose symptoms, alternating nostrils. Qty: 1 Each, Refills: 0      aspirin delayed-release 81 mg tablet Take 81 mg by mouth daily. baclofen (LIORESAL) 20 mg tablet Take 20 mg by mouth three (3) times daily. cholecalciferol (VITAMIN D3) 25 mcg (1,000 unit) cap Take 1,000 Units by mouth daily. Saccharomyces boulardii (Florastor) 250 mg capsule Take 250 mg by mouth two (2) times a day.      hydrOXYzine HCL (ATARAX) 25 mg tablet Take 25 mg by mouth every eight (8) hours as needed. magnesium oxide (MAG-OX) 400 mg tablet Take 400 mg by mouth two (2) times a day. melatonin 5 mg cap capsule Take 5 mg by mouth nightly. polyethylene glycol (Miralax) 17 gram/dose powder Take 17 g by mouth daily. iron polysaccharides (NIFEREX) 150 mg iron capsule Take 150 mg by mouth daily.       pregabalin (LYRICA) 50 mg capsule Take 50 mg by mouth daily. Give 75mg at HS      senna (Senna) 8.6 mg tablet Take 2 Tabs by mouth two (2) times a day. multivitamin (ONE A DAY) tablet Take 1 Tab by mouth daily. fingolimod 0.5 mg cap Take 0.5 mg by mouth daily. Qty: 1 Cap, Refills: 0      amantadine HCL (SYMMETREL) 100 mg capsule Take 100 mg by mouth two (2) times a day. STOP taking these medications       apixaban (Eliquis DVT-PE Treat 30D Start) 5 mg (74 tabs) starter pack Comments:   Reason for Stopping:               Activity: PT/OT Eval and Treat  Diet: Regular Diet  Wound Care: Left hip, right and left ischial wounds, sacral wound and right lateral ankle cleanse with NS apply silicone foam border every 3 days. Right plantar heel, paint with betadine daily around the eschar.       Follow-up  ·   Follow up with physician at [facility]  Time spent to discharge patient 35 minutes  Signed:  Joseph Molina MD  6/5/2021  8:12 AM

## 2021-06-08 NOTE — PROGRESS NOTES
Physician Progress Note      Radha Tolbert  CSN #:                  [id number]  :                       1960  ADMIT DATE:       2021 12:06 PM  Danuta Hester DATE:        2021 11:53 AM  RESPONDING  PROVIDER #:        Pat Wood MD          QUERY TEXT:    Pt admitted with Encephalopathy. Pt noted to have Urine drug screen positive for opiates. If possible, please document in progress notes and discharge summary the relationship, if any, between Encephalopathy and Medications taken . The medical record reflects the following:  Risk Factors: Chronic back pain, Oxycodone PO taken for chronic pain, Baclofen  Clinical Indicators: Confusion, Encephalopathy documented,CT scan showing encephalomalacia that is a  component of vascular dementia,Discharge summary noted, \"Was found by her ex  confused. EMS called to her residence and noted medication pills scattered all over the residence. Urine Drug Screen + opiates. Treatment:  Home Oxycodone dose was reduced. Thank you. Barby King RN CDI, CCS  My office phone [phone]  Options provided:  -- Encephalopathy due to medications taken as prescribed  -- Encephalopathy due to medications not  taken as prescribed  -- Encephalopathy unrelated to Medications  -- Other - I will add my own diagnosis  -- Disagree - Not applicable / Not valid  -- Disagree - Clinically unable to determine / Unknown  -- Refer to Clinical Documentation Reviewer    PROVIDER RESPONSE TEXT:    This patient has Encephalopathy due to medications taken as prescribed.     Query created by: Nic Melchor on 2021 12:49 PM      Electronically signed by:  Pat Wood MD 2021 4:09 PM

## 2021-08-23 ENCOUNTER — HOSPITAL ENCOUNTER (EMERGENCY)
Age: 61
Discharge: HOME | End: 2021-08-23
Attending: STUDENT IN AN ORGANIZED HEALTH CARE EDUCATION/TRAINING PROGRAM
Payer: MEDICARE

## 2021-08-23 ENCOUNTER — APPOINTMENT (OUTPATIENT)
Dept: CT IMAGING | Age: 61
End: 2021-08-23
Attending: STUDENT IN AN ORGANIZED HEALTH CARE EDUCATION/TRAINING PROGRAM
Payer: MEDICARE

## 2021-08-23 VITALS
OXYGEN SATURATION: 97 % | RESPIRATION RATE: 18 BRPM | HEART RATE: 67 BPM | DIASTOLIC BLOOD PRESSURE: 84 MMHG | SYSTOLIC BLOOD PRESSURE: 130 MMHG | TEMPERATURE: 98.3 F

## 2021-08-23 DIAGNOSIS — T83.511A URINARY TRACT INFECTION ASSOCIATED WITH INDWELLING URETHRAL CATHETER, INITIAL ENCOUNTER (HCC): ICD-10-CM

## 2021-08-23 DIAGNOSIS — L89.159 PRESSURE INJURY OF SKIN OF SACRAL REGION, UNSPECIFIED INJURY STAGE: Primary | ICD-10-CM

## 2021-08-23 DIAGNOSIS — N39.0 URINARY TRACT INFECTION ASSOCIATED WITH INDWELLING URETHRAL CATHETER, INITIAL ENCOUNTER (HCC): ICD-10-CM

## 2021-08-23 LAB
ALBUMIN SERPL-MCNC: 3.7 G/DL (ref 3.5–5)
ALBUMIN/GLOB SERPL: 0.9 {RATIO} (ref 1.2–3.5)
ALP SERPL-CCNC: 119 U/L (ref 50–130)
ALT SERPL-CCNC: 38 U/L (ref 12–65)
ANION GAP SERPL CALC-SCNC: 2 MMOL/L (ref 7–16)
AST SERPL-CCNC: 42 U/L (ref 15–37)
BACTERIA URNS QL MICRO: ABNORMAL /HPF
BASOPHILS # BLD: 0 K/UL (ref 0–0.2)
BASOPHILS NFR BLD: 1 % (ref 0–2)
BILIRUB SERPL-MCNC: 0.3 MG/DL (ref 0.2–1.1)
BUN SERPL-MCNC: 13 MG/DL (ref 6–23)
CALCIUM SERPL-MCNC: 9.7 MG/DL (ref 8.3–10.4)
CASTS URNS QL MICRO: ABNORMAL /LPF
CHLORIDE SERPL-SCNC: 103 MMOL/L (ref 98–107)
CO2 SERPL-SCNC: 31 MMOL/L (ref 21–32)
CREAT SERPL-MCNC: 0.6 MG/DL (ref 0.6–1)
DIFFERENTIAL METHOD BLD: ABNORMAL
EOSINOPHIL # BLD: 0.2 K/UL (ref 0–0.8)
EOSINOPHIL NFR BLD: 3 % (ref 0.5–7.8)
EPI CELLS #/AREA URNS HPF: ABNORMAL /HPF
ERYTHROCYTE [DISTWIDTH] IN BLOOD BY AUTOMATED COUNT: 15.4 % (ref 11.9–14.6)
GLOBULIN SER CALC-MCNC: 4 G/DL (ref 2.3–3.5)
GLUCOSE SERPL-MCNC: 95 MG/DL (ref 65–100)
HCT VFR BLD AUTO: 43 % (ref 35.8–46.3)
HGB BLD-MCNC: 14.2 G/DL (ref 11.7–15.4)
IMM GRANULOCYTES # BLD AUTO: 0 K/UL (ref 0–0.5)
IMM GRANULOCYTES NFR BLD AUTO: 0 % (ref 0–5)
LACTATE SERPL-SCNC: 1.3 MMOL/L (ref 0.4–2)
LYMPHOCYTES # BLD: 0.3 K/UL (ref 0.5–4.6)
LYMPHOCYTES NFR BLD: 6 % (ref 13–44)
MCH RBC QN AUTO: 31.1 PG (ref 26.1–32.9)
MCHC RBC AUTO-ENTMCNC: 33 G/DL (ref 31.4–35)
MCV RBC AUTO: 94.1 FL (ref 79.6–97.8)
MONOCYTES # BLD: 0.4 K/UL (ref 0.1–1.3)
MONOCYTES NFR BLD: 10 % (ref 4–12)
NEUTS SEG # BLD: 3.5 K/UL (ref 1.7–8.2)
NEUTS SEG NFR BLD: 80 % (ref 43–78)
NRBC # BLD: 0 K/UL (ref 0–0.2)
PLATELET # BLD AUTO: 353 K/UL (ref 150–450)
PMV BLD AUTO: 9.9 FL (ref 9.4–12.3)
POTASSIUM SERPL-SCNC: 5.9 MMOL/L (ref 3.5–5.1)
PROT SERPL-MCNC: 7.7 G/DL (ref 6.3–8.2)
RBC # BLD AUTO: 4.57 M/UL (ref 4.05–5.2)
RBC #/AREA URNS HPF: ABNORMAL /HPF
SODIUM SERPL-SCNC: 136 MMOL/L (ref 136–145)
WBC # BLD AUTO: 4.4 K/UL (ref 4.3–11.1)
WBC URNS QL MICRO: ABNORMAL /HPF

## 2021-08-23 PROCEDURE — 96374 THER/PROPH/DIAG INJ IV PUSH: CPT

## 2021-08-23 PROCEDURE — 80053 COMPREHEN METABOLIC PANEL: CPT

## 2021-08-23 PROCEDURE — 83605 ASSAY OF LACTIC ACID: CPT

## 2021-08-23 PROCEDURE — 96361 HYDRATE IV INFUSION ADD-ON: CPT

## 2021-08-23 PROCEDURE — 74011250636 HC RX REV CODE- 250/636: Performed by: STUDENT IN AN ORGANIZED HEALTH CARE EDUCATION/TRAINING PROGRAM

## 2021-08-23 PROCEDURE — 81003 URINALYSIS AUTO W/O SCOPE: CPT

## 2021-08-23 PROCEDURE — 51702 INSERT TEMP BLADDER CATH: CPT

## 2021-08-23 PROCEDURE — 81015 MICROSCOPIC EXAM OF URINE: CPT

## 2021-08-23 PROCEDURE — 72193 CT PELVIS W/DYE: CPT

## 2021-08-23 PROCEDURE — 85025 COMPLETE CBC W/AUTO DIFF WBC: CPT

## 2021-08-23 PROCEDURE — 99284 EMERGENCY DEPT VISIT MOD MDM: CPT

## 2021-08-23 PROCEDURE — 74011000636 HC RX REV CODE- 636: Performed by: STUDENT IN AN ORGANIZED HEALTH CARE EDUCATION/TRAINING PROGRAM

## 2021-08-23 PROCEDURE — 74011000258 HC RX REV CODE- 258: Performed by: STUDENT IN AN ORGANIZED HEALTH CARE EDUCATION/TRAINING PROGRAM

## 2021-08-23 RX ORDER — CEPHALEXIN 500 MG/1
500 CAPSULE ORAL 3 TIMES DAILY
Qty: 15 CAPSULE | Refills: 0 | Status: SHIPPED | OUTPATIENT
Start: 2021-08-23 | End: 2021-08-28

## 2021-08-23 RX ORDER — SODIUM CHLORIDE 0.9 % (FLUSH) 0.9 %
10 SYRINGE (ML) INJECTION
Status: COMPLETED | OUTPATIENT
Start: 2021-08-23 | End: 2021-08-23

## 2021-08-23 RX ORDER — CALCIUM GLUCONATE 94 MG/ML
1 INJECTION, SOLUTION INTRAVENOUS
Status: COMPLETED | OUTPATIENT
Start: 2021-08-23 | End: 2021-08-23

## 2021-08-23 RX ADMIN — CALCIUM GLUCONATE 1 G: 98 INJECTION, SOLUTION INTRAVENOUS at 11:37

## 2021-08-23 RX ADMIN — SODIUM CHLORIDE 1000 ML: 900 INJECTION, SOLUTION INTRAVENOUS at 11:37

## 2021-08-23 RX ADMIN — IOPAMIDOL 100 ML: 755 INJECTION, SOLUTION INTRAVENOUS at 11:37

## 2021-08-23 RX ADMIN — SODIUM CHLORIDE 100 ML: 900 INJECTION, SOLUTION INTRAVENOUS at 11:42

## 2021-08-23 RX ADMIN — Medication 10 ML: at 11:42

## 2021-08-23 NOTE — ED NOTES
I have reviewed discharge instructions with the patient and caregiver. The patient and caregiver verbalized understanding. Patient left ED via Discharge Method: stretcher to Home with (ems transport). Opportunity for questions and clarification provided. Patient given 1 scripts. Okay to d/c pt home with wasserman catheter    To continue your aftercare when you leave the hospital, you may receive an automated call from our care team to check in on how you are doing. This is a free service and part of our promise to provide the best care and service to meet your aftercare needs.  If you have questions, or wish to unsubscribe from this service please call [phone]. Thank you for Choosing our [facility] Emergency Department.

## 2021-08-23 NOTE — ED PROVIDER NOTES
80-year-old female history of MS as well as known decubitus ulcer. Presents ER with worsening pain to the ulcer on her sacrum. Reports she is bedridden secondary to MS but does ambulate a few times daily. States she has help in the form of home health at home. Patient has urinary catheter which she reports was placed for the known sacral ulcer. Patient denies fever or chills. States she is has worsening pain to her sacral region.            Past Medical History:   Diagnosis Date    Decubitus ulcer of ischial area     Hypertension     Kidney infection     Menopause     MS (multiple sclerosis) ([facility]        Past Surgical History:   Procedure Laterality Date    COLONOSCOPY N/A 6/29/2020    COLONOSCOPY /Room 424 performed by Cherie Tran MD at [facility] ENDOSCOPY    ERCP  5/15/2020         HX CHOLECYSTECTOMY  05/18/2020         Family History:   Problem Relation Age of Onset    Breast Cancer Mother        Social History     Socioeconomic History    Marital status:      Spouse name: Not on file    Number of children: Not on file    Years of education: Not on file    Highest education level: Not on file   Occupational History    Not on file   Tobacco Use    Smoking status: Former Smoker    Smokeless tobacco: Never Used   Substance and Sexual Activity    Alcohol use: Not Currently    Drug use: Not Currently    Sexual activity: Not on file   Other Topics Concern   [address] Service Not Asked    Blood Transfusions Not Asked    Caffeine Concern Not Asked    Occupational Exposure Not Asked    Hobby Hazards Not Asked    Sleep Concern Not Asked    Stress Concern Not Asked    Weight Concern Not Asked    Special Diet Not Asked    Back Care Not Asked    Exercise Not Asked    Bike Helmet Not Asked   [address] Not Asked    Self-Exams Not Asked   Social History Narrative    Not on file     Social Determinants of Health     Financial Resource Strain:     Difficulty of Paying Living Expenses:    Food Insecurity:     Worried About Running Out of Food in the Last Year:     [address] in the Last Year:    Transportation Needs:     Lack of Transportation (Medical):  Lack of Transportation (Non-Medical):    Physical Activity:     Days of Exercise per Week:     Minutes of Exercise per Session:    Stress:     Feeling of Stress :    Social Connections:     Frequency of Communication with Friends and Family:     Frequency of Social Gatherings with Friends and Family:     Attends Denominational Services:     Active Member of Clubs or Organizations:     Attends Club or Organization Meetings:     Marital Status:    Intimate Partner Violence:     Fear of Current or Ex-Partner:     Emotionally Abused:     Physically Abused:     Sexually Abused: ALLERGIES: Latex, Norco [hydrocodone-acetaminophen], and Pcn [penicillins]    Review of Systems   Constitutional: Negative for chills and fever. HENT: Negative for sinus pressure and sore throat. Eyes: Negative for visual disturbance. Respiratory: Negative for cough and shortness of breath. Cardiovascular: Negative for chest pain. Gastrointestinal: Negative for abdominal pain, diarrhea, nausea and vomiting. Endocrine: Negative for polyuria. Genitourinary: Negative for difficulty urinating and dysuria. Musculoskeletal: Negative for neck pain and neck stiffness. Skin: Positive for wound. Negative for rash. Neurological: Negative for weakness and headaches. Psychiatric/Behavioral: Negative for confusion. All other systems reviewed and are negative. Vitals:    08/23/21 0923   BP: 113/80   Pulse: 81   Resp: 18   Temp: 98.1 °F (36.7 °C)   SpO2: 100%            Physical Exam  Vitals and nursing note reviewed. Exam conducted with a chaperone present. Constitutional:       Appearance: Normal appearance. She is not ill-appearing or toxic-appearing. HENT:      Head: Normocephalic and atraumatic.       Nose: Nose normal.      Mouth/Throat:      Mouth: Mucous membranes are moist.   Eyes:      Extraocular Movements: Extraocular movements intact. Cardiovascular:      Rate and Rhythm: Normal rate and regular rhythm. Pulses: Normal pulses. Heart sounds: Normal heart sounds. Pulmonary:      Effort: Pulmonary effort is normal. No respiratory distress. Breath sounds: Normal breath sounds. Abdominal:      General: Abdomen is flat. There is no distension. Palpations: Abdomen is soft. Tenderness: There is no abdominal tenderness. Musculoskeletal:         General: Normal range of motion. Cervical back: Normal range of motion. No rigidity. Skin:     General: Skin is warm and dry. Comments: Right buttock: Area of skin breakdown consistent with pressure ulcer to sacrum involving the right buttock as well as proximal RLE. No abscess definitively palpated. Neurological:      General: No focal deficit present. Mental Status: She is alert and oriented to person, place, and time. Psychiatric:         Mood and Affect: Mood normal.          MDM  Number of Diagnoses or Management Options  Pressure injury of skin of sacral region, unspecified injury stage  Urinary tract infection associated with indwelling urethral catheter, initial encounter [facility])  Diagnosis management comments: 20-year-old female presents the ER with continued pain from a sacral ulcer. This was documented on multiple prior hospital visits. Patient was found to be in a contaminated diaper likely contributes to patient's skin breakdown. Will obtain lab work as well as imaging to rule out deep space abscess. Patient's labs are normal white count, stable H&H, Potassium elevated 5.9 patient with normal kidney function. Given 1 L bolus IV fluid, with normal functioning kidneys patient should excrete this excess potassium. Patient Mathew catheter was changed, patient has been producing urine normally here in the ER.   CT scan of her pelvis did not reveal any evidence of abscess or bony involvement. Patient does have urinary tract infection, will treat with prescription of Keflex. Patient has been colonized with multiple tear in the past, advised her to follow-up with urology as well as primary care physician. Social work spent an excessive amount time making multiple phone calls and confirming the patient does have home health set up that has PT, OT, social work, wound care and nursing. Hospice is set to meet with patient today at home. Patient be discharged back home, will travel via EMS transport. Advised to follow-up with family physician within 1 week. Return to the ER for worsening or worrisome symptoms. She voiced understanding and agreement with this plan.        Amount and/or Complexity of Data Reviewed  Clinical lab tests: ordered and reviewed  Tests in the radiology section of CPT®: ordered and reviewed  Decide to obtain previous medical records or to obtain history from someone other than the patient: yes  Review and summarize past medical records: yes    Risk of Complications, Morbidity, and/or Mortality  Presenting problems: moderate  Diagnostic procedures: moderate  Management options: moderate           Procedures

## 2021-08-23 NOTE — ED NOTES
Existing wasserman catheter removed intact. Balloon intact. Pt tolerated well. Wasserman catheter replaced per md order. 500ml urine drained.

## 2021-08-23 NOTE — ED TRIAGE NOTES
Ems reports from home with bed sores to \"bottom\"  Vss. Pt reports has had bed sores for a year and has followed up with wound care.   Pt reports no new changes

## 2021-08-23 NOTE — DISCHARGE INSTRUCTIONS
Follow-up with your family physician as well as wound care within 1 week. Take antibiotics as prescribed. Return to the ER for worsening or worrisome symptoms.

## 2021-08-23 NOTE — PROGRESS NOTES
@1000 Meet with pt about her home services. Pt is a poor historian and unale to confirm information that I had gathered form her chart. Per the chart she was d/c form [facility] to home with [address]. Pt states that she is no longer with them, but could not confirm if or who she was with. She also stated that [facility] was coming out the the house. Pt stated that a friend \"Manuel\" comes out daily ot help her ad that she has others helping, but could not provide the name of the agency which these people work. I asked if I could call pt's sister and she gave verbal consent. Attempted twice to contact Fritz King and did not hear back. I found a friend/ Iraida Gonzales #[phone], who confirmed that Tarun Hale( #[phone]) is pt's ex-, and he in fact does check on pt daily. I spoke with Tarun Hale, he was with pt this am and called 911 b/c of the amount of pain she was in. He confirmed that [facility] does come out and provided there #. I called HH, it is Arianna/Natalia RN #[phone]. She confirmed that they have PT/OT/RN/Aide and SW seeing pt, that pt has been c/o increasing pain and they have been discussing hospice, pt agreed and a hospice had planned on seeing pt today, before she came in this a.m. I went back in to speak with pt to see if she was still interested in meeting with hospice and she was. Brittney Reece informed and I will fax clinicals form today to them and hospice will go out this evening and admit pt under Kaylyn will meet pt at the house. RN to call for EMS transportation & CM signing off.

## 2021-09-19 ENCOUNTER — HOSPITAL ENCOUNTER (EMERGENCY)
Age: 61
Discharge: HOME | End: 2021-09-19
Attending: EMERGENCY MEDICINE
Payer: MEDICARE

## 2021-09-19 VITALS
HEART RATE: 94 BPM | OXYGEN SATURATION: 96 % | WEIGHT: 100 LBS | SYSTOLIC BLOOD PRESSURE: 163 MMHG | DIASTOLIC BLOOD PRESSURE: 97 MMHG | TEMPERATURE: 97.5 F | BODY MASS INDEX: 15.7 KG/M2 | HEIGHT: 67 IN | RESPIRATION RATE: 22 BRPM

## 2021-09-19 DIAGNOSIS — L89.159 PRESSURE INJURY OF SKIN OF SACRAL REGION, UNSPECIFIED INJURY STAGE: Primary | ICD-10-CM

## 2021-09-19 PROCEDURE — 96372 THER/PROPH/DIAG INJ SC/IM: CPT

## 2021-09-19 PROCEDURE — 74011250636 HC RX REV CODE- 250/636: Performed by: EMERGENCY MEDICINE

## 2021-09-19 PROCEDURE — 99283 EMERGENCY DEPT VISIT LOW MDM: CPT

## 2021-09-19 RX ORDER — FENTANYL CITRATE 50 UG/ML
50 INJECTION, SOLUTION INTRAMUSCULAR; INTRAVENOUS
Status: COMPLETED | OUTPATIENT
Start: 2021-09-19 | End: 2021-09-19

## 2021-09-19 RX ADMIN — FENTANYL CITRATE 50 MCG: 50 INJECTION INTRAMUSCULAR; INTRAVENOUS at 19:30

## 2021-09-19 NOTE — ED TRIAGE NOTES
Patient arrives from home via EMS c/o buttocks pain. Per EMS family and hospice RN state patients wound is improved and they have doubled the patients pain medications today.

## 2021-09-20 NOTE — DISCHARGE INSTRUCTIONS
Follow-up with hospice to discuss her  pain medication  Return to the ER for any new, worsening or life-threatening symptoms

## 2021-09-20 NOTE — ED NOTES
I have reviewed discharge instructions with the patient. The patient verbalized understanding. Patient left ED via Discharge Method: stretcher to Home with [facility]. Opportunity for questions and clarification provided. Patient given 0 scripts. To continue your aftercare when you leave the hospital, you may receive an automated call from our care team to check in on how you are doing. This is a free service and part of our promise to provide the best care and service to meet your aftercare needs.  If you have questions, or wish to unsubscribe from this service please call [phone]. Thank you for Choosing our [facility] Emergency Department.

## 2022-03-18 PROBLEM — N17.9 AKI (ACUTE KIDNEY INJURY) (HCC): Status: ACTIVE | Noted: 2020-05-13

## 2022-03-18 PROBLEM — L98.429 SACRAL ULCER (HCC): Status: ACTIVE | Noted: 2021-05-31

## 2022-03-18 PROBLEM — N39.0 ACUTE UTI: Status: ACTIVE | Noted: 2021-03-03

## 2022-03-18 PROBLEM — R53.1 GENERALIZED WEAKNESS: Status: ACTIVE | Noted: 2021-03-03

## 2022-03-19 PROBLEM — F32.A DEPRESSION: Status: ACTIVE | Noted: 2020-09-10

## 2022-03-19 PROBLEM — M46.28 OSTEOMYELITIS OF SACROILIAC REGION (HCC): Status: ACTIVE | Noted: 2020-05-20

## 2022-03-19 PROBLEM — R74.01 TRANSAMINITIS: Status: ACTIVE | Noted: 2020-05-13

## 2022-03-19 PROBLEM — G35 MULTIPLE SCLEROSIS (HCC): Status: ACTIVE | Noted: 2020-05-13

## 2022-03-19 PROBLEM — K80.50 CHOLEDOCHOLITHIASIS: Status: ACTIVE | Noted: 2020-05-16

## 2022-03-19 PROBLEM — D72.829 LEUKOCYTOSIS: Status: ACTIVE | Noted: 2020-05-13

## 2022-03-19 PROBLEM — E87.6 HYPOKALEMIA: Status: ACTIVE | Noted: 2020-07-27

## 2022-03-19 PROBLEM — E46 PROTEIN CALORIE MALNUTRITION (HCC): Status: ACTIVE | Noted: 2021-05-31

## 2022-03-19 PROBLEM — F41.9 ANXIETY DISORDER: Status: ACTIVE | Noted: 2020-08-08

## 2022-03-19 PROBLEM — G35 MS (MULTIPLE SCLEROSIS) (HCC): Status: ACTIVE | Noted: 2021-04-11

## 2022-03-19 PROBLEM — E83.42 HYPOMAGNESEMIA: Status: ACTIVE | Noted: 2020-07-27

## 2022-03-19 PROBLEM — E43 SEVERE PROTEIN-CALORIE MALNUTRITION (HCC): Status: ACTIVE | Noted: 2020-05-13

## 2022-03-19 PROBLEM — Z86.718 HISTORY OF DVT OF LOWER EXTREMITY: Status: ACTIVE | Noted: 2021-03-03

## 2022-03-19 PROBLEM — T07.XXXA MULTIPLE WOUNDS: Status: ACTIVE | Noted: 2020-09-10

## 2022-03-19 PROBLEM — K21.9 GASTROESOPHAGEAL REFLUX DISEASE WITHOUT ESOPHAGITIS: Status: ACTIVE | Noted: 2020-08-08

## 2022-03-19 PROBLEM — L89.154 PRESSURE INJURY OF SACRAL REGION, STAGE 4 (HCC): Status: ACTIVE | Noted: 2020-05-13

## 2022-03-19 PROBLEM — G93.41 ACUTE METABOLIC ENCEPHALOPATHY: Status: ACTIVE | Noted: 2020-05-13

## 2022-03-19 PROBLEM — G93.40 ACUTE ENCEPHALOPATHY: Status: ACTIVE | Noted: 2021-05-31

## 2022-03-20 PROBLEM — I10 ESSENTIAL HYPERTENSION: Status: ACTIVE | Noted: 2020-09-10

## 2022-03-20 PROBLEM — A41.9 SEPSIS (HCC): Status: ACTIVE | Noted: 2020-07-23

## 2022-03-20 PROBLEM — R78.81 COAGULASE NEGATIVE STAPHYLOCOCCUS BACTEREMIA: Status: ACTIVE | Noted: 2020-07-26

## 2022-03-20 PROBLEM — B95.7 COAGULASE NEGATIVE STAPHYLOCOCCUS BACTEREMIA: Status: ACTIVE | Noted: 2020-07-26

## 2023-09-13 ENCOUNTER — HOSPITAL ENCOUNTER (INPATIENT)
Age: 63
LOS: 8 days | Discharge: NURSING, REHAB OR LONG-TERM CARE | DRG: 698 | End: 2023-09-23
Attending: EMERGENCY MEDICINE | Admitting: INTERNAL MEDICINE
Payer: MEDICARE

## 2023-09-13 ENCOUNTER — APPOINTMENT (OUTPATIENT)
Dept: CT IMAGING | Age: 63
DRG: 698 | End: 2023-09-13
Payer: MEDICARE

## 2023-09-13 ENCOUNTER — APPOINTMENT (OUTPATIENT)
Dept: GENERAL RADIOLOGY | Age: 63
DRG: 698 | End: 2023-09-13
Payer: MEDICARE

## 2023-09-13 DIAGNOSIS — Z51.5 HOSPICE CARE PATIENT: ICD-10-CM

## 2023-09-13 DIAGNOSIS — G35 MS (MULTIPLE SCLEROSIS) (HCC): ICD-10-CM

## 2023-09-13 DIAGNOSIS — R41.82 ALTERED MENTAL STATUS, UNSPECIFIED ALTERED MENTAL STATUS TYPE: Primary | ICD-10-CM

## 2023-09-13 DIAGNOSIS — N39.0 URINARY TRACT INFECTION WITHOUT HEMATURIA, SITE UNSPECIFIED: ICD-10-CM

## 2023-09-13 DIAGNOSIS — G35 MULTIPLE SCLEROSIS (HCC): ICD-10-CM

## 2023-09-13 PROBLEM — G93.40 ENCEPHALOPATHY ACUTE: Status: ACTIVE | Noted: 2023-09-13

## 2023-09-13 PROBLEM — E43 SEVERE PROTEIN-CALORIE MALNUTRITION (HCC): Status: ACTIVE | Noted: 2020-05-13

## 2023-09-13 PROBLEM — K21.9 GASTROESOPHAGEAL REFLUX DISEASE WITHOUT ESOPHAGITIS: Status: ACTIVE | Noted: 2020-08-08

## 2023-09-13 PROBLEM — G93.40 ACUTE ENCEPHALOPATHY: Status: ACTIVE | Noted: 2021-05-31

## 2023-09-13 LAB
ALBUMIN SERPL-MCNC: 3.2 G/DL (ref 3.2–4.6)
ALBUMIN/GLOB SERPL: 0.8 (ref 0.4–1.6)
ALP SERPL-CCNC: 93 U/L (ref 50–136)
ALT SERPL-CCNC: 23 U/L (ref 12–65)
ANION GAP SERPL CALC-SCNC: 4 MMOL/L (ref 2–11)
APPEARANCE UR: ABNORMAL
AST SERPL-CCNC: 22 U/L (ref 15–37)
BACTERIA URNS QL MICRO: ABNORMAL /HPF
BASOPHILS # BLD: 0.1 K/UL (ref 0–0.2)
BASOPHILS NFR BLD: 1 % (ref 0–2)
BILIRUB SERPL-MCNC: 0.5 MG/DL (ref 0.2–1.1)
BILIRUB UR QL: ABNORMAL
BUN SERPL-MCNC: 17 MG/DL (ref 8–23)
CALCIUM SERPL-MCNC: 9.4 MG/DL (ref 8.3–10.4)
CASTS URNS QL MICRO: ABNORMAL /LPF
CHLORIDE SERPL-SCNC: 113 MMOL/L (ref 101–110)
CHP ED QC CHECK: 119
CO2 SERPL-SCNC: 27 MMOL/L (ref 21–32)
COLOR UR: ABNORMAL
CREAT SERPL-MCNC: 0.7 MG/DL (ref 0.6–1)
DIFFERENTIAL METHOD BLD: ABNORMAL
EOSINOPHIL # BLD: 0.4 K/UL (ref 0–0.8)
EOSINOPHIL NFR BLD: 7 % (ref 0.5–7.8)
EPI CELLS #/AREA URNS HPF: ABNORMAL /HPF
ERYTHROCYTE [DISTWIDTH] IN BLOOD BY AUTOMATED COUNT: 12.7 % (ref 11.9–14.6)
GLOBULIN SER CALC-MCNC: 3.9 G/DL (ref 2.8–4.5)
GLUCOSE BLD STRIP.AUTO-MCNC: 117 MG/DL (ref 65–100)
GLUCOSE SERPL-MCNC: 121 MG/DL (ref 65–100)
GLUCOSE UR STRIP.AUTO-MCNC: NEGATIVE MG/DL
HCT VFR BLD AUTO: 48.1 % (ref 35.8–46.3)
HGB BLD-MCNC: 15.3 G/DL (ref 11.7–15.4)
HGB UR QL STRIP: ABNORMAL
IMM GRANULOCYTES # BLD AUTO: 0 K/UL (ref 0–0.5)
IMM GRANULOCYTES NFR BLD AUTO: 0 % (ref 0–5)
KETONES UR QL STRIP.AUTO: 15 MG/DL
LACTATE SERPL-SCNC: 1 MMOL/L (ref 0.4–2)
LEUKOCYTE ESTERASE UR QL STRIP.AUTO: ABNORMAL
LYMPHOCYTES # BLD: 0.7 K/UL (ref 0.5–4.6)
LYMPHOCYTES NFR BLD: 11 % (ref 13–44)
MCH RBC QN AUTO: 32.1 PG (ref 26.1–32.9)
MCHC RBC AUTO-ENTMCNC: 31.8 G/DL (ref 31.4–35)
MCV RBC AUTO: 101.1 FL (ref 82–102)
MONOCYTES # BLD: 0.5 K/UL (ref 0.1–1.3)
MONOCYTES NFR BLD: 9 % (ref 4–12)
NEUTS SEG # BLD: 4.3 K/UL (ref 1.7–8.2)
NEUTS SEG NFR BLD: 72 % (ref 43–78)
NITRITE UR QL STRIP.AUTO: POSITIVE
NRBC # BLD: 0 K/UL (ref 0–0.2)
OTHER OBSERVATIONS: ABNORMAL
PH UR STRIP: 6 (ref 5–9)
PLATELET # BLD AUTO: 310 K/UL (ref 150–450)
PMV BLD AUTO: 10.6 FL (ref 9.4–12.3)
POTASSIUM SERPL-SCNC: 3.5 MMOL/L (ref 3.5–5.1)
PROT SERPL-MCNC: 7.1 G/DL (ref 6.3–8.2)
PROT UR STRIP-MCNC: 30 MG/DL
RBC # BLD AUTO: 4.76 M/UL (ref 4.05–5.2)
RBC #/AREA URNS HPF: ABNORMAL /HPF
SERVICE CMNT-IMP: ABNORMAL
SODIUM SERPL-SCNC: 144 MMOL/L (ref 133–143)
SP GR UR REFRACTOMETRY: 1.01 (ref 1–1.02)
TSH W FREE THYROID IF ABNORMAL: 1.27 UIU/ML (ref 0.36–3.74)
UROBILINOGEN UR QL STRIP.AUTO: 1 EU/DL (ref 0.2–1)
WBC # BLD AUTO: 6.1 K/UL (ref 4.3–11.1)
WBC URNS QL MICRO: ABNORMAL /HPF

## 2023-09-13 PROCEDURE — 80053 COMPREHEN METABOLIC PANEL: CPT

## 2023-09-13 PROCEDURE — 87086 URINE CULTURE/COLONY COUNT: CPT

## 2023-09-13 PROCEDURE — G0378 HOSPITAL OBSERVATION PER HR: HCPCS

## 2023-09-13 PROCEDURE — 84443 ASSAY THYROID STIM HORMONE: CPT

## 2023-09-13 PROCEDURE — 76937 US GUIDE VASCULAR ACCESS: CPT

## 2023-09-13 PROCEDURE — 71045 X-RAY EXAM CHEST 1 VIEW: CPT

## 2023-09-13 PROCEDURE — 2580000003 HC RX 258: Performed by: FAMILY MEDICINE

## 2023-09-13 PROCEDURE — 96365 THER/PROPH/DIAG IV INF INIT: CPT

## 2023-09-13 PROCEDURE — 2580000003 HC RX 258: Performed by: EMERGENCY MEDICINE

## 2023-09-13 PROCEDURE — 6370000000 HC RX 637 (ALT 250 FOR IP): Performed by: FAMILY MEDICINE

## 2023-09-13 PROCEDURE — 70450 CT HEAD/BRAIN W/O DYE: CPT

## 2023-09-13 PROCEDURE — 99285 EMERGENCY DEPT VISIT HI MDM: CPT

## 2023-09-13 PROCEDURE — 82962 GLUCOSE BLOOD TEST: CPT

## 2023-09-13 PROCEDURE — 94760 N-INVAS EAR/PLS OXIMETRY 1: CPT

## 2023-09-13 PROCEDURE — 85025 COMPLETE CBC W/AUTO DIFF WBC: CPT

## 2023-09-13 PROCEDURE — 81001 URINALYSIS AUTO W/SCOPE: CPT

## 2023-09-13 PROCEDURE — 87186 SC STD MICRODIL/AGAR DIL: CPT

## 2023-09-13 PROCEDURE — 87040 BLOOD CULTURE FOR BACTERIA: CPT

## 2023-09-13 PROCEDURE — 87088 URINE BACTERIA CULTURE: CPT

## 2023-09-13 PROCEDURE — 6360000002 HC RX W HCPCS: Performed by: EMERGENCY MEDICINE

## 2023-09-13 PROCEDURE — 96361 HYDRATE IV INFUSION ADD-ON: CPT

## 2023-09-13 PROCEDURE — 93005 ELECTROCARDIOGRAM TRACING: CPT | Performed by: EMERGENCY MEDICINE

## 2023-09-13 PROCEDURE — 83605 ASSAY OF LACTIC ACID: CPT

## 2023-09-13 RX ORDER — ACETAMINOPHEN 325 MG/1
650 TABLET ORAL EVERY 6 HOURS PRN
Status: DISCONTINUED | OUTPATIENT
Start: 2023-09-13 | End: 2023-09-23 | Stop reason: HOSPADM

## 2023-09-13 RX ORDER — POLYETHYLENE GLYCOL 3350 17 G/17G
17 POWDER, FOR SOLUTION ORAL DAILY PRN
Status: DISCONTINUED | OUTPATIENT
Start: 2023-09-13 | End: 2023-09-23 | Stop reason: HOSPADM

## 2023-09-13 RX ORDER — SENNOSIDES A AND B 8.6 MG/1
2 TABLET, FILM COATED ORAL 2 TIMES DAILY
Status: DISCONTINUED | OUTPATIENT
Start: 2023-09-13 | End: 2023-09-23 | Stop reason: HOSPADM

## 2023-09-13 RX ORDER — SODIUM CHLORIDE 0.9 % (FLUSH) 0.9 %
5-40 SYRINGE (ML) INJECTION PRN
Status: DISCONTINUED | OUTPATIENT
Start: 2023-09-13 | End: 2023-09-23 | Stop reason: HOSPADM

## 2023-09-13 RX ORDER — PREGABALIN 25 MG/1
50 CAPSULE ORAL DAILY
Status: DISCONTINUED | OUTPATIENT
Start: 2023-09-14 | End: 2023-09-13

## 2023-09-13 RX ORDER — ONDANSETRON 2 MG/ML
4 INJECTION INTRAMUSCULAR; INTRAVENOUS EVERY 6 HOURS PRN
Status: DISCONTINUED | OUTPATIENT
Start: 2023-09-13 | End: 2023-09-23 | Stop reason: HOSPADM

## 2023-09-13 RX ORDER — BACLOFEN 10 MG/1
20 TABLET ORAL 3 TIMES DAILY
Status: DISCONTINUED | OUTPATIENT
Start: 2023-09-13 | End: 2023-09-16

## 2023-09-13 RX ORDER — MAGNESIUM HYDROXIDE/ALUMINUM HYDROXICE/SIMETHICONE 120; 1200; 1200 MG/30ML; MG/30ML; MG/30ML
30 SUSPENSION ORAL EVERY 6 HOURS PRN
Status: DISCONTINUED | OUTPATIENT
Start: 2023-09-13 | End: 2023-09-23 | Stop reason: HOSPADM

## 2023-09-13 RX ORDER — FINGOLIMOD HYDROCHLORIDE 0.5 MG/1
0.5 CAPSULE ORAL DAILY
Status: DISCONTINUED | OUTPATIENT
Start: 2023-09-14 | End: 2023-09-16

## 2023-09-13 RX ORDER — SODIUM CHLORIDE 9 MG/ML
INJECTION, SOLUTION INTRAVENOUS PRN
Status: DISCONTINUED | OUTPATIENT
Start: 2023-09-13 | End: 2023-09-23 | Stop reason: HOSPADM

## 2023-09-13 RX ORDER — HYDROXYZINE HYDROCHLORIDE 10 MG/1
25 TABLET, FILM COATED ORAL EVERY 8 HOURS PRN
Status: DISCONTINUED | OUTPATIENT
Start: 2023-09-13 | End: 2023-09-16

## 2023-09-13 RX ORDER — LANOLIN ALCOHOL/MO/W.PET/CERES
1000 CREAM (GRAM) TOPICAL DAILY
Status: DISCONTINUED | OUTPATIENT
Start: 2023-09-14 | End: 2023-09-23 | Stop reason: HOSPADM

## 2023-09-13 RX ORDER — ENOXAPARIN SODIUM 100 MG/ML
40 INJECTION SUBCUTANEOUS DAILY
Status: DISCONTINUED | OUTPATIENT
Start: 2023-09-14 | End: 2023-09-23 | Stop reason: HOSPADM

## 2023-09-13 RX ORDER — POLYETHYLENE GLYCOL 3350 17 G/17G
17 POWDER, FOR SOLUTION ORAL DAILY
Status: DISCONTINUED | OUTPATIENT
Start: 2023-09-14 | End: 2023-09-23 | Stop reason: HOSPADM

## 2023-09-13 RX ORDER — SODIUM CHLORIDE 0.9 % (FLUSH) 0.9 %
5-40 SYRINGE (ML) INJECTION EVERY 12 HOURS SCHEDULED
Status: DISCONTINUED | OUTPATIENT
Start: 2023-09-13 | End: 2023-09-23 | Stop reason: HOSPADM

## 2023-09-13 RX ORDER — ASPIRIN 81 MG/1
81 TABLET ORAL DAILY
Status: DISCONTINUED | OUTPATIENT
Start: 2023-09-14 | End: 2023-09-16

## 2023-09-13 RX ORDER — VITAMIN B COMPLEX
1000 TABLET ORAL DAILY
Status: DISCONTINUED | OUTPATIENT
Start: 2023-09-14 | End: 2023-09-23 | Stop reason: HOSPADM

## 2023-09-13 RX ORDER — ACETAMINOPHEN 650 MG/1
650 SUPPOSITORY RECTAL EVERY 6 HOURS PRN
Status: DISCONTINUED | OUTPATIENT
Start: 2023-09-13 | End: 2023-09-23 | Stop reason: HOSPADM

## 2023-09-13 RX ORDER — LANOLIN ALCOHOL/MO/W.PET/CERES
400 CREAM (GRAM) TOPICAL 2 TIMES DAILY
Status: DISCONTINUED | OUTPATIENT
Start: 2023-09-13 | End: 2023-09-16

## 2023-09-13 RX ORDER — FAMOTIDINE 20 MG/1
20 TABLET, FILM COATED ORAL DAILY
Status: DISCONTINUED | OUTPATIENT
Start: 2023-09-14 | End: 2023-09-23 | Stop reason: HOSPADM

## 2023-09-13 RX ORDER — ONDANSETRON 4 MG/1
4 TABLET, ORALLY DISINTEGRATING ORAL EVERY 8 HOURS PRN
Status: DISCONTINUED | OUTPATIENT
Start: 2023-09-13 | End: 2023-09-23 | Stop reason: HOSPADM

## 2023-09-13 RX ORDER — ASCORBIC ACID 500 MG
250 TABLET ORAL DAILY
Status: DISCONTINUED | OUTPATIENT
Start: 2023-09-14 | End: 2023-09-23 | Stop reason: HOSPADM

## 2023-09-13 RX ORDER — SODIUM CHLORIDE 9 MG/ML
INJECTION, SOLUTION INTRAVENOUS CONTINUOUS
Status: DISCONTINUED | OUTPATIENT
Start: 2023-09-13 | End: 2023-09-13

## 2023-09-13 RX ORDER — AMANTADINE HYDROCHLORIDE 100 MG/1
100 CAPSULE, GELATIN COATED ORAL 2 TIMES DAILY
Status: DISCONTINUED | OUTPATIENT
Start: 2023-09-13 | End: 2023-09-23 | Stop reason: HOSPADM

## 2023-09-13 RX ORDER — LANOLIN ALCOHOL/MO/W.PET/CERES
4.5 CREAM (GRAM) TOPICAL NIGHTLY PRN
Status: DISCONTINUED | OUTPATIENT
Start: 2023-09-13 | End: 2023-09-23 | Stop reason: HOSPADM

## 2023-09-13 RX ORDER — FERROUS SULFATE 325(65) MG
325 TABLET ORAL DAILY
Status: DISCONTINUED | OUTPATIENT
Start: 2023-09-14 | End: 2023-09-16

## 2023-09-13 RX ORDER — SODIUM CHLORIDE 450 MG/100ML
INJECTION, SOLUTION INTRAVENOUS CONTINUOUS
Status: ACTIVE | OUTPATIENT
Start: 2023-09-14 | End: 2023-09-15

## 2023-09-13 RX ADMIN — AMANTADINE HYDROCHLORIDE 100 MG: 100 CAPSULE ORAL at 23:11

## 2023-09-13 RX ADMIN — BACLOFEN 20 MG: 10 TABLET ORAL at 23:11

## 2023-09-13 RX ADMIN — SODIUM CHLORIDE, PRESERVATIVE FREE 10 ML: 5 INJECTION INTRAVENOUS at 23:51

## 2023-09-13 RX ADMIN — SODIUM CHLORIDE: 4.5 INJECTION, SOLUTION INTRAVENOUS at 23:51

## 2023-09-13 RX ADMIN — SODIUM CHLORIDE: 9 INJECTION, SOLUTION INTRAVENOUS at 23:11

## 2023-09-13 RX ADMIN — MAGNESIUM GLUCONATE 500 MG ORAL TABLET 400 MG: 500 TABLET ORAL at 23:11

## 2023-09-13 RX ADMIN — SENNOSIDES 17.2 MG: 8.6 TABLET, FILM COATED ORAL at 23:11

## 2023-09-13 RX ADMIN — CEFEPIME 2000 MG: 2 INJECTION, POWDER, FOR SOLUTION INTRAVENOUS at 21:37

## 2023-09-13 ASSESSMENT — PAIN DESCRIPTION - LOCATION: LOCATION: BUTTOCKS

## 2023-09-13 ASSESSMENT — PAIN DESCRIPTION - PAIN TYPE: TYPE: CHRONIC PAIN

## 2023-09-13 ASSESSMENT — PAIN - FUNCTIONAL ASSESSMENT: PAIN_FUNCTIONAL_ASSESSMENT: 0-10

## 2023-09-13 ASSESSMENT — PAIN SCALES - GENERAL: PAINLEVEL_OUTOF10: 5

## 2023-09-13 NOTE — ED PROVIDER NOTES
Emergency Department Provider Note       PCP: Indio Silver MD   Age: 65 y.o. Sex: female     DISPOSITION Decision To Admit 09/13/2023 08:52:15 PM       ICD-10-CM    1. Altered mental status, unspecified altered mental status type  R41.82       2. Urinary tract infection without hematuria, site unspecified  N39.0       3. Multiple sclerosis ([address])  G35       4. Hospice care patient  Z51.5           Medical Decision Making     Complexity of Problems Addressed:  1 or more chronic illnesses with a severe exacerbation or progression. 1 or more acute illnesses that pose a threat to life or bodily function. Data Reviewed and Analyzed:   I independently ordered and reviewed each unique test.  I reviewed external records: provider visit note from PCP. I reviewed external records: provider visit note from outside specialist.     Additional history was provided by patient's sister. I called her. I interpreted the CT Scan no intracranial hemorrhage. Encephalomalacia as outlined by radiology. .    Discussion of management or test interpretation. Patient on hospice and bedridden from multiple sclerosis. She was brought in for altered mental status. I have phoned her sister she reports she was last known well on Sunday. On CT scan she has some encephalomalacia and evidence of remote strokes. I think it would be impossible to tell if there is any new stroke given her severe debility and bedridden status. She is well out of the window for tPA and intervention if this were the case. She is on hospice. We did find a UTI after changing the catheter and she will be given cefepime. Blood cultures will be obtained prior and a urine culture has been ordered. Hospitalist has agreed for admission. The patient was admitted and I have discussed patient management with the admitting provider.     Risk of Complications and/or Morbidity of Patient Management:  Chronic medical problems impacting care include advanced (VITAMIN C) 250 MG TABLET    Take 250 mg by mouth daily    ASPIRIN 81 MG EC TABLET    Take 81 mg by mouth daily    BACLOFEN (LIORESAL) 20 MG TABLET    Take 20 mg by mouth 3 times daily    CYANOCOBALAMIN 1000 MCG TABLET    Take 1,000 mcg by mouth daily    FAMOTIDINE (PEPCID) 20 MG TABLET    Take 20 mg by mouth daily    FINGOLIMOD HCL 0.5 MG CAPS    Take 0.5 mg by mouth daily    HYDROXYZINE (ATARAX) 25 MG TABLET    Take 25 mg by mouth every 8 hours as needed    IRON POLYSACCHARIDES (NIFEREX) 150 MG CAPSULE    Take 150 mg by mouth daily    MAGNESIUM OXIDE (MAG-OX) 400 MG TABLET    Take 400 mg by mouth 2 times daily    MELATONIN 5 MG CAPS    Take 5 mg by mouth    NALOXONE 4 MG/0.1ML LIQD NASAL SPRAY    Use 1 spray intranasally, then discard. Repeat with new spray every 2 min as needed for opioid overdose symptoms, alternating nostrils. POLYETHYLENE GLYCOL (GLYCOLAX) 17 GM/SCOOP POWDER    Take 17 g by mouth daily    PREGABALIN (LYRICA) 50 MG CAPSULE    Take 50 mg by mouth daily. SACCHAROMYCES BOULARDII (FLORASTOR) 250 MG CAPSULE    Take 250 mg by mouth 2 times daily    SENNA (SENOKOT) 8.6 MG TABLET    Take 2 tablets by mouth 2 times daily    VITAMIN D 25 MCG (1000 UT) CAPS    Take 1,000 Units by mouth daily        Results for orders placed or performed during the hospital encounter of 09/13/23   XR CHEST PORTABLE    Narrative    Single portable chest radiograph    INDICATION:  Altered mental status    COMPARISON:  Chest radiograph 5/31/2021    FINDINGS:    Hazy opacity at the left lung base. No pleural effusion, or pneumothorax. The cardiomediastinal silhouette is within normal limits. Scoliosis redemonstrated. Impression    Hazy opacity at the left lung base, may reflect atelectasis, early pneumonia not   excluded. Follow-up recommended.         Durell Carrel, M.D.   9/13/2023 6:41:00 PM   CT HEAD WO CONTRAST    Narrative    EXAMINATION: CT HEAD WITHOUT CONTRAST    DATE: 9/13/2023 6:45

## 2023-09-13 NOTE — ED TRIAGE NOTES
Pt arrives via EMS from home. Sent by hospice nurse. Hx of MS. Sent due to altered mental status since yesterday. Pt reports buttocks and hip pain that is chronic. A/ox2 with EMS and this is new. 170/100. HR 98. 98% RA. 98.0oral. bgl 105.     22G.

## 2023-09-14 LAB
EKG ATRIAL RATE: 94 BPM
EKG DIAGNOSIS: NORMAL
EKG P AXIS: 66 DEGREES
EKG P-R INTERVAL: 204 MS
EKG Q-T INTERVAL: 380 MS
EKG QRS DURATION: 93 MS
EKG QTC CALCULATION (BAZETT): 476 MS
EKG R AXIS: 52 DEGREES
EKG T AXIS: 52 DEGREES
EKG VENTRICULAR RATE: 94 BPM
LACTATE SERPL-SCNC: 1.5 MMOL/L (ref 0.4–2)

## 2023-09-14 PROCEDURE — 96366 THER/PROPH/DIAG IV INF ADDON: CPT

## 2023-09-14 PROCEDURE — 92610 EVALUATE SWALLOWING FUNCTION: CPT

## 2023-09-14 PROCEDURE — 2500000003 HC RX 250 WO HCPCS: Performed by: INTERNAL MEDICINE

## 2023-09-14 PROCEDURE — 96376 TX/PRO/DX INJ SAME DRUG ADON: CPT

## 2023-09-14 PROCEDURE — G0378 HOSPITAL OBSERVATION PER HR: HCPCS

## 2023-09-14 PROCEDURE — 2580000003 HC RX 258: Performed by: FAMILY MEDICINE

## 2023-09-14 PROCEDURE — 97112 NEUROMUSCULAR REEDUCATION: CPT

## 2023-09-14 PROCEDURE — 36415 COLL VENOUS BLD VENIPUNCTURE: CPT

## 2023-09-14 PROCEDURE — 96372 THER/PROPH/DIAG INJ SC/IM: CPT

## 2023-09-14 PROCEDURE — 97530 THERAPEUTIC ACTIVITIES: CPT

## 2023-09-14 PROCEDURE — 6360000002 HC RX W HCPCS: Performed by: INTERNAL MEDICINE

## 2023-09-14 PROCEDURE — 83605 ASSAY OF LACTIC ACID: CPT

## 2023-09-14 PROCEDURE — 6360000002 HC RX W HCPCS: Performed by: FAMILY MEDICINE

## 2023-09-14 PROCEDURE — 96361 HYDRATE IV INFUSION ADD-ON: CPT

## 2023-09-14 PROCEDURE — 96367 TX/PROPH/DG ADDL SEQ IV INF: CPT

## 2023-09-14 PROCEDURE — 97161 PT EVAL LOW COMPLEX 20 MIN: CPT

## 2023-09-14 PROCEDURE — 97165 OT EVAL LOW COMPLEX 30 MIN: CPT

## 2023-09-14 PROCEDURE — 96375 TX/PRO/DX INJ NEW DRUG ADDON: CPT

## 2023-09-14 PROCEDURE — 6370000000 HC RX 637 (ALT 250 FOR IP): Performed by: FAMILY MEDICINE

## 2023-09-14 RX ORDER — MORPHINE SULFATE 2 MG/ML
2 INJECTION, SOLUTION INTRAMUSCULAR; INTRAVENOUS EVERY 4 HOURS PRN
Status: DISCONTINUED | OUTPATIENT
Start: 2023-09-14 | End: 2023-09-16

## 2023-09-14 RX ORDER — HYDRALAZINE HYDROCHLORIDE 20 MG/ML
10 INJECTION INTRAMUSCULAR; INTRAVENOUS EVERY 6 HOURS PRN
Status: DISCONTINUED | OUTPATIENT
Start: 2023-09-14 | End: 2023-09-23 | Stop reason: HOSPADM

## 2023-09-14 RX ADMIN — AMANTADINE HYDROCHLORIDE 100 MG: 100 CAPSULE ORAL at 20:38

## 2023-09-14 RX ADMIN — SENNOSIDES 17.2 MG: 8.6 TABLET, FILM COATED ORAL at 08:50

## 2023-09-14 RX ADMIN — Medication 4.5 MG: at 20:07

## 2023-09-14 RX ADMIN — CYANOCOBALAMIN TAB 1000 MCG 1000 MCG: 1000 TAB at 08:51

## 2023-09-14 RX ADMIN — HYDRALAZINE HYDROCHLORIDE 10 MG: 20 INJECTION, SOLUTION INTRAMUSCULAR; INTRAVENOUS at 08:51

## 2023-09-14 RX ADMIN — MORPHINE SULFATE 2 MG: 2 INJECTION, SOLUTION INTRAMUSCULAR; INTRAVENOUS at 12:58

## 2023-09-14 RX ADMIN — TUBERCULIN PURIFIED PROTEIN DERIVATIVE 5 UNITS: 5 INJECTION, SOLUTION INTRADERMAL at 08:52

## 2023-09-14 RX ADMIN — FAMOTIDINE 20 MG: 20 TABLET ORAL at 08:51

## 2023-09-14 RX ADMIN — CEFTRIAXONE 1000 MG: 1 INJECTION, POWDER, FOR SOLUTION INTRAMUSCULAR; INTRAVENOUS at 05:34

## 2023-09-14 RX ADMIN — SODIUM CHLORIDE: 4.5 INJECTION, SOLUTION INTRAVENOUS at 13:02

## 2023-09-14 RX ADMIN — ENOXAPARIN SODIUM 40 MG: 100 INJECTION SUBCUTANEOUS at 08:51

## 2023-09-14 RX ADMIN — SODIUM CHLORIDE, PRESERVATIVE FREE 10 ML: 5 INJECTION INTRAVENOUS at 20:07

## 2023-09-14 RX ADMIN — MAGNESIUM GLUCONATE 500 MG ORAL TABLET 400 MG: 500 TABLET ORAL at 20:07

## 2023-09-14 RX ADMIN — VITAMIN D, TAB 1000IU (100/BT) 1000 UNITS: 25 TAB at 08:50

## 2023-09-14 RX ADMIN — OXYCODONE HYDROCHLORIDE AND ACETAMINOPHEN 250 MG: 500 TABLET ORAL at 08:51

## 2023-09-14 RX ADMIN — BACLOFEN 20 MG: 10 TABLET ORAL at 14:54

## 2023-09-14 RX ADMIN — HYDROXYZINE HYDROCHLORIDE 25 MG: 10 TABLET, FILM COATED ORAL at 19:44

## 2023-09-14 RX ADMIN — SENNOSIDES 17.2 MG: 8.6 TABLET, FILM COATED ORAL at 20:06

## 2023-09-14 RX ADMIN — POLYETHYLENE GLYCOL 3350 17 G: 17 POWDER, FOR SOLUTION ORAL at 08:51

## 2023-09-14 RX ADMIN — MAGNESIUM GLUCONATE 500 MG ORAL TABLET 400 MG: 500 TABLET ORAL at 08:50

## 2023-09-14 RX ADMIN — AMANTADINE HYDROCHLORIDE 100 MG: 100 CAPSULE ORAL at 08:51

## 2023-09-14 RX ADMIN — BACLOFEN 20 MG: 10 TABLET ORAL at 08:50

## 2023-09-14 RX ADMIN — FERROUS SULFATE TAB 325 MG (65 MG ELEMENTAL FE) 325 MG: 325 (65 FE) TAB at 08:51

## 2023-09-14 RX ADMIN — BACLOFEN 20 MG: 10 TABLET ORAL at 20:07

## 2023-09-14 RX ADMIN — HYDRALAZINE HYDROCHLORIDE 10 MG: 20 INJECTION, SOLUTION INTRAMUSCULAR; INTRAVENOUS at 15:05

## 2023-09-14 RX ADMIN — SODIUM CHLORIDE, PRESERVATIVE FREE 10 ML: 5 INJECTION INTRAVENOUS at 08:58

## 2023-09-14 RX ADMIN — ASPIRIN 81 MG: 81 TABLET ORAL at 08:50

## 2023-09-14 ASSESSMENT — PAIN SCALES - GENERAL
PAINLEVEL_OUTOF10: 0
PAINLEVEL_OUTOF10: 2
PAINLEVEL_OUTOF10: 10
PAINLEVEL_OUTOF10: 10
PAINLEVEL_OUTOF10: 2
PAINLEVEL_OUTOF10: 0

## 2023-09-14 NOTE — ED NOTES
TRANSFER - OUT REPORT:    Verbal report given to Sudhakar Felix RN on Franky Kowalski  being transferred to bed 636 for routine progression of patient care       Report consisted of patient's Situation, Background, Assessment and   Recommendations(SBAR). Information from the following report(s) ED Encounter Summary was reviewed with the receiving nurse. [place] Fall Assessment:                           Lines:   Peripheral IV 09/13/23 Left;Posterior Forearm (Active)       Peripheral IV 09/13/23 Left Antecubital (Active)   Site Assessment Clean, dry & intact 09/13/23 2243   Line Status Blood return noted 09/13/23 2243       Peripheral IV 09/13/23 Right Hand (Active)        Opportunity for questions and clarification was provided.       Patient transported with:  Christine Crews RN  09/14/23 0004

## 2023-09-14 NOTE — PROGRESS NOTES
ACUTE OCCUPATIONAL THERAPY GOALS:   (Developed with and agreed upon by patient and/or caregiver.)  Patient will sit edge of bed with minimal assistance for at least 5 minutes in preparation for functional transfers and ADL. Patient/family will verbalize understanding of options for pre-fabricated resting hand orthosis to protect ambrosio aspect of L hand. Time frame: 3-7 visits    OCCUPATIONAL THERAPY Initial Assessment and Daily Note       OT Visit Days: 1  Acknowledge Orders  Time  OT Charge Capture  Rehab Caseload Tracker      Justin Mckee is a 65 y.o. female   PRIMARY DIAGNOSIS: Acute encephalopathy  Multiple sclerosis ([address]) [G35]  Encephalopathy acute [G93.40]  Hospice care patient [Z51.5]  Urinary tract infection without hematuria, site unspecified [N39.0]  Altered mental status, unspecified altered mental status type [R41.82]       Reason for Referral: Other lack of cordination (R27.8)  Observation: Payor: /     ASSESSMENT:     REHAB RECOMMENDATIONS:   Recommendation to date pending progress:  Setting:  Home hospice care    Equipment:    To Be Determined     ASSESSMENT:  Ms. Luzmaria Blackwell is a friendly lady with a history of MS. She is oriented to person. When asked where she was she stated \"ER. \"  She lives with her sister per patient and chart (chart also states is receiving hospice care), but sister is not currently present. She states she gets up to a wheelchair at home. She presented supine in bed with hands fisted, ankles plantar flexed, and knees extended. She was agreeable to OT. She came to edge of bed with total assistance of 2 and once sitting edge of bed sat with knees extended - noted some movement of the feet. She required maximal overall assistance to sit edge of bed and was able to extend her neck/head to look up at times. She has more control of her R hand with the L hand fisted and elbow extended across her lap.   She sat long enough for hair washing/grooming task and was assisted back

## 2023-09-14 NOTE — PROGRESS NOTES
US Guided PIV access    Called for assistance with IV access. Ultrasound was used to find the vein which was compressible and does not have any features of an artery or nerve bundle. Skin was cleaned and disinfected prior to IV puncture. Under real-time ultrasound guidance, peripheral access was obtained in the left AC using 20 G 1.75\" Peripheral IV catheter x 1 attempt. Blood return was present and IV flushed without difficulty. IV dressing applied, no immediate complications noted, and patient tolerated the procedure well.

## 2023-09-14 NOTE — PROGRESS NOTES
TRANSFER - IN REPORT:    Verbal report received from SASKIA Man on Qasim Gutiérrez  being received from ED for routine progression of patient care      Report consisted of patient's Situation, Background, Assessment and   Recommendations(SBAR). Information from the following report(s) Nurse Handoff Report was reviewed with the receiving nurse. Opportunity for questions and clarification was provided. Assessment completed upon patient's arrival to unit and care assumed.

## 2023-09-14 NOTE — PROGRESS NOTES
Limited admission done due to patient confusion and no family present. Will notify day shift in am to talk to family about med req.

## 2023-09-14 NOTE — PROGRESS NOTES
LTG: patient will tolerate safest, least restrictive oral diet without s/sx airway compromise  STG: Patient will tolerate minced/moist diet and thin liquids without overt s/sx aspiration  STG: Patient will tolerate ongoing po trials in efforts to advance diet  STG: Patient will participate in instrumental swallowing assessment to objectively assess oropharyngeal swallow if clinically indicated    OBSERVATION STATUS    SPEECH LANGUAGE PATHOLOGY: DYSPHAGIA  Initial Assessment    NAME: Frankey Rast  : 1960  MRN: [id number]    ADMISSION DATE: 2023  PRIMARY DIAGNOSIS: Acute encephalopathy  Multiple sclerosis ([address]) [G35]  Encephalopathy acute [G93.40]  Hospice care patient [Z51.5]  Urinary tract infection without hematuria, site unspecified [N39.0]  Altered mental status, unspecified altered mental status type [R41.82]    ICD-10: Treatment Diagnosis: R13.12 Dysphagia, Oropharyngeal Phase    RECOMMENDATIONS   Diet:  Diet Solids Recommendation: Minced & Moist  Liquid Consistency Recommendation: Thin    Medications: Crushed in puree as able     Compensatory Swallowing Strategies: Remain upright for 30-45 minutes after meals;Eat/Feed slowly;Upright as possible for all oral intake; Alternate solids and liquids; Total feed;Small bites/sips        Therapeutic Interventions: Diet tolerance monitoring; Therapeutic PO trials with SLP;Patient/Family education    Referrals: consider Registered Dietician consult   Patient continues to require skilled intervention: Recommend speech therapy during this hospitalization. Post acute needs to be determined. ASSESSMENT    Dysphagia Diagnosis: Moderate oral stage dysphagia  Prolonged oral prep, delayed transfer with episodes of oral holding and reduced oral clearance with inconsistent oral residue that is mild-mod in degree. Benefits from alternating bite/sip. No overt s/sx aspiration. Recommend minced/moist diet and thin liquids. Meds crushed in puree.   1:1 assist, aspiration precautions, Alternate bite/sips, small bite/sips, slow rate, check for pocketing. GENERAL    History of Present Injury/Illness: Ms. Marielle Gomez  has a past medical history of Decubitus ulcer of ischial area, Hypertension, Kidney infection, Menopause, and MS (multiple sclerosis) ([address]). . She also  has a past surgical history that includes Cholecystectomy (05/18/2020); Colonoscopy (N/A, 6/29/2020); and ERCP (5/15/2020). General Comment  Comments: patient admitted from home hospice with acute encephalopathy    Subjective: patient awake in bed. limited verbalizations (primarily 1 word), but clear speech. says she is at home. follows simple directions inconsistently          Prior Dysphagia History: none per chart   Prior instrumental assessment: none    Current Diet : Regular  Current Liquid Diet : Thin             O2 Device: None (Room air)        Pain:   Patient does not c/o pain                                          OBJECTIVE    Patient Positioning: Upright in bed   Oral Motor   Labial: Decreased rate  Lingual: Decreased rate;Decreased strength  Dentition: Adequate             Oropharyngeal Phase:   Patient presented with thin liquids by straw, oatmeal and eggs. Prolonged oral prep with intermittent oral holding. At times, needs liquid wash to prompt swallow which is effective. Decreased oral clearance ranging from mild to moderate, but clears fully with liquid rinse. No overt s/sx aspiration. Voice clear post swallow. Dysphagia Outcome and Severity Scale (ELENI)  Dysphagia Outcome Severity Scale: Level 3: Moderate dysphagia- Total assistance, supervision or strategies.  Two or more diet consistencies restricted  Interpretation of Tool: The Dysphagia Outcome and Severity Scale (ELENI) is a simple, easy-to-use, 7-point scale developed to systematically rate the functional severity of dysphagia based on objective assessment and make recommendations for diet level, independence

## 2023-09-14 NOTE — CARE COORDINATION
Spoke with sister, Parvin Adrian, via phone for initial CM assessment. Ms. Janneth Felipe lives alone in a one story home with ramped entrances. She was receiving hospice services through [facility]. Per sister, patient has a hospital bed set up in her living room. She also has a manual and electric wheelchair and a leonie lift. She says hospice was coming out 3x/ week and the sister comes daily for assistance with various things. She inquired about long-term placement. We discussed that she would have to have medicaid or private pay. She states both of those are not an option at this point and likely patient will return home on hospice. CM will continue to follow. 09/14/23 4215   Service Assessment   Patient Orientation Alert and Oriented;Self   Cognition Alert   History Provided By Child/Family; Other (see comment)  (Sister-Aretha)   Primary Caregiver Other (Comment)  (hospice)   Accompanied By/Relationship N/A   [address]; Family Members   Patient's [address] [place] [address] Avenue is: Named in [address] [facility]   PCP Verified by CM Yes   Last Visit to PCP Within last 6 months   Prior Functional Level Assistance with the following:;Bathing;Dressing;Cooking;Housework; Shopping;Mobility   Current Functional Level Other (see comment)  (awaiting therapy evals)   Can patient return to prior living arrangement Unknown at present   Ability to make needs known: Poor   Family able to assist with home care needs: Yes   Would you like for me to discuss the discharge plan with any other family members/significant others, and if so, who? Yes  (sister-Aretha)   Financial Resources Medicare   [address] History   Lives With Alone   Type of [address] [place] [address] One level   Home Access Ramped entrance   [address] bed;Electric scooter; Wheelchair-manual;Wheelchair-electric   Receives Help From Family; Other (comment)  (Hospice)   Active  No   Occupation Retired   Discharge Planning   Type of [address] Prior To Admission [address]   DME Ordered? No   Potential Assistance Purchasing Medications No   Type of Home Care Services Hospice   Patient expects to be discharged to: Unknown   One/Two Story Residence One story   History of falls? 0   Condition of Participation: Discharge Planning   The Plan for Transition of Care is related to the following treatment goals: Pending clinical course.

## 2023-09-14 NOTE — PROGRESS NOTES
REQUESTS      Culture        No growth after short period of incubation. Further results to follow after overnight incubation.    POCT Glucose    Collection Time: 09/13/23  8:42 PM   Result Value Ref Range    QC OK? 119    Culture, Blood 1    Collection Time: 09/13/23 10:32 PM    Specimen: Blood   Result Value Ref Range    Special Requests LEFT  Antecubital        Culture PENDING    Culture, Blood 1    Collection Time: 09/13/23 10:32 PM    Specimen: Blood   Result Value Ref Range    Special Requests RIGHT  FOREARM        Culture PENDING    Lactic Acid Now and in 2 Hours    Collection Time: 09/14/23  2:30 AM   Result Value Ref Range    Lactic Acid, Plasma 1.5 0.4 - 2.0 MMOL/L       Current Meds:  Current Facility-Administered Medications   Medication Dose Route Frequency    tuberculin injection 5 Units  5 Units IntraDERmal Once    hydrALAZINE (APRESOLINE) injection 10 mg  10 mg IntraVENous Q6H PRN    morphine injection 2 mg  2 mg IntraVENous Q4H PRN    amantadine (SYMMETREL) capsule 100 mg  100 mg Oral BID    ascorbic acid (VITAMIN C) tablet 250 mg  250 mg Oral Daily    aspirin EC tablet 81 mg  81 mg Oral Daily    baclofen (LIORESAL) tablet 20 mg  20 mg Oral TID    famotidine (PEPCID) tablet 20 mg  20 mg Oral Daily    vitamin B-12 (CYANOCOBALAMIN) tablet 1,000 mcg  1,000 mcg Oral Daily    Fingolimod HCl CAPS 0.5 mg- patient supplied (Patient Supplied)  0.5 mg Oral Daily    hydrOXYzine HCl (ATARAX) tablet 25 mg  25 mg Oral Q8H PRN    ferrous sulfate (IRON 325) tablet 325 mg  325 mg Oral Daily    magnesium oxide (MAG-OX) tablet 400 mg  400 mg Oral BID    melatonin tablet 4.5 mg  4.5 mg Oral Nightly PRN    polyethylene glycol (GLYCOLAX) packet 17 g  17 g Oral Daily    senna (SENOKOT) tablet 17.2 mg  2 tablet Oral BID    Vitamin D (CHOLECALCIFEROL) tablet 1,000 Units  1,000 Units Oral Daily    sodium chloride flush 0.9 % injection 5-40 mL  5-40 mL IntraVENous 2 times per day    sodium chloride flush 0.9 % injection 5-40 mL  5-40 mL IntraVENous PRN    0.9 % sodium chloride infusion   IntraVENous PRN    enoxaparin (LOVENOX) injection 40 mg  40 mg SubCUTAneous Daily    ondansetron (ZOFRAN-ODT) disintegrating tablet 4 mg  4 mg Oral Q8H PRN    Or    ondansetron (ZOFRAN) injection 4 mg  4 mg IntraVENous Q6H PRN    polyethylene glycol (GLYCOLAX) packet 17 g  17 g Oral Daily PRN    acetaminophen (TYLENOL) tablet 650 mg  650 mg Oral Q6H PRN    Or    acetaminophen (TYLENOL) suppository 650 mg  650 mg Rectal Q6H PRN    aluminum & magnesium hydroxide-simethicone (MAALOX) 200-200-20 MG/5ML suspension 30 mL  30 mL Oral Q6H PRN    cefTRIAXone (ROCEPHIN) 1,000 mg in sodium chloride 0.9 % 50 mL IVPB (mini-bag)  1,000 mg IntraVENous Q24H    0.45 % sodium chloride infusion   IntraVENous Continuous       Signed:  Calos Tobar MD    Part of this note may have been written by using a voice dictation software. The note has been proof read but may still contain some grammatical/other typographical errors.

## 2023-09-14 NOTE — H&P
Hospitalist History and Physical   Admit Date:  2023  5:42 PM   Name:  Terrell Solorio   Age:  65 y.o. Sex:  female  :  1960   MRN:  [id number]   Room:  ER06/    Presenting/Chief Complaint: Altered Mental Status     Reason(s) for Admission: Encephalopathy acute [G93.40]     History of Present Illness:   Terrell Solorio is a 65 y.o. female with medical history of MS immunosuppression, ambulatory dysfunction, chronic indwelling Vega catheter who presented with encephalopathy from home. Patient was brought in by EMS sent by her hospice nurse. Patient is encephalopathic so history is unreliable. She states \"I am dead, disconnect the circuit\". No fevers or chills vital signs are stable CBC and BMP unremarkable however urinalysis shows leukocytes and nitrite. Vega catheter was changed in the ER. Patient was given cefepime. Assessment & Plan:     Principal Problem:    Acute encephalopathy  Plan: Most likely due to UTI metabolic as well as dehydration. Treat UTI and IV fluids. Nutritional supplement. Active Problems:    Hypernatremia/hyperchloremia    Severe protein-calorie malnutrition (HCC)  Plan: Nutritional supplement, IV fluids. BMP in a.m. Gastroesophageal reflux disease without esophagitis  Plan: PPI    MS (multiple sclerosis) (HCC)  Plan: Stable. Continue disease modifying meds. Disposition  Plan: We will need at least 1-2 midnights in the hospital.  May need PT OT consult perhaps      PT/OT evals and PPD needed/ordered? Yes  Diet: ADULT DIET;  Regular  VTE prophylaxis: Lovenox  Code status: Full Code      Non-peripheral Lines and Tubes (if present):     Urinary Catheter 23 Vega (Active)            Hospital Problems:  Principal Problem:    Acute encephalopathy  Active Problems:    Severe protein-calorie malnutrition ([address])    Gastroesophageal reflux disease without esophagitis    MS (multiple sclerosis) (HCC)    Encephalopathy acute  Resolved Problems:    * No resolved hospital problems. *      Past History:     Past Medical History:   Diagnosis Date    Decubitus ulcer of ischial area     Hypertension     Kidney infection     Menopause     MS (multiple sclerosis) ([address])        Past Surgical History:   Procedure Laterality Date    CHOLECYSTECTOMY  05/18/2020    COLONOSCOPY N/A 6/29/2020    COLONOSCOPY /Room 424 performed by Leonardo [facility]les MD at [facility] ENDOSCOPY    ERCP  5/15/2020             Social History     Tobacco Use    Smoking status: Former    Smokeless tobacco: Never   Substance Use Topics    Alcohol use: Not Currently      Social History     Substance and Sexual Activity   Drug Use Not Currently       Family History   Problem Relation Age of Onset    Breast Cancer Mother         Immunization History   Administered Date(s) Administered    PPD Test 05/13/2020, 03/04/2021, 06/04/2021     Allergies   Allergen Reactions    Latex Rash    Hydrocodone-Acetaminophen Other (See Comments)     Sees dead people    Penicillins Swelling     Prior to Admit Medications:  Current Outpatient Medications   Medication Instructions    amantadine (SYMMETREL) 100 mg, Oral, 2 TIMES DAILY    ascorbic acid (VITAMIN C) 250 mg, Oral, DAILY    aspirin 81 mg, Oral, DAILY    baclofen (LIORESAL) 20 mg, Oral, 3 TIMES DAILY    cyanocobalamin 1,000 mcg, Oral, DAILY    famotidine (PEPCID) 20 mg, Oral, DAILY    Fingolimod HCl 0.5 mg, Oral, DAILY    hydrOXYzine HCl (ATARAX) 25 mg, Oral, EVERY 8 HOURS PRN    iron polysaccharides (NIFEREX) 150 mg, Oral, DAILY    magnesium oxide (MAG-OX) 400 mg, Oral, 2 TIMES DAILY    Melatonin 5 mg, Oral    naloxone 4 MG/0.1ML LIQD nasal spray Use 1 spray intranasally, then discard. Repeat with new spray every 2 min as needed for opioid overdose symptoms, alternating nostrils.     polyethylene glycol (GLYCOLAX) 17 g, Oral, DAILY    pregabalin (LYRICA) 50 mg, Oral, DAILY    saccharomyces boulardii (FLORASTOR) 250 mg, Oral, 2 TIMES DAILY    senna (SENOKOT) 8.6 MG tablet 2

## 2023-09-14 NOTE — PROGRESS NOTES
Pt's orientation worsened throughout the shift. Pt hallucinated people who weren't there and responded both appropriately and inappropriately to prompts and questions. Pt was upset at times and cried. RN sat with patient and was able to console. Sister came and brought POA files as well as pt's insurance cards. Sister stated that she had not seen pt this disoriented but had seen her confused d/t past UTIs. Will alert oncoming staff of pt's state. Hourly rounding completed and all known needs met. Bed is low, call light is in reach, and pt is encouraged to ask for assistance. Pt is resting in bed.

## 2023-09-15 PROBLEM — N39.0 UTI (URINARY TRACT INFECTION): Status: ACTIVE | Noted: 2023-09-15

## 2023-09-15 PROBLEM — R53.2 FUNCTIONAL QUADRIPLEGIA (HCC): Chronic | Status: ACTIVE | Noted: 2023-09-15

## 2023-09-15 LAB
ANION GAP SERPL CALC-SCNC: 7 MMOL/L (ref 2–11)
BASOPHILS # BLD: 0.1 K/UL (ref 0–0.2)
BASOPHILS NFR BLD: 1 % (ref 0–2)
BUN SERPL-MCNC: <1 MG/DL (ref 8–23)
CALCIUM SERPL-MCNC: 9.5 MG/DL (ref 8.3–10.4)
CHLORIDE SERPL-SCNC: 115 MMOL/L (ref 101–110)
CO2 SERPL-SCNC: 24 MMOL/L (ref 21–32)
CREAT SERPL-MCNC: 0.5 MG/DL (ref 0.6–1)
DIFFERENTIAL METHOD BLD: ABNORMAL
EOSINOPHIL # BLD: 0.4 K/UL (ref 0–0.8)
EOSINOPHIL NFR BLD: 6 % (ref 0.5–7.8)
ERYTHROCYTE [DISTWIDTH] IN BLOOD BY AUTOMATED COUNT: 12.8 % (ref 11.9–14.6)
GLUCOSE SERPL-MCNC: 104 MG/DL (ref 65–100)
HCT VFR BLD AUTO: 43.3 % (ref 35.8–46.3)
HGB BLD-MCNC: 14.8 G/DL (ref 11.7–15.4)
IMM GRANULOCYTES # BLD AUTO: 0 K/UL (ref 0–0.5)
IMM GRANULOCYTES NFR BLD AUTO: 0 % (ref 0–5)
LYMPHOCYTES # BLD: 1.6 K/UL (ref 0.5–4.6)
LYMPHOCYTES NFR BLD: 23 % (ref 13–44)
MCH RBC QN AUTO: 33 PG (ref 26.1–32.9)
MCHC RBC AUTO-ENTMCNC: 34.2 G/DL (ref 31.4–35)
MCV RBC AUTO: 96.7 FL (ref 82–102)
MM INDURATION, POC: 0 MM (ref 0–5)
MONOCYTES # BLD: 1.4 K/UL (ref 0.1–1.3)
MONOCYTES NFR BLD: 20 % (ref 4–12)
NEUTS SEG # BLD: 3.5 K/UL (ref 1.7–8.2)
NEUTS SEG NFR BLD: 50 % (ref 43–78)
NRBC # BLD: 0 K/UL (ref 0–0.2)
PLATELET # BLD AUTO: 273 K/UL (ref 150–450)
PMV BLD AUTO: 11.1 FL (ref 9.4–12.3)
POTASSIUM SERPL-SCNC: 3.2 MMOL/L (ref 3.5–5.1)
PPD, POC: NEGATIVE
RBC # BLD AUTO: 4.48 M/UL (ref 4.05–5.2)
SODIUM SERPL-SCNC: 146 MMOL/L (ref 133–143)
WBC # BLD AUTO: 6.9 K/UL (ref 4.3–11.1)

## 2023-09-15 PROCEDURE — 36415 COLL VENOUS BLD VENIPUNCTURE: CPT

## 2023-09-15 PROCEDURE — 6370000000 HC RX 637 (ALT 250 FOR IP): Performed by: INTERNAL MEDICINE

## 2023-09-15 PROCEDURE — 96367 TX/PROPH/DG ADDL SEQ IV INF: CPT

## 2023-09-15 PROCEDURE — 80048 BASIC METABOLIC PNL TOTAL CA: CPT

## 2023-09-15 PROCEDURE — 96372 THER/PROPH/DIAG INJ SC/IM: CPT

## 2023-09-15 PROCEDURE — 96376 TX/PRO/DX INJ SAME DRUG ADON: CPT

## 2023-09-15 PROCEDURE — 1100000000 HC RM PRIVATE

## 2023-09-15 PROCEDURE — 2580000003 HC RX 258: Performed by: FAMILY MEDICINE

## 2023-09-15 PROCEDURE — 6360000002 HC RX W HCPCS: Performed by: FAMILY MEDICINE

## 2023-09-15 PROCEDURE — 6360000002 HC RX W HCPCS: Performed by: INTERNAL MEDICINE

## 2023-09-15 PROCEDURE — 6370000000 HC RX 637 (ALT 250 FOR IP): Performed by: FAMILY MEDICINE

## 2023-09-15 PROCEDURE — A4216 STERILE WATER/SALINE, 10 ML: HCPCS | Performed by: FAMILY MEDICINE

## 2023-09-15 PROCEDURE — 2580000003 HC RX 258: Performed by: INTERNAL MEDICINE

## 2023-09-15 PROCEDURE — G0378 HOSPITAL OBSERVATION PER HR: HCPCS

## 2023-09-15 PROCEDURE — 96375 TX/PRO/DX INJ NEW DRUG ADDON: CPT

## 2023-09-15 PROCEDURE — 85025 COMPLETE CBC W/AUTO DIFF WBC: CPT

## 2023-09-15 PROCEDURE — 96366 THER/PROPH/DIAG IV INF ADDON: CPT

## 2023-09-15 RX ORDER — POTASSIUM CHLORIDE 7.45 MG/ML
10 INJECTION INTRAVENOUS
Status: COMPLETED | OUTPATIENT
Start: 2023-09-15 | End: 2023-09-15

## 2023-09-15 RX ORDER — AMLODIPINE BESYLATE 5 MG/1
5 TABLET ORAL DAILY
Status: DISCONTINUED | OUTPATIENT
Start: 2023-09-15 | End: 2023-09-23 | Stop reason: HOSPADM

## 2023-09-15 RX ADMIN — FERROUS SULFATE TAB 325 MG (65 MG ELEMENTAL FE) 325 MG: 325 (65 FE) TAB at 08:27

## 2023-09-15 RX ADMIN — SODIUM CHLORIDE: 4.5 INJECTION, SOLUTION INTRAVENOUS at 01:13

## 2023-09-15 RX ADMIN — HYDRALAZINE HYDROCHLORIDE 10 MG: 20 INJECTION, SOLUTION INTRAMUSCULAR; INTRAVENOUS at 08:28

## 2023-09-15 RX ADMIN — SODIUM CHLORIDE 1 MG: 9 INJECTION INTRAMUSCULAR; INTRAVENOUS; SUBCUTANEOUS at 17:11

## 2023-09-15 RX ADMIN — OXYCODONE HYDROCHLORIDE AND ACETAMINOPHEN 250 MG: 500 TABLET ORAL at 08:28

## 2023-09-15 RX ADMIN — BACLOFEN 20 MG: 10 TABLET ORAL at 13:34

## 2023-09-15 RX ADMIN — Medication 4.5 MG: at 21:03

## 2023-09-15 RX ADMIN — CEFTRIAXONE 1000 MG: 1 INJECTION, POWDER, FOR SOLUTION INTRAMUSCULAR; INTRAVENOUS at 05:38

## 2023-09-15 RX ADMIN — CYANOCOBALAMIN TAB 1000 MCG 1000 MCG: 1000 TAB at 08:27

## 2023-09-15 RX ADMIN — FAMOTIDINE 20 MG: 20 TABLET ORAL at 08:27

## 2023-09-15 RX ADMIN — ACETAMINOPHEN 650 MG: 325 TABLET ORAL at 07:29

## 2023-09-15 RX ADMIN — AMANTADINE HYDROCHLORIDE 100 MG: 100 CAPSULE ORAL at 08:30

## 2023-09-15 RX ADMIN — POTASSIUM CHLORIDE 10 MEQ: 7.46 INJECTION, SOLUTION INTRAVENOUS at 16:44

## 2023-09-15 RX ADMIN — AMANTADINE HYDROCHLORIDE 100 MG: 100 CAPSULE ORAL at 21:03

## 2023-09-15 RX ADMIN — POTASSIUM CHLORIDE 10 MEQ: 7.46 INJECTION, SOLUTION INTRAVENOUS at 15:32

## 2023-09-15 RX ADMIN — MAGNESIUM GLUCONATE 500 MG ORAL TABLET 400 MG: 500 TABLET ORAL at 08:27

## 2023-09-15 RX ADMIN — SODIUM CHLORIDE, PRESERVATIVE FREE 10 ML: 5 INJECTION INTRAVENOUS at 09:12

## 2023-09-15 RX ADMIN — SENNOSIDES 17.2 MG: 8.6 TABLET, FILM COATED ORAL at 08:28

## 2023-09-15 RX ADMIN — MEROPENEM 1000 MG: 1 INJECTION, POWDER, FOR SOLUTION INTRAVENOUS at 13:24

## 2023-09-15 RX ADMIN — BACLOFEN 20 MG: 10 TABLET ORAL at 08:27

## 2023-09-15 RX ADMIN — POTASSIUM CHLORIDE 10 MEQ: 7.46 INJECTION, SOLUTION INTRAVENOUS at 14:33

## 2023-09-15 RX ADMIN — MORPHINE SULFATE 2 MG: 2 INJECTION, SOLUTION INTRAMUSCULAR; INTRAVENOUS at 02:32

## 2023-09-15 RX ADMIN — ENOXAPARIN SODIUM 40 MG: 100 INJECTION SUBCUTANEOUS at 08:30

## 2023-09-15 RX ADMIN — POTASSIUM CHLORIDE 10 MEQ: 7.46 INJECTION, SOLUTION INTRAVENOUS at 13:24

## 2023-09-15 RX ADMIN — SODIUM CHLORIDE, PRESERVATIVE FREE 10 ML: 5 INJECTION INTRAVENOUS at 21:03

## 2023-09-15 RX ADMIN — BACLOFEN 20 MG: 10 TABLET ORAL at 21:03

## 2023-09-15 RX ADMIN — SODIUM CHLORIDE 1 MG: 9 INJECTION INTRAMUSCULAR; INTRAVENOUS; SUBCUTANEOUS at 03:04

## 2023-09-15 RX ADMIN — VITAMIN D, TAB 1000IU (100/BT) 1000 UNITS: 25 TAB at 08:27

## 2023-09-15 RX ADMIN — POLYETHYLENE GLYCOL 3350 17 G: 17 POWDER, FOR SOLUTION ORAL at 08:32

## 2023-09-15 RX ADMIN — SENNOSIDES 17.2 MG: 8.6 TABLET, FILM COATED ORAL at 21:03

## 2023-09-15 RX ADMIN — SODIUM CHLORIDE 1 MG: 9 INJECTION INTRAMUSCULAR; INTRAVENOUS; SUBCUTANEOUS at 09:06

## 2023-09-15 RX ADMIN — MEROPENEM 1000 MG: 1 INJECTION, POWDER, FOR SOLUTION INTRAVENOUS at 21:03

## 2023-09-15 RX ADMIN — ASPIRIN 81 MG: 81 TABLET ORAL at 08:28

## 2023-09-15 RX ADMIN — AMLODIPINE BESYLATE 5 MG: 5 TABLET ORAL at 12:14

## 2023-09-15 RX ADMIN — MAGNESIUM GLUCONATE 500 MG ORAL TABLET 400 MG: 500 TABLET ORAL at 21:03

## 2023-09-15 RX ADMIN — SODIUM CHLORIDE: 4.5 INJECTION, SOLUTION INTRAVENOUS at 15:47

## 2023-09-15 ASSESSMENT — PAIN - FUNCTIONAL ASSESSMENT: PAIN_FUNCTIONAL_ASSESSMENT: PREVENTS OR INTERFERES SOME ACTIVE ACTIVITIES AND ADLS

## 2023-09-15 ASSESSMENT — PAIN SCALES - GENERAL
PAINLEVEL_OUTOF10: 3
PAINLEVEL_OUTOF10: 0
PAINLEVEL_OUTOF10: 7
PAINLEVEL_OUTOF10: 5

## 2023-09-15 ASSESSMENT — PAIN DESCRIPTION - LOCATION: LOCATION: GENERALIZED

## 2023-09-15 ASSESSMENT — PAIN DESCRIPTION - ONSET: ONSET: GRADUAL

## 2023-09-15 ASSESSMENT — PAIN DESCRIPTION - PAIN TYPE: TYPE: CHRONIC PAIN

## 2023-09-15 ASSESSMENT — PAIN DESCRIPTION - FREQUENCY: FREQUENCY: CONTINUOUS

## 2023-09-15 ASSESSMENT — PAIN DESCRIPTION - DESCRIPTORS: DESCRIPTORS: DISCOMFORT

## 2023-09-15 NOTE — PROGRESS NOTES
Hospitalist Progress Note   Admit Date:  2023  5:42 PM   Name:  Piero Parrish   Age:  65 y.o. Sex:  female  :  1960   MRN:  [id number]   Room:  Critical access hospital/    Presenting/Chief Complaint: Altered Mental Status     Reason(s) for Admission: Multiple sclerosis ([address]) [G35]  Encephalopathy acute [G93.40]  Hospice care patient [Z51.5]  Urinary tract infection without hematuria, site unspecified [N39.0]  Altered mental status, unspecified altered mental status type [R41.82]     Hospital Course:       Piero Parrish is a 65 y.o. female with medical history of Multiple sclerosis on immunosuppression, indwelling ricks, prior ESBL Klebsiella UTI, bed bound, on home hospice, admitted with UTI/ sepsis/ metabolic encephalopathy.        She has been confused  CT head no acute issues      She has UTI- on rocephin  Pending urine and blood cultures       CXR- left base opacity    Ricks exchanged in ED    Discharge plans pending         Subjective & 24hr Events:     ROS not possible due to mentation,, confused, not eating or drinking per RN, making statements about wishing to be dead  to RN      I updated her sister Deanne Ariza by phone , her [address] patient lives alone with hospice 3 times weekly     She has had hospice for quite a while , has chosen to live in her home , typically more interactive and outspoken , has some memory troubles related to MS, she is seen by Dr. Stephen Never neurology but has not seen for months      FULL CODE     Assessment & Plan:     Principal Problem:    Acute encephalopathy  Plan:   Followup mentation   Still confused   Psychiatry consult- noted confused and on prior hospice        UTI:  D3 rocephin  Will ch to merrem given prior cultures   Followup cultures             Active Problems:    Severe protein-calorie malnutrition ([address])  Plan:   Nutrition consult             MS (multiple sclerosis) ([address])  Plan:   PPD  PT,OT, SLP  Continued amantadine, baclofen, fingolimid         HTN:  No home mL Oral Q6H PRN    cefTRIAXone (ROCEPHIN) 1,000 mg in sodium chloride 0.9 % 50 mL IVPB (mini-bag)  1,000 mg IntraVENous Q24H    0.45 % sodium chloride infusion   IntraVENous Continuous       Signed:  Mercedes Tatum MD    Part of this note may have been written by using a voice dictation software. The note has been proof read but may still contain some grammatical/other typographical errors.

## 2023-09-15 NOTE — PROGRESS NOTES
Pt stated she wanted to eat some lunch. Pt was fed and she ate 25% and drank 15% of tea. She tolerated all well.

## 2023-09-15 NOTE — PROGRESS NOTES
SPEECH LANGUAGE PATHOLOGY     Attempted to see for diet tolerance, however patient resting after getting meds. Will follow up at later time/date as schedule permits.          Allen Cast, [facility], [facility]

## 2023-09-15 NOTE — PLAN OF CARE
Problem: Discharge Planning  Goal: Discharge to home or other facility with appropriate resources  Outcome: Not Progressing     Problem: Pain  Goal: Verbalizes/displays adequate comfort level or baseline comfort level  Outcome: Not Progressing     Problem: Safety - Adult  Goal: Free from fall injury  Outcome: Not Progressing     Problem: Chronic Conditions and Co-morbidities  Goal: Patient's chronic conditions and co-morbidity symptoms are monitored and maintained or improved  Outcome: Not Progressing     Problem: Skin/Tissue Integrity  Goal: Absence of new skin breakdown  Description: 1. Monitor for areas of redness and/or skin breakdown  2. Assess vascular access sites hourly  3. Every 4-6 hours minimum:  Change oxygen saturation probe site  4. Every 4-6 hours:  If on nasal continuous positive airway pressure, respiratory therapy assess nares and determine need for appliance change or resting period. Outcome: Not Progressing     Problem: ABCDS Injury Assessment  Goal: Absence of physical injury  Outcome: Not Progressing     Problem: Confusion  Goal: Confusion, delirium, dementia, or psychosis is improved or at baseline  Description: INTERVENTIONS:  1. Assess for possible contributors to thought disturbance, including medications, impaired vision or hearing, underlying metabolic abnormalities, dehydration, psychiatric diagnoses, and notify attending LIP  2. [place] high risk fall precautions, as indicated  3. Provide frequent short contacts to provide reality reorientation, refocusing and direction  4. Decrease environmental stimuli, including noise as appropriate  5. Monitor and intervene to maintain adequate nutrition, hydration, elimination, sleep and activity  6. If unable to ensure safety without constant attention obtain sitter and review sitter guidelines with assigned personnel  7.  Initiate Psychosocial CNS and Spiritual Care consult, as indicated  Outcome: Not Progressing     Problem: Discharge

## 2023-09-15 NOTE — CARE COORDINATION
Received phone call from DARLYN at [facility] stating that they were not going to be able to accept the patient back under hospice services. MD feels like patient is hospice house appropriate due to her decreased intake. Referral sent to [facility]. CM will continue to follow.

## 2023-09-15 NOTE — PROGRESS NOTES
Crying while talking/swearing non stop. Very restless uncovering, pulling out IVs, and pulling at ricks this shift. The atarax and melatonin did not help. Morphine did not help. Ativan given per new order and she finally rested. Turned every 2 hours. Bed alarm on.

## 2023-09-15 NOTE — PROGRESS NOTES
Pt was only oriented to self all shift and very anxious, confused, and upset intermittently. Gave ativan twice during shift. C-SSRS was completed and patient responded yes to wishing she was dead. Attending notified and psych consulted. Pt does not have any plans of suicide. Continuing to monitor. Hourly rounding completed and all needs met. Bed is low, call light is in reach, and pt is encouraged to ask for assistance. Pt is resting in bed with no complaints.

## 2023-09-15 NOTE — CONSULTS
Comprehensive Nutrition Assessment    Type and Reason for Visit: Initial, Consult  General Nutrition Management/other reason (Hospitalists)    Nutrition Recommendations/Plan:   Meals and Snacks:  Diet: Continue current order  Nutrition Supplement Therapy:  Medical food supplement therapy:  None  pt declines all offers  Do not anticipate pt intake will improve to meet estimated needs. She does not answer questions upon attempting to discuss nutrition support. Pt is noted to be a long term hospice patient. Nutrition support if unlikely to provide any quality of life at this juncture. Malnutrition Assessment:  Malnutrition Status: Moderate malnutrition  Context: Chronic Illness  Findings of clinical characteristics of malnutrition:   Energy Intake:  Unable to assess (pt simply states she's never eaten much)  Weight Loss:  Unable to assess (current body wt is not sourced, unable to obtain a wt hx within the past year)     Body Fat Loss:  Mild body fat loss Fat Overlying Ribs, Orbital, Buccal region   Muscle Mass Loss:  Mild muscle mass loss Temples (temporalis), Hand (interosseous)  Fluid Accumulation:  Unable to assess     Strength:  Not Performed   Lower body NFPE deferred secondary B LE contractures/MS  Nutrition Assessment:  Nutrition History: Pt reports she has never eaten much, she doesn't provide any specific recall. Noted to live at home alone with hospice 3 times weekly. Pt reports her sister has always been like a mother to her when asked about po intake stating she takes care of her but offers no specific nutrition hx. Pt reports she was a chubby teenager but has never weighed greater than 100# since MS. Do You Have Any Cultural, Advent, or Ethnic Food Preferences?: No   Nutrition Background:       PMH remarkable for multiple sclerosis, indwelling catheter, prior ESBL klebsiella, UTI, bed bound, hx of pressure ulcers.   Admitted with UTI/sepsis/metabolic encephalopathy, severe protein
PSYCHIATRIC EVALUATION    Date of Service: 9/15/2023    Patient Name: Aleja Flores  Patient : 1960  Patient MRN: [id number]    Purpose:    [address] - 1 hour psychiatric diagnostic interview with labs / me  Referral Source:  referred by Dr. Turner [facility]  History  From: patient, non-family caregiver - sister, electronic medical record  Record Review: moderate    Chief Complaint:  Chief Complaint   Patient presents with    Altered Mental Status          History of Present Illness:     Aleja Flores is a 65 y.o. female with medical history of Multiple sclerosis on immunosuppression, indwelling ricks, prior ESBL Klebsiella UTI, bed bound, on home hospice, admitted with UTI/ sepsis/ metabolic encephalopathy.              Psychiatric Review Of Systems:  Sleep: up all night  Appetite:  Decreased  Current suicidal/homicidal ideations: Endorses ***  Current auditory/visual hallucinations: {Blank single:66359::\"Denies\",\"Endorses ***\"}  Depression: depressed mood, anhedonia, low energy, weight loss, psychomotor slowing, and suicidal ideation  Anxiety: sleep disturbance  Hypomania/imelda: Denies  Psychosis: {Psychosis ROS:72354}      Medications:    Current Facility-Administered Medications:     LORazepam (ATIVAN) 1 mg in sodium chloride (PF) 0.9 % 10 mL injection, 1 mg, IntraVENous, Q4H PRN, Romeo Wyatt MD, 1 mg at 09/15/23 0906    amLODIPine (NORVASC) tablet 5 mg, 5 mg, Oral, Daily, Ursula Peterson MD, 5 mg at 09/15/23 1214    potassium chloride 10 mEq/100 mL IVPB (Peripheral Line), 10 mEq, IntraVENous, Q1H, Ursula Peterson MD, Last Rate: 100 mL/hr at 09/15/23 1433, 10 mEq at 09/15/23 1433    [COMPLETED] meropenem (MERREM) 1,000 mg in sodium chloride 0.9 % 100 mL IVPB (mini-bag), 1,000 mg, IntraVENous, Once, Stopped at 09/15/23 1406 **FOLLOWED BY** meropenem (MERREM) 1,000 mg in sodium chloride 0.9 % 100 mL IVPB (mini-bag), 1,000 mg, IntraVENous, Q8H, Ursula Peterson MD    hydrALAZINE (APRESOLINE) injection
Pt presents with 2 months of dizziness, which he attributes to stress. Was seen in an ED last week where he was diagnosed with vertigo. States the medication he was given is not helping. [place] pounding headaches today while at work and was dizzy on the way home so called EMS.

## 2023-09-15 NOTE — PROGRESS NOTES
[facility]    TRACEY Heck P. and liaison spoke briefly concerning current patient status prior to entering the room. Liaison met the patient at the bedside and spoke/presented hospice benefit  with her sister via phone call. Upon arrival the patient was sleeping but was easily awaken with voice. Rukhsana Burgos stated that she is familiar with hospice benefits due to patient's previous experience. Sister Bobby Kocher [phone] stated that she is unable to care for her sister and that there isn't anyone that she is aware of that can aid in patient's care. Left perfect serv message for assigned TRACEY Heck post eval and presentation. Liaison to follow-up.     Thank you for this referral,  Russell Sheppard RN BSN  [address]  [phone] C

## 2023-09-16 LAB
ANION GAP SERPL CALC-SCNC: 6 MMOL/L (ref 2–11)
BASOPHILS # BLD: 0.1 K/UL (ref 0–0.2)
BASOPHILS NFR BLD: 1 % (ref 0–2)
BUN SERPL-MCNC: 8 MG/DL (ref 8–23)
CALCIUM SERPL-MCNC: 9.9 MG/DL (ref 8.3–10.4)
CHLORIDE SERPL-SCNC: 116 MMOL/L (ref 101–110)
CO2 SERPL-SCNC: 23 MMOL/L (ref 21–32)
CREAT SERPL-MCNC: 0.5 MG/DL (ref 0.6–1)
DIFFERENTIAL METHOD BLD: NORMAL
EOSINOPHIL # BLD: 0.3 K/UL (ref 0–0.8)
EOSINOPHIL NFR BLD: 6 % (ref 0.5–7.8)
ERYTHROCYTE [DISTWIDTH] IN BLOOD BY AUTOMATED COUNT: 13.1 % (ref 11.9–14.6)
GLUCOSE SERPL-MCNC: 93 MG/DL (ref 65–100)
HCT VFR BLD AUTO: 43.1 % (ref 35.8–46.3)
HGB BLD-MCNC: 14.1 G/DL (ref 11.7–15.4)
IMM GRANULOCYTES # BLD AUTO: 0 K/UL (ref 0–0.5)
IMM GRANULOCYTES NFR BLD AUTO: 0 % (ref 0–5)
LYMPHOCYTES # BLD: 1.1 K/UL (ref 0.5–4.6)
LYMPHOCYTES NFR BLD: 21 % (ref 13–44)
MCH RBC QN AUTO: 32.7 PG (ref 26.1–32.9)
MCHC RBC AUTO-ENTMCNC: 32.7 G/DL (ref 31.4–35)
MCV RBC AUTO: 100 FL (ref 82–102)
MM INDURATION, POC: 0 MM (ref 0–5)
MONOCYTES # BLD: 0.6 K/UL (ref 0.1–1.3)
MONOCYTES NFR BLD: 11 % (ref 4–12)
NEUTS SEG # BLD: 3 K/UL (ref 1.7–8.2)
NEUTS SEG NFR BLD: 61 % (ref 43–78)
NRBC # BLD: 0 K/UL (ref 0–0.2)
PLATELET # BLD AUTO: 280 K/UL (ref 150–450)
PMV BLD AUTO: 10.8 FL (ref 9.4–12.3)
POTASSIUM SERPL-SCNC: 3.8 MMOL/L (ref 3.5–5.1)
PPD, POC: NEGATIVE
RBC # BLD AUTO: 4.31 M/UL (ref 4.05–5.2)
SODIUM SERPL-SCNC: 145 MMOL/L (ref 133–143)
WBC # BLD AUTO: 5.1 K/UL (ref 4.3–11.1)

## 2023-09-16 PROCEDURE — 2580000003 HC RX 258: Performed by: INTERNAL MEDICINE

## 2023-09-16 PROCEDURE — A4216 STERILE WATER/SALINE, 10 ML: HCPCS | Performed by: INTERNAL MEDICINE

## 2023-09-16 PROCEDURE — 6370000000 HC RX 637 (ALT 250 FOR IP): Performed by: HOSPITALIST

## 2023-09-16 PROCEDURE — 6370000000 HC RX 637 (ALT 250 FOR IP): Performed by: INTERNAL MEDICINE

## 2023-09-16 PROCEDURE — 2580000003 HC RX 258: Performed by: FAMILY MEDICINE

## 2023-09-16 PROCEDURE — 6370000000 HC RX 637 (ALT 250 FOR IP): Performed by: FAMILY MEDICINE

## 2023-09-16 PROCEDURE — 80048 BASIC METABOLIC PNL TOTAL CA: CPT

## 2023-09-16 PROCEDURE — 85025 COMPLETE CBC W/AUTO DIFF WBC: CPT

## 2023-09-16 PROCEDURE — 36415 COLL VENOUS BLD VENIPUNCTURE: CPT

## 2023-09-16 PROCEDURE — 76937 US GUIDE VASCULAR ACCESS: CPT

## 2023-09-16 PROCEDURE — 1100000000 HC RM PRIVATE

## 2023-09-16 PROCEDURE — 51702 INSERT TEMP BLADDER CATH: CPT

## 2023-09-16 PROCEDURE — 6360000002 HC RX W HCPCS: Performed by: FAMILY MEDICINE

## 2023-09-16 PROCEDURE — 6360000002 HC RX W HCPCS: Performed by: INTERNAL MEDICINE

## 2023-09-16 RX ORDER — LORAZEPAM 0.5 MG/1
0.5 TABLET ORAL ONCE
Status: COMPLETED | OUTPATIENT
Start: 2023-09-16 | End: 2023-09-16

## 2023-09-16 RX ORDER — METHADONE HYDROCHLORIDE 10 MG/1
10 TABLET ORAL EVERY 12 HOURS
Status: DISCONTINUED | OUTPATIENT
Start: 2023-09-16 | End: 2023-09-23 | Stop reason: HOSPADM

## 2023-09-16 RX ORDER — DULOXETIN HYDROCHLORIDE 30 MG/1
30 CAPSULE, DELAYED RELEASE ORAL DAILY
Status: DISCONTINUED | OUTPATIENT
Start: 2023-09-16 | End: 2023-09-23 | Stop reason: HOSPADM

## 2023-09-16 RX ORDER — TIZANIDINE 2 MG/1
4 TABLET ORAL 2 TIMES DAILY
Status: DISCONTINUED | OUTPATIENT
Start: 2023-09-16 | End: 2023-09-23 | Stop reason: HOSPADM

## 2023-09-16 RX ORDER — DROPERIDOL 2.5 MG/ML
1.25 INJECTION, SOLUTION INTRAMUSCULAR; INTRAVENOUS EVERY 6 HOURS PRN
Status: DISCONTINUED | OUTPATIENT
Start: 2023-09-16 | End: 2023-09-23 | Stop reason: HOSPADM

## 2023-09-16 RX ORDER — BACLOFEN 10 MG/1
20 TABLET ORAL 2 TIMES DAILY
Status: DISCONTINUED | OUTPATIENT
Start: 2023-09-16 | End: 2023-09-23 | Stop reason: HOSPADM

## 2023-09-16 RX ADMIN — MORPHINE SULFATE 2 MG: 2 INJECTION, SOLUTION INTRAMUSCULAR; INTRAVENOUS at 05:06

## 2023-09-16 RX ADMIN — BACLOFEN 20 MG: 10 TABLET ORAL at 13:21

## 2023-09-16 RX ADMIN — SODIUM CHLORIDE 0.5 MG: 9 INJECTION INTRAMUSCULAR; INTRAVENOUS; SUBCUTANEOUS at 18:06

## 2023-09-16 RX ADMIN — SODIUM CHLORIDE, PRESERVATIVE FREE 10 ML: 5 INJECTION INTRAVENOUS at 20:37

## 2023-09-16 RX ADMIN — MEROPENEM 1000 MG: 1 INJECTION, POWDER, FOR SOLUTION INTRAVENOUS at 05:06

## 2023-09-16 RX ADMIN — VITAMIN D, TAB 1000IU (100/BT) 1000 UNITS: 25 TAB at 08:58

## 2023-09-16 RX ADMIN — DROPERIDOL 1.25 MG: 2.5 INJECTION, SOLUTION INTRAMUSCULAR; INTRAVENOUS at 22:31

## 2023-09-16 RX ADMIN — MEROPENEM 1000 MG: 1 INJECTION, POWDER, FOR SOLUTION INTRAVENOUS at 20:38

## 2023-09-16 RX ADMIN — HYDRALAZINE HYDROCHLORIDE 10 MG: 20 INJECTION, SOLUTION INTRAMUSCULAR; INTRAVENOUS at 20:36

## 2023-09-16 RX ADMIN — MAGNESIUM GLUCONATE 500 MG ORAL TABLET 400 MG: 500 TABLET ORAL at 08:58

## 2023-09-16 RX ADMIN — SODIUM CHLORIDE, PRESERVATIVE FREE 10 ML: 5 INJECTION INTRAVENOUS at 09:00

## 2023-09-16 RX ADMIN — ENOXAPARIN SODIUM 40 MG: 100 INJECTION SUBCUTANEOUS at 08:58

## 2023-09-16 RX ADMIN — Medication 4.5 MG: at 20:36

## 2023-09-16 RX ADMIN — AMANTADINE HYDROCHLORIDE 100 MG: 100 CAPSULE ORAL at 08:59

## 2023-09-16 RX ADMIN — SENNOSIDES 17.2 MG: 8.6 TABLET, FILM COATED ORAL at 20:37

## 2023-09-16 RX ADMIN — AMANTADINE HYDROCHLORIDE 100 MG: 100 CAPSULE ORAL at 20:37

## 2023-09-16 RX ADMIN — AMLODIPINE BESYLATE 5 MG: 5 TABLET ORAL at 08:59

## 2023-09-16 RX ADMIN — TIZANIDINE 4 MG: 2 TABLET ORAL at 20:37

## 2023-09-16 RX ADMIN — MEROPENEM 1000 MG: 1 INJECTION, POWDER, FOR SOLUTION INTRAVENOUS at 13:13

## 2023-09-16 RX ADMIN — LORAZEPAM 0.5 MG: 0.5 TABLET ORAL at 20:37

## 2023-09-16 RX ADMIN — FERROUS SULFATE TAB 325 MG (65 MG ELEMENTAL FE) 325 MG: 325 (65 FE) TAB at 08:59

## 2023-09-16 RX ADMIN — FAMOTIDINE 20 MG: 20 TABLET ORAL at 08:59

## 2023-09-16 RX ADMIN — METHADONE HYDROCHLORIDE 10 MG: 10 TABLET ORAL at 20:37

## 2023-09-16 RX ADMIN — DULOXETINE HYDROCHLORIDE 30 MG: 30 CAPSULE, DELAYED RELEASE ORAL at 14:55

## 2023-09-16 RX ADMIN — SENNOSIDES 17.2 MG: 8.6 TABLET, FILM COATED ORAL at 08:58

## 2023-09-16 RX ADMIN — ASPIRIN 81 MG: 81 TABLET ORAL at 08:59

## 2023-09-16 RX ADMIN — POLYETHYLENE GLYCOL 3350 17 G: 17 POWDER, FOR SOLUTION ORAL at 08:58

## 2023-09-16 RX ADMIN — OXYCODONE HYDROCHLORIDE AND ACETAMINOPHEN 250 MG: 500 TABLET ORAL at 08:58

## 2023-09-16 RX ADMIN — ACETAMINOPHEN 650 MG: 325 TABLET ORAL at 20:36

## 2023-09-16 RX ADMIN — BACLOFEN 20 MG: 10 TABLET ORAL at 20:37

## 2023-09-16 RX ADMIN — BACLOFEN 20 MG: 10 TABLET ORAL at 08:59

## 2023-09-16 RX ADMIN — CYANOCOBALAMIN TAB 1000 MCG 1000 MCG: 1000 TAB at 08:58

## 2023-09-16 ASSESSMENT — PAIN DESCRIPTION - ORIENTATION
ORIENTATION: RIGHT;LEFT
ORIENTATION: RIGHT

## 2023-09-16 ASSESSMENT — PAIN DESCRIPTION - DESCRIPTORS
DESCRIPTORS: SORE
DESCRIPTORS: ACHING

## 2023-09-16 ASSESSMENT — PAIN - FUNCTIONAL ASSESSMENT
PAIN_FUNCTIONAL_ASSESSMENT: PREVENTS OR INTERFERES WITH MANY ACTIVE NOT PASSIVE ACTIVITIES
PAIN_FUNCTIONAL_ASSESSMENT: PREVENTS OR INTERFERES SOME ACTIVE ACTIVITIES AND ADLS

## 2023-09-16 ASSESSMENT — PAIN DESCRIPTION - LOCATION
LOCATION: HIP
LOCATION: GENERALIZED

## 2023-09-16 ASSESSMENT — PAIN SCALES - GENERAL
PAINLEVEL_OUTOF10: 7
PAINLEVEL_OUTOF10: 3
PAINLEVEL_OUTOF10: 0
PAINLEVEL_OUTOF10: 0

## 2023-09-16 NOTE — PROGRESS NOTES
US Guided PIV access-    Ultrasound was used to find the vein which was compressible and without any ultrasound features of an artery or nerve bundle. Skin was cleaned and disinfected prior to IV puncture. Under real-time ultrasound guidance peripheral access was obtained in the left upper arm using 22 G 1.75\" Peripheral IV catheter after 2 attempt(s). Blood return was present and IV flushed without difficulty with no clinical signs of infiltration. IV dressing applied and no immediate complications noted. Patient tolerated the procedure well.

## 2023-09-16 NOTE — CARE COORDINATION
CM has been collaborating with liaisons from [facility] and [facility]) and pt sister Renee Villanueva) regarding discharge plan. CM met pt at bedside with [facility] liaison and RN and was presented with a non responding pt when name was called. Pt was unable to answer any questions presented to her. CM and liaison for [facility] left pt bedside, will try to assess later on today or tomorrow. CM walked by pt room, observed pt alert with eyes open. CM return to pt beside with pt sister on phone and pt was willing to discuss plans for discharge. Pt was agreeable with accepting (2) 12 hours shift in home versus live in. CM advised she would reach back out to [facility] and let her know of decision. CM called Mrs. Violetta Mckee and explained what pt had chosen, [facility] stated it would be beneficial if pt had live in care instead of hourly because pt sister had concerns for multiple persons in pt home when she spoke to her via phone earlier. CM along with [facility] on phone met pt at bedside to explain the differences between 24/7 care and hourly care. Pt seemed confused at times but agreeable with a live in care provider however wants to talk to sister regarding the prices, etc. CM ended call with liaison and spoke with MD and was told pt mental state may not appropriate at this time to make decision for discharge planning. Will see how pt mental state is in a day or so and try to make a decision then. CM advised pt sister of decision and she is also agreeable as feel pt is confused and will not remember this discussion. CM to continue to follow pt with supportive needs as they arise.

## 2023-09-16 NOTE — CARE COORDINATION
CM spoke with pt sister Deneen Avery) via phone [phone] regarding pt not meeting criteria for [facility] hospice house and needing a 24/7 caregiver going home with hospice (pt choice at this time). Pt sister is POA and states she is having a difficult time finding caregivers and needs help. CM provided a potential provider [facility] care) to pt sister and was given email of pt sister to send information. Information sent via email by CM to ([email or web address]). CM also reach out to [facility] [phone] and discuss pt situation. Mrs. Ariela Stubbs is planning on reaching out to pt sister and will update CM with plans of care if any arranged. CM will continue to follow pt with supportive needs.

## 2023-09-16 NOTE — PROGRESS NOTES
Liaison made a joint visit at bedside with Allyson Blanca CM to speak with patient and her sister Mayra Arreola joined us via phone. Patient was not willing to speak to TRACEY or Liaison. Allyson Blanca CM let Mayra Arreola know patient is medically ready and Mayra Arreola may have to move forward with decision for patient with regard to going home with hospice and having 24/7 Care Givers. Liaisons will continue to follow. Patrick Roberts RN  Hospice Nurse Liaison  [phone]: C  [phone]:  O

## 2023-09-17 LAB
ANION GAP SERPL CALC-SCNC: 5 MMOL/L (ref 2–11)
BASOPHILS # BLD: 0.1 K/UL (ref 0–0.2)
BASOPHILS NFR BLD: 2 % (ref 0–2)
BUN SERPL-MCNC: 11 MG/DL (ref 8–23)
CALCIUM SERPL-MCNC: 9.3 MG/DL (ref 8.3–10.4)
CHLORIDE SERPL-SCNC: 116 MMOL/L (ref 101–110)
CO2 SERPL-SCNC: 24 MMOL/L (ref 21–32)
CREAT SERPL-MCNC: 0.5 MG/DL (ref 0.6–1)
DIFFERENTIAL METHOD BLD: NORMAL
EOSINOPHIL # BLD: 0.4 K/UL (ref 0–0.8)
EOSINOPHIL NFR BLD: 7 % (ref 0.5–7.8)
ERYTHROCYTE [DISTWIDTH] IN BLOOD BY AUTOMATED COUNT: 12.9 % (ref 11.9–14.6)
GLUCOSE SERPL-MCNC: 88 MG/DL (ref 65–100)
HCT VFR BLD AUTO: 43.3 % (ref 35.8–46.3)
HGB BLD-MCNC: 14 G/DL (ref 11.7–15.4)
IMM GRANULOCYTES # BLD AUTO: 0 K/UL (ref 0–0.5)
IMM GRANULOCYTES NFR BLD AUTO: 0 % (ref 0–5)
LYMPHOCYTES # BLD: 1.8 K/UL (ref 0.5–4.6)
LYMPHOCYTES NFR BLD: 33 % (ref 13–44)
MCH RBC QN AUTO: 32.1 PG (ref 26.1–32.9)
MCHC RBC AUTO-ENTMCNC: 32.3 G/DL (ref 31.4–35)
MCV RBC AUTO: 99.3 FL (ref 82–102)
MONOCYTES # BLD: 0.6 K/UL (ref 0.1–1.3)
MONOCYTES NFR BLD: 12 % (ref 4–12)
NEUTS SEG # BLD: 2.5 K/UL (ref 1.7–8.2)
NEUTS SEG NFR BLD: 46 % (ref 43–78)
NRBC # BLD: 0 K/UL (ref 0–0.2)
PLATELET # BLD AUTO: 259 K/UL (ref 150–450)
PMV BLD AUTO: 10.6 FL (ref 9.4–12.3)
POTASSIUM SERPL-SCNC: 3.9 MMOL/L (ref 3.5–5.1)
RBC # BLD AUTO: 4.36 M/UL (ref 4.05–5.2)
SODIUM SERPL-SCNC: 145 MMOL/L (ref 133–143)
WBC # BLD AUTO: 5.3 K/UL (ref 4.3–11.1)

## 2023-09-17 PROCEDURE — 1100000000 HC RM PRIVATE

## 2023-09-17 PROCEDURE — 6360000002 HC RX W HCPCS: Performed by: INTERNAL MEDICINE

## 2023-09-17 PROCEDURE — 6370000000 HC RX 637 (ALT 250 FOR IP): Performed by: FAMILY MEDICINE

## 2023-09-17 PROCEDURE — A4216 STERILE WATER/SALINE, 10 ML: HCPCS | Performed by: INTERNAL MEDICINE

## 2023-09-17 PROCEDURE — 6370000000 HC RX 637 (ALT 250 FOR IP): Performed by: INTERNAL MEDICINE

## 2023-09-17 PROCEDURE — 2580000003 HC RX 258: Performed by: FAMILY MEDICINE

## 2023-09-17 PROCEDURE — 51702 INSERT TEMP BLADDER CATH: CPT

## 2023-09-17 PROCEDURE — 36415 COLL VENOUS BLD VENIPUNCTURE: CPT

## 2023-09-17 PROCEDURE — 6360000002 HC RX W HCPCS: Performed by: FAMILY MEDICINE

## 2023-09-17 PROCEDURE — 80048 BASIC METABOLIC PNL TOTAL CA: CPT

## 2023-09-17 PROCEDURE — 2580000003 HC RX 258: Performed by: INTERNAL MEDICINE

## 2023-09-17 PROCEDURE — 85025 COMPLETE CBC W/AUTO DIFF WBC: CPT

## 2023-09-17 RX ORDER — THIAMINE HYDROCHLORIDE 100 MG/ML
100 INJECTION, SOLUTION INTRAMUSCULAR; INTRAVENOUS DAILY
Status: DISCONTINUED | OUTPATIENT
Start: 2023-09-17 | End: 2023-09-18

## 2023-09-17 RX ADMIN — POLYETHYLENE GLYCOL 3350 17 G: 17 POWDER, FOR SOLUTION ORAL at 09:36

## 2023-09-17 RX ADMIN — SENNOSIDES 17.2 MG: 8.6 TABLET, FILM COATED ORAL at 20:23

## 2023-09-17 RX ADMIN — TIZANIDINE 4 MG: 2 TABLET ORAL at 09:37

## 2023-09-17 RX ADMIN — THIAMINE HYDROCHLORIDE 100 MG: 100 INJECTION, SOLUTION INTRAMUSCULAR; INTRAVENOUS at 09:37

## 2023-09-17 RX ADMIN — ENOXAPARIN SODIUM 40 MG: 100 INJECTION SUBCUTANEOUS at 09:37

## 2023-09-17 RX ADMIN — OXYCODONE HYDROCHLORIDE AND ACETAMINOPHEN 250 MG: 500 TABLET ORAL at 09:38

## 2023-09-17 RX ADMIN — VITAMIN D, TAB 1000IU (100/BT) 1000 UNITS: 25 TAB at 09:37

## 2023-09-17 RX ADMIN — BACLOFEN 20 MG: 10 TABLET ORAL at 20:23

## 2023-09-17 RX ADMIN — AMANTADINE HYDROCHLORIDE 100 MG: 100 CAPSULE ORAL at 20:23

## 2023-09-17 RX ADMIN — SODIUM CHLORIDE 0.5 MG: 9 INJECTION INTRAMUSCULAR; INTRAVENOUS; SUBCUTANEOUS at 00:21

## 2023-09-17 RX ADMIN — SENNOSIDES 17.2 MG: 8.6 TABLET, FILM COATED ORAL at 09:38

## 2023-09-17 RX ADMIN — METHADONE HYDROCHLORIDE 10 MG: 10 TABLET ORAL at 09:38

## 2023-09-17 RX ADMIN — TIZANIDINE 4 MG: 2 TABLET ORAL at 20:23

## 2023-09-17 RX ADMIN — METHADONE HYDROCHLORIDE 10 MG: 10 TABLET ORAL at 20:23

## 2023-09-17 RX ADMIN — AMANTADINE HYDROCHLORIDE 100 MG: 100 CAPSULE ORAL at 09:38

## 2023-09-17 RX ADMIN — SODIUM CHLORIDE, PRESERVATIVE FREE 10 ML: 5 INJECTION INTRAVENOUS at 09:39

## 2023-09-17 RX ADMIN — BACLOFEN 20 MG: 10 TABLET ORAL at 09:38

## 2023-09-17 RX ADMIN — SODIUM CHLORIDE, PRESERVATIVE FREE 10 ML: 5 INJECTION INTRAVENOUS at 20:25

## 2023-09-17 RX ADMIN — Medication 4.5 MG: at 20:23

## 2023-09-17 RX ADMIN — DULOXETINE HYDROCHLORIDE 30 MG: 30 CAPSULE, DELAYED RELEASE ORAL at 09:39

## 2023-09-17 RX ADMIN — CYANOCOBALAMIN TAB 1000 MCG 1000 MCG: 1000 TAB at 09:38

## 2023-09-17 RX ADMIN — MEROPENEM 1000 MG: 1 INJECTION, POWDER, FOR SOLUTION INTRAVENOUS at 20:23

## 2023-09-17 RX ADMIN — MEROPENEM 1000 MG: 1 INJECTION, POWDER, FOR SOLUTION INTRAVENOUS at 04:37

## 2023-09-17 RX ADMIN — MEROPENEM 1000 MG: 1 INJECTION, POWDER, FOR SOLUTION INTRAVENOUS at 14:10

## 2023-09-17 RX ADMIN — FAMOTIDINE 20 MG: 20 TABLET ORAL at 09:39

## 2023-09-17 RX ADMIN — AMLODIPINE BESYLATE 5 MG: 5 TABLET ORAL at 09:38

## 2023-09-17 NOTE — PROGRESS NOTES
It is with great sedrick we pray for your family today:        \" If you trust him to save your soul,  Why would you not also,trust him to care for your body and life? He has never refused to carry your burdens; He has never fainted under their weight. So then, be finished with anxious worry, and leave all your concerns behind,  Placing them in the hand of a gracious God. \"              Faithful God,    The weight of my illness is more than I can bear. I need your help. I place the full weight of my burdens in your loving care. Thank you for being so faithful to me.     Jamar sutherland  [phone]

## 2023-09-17 NOTE — PROGRESS NOTES
Comprehensive Nutrition Assessment    Type and Reason for Visit: Reassess  General Nutrition Management/other reason (Hospitalists)    Nutrition Recommendations/Plan:   Meals and Snacks:  Diet: Continue current order Consult SLP to reeval 9/18 as pt with increased alertness and asking why she requires modified foods. Nutrition Supplement Therapy:  Medical food supplement therapy:  Initiate Ensure Enlive three times per day (this provides 350 kcal and 20 grams protein per bottle) vanilla     Malnutrition Assessment:  Malnutrition Status: Moderate malnutrition  Context: Chronic Illness  Findings of clinical characteristics of malnutrition:   Energy Intake:  Unable to assess (pt simply states she's never eaten much)  Weight Loss:  Unable to assess (current body wt is not sourced, unable to obtain a wt hx within the past year)     Body Fat Loss:  Mild body fat loss Fat Overlying Ribs, Orbital, Buccal region   Muscle Mass Loss:  Mild muscle mass loss Temples (temporalis), Hand (interosseous)  Fluid Accumulation:  Unable to assess     Strength:  Not Performed   Lower body NFPE deferred secondary B LE contractures/MS  Nutrition Assessment:  Nutrition History: Pt reports she has never eaten much, she doesn't provide any specific recall. Noted to live at home alone with hospice 3 times weekly. Pt reports her sister has always been like a mother to her when asked about po intake stating she takes care of her but offers no specific nutrition hx. Pt reports she was a chubby teenager but has never weighed greater than 100# since MS. Do You Have Any Cultural, Latter-day, or Ethnic Food Preferences?: No   Nutrition Background:       PMH remarkable for multiple sclerosis, indwelling catheter, prior ESBL klebsiella, UTI, bed bound, hx of pressure ulcers. Admitted with UTI/sepsis/metabolic encephalopathy, severe protein calorie malnutrition, hypernatremia.     Nutrition Interval:  SLP baylee 9/14: Minced and moist. Initial RD visit, pt alert, minimally interactive. Intake was bites at best at that time and pt refusing oral supplements. Today, passing room pt was self feeding and asking for assistance with condiments. Pt c/o minced and moist foods, states she isn't ancient and needs real food. Pt again indicates at baseline she does not eat significant amounts. Today she is amendable to oral supplement - serving provided at RD visit. Thiamine started today. Current Nutrition Therapies:  ADULT DIET;  Dysphagia - Minced and Moist    Current Intake:   Average Meal Intake: 1-25% Average Supplements Intake: None Ordered      Anthropometric Measures:  Height: 5' 4\" (162.6 cm)  Current Body Wt: 117 lb 1 oz (53.1 kg) (9/13), Weight source: Not Specified  BMI: 20.1,    Admission Body Weight: 117 lb (53.1 kg) (source not specified)  Ideal Body Weight (Kg) (Calculated): 55 kg (120 lbs), 97.6 %  Usual Body Wt:  (no recent wt hx, pt recall is never >100# which is consistent with EMR values >1 year ago), Percent weight change:  unable to assess       BMI Category    Estimated Daily Nutrient Needs:  Energy (kcal/day): 4568-1243 (25-30 kcal/kg) (Kcal/kg Weight used: 53 kg Admission  Protein (g/day): 64-69 (1.2-1.3 g/kg) Weight Used: (Admission) 53 kg  Fluid (ml/day):   (1 ml/kcal)    Nutrition Diagnosis:   Inadequate oral intake related to cognitive or neurological impairment (baseline intake) as evidenced by  (more alert today, self feeding po improving, ~25% needs)  Moderate malnutrition, In context of chronic illness related to  (likely inadequate intake) as evidenced by Criteria as identified in malnutrition assessment  Nutrition Interventions:   Food and/or Nutrient Delivery: Continue Current Diet, Start Oral Nutrition Supplement     Coordination of Nutrition Care: Continue to monitor while inpatient      Goals:   Previous Goal Met: Progressing toward Goal(s)  Active Goal:  (Meet at least 50% estimated needs by RD

## 2023-09-17 NOTE — PROGRESS NOTES
Vega care given. New allevyn placed at sacrum, and a mepilex placed on left hip for noted small abrasion. Pt turned to left side.

## 2023-09-17 NOTE — PROGRESS NOTES
Hospitalist Progress Note   Admit Date:  2023  5:42 PM   Name:  Franky Kowalski   Age:  65 y.o. Sex:  female  :  1960   MRN:  [id number]   Room:  [facility]/    Presenting/Chief Complaint: Altered Mental Status     Reason(s) for Admission: Multiple sclerosis ([address]) [G35]  Encephalopathy acute [G93.40]  Hospice care patient [Z51.5]  Urinary tract infection without hematuria, site unspecified [N39.0]  Altered mental status, unspecified altered mental status type [R41.82]  UTI (urinary tract infection) [N39.0]     Hospital Course:       Franky Kowalski is a 65 y.o. female with medical history of Multiple sclerosis no longer on on immunosuppression, indwelling ricks, prior ESBL Klebsiella UTI, bed bound, on home hospice, admitted with UTI/ sepsis/ metabolic encephalopathy. She has been confused  CT head no acute issues      She has UTI- on merrem  Pending urine and blood cultures       CXR- left base opacity    Ricks exchanged in ED      She did have statements of wishing to die. Seen by psychiatry.  Home meds resumed and mentation starting to improve     Discharge plans pending         Subjective & 24hr Events:     Eating, feeding herself, more interactive , still feels that she has  and was resuscitated, states does drink wine, wants to go home with care giver Paulino Leal and hospice at discharge     Assessment & Plan:     Principal Problem:    Acute encephalopathy  Plan:   Followup mentation   A and O x 3 today   Psychiatry consulted- noted confused and on prior hospice  Resumed home meds, cymbalta, zanaflex, methadone  Add thiamine         UTI:  D3 merrem  Followup cultures -pending             Active Problems:    Severe protein-calorie malnutrition ([address])  Plan:   Nutrition consulted             MS (multiple sclerosis) ([address])  Plan:   PPD  PT,OT, SLP  Continued amantadine, baclofen  Not taking  fingolimid         HTN:  norvasc 5 mg daily   as needed hydralazine         Hypernatremia:  Needs free C) tablet 250 mg  250 mg Oral Daily    famotidine (PEPCID) tablet 20 mg  20 mg Oral Daily    vitamin B-12 (CYANOCOBALAMIN) tablet 1,000 mcg  1,000 mcg Oral Daily    melatonin tablet 4.5 mg  4.5 mg Oral Nightly PRN    polyethylene glycol (GLYCOLAX) packet 17 g  17 g Oral Daily    senna (SENOKOT) tablet 17.2 mg  2 tablet Oral BID    Vitamin D (CHOLECALCIFEROL) tablet 1,000 Units  1,000 Units Oral Daily    sodium chloride flush 0.9 % injection 5-40 mL  5-40 mL IntraVENous 2 times per day    sodium chloride flush 0.9 % injection 5-40 mL  5-40 mL IntraVENous PRN    0.9 % sodium chloride infusion   IntraVENous PRN    enoxaparin (LOVENOX) injection 40 mg  40 mg SubCUTAneous Daily    ondansetron (ZOFRAN-ODT) disintegrating tablet 4 mg  4 mg Oral Q8H PRN    Or    ondansetron (ZOFRAN) injection 4 mg  4 mg IntraVENous Q6H PRN    polyethylene glycol (GLYCOLAX) packet 17 g  17 g Oral Daily PRN    acetaminophen (TYLENOL) tablet 650 mg  650 mg Oral Q6H PRN    Or    acetaminophen (TYLENOL) suppository 650 mg  650 mg Rectal Q6H PRN    aluminum & magnesium hydroxide-simethicone (MAALOX) 200-200-20 MG/5ML suspension 30 mL  30 mL Oral Q6H PRN       Signed:  Bessy Gallegos MD    Part of this note may have been written by using a voice dictation software. The note has been proof read but may still contain some grammatical/other typographical errors.

## 2023-09-18 LAB
ANION GAP SERPL CALC-SCNC: 6 MMOL/L (ref 2–11)
BACTERIA SPEC CULT: ABNORMAL
BACTERIA SPEC CULT: ABNORMAL
BACTERIA SPEC CULT: NORMAL
BACTERIA SPEC CULT: NORMAL
BASOPHILS # BLD: 0.1 K/UL (ref 0–0.2)
BASOPHILS NFR BLD: 1 % (ref 0–2)
BUN SERPL-MCNC: 13 MG/DL (ref 8–23)
CALCIUM SERPL-MCNC: 9.2 MG/DL (ref 8.3–10.4)
CHLORIDE SERPL-SCNC: 114 MMOL/L (ref 101–110)
CO2 SERPL-SCNC: 23 MMOL/L (ref 21–32)
CREAT SERPL-MCNC: 0.5 MG/DL (ref 0.6–1)
DIFFERENTIAL METHOD BLD: ABNORMAL
EOSINOPHIL # BLD: 0.4 K/UL (ref 0–0.8)
EOSINOPHIL NFR BLD: 5 % (ref 0.5–7.8)
ERYTHROCYTE [DISTWIDTH] IN BLOOD BY AUTOMATED COUNT: 13 % (ref 11.9–14.6)
GLUCOSE SERPL-MCNC: 86 MG/DL (ref 65–100)
HCT VFR BLD AUTO: 40.4 % (ref 35.8–46.3)
HGB BLD-MCNC: 13 G/DL (ref 11.7–15.4)
IMM GRANULOCYTES # BLD AUTO: 0 K/UL (ref 0–0.5)
IMM GRANULOCYTES NFR BLD AUTO: 0 % (ref 0–5)
LYMPHOCYTES # BLD: 1.7 K/UL (ref 0.5–4.6)
LYMPHOCYTES NFR BLD: 25 % (ref 13–44)
MCH RBC QN AUTO: 32.5 PG (ref 26.1–32.9)
MCHC RBC AUTO-ENTMCNC: 32.2 G/DL (ref 31.4–35)
MCV RBC AUTO: 101 FL (ref 82–102)
MONOCYTES # BLD: 0.7 K/UL (ref 0.1–1.3)
MONOCYTES NFR BLD: 10 % (ref 4–12)
NEUTS SEG # BLD: 3.8 K/UL (ref 1.7–8.2)
NEUTS SEG NFR BLD: 59 % (ref 43–78)
NRBC # BLD: 0 K/UL (ref 0–0.2)
PLATELET # BLD AUTO: 270 K/UL (ref 150–450)
PMV BLD AUTO: 11.1 FL (ref 9.4–12.3)
POTASSIUM SERPL-SCNC: 4 MMOL/L (ref 3.5–5.1)
RBC # BLD AUTO: 4 M/UL (ref 4.05–5.2)
SERVICE CMNT-IMP: ABNORMAL
SERVICE CMNT-IMP: NORMAL
SERVICE CMNT-IMP: NORMAL
SODIUM SERPL-SCNC: 143 MMOL/L (ref 133–143)
WBC # BLD AUTO: 6.6 K/UL (ref 4.3–11.1)

## 2023-09-18 PROCEDURE — 6360000002 HC RX W HCPCS: Performed by: INTERNAL MEDICINE

## 2023-09-18 PROCEDURE — 6370000000 HC RX 637 (ALT 250 FOR IP): Performed by: FAMILY MEDICINE

## 2023-09-18 PROCEDURE — 2580000003 HC RX 258: Performed by: INTERNAL MEDICINE

## 2023-09-18 PROCEDURE — 51702 INSERT TEMP BLADDER CATH: CPT

## 2023-09-18 PROCEDURE — 36415 COLL VENOUS BLD VENIPUNCTURE: CPT

## 2023-09-18 PROCEDURE — 85025 COMPLETE CBC W/AUTO DIFF WBC: CPT

## 2023-09-18 PROCEDURE — 6360000002 HC RX W HCPCS: Performed by: FAMILY MEDICINE

## 2023-09-18 PROCEDURE — 2580000003 HC RX 258: Performed by: FAMILY MEDICINE

## 2023-09-18 PROCEDURE — 80048 BASIC METABOLIC PNL TOTAL CA: CPT

## 2023-09-18 PROCEDURE — 1100000000 HC RM PRIVATE

## 2023-09-18 PROCEDURE — 92526 ORAL FUNCTION THERAPY: CPT

## 2023-09-18 PROCEDURE — 6370000000 HC RX 637 (ALT 250 FOR IP): Performed by: INTERNAL MEDICINE

## 2023-09-18 RX ORDER — LANOLIN ALCOHOL/MO/W.PET/CERES
100 CREAM (GRAM) TOPICAL DAILY
Status: DISCONTINUED | OUTPATIENT
Start: 2023-09-19 | End: 2023-09-23 | Stop reason: HOSPADM

## 2023-09-18 RX ADMIN — AMANTADINE HYDROCHLORIDE 100 MG: 100 CAPSULE ORAL at 08:54

## 2023-09-18 RX ADMIN — OXYCODONE HYDROCHLORIDE AND ACETAMINOPHEN 250 MG: 500 TABLET ORAL at 08:51

## 2023-09-18 RX ADMIN — SENNOSIDES 17.2 MG: 8.6 TABLET, FILM COATED ORAL at 08:51

## 2023-09-18 RX ADMIN — AMANTADINE HYDROCHLORIDE 100 MG: 100 CAPSULE ORAL at 21:00

## 2023-09-18 RX ADMIN — Medication 4.5 MG: at 20:59

## 2023-09-18 RX ADMIN — MEROPENEM 1000 MG: 1 INJECTION, POWDER, FOR SOLUTION INTRAVENOUS at 21:00

## 2023-09-18 RX ADMIN — ENOXAPARIN SODIUM 40 MG: 100 INJECTION SUBCUTANEOUS at 08:51

## 2023-09-18 RX ADMIN — BACLOFEN 20 MG: 10 TABLET ORAL at 08:51

## 2023-09-18 RX ADMIN — SENNOSIDES 17.2 MG: 8.6 TABLET, FILM COATED ORAL at 21:00

## 2023-09-18 RX ADMIN — MEROPENEM 1000 MG: 1 INJECTION, POWDER, FOR SOLUTION INTRAVENOUS at 05:28

## 2023-09-18 RX ADMIN — METHADONE HYDROCHLORIDE 10 MG: 10 TABLET ORAL at 20:59

## 2023-09-18 RX ADMIN — DULOXETINE HYDROCHLORIDE 30 MG: 30 CAPSULE, DELAYED RELEASE ORAL at 08:51

## 2023-09-18 RX ADMIN — SODIUM CHLORIDE, PRESERVATIVE FREE 10 ML: 5 INJECTION INTRAVENOUS at 09:01

## 2023-09-18 RX ADMIN — TIZANIDINE 4 MG: 2 TABLET ORAL at 20:59

## 2023-09-18 RX ADMIN — AMLODIPINE BESYLATE 5 MG: 5 TABLET ORAL at 08:54

## 2023-09-18 RX ADMIN — SODIUM CHLORIDE, PRESERVATIVE FREE 10 ML: 5 INJECTION INTRAVENOUS at 21:00

## 2023-09-18 RX ADMIN — POLYETHYLENE GLYCOL 3350 17 G: 17 POWDER, FOR SOLUTION ORAL at 08:51

## 2023-09-18 RX ADMIN — MEROPENEM 1000 MG: 1 INJECTION, POWDER, FOR SOLUTION INTRAVENOUS at 12:55

## 2023-09-18 RX ADMIN — VITAMIN D, TAB 1000IU (100/BT) 1000 UNITS: 25 TAB at 08:51

## 2023-09-18 RX ADMIN — METHADONE HYDROCHLORIDE 10 MG: 10 TABLET ORAL at 08:54

## 2023-09-18 RX ADMIN — FAMOTIDINE 20 MG: 20 TABLET ORAL at 08:54

## 2023-09-18 RX ADMIN — TIZANIDINE 4 MG: 2 TABLET ORAL at 08:51

## 2023-09-18 RX ADMIN — CYANOCOBALAMIN TAB 1000 MCG 1000 MCG: 1000 TAB at 08:54

## 2023-09-18 RX ADMIN — BACLOFEN 20 MG: 10 TABLET ORAL at 20:59

## 2023-09-18 RX ADMIN — THIAMINE HYDROCHLORIDE 100 MG: 100 INJECTION, SOLUTION INTRAMUSCULAR; INTRAVENOUS at 08:54

## 2023-09-18 ASSESSMENT — PAIN SCALES - GENERAL: PAINLEVEL_OUTOF10: 0

## 2023-09-18 NOTE — ED NOTES
Physician Discharge Summary     Patient ID:  Brian Alvarado  [id number]  80 year old  1943    Admit date: 9/12/2023    Discharge date and time: 9/18/2023      Admitting Physician: Morgan Courtney MD    Discharge Physician: Boris Norwood Unsuccessful multiple IV attempts. [facility] aware of need. Provider verbal to give antibiotic.      Jose Nguyen RN  09/13/23 3480 

## 2023-09-18 NOTE — PROGRESS NOTES
LTG: patient will tolerate safest, least restrictive oral diet without s/sx airway compromise  STG: Patient will tolerate minced/moist diet and thin liquids without overt s/sx aspiration. MET   STG: Patient will tolerate ongoing po trials in efforts to advance diet. Progressing   STG: Patient will participate in instrumental swallowing assessment to objectively assess oropharyngeal swallow if clinically indicated        SPEECH LANGUAGE PATHOLOGY: DYSPHAGIA  Re-evaluation    NAME: Terrell Solorio  : 1960  MRN: [id number]    ADMISSION DATE: 2023  PRIMARY DIAGNOSIS: Acute encephalopathy  Multiple sclerosis ([address]) [G35]  Encephalopathy acute [G93.40]  Hospice care patient [Z51.5]  Urinary tract infection without hematuria, site unspecified [N39.0]  Altered mental status, unspecified altered mental status type [R41.82]  UTI (urinary tract infection) [N39.0]    ICD-10: Treatment Diagnosis: R13.12 Dysphagia, Oropharyngeal Phase    RECOMMENDATIONS   Diet:  Diet Solids Recommendation: Soft & Bite Sized  Liquid Consistency Recommendation: Thin    Medications: Crushed in puree as able     Compensatory Swallowing Strategies: Remain upright for 30-45 minutes after meals;Eat/Feed slowly;Upright as possible for all oral intake; Alternate solids and liquids;Small bites/sips; Set up assist;Assist feed        Therapeutic Interventions: Diet tolerance monitoring; Therapeutic PO trials with SLP;Patient/Family education; MBSS    Patient continues to require skilled intervention: Recommend speech therapy during this hospitalization. Post acute needs to be determined. ASSESSMENT    Dysphagia Diagnosis: Mild oral stage dysphagia;Mild pharyngeal stage dysphagia; Suspected needs further assessment  Mildly increased mastication with coarse solids but functional. Patient reports occasional sticking sensation with dry solids as meal progresses. Liquid wash appears to help, but occasional throat clearing noted.  Patient

## 2023-09-18 NOTE — PROGRESS NOTES
Hospitalist Progress Note   Admit Date:  2023  5:42 PM   Name:  Brittney Yarbrough   Age:  65 y.o. Sex:  female  :  1960   MRN:  [id number]   Room:  [facility]/    Presenting/Chief Complaint: Altered Mental Status     Reason(s) for Admission: Multiple sclerosis ([address]) [G35]  Encephalopathy acute [G93.40]  Hospice care patient [Z51.5]  Urinary tract infection without hematuria, site unspecified [N39.0]  Altered mental status, unspecified altered mental status type [R41.82]  UTI (urinary tract infection) [N39.0]     Hospital Course:       Brittney Yarbrough is a 65 y.o. female with medical history of Multiple sclerosis no longer on on immunosuppression, indwelling ricks, prior ESBL Klebsiella UTI, bed bound, on home hospice, admitted with UTI/ sepsis/ metabolic encephalopathy. She has been confused-gradually improving  CT head no acute issues      She has UTI- on merrem course  Pending urine cultures- GNR  Negative blood cultures       CXR- left base opacity    Ricks exchanged in ED      She did have statements of wishing to die. Seen by psychiatry.  Home meds resumed and mentation starting to improve     Discharge plans pending         Subjective & 24hr Events:     SLP at bedside, plans for MBS tomorrow, self feeding, A and O x 3, some recent nausea and sour taste     Assessment & Plan:     Principal Problem:    Acute encephalopathy  Plan:   Followup mentation   A and O x 3 today   Psychiatry consulted- noted confused and on prior hospice  Resumed home meds, cymbalta, zanaflex, methadone  Continued  thiamine         UTI:  D4/7 merrem  Followup cultures -pending             Active Problems:    Severe protein-calorie malnutrition ([address])  Plan:   Nutrition consulted             MS (multiple sclerosis) ([address])  Plan:   PPD  PT,OT, SLP  Continued amantadine, baclofen  Not taking  fingolimid         HTN:  norvasc 5 mg daily   as needed hydralazine         Hypernatremia:  Needs free water  Trend BMP-resolved - 450 K/uL    MPV 11.1 9.4 - 12.3 FL    nRBC 0.00 0.0 - 0.2 K/uL    Differential Type AUTOMATED      Neutrophils % 59 43 - 78 %    Lymphocytes % 25 13 - 44 %    Monocytes % 10 4.0 - 12.0 %    Eosinophils % 5 0.5 - 7.8 %    Basophils % 1 0.0 - 2.0 %    Immature Granulocytes 0 0.0 - 5.0 %    Neutrophils Absolute 3.8 1.7 - 8.2 K/UL    Lymphocytes Absolute 1.7 0.5 - 4.6 K/UL    Monocytes Absolute 0.7 0.1 - 1.3 K/UL    Eosinophils Absolute 0.4 0.0 - 0.8 K/UL    Basophils Absolute 0.1 0.0 - 0.2 K/UL    Absolute Immature Granulocyte 0.0 0.0 - 0.5 K/UL   Basic Metabolic Panel    Collection Time: 09/18/23  6:35 AM   Result Value Ref Range    Sodium 143 133 - 143 mmol/L    Potassium 4.0 3.5 - 5.1 mmol/L    Chloride 114 (H) 101 - 110 mmol/L    CO2 23 21 - 32 mmol/L    Anion Gap 6 2 - 11 mmol/L    Glucose 86 65 - 100 mg/dL    BUN 13 8 - 23 MG/DL    Creatinine 0.50 (L) 0.6 - 1.0 MG/DL    Est, Glom Filt Rate >60 >60 ml/min/1.73m2    Calcium 9.2 8.3 - 10.4 MG/DL       Current Meds:  Current Facility-Administered Medications   Medication Dose Route Frequency    thiamine (B-1) injection 100 mg  100 mg IntraVENous Daily    baclofen (LIORESAL) tablet 20 mg  20 mg Oral BID    DULoxetine (CYMBALTA) extended release capsule 30 mg  30 mg Oral Daily    LORazepam (ATIVAN) 0.5 mg in sodium chloride (PF) 0.9 % 10 mL injection  0.5 mg IntraVENous Q6H PRN    methadone (DOLOPHINE) tablet 10 mg  10 mg Oral Q12H    tiZANidine (ZANAFLEX) tablet 4 mg  4 mg Oral BID    droperidol (INAPSINE) injection 1.25 mg  1.25 mg IntraMUSCular Q6H PRN    amLODIPine (NORVASC) tablet 5 mg  5 mg Oral Daily    meropenem (MERREM) 1,000 mg in sodium chloride 0.9 % 100 mL IVPB (mini-bag)  1,000 mg IntraVENous Q8H    hydrALAZINE (APRESOLINE) injection 10 mg  10 mg IntraVENous Q6H PRN    amantadine (SYMMETREL) capsule 100 mg  100 mg Oral BID    ascorbic acid (VITAMIN C) tablet 250 mg  250 mg Oral Daily    famotidine (PEPCID) tablet 20 mg  20 mg Oral Daily    vitamin B-12

## 2023-09-19 ENCOUNTER — APPOINTMENT (OUTPATIENT)
Dept: GENERAL RADIOLOGY | Age: 63
DRG: 698 | End: 2023-09-19
Payer: MEDICARE

## 2023-09-19 LAB
ANION GAP SERPL CALC-SCNC: 7 MMOL/L (ref 2–11)
BASOPHILS # BLD: 0.1 K/UL (ref 0–0.2)
BASOPHILS NFR BLD: 1 % (ref 0–2)
BUN SERPL-MCNC: 13 MG/DL (ref 8–23)
CALCIUM SERPL-MCNC: 9.4 MG/DL (ref 8.3–10.4)
CHLORIDE SERPL-SCNC: 112 MMOL/L (ref 101–110)
CO2 SERPL-SCNC: 25 MMOL/L (ref 21–32)
CREAT SERPL-MCNC: 0.5 MG/DL (ref 0.6–1)
DIFFERENTIAL METHOD BLD: ABNORMAL
EOSINOPHIL # BLD: 0.4 K/UL (ref 0–0.8)
EOSINOPHIL NFR BLD: 7 % (ref 0.5–7.8)
ERYTHROCYTE [DISTWIDTH] IN BLOOD BY AUTOMATED COUNT: 12.6 % (ref 11.9–14.6)
GLUCOSE SERPL-MCNC: 88 MG/DL (ref 65–100)
HCT VFR BLD AUTO: 39.9 % (ref 35.8–46.3)
HGB BLD-MCNC: 12.9 G/DL (ref 11.7–15.4)
IMM GRANULOCYTES # BLD AUTO: 0 K/UL (ref 0–0.5)
IMM GRANULOCYTES NFR BLD AUTO: 0 % (ref 0–5)
LYMPHOCYTES # BLD: 2 K/UL (ref 0.5–4.6)
LYMPHOCYTES NFR BLD: 34 % (ref 13–44)
MCH RBC QN AUTO: 32.3 PG (ref 26.1–32.9)
MCHC RBC AUTO-ENTMCNC: 32.3 G/DL (ref 31.4–35)
MCV RBC AUTO: 99.8 FL (ref 82–102)
MONOCYTES # BLD: 0.6 K/UL (ref 0.1–1.3)
MONOCYTES NFR BLD: 10 % (ref 4–12)
NEUTS SEG # BLD: 2.9 K/UL (ref 1.7–8.2)
NEUTS SEG NFR BLD: 48 % (ref 43–78)
NRBC # BLD: 0 K/UL (ref 0–0.2)
PLATELET # BLD AUTO: 265 K/UL (ref 150–450)
PMV BLD AUTO: 11 FL (ref 9.4–12.3)
POTASSIUM SERPL-SCNC: 4.1 MMOL/L (ref 3.5–5.1)
RBC # BLD AUTO: 4 M/UL (ref 4.05–5.2)
SODIUM SERPL-SCNC: 144 MMOL/L (ref 133–143)
WBC # BLD AUTO: 5.9 K/UL (ref 4.3–11.1)

## 2023-09-19 PROCEDURE — 1100000000 HC RM PRIVATE

## 2023-09-19 PROCEDURE — 2500000003 HC RX 250 WO HCPCS: Performed by: INTERNAL MEDICINE

## 2023-09-19 PROCEDURE — 6370000000 HC RX 637 (ALT 250 FOR IP): Performed by: INTERNAL MEDICINE

## 2023-09-19 PROCEDURE — 2580000003 HC RX 258: Performed by: INTERNAL MEDICINE

## 2023-09-19 PROCEDURE — 85025 COMPLETE CBC W/AUTO DIFF WBC: CPT

## 2023-09-19 PROCEDURE — 36415 COLL VENOUS BLD VENIPUNCTURE: CPT

## 2023-09-19 PROCEDURE — 74230 X-RAY XM SWLNG FUNCJ C+: CPT

## 2023-09-19 PROCEDURE — 80048 BASIC METABOLIC PNL TOTAL CA: CPT

## 2023-09-19 PROCEDURE — 2580000003 HC RX 258: Performed by: FAMILY MEDICINE

## 2023-09-19 PROCEDURE — 51702 INSERT TEMP BLADDER CATH: CPT

## 2023-09-19 PROCEDURE — 6360000002 HC RX W HCPCS: Performed by: FAMILY MEDICINE

## 2023-09-19 PROCEDURE — 6360000002 HC RX W HCPCS: Performed by: INTERNAL MEDICINE

## 2023-09-19 PROCEDURE — 92611 MOTION FLUOROSCOPY/SWALLOW: CPT

## 2023-09-19 PROCEDURE — 6370000000 HC RX 637 (ALT 250 FOR IP): Performed by: FAMILY MEDICINE

## 2023-09-19 RX ADMIN — FAMOTIDINE 20 MG: 20 TABLET ORAL at 09:00

## 2023-09-19 RX ADMIN — TIZANIDINE 4 MG: 2 TABLET ORAL at 20:49

## 2023-09-19 RX ADMIN — Medication 4.5 MG: at 20:49

## 2023-09-19 RX ADMIN — OXYCODONE HYDROCHLORIDE AND ACETAMINOPHEN 250 MG: 500 TABLET ORAL at 09:00

## 2023-09-19 RX ADMIN — TIZANIDINE 4 MG: 2 TABLET ORAL at 09:00

## 2023-09-19 RX ADMIN — ENOXAPARIN SODIUM 40 MG: 100 INJECTION SUBCUTANEOUS at 09:07

## 2023-09-19 RX ADMIN — AMANTADINE HYDROCHLORIDE 100 MG: 100 CAPSULE ORAL at 09:00

## 2023-09-19 RX ADMIN — DULOXETINE HYDROCHLORIDE 30 MG: 30 CAPSULE, DELAYED RELEASE ORAL at 09:00

## 2023-09-19 RX ADMIN — SODIUM CHLORIDE, PRESERVATIVE FREE 10 ML: 5 INJECTION INTRAVENOUS at 09:11

## 2023-09-19 RX ADMIN — BARIUM SULFATE 30 ML: 400 PASTE ORAL at 10:07

## 2023-09-19 RX ADMIN — SENNOSIDES 17.2 MG: 8.6 TABLET, FILM COATED ORAL at 09:00

## 2023-09-19 RX ADMIN — AMLODIPINE BESYLATE 5 MG: 5 TABLET ORAL at 09:00

## 2023-09-19 RX ADMIN — SENNOSIDES 17.2 MG: 8.6 TABLET, FILM COATED ORAL at 20:49

## 2023-09-19 RX ADMIN — VITAMIN D, TAB 1000IU (100/BT) 1000 UNITS: 25 TAB at 09:00

## 2023-09-19 RX ADMIN — CEFTRIAXONE 1000 MG: 1 INJECTION, POWDER, FOR SOLUTION INTRAMUSCULAR; INTRAVENOUS at 11:42

## 2023-09-19 RX ADMIN — AMANTADINE HYDROCHLORIDE 100 MG: 100 CAPSULE ORAL at 20:49

## 2023-09-19 RX ADMIN — POLYETHYLENE GLYCOL 3350 17 G: 17 POWDER, FOR SOLUTION ORAL at 08:59

## 2023-09-19 RX ADMIN — CYANOCOBALAMIN TAB 1000 MCG 1000 MCG: 1000 TAB at 09:00

## 2023-09-19 RX ADMIN — BARIUM SULFATE 30 ML: 0.81 POWDER, FOR SUSPENSION ORAL at 10:08

## 2023-09-19 RX ADMIN — METHADONE HYDROCHLORIDE 10 MG: 10 TABLET ORAL at 20:50

## 2023-09-19 RX ADMIN — MEROPENEM 1000 MG: 1 INJECTION, POWDER, FOR SOLUTION INTRAVENOUS at 05:17

## 2023-09-19 RX ADMIN — METHADONE HYDROCHLORIDE 10 MG: 10 TABLET ORAL at 09:00

## 2023-09-19 RX ADMIN — BACLOFEN 20 MG: 10 TABLET ORAL at 09:00

## 2023-09-19 RX ADMIN — BACLOFEN 20 MG: 10 TABLET ORAL at 20:49

## 2023-09-19 RX ADMIN — Medication 100 MG: at 09:00

## 2023-09-19 RX ADMIN — SODIUM CHLORIDE, PRESERVATIVE FREE 10 ML: 5 INJECTION INTRAVENOUS at 20:52

## 2023-09-19 ASSESSMENT — PAIN SCALES - GENERAL: PAINLEVEL_OUTOF10: 0

## 2023-09-19 NOTE — PROGRESS NOTES
Comprehensive Nutrition Assessment    Type and Reason for Visit: Reassess  General Nutrition Management/other reason (Hospitalists)    Nutrition Recommendations/Plan:   Meals and Snacks:  Diet: Continue current order   Nutrition Supplement Therapy:  Medical food supplement therapy:  Continue Ensure Enlive three times per day (this provides 350 kcal and 20 grams protein per bottle) vanilla     Malnutrition Assessment:  Malnutrition Status: Moderate malnutrition  Context: Chronic Illness  Findings of clinical characteristics of malnutrition:   Energy Intake:  Unable to assess (pt simply states she's never eaten much)  Weight Loss:  Unable to assess (current body wt is not sourced, unable to obtain a wt hx within the past year)     Body Fat Loss:  Mild body fat loss Fat Overlying Ribs, Orbital, Buccal region   Muscle Mass Loss:  Mild muscle mass loss Temples (temporalis), Hand (interosseous)  Fluid Accumulation:  Unable to assess     Strength:  Not Performed   Lower body NFPE deferred secondary B LE contractures/MS  Nutrition Assessment:  Nutrition History: Pt reports she has never eaten much, she doesn't provide any specific recall. Noted to live at home alone with hospice 3 times weekly. Pt reports her sister has always been like a mother to her when asked about po intake stating she takes care of her but offers no specific nutrition hx. Pt reports she was a chubby teenager but has never weighed greater than 100# since MS. Do You Have Any Cultural, Gnosticism, or Ethnic Food Preferences?: No   Nutrition Background:       PMH remarkable for multiple sclerosis, indwelling catheter, prior ESBL klebsiella, UTI, bed bound, hx of pressure ulcers. Admitted with UTI/sepsis/metabolic encephalopathy, severe protein calorie malnutrition, hypernatremia. Nutrition Interval:  SLP eval 9/14: Minced and moist.  Initial RD visit, pt alert, minimally interactive.  Intake was bites at best at that time and pt refusing

## 2023-09-19 NOTE — PROGRESS NOTES
LTG: patient will tolerate safest, least restrictive oral diet without s/sx airway compromise. Met 23  STG: Patient will tolerate ongoing po trials in efforts to advance diet. Progressing . Met 23  STG: Patient will participate in instrumental swallowing assessment to objectively assess oropharyngeal swallow if clinically indicated. Met 23      SPEECH LANGUAGE PATHOLOGY: MODIFIED BARIUM SWALLOW STUDY  Initial Study and discharge    NAME: Kisha Bernal  : 1960  MRN: [id number]    ADMISSION DATE: 2023  PRIMARY DIAGNOSIS: Acute encephalopathy  Multiple sclerosis ([address]) [G35]  Encephalopathy acute [G93.40]  Hospice care patient [Z51.5]  Urinary tract infection without hematuria, site unspecified [N39.0]  Altered mental status, unspecified altered mental status type [R41.82]  UTI (urinary tract infection) [N39.0]  Date of Eval: 2023    ICD-10: Treatment Diagnosis: R13.12 Dysphagia, Oropharyngeal Phase    RECOMMENDATIONS   Diet:  regular consistency  thin liquids     Medications: as tolerated     Compensatory Swallowing Strategies/Modifications:  Fully awake/alert  Upright for all PO  Small bites and sips  Slow rate of PO intake  Remain upright for 20-30 min after any PO   Interventions/Recommendations/Referrals:  Further assessment of esophageal phase of swallow if symptoms persist   Patient continues to require skilled intervention:   No. Please re-consult if new concerns arise. ASSESSMENT    Patient presents with oropharyngeal swallow within functional limits. Timely swallow of all consistencies with adequate hyolaryngeal elevation and excursion. No laryngeal penetration or aspiration observed and no pharyngeal residue after swallow. GENERAL    History of Present Injury/Illness: Ms. Castillo Smoker  has a past medical history of Decubitus ulcer of ischial area, Hypertension, Kidney infection, Menopause, and MS (multiple sclerosis) ([address] . She also  has a past surgical history that

## 2023-09-19 NOTE — CARE COORDINATION
[facility] follow up about care plan. Pt prefers to go home at discharge. She lives at home with periodic assistance from a hired caregiver Liz Sow and her sister, Breonna Nation. [facility] is willing to assist pt with 24 hour care but It is now questionable that pt can afford to pay for 24 hour care. [facility] will not admit pt for care without definitive confirmation that she will have 24 hour care. [facility] is willing to talk with pt/sister about care support. Pt states that she is talking with [facility] about re-admittimg her for home hospice care but per [facility] staff they will not be able to accept pt back for care.  also became aware today that pt has an open APS case assigned to [address] [place] [address], cell #[phone]. Spoke with APS worker. Pt had told APS that she refused out of home placement prior to this admission. Shared with APS worker that pt's care recommendation at this time is that she needs 24/7 care assistance but pt has refused placement preferring to return home. APS worker plans to talk with pt's sister about pt care needs and her role in pt's care planning as her HCPOA (document on file in paper chart on floor). Forwarded copy of HCPOA to APS as requested. Await update from APS about pt care planning.

## 2023-09-19 NOTE — PROGRESS NOTES
Hospitalist Progress Note   Admit Date:  2023  5:42 PM   Name:  Jorgito Mederos   Age:  65 y.o. Sex:  female  :  1960   MRN:  [id number]   Room:  [facility]    Presenting/Chief Complaint: Altered Mental Status     Reason(s) for Admission: Multiple sclerosis ([address]) [G35]  Encephalopathy acute [G93.40]  Hospice care patient [Z51.5]  Urinary tract infection without hematuria, site unspecified [N39.0]  Altered mental status, unspecified altered mental status type [R41.82]  UTI (urinary tract infection) [N39.0]     Hospital Course:       Jorgito Mederos is a 65 y.o. female with medical history of Multiple sclerosis no longer on on immunosuppression, indwelling ricks, prior ESBL Klebsiella UTI, bed bound, on home hospice, admitted with UTI/ sepsis/ metabolic encephalopathy. She has been confused-since resolved   CT head no acute issues      She has UTI- on merrem course  urine culture resulted as proteus   Negative blood cultures       CXR- left base opacity    Ricks exchanged in ED      She did have statements of wishing to die. Seen by psychiatry.  Home meds resumed and mentation starting to improve     Passed [facility]    Discharge plans pending   Lives alone  Bedbound  Unable to resume hospice  Has some in home care giver  Sister Asha DIXON        Subjective & 24hr Events:     More alert, ok with plans for home at discharge, less confused, eating,     Assessment & Plan:     Principal Problem:    Acute encephalopathy  Plan:   Followup mentation   A and O x 3 today   Psychiatry consulted- noted confused and on prior hospice  Resumed home meds, cymbalta, zanaflex, methadone  Continued  thiamine         UTI:  D6/7 antibiotics  Change merrem to rocephin for final 2 doses           Active Problems:    Severe protein-calorie malnutrition ([address])  Plan:   Nutrition consulted             MS (multiple sclerosis) ([address])  Plan:   PPD  PT,OT, SLP  Continued amantadine, baclofen  Not taking  fingolimid         HTN:  norvasc 31.4 - 35.0 g/dL    RDW 12.6 11.9 - 14.6 %    Platelets 478 429 - 520 K/uL    MPV 11.0 9.4 - 12.3 FL    nRBC 0.00 0.0 - 0.2 K/uL    Differential Type AUTOMATED      Neutrophils % 48 43 - 78 %    Lymphocytes % 34 13 - 44 %    Monocytes % 10 4.0 - 12.0 %    Eosinophils % 7 0.5 - 7.8 %    Basophils % 1 0.0 - 2.0 %    Immature Granulocytes 0 0.0 - 5.0 %    Neutrophils Absolute 2.9 1.7 - 8.2 K/UL    Lymphocytes Absolute 2.0 0.5 - 4.6 K/UL    Monocytes Absolute 0.6 0.1 - 1.3 K/UL    Eosinophils Absolute 0.4 0.0 - 0.8 K/UL    Basophils Absolute 0.1 0.0 - 0.2 K/UL    Absolute Immature Granulocyte 0.0 0.0 - 0.5 K/UL   Basic Metabolic Panel    Collection Time: 09/19/23  6:01 AM   Result Value Ref Range    Sodium 144 (H) 133 - 143 mmol/L    Potassium 4.1 3.5 - 5.1 mmol/L    Chloride 112 (H) 101 - 110 mmol/L    CO2 25 21 - 32 mmol/L    Anion Gap 7 2 - 11 mmol/L    Glucose 88 65 - 100 mg/dL    BUN 13 8 - 23 MG/DL    Creatinine 0.50 (L) 0.6 - 1.0 MG/DL    Est, Glom Filt Rate >60 >60 ml/min/1.73m2    Calcium 9.4 8.3 - 10.4 MG/DL       Current Meds:  Current Facility-Administered Medications   Medication Dose Route Frequency    cefTRIAXone (ROCEPHIN) 1,000 mg in sodium chloride 0.9 % 50 mL IVPB (mini-bag)  1,000 mg IntraVENous Q24H    thiamine tablet 100 mg  100 mg Oral Daily    baclofen (LIORESAL) tablet 20 mg  20 mg Oral BID    DULoxetine (CYMBALTA) extended release capsule 30 mg  30 mg Oral Daily    LORazepam (ATIVAN) 0.5 mg in sodium chloride (PF) 0.9 % 10 mL injection  0.5 mg IntraVENous Q6H PRN    methadone (DOLOPHINE) tablet 10 mg  10 mg Oral Q12H    tiZANidine (ZANAFLEX) tablet 4 mg  4 mg Oral BID    droperidol (INAPSINE) injection 1.25 mg  1.25 mg IntraMUSCular Q6H PRN    amLODIPine (NORVASC) tablet 5 mg  5 mg Oral Daily    hydrALAZINE (APRESOLINE) injection 10 mg  10 mg IntraVENous Q6H PRN    amantadine (SYMMETREL) capsule 100 mg  100 mg Oral BID    ascorbic acid (VITAMIN C) tablet 250 mg  250 mg Oral Daily    famotidine

## 2023-09-19 NOTE — PROGRESS NOTES
Liaison assessed patient and spoke to RN about patient condition. Patient remains bedbound and needs max assist to turn on her bed. Liaison spoke with hospice provider who states [facility] would follow patient if she was placed in a SNF, but we cannot accept her at home with hospice without 24 hour caregivers. Liaison updated Shruthi Caceres RN  Hospice Nurse Liaison  [phone]: C  [phone]:  O

## 2023-09-20 LAB
ANION GAP SERPL CALC-SCNC: 3 MMOL/L (ref 2–11)
BASOPHILS # BLD: 0.1 K/UL (ref 0–0.2)
BASOPHILS NFR BLD: 1 % (ref 0–2)
BUN SERPL-MCNC: 17 MG/DL (ref 8–23)
CALCIUM SERPL-MCNC: 9.2 MG/DL (ref 8.3–10.4)
CHLORIDE SERPL-SCNC: 113 MMOL/L (ref 101–110)
CO2 SERPL-SCNC: 29 MMOL/L (ref 21–32)
CREAT SERPL-MCNC: 0.5 MG/DL (ref 0.6–1)
DIFFERENTIAL METHOD BLD: ABNORMAL
EOSINOPHIL # BLD: 0.5 K/UL (ref 0–0.8)
EOSINOPHIL NFR BLD: 8 % (ref 0.5–7.8)
ERYTHROCYTE [DISTWIDTH] IN BLOOD BY AUTOMATED COUNT: 12.6 % (ref 11.9–14.6)
GLUCOSE SERPL-MCNC: 100 MG/DL (ref 65–100)
HCT VFR BLD AUTO: 41.1 % (ref 35.8–46.3)
HGB BLD-MCNC: 13.6 G/DL (ref 11.7–15.4)
IMM GRANULOCYTES # BLD AUTO: 0 K/UL (ref 0–0.5)
IMM GRANULOCYTES NFR BLD AUTO: 0 % (ref 0–5)
LYMPHOCYTES # BLD: 1.6 K/UL (ref 0.5–4.6)
LYMPHOCYTES NFR BLD: 27 % (ref 13–44)
MCH RBC QN AUTO: 32.8 PG (ref 26.1–32.9)
MCHC RBC AUTO-ENTMCNC: 33.1 G/DL (ref 31.4–35)
MCV RBC AUTO: 99 FL (ref 82–102)
MM INDURATION, POC: 0 MM (ref 0–5)
MM INDURATION, POC: 0 MM (ref 0–5)
MONOCYTES # BLD: 0.6 K/UL (ref 0.1–1.3)
MONOCYTES NFR BLD: 10 % (ref 4–12)
NEUTS SEG # BLD: 3.2 K/UL (ref 1.7–8.2)
NEUTS SEG NFR BLD: 54 % (ref 43–78)
NRBC # BLD: 0 K/UL (ref 0–0.2)
PLATELET # BLD AUTO: 268 K/UL (ref 150–450)
PMV BLD AUTO: 11.5 FL (ref 9.4–12.3)
POTASSIUM SERPL-SCNC: 4.1 MMOL/L (ref 3.5–5.1)
PPD, POC: NEGATIVE
PPD, POC: NEGATIVE
RBC # BLD AUTO: 4.15 M/UL (ref 4.05–5.2)
SODIUM SERPL-SCNC: 145 MMOL/L (ref 133–143)
WBC # BLD AUTO: 5.9 K/UL (ref 4.3–11.1)

## 2023-09-20 PROCEDURE — 6360000002 HC RX W HCPCS: Performed by: FAMILY MEDICINE

## 2023-09-20 PROCEDURE — 6370000000 HC RX 637 (ALT 250 FOR IP): Performed by: FAMILY MEDICINE

## 2023-09-20 PROCEDURE — 80048 BASIC METABOLIC PNL TOTAL CA: CPT

## 2023-09-20 PROCEDURE — 2580000003 HC RX 258: Performed by: INTERNAL MEDICINE

## 2023-09-20 PROCEDURE — 1100000000 HC RM PRIVATE

## 2023-09-20 PROCEDURE — 85025 COMPLETE CBC W/AUTO DIFF WBC: CPT

## 2023-09-20 PROCEDURE — 36415 COLL VENOUS BLD VENIPUNCTURE: CPT

## 2023-09-20 PROCEDURE — 51702 INSERT TEMP BLADDER CATH: CPT

## 2023-09-20 PROCEDURE — 6360000002 HC RX W HCPCS: Performed by: INTERNAL MEDICINE

## 2023-09-20 PROCEDURE — 6370000000 HC RX 637 (ALT 250 FOR IP): Performed by: INTERNAL MEDICINE

## 2023-09-20 PROCEDURE — 2580000003 HC RX 258: Performed by: FAMILY MEDICINE

## 2023-09-20 RX ADMIN — SODIUM CHLORIDE, PRESERVATIVE FREE 10 ML: 5 INJECTION INTRAVENOUS at 20:42

## 2023-09-20 RX ADMIN — METHADONE HYDROCHLORIDE 10 MG: 10 TABLET ORAL at 08:32

## 2023-09-20 RX ADMIN — OXYCODONE HYDROCHLORIDE AND ACETAMINOPHEN 250 MG: 500 TABLET ORAL at 08:32

## 2023-09-20 RX ADMIN — BACLOFEN 20 MG: 10 TABLET ORAL at 08:32

## 2023-09-20 RX ADMIN — ENOXAPARIN SODIUM 40 MG: 100 INJECTION SUBCUTANEOUS at 08:33

## 2023-09-20 RX ADMIN — METHADONE HYDROCHLORIDE 10 MG: 10 TABLET ORAL at 20:41

## 2023-09-20 RX ADMIN — CEFTRIAXONE 1000 MG: 1 INJECTION, POWDER, FOR SOLUTION INTRAMUSCULAR; INTRAVENOUS at 11:30

## 2023-09-20 RX ADMIN — DULOXETINE HYDROCHLORIDE 30 MG: 30 CAPSULE, DELAYED RELEASE ORAL at 08:32

## 2023-09-20 RX ADMIN — TIZANIDINE 4 MG: 2 TABLET ORAL at 08:31

## 2023-09-20 RX ADMIN — FAMOTIDINE 20 MG: 20 TABLET ORAL at 08:32

## 2023-09-20 RX ADMIN — AMANTADINE HYDROCHLORIDE 100 MG: 100 CAPSULE ORAL at 08:33

## 2023-09-20 RX ADMIN — AMLODIPINE BESYLATE 5 MG: 5 TABLET ORAL at 08:33

## 2023-09-20 RX ADMIN — SENNOSIDES 17.2 MG: 8.6 TABLET, FILM COATED ORAL at 20:41

## 2023-09-20 RX ADMIN — AMANTADINE HYDROCHLORIDE 100 MG: 100 CAPSULE ORAL at 20:41

## 2023-09-20 RX ADMIN — TIZANIDINE 4 MG: 2 TABLET ORAL at 20:41

## 2023-09-20 RX ADMIN — SENNOSIDES 17.2 MG: 8.6 TABLET, FILM COATED ORAL at 08:32

## 2023-09-20 RX ADMIN — SODIUM CHLORIDE: 9 INJECTION, SOLUTION INTRAVENOUS at 11:31

## 2023-09-20 RX ADMIN — ONDANSETRON 4 MG: 4 TABLET, ORALLY DISINTEGRATING ORAL at 08:41

## 2023-09-20 RX ADMIN — BACLOFEN 20 MG: 10 TABLET ORAL at 20:41

## 2023-09-20 RX ADMIN — Medication 4.5 MG: at 20:40

## 2023-09-20 RX ADMIN — SODIUM CHLORIDE, PRESERVATIVE FREE 10 ML: 5 INJECTION INTRAVENOUS at 08:37

## 2023-09-20 RX ADMIN — Medication 100 MG: at 08:32

## 2023-09-20 RX ADMIN — CYANOCOBALAMIN TAB 1000 MCG 1000 MCG: 1000 TAB at 08:32

## 2023-09-20 RX ADMIN — VITAMIN D, TAB 1000IU (100/BT) 1000 UNITS: 25 TAB at 08:32

## 2023-09-20 ASSESSMENT — PAIN SCALES - GENERAL: PAINLEVEL_OUTOF10: 0

## 2023-09-20 NOTE — PROGRESS NOTES
Hospitalist Progress Note   Admit Date:  2023  5:42 PM   Name:  Justin Mckee   Age:  65 y.o. Sex:  female  :  1960   MRN:  [id number]   Room:  [facility]/    Presenting/Chief Complaint: Altered Mental Status     Reason(s) for Admission: Multiple sclerosis ([address]) [G35]  Encephalopathy acute [G93.40]  Hospice care patient [Z51.5]  Urinary tract infection without hematuria, site unspecified [N39.0]  Altered mental status, unspecified altered mental status type [R41.82]  UTI (urinary tract infection) [N39.0]     Hospital Course:       Justin Mckee is a 65 y.o. female with medical history of Multiple sclerosis no longer on on immunosuppression, indwelling ricks, prior ESBL Klebsiella UTI, bed bound, on home hospice, admitted with UTI/ sepsis/ metabolic encephalopathy. She has been confused-gradually improving  CT head no acute issues      She has UTI- on merrem course  Pending urine cultures- GNR  Negative blood cultures       CXR- left base opacity    Ricks exchanged in ED      She did have statements of wishing to die. Seen by psychiatry. Home meds resumed and mentation starting to improve     Discharge plans pending         Subjective & 24hr Events:     Pt is , A and O x 3, some recent nausea and sour taste.      Assessment & Plan:     Principal Problem:    Acute encephalopathy  Plan:   Followup mentation   A and O x 3 today   Psychiatry consulted- noted confused and on prior hospice  Resumed home meds, cymbalta, zanaflex, methadone  Continued  thiamine         UTI:  U/A positive   completed merrem  Blood cultures positive for Proteus and pending sensitivities      Active Problems:    Severe protein-calorie malnutrition ([address])  Nutrition consulted, appreciate recommendations            MS (multiple sclerosis) ([address])  Plan:   PPD  PT,OT, SLP  Continued amantadine, baclofen  Not taking  fingolimid         HTN:  norvasc 5 mg daily   as needed hydralazine

## 2023-09-20 NOTE — PROGRESS NOTES
Occupational Therapy Note:    Attempted to see patient this PM for occupational therapy treatment  session. Patient politely refused therapy, reported she was in too much pain to participate. Educated pt on importance of mobility, encouraged her to participated in bed level ADLs but pt still refused. Will follow and re-attempt as schedule permits/patient available.  Thank you,    Vince Heller, OT    Rehab Caseload Tracker

## 2023-09-20 NOTE — PROGRESS NOTES
Pt stated to tech that she needs an MRI. After further questioning from RN, pt stated that \"something is not right\" and she needs an MRI because she hasn't had one in \"over 25 years. \" Nurse informed pt that she had an MRI in 2021 and that there were no serious abnormalities of concern. Pt stated that her \"equilibrium is off. I couldn't even stand up if I tried. \" Pt is disoriented and anxious. Continuing to monitor.

## 2023-09-20 NOTE — PROGRESS NOTES
Pt has been disoriented intermittently throughout this shift. See previous note. Pt had very small bowel movement. Another small open area on right buttocks appeared. Pt is consistently moving to right side even after repositioning. Two dressings applied. Pt Hourly rounding completed and all needs met. Bed is low, call light is in reach, and pt is encouraged to ask for assistance. Pt is resting in bed with no complaints.

## 2023-09-20 NOTE — PROGRESS NOTES
Pt  bed in lowest position, wheels locked, and call light within reach. Hourly rounds completed. VSS. Pt had complaints of itchiness. Rn offered to get pt order for medication to stop itching. Pt declined. Pt turned and changed during shift. IV is patent and dressing is clean, dry, and intact.

## 2023-09-21 PROCEDURE — 2580000003 HC RX 258: Performed by: FAMILY MEDICINE

## 2023-09-21 PROCEDURE — 6360000002 HC RX W HCPCS: Performed by: FAMILY MEDICINE

## 2023-09-21 PROCEDURE — 1100000000 HC RM PRIVATE

## 2023-09-21 PROCEDURE — 6370000000 HC RX 637 (ALT 250 FOR IP): Performed by: INTERNAL MEDICINE

## 2023-09-21 PROCEDURE — 6370000000 HC RX 637 (ALT 250 FOR IP): Performed by: FAMILY MEDICINE

## 2023-09-21 RX ADMIN — AMANTADINE HYDROCHLORIDE 100 MG: 100 CAPSULE ORAL at 08:30

## 2023-09-21 RX ADMIN — DULOXETINE HYDROCHLORIDE 30 MG: 30 CAPSULE, DELAYED RELEASE ORAL at 08:24

## 2023-09-21 RX ADMIN — FAMOTIDINE 20 MG: 20 TABLET ORAL at 08:24

## 2023-09-21 RX ADMIN — BACLOFEN 20 MG: 10 TABLET ORAL at 08:24

## 2023-09-21 RX ADMIN — BACLOFEN 20 MG: 10 TABLET ORAL at 22:02

## 2023-09-21 RX ADMIN — Medication 100 MG: at 08:24

## 2023-09-21 RX ADMIN — AMLODIPINE BESYLATE 5 MG: 5 TABLET ORAL at 08:24

## 2023-09-21 RX ADMIN — OXYCODONE HYDROCHLORIDE AND ACETAMINOPHEN 250 MG: 500 TABLET ORAL at 08:23

## 2023-09-21 RX ADMIN — ENOXAPARIN SODIUM 40 MG: 100 INJECTION SUBCUTANEOUS at 08:30

## 2023-09-21 RX ADMIN — POLYETHYLENE GLYCOL 3350 17 G: 17 POWDER, FOR SOLUTION ORAL at 08:23

## 2023-09-21 RX ADMIN — TIZANIDINE 4 MG: 2 TABLET ORAL at 22:02

## 2023-09-21 RX ADMIN — CYANOCOBALAMIN TAB 1000 MCG 1000 MCG: 1000 TAB at 08:24

## 2023-09-21 RX ADMIN — SODIUM CHLORIDE, PRESERVATIVE FREE 10 ML: 5 INJECTION INTRAVENOUS at 08:32

## 2023-09-21 RX ADMIN — SODIUM CHLORIDE, PRESERVATIVE FREE 10 ML: 5 INJECTION INTRAVENOUS at 22:02

## 2023-09-21 RX ADMIN — AMANTADINE HYDROCHLORIDE 100 MG: 100 CAPSULE ORAL at 22:01

## 2023-09-21 RX ADMIN — METHADONE HYDROCHLORIDE 10 MG: 10 TABLET ORAL at 08:24

## 2023-09-21 RX ADMIN — SENNOSIDES 17.2 MG: 8.6 TABLET, FILM COATED ORAL at 08:23

## 2023-09-21 RX ADMIN — METHADONE HYDROCHLORIDE 10 MG: 10 TABLET ORAL at 22:01

## 2023-09-21 RX ADMIN — TIZANIDINE 4 MG: 2 TABLET ORAL at 08:23

## 2023-09-21 RX ADMIN — VITAMIN D, TAB 1000IU (100/BT) 1000 UNITS: 25 TAB at 08:24

## 2023-09-21 RX ADMIN — SENNOSIDES 17.2 MG: 8.6 TABLET, FILM COATED ORAL at 22:02

## 2023-09-21 NOTE — PROGRESS NOTES
Pt was oriented x3 this morning. Stated she was at [facility]. Pt is intermittently disoriented to her medical condition. Pt refused repositioning twice today. Pt requested miralax with her coffee this morning. Hourly rounding completed and all needs met. Bed is low, call light is in reach, and pt is encouraged to ask for assistance. Pt is resting in bed with no complaints. Will give report to oncoming shift.

## 2023-09-21 NOTE — PROGRESS NOTES
Comprehensive Nutrition Assessment    Type and Reason for Visit: Reassess  General Nutrition Management/other reason (Hospitalists)    Nutrition Recommendations/Plan:   Meals and Snacks:  Diet: Continue current order Pt encouraged to increase fluid intake. Water bottle w meals. Nutrition Supplement Therapy:  Medical food supplement therapy:  Continue Ensure Enlive three times per day (this provides 350 kcal and 20 grams protein per bottle) vanilla     Malnutrition Assessment:  Malnutrition Status: Moderate malnutrition  Context: Chronic Illness  Findings of clinical characteristics of malnutrition:   Energy Intake:  Unable to assess (pt simply states she's never eaten much)  Weight Loss:  Unable to assess (current body wt is not sourced, unable to obtain a wt hx within the past year)     Body Fat Loss:  Mild body fat loss Fat Overlying Ribs, Orbital, Buccal region   Muscle Mass Loss:  Mild muscle mass loss Temples (temporalis), Hand (interosseous)  Fluid Accumulation:  Unable to assess     Strength:  Not Performed   Lower body NFPE deferred secondary B LE contractures/MS  Nutrition Assessment:  Nutrition History: Pt reports she has never eaten much, she doesn't provide any specific recall. Noted to live at home alone with hospice 3 times weekly. Pt reports her sister has always been like a mother to her when asked about po intake stating she takes care of her but offers no specific nutrition hx. Pt reports she was a chubby teenager but has never weighed greater than 100# since MS. Do You Have Any Cultural, Presybeterian, or Ethnic Food Preferences?: No   Nutrition Background:       PMH remarkable for multiple sclerosis, indwelling catheter, prior ESBL klebsiella, UTI, bed bound, hx of pressure ulcers. Admitted with UTI/sepsis/metabolic encephalopathy, severe protein calorie malnutrition, hypernatremia.     Nutrition Interval:  SLP eval 9/14: Minced and moist.  Initial RD visit, pt alert, minimally as identified in malnutrition assessment  Nutrition Interventions:   Food and/or Nutrient Delivery: Continue Current Diet, Continue Oral Nutrition Supplement     Coordination of Nutrition Care: Interdisciplinary Rounds      Goals:   Previous Goal Met: Progressing toward Goal(s)  Active Goal:  (Meet at least 50% estimated needs by RD follow-up)       Nutrition Monitoring and Evaluation:      Food/Nutrient Intake Outcomes: Food and Nutrient Intake, Supplement Intake  Physical Signs/Symptoms Outcomes: Biochemical Data, GI Status, Meal Time Behavior, Weight    Discharge Planning:    Continue current diet, Continue Oral Nutrition Supplement    EDUARDO MADSEN, RD

## 2023-09-21 NOTE — PROGRESS NOTES
Hospitalist Progress Note   Admit Date:  2023  5:42 PM   Name:  Edna Davis   Age:  65 y.o. Sex:  female  :  1960   MRN:  [id number]   Room:  [facility]/    Presenting/Chief Complaint: Altered Mental Status     Reason(s) for Admission: Multiple sclerosis ([facility]) [G35]  Encephalopathy acute [G93.40]  Hospice care patient [Z51.5]  Urinary tract infection without hematuria, site unspecified [N39.0]  Altered mental status, unspecified altered mental status type [R41.82]  UTI (urinary tract infection) [N39.0]     Hospital Course:       Edna Davis is a 65 y.o. female with medical history of Multiple sclerosis no longer on on immunosuppression, indwelling ricks, prior ESBL Klebsiella UTI, bed bound, on home hospice, admitted with UTI/ sepsis/ metabolic encephalopathy. She has been confused-gradually improving  CT head no acute issues      She has UTI- on merrem course  Pending urine cultures- GNR  Negative blood cultures       CXR- left base opacity    Ricks exchanged in ED      She did have statements of wishing to die. Seen by psychiatry. Home meds resumed and mentation starting to improve     Discharge plans pending         Subjective & 24hr Events:     Pt is , A and O x 3, some recent nausea and sour taste.      Assessment & Plan:     Principal Problem:    Acute encephalopathy  Plan:   Followup mentation   A and O x 3 today   Psychiatry consulted- noted confused and on prior hospice  Resumed home meds, cymbalta, zanaflex, methadone  Continued  thiamine         UTI:  U/A positive   completed merrem  Blood cultures positive for Proteus and pending sensitivities      Active Problems:    Severe protein-calorie malnutrition ([facility])  Nutrition consulted, appreciate recommendations            MS (multiple sclerosis) ([facility])  Plan:   PPD  PT,OT, SLP  Continued amantadine, baclofen  Not taking  fingolimid         HTN:  Holding norvasc 5 mg daily   as needed hydralazine

## 2023-09-21 NOTE — PROGRESS NOTES
Physician Progress Note      PATIENTNemiah Alpers  [facility] #:                  [id number]  :                       1960  ADMIT DATE:       2023 5:42 PM  [address] [facility] DATE:  RESPONDING  PROVIDER #:        Shorty Kumar MD          QUERY TEXT:    Patient admitted with UTI. Noted documentation of sepsis in progress notes. In   order to support the diagnosis of sepsis, please include additional clinical   indicators in your documentation. Or please document if the diagnosis of   sepsis has been ruled out after further study    The medical record reflects the following:  Risk Factors: UTI, indwelling ricks, bedbound, hospice, MS immunosuppression  Clinical Indicators: IM PN  ?admitted with UTI/ sepsis/ metabolic   encephalopathy? Temp 98.5 (36.9), , RR 29, WBC 6.1, LA 1  Treatment: IV Rocephin and IV Cefepime  Options provided:  -- Sepsis present as evidenced by, Please document evidence. -- Sepsis was ruled out after study  -- Other - I will add my own diagnosis  -- Disagree - Not applicable / Not valid  -- Disagree - Clinically unable to determine / Unknown  -- Refer to Clinical Documentation Reviewer    PROVIDER RESPONSE TEXT:    Sepsis was ruled out after study. Query created by: Luann Park on 2023 4:07 PM      QUERY TEXT:    Patient admitted with UTI. Noted documentation of severe protein-calorie   malnutrition in H&P  and Moderate malnutrition in RD consult 09/15. If   possible, please document in progress notes and discharge summary if you are   evaluating and /or treating any of the following: The medical record reflects the following:  Risk Factors: Hospice, bedbound, encephalopathy    Clinical Indicators: H&P  notes ? Severe protein-calorie malnutrition. ?    RD consult 09/15 notes ? Malnutrition Status:  Moderate malnutrition  Body Fat Loss:  Mild body fat loss Fat Overlying Ribs, Orbital, Buccal region  Muscle Mass Loss:  Mild muscle mass loss Temples (temporalis), Hand   (interosseous)  BMI: 20.07 kg/m? Treatment: Nutritional supplement, dietician consult, IV fluids  Options provided:  -- Moderate malnutrition confirmed and Severe protein-calorie malnutrition   ruled out  -- Severe protein-calorie malnutrition present as evidenced by, Please   document evidence. -- Other - I will add my own diagnosis  -- Disagree - Not applicable / Not valid  -- Disagree - Clinically unable to determine / Unknown  -- Refer to Clinical Documentation Reviewer    PROVIDER RESPONSE TEXT:    Severe protein-calorie malnutrition is present as evidenced by    Query created by: Triston Higgins on 9/18/2023 4:12 PM      QUERY TEXT:    Pt admitted with UTI. Pt noted to have chronic indwelling urinary catheter. If possible, please document in the progress notes and discharge summary if   you are evaluating and/or treating any of the following: The medical record reflects the following:  Risk Factors: Chronic ricks, MS immunosuppression, bedbound  Clinical Indicators: Patient admitted with UTI, started on Rocephin, noted to   have chronic indwelling ricks. 9/13 urine culture with 10-50K proteus   mirabilis. Treatment: Rocephin, cultures  Options provided:  -- UTI due to chronic indwelling urinary catheter  -- Other - I will add my own diagnosis  -- Disagree - Not applicable / Not valid  -- Disagree - Clinically unable to determine / Unknown  -- Refer to Clinical Documentation Reviewer    PROVIDER RESPONSE TEXT:    UTI is due to the chronic indwelling urinary catheter. Query created by:  Triston Higgins on 9/18/2023 4:21 PM      Electronically signed by:  Shorty Dawson MD 9/21/2023 7:18 AM

## 2023-09-21 NOTE — PROGRESS NOTES
Pt  bed in lowest position, wheels locked, and call light within reach. Hourly rounds completed. VSS. IV is patent and dressing is clean, dry, and intact. Pt able to answer orientation questions correctly but had periods of intermittent confusion. Pt turned throughout shift.

## 2023-09-22 LAB
ANION GAP SERPL CALC-SCNC: 5 MMOL/L (ref 2–11)
BASOPHILS # BLD: 0.1 K/UL (ref 0–0.2)
BASOPHILS NFR BLD: 1 % (ref 0–2)
BUN SERPL-MCNC: 20 MG/DL (ref 8–23)
CALCIUM SERPL-MCNC: 9.4 MG/DL (ref 8.3–10.4)
CHLORIDE SERPL-SCNC: 113 MMOL/L (ref 101–110)
CO2 SERPL-SCNC: 27 MMOL/L (ref 21–32)
CREAT SERPL-MCNC: 0.5 MG/DL (ref 0.6–1)
DIFFERENTIAL METHOD BLD: ABNORMAL
EOSINOPHIL # BLD: 0.8 K/UL (ref 0–0.8)
EOSINOPHIL NFR BLD: 13 % (ref 0.5–7.8)
ERYTHROCYTE [DISTWIDTH] IN BLOOD BY AUTOMATED COUNT: 12.3 % (ref 11.9–14.6)
GLUCOSE SERPL-MCNC: 89 MG/DL (ref 65–100)
HCT VFR BLD AUTO: 41.4 % (ref 35.8–46.3)
HGB BLD-MCNC: 13.4 G/DL (ref 11.7–15.4)
IMM GRANULOCYTES # BLD AUTO: 0 K/UL (ref 0–0.5)
IMM GRANULOCYTES NFR BLD AUTO: 0 % (ref 0–5)
LYMPHOCYTES # BLD: 1.5 K/UL (ref 0.5–4.6)
LYMPHOCYTES NFR BLD: 23 % (ref 13–44)
MCH RBC QN AUTO: 32.4 PG (ref 26.1–32.9)
MCHC RBC AUTO-ENTMCNC: 32.4 G/DL (ref 31.4–35)
MCV RBC AUTO: 100 FL (ref 82–102)
MONOCYTES # BLD: 0.6 K/UL (ref 0.1–1.3)
MONOCYTES NFR BLD: 9 % (ref 4–12)
NEUTS SEG # BLD: 3.5 K/UL (ref 1.7–8.2)
NEUTS SEG NFR BLD: 54 % (ref 43–78)
NRBC # BLD: 0 K/UL (ref 0–0.2)
PHOSPHATE SERPL-MCNC: 3.3 MG/DL (ref 2.3–3.7)
PLATELET # BLD AUTO: 256 K/UL (ref 150–450)
PMV BLD AUTO: 11.4 FL (ref 9.4–12.3)
POTASSIUM SERPL-SCNC: 4 MMOL/L (ref 3.5–5.1)
RBC # BLD AUTO: 4.14 M/UL (ref 4.05–5.2)
SODIUM SERPL-SCNC: 145 MMOL/L (ref 133–143)
WBC # BLD AUTO: 6.6 K/UL (ref 4.3–11.1)

## 2023-09-22 PROCEDURE — 6370000000 HC RX 637 (ALT 250 FOR IP): Performed by: FAMILY MEDICINE

## 2023-09-22 PROCEDURE — 1100000000 HC RM PRIVATE

## 2023-09-22 PROCEDURE — 84100 ASSAY OF PHOSPHORUS: CPT

## 2023-09-22 PROCEDURE — 80048 BASIC METABOLIC PNL TOTAL CA: CPT

## 2023-09-22 PROCEDURE — 36415 COLL VENOUS BLD VENIPUNCTURE: CPT

## 2023-09-22 PROCEDURE — 85025 COMPLETE CBC W/AUTO DIFF WBC: CPT

## 2023-09-22 PROCEDURE — 6360000002 HC RX W HCPCS: Performed by: FAMILY MEDICINE

## 2023-09-22 PROCEDURE — 6370000000 HC RX 637 (ALT 250 FOR IP): Performed by: INTERNAL MEDICINE

## 2023-09-22 PROCEDURE — 2580000003 HC RX 258: Performed by: FAMILY MEDICINE

## 2023-09-22 RX ADMIN — BACLOFEN 20 MG: 10 TABLET ORAL at 08:44

## 2023-09-22 RX ADMIN — BACLOFEN 20 MG: 10 TABLET ORAL at 20:35

## 2023-09-22 RX ADMIN — SENNOSIDES 17.2 MG: 8.6 TABLET, FILM COATED ORAL at 08:44

## 2023-09-22 RX ADMIN — SODIUM CHLORIDE, PRESERVATIVE FREE 10 ML: 5 INJECTION INTRAVENOUS at 08:45

## 2023-09-22 RX ADMIN — DULOXETINE HYDROCHLORIDE 30 MG: 30 CAPSULE, DELAYED RELEASE ORAL at 08:44

## 2023-09-22 RX ADMIN — AMANTADINE HYDROCHLORIDE 100 MG: 100 CAPSULE ORAL at 20:35

## 2023-09-22 RX ADMIN — AMANTADINE HYDROCHLORIDE 100 MG: 100 CAPSULE ORAL at 08:45

## 2023-09-22 RX ADMIN — METHADONE HYDROCHLORIDE 10 MG: 10 TABLET ORAL at 20:35

## 2023-09-22 RX ADMIN — TIZANIDINE 4 MG: 2 TABLET ORAL at 08:43

## 2023-09-22 RX ADMIN — POLYETHYLENE GLYCOL 3350 17 G: 17 POWDER, FOR SOLUTION ORAL at 08:43

## 2023-09-22 RX ADMIN — FAMOTIDINE 20 MG: 20 TABLET ORAL at 08:45

## 2023-09-22 RX ADMIN — METHADONE HYDROCHLORIDE 10 MG: 10 TABLET ORAL at 08:44

## 2023-09-22 RX ADMIN — ENOXAPARIN SODIUM 40 MG: 100 INJECTION SUBCUTANEOUS at 08:45

## 2023-09-22 RX ADMIN — Medication 100 MG: at 08:44

## 2023-09-22 RX ADMIN — TIZANIDINE 4 MG: 2 TABLET ORAL at 20:35

## 2023-09-22 RX ADMIN — CYANOCOBALAMIN TAB 1000 MCG 1000 MCG: 1000 TAB at 08:44

## 2023-09-22 RX ADMIN — Medication 4.5 MG: at 20:35

## 2023-09-22 RX ADMIN — SODIUM CHLORIDE, PRESERVATIVE FREE 10 ML: 5 INJECTION INTRAVENOUS at 20:36

## 2023-09-22 RX ADMIN — VITAMIN D, TAB 1000IU (100/BT) 1000 UNITS: 25 TAB at 08:44

## 2023-09-22 RX ADMIN — OXYCODONE HYDROCHLORIDE AND ACETAMINOPHEN 250 MG: 500 TABLET ORAL at 08:44

## 2023-09-22 RX ADMIN — ONDANSETRON 4 MG: 4 TABLET, ORALLY DISINTEGRATING ORAL at 16:17

## 2023-09-22 NOTE — CARE COORDINATION
Case management is following for d/c planning. Spoke with APS worker Adan Duarted [phone]. She stated at this time, they are unable to do anything as patient has decision making capacity and can make her decisions. She stated she would be speaking with her supervisor for further direction. APS worker should be contacted prior to patient's d/c.     Spoke with patient's sister, Lara Dorsey. She stated that she is aware of patient's situation, but wants to honor her wishes of wanting to be at home. She stated she is able to check on her a few times per week and patient has a person that checks on her multiple times per day - cooks, cleans, provides self care, etc.  Omer Ramsay stated she has offered multiple times for patient to come live with her, but patient is not willing to do that at present time. Spoke with patient with RN at bedside. Patient is understanding that her safety is our main concern. Patient is aware that [facility] has discharged her. Patient stated she is willing to go to Rehab since she has been in the hospital to assist with transition home, but ultimately wants to be home and also confirmed that she does not want to live with her sister at this time. Patient is understanding that the rehab situation would only be temporary. I spoke with [facility] who stated they would be willing to help patient and met with patient today 9.22.2023. Patient asked the liaison to return on Saturday 9.23 to meet with her again. [facility] liaison - Chaim [place] [phone]. PT/OT re-consulted to assess for short term rehab potential prior to returning home with hospice. Patient is willing to go to [facility] or a facility [place]      CM following for therapy notes and will sent rehab referral.

## 2023-09-22 NOTE — PROGRESS NOTES
Occupational Therapy Note:    Attempted to see patient this PM for occupational therapy treatment  session. Began session, patient washed face with SBA in bed with HOB elevated. Pt then reported she was nauseous and requested therapist end the session. Therapist provided pt with emesis bag, alerted RN. Will follow and re-attempt as schedule permits/patient available.  Thank you,    Domi Marquis, OT    Rehab Caseload Tracker

## 2023-09-22 NOTE — PROGRESS NOTES
Pt has been turned every two hours. She had one bowel movement tonight. Pt stated she was not in any pain tonight. Bed locked and lowered, call light within reach, all known needs met at this time.

## 2023-09-22 NOTE — PROGRESS NOTES
Hospitalist Progress Note   Admit Date:  2023  5:42 PM   Name:  Terrell Solorio   Age:  65 y.o. Sex:  female  :  1960   MRN:  [id number]   Room:  [facility]/    Presenting/Chief Complaint: Altered Mental Status     Reason(s) for Admission: Multiple sclerosis ([address]) [G35]  Encephalopathy acute [G93.40]  Hospice care patient [Z51.5]  Urinary tract infection without hematuria, site unspecified [N39.0]  Altered mental status, unspecified altered mental status type [R41.82]  UTI (urinary tract infection) [N39.0]     Hospital Course:       Terrell Solorio is a 65 y.o. female with medical history of Multiple sclerosis no longer on on immunosuppression, indwelling ricks, prior ESBL Klebsiella UTI, bed bound, on home hospice, admitted with UTI/ sepsis/ metabolic encephalopathy. She has been confused-gradually improving  CT head no acute issues      She has UTI- on merrem course  Pending urine cultures- GNR  Negative blood cultures       CXR- left base opacity    Ricks exchanged in ED      She did have statements of wishing to die. Seen by psychiatry. Home meds resumed and mentation starting to improve     Discharge plans pending         Subjective & 24hr Events:     Pt is , AA and O x 3.      Assessment & Plan:     Principal Problem:    Acute encephalopathy  Resolved  A and O x 3 today   Psychiatry consulted-and patient is competent to make decisions  Resumed home meds, cymbalta, zanaflex, methadone  Continued  thiamine         UTI:  U/A positive   completed merrem  Blood cultures positive for Proteus and pending sensitivities      Active Problems:    Severe protein-calorie malnutrition ([address])  Nutrition following, appreciate recommendations            MS (multiple sclerosis) ([address])  Continued amantadine, baclofen  Not taking  fingolimid         HTN:  Blood pressures well controlled  Holding norvasc 5 mg daily       Hypernatremia:  resolved      Hypokalemia:  Resolved    [facility] stay for 2-3

## 2023-09-22 NOTE — PROGRESS NOTES
Pt was oriented x4 this morning. Pt refused bath. Changed top sheet, blanket, and gown. Q2 turns completed unless pt refused. Pt is likely to be discharged soon, as pt is medically stable, either to home (with hospice if possible) or rehab, if possible. Pt expressed a strong desire to go to short term rehab. Pt complained of nausea during PT, which cut PT short. RN gave PO zofran. Pt responded well to medication. Pt had medium sized, formed bowel movement at 1530. Assessed small wounds on buttocks. Wounds do not look worse. Dressings clean, dry, and intact. Hourly rounding completed and all needs met. Bed is low, call light is in reach, and pt is encouraged to ask for assistance. Pt is resting in bed with no complaints. Will give bedside report to oncoming nurse.

## 2023-09-23 VITALS
OXYGEN SATURATION: 96 % | WEIGHT: 108.03 LBS | DIASTOLIC BLOOD PRESSURE: 81 MMHG | HEIGHT: 64 IN | HEART RATE: 78 BPM | BODY MASS INDEX: 18.44 KG/M2 | TEMPERATURE: 97.7 F | SYSTOLIC BLOOD PRESSURE: 120 MMHG | RESPIRATION RATE: 16 BRPM

## 2023-09-23 LAB
ANION GAP SERPL CALC-SCNC: 4 MMOL/L (ref 2–11)
BASOPHILS # BLD: 0.1 K/UL (ref 0–0.2)
BASOPHILS NFR BLD: 1 % (ref 0–2)
BUN SERPL-MCNC: 20 MG/DL (ref 8–23)
CALCIUM SERPL-MCNC: 9.3 MG/DL (ref 8.3–10.4)
CHLORIDE SERPL-SCNC: 111 MMOL/L (ref 101–110)
CO2 SERPL-SCNC: 29 MMOL/L (ref 21–32)
CREAT SERPL-MCNC: 0.4 MG/DL (ref 0.6–1)
DIFFERENTIAL METHOD BLD: ABNORMAL
EOSINOPHIL # BLD: 0.9 K/UL (ref 0–0.8)
EOSINOPHIL NFR BLD: 15 % (ref 0.5–7.8)
ERYTHROCYTE [DISTWIDTH] IN BLOOD BY AUTOMATED COUNT: 12.2 % (ref 11.9–14.6)
GLUCOSE SERPL-MCNC: 91 MG/DL (ref 65–100)
HCT VFR BLD AUTO: 37.8 % (ref 35.8–46.3)
HGB BLD-MCNC: 12.5 G/DL (ref 11.7–15.4)
IMM GRANULOCYTES # BLD AUTO: 0 K/UL (ref 0–0.5)
IMM GRANULOCYTES NFR BLD AUTO: 0 % (ref 0–5)
LYMPHOCYTES # BLD: 1.6 K/UL (ref 0.5–4.6)
LYMPHOCYTES NFR BLD: 26 % (ref 13–44)
MCH RBC QN AUTO: 32.6 PG (ref 26.1–32.9)
MCHC RBC AUTO-ENTMCNC: 33.1 G/DL (ref 31.4–35)
MCV RBC AUTO: 98.4 FL (ref 82–102)
MONOCYTES # BLD: 0.6 K/UL (ref 0.1–1.3)
MONOCYTES NFR BLD: 10 % (ref 4–12)
NEUTS SEG # BLD: 2.8 K/UL (ref 1.7–8.2)
NEUTS SEG NFR BLD: 48 % (ref 43–78)
NRBC # BLD: 0 K/UL (ref 0–0.2)
PHOSPHATE SERPL-MCNC: 3.5 MG/DL (ref 2.3–3.7)
PLATELET # BLD AUTO: 247 K/UL (ref 150–450)
PMV BLD AUTO: 11.8 FL (ref 9.4–12.3)
POTASSIUM SERPL-SCNC: 4.1 MMOL/L (ref 3.5–5.1)
RBC # BLD AUTO: 3.84 M/UL (ref 4.05–5.2)
SODIUM SERPL-SCNC: 144 MMOL/L (ref 133–143)
WBC # BLD AUTO: 5.9 K/UL (ref 4.3–11.1)

## 2023-09-23 PROCEDURE — 51702 INSERT TEMP BLADDER CATH: CPT

## 2023-09-23 PROCEDURE — 6370000000 HC RX 637 (ALT 250 FOR IP): Performed by: FAMILY MEDICINE

## 2023-09-23 PROCEDURE — 84100 ASSAY OF PHOSPHORUS: CPT

## 2023-09-23 PROCEDURE — 85025 COMPLETE CBC W/AUTO DIFF WBC: CPT

## 2023-09-23 PROCEDURE — 80048 BASIC METABOLIC PNL TOTAL CA: CPT

## 2023-09-23 PROCEDURE — 36415 COLL VENOUS BLD VENIPUNCTURE: CPT

## 2023-09-23 PROCEDURE — 2580000003 HC RX 258: Performed by: FAMILY MEDICINE

## 2023-09-23 PROCEDURE — 97164 PT RE-EVAL EST PLAN CARE: CPT

## 2023-09-23 PROCEDURE — 6360000002 HC RX W HCPCS: Performed by: FAMILY MEDICINE

## 2023-09-23 PROCEDURE — 6370000000 HC RX 637 (ALT 250 FOR IP): Performed by: INTERNAL MEDICINE

## 2023-09-23 PROCEDURE — 97530 THERAPEUTIC ACTIVITIES: CPT

## 2023-09-23 RX ORDER — LANOLIN ALCOHOL/MO/W.PET/CERES
100 CREAM (GRAM) TOPICAL DAILY
Qty: 30 TABLET | Refills: 0 | Status: SHIPPED | OUTPATIENT
Start: 2023-09-24 | End: 2023-10-24

## 2023-09-23 RX ORDER — METHADONE HYDROCHLORIDE 10 MG/1
10 TABLET ORAL EVERY 12 HOURS
Qty: 20 TABLET | Refills: 0 | Status: SHIPPED | OUTPATIENT
Start: 2023-09-23 | End: 2023-10-03

## 2023-09-23 RX ORDER — TIZANIDINE 4 MG/1
4 TABLET ORAL 2 TIMES DAILY
Qty: 60 TABLET | Refills: 0 | Status: SHIPPED | OUTPATIENT
Start: 2023-09-23 | End: 2023-10-23

## 2023-09-23 RX ORDER — DULOXETIN HYDROCHLORIDE 30 MG/1
30 CAPSULE, DELAYED RELEASE ORAL DAILY
Qty: 30 CAPSULE | Refills: 0 | Status: SHIPPED | OUTPATIENT
Start: 2023-09-24 | End: 2023-10-24

## 2023-09-23 RX ADMIN — VITAMIN D, TAB 1000IU (100/BT) 1000 UNITS: 25 TAB at 09:29

## 2023-09-23 RX ADMIN — DULOXETINE HYDROCHLORIDE 30 MG: 30 CAPSULE, DELAYED RELEASE ORAL at 09:30

## 2023-09-23 RX ADMIN — POLYETHYLENE GLYCOL 3350 17 G: 17 POWDER, FOR SOLUTION ORAL at 09:29

## 2023-09-23 RX ADMIN — AMANTADINE HYDROCHLORIDE 100 MG: 100 CAPSULE ORAL at 09:29

## 2023-09-23 RX ADMIN — FAMOTIDINE 20 MG: 20 TABLET ORAL at 09:29

## 2023-09-23 RX ADMIN — ENOXAPARIN SODIUM 40 MG: 100 INJECTION SUBCUTANEOUS at 09:29

## 2023-09-23 RX ADMIN — METHADONE HYDROCHLORIDE 10 MG: 10 TABLET ORAL at 09:29

## 2023-09-23 RX ADMIN — METHADONE HYDROCHLORIDE 10 MG: 10 TABLET ORAL at 17:34

## 2023-09-23 RX ADMIN — SENNOSIDES 17.2 MG: 8.6 TABLET, FILM COATED ORAL at 09:30

## 2023-09-23 RX ADMIN — Medication 100 MG: at 09:29

## 2023-09-23 RX ADMIN — BACLOFEN 20 MG: 10 TABLET ORAL at 09:29

## 2023-09-23 RX ADMIN — SODIUM CHLORIDE, PRESERVATIVE FREE 10 ML: 5 INJECTION INTRAVENOUS at 09:39

## 2023-09-23 RX ADMIN — CYANOCOBALAMIN TAB 1000 MCG 1000 MCG: 1000 TAB at 09:29

## 2023-09-23 RX ADMIN — OXYCODONE HYDROCHLORIDE AND ACETAMINOPHEN 250 MG: 500 TABLET ORAL at 09:30

## 2023-09-23 RX ADMIN — TIZANIDINE 4 MG: 2 TABLET ORAL at 09:30

## 2023-09-23 NOTE — PROGRESS NOTES
Pt noted to be intermittently confused overnight. Declined turning intermittently. Otherwise uneventful shift.

## 2023-09-23 NOTE — PROGRESS NOTES
ACUTE PHYSICAL THERAPY GOALS:   (Developed with and agreed upon by patient and/or caregiver.)   Pt max assist for rolling side to side to assist with mobility for caregivers at home and EOB with sliding board transfers in 4 treatment days. PHYSICAL THERAPY Initial Assessment, Re-evaluation, and PM  (Link to Caseload Tracking: PT Visit Days : 1  Acknowledge Orders  Time In/Out  PT Charge Capture  Rehab Caseload Tracker    Zac Bejarano is a 65 y.o. female . PRIMARY DIAGNOSIS: Acute encephalopathy  Multiple sclerosis ([address]) [G35]  Encephalopathy acute [G93.40]  Hospice care patient [Z51.5]  Urinary tract infection without hematuria, site unspecified [N39.0]  Altered mental status, unspecified altered mental status type [R41.82]  UTI (urinary tract infection) [N39.0]       Reason for Referral: Generalized Muscle Weakness (M62.81)  Other lack of cordination (R27.8)  Other abnormalities of gait and mobility (R26.89)  Inpatient: Payor: MEDICARE / Plan: MEDICARE PART A AND B / Product Type: *No Product type* /     ASSESSMENT:     REHAB RECOMMENDATIONS:   Recommendation to date pending progress:  Settin [address] and hospice services. Equipment:    None  Pt has needed DME at home     ASSESSMENT:  Ms. Kolby Smith is a 65year old female admitted with encephalopathy due to UTI. Pt history of MS with chronic indwelling ricks. Pt lives alone, had hospice and some private sitter and was nearly dependent for all care. Pt noted B LE contractures, knees fully extended and ankles with PF contractures with PRAFOS. Pt able to roll side to side in bed with verbal cueing max assist and use of bed rail. Seated EOB with MAX A x 1 with some postural hypotension. Return to supine after a few mins MAX A x 1.  B heels floated at end of session. Pt appears to be functioning near baseline with current debility. Pt with no additional acute PT needs at this time.   Plan for discharge to [address] [place] [address],[address] services with hospice per

## 2023-09-23 NOTE — PROGRESS NOTES
Pt now being discharged. IVs removed. Pt is leaving with ricks in place. Ricks intact, ricks care given.

## 2023-09-23 NOTE — DISCHARGE SUMMARY
Hospitalist Discharge Summary   Admit Date:  2023  5:42 PM   DC Note date: 2023  Name:  Ray Cárdenas   Age:  65 y.o. Sex:  female  :  1960   MRN:  [id number]   Room:  [facility]  PCP:  Nataly Renae MD    Presenting Complaint: Altered Mental Status     Initial Admission Diagnosis: Multiple sclerosis ([address]) [G35]  Encephalopathy acute [G93.40]  Hospice care patient [Z51.5]  Urinary tract infection without hematuria, site unspecified [N39.0]  Altered mental status, unspecified altered mental status type [R41.82]  UTI (urinary tract infection) [N39.0]     Problem List for this Hospitalization (present on admission):    Principal Problem:    Acute encephalopathy  Active Problems:    Severe protein-calorie malnutrition ([address])    Gastroesophageal reflux disease without esophagitis    MS (multiple sclerosis) ([address])    Encephalopathy acute    Functional quadriplegia ([address])    UTI (urinary tract infection)  Resolved Problems:    * No resolved hospital problems. *      Hospital Course:  Ray Cárdenas is a 65 y.o. female with medical history of Multiple sclerosis no longer on on immunosuppression, indwelling ricks, prior ESBL Klebsiella UTI, bed bound, on home hospice, admitted with UTI/ sepsis/ metabolic encephalopathy. She has been confused-gradually improving  CT head no acute issues. She had UTI and completed merrem. urine cultures- growing Proteus. Negative blood cultures. CXR- left base opacity. Ricks exchanged in ED. She did have statements of wishing to die. Seen by psychiatry and patient is competent to make decisions. . Home meds resumed cymbalta, zanaflex, methadone and mentation starting to improve. Patient is hemodynamically stable for discharge.      MS (multiple sclerosis) ([address])  Continued amantadine, baclofen  Not taking  fingolimid     HTN:  Blood pressures well controlled  Holding norvasc 5 mg daily         Hypernatremia:  resolved        Hypokalemia:  Resolved       Disposition:

## 2023-09-24 NOTE — CARE COORDINATION
Pt is for discharge home today with [facility]. Referral called/faxed to [facility] for follow up home care as ordered. No additional CM orders received or supportive care needs expressed at this time. 09/24/23 3356   Service Assessment   Patient's Healthcare Decision Maker is: Named in [address] History   Lives With Alone   Type of [address] [facility]  One level   Home Access Ramped entrance   [address] bed;Electric scooter; Wheelchair-manual;Wheelchair-electric   Receives Help From Family; Other (comment)  (Hospice)   Active  No   Occupation Retired   Services At/After Discharge   Transition of [address] [place] [address] Center  ([address]) Discharge Planning;Hospice   Internal Hospice No   Reason [address] used patient chose alternate [address] [facility] Discharge Hospice  ([facility])   [address] Provided? No   Mode of Transport at Discharge [facility])   Confirm Follow Up Transport Family   Condition of Participation: Discharge Planning   The Plan for Transition of Care is related to the following treatment goals: Pt will return home at discharge with [facility]. The Patient and/or Patient Representative was provided with a Choice of Provider? Patient   The Patient and/Or Patient Representative agree with the Discharge Plan? Yes   Freedom of Choice list was provided with basic dialogue that supports the patient's individualized plan of care/goals, treatment preferences, and shares the quality data associated with the providers?   Yes

## 2023-09-26 PROBLEM — Z51.5 HOSPICE CARE PATIENT: Status: ACTIVE | Noted: 2023-09-26

## 2023-09-26 PROBLEM — Z86.59 HX OF ANXIETY DISORDER: Status: ACTIVE | Noted: 2023-09-26

## 2023-09-26 PROBLEM — R41.82 ALTERED MENTAL STATUS: Status: ACTIVE | Noted: 2023-09-26

## 2023-10-15 PROBLEM — N39.0 UTI (URINARY TRACT INFECTION): Status: RESOLVED | Noted: 2023-09-15 | Resolved: 2023-10-15

## 2024-02-26 ENCOUNTER — HOSPITAL ENCOUNTER (INPATIENT)
Age: 64
LOS: 8 days | Discharge: HOSPICE | DRG: 689 | End: 2024-03-05
Attending: STUDENT IN AN ORGANIZED HEALTH CARE EDUCATION/TRAINING PROGRAM | Admitting: STUDENT IN AN ORGANIZED HEALTH CARE EDUCATION/TRAINING PROGRAM
Payer: MEDICARE

## 2024-02-26 ENCOUNTER — APPOINTMENT (OUTPATIENT)
Dept: GENERAL RADIOLOGY | Age: 64
DRG: 689 | End: 2024-02-26
Payer: MEDICARE

## 2024-02-26 DIAGNOSIS — G35 HX OF MULTIPLE SCLEROSIS (HCC): ICD-10-CM

## 2024-02-26 DIAGNOSIS — N39.0 URINARY TRACT INFECTION WITHOUT HEMATURIA, SITE UNSPECIFIED: Primary | ICD-10-CM

## 2024-02-26 DIAGNOSIS — R53.81 DEBILITY: ICD-10-CM

## 2024-02-26 LAB
ALBUMIN SERPL-MCNC: 3.1 G/DL (ref 3.2–4.6)
ALBUMIN/GLOB SERPL: 0.6 (ref 0.4–1.6)
ALP SERPL-CCNC: 82 U/L (ref 50–130)
ALT SERPL-CCNC: 21 U/L (ref 12–65)
ANION GAP SERPL CALC-SCNC: 5 MMOL/L (ref 2–11)
APPEARANCE UR: ABNORMAL
AST SERPL-CCNC: 19 U/L (ref 15–37)
BACTERIA URNS QL MICRO: ABNORMAL /HPF
BASOPHILS # BLD: 0.1 K/UL (ref 0–0.2)
BASOPHILS NFR BLD: 1 % (ref 0–2)
BILIRUB SERPL-MCNC: 0.4 MG/DL (ref 0.2–1.1)
BILIRUB UR QL: ABNORMAL
BUN SERPL-MCNC: 39 MG/DL (ref 8–23)
CALCIUM SERPL-MCNC: 9.3 MG/DL (ref 8.3–10.4)
CHLORIDE SERPL-SCNC: 109 MMOL/L (ref 103–113)
CO2 SERPL-SCNC: 28 MMOL/L (ref 21–32)
COLOR UR: ABNORMAL
CREAT SERPL-MCNC: 0.82 MG/DL (ref 0.6–1)
CRYSTALS URNS QL MICRO: ABNORMAL /LPF
DIFFERENTIAL METHOD BLD: ABNORMAL
EOSINOPHIL # BLD: 0.8 K/UL (ref 0–0.8)
EOSINOPHIL NFR BLD: 6 % (ref 0.5–7.8)
EPI CELLS #/AREA URNS HPF: ABNORMAL /HPF
ERYTHROCYTE [DISTWIDTH] IN BLOOD BY AUTOMATED COUNT: 16.3 % (ref 11.9–14.6)
GLOBULIN SER CALC-MCNC: 4.9 G/DL (ref 2.8–4.5)
GLUCOSE SERPL-MCNC: 76 MG/DL (ref 65–100)
GLUCOSE UR STRIP.AUTO-MCNC: NEGATIVE MG/DL
HCT VFR BLD AUTO: 30.9 % (ref 35.8–46.3)
HGB BLD-MCNC: 9.4 G/DL (ref 11.7–15.4)
HGB UR QL STRIP: ABNORMAL
IMM GRANULOCYTES # BLD AUTO: 0.3 K/UL (ref 0–0.5)
IMM GRANULOCYTES NFR BLD AUTO: 2 % (ref 0–5)
KETONES UR QL STRIP.AUTO: NEGATIVE MG/DL
LACTATE SERPL-SCNC: 1.6 MMOL/L (ref 0.4–2)
LEUKOCYTE ESTERASE UR QL STRIP.AUTO: ABNORMAL
LYMPHOCYTES # BLD: 2.6 K/UL (ref 0.5–4.6)
LYMPHOCYTES NFR BLD: 19 % (ref 13–44)
MAGNESIUM SERPL-MCNC: 2.3 MG/DL (ref 1.8–2.4)
MCH RBC QN AUTO: 29.4 PG (ref 26.1–32.9)
MCHC RBC AUTO-ENTMCNC: 30.4 G/DL (ref 31.4–35)
MCV RBC AUTO: 96.6 FL (ref 82–102)
MONOCYTES # BLD: 0.8 K/UL (ref 0.1–1.3)
MONOCYTES NFR BLD: 6 % (ref 4–12)
NEUTS SEG # BLD: 9.6 K/UL (ref 1.7–8.2)
NEUTS SEG NFR BLD: 68 % (ref 43–78)
NITRITE UR QL STRIP.AUTO: POSITIVE
NRBC # BLD: 0 K/UL (ref 0–0.2)
OTHER OBSERVATIONS: ABNORMAL
PH UR STRIP: 7.5 (ref 5–9)
PLATELET # BLD AUTO: 502 K/UL (ref 150–450)
PMV BLD AUTO: 11.3 FL (ref 9.4–12.3)
POTASSIUM SERPL-SCNC: 4.3 MMOL/L (ref 3.5–5.1)
PROCALCITONIN SERPL-MCNC: <0.05 NG/ML (ref 0–0.49)
PROT SERPL-MCNC: 8 G/DL (ref 6.3–8.2)
PROT UR STRIP-MCNC: 100 MG/DL
RBC # BLD AUTO: 3.2 M/UL (ref 4.05–5.2)
RBC #/AREA URNS HPF: ABNORMAL /HPF
SODIUM SERPL-SCNC: 142 MMOL/L (ref 136–146)
SP GR UR REFRACTOMETRY: 1.02 (ref 1–1.02)
UROBILINOGEN UR QL STRIP.AUTO: 1 EU/DL (ref 0.2–1)
WBC # BLD AUTO: 14.1 K/UL (ref 4.3–11.1)
WBC URNS QL MICRO: ABNORMAL /HPF

## 2024-02-26 PROCEDURE — 6360000002 HC RX W HCPCS: Performed by: STUDENT IN AN ORGANIZED HEALTH CARE EDUCATION/TRAINING PROGRAM

## 2024-02-26 PROCEDURE — 71045 X-RAY EXAM CHEST 1 VIEW: CPT

## 2024-02-26 PROCEDURE — 2580000003 HC RX 258: Performed by: STUDENT IN AN ORGANIZED HEALTH CARE EDUCATION/TRAINING PROGRAM

## 2024-02-26 PROCEDURE — 87088 URINE BACTERIA CULTURE: CPT

## 2024-02-26 PROCEDURE — 80053 COMPREHEN METABOLIC PANEL: CPT

## 2024-02-26 PROCEDURE — 99285 EMERGENCY DEPT VISIT HI MDM: CPT

## 2024-02-26 PROCEDURE — 83735 ASSAY OF MAGNESIUM: CPT

## 2024-02-26 PROCEDURE — 87086 URINE CULTURE/COLONY COUNT: CPT

## 2024-02-26 PROCEDURE — 81001 URINALYSIS AUTO W/SCOPE: CPT

## 2024-02-26 PROCEDURE — 1100000000 HC RM PRIVATE

## 2024-02-26 PROCEDURE — 85025 COMPLETE CBC W/AUTO DIFF WBC: CPT

## 2024-02-26 PROCEDURE — 96374 THER/PROPH/DIAG INJ IV PUSH: CPT

## 2024-02-26 PROCEDURE — 83605 ASSAY OF LACTIC ACID: CPT

## 2024-02-26 PROCEDURE — 84145 PROCALCITONIN (PCT): CPT

## 2024-02-26 PROCEDURE — 87040 BLOOD CULTURE FOR BACTERIA: CPT

## 2024-02-26 PROCEDURE — 87186 SC STD MICRODIL/AGAR DIL: CPT

## 2024-02-26 PROCEDURE — 6370000000 HC RX 637 (ALT 250 FOR IP): Performed by: STUDENT IN AN ORGANIZED HEALTH CARE EDUCATION/TRAINING PROGRAM

## 2024-02-26 RX ORDER — SODIUM CHLORIDE 9 MG/ML
INJECTION, SOLUTION INTRAVENOUS PRN
Status: DISCONTINUED | OUTPATIENT
Start: 2024-02-26 | End: 2024-03-05 | Stop reason: HOSPADM

## 2024-02-26 RX ORDER — ONDANSETRON 4 MG/1
4 TABLET, ORALLY DISINTEGRATING ORAL EVERY 8 HOURS PRN
Status: DISCONTINUED | OUTPATIENT
Start: 2024-02-26 | End: 2024-03-05 | Stop reason: HOSPADM

## 2024-02-26 RX ORDER — ACETAMINOPHEN 325 MG/1
650 TABLET ORAL EVERY 6 HOURS PRN
Status: DISCONTINUED | OUTPATIENT
Start: 2024-02-26 | End: 2024-03-05 | Stop reason: HOSPADM

## 2024-02-26 RX ORDER — ENOXAPARIN SODIUM 100 MG/ML
40 INJECTION SUBCUTANEOUS DAILY
Status: DISCONTINUED | OUTPATIENT
Start: 2024-02-27 | End: 2024-02-29

## 2024-02-26 RX ORDER — SODIUM CHLORIDE 0.9 % (FLUSH) 0.9 %
5-40 SYRINGE (ML) INJECTION PRN
Status: DISCONTINUED | OUTPATIENT
Start: 2024-02-26 | End: 2024-03-05 | Stop reason: HOSPADM

## 2024-02-26 RX ORDER — SODIUM CHLORIDE 0.9 % (FLUSH) 0.9 %
5-40 SYRINGE (ML) INJECTION EVERY 12 HOURS SCHEDULED
Status: DISCONTINUED | OUTPATIENT
Start: 2024-02-26 | End: 2024-03-05 | Stop reason: HOSPADM

## 2024-02-26 RX ORDER — ACETAMINOPHEN 650 MG/1
650 SUPPOSITORY RECTAL EVERY 6 HOURS PRN
Status: DISCONTINUED | OUTPATIENT
Start: 2024-02-26 | End: 2024-03-05 | Stop reason: HOSPADM

## 2024-02-26 RX ORDER — POLYETHYLENE GLYCOL 3350 17 G/17G
17 POWDER, FOR SOLUTION ORAL DAILY PRN
Status: DISCONTINUED | OUTPATIENT
Start: 2024-02-26 | End: 2024-03-05 | Stop reason: HOSPADM

## 2024-02-26 RX ORDER — POLYETHYLENE GLYCOL 3350 17 G/17G
17 POWDER, FOR SOLUTION ORAL DAILY
Status: DISCONTINUED | OUTPATIENT
Start: 2024-02-27 | End: 2024-03-02

## 2024-02-26 RX ORDER — ONDANSETRON 2 MG/ML
4 INJECTION INTRAMUSCULAR; INTRAVENOUS EVERY 6 HOURS PRN
Status: DISCONTINUED | OUTPATIENT
Start: 2024-02-26 | End: 2024-03-05 | Stop reason: HOSPADM

## 2024-02-26 RX ORDER — AMANTADINE HYDROCHLORIDE 100 MG/1
100 CAPSULE, GELATIN COATED ORAL 2 TIMES DAILY
Status: DISCONTINUED | OUTPATIENT
Start: 2024-02-26 | End: 2024-03-05 | Stop reason: HOSPADM

## 2024-02-26 RX ORDER — LANOLIN ALCOHOL/MO/W.PET/CERES
1000 CREAM (GRAM) TOPICAL DAILY
Status: DISCONTINUED | OUTPATIENT
Start: 2024-02-27 | End: 2024-03-05 | Stop reason: HOSPADM

## 2024-02-26 RX ORDER — BACLOFEN 10 MG/1
20 TABLET ORAL 2 TIMES DAILY
Status: DISCONTINUED | OUTPATIENT
Start: 2024-02-27 | End: 2024-03-05 | Stop reason: HOSPADM

## 2024-02-26 RX ORDER — PREGABALIN 50 MG/1
50 CAPSULE ORAL DAILY
Status: DISCONTINUED | OUTPATIENT
Start: 2024-02-27 | End: 2024-03-05 | Stop reason: HOSPADM

## 2024-02-26 RX ORDER — VITAMIN B COMPLEX
1000 TABLET ORAL DAILY
Status: DISCONTINUED | OUTPATIENT
Start: 2024-02-27 | End: 2024-03-05 | Stop reason: HOSPADM

## 2024-02-26 RX ORDER — ASPIRIN 81 MG/1
81 TABLET ORAL DAILY
Status: DISCONTINUED | OUTPATIENT
Start: 2024-02-27 | End: 2024-03-05 | Stop reason: HOSPADM

## 2024-02-26 RX ORDER — HYDROXYZINE HYDROCHLORIDE 25 MG/1
25 TABLET, FILM COATED ORAL EVERY 8 HOURS PRN
Status: DISCONTINUED | OUTPATIENT
Start: 2024-02-26 | End: 2024-03-05 | Stop reason: HOSPADM

## 2024-02-26 RX ADMIN — CEFTRIAXONE 1000 MG: 1 INJECTION, POWDER, FOR SOLUTION INTRAMUSCULAR; INTRAVENOUS at 17:18

## 2024-02-26 RX ADMIN — SODIUM CHLORIDE, PRESERVATIVE FREE 10 ML: 5 INJECTION INTRAVENOUS at 22:08

## 2024-02-26 RX ADMIN — AMANTADINE HYDROCHLORIDE 100 MG: 100 CAPSULE, LIQUID FILLED ORAL at 22:08

## 2024-02-26 ASSESSMENT — PAIN - FUNCTIONAL ASSESSMENT: PAIN_FUNCTIONAL_ASSESSMENT: NONE - DENIES PAIN

## 2024-02-26 NOTE — CARE COORDINATION
Chart reviewed, patient is current with [facility] and was in respite care at [facility]. The patient was scheduled to discharge from respite today and go home. However, the patient's sister is unable to care for her at home due to a scheduled surgery and the hospice agency elected to have the patient sent to the ER for placement. DARLYN called Osiel [phone] and Sherlyn [phone] with [facility] (no answer). DARLYN then called the [facility] main office and requested to speak with a supervisor. DARLYN spoke with Bettie Gonzalez who identified herself as the . Bettie states that the patient was at [facility] for five days for respite. At the end of her stay they felt that the patient would benefit from placement so requested she be sent to the ER. DARLYN asked what was done proactively to assist with placement and Bettie states that they tried to get [facility] to keep the patient and the facility applied for Medicaid but she's unsure of how far that process has gotten. DARLYN asked about hospice house and Bettie states the patient would not qualify. DARLYN reminded Bettie that Sherlyn sent the patient to the ER for placement in [place] for GIP and asked for clarification which Bettie could not provide. Bettie does state that an APS report was made but it's unknown what was reported and if the case was accepted for investigation.     DARLYN met with the patient at the bedside who was A&O x's 4. Patient's sister Aretha at the bedside. Patient states that she lives alone and her sister is in [place]. Patient reports that she is at baseline wheelchair bound and transfers independently. Patient reports being independent in her ADLs and denies any falls. Patient states she has a CNA she pays privately five days a week to assist her as needed. When asked why she's here today, patient states \"to see if anything can be done with my steroids.\" Patient advises that she feels at her baseline and if she went home would be able to

## 2024-02-26 NOTE — ED NOTES
Hospice- [facility]  SASKIA Colvin-      PT has hx of MS  Bed bound and incontinent     PT's sister has been caring for her but is fixing to have brain surgery and is not able to care for her now.     PT has no one to care for PT at this time-     PT has been found 3x in the last few months unresponsive for days at a time.     SASKIA Colvin (Hospice CM)- states that PT need to be put in facility since no one is able care for PT and PT is unable to care for herself at this time

## 2024-02-26 NOTE — ED TRIAGE NOTES
PT to triage/room coming from home via EMS w/ c/o no care giver at home with PT while she is on Hospice.      informed PT since her caregiver was not available that she needed to come to ED.     PT/EMS state no complaint at this time       116/86 BP  98 oral     HX of MS

## 2024-02-26 NOTE — H&P
0 /hpf    RBC, UA 20-50 0 /hpf    Epithelial Cells UA 3-5 0 /hpf    BACTERIA, URINE 4+ (H) 0 /hpf    Crystals MODERATE 0 /LPF    Other observations RESULTS VERIFIED MANUALLY     Lactic Acid    Collection Time: 02/26/24  4:25 PM   Result Value Ref Range    Lactic Acid, Plasma 1.6 0.4 - 2.0 MMOL/L       I have personally reviewed imaging studies:  XR CHEST PORTABLE    Result Date: 2/26/2024  Chest X-ray INDICATION: Weakness COMPARISON: X-ray chest September 13, 2023 TECHNIQUE: Frontal view of the chest was obtained. FINDINGS: The lungs are clear. There are no infiltrates or effusions.  The heart size is normal.  The bony thorax is intact.      No acute findings in the chest         Signed:  Cedric Erickson,

## 2024-02-26 NOTE — ED PROVIDER NOTES
Emergency Department Provider Note       PCP: Yissel Javier MD   Age: 63 y.o.   Sex: female     DISPOSITION Admitted 02/26/2024 06:57:31 PM       ICD-10-CM    1. Urinary tract infection without hematuria, site unspecified  N39.0       2. Debility  R53.81       3. Hx of multiple sclerosis (HCC)  G35           Medical Decision Making     63-year-old female patient with end-stage MS, previously on hospice, admitted to local hospice service for respite care and transferred to this facility at the end of this day secondary for the inability to care for herself.  APS report has been initiated as well.  Patient is certainly very disabled and I feel unable to care for herself today.  She is willing to stay for help with placement.  Case management states that patient will require workup to assess for medical cause for admission.    Will collect basic labs, x-ray and urinalysis  Patient does have evidence of leukocytosis with significant UTI.  Again I think she is currently unable to care for herself and her normal caregiver is unavailable to check on her.  Her sister at bedside has significant Parkinson's and is scheduled for surgery and can also not care for patient.  Patient is now willing to undergo admission with plan for placement.  I will speak to the hospitalist to facilitate this admission.  Patient agreeable at this time    ED Course as of 02/26/24 2301   Mon Feb 26, 2024   1708 Patient has significant UTI, white count as well.  Will move forward with Rocephin, urine culture.  I will ask the hospitalist to admit patient as I do not think she is a safe discharge home. [BR]   1709 Sepsis markers within normal limits [BR]      ED Course User Index  [BR] Kushal Meza R, DO     1 or more acute illnesses that pose a threat to life or bodily function.   1 or more chronic illnesses with a severe exacerbation or progression.  Drug therapy given requiring intensive monitoring for toxicity.  Chronic medical

## 2024-02-27 LAB
ALBUMIN SERPL-MCNC: 3.1 G/DL (ref 3.2–4.6)
ANION GAP SERPL CALC-SCNC: 7 MMOL/L (ref 2–11)
BASOPHILS # BLD: 0.1 K/UL (ref 0–0.2)
BASOPHILS NFR BLD: 1 % (ref 0–2)
BUN SERPL-MCNC: 32 MG/DL (ref 8–23)
CALCIUM SERPL-MCNC: 9.3 MG/DL (ref 8.3–10.4)
CHLORIDE SERPL-SCNC: 113 MMOL/L (ref 103–113)
CO2 SERPL-SCNC: 26 MMOL/L (ref 21–32)
CREAT SERPL-MCNC: 0.85 MG/DL (ref 0.6–1)
DIFFERENTIAL METHOD BLD: ABNORMAL
EOSINOPHIL # BLD: 0.7 K/UL (ref 0–0.8)
EOSINOPHIL NFR BLD: 6 % (ref 0.5–7.8)
ERYTHROCYTE [DISTWIDTH] IN BLOOD BY AUTOMATED COUNT: 16.3 % (ref 11.9–14.6)
GLUCOSE SERPL-MCNC: 90 MG/DL (ref 65–100)
HCT VFR BLD AUTO: 31.1 % (ref 35.8–46.3)
HGB BLD-MCNC: 9.2 G/DL (ref 11.7–15.4)
IMM GRANULOCYTES # BLD AUTO: 0.2 K/UL (ref 0–0.5)
IMM GRANULOCYTES NFR BLD AUTO: 2 % (ref 0–5)
LYMPHOCYTES # BLD: 1.8 K/UL (ref 0.5–4.6)
LYMPHOCYTES NFR BLD: 17 % (ref 13–44)
MAGNESIUM SERPL-MCNC: 2.2 MG/DL (ref 1.8–2.4)
MCH RBC QN AUTO: 29.1 PG (ref 26.1–32.9)
MCHC RBC AUTO-ENTMCNC: 29.6 G/DL (ref 31.4–35)
MCV RBC AUTO: 98.4 FL (ref 82–102)
MONOCYTES # BLD: 0.4 K/UL (ref 0.1–1.3)
MONOCYTES NFR BLD: 4 % (ref 4–12)
NEUTS SEG # BLD: 7.7 K/UL (ref 1.7–8.2)
NEUTS SEG NFR BLD: 70 % (ref 43–78)
NRBC # BLD: 0 K/UL (ref 0–0.2)
PHOSPHATE SERPL-MCNC: 3.5 MG/DL (ref 2.3–3.7)
PLATELET # BLD AUTO: 412 K/UL (ref 150–450)
PMV BLD AUTO: 11 FL (ref 9.4–12.3)
POTASSIUM SERPL-SCNC: 4.4 MMOL/L (ref 3.5–5.1)
RBC # BLD AUTO: 3.16 M/UL (ref 4.05–5.2)
SODIUM SERPL-SCNC: 146 MMOL/L (ref 136–146)
WBC # BLD AUTO: 10.9 K/UL (ref 4.3–11.1)

## 2024-02-27 PROCEDURE — 80069 RENAL FUNCTION PANEL: CPT

## 2024-02-27 PROCEDURE — 87040 BLOOD CULTURE FOR BACTERIA: CPT

## 2024-02-27 PROCEDURE — 2500000003 HC RX 250 WO HCPCS: Performed by: STUDENT IN AN ORGANIZED HEALTH CARE EDUCATION/TRAINING PROGRAM

## 2024-02-27 PROCEDURE — 1100000000 HC RM PRIVATE

## 2024-02-27 PROCEDURE — 85025 COMPLETE CBC W/AUTO DIFF WBC: CPT

## 2024-02-27 PROCEDURE — 36415 COLL VENOUS BLD VENIPUNCTURE: CPT

## 2024-02-27 PROCEDURE — 2580000003 HC RX 258: Performed by: STUDENT IN AN ORGANIZED HEALTH CARE EDUCATION/TRAINING PROGRAM

## 2024-02-27 PROCEDURE — 6360000002 HC RX W HCPCS: Performed by: STUDENT IN AN ORGANIZED HEALTH CARE EDUCATION/TRAINING PROGRAM

## 2024-02-27 PROCEDURE — 83735 ASSAY OF MAGNESIUM: CPT

## 2024-02-27 PROCEDURE — 6370000000 HC RX 637 (ALT 250 FOR IP): Performed by: STUDENT IN AN ORGANIZED HEALTH CARE EDUCATION/TRAINING PROGRAM

## 2024-02-27 RX ORDER — PHENAZOPYRIDINE HYDROCHLORIDE 200 MG/1
200 TABLET, FILM COATED ORAL 2 TIMES DAILY
COMMUNITY

## 2024-02-27 RX ORDER — TRAZODONE HYDROCHLORIDE 50 MG/1
50 TABLET ORAL NIGHTLY
COMMUNITY

## 2024-02-27 RX ORDER — OXYBUTYNIN CHLORIDE 10 MG/1
10 TABLET, EXTENDED RELEASE ORAL DAILY
COMMUNITY

## 2024-02-27 RX ORDER — DULOXETIN HYDROCHLORIDE 30 MG/1
30 CAPSULE, DELAYED RELEASE ORAL 2 TIMES DAILY
COMMUNITY

## 2024-02-27 RX ADMIN — ENOXAPARIN SODIUM 40 MG: 100 INJECTION SUBCUTANEOUS at 08:59

## 2024-02-27 RX ADMIN — BACLOFEN 20 MG: 10 TABLET ORAL at 22:37

## 2024-02-27 RX ADMIN — BACLOFEN 20 MG: 10 TABLET ORAL at 08:59

## 2024-02-27 RX ADMIN — VITAMIN D, TAB 1000IU (100/BT) 1000 UNITS: 25 TAB at 08:59

## 2024-02-27 RX ADMIN — CEFTRIAXONE 1000 MG: 1 INJECTION, POWDER, FOR SOLUTION INTRAMUSCULAR; INTRAVENOUS at 15:48

## 2024-02-27 RX ADMIN — TUBERCULIN PURIFIED PROTEIN DERIVATIVE 5 UNITS: 5 INJECTION, SOLUTION INTRADERMAL at 08:59

## 2024-02-27 RX ADMIN — SODIUM CHLORIDE, PRESERVATIVE FREE 10 ML: 5 INJECTION INTRAVENOUS at 22:37

## 2024-02-27 RX ADMIN — Medication 1000 MCG: at 08:59

## 2024-02-27 RX ADMIN — SODIUM CHLORIDE, PRESERVATIVE FREE 10 ML: 5 INJECTION INTRAVENOUS at 09:05

## 2024-02-27 RX ADMIN — AMANTADINE HYDROCHLORIDE 100 MG: 100 CAPSULE, LIQUID FILLED ORAL at 22:37

## 2024-02-27 RX ADMIN — AMANTADINE HYDROCHLORIDE 100 MG: 100 CAPSULE, LIQUID FILLED ORAL at 08:59

## 2024-02-27 RX ADMIN — PREGABALIN 50 MG: 50 CAPSULE ORAL at 08:59

## 2024-02-27 RX ADMIN — POLYETHYLENE GLYCOL 3350 17 G: 17 POWDER, FOR SOLUTION ORAL at 08:59

## 2024-02-27 RX ADMIN — ASPIRIN 81 MG: 81 TABLET, COATED ORAL at 08:59

## 2024-02-27 NOTE — PLAN OF CARE
Problem: Skin/Tissue Integrity  Goal: Absence of new skin breakdown  Description: 1.  Monitor for areas of redness and/or skin breakdown  2.  Assess vascular access sites hourly  3.  Every 4-6 hours minimum:  Change oxygen saturation probe site  4.  Every 4-6 hours:  If on nasal continuous positive airway pressure, respiratory therapy assess nares and determine need for appliance change or resting period.  2/27/2024 1007 by Ama Huber RN  Outcome: Progressing  2/27/2024 0155 by Ama De La Fuente RN  Outcome: Progressing  2/27/2024 0153 by Ama De La Fuente RN  Outcome: Progressing     Problem: Discharge Planning  Goal: Discharge to home or other facility with appropriate resources  2/27/2024 1007 by Ama Huber RN  Outcome: Progressing  2/27/2024 0155 by Ama De La Fuente RN  Outcome: Progressing  2/27/2024 0153 by Ama De La Fuente RN  Outcome: Progressing     Problem: Safety - Adult  Goal: Free from fall injury  2/27/2024 1007 by Ama Huber RN  Outcome: Progressing  2/27/2024 0155 by Ama De La Fuente RN  Outcome: Progressing  2/27/2024 0153 by Ama De La Fuente RN  Outcome: Progressing     Problem: Wound:  Goal: Will show signs of wound healing; wound closure and no evidence of infection  Description: Will show signs of wound healing; wound closure and no evidence of infection  2/27/2024 1007 by Ama Huber RN  Outcome: Progressing  2/27/2024 0155 by Ama De La Fuente RN  Outcome: Progressing

## 2024-02-27 NOTE — PLAN OF CARE
Problem: Skin/Tissue Integrity  Goal: Absence of new skin breakdown  Description: 1.  Monitor for areas of redness and/or skin breakdown  2.  Assess vascular access sites hourly  3.  Every 4-6 hours minimum:  Change oxygen saturation probe site  4.  Every 4-6 hours:  If on nasal continuous positive airway pressure, respiratory therapy assess nares and determine need for appliance change or resting period.  2/27/2024 0155 by Ama De La Fuente RN  Outcome: Progressing     Problem: Discharge Planning  Goal: Discharge to home or other facility with appropriate resources  2/27/2024 0155 by Ama De La Fuente, RN  Outcome: Progressing    Problem: Safety - Adult  Goal: Free from fall injury  2/27/2024 0155 by Ama De La Fuente RN  Outcome: Progressing    Problem: Wound:  Goal: Will show signs of wound healing; wound closure and no evidence of infection  Description: Will show signs of wound healing; wound closure and no evidence of infection  Outcome: Progressing     Problem: Pressure Ulcer:  Goal: Signs of wound healing will improve  Description: Signs of wound healing will improve  Outcome: Progressing  Goal: Absence of new pressure ulcer  Description: Absence of new pressure ulcer  Outcome: Progressing  Goal: Will show no infection signs and symptoms  Description: Will show no infection signs and symptoms  Outcome: Progressing

## 2024-02-27 NOTE — WOUND CARE
Pt seen for sacral and right ischial wounds documented as present on admission. Pt very frail and emaciated also incontinent. Recommend purewik for moisture control to help with sacral and right ichial wounds. Right ischial with stage 3 ulcer measuring 4 x3 x 0.2 cm with slough and pink tissue in base. Sacrum with stage 2 pressure injury with slough present as well. Recommend to cleanse with normal saline and apply bordered foam dressings change daily along with frequent turning and repositioning. Right heel with small stage 2 pressure injury, left heel with stage one blanchable erythema. Recommend foam dressings changed daily along with offloading with heel boots. Pt has severe contractors and foot drop that makes it difficult to offload areas and only has one good turning surface at this time, will order low airloss wound bed for better chance for wounds to heal while in acute care setting. Primary RN updated of plan. Wound team will continue to follow while in acute care setting.

## 2024-02-27 NOTE — ED NOTES
TRANSFER - OUT REPORT:    Verbal report given to SASKIA Serrato on Thalia Javier  being transferred to [facility] for routine progression of patient care       Report consisted of patient's Situation, Background, Assessment and   Recommendations(SBAR).     Information from the following report(s) ED SBAR, MAR, Recent Results, and Neuro Assessment was reviewed with the receiving nurse.    Lines:   Peripheral IV 02/26/24 Right Antecubital (Active)   Site Assessment Clean, dry & intact 02/26/24 1530   Line Status Brisk blood return;Capped;Flushed;Normal saline locked;Specimen collected 02/26/24 1530   Phlebitis Assessment No symptoms 02/26/24 1530   Infiltration Assessment 0 02/26/24 1530   Dressing Status Clean, dry & intact;New dressing applied 02/26/24 1530   Dressing Type Transparent 02/26/24 1530   Dressing Intervention New 02/26/24 1530        Opportunity for questions and clarification was provided.      Patient transported with:  [facility] [employer]

## 2024-02-28 LAB
BACTERIA SPEC CULT: ABNORMAL
BACTERIA SPEC CULT: ABNORMAL
MM INDURATION, POC: 0 MM (ref 0–5)
PPD, POC: NEGATIVE
SERVICE CMNT-IMP: ABNORMAL

## 2024-02-28 PROCEDURE — 6360000002 HC RX W HCPCS: Performed by: INTERNAL MEDICINE

## 2024-02-28 PROCEDURE — 2580000003 HC RX 258: Performed by: INTERNAL MEDICINE

## 2024-02-28 PROCEDURE — 6370000000 HC RX 637 (ALT 250 FOR IP): Performed by: STUDENT IN AN ORGANIZED HEALTH CARE EDUCATION/TRAINING PROGRAM

## 2024-02-28 PROCEDURE — 1100000000 HC RM PRIVATE

## 2024-02-28 PROCEDURE — 2580000003 HC RX 258: Performed by: STUDENT IN AN ORGANIZED HEALTH CARE EDUCATION/TRAINING PROGRAM

## 2024-02-28 PROCEDURE — 6360000002 HC RX W HCPCS: Performed by: STUDENT IN AN ORGANIZED HEALTH CARE EDUCATION/TRAINING PROGRAM

## 2024-02-28 RX ADMIN — CEFTRIAXONE 1000 MG: 1 INJECTION, POWDER, FOR SOLUTION INTRAMUSCULAR; INTRAVENOUS at 11:50

## 2024-02-28 RX ADMIN — BACLOFEN 20 MG: 10 TABLET ORAL at 20:55

## 2024-02-28 RX ADMIN — ACETAMINOPHEN 650 MG: 325 TABLET, FILM COATED ORAL at 20:55

## 2024-02-28 RX ADMIN — AMANTADINE HYDROCHLORIDE 100 MG: 100 CAPSULE, LIQUID FILLED ORAL at 09:44

## 2024-02-28 RX ADMIN — SODIUM CHLORIDE, PRESERVATIVE FREE 10 ML: 5 INJECTION INTRAVENOUS at 20:56

## 2024-02-28 RX ADMIN — BACLOFEN 20 MG: 10 TABLET ORAL at 09:44

## 2024-02-28 RX ADMIN — ASPIRIN 81 MG: 81 TABLET, COATED ORAL at 09:44

## 2024-02-28 RX ADMIN — Medication 1000 MCG: at 09:44

## 2024-02-28 RX ADMIN — VITAMIN D, TAB 1000IU (100/BT) 1000 UNITS: 25 TAB at 09:43

## 2024-02-28 RX ADMIN — ENOXAPARIN SODIUM 40 MG: 100 INJECTION SUBCUTANEOUS at 09:44

## 2024-02-28 RX ADMIN — AMANTADINE HYDROCHLORIDE 100 MG: 100 CAPSULE, LIQUID FILLED ORAL at 20:55

## 2024-02-28 RX ADMIN — PREGABALIN 50 MG: 50 CAPSULE ORAL at 09:44

## 2024-02-28 RX ADMIN — SODIUM CHLORIDE, PRESERVATIVE FREE 10 ML: 5 INJECTION INTRAVENOUS at 09:45

## 2024-02-28 ASSESSMENT — PAIN SCALES - GENERAL
PAINLEVEL_OUTOF10: 7
PAINLEVEL_OUTOF10: 5

## 2024-02-28 ASSESSMENT — PAIN DESCRIPTION - LOCATION: LOCATION: BACK

## 2024-02-28 ASSESSMENT — PAIN DESCRIPTION - ORIENTATION: ORIENTATION: LOWER

## 2024-02-28 ASSESSMENT — PAIN DESCRIPTION - DESCRIPTORS: DESCRIPTORS: ACHING;DULL

## 2024-02-28 NOTE — DISCHARGE SUMMARY
Hospitalist Discharge Summary   Admit Date:  2024 12:38 PM   DC Note date: 2024  Name:  Thalia Javier   Age:  63 y.o.  Sex:  female  :  1960   MRN:  [id number]   Room:  [facility]  PCP:  Yissel Javier MD    Presenting Complaint: OTHER and Care Management     Initial Admission Diagnosis: Debility [R53.81]  UTI (urinary tract infection) [N39.0]  Hx of multiple sclerosis (HCC) [G35]  Urinary tract infection without hematuria, site unspecified [N39.0]     Problem List for this Hospitalization (present on admission):    Principal Problem:    UTI (urinary tract infection)  Resolved Problems:    * No resolved hospital problems. *      Hospital Course:  63 y.o. female with medical history of multiple sclerosis who presented to the ED on 2024 from hospice respite care service. Patient was set to return home on  but had no one to help care for her at home, so hospice sent her to the ER for placement. See  note on  for more details.  In the ED, labs showed WBC 14.1 and UA showed positive nitrites and large leukocyte esterase.  Chest x-ray showed no acute abnormalities.  Started on IV ceftriaxone in the setting of urinary tract infection.  Urine culture positive for Proteus.  Improved symptomatology.  Patient to return back to home hospice.    Disposition: Home Hospice  Diet: ADULT DIET; Regular  Code Status: DNR    Follow Ups:  Follow-up Information       Follow up With Specialties Details Why Contact Info    Yissel Javier MD Neurology Follow up  [address] [facility] [id number] [phone]              Time spent in patient discharge and coordination 31 minutes.        Follow up labs/diagnostics (ultimately defer to outpatient provider):  None    Plan was discussed with Patient.  All questions answered.  Patient was stable at time of discharge.  Instructions given to call a physician or return if any concerns.    Pending Labs       Order Current Status    Culture, Blood

## 2024-02-29 PROCEDURE — 6360000002 HC RX W HCPCS: Performed by: STUDENT IN AN ORGANIZED HEALTH CARE EDUCATION/TRAINING PROGRAM

## 2024-02-29 PROCEDURE — 2580000003 HC RX 258: Performed by: STUDENT IN AN ORGANIZED HEALTH CARE EDUCATION/TRAINING PROGRAM

## 2024-02-29 PROCEDURE — 6370000000 HC RX 637 (ALT 250 FOR IP): Performed by: STUDENT IN AN ORGANIZED HEALTH CARE EDUCATION/TRAINING PROGRAM

## 2024-02-29 PROCEDURE — 97163 PT EVAL HIGH COMPLEX 45 MIN: CPT

## 2024-02-29 PROCEDURE — 1100000000 HC RM PRIVATE

## 2024-02-29 PROCEDURE — 97165 OT EVAL LOW COMPLEX 30 MIN: CPT

## 2024-02-29 PROCEDURE — 97161 PT EVAL LOW COMPLEX 20 MIN: CPT

## 2024-02-29 RX ORDER — ENOXAPARIN SODIUM 100 MG/ML
30 INJECTION SUBCUTANEOUS DAILY
Status: DISCONTINUED | OUTPATIENT
Start: 2024-03-01 | End: 2024-03-05 | Stop reason: HOSPADM

## 2024-02-29 RX ADMIN — AMANTADINE HYDROCHLORIDE 100 MG: 100 CAPSULE, LIQUID FILLED ORAL at 21:07

## 2024-02-29 RX ADMIN — BACLOFEN 20 MG: 10 TABLET ORAL at 21:06

## 2024-02-29 RX ADMIN — PREGABALIN 50 MG: 50 CAPSULE ORAL at 08:31

## 2024-02-29 RX ADMIN — SODIUM CHLORIDE, PRESERVATIVE FREE 10 ML: 5 INJECTION INTRAVENOUS at 21:38

## 2024-02-29 RX ADMIN — Medication 1000 MCG: at 08:31

## 2024-02-29 RX ADMIN — VITAMIN D, TAB 1000IU (100/BT) 1000 UNITS: 25 TAB at 08:31

## 2024-02-29 RX ADMIN — AMANTADINE HYDROCHLORIDE 100 MG: 100 CAPSULE, LIQUID FILLED ORAL at 08:31

## 2024-02-29 RX ADMIN — ENOXAPARIN SODIUM 40 MG: 100 INJECTION SUBCUTANEOUS at 08:31

## 2024-02-29 RX ADMIN — ACETAMINOPHEN 650 MG: 325 TABLET, FILM COATED ORAL at 06:04

## 2024-02-29 RX ADMIN — SODIUM CHLORIDE, PRESERVATIVE FREE 10 ML: 5 INJECTION INTRAVENOUS at 08:32

## 2024-02-29 RX ADMIN — BACLOFEN 20 MG: 10 TABLET ORAL at 08:31

## 2024-02-29 RX ADMIN — ASPIRIN 81 MG: 81 TABLET, COATED ORAL at 08:31

## 2024-02-29 RX ADMIN — ACETAMINOPHEN 650 MG: 325 TABLET, FILM COATED ORAL at 21:06

## 2024-02-29 ASSESSMENT — PAIN DESCRIPTION - DESCRIPTORS
DESCRIPTORS: ACHING;DULL
DESCRIPTORS: ACHING

## 2024-02-29 ASSESSMENT — PAIN DESCRIPTION - LOCATION
LOCATION: BACK
LOCATION: BACK

## 2024-02-29 ASSESSMENT — PAIN DESCRIPTION - ORIENTATION
ORIENTATION: LOWER
ORIENTATION: LOWER

## 2024-02-29 ASSESSMENT — PAIN SCALES - GENERAL
PAINLEVEL_OUTOF10: 4
PAINLEVEL_OUTOF10: 6
PAINLEVEL_OUTOF10: 0
PAINLEVEL_OUTOF10: 10

## 2024-02-29 NOTE — CARE COORDINATION
Pt chart reviewed and discussed in AM IDR for continued stay. LOS 2 days. Pt admitted with UTI. [facility] pt to hospice at home. MD notified and pt to discharge home today via [facility].     Addendum: SW notified [facility] unable to accept pt due to wounds and need of 24 hour care.     Addendum: SW met with pt at bedside to discuss discharge planning. SW notified pt [facility] unable to accept due to wounds and 24 hour care. Pt requested to return home with  and open to bridge program. SW discussed adding a SW to  services to assist with LTC at SNF facility and additional community resources to assist with care. Pt open to SW referral. SW provided pt with choice list and referral was sent to [facility].      DARLYN spoke with pt's sister about discharge plan. Pt's sister reported concerns with pt's ability to return home due to pt's inability to tx from bed to chair and wound care as pt lives alone.     DARLYN notified MD pt unable to return home today due to safety concerns. RN notified. SW to discuss with pt potential discharge plans to assist with care in AM.    SW to continue to follow.

## 2024-02-29 NOTE — CARE COORDINATION
SW met with pt at bedside to discuss discharge planning. SW discussed with pt need for 24/7 care at home to return. SW discussed STR to LTC at discharge. Pt accepting to discharge plan. PT/OT to eval. Awaiting recommendation.     Addendum: PT/OT spoke with SW and reported pt contracted and unable to transfer or sit-up on the side of the bed. Pt will need LTC.     SW to continue to follow.

## 2024-02-29 NOTE — PLAN OF CARE
Problem: Skin/Tissue Integrity  Goal: Absence of new skin breakdown  Description: 1.  Monitor for areas of redness and/or skin breakdown  2.  Assess vascular access sites hourly  3.  Every 4-6 hours minimum:  Change oxygen saturation probe site  4.  Every 4-6 hours:  If on nasal continuous positive airway pressure, respiratory therapy assess nares and determine need for appliance change or resting period.  Outcome: Progressing     Problem: Discharge Planning  Goal: Discharge to home or other facility with appropriate resources  Outcome: Progressing     Problem: Safety - Adult  Goal: Free from fall injury  Outcome: Progressing     Problem: Wound:  Goal: Will show signs of wound healing; wound closure and no evidence of infection  Description: Will show signs of wound healing; wound closure and no evidence of infection  Outcome: Progressing     Problem: Pressure Ulcer:  Goal: Signs of wound healing will improve  Description: Signs of wound healing will improve  Outcome: Progressing  Goal: Absence of new pressure ulcer  Description: Absence of new pressure ulcer  Outcome: Progressing  Goal: Will show no infection signs and symptoms  Description: Will show no infection signs and symptoms  Outcome: Progressing     Problem: Pain  Goal: Verbalizes/displays adequate comfort level or baseline comfort level  Outcome: Progressing

## 2024-02-29 NOTE — ACP (ADVANCE CARE PLANNING)
Advance Care Planning     General Advance Care Planning (ACP) Conversation    Date of Conversation: 2/26/2024  Conducted with: Patient with Decision Making Capacity    Healthcare Decision Maker:    Primary Decision Maker: Aretha Kaufman [facility] - [phone]  Click here to complete Healthcare Decision Makers including selection of the Healthcare Decision Maker Relationship (ie \"Primary\").  Today we documented Decision Maker(s) consistent with ACP documents on file.    Content/Action Overview:  Has ACP document(s) on file - reflects the patient's care preferences      Length of Voluntary ACP Conversation in minutes:  <16 minutes (Non-Billable)    IDA NAZARIO

## 2024-03-01 PROCEDURE — 6370000000 HC RX 637 (ALT 250 FOR IP): Performed by: STUDENT IN AN ORGANIZED HEALTH CARE EDUCATION/TRAINING PROGRAM

## 2024-03-01 PROCEDURE — 6360000002 HC RX W HCPCS: Performed by: INTERNAL MEDICINE

## 2024-03-01 PROCEDURE — 1100000000 HC RM PRIVATE

## 2024-03-01 PROCEDURE — 2580000003 HC RX 258: Performed by: STUDENT IN AN ORGANIZED HEALTH CARE EDUCATION/TRAINING PROGRAM

## 2024-03-01 RX ADMIN — SODIUM CHLORIDE, PRESERVATIVE FREE 10 ML: 5 INJECTION INTRAVENOUS at 21:00

## 2024-03-01 RX ADMIN — ACETAMINOPHEN 650 MG: 325 TABLET, FILM COATED ORAL at 13:52

## 2024-03-01 RX ADMIN — PREGABALIN 50 MG: 50 CAPSULE ORAL at 08:36

## 2024-03-01 RX ADMIN — VITAMIN D, TAB 1000IU (100/BT) 1000 UNITS: 25 TAB at 08:36

## 2024-03-01 RX ADMIN — SODIUM CHLORIDE, PRESERVATIVE FREE 10 ML: 5 INJECTION INTRAVENOUS at 08:42

## 2024-03-01 RX ADMIN — ENOXAPARIN SODIUM 30 MG: 100 INJECTION SUBCUTANEOUS at 08:36

## 2024-03-01 RX ADMIN — ASPIRIN 81 MG: 81 TABLET, COATED ORAL at 08:36

## 2024-03-01 RX ADMIN — ACETAMINOPHEN 650 MG: 325 TABLET, FILM COATED ORAL at 02:16

## 2024-03-01 RX ADMIN — ACETAMINOPHEN 650 MG: 325 TABLET, FILM COATED ORAL at 20:54

## 2024-03-01 RX ADMIN — BACLOFEN 20 MG: 10 TABLET ORAL at 20:54

## 2024-03-01 RX ADMIN — AMANTADINE HYDROCHLORIDE 100 MG: 100 CAPSULE, LIQUID FILLED ORAL at 20:54

## 2024-03-01 RX ADMIN — AMANTADINE HYDROCHLORIDE 100 MG: 100 CAPSULE, LIQUID FILLED ORAL at 08:36

## 2024-03-01 RX ADMIN — Medication 1000 MCG: at 08:36

## 2024-03-01 RX ADMIN — BACLOFEN 20 MG: 10 TABLET ORAL at 08:36

## 2024-03-01 ASSESSMENT — PAIN SCALES - GENERAL
PAINLEVEL_OUTOF10: 5
PAINLEVEL_OUTOF10: 10
PAINLEVEL_OUTOF10: 7
PAINLEVEL_OUTOF10: 8
PAINLEVEL_OUTOF10: 8

## 2024-03-01 ASSESSMENT — PAIN DESCRIPTION - ORIENTATION
ORIENTATION: RIGHT;LEFT
ORIENTATION: LOWER
ORIENTATION: LOWER

## 2024-03-01 ASSESSMENT — PAIN DESCRIPTION - DESCRIPTORS
DESCRIPTORS: DISCOMFORT;DULL
DESCRIPTORS: DISCOMFORT

## 2024-03-01 ASSESSMENT — PAIN DESCRIPTION - LOCATION
LOCATION: BACK
LOCATION: BUTTOCKS;GENERALIZED
LOCATION: BACK

## 2024-03-01 NOTE — CARE COORDINATION
Pt chart reviewed and discussed in AM IDR for continued stay. LOS 4 days. Pt admitted with UTI. PT discharged pt from services. Socially complex discharge situation. [employer] liaison met with SW and pt to discuss potential eligibility for Medicaid services. SW attempted to contact [employer]. Awaiting for response.    SW to continue to follow.

## 2024-03-02 PROCEDURE — 2580000003 HC RX 258: Performed by: STUDENT IN AN ORGANIZED HEALTH CARE EDUCATION/TRAINING PROGRAM

## 2024-03-02 PROCEDURE — 1100000000 HC RM PRIVATE

## 2024-03-02 PROCEDURE — 6360000002 HC RX W HCPCS: Performed by: INTERNAL MEDICINE

## 2024-03-02 PROCEDURE — 6370000000 HC RX 637 (ALT 250 FOR IP): Performed by: STUDENT IN AN ORGANIZED HEALTH CARE EDUCATION/TRAINING PROGRAM

## 2024-03-02 PROCEDURE — 6370000000 HC RX 637 (ALT 250 FOR IP): Performed by: INTERNAL MEDICINE

## 2024-03-02 RX ORDER — POLYETHYLENE GLYCOL 3350 17 G/17G
17 POWDER, FOR SOLUTION ORAL DAILY
Status: DISCONTINUED | OUTPATIENT
Start: 2024-03-02 | End: 2024-03-05 | Stop reason: HOSPADM

## 2024-03-02 RX ADMIN — PREGABALIN 50 MG: 50 CAPSULE ORAL at 10:29

## 2024-03-02 RX ADMIN — SODIUM CHLORIDE, PRESERVATIVE FREE 10 ML: 5 INJECTION INTRAVENOUS at 19:59

## 2024-03-02 RX ADMIN — AMANTADINE HYDROCHLORIDE 100 MG: 100 CAPSULE, LIQUID FILLED ORAL at 10:29

## 2024-03-02 RX ADMIN — ACETAMINOPHEN 650 MG: 325 TABLET, FILM COATED ORAL at 19:58

## 2024-03-02 RX ADMIN — ENOXAPARIN SODIUM 30 MG: 100 INJECTION SUBCUTANEOUS at 10:29

## 2024-03-02 RX ADMIN — SODIUM CHLORIDE, PRESERVATIVE FREE 10 ML: 5 INJECTION INTRAVENOUS at 10:30

## 2024-03-02 RX ADMIN — Medication 1000 MCG: at 10:29

## 2024-03-02 RX ADMIN — VITAMIN D, TAB 1000IU (100/BT) 1000 UNITS: 25 TAB at 10:29

## 2024-03-02 RX ADMIN — BACLOFEN 20 MG: 10 TABLET ORAL at 19:59

## 2024-03-02 RX ADMIN — BACLOFEN 20 MG: 10 TABLET ORAL at 10:29

## 2024-03-02 RX ADMIN — ACETAMINOPHEN 650 MG: 325 TABLET, FILM COATED ORAL at 06:48

## 2024-03-02 RX ADMIN — AMANTADINE HYDROCHLORIDE 100 MG: 100 CAPSULE, LIQUID FILLED ORAL at 19:59

## 2024-03-02 RX ADMIN — ASPIRIN 81 MG: 81 TABLET, COATED ORAL at 10:29

## 2024-03-02 ASSESSMENT — PAIN - FUNCTIONAL ASSESSMENT: PAIN_FUNCTIONAL_ASSESSMENT: PREVENTS OR INTERFERES SOME ACTIVE ACTIVITIES AND ADLS

## 2024-03-02 ASSESSMENT — PAIN DESCRIPTION - DESCRIPTORS
DESCRIPTORS: DISCOMFORT
DESCRIPTORS: ACHING

## 2024-03-02 ASSESSMENT — PAIN SCALES - GENERAL
PAINLEVEL_OUTOF10: 8
PAINLEVEL_OUTOF10: 3

## 2024-03-02 ASSESSMENT — PAIN DESCRIPTION - LOCATION
LOCATION: BACK;SACRUM
LOCATION: COCCYX

## 2024-03-02 ASSESSMENT — PAIN DESCRIPTION - ORIENTATION: ORIENTATION: LOWER

## 2024-03-03 PROCEDURE — 1100000000 HC RM PRIVATE

## 2024-03-03 PROCEDURE — 6360000002 HC RX W HCPCS: Performed by: INTERNAL MEDICINE

## 2024-03-03 PROCEDURE — 6370000000 HC RX 637 (ALT 250 FOR IP): Performed by: INTERNAL MEDICINE

## 2024-03-03 PROCEDURE — 6370000000 HC RX 637 (ALT 250 FOR IP): Performed by: STUDENT IN AN ORGANIZED HEALTH CARE EDUCATION/TRAINING PROGRAM

## 2024-03-03 PROCEDURE — 2580000003 HC RX 258: Performed by: STUDENT IN AN ORGANIZED HEALTH CARE EDUCATION/TRAINING PROGRAM

## 2024-03-03 RX ADMIN — POLYETHYLENE GLYCOL 3350 17 G: 17 POWDER, FOR SOLUTION ORAL at 09:03

## 2024-03-03 RX ADMIN — ENOXAPARIN SODIUM 30 MG: 100 INJECTION SUBCUTANEOUS at 09:03

## 2024-03-03 RX ADMIN — SODIUM CHLORIDE, PRESERVATIVE FREE 10 ML: 5 INJECTION INTRAVENOUS at 20:50

## 2024-03-03 RX ADMIN — PREGABALIN 50 MG: 50 CAPSULE ORAL at 09:03

## 2024-03-03 RX ADMIN — SODIUM CHLORIDE, PRESERVATIVE FREE 10 ML: 5 INJECTION INTRAVENOUS at 09:04

## 2024-03-03 RX ADMIN — VITAMIN D, TAB 1000IU (100/BT) 1000 UNITS: 25 TAB at 09:03

## 2024-03-03 RX ADMIN — BACLOFEN 20 MG: 10 TABLET ORAL at 20:50

## 2024-03-03 RX ADMIN — Medication 1000 MCG: at 09:03

## 2024-03-03 RX ADMIN — BACLOFEN 20 MG: 10 TABLET ORAL at 09:03

## 2024-03-03 RX ADMIN — ACETAMINOPHEN 650 MG: 325 TABLET, FILM COATED ORAL at 20:50

## 2024-03-03 RX ADMIN — AMANTADINE HYDROCHLORIDE 100 MG: 100 CAPSULE, LIQUID FILLED ORAL at 20:50

## 2024-03-03 RX ADMIN — HYDROXYZINE HYDROCHLORIDE 25 MG: 25 TABLET, FILM COATED ORAL at 09:06

## 2024-03-03 RX ADMIN — ASPIRIN 81 MG: 81 TABLET, COATED ORAL at 09:03

## 2024-03-03 RX ADMIN — AMANTADINE HYDROCHLORIDE 100 MG: 100 CAPSULE, LIQUID FILLED ORAL at 09:03

## 2024-03-03 ASSESSMENT — PAIN DESCRIPTION - DESCRIPTORS: DESCRIPTORS: ACHING

## 2024-03-03 ASSESSMENT — PAIN SCALES - GENERAL
PAINLEVEL_OUTOF10: 2
PAINLEVEL_OUTOF10: 3
PAINLEVEL_OUTOF10: 0

## 2024-03-03 ASSESSMENT — PAIN DESCRIPTION - LOCATION: LOCATION: BUTTOCKS

## 2024-03-03 NOTE — PLAN OF CARE
Problem: Skin/Tissue Integrity  Goal: Absence of new skin breakdown  Description: 1.  Monitor for areas of redness and/or skin breakdown  2.  Assess vascular access sites hourly  3.  Every 4-6 hours minimum:  Change oxygen saturation probe site  4.  Every 4-6 hours:  If on nasal continuous positive airway pressure, respiratory therapy assess nares and determine need for appliance change or resting period.  3/3/2024 0954 by Ama Huber RN  Outcome: Progressing  3/2/2024 2330 by Kathy [facility] RN  Outcome: Progressing     Problem: Safety - Adult  Goal: Free from fall injury  3/3/2024 0954 by Ama Huber RN  Outcome: Progressing  3/2/2024 2330 by Kathy [facility] RN  Outcome: Progressing     Problem: Wound:  Goal: Will show signs of wound healing; wound closure and no evidence of infection  Description: Will show signs of wound healing; wound closure and no evidence of infection  3/3/2024 0954 by Ama Huber RN  Outcome: Progressing  3/2/2024 2330 by Kathy [facility] RN  Outcome: Progressing     Problem: Pressure Ulcer:  Goal: Signs of wound healing will improve  Description: Signs of wound healing will improve  Outcome: Progressing  Goal: Absence of new pressure ulcer  Description: Absence of new pressure ulcer  3/3/2024 0954 by Ama Huber RN  Outcome: Progressing  3/2/2024 2330 by Kathy [facility] RN  Outcome: Progressing  Goal: Will show no infection signs and symptoms  Description: Will show no infection signs and symptoms  3/3/2024 0954 by Ama Huber RN  Outcome: Progressing  3/2/2024 2330 by Kathy [facility] RN  Outcome: Progressing     Problem: Pain  Goal: Verbalizes/displays adequate comfort level or baseline comfort level  3/3/2024 0954 by Ama Huber RN  Outcome: Progressing  3/2/2024 2330 by Kathy [facility] RN  Outcome: Progressing

## 2024-03-04 PROCEDURE — 6370000000 HC RX 637 (ALT 250 FOR IP): Performed by: STUDENT IN AN ORGANIZED HEALTH CARE EDUCATION/TRAINING PROGRAM

## 2024-03-04 PROCEDURE — 6360000002 HC RX W HCPCS: Performed by: INTERNAL MEDICINE

## 2024-03-04 PROCEDURE — 1100000000 HC RM PRIVATE

## 2024-03-04 PROCEDURE — 2580000003 HC RX 258: Performed by: STUDENT IN AN ORGANIZED HEALTH CARE EDUCATION/TRAINING PROGRAM

## 2024-03-04 PROCEDURE — 6370000000 HC RX 637 (ALT 250 FOR IP): Performed by: INTERNAL MEDICINE

## 2024-03-04 RX ORDER — OXYCODONE HYDROCHLORIDE 5 MG/1
5 TABLET ORAL EVERY 4 HOURS PRN
Status: DISCONTINUED | OUTPATIENT
Start: 2024-03-04 | End: 2024-03-05 | Stop reason: HOSPADM

## 2024-03-04 RX ADMIN — VITAMIN D, TAB 1000IU (100/BT) 1000 UNITS: 25 TAB at 08:54

## 2024-03-04 RX ADMIN — Medication 1000 MCG: at 08:54

## 2024-03-04 RX ADMIN — PREGABALIN 50 MG: 50 CAPSULE ORAL at 08:54

## 2024-03-04 RX ADMIN — ENOXAPARIN SODIUM 30 MG: 100 INJECTION SUBCUTANEOUS at 08:55

## 2024-03-04 RX ADMIN — AMANTADINE HYDROCHLORIDE 100 MG: 100 CAPSULE, LIQUID FILLED ORAL at 23:32

## 2024-03-04 RX ADMIN — SODIUM CHLORIDE, PRESERVATIVE FREE 10 ML: 5 INJECTION INTRAVENOUS at 08:55

## 2024-03-04 RX ADMIN — BACLOFEN 20 MG: 10 TABLET ORAL at 23:32

## 2024-03-04 RX ADMIN — AMANTADINE HYDROCHLORIDE 100 MG: 100 CAPSULE, LIQUID FILLED ORAL at 08:54

## 2024-03-04 RX ADMIN — BACLOFEN 20 MG: 10 TABLET ORAL at 08:54

## 2024-03-04 RX ADMIN — SODIUM CHLORIDE, PRESERVATIVE FREE 10 ML: 5 INJECTION INTRAVENOUS at 23:35

## 2024-03-04 RX ADMIN — ASPIRIN 81 MG: 81 TABLET, COATED ORAL at 08:54

## 2024-03-04 RX ADMIN — OXYCODONE 5 MG: 5 TABLET ORAL at 13:03

## 2024-03-04 ASSESSMENT — PAIN SCALES - GENERAL
PAINLEVEL_OUTOF10: 0
PAINLEVEL_OUTOF10: 0
PAINLEVEL_OUTOF10: 4

## 2024-03-04 ASSESSMENT — PAIN DESCRIPTION - DESCRIPTORS: DESCRIPTORS: ACHING

## 2024-03-04 ASSESSMENT — PAIN DESCRIPTION - LOCATION: LOCATION: OTHER (COMMENT)

## 2024-03-04 NOTE — PROGRESS NOTES
Hospitalist Progress Note   Admit Date:  2024 12:38 PM   Name:  Thalia Javier   Age:  63 y.o.  Sex:  female  :  1960   MRN:  [id number]   Room:  [facility]/    Presenting/Chief Complaint: OTHER and Care Management     Reason(s) for Admission: Debility [R53.81]  UTI (urinary tract infection) [N39.0]  Hx of multiple sclerosis (HCC) [G35]  Urinary tract infection without hematuria, site unspecified [N39.0]     Hospital Course:   Thalia Javier is a 63 y.o. female with medical history of multiple sclerosis who presented to the ED on 2024 from hospice respite care service. Patient was set to return home on  but had no one to help care for her at home, so hospice sent her to the ER for placement. See  note on  for more details.  In the ED, labs showed WBC 14.1 and UA showed positive nitrites and large leukocyte esterase.  Chest x-ray showed no acute abnormalities.  She received ceftriaxone IV fluids and was admitted for further care.     Subjective & 24hr Events:   This morning feeling well.  Denies any nausea or vomiting.    Assessment & Plan:     UTI (POA, due to suspected gram-negative bacteria)  -Continue ceftriaxone     Mood disorder  -Duloxetine, hydroxyzine     Multiple sclerosis  -Amantadine, baclofen  -PT/OT     Neuropathy  -Lyrica     PT/OT evals and PPD needed/ordered?  Yes  Diet: ADULT DIET; Regular  VTE prophylaxis: Lovenox  Code status: Full Code  Dispo: CM assisting with DC planning given complex social situation.    Non-peripheral Lines and Tubes (if present):      none    Hospital Problems:  Principal Problem:    UTI (urinary tract infection)  Resolved Problems:    * No resolved hospital problems. *      Objective:   Patient Vitals for the past 24 hrs:   Temp Pulse Resp BP SpO2   24 0743 98.5 °F (36.9 °C) (!) 104 16 104/78 96 %   24 2122 98.8 °F (37.1 °C) 99 18 129/86 97 %         Physical Exam:   General: in no acute distress.   Head: no scleral 
       Hospitalist Progress Note   Admit Date:  2024 12:38 PM   Name:  Thalia Javier   Age:  63 y.o.  Sex:  female  :  1960   MRN:  [id number]   Room:  [facility]/    Presenting/Chief Complaint: OTHER and Care Management     Reason(s) for Admission: Debility [R53.81]  UTI (urinary tract infection) [N39.0]  Hx of multiple sclerosis (HCC) [G35]  Urinary tract infection without hematuria, site unspecified [N39.0]     Hospital Course:   Thalia Javier is a 63 y.o. female with medical history of multiple sclerosis who presented to the ED on 2024 from hospice respite care service. Patient was set to return home on  but had no one to help care for her at home, so hospice sent her to the ER for placement. See  note on  for more details.  In the ED, labs showed WBC 14.1 and UA showed positive nitrites and large leukocyte esterase.  Chest x-ray showed no acute abnormalities.  She received ceftriaxone IV fluids and was admitted for further care.     Subjective & 24hr Events:   This morning resting comfortably.  No complaints.    Assessment & Plan:     UTI (POA, due to suspected gram-negative bacteria)  -Continue ceftriaxone     Mood disorder  -Duloxetine, hydroxyzine     Multiple sclerosis  -Amantadine, baclofen  -PT/OT     Neuropathy  -Lyrica     PT/OT evals and PPD needed/ordered?  Yes  Diet: ADULT DIET; Regular  VTE prophylaxis: Lovenox  Code status: Full Code  Dispo: CM assisting with DC planning given complex social situation.    Non-peripheral Lines and Tubes (if present):      External Urinary Catheter (Active)    none    Hospital Problems:  Principal Problem:    UTI (urinary tract infection)  Resolved Problems:    * No resolved hospital problems. *      Objective:   Patient Vitals for the past 24 hrs:   Temp Pulse Resp BP SpO2   24 0842 97.3 °F (36.3 °C) 81 16 103/62 97 %   24 2150 97.3 °F (36.3 °C) 86 16 110/75 100 %         Physical Exam:   General: in no acute distress. 
       Hospitalist Progress Note   Admit Date:  2024 12:38 PM   Name:  Thalia Javier   Age:  63 y.o.  Sex:  female  :  1960   MRN:  [id number]   Room:  [facility]/    Presenting/Chief Complaint: OTHER and Care Management     Reason(s) for Admission: Debility [R53.81]  UTI (urinary tract infection) [N39.0]  Hx of multiple sclerosis (HCC) [G35]  Urinary tract infection without hematuria, site unspecified [N39.0]     Hospital Course:   Thalia Javier is a 63 y.o. female with medical history of multiple sclerosis who presented to the ED on 2024 from hospice respite care service. Patient was set to return home on  but had no one to help care for her at home, so hospice sent her to the ER for placement. See  note on  for more details.  In the ED, labs showed WBC 14.1 and UA showed positive nitrites and large leukocyte esterase.  Chest x-ray showed no acute abnormalities.  She received ceftriaxone IV fluids and was admitted for further care.     Subjective & 24hr Events:   This morning feeling well.  Denies any nausea or vomiting.    Assessment & Plan:     UTI (POA, due to suspected gram-negative bacteria)  -Continue ceftriaxone     Mood disorder  -Duloxetine, hydroxyzine     Multiple sclerosis  -Amantadine, baclofen  -PT/OT     Neuropathy  -Lyrica     PT/OT evals and PPD needed/ordered?  Yes  Diet: ADULT DIET; Regular  VTE prophylaxis: Lovenox  Code status: Full Code  Dispo: CM assisting with DC planning given complex social situation.    Non-peripheral Lines and Tubes (if present):      none    Hospital Problems:  Principal Problem:    UTI (urinary tract infection)  Resolved Problems:    * No resolved hospital problems. *      Objective:   Patient Vitals for the past 24 hrs:   Temp Pulse Resp BP SpO2   24 0822 98.2 °F (36.8 °C) 93 -- 125/78 97 %   24 98.7 °F (37.1 °C) 98 24 124/79 95 %         Physical Exam:   General: in no acute distress.   Head: no scleral icterus 
       Hospitalist Progress Note   Admit Date:  2024 12:38 PM   Name:  Thalia Javier   Age:  63 y.o.  Sex:  female  :  1960   MRN:  [id number]   Room:  [facility]/    Presenting/Chief Complaint: OTHER and Care Management     Reason(s) for Admission: Debility [R53.81]  UTI (urinary tract infection) [N39.0]  Hx of multiple sclerosis (HCC) [G35]  Urinary tract infection without hematuria, site unspecified [N39.0]     Hospital Course:   Thalia Javier is a 63 y.o. female with medical history of multiple sclerosis who presented to the ED on 2024 from hospice respite care service. Patient was set to return home on  but had no one to help care for her at home, so hospice sent her to the ER for placement. See  note on  for more details.  In the ED, labs showed WBC 14.1 and UA showed positive nitrites and large leukocyte esterase.  Chest x-ray showed no acute abnormalities.  She received ceftriaxone IV fluids and was admitted for further care.     Subjective & 24hr Events:   This morning resting comfortably.  No complaints.    Assessment & Plan:     UTI (POA, due to suspected gram-negative bacteria)  -Continue ceftriaxone     Mood disorder  -Duloxetine, hydroxyzine     Multiple sclerosis  -Amantadine, baclofen  -PT/OT     Neuropathy  -Lyrica     PT/OT evals and PPD needed/ordered?  Yes  Diet: ADULT DIET; Regular  VTE prophylaxis: Lovenox  Code status: Full Code  Dispo: CM assisting with DC planning given complex social situation.    Non-peripheral Lines and Tubes (if present):      External Urinary Catheter (Active)    none    Hospital Problems:  Principal Problem:    UTI (urinary tract infection)  Resolved Problems:    * No resolved hospital problems. *      Objective:   Patient Vitals for the past 24 hrs:   Temp Pulse Resp BP SpO2   24 0754 97.5 °F (36.4 °C) 83 18 (!) 102/51 97 %   24 2114 97.9 °F (36.6 °C) 87 16 98/71 97 %         Physical Exam:   General: in no acute 
       Hospitalist Progress Note   Admit Date:  2024 12:38 PM   Name:  Thalia Javier   Age:  63 y.o.  Sex:  female  :  1960   MRN:  [id number]   Room:  [facility]/    Presenting/Chief Complaint: OTHER and Care Management     Reason(s) for Admission: Debility [R53.81]  UTI (urinary tract infection) [N39.0]  Hx of multiple sclerosis (HCC) [G35]  Urinary tract infection without hematuria, site unspecified [N39.0]     Hospital Course:   Thalia Javier is a 63 y.o. female with medical history of multiple sclerosis who presented to the ED on 2024 from hospice respite care service. Patient was set to return home on  but had no one to help care for her at home, so hospice sent her to the ER for placement. See  note on  for more details.  In the ED, labs showed WBC 14.1 and UA showed positive nitrites and large leukocyte esterase.  Chest x-ray showed no acute abnormalities.  She received ceftriaxone IV fluids and was admitted for further care.     Subjective & 24hr Events:   No acute overnight events.  Patient resting in bed with no complaints.    Assessment & Plan:     UTI (POA, due to suspected gram-negative bacteria)  -Continue ceftriaxone and follow-up on culture results     Mood disorder  -Duloxetine, hydroxyzine     Multiple sclerosis  -Amantadine, baclofen  -PT/OT     Neuropathy  -Lyrica     PT/OT evals and PPD needed/ordered?  Yes  Diet: ADULT DIET; Regular  VTE prophylaxis: Lovenox  Code status: Full Code  Dispo: CM assisting with DC planning given complex social situation.    Non-peripheral Lines and Tubes (if present):      none    Hospital Problems:  Principal Problem:    UTI (urinary tract infection)  Resolved Problems:    * No resolved hospital problems. *      Objective:   Patient Vitals for the past 24 hrs:   Temp Pulse Resp BP SpO2   24 0720 98.2 °F (36.8 °C) 91 18 120/71 97 %   24 1937 97.7 °F (36.5 °C) 90 18 126/71 97 %   24 1630 -- -- -- 121/65 -- 
       Hospitalist Progress Note   Admit Date:  2024 12:38 PM   Name:  Thalia aJvier   Age:  63 y.o.  Sex:  female  :  1960   MRN:  [id number]   Room:  [facility]/    Presenting/Chief Complaint: OTHER and Care Management     Reason(s) for Admission: Debility [R53.81]  UTI (urinary tract infection) [N39.0]  Hx of multiple sclerosis (HCC) [G35]  Urinary tract infection without hematuria, site unspecified [N39.0]     Hospital Course:   Thalia Javier is a 63 y.o. female with medical history of multiple sclerosis who presented to the ED on 2024 from hospice respite care service. Patient was set to return home on  but had no one to help care for her at home, so hospice sent her to the ER for placement. See  note on  for more details.  In the ED, labs showed WBC 14.1 and UA showed positive nitrites and large leukocyte esterase.  Chest x-ray showed no acute abnormalities.  She received ceftriaxone IV fluids and was admitted for further care.     Subjective & 24hr Events:   This morning resting comfortably.  No complaints.    Assessment & Plan:     UTI   -Status post ceftriaxone     Mood disorder  -Duloxetine, hydroxyzine     Multiple sclerosis  -Amantadine, baclofen  -PT/OT     Neuropathy  -Lyrica     PT/OT evals and PPD needed/ordered?  Yes  Diet: ADULT DIET; Regular  VTE prophylaxis: Lovenox  Code status: Full Code  Dispo: CM assisting with DC planning given complex social situation.    Non-peripheral Lines and Tubes (if present):      External Urinary Catheter (Active)    none    Hospital Problems:  Principal Problem:    UTI (urinary tract infection)  Resolved Problems:    * No resolved hospital problems. *      Objective:   Patient Vitals for the past 24 hrs:   Temp Pulse Resp BP SpO2   24 0822 98.5 °F (36.9 °C) 88 16 130/74 93 %   24 2101 97.8 °F (36.6 °C) 91 14 (!) 99/55 97 %         Physical Exam:   General: in no acute distress.   Head: no scleral icterus   CV: regular 
  OCCUPATIONAL THERAPY Initial Assessment          Acknowledge Orders  Time  OT Charge Capture  Rehab Caseload Tracker      Thalia Javier is a 63 y.o. female   PRIMARY DIAGNOSIS: UTI (urinary tract infection)  Debility [R53.81]  UTI (urinary tract infection) [N39.0]  Hx of multiple sclerosis (HCC) [G35]  Urinary tract infection without hematuria, site unspecified [N39.0]       Reason for Referral: Generalized Muscle Weakness (M62.81)  Inpatient: Payor: MEDICARE / Plan: MEDICARE PART A AND B / Product Type: *No Product type* /     ASSESSMENT:     REHAB RECOMMENDATIONS:   Recommendation to date pending progress:  Setting:  No further skilled occupational therapy after discharge from hospital  Pt will require full care     Equipment:    None     ASSESSMENT:  Ms. Javier was admitted with above diagnosis. Pt came through ED after being at [facility] in respite care. Therapy evaluated pt to assist with discharge planning. Per pt's chart she has been on hospice at home for quiet sometime with sister's assistance. Pt is noted with some cognitive impairment. She is unrealistic about her physical ability and is inconsistent with reports of how she lives. OT would seriously question her ability to make appropriate decisions about her own care. Pt is noted with severe ankle, knee and hip extension contracture and unable to sit edge of bed. Pt is noted with multiple pressure wounds. It appears Ms. Javier has been bed bound for an extended period. This pt will need full time care.      [facility] AM-PAC™ “6 Clicks” Daily Activity Inpatient Short Form:                SUBJECTIVE:     Ms. Javier states, \"I live by myself and use a scooter\"     Social/Functional Lives With: Alone  Type of Home: House  Home Layout: One level  Home Access: Ramped entrance  Bathroom Shower/Tub: Walk-in shower  Bathroom Toilet: Standard  Bathroom Equipment: Grab bars in shower, Shower chair  Bathroom Accessibility: Accessible  Home Equipment: 
  Physician Progress Note      PATIENT:               MARICHUY CURIEL  CSN #:                  [id number]  :                       1960  ADMIT DATE:       2024 12:38 PM  DISCH DATE:  RESPONDING  PROVIDER #:        Cedric Erickson DO          QUERY TEXT:    Pt admitted with UTI.  Noted documentation of stage 3 sacral ulcer on RN wound   care note.  If possible, please document in progress notes and discharge   summary:    The medical record reflects the following:  Risk Factors: MS, patient on hospice    Clinical Indicators:  RN wound care note, \"Right ischial with stage 3   ulcer measuring 4 x3 x 0.2 cm with slough and pink tissue in base. Sacrum with   stage 2 pressure injury with slough present as well.\" and \"Right heel with   small stage 2 pressure injury, left heel with stage one blanchable erythema\"    Treatment: Wound care consult    Maury Morris RN CDI  [email or web address]  Options provided:  -- Stage 3 right ischial ulcer, stage 2 sacral ulcer, and stage 2 right heel   ulcer confirmed present on admission  -- Other - I will add my own diagnosis  -- Disagree - Not applicable / Not valid  -- Disagree - Clinically unable to determine / Unknown  -- Refer to Clinical Documentation Reviewer    PROVIDER RESPONSE TEXT:    The diagnosis of stage 3 right ischial ulcer, stage 2 sacral ucler, and stage   2 right heel ulcer was confirmed as present on admission.    Query created by: Maury Morris on 2024 1:13 PM      Electronically signed by:  Cedric Erickson DO 2024 1:33 PM          
Comprehensive Nutrition Assessment    Type and Reason for Visit: Initial, Wound  Best Practice Alert for Pressure Injury Stage 2 or greater    Nutrition Recommendations/Plan:   Meals and Snacks:  Diet: Continue current order  Nutrition Supplement Therapy:  Medical food supplement therapy:  Initiate Ensure Enlive three times per day (this provides 350 kcal and 20 grams protein per bottle) and Carroll twice per day (this provides 90 kcal and 2.5 grams protein per packet). Provided ot with both to drink either with lunch and/or this afternoon.  Order placed for daily weights     Malnutrition Assessment:  Malnutrition Status: Moderate malnutrition  Context: Chronic Illness  Findings of clinical characteristics of malnutrition:   Energy Intake:  Unable to assess (pt reports chronic low po intake, Boost 2-3 bottles per day meets 25-50% of estimated needs)  Weight Loss:   (Potential 5.6-10.5% weight loss since last admit but suspect scale variances so unable to validate)     Body Fat Loss:  Mild body fat loss Triceps, Orbital, Fat Overlying Ribs, Buccal region (mild to moderate could be partly d/t MS)   Muscle Mass Loss:  Mild muscle mass loss Temples (temporalis), Hand (interosseous), Clavicles (pectoralis & deltoids) (Mild to moderate-could be partly d/t MS)  Fluid Accumulation:        Strength:      Lower body NFPE deferred secondary BLE contractures/MS   Nutrition Assessment:  Nutrition History: Per prior RD note 9/15/2023:  Pt reports she has never eaten much, she doesn't provide any specific recall. Noted to live at home alone with hospice 3 times weekly. Pt reports her sister has always been like a mother to her when asked about po intake stating she takes care of her but offers no specific nutrition hx. Pt reports she was a chubby teenager but has never weighed greater than 100# since MS.   2/29: Pt denies any change in appetite, intake or subjective weight loss. She reports she has never been a big eater. 
distress.   Head: no scleral icterus   CV: regular rate and rhythm   Lungs: clear breath sounds bilaterally   Abdomen: abdomen is soft, non-tender, non-distended   Extremities: Chronic muscle atrophy  Skin: warm and dry    Neuro: Generalized weakness  Psych: normal affect     I have personally reviewed recent labs and imaging.    Signed:  Pb Lujan MD    
12 oz 46.6 kg Bed scale   2/29/2024 102 lbs 1 oz 46.3 kg Bed scale     BMI: 18.1, Underweight (BMI less than 18.5)     Ideal Body Weight (Kg) (Calculated): 55 kg (120 lbs),    BMI Category Underweight (BMI less than 18.5)  Estimated Daily Nutrient Needs:  Energy (kcal/day): 1523-5170 (35-40 kcal/kg) (Kcal/kg Weight used: 46.3 kg Current  Protein (g/day): 58-70 (1.25-1.5 g/kg) Weight Used: (Current) 46.3 kg  Fluid (ml/day):   (1 ml/kcal)    Nutrition Diagnosis:   Increased nutrient needs related to increase demand for energy/nutrients as evidenced by wounds, BMI  Moderate malnutrition, In context of chronic illness related to increase demand for energy/nutrients (likely chronic inadequate intake, end stage MS) as evidenced by wounds, moderate loss of subcutaneous fat, moderate muscle loss (limited diet recall but includes ONS 2-3 bottles per day to meet ~25-50% of estimated needs, potential weight loss)   Inadequate oral intake r/t altered appetite,  as evidenced by  chronic low po, current intake meeting 50-75% of estimated needs, base don stated ONS intake      Nutrition Interventions:   Food and/or Nutrient Delivery: Continue Current Diet, Continue Oral Nutrition Supplement  Nutrition Education/Counseling: Survival skills/brief education completed. Encouraged increased intake of Ensure and Carroll. Suggested 2nd serving of Carroll in the evenings   Coordination of Nutrition Care: Continue to monitor while inpatient, Interdisciplinary Rounds      Goals:   Previous Goal Met: Progressing toward Goal(s)  Active Goal: Meet at least 75% of estimated needs, by next RD assessment       Nutrition Monitoring and Evaluation:      Food/Nutrient Intake Outcomes: Food and Nutrient Intake, Supplement Intake  Physical Signs/Symptoms Outcomes: Weight    Discharge Planning:    Continue Oral Nutrition Supplement    Kaela Jacobs, MENA,LD, [facility] [phone]    
Vitals        Oxygen      External Catheter    RESTRICTIONS/PRECAUTIONS:                    GROSS EVALUATION:  Intact Impaired (Comments):   AROM []  LE- non functional, contractures into extension hip, knees and plantar flexed ankles   PROM [] Minimal knee,ankle hips - causes extensor tone and pain   Strength []  LE- non functional, contractures into extension hip, knees and plantar flexed ankles - no active movement LE's 0-1/5   Balance []  Unable to actively sit   Posture [] Extended posture   Sensation []  Pt. Reports intact   Coordination []   Non functional   Tone []  Extensor tone   Edema []    Activity Tolerance []  unable    []      COGNITION/  PERCEPTION: Intact Impaired (Comments):   Orientation []  Questionable historian as story changed throughout session   Vision []     Hearing []     Cognition  []       MOBILITY: I Mod I S SBA CGA Min Mod Max Total  NT x2 Comments:   Bed Mobility    Rolling [] [] [] [] [] [] [] [] [x] [] [x]    Supine to Sit [] [] [] [] [] [] [] [] [x] [] [x]    Scooting [] [] [] [] [] [] [] [] [x] [] [x]    Sit to Supine [] [] [] [] [] [] [] [] [x] [] [x]    Transfers    Sit to Stand [] [] [] [] [] [] [] [] [] [x] []    Bed to Chair [] [] [] [] [] [] [] [] [] [x] []    Stand to Sit [] [] [] [] [] [] [] [] [] [x] []     [] [] [] [] [] [] [] [] [] [] []    I=Independent, Mod I=Modified Independent, S=Supervision, SBA=Standby Assistance, CGA=Contact Guard Assistance,   Min=Minimal Assistance, Mod=Moderate Assistance, Max=Maximal Assistance, Total=Total Assistance, NT=Not Tested    GAIT: I Mod I S SBA CGA Min Mod Max Total  NT x2 Comments:   Level of Assistance [] [] [] [] [] [] [] [] [] [x] []    Distance   feet    DME N/A    Gait Quality N/A    Weightbearing Status      Stairs      I=Independent, Mod I=Modified Independent, S=Supervision, SBA=Standby Assistance, CGA=Contact Guard Assistance,   Min=Minimal Assistance, Mod=Moderate Assistance, Max=Maximal Assistance, Total=Total

## 2024-03-04 NOTE — CARE COORDINATION
Case management spoke with patient's sister, Aretha, at length via phone. TRACEY explained to Aretha that patient has been medically ready for discharge for several days now. Patient has expressed multiple times to TRACEY that her desire is to return home on hospice, which she has been doing since 2021. Aretha expressed concerns regarding herself having surgery on March 18 and that she would not be able to be there for her sister. TRACEY suggested private care givers as it was mentioned previously that patient had one coming 5 days per week. Aretha confirmed patient did have caregivers coming 5 days per week when she was home with [facility] prior to her respite stay at [facility]. She tells TRACEY that between social security disability and skilled nursing, Ms. Javier is bringing in around $4,000/month. TRACEY gave Aretha resources for private caregivers. Aretha is in agreement with the fact that patients desire to go home should be honored and she is in agreement for patient to discharge home tomorrow (3/4/2024). She is requesting transport be set up for 1530 as she gets off of work at 1500 and wants to ensure she gets there in time to unlock the home. TRACEY informed Aretha we would call back in the morning and confirm transport time. TRACEY will continue to follow.

## 2024-03-04 NOTE — PLAN OF CARE
Problem: Skin/Tissue Integrity  Goal: Absence of new skin breakdown  Description: 1.  Monitor for areas of redness and/or skin breakdown  2.  Assess vascular access sites hourly  3.  Every 4-6 hours minimum:  Change oxygen saturation probe site  4.  Every 4-6 hours:  If on nasal continuous positive airway pressure, respiratory therapy assess nares and determine need for appliance change or resting period.  3/4/2024 1036 by Ama Huber RN  Outcome: Progressing  3/4/2024 0044 by Kathy Stokes RN  Outcome: Progressing     Problem: Safety - Adult  Goal: Free from fall injury  3/4/2024 1036 by Ama Huber RN  Outcome: Progressing  3/4/2024 0044 by Kathy Stokes RN  Outcome: Progressing     Problem: Wound:  Goal: Will show signs of wound healing; wound closure and no evidence of infection  Description: Will show signs of wound healing; wound closure and no evidence of infection  3/4/2024 1036 by Ama Huber RN  Outcome: Progressing  3/4/2024 0044 by Kathy Stokes RN  Outcome: Progressing     Problem: Pressure Ulcer:  Goal: Signs of wound healing will improve  Description: Signs of wound healing will improve  3/4/2024 1036 by Ama Huber RN  Outcome: Progressing  3/4/2024 0044 by Kathy Stokes RN  Outcome: Progressing  Goal: Absence of new pressure ulcer  Description: Absence of new pressure ulcer  3/4/2024 1036 by Ama Huber RN  Outcome: Progressing  3/4/2024 0044 by Kathy Stokes RN  Outcome: Progressing  Goal: Will show no infection signs and symptoms  Description: Will show no infection signs and symptoms  3/4/2024 1036 by Ama Huber RN  Outcome: Progressing  3/4/2024 0044 by Kathy Stokes RN  Outcome: Progressing     Problem: Pain  Goal: Verbalizes/displays adequate comfort level or baseline comfort level  3/4/2024 1036 by Ama Huber RN  Outcome: Progressing  3/4/2024 0044 by Kathy Stokes RN  Outcome: Progressing

## 2024-03-04 NOTE — PLAN OF CARE
Problem: Skin/Tissue Integrity  Goal: Absence of new skin breakdown  Description: 1.  Monitor for areas of redness and/or skin breakdown  2.  Assess vascular access sites hourly  3.  Every 4-6 hours minimum:  Change oxygen saturation probe site  4.  Every 4-6 hours:  If on nasal continuous positive airway pressure, respiratory therapy assess nares and determine need for appliance change or resting period.  Outcome: Progressing     Problem: Discharge Planning  Goal: Discharge to home or other facility with appropriate resources  Outcome: Progressing  Flowsheets (Taken 3/3/2024 1947)  Discharge to home or other facility with appropriate resources: Identify discharge learning needs (meds, wound care, etc)     Problem: Safety - Adult  Goal: Free from fall injury  Outcome: Progressing     Problem: Wound:  Goal: Will show signs of wound healing; wound closure and no evidence of infection  Description: Will show signs of wound healing; wound closure and no evidence of infection  Outcome: Progressing     Problem: Pressure Ulcer:  Goal: Signs of wound healing will improve  Description: Signs of wound healing will improve  Outcome: Progressing  Goal: Absence of new pressure ulcer  Description: Absence of new pressure ulcer  Outcome: Progressing  Goal: Will show no infection signs and symptoms  Description: Will show no infection signs and symptoms  Outcome: Progressing     Problem: Pain  Goal: Verbalizes/displays adequate comfort level or baseline comfort level  Outcome: Progressing

## 2024-03-04 NOTE — CARE COORDINATION
Pt chart reviewed and discussed in AM IDR for continued stay. LOS 7 days. Pt admitted with UTI. Per MD, pt stable for discharge pending placement.     CM contacted [facility] and [facility]. Referral sent to [facility] and awaiting if able to accept pt for hospice services.     CM to continue to follow.

## 2024-03-05 VITALS
HEART RATE: 85 BPM | TEMPERATURE: 97.9 F | BODY MASS INDEX: 17.8 KG/M2 | DIASTOLIC BLOOD PRESSURE: 70 MMHG | WEIGHT: 104.28 LBS | OXYGEN SATURATION: 97 % | SYSTOLIC BLOOD PRESSURE: 105 MMHG | HEIGHT: 64 IN | RESPIRATION RATE: 18 BRPM

## 2024-03-05 PROCEDURE — 2580000003 HC RX 258: Performed by: STUDENT IN AN ORGANIZED HEALTH CARE EDUCATION/TRAINING PROGRAM

## 2024-03-05 PROCEDURE — 6360000002 HC RX W HCPCS: Performed by: INTERNAL MEDICINE

## 2024-03-05 PROCEDURE — 6370000000 HC RX 637 (ALT 250 FOR IP): Performed by: STUDENT IN AN ORGANIZED HEALTH CARE EDUCATION/TRAINING PROGRAM

## 2024-03-05 PROCEDURE — 6370000000 HC RX 637 (ALT 250 FOR IP): Performed by: INTERNAL MEDICINE

## 2024-03-05 RX ADMIN — BACLOFEN 20 MG: 10 TABLET ORAL at 09:03

## 2024-03-05 RX ADMIN — VITAMIN D, TAB 1000IU (100/BT) 1000 UNITS: 25 TAB at 09:02

## 2024-03-05 RX ADMIN — POLYETHYLENE GLYCOL 3350 17 G: 17 POWDER, FOR SOLUTION ORAL at 09:03

## 2024-03-05 RX ADMIN — PREGABALIN 50 MG: 50 CAPSULE ORAL at 09:02

## 2024-03-05 RX ADMIN — ASPIRIN 81 MG: 81 TABLET, COATED ORAL at 09:02

## 2024-03-05 RX ADMIN — Medication 1000 MCG: at 09:03

## 2024-03-05 RX ADMIN — AMANTADINE HYDROCHLORIDE 100 MG: 100 CAPSULE, LIQUID FILLED ORAL at 09:02

## 2024-03-05 RX ADMIN — SODIUM CHLORIDE, PRESERVATIVE FREE 10 ML: 5 INJECTION INTRAVENOUS at 09:02

## 2024-03-05 RX ADMIN — ENOXAPARIN SODIUM 30 MG: 100 INJECTION SUBCUTANEOUS at 09:02

## 2024-03-05 NOTE — DISCHARGE SUMMARY
Hospitalist Discharge Summary   Admit Date:  2024 12:38 PM   DC Note date: 3/5/2024  Name:  Thalia Javier   Age:  63 y.o.  Sex:  female  :  1960   MRN:  [id number]   Room:  [facility]  PCP:  Yissel Javier MD    Presenting Complaint: OTHER and Care Management     Initial Admission Diagnosis: Debility [R53.81]  UTI (urinary tract infection) [N39.0]  Hx of multiple sclerosis (HCC) [G35]  Urinary tract infection without hematuria, site unspecified [N39.0]     Problem List for this Hospitalization (present on admission):    Principal Problem:    UTI (urinary tract infection)  Resolved Problems:    * No resolved hospital problems. *      Hospital Course:  63 y.o. female with medical history of multiple sclerosis who presented to the ED on 2024 from hospice respite care service. Patient was set to return home on  but had no one to help care for her at home, so hospice sent her to the ER for placement. See  note on  for more details.  In the ED, labs showed WBC 14.1 and UA showed positive nitrites and large leukocyte esterase.  Chest x-ray showed no acute abnormalities.  She was started on a IV ceftriaxone.  Improved symptomatology.  Returned back with home hospice on discharge.    Disposition: Home Hospice  Diet: ADULT DIET; Regular  ADULT ORAL NUTRITION SUPPLEMENT; Breakfast, Lunch, Dinner; Standard High Calorie/High Protein Oral Supplement  ADULT ORAL NUTRITION SUPPLEMENT; Breakfast, Dinner; Wound Healing Oral Supplement  Code Status: DNR    Follow Ups:  Follow-up Information       Follow up With Specialties Details Why Contact Info    Yissel Javier MD Neurology Follow up  [address] [address]  [phone]      [place], [place] (LINK) Home [facility] Services   [address]  [address]  [place] [address] [phone]            Time spent in patient discharge and coordination 31 minutes.        Follow up labs/diagnostics (ultimately

## 2024-03-05 NOTE — FLOWSHEET NOTE
03/05/24 1733   AVS Reviewed   AVS & discharge instructions reviewed with patient and/or representative? Yes   Reviewed instructions with Patient   Level of Understanding Verbalized understanding

## 2024-03-05 NOTE — CARE COORDINATION
SW notified by [facility] liaison pt and sister declined private home care services due to finances. [facility] liaison scheduled to meet pt and pt's sister to set-up home care so [facility] able to provide pt services.     DARLYN contacted [facility] intake hotline and spoke with Bill to report concern for pt safety and well-being upon pt discharging home via [facility] at approximately 1700.     SW provided demographic information and reason for concern. [facility] intake hotline to review and notify SW of decision to accept report for investigation.

## 2024-03-05 NOTE — CARE COORDINATION
Pt is for discharge home today via [facility] at approximately 1700. Pt, family, RN, and Home Care liaison with [facility] notified of anticipated transport time. Pt and sister scheduled to meet [facility] liaison Maria De Jesus at 1700 when pt returns home to set-up home care so [facility] can evaluate and accept pt for services tomorrow. [facility] Liaison to contact [facility] if pt and sister decline services.     Transport packet placed at RN station.       03/05/24 1612   Service Assessment   Patient's Healthcare Decision Maker is: Named in Scanned ACP Document   Social/Functional History   Lives With Alone   Type of Home House   Home Layout One level   Home Access Ramped entrance   Bathroom Shower/Tub Walk-in shower   Bathroom Toilet Standard   Bathroom Equipment Grab bars in shower;Shower chair   Bathroom Accessibility Accessible   Home Equipment Wheelchair-electric;Hospital bed;Mechanical Lift   Receives Help From Family;Other (comment)  (hospice)   Occupation On disability   Services At/After Discharge   Transition of Care Consult (CM Consult) Discharge Planning;Hospice;Transportation Assistance;Other   Internal Hospice No   Reason Outside Agency Chosen Script used patient chose alternate agency   Services At/After Discharge Community Resources;In ambulance;Transport   [place] Resource Information Provided? No   Mode of Transport at Discharge [facility]   Hospital Transport Time of Discharge 1700   Confirm Follow Up Transport Other (see comment)  ([facility])   Condition of Participation: Discharge Planning   The Plan for Transition of Care is related to the following treatment goals: Pt to return home with homecare and hospice services via [facility].   The Patient and/or Patient Representative was provided with a Choice of Provider? Patient   The Patient and/Or Patient Representative agree with the Discharge Plan? Yes   Freedom of Choice list was provided with basic dialogue that supports the patient's individualized

## 2024-03-05 NOTE — CARE COORDINATION
SW spoke with pt and pt's sister for discharge planning. Pt would like to return home. Pt and sister expressed understanding pt would require private home care for a hospice agency to accept for services. SW provided pt's sister with name and contact information for private home care of [facility] and [facility]. [facility] contacted SW and met with pt at bedside to discuss services. [facility] reported willing to accept pt upon pt signing up for private home care providers to assist with ADLs for pt's safety and well-being.     DARLYN spoke with pt's sister and pt and discussed [facility] would meet at pt's home with the plan to sign up for private home care services with [facility] to begin services 3/6. Pt and sister expressed understanding of discharge plan.

## 2024-03-27 PROBLEM — N39.0 UTI (URINARY TRACT INFECTION): Status: RESOLVED | Noted: 2024-02-26 | Resolved: 2024-03-27

## 2024-05-02 ENCOUNTER — APPOINTMENT (OUTPATIENT)
Dept: GENERAL RADIOLOGY | Age: 64
End: 2024-05-02
Payer: MEDICARE

## 2024-05-02 ENCOUNTER — HOSPITAL ENCOUNTER (EMERGENCY)
Age: 64
Discharge: HOME | End: 2024-05-02
Attending: EMERGENCY MEDICINE
Payer: MEDICARE

## 2024-05-02 VITALS
HEART RATE: 87 BPM | TEMPERATURE: 97.7 F | BODY MASS INDEX: 17.68 KG/M2 | WEIGHT: 110 LBS | DIASTOLIC BLOOD PRESSURE: 92 MMHG | SYSTOLIC BLOOD PRESSURE: 165 MMHG | HEIGHT: 66 IN | OXYGEN SATURATION: 97 % | RESPIRATION RATE: 17 BRPM

## 2024-05-02 DIAGNOSIS — N30.00 ACUTE CYSTITIS WITHOUT HEMATURIA: ICD-10-CM

## 2024-05-02 DIAGNOSIS — R53.81 DEBILITY: Primary | ICD-10-CM

## 2024-05-02 LAB
APPEARANCE UR: ABNORMAL
BACTERIA URNS QL MICRO: ABNORMAL /HPF
BILIRUB UR QL: ABNORMAL
CASTS URNS QL MICRO: 0 /LPF
COLOR UR: ABNORMAL
CRYSTALS URNS QL MICRO: 0 /LPF
EPI CELLS #/AREA URNS HPF: ABNORMAL /HPF
GLUCOSE UR STRIP.AUTO-MCNC: NEGATIVE MG/DL
HGB UR QL STRIP: ABNORMAL
KETONES UR QL STRIP.AUTO: 40 MG/DL
LEUKOCYTE ESTERASE UR QL STRIP.AUTO: ABNORMAL
MUCOUS THREADS URNS QL MICRO: 0 /LPF
NITRITE UR QL STRIP.AUTO: POSITIVE
PH UR STRIP: 6.5 (ref 5–9)
PROT UR STRIP-MCNC: ABNORMAL MG/DL
RBC #/AREA URNS HPF: ABNORMAL /HPF
SP GR UR REFRACTOMETRY: 1.02 (ref 1–1.02)
URINE CULTURE IF INDICATED: ABNORMAL
UROBILINOGEN UR QL STRIP.AUTO: 1 EU/DL (ref 0.2–1)
WBC URNS QL MICRO: ABNORMAL /HPF

## 2024-05-02 PROCEDURE — 6370000000 HC RX 637 (ALT 250 FOR IP): Performed by: EMERGENCY MEDICINE

## 2024-05-02 PROCEDURE — 71045 X-RAY EXAM CHEST 1 VIEW: CPT

## 2024-05-02 PROCEDURE — 99284 EMERGENCY DEPT VISIT MOD MDM: CPT

## 2024-05-02 PROCEDURE — 81001 URINALYSIS AUTO W/SCOPE: CPT

## 2024-05-02 PROCEDURE — 87086 URINE CULTURE/COLONY COUNT: CPT

## 2024-05-02 RX ORDER — CIPROFLOXACIN 500 MG/1
500 TABLET, FILM COATED ORAL 2 TIMES DAILY
Qty: 19 TABLET | Refills: 0 | Status: SHIPPED | OUTPATIENT
Start: 2024-05-02 | End: 2024-05-12

## 2024-05-02 RX ORDER — CIPROFLOXACIN 500 MG/1
500 TABLET, FILM COATED ORAL
Status: COMPLETED | OUTPATIENT
Start: 2024-05-02 | End: 2024-05-02

## 2024-05-02 RX ADMIN — CIPROFLOXACIN HYDROCHLORIDE 500 MG: 500 TABLET, FILM COATED ORAL at 12:38

## 2024-05-02 ASSESSMENT — PAIN SCALES - GENERAL: PAINLEVEL_OUTOF10: 5

## 2024-05-02 ASSESSMENT — PAIN - FUNCTIONAL ASSESSMENT: PAIN_FUNCTIONAL_ASSESSMENT: 0-10

## 2024-05-02 NOTE — ED PROVIDER NOTES
Voice dictation software was used during the making of this note.  This software is not perfect and grammatical and other typographical errors may be present.  This note has not been completely proofread for errors.     Justin Tinoco MD  05/02/24 5918

## 2024-05-02 NOTE — CARE COORDINATION
DARLYN CM consulted as pt declining work-up after brought in by EMS. DARLYN met with pt at bedside and completed initial CM assessment. Pt alert and oriented to person and self. Pt brought in by EMS after HH agency visited pt's house. Per ED note, pt declining medical work-up and requested to return home. DARLYN confirmed demographics, insurance, and PCP. Last PCP visit approximately 1-2 years ago. Pt lives alone in 1 level home with level entrance. Pt confirmed DME of Wheelchair inside the home. Pt unsure if hospital bed still inside the home. Pt reported current with HH or Hospice services. Pt reported recently discharged from [facility] at [facility] and unable to provide date of discharge.     DARLYN contacted [facility] to obtain additional information. DARLYN Cisse at [facility] reported pt discharged from facility 4/30 and sent home with DME of hospital bed and trapeze bar, [facility] for PCP care, and [facility] PT/OT/RN/SW services.     DARLYN familiar with patient from prior admission. DARLYN previously made adult protective services (APS) report at last admission. DARLYN notified APS and , Lance, noted from previous visit at [facility]. APS contacted DARLYN and requested additional information for new report to be made. DARLYN provided APS intake with updated information for review.     DARLYN sent resumption of care to [facility] for PT/OT/RN/SW services.    Pt continued to decline medical work-up in ED and requested to return home. Pt confirmed DNR status. DARLYN discussed with attending and EMS DNR signed and scanned into medical records. RN Supervisor set-up transport for approximately 1445.            05/02/24 1348   Service Assessment   Patient Orientation Alert and Oriented;Person;Self   Cognition Alert   History Provided By Patient;Medical Record   Primary Caregiver Self   Accompanied By/Relationship None   Support Systems Family Members  (Home Health)   Patient's Healthcare Decision Maker is: Named in Scanned ACP

## 2024-05-02 NOTE — ED NOTES
Pt tearful after attempting to gain IV access without success. Pt begging to be left alone. Asking to just kill her other than keep putting her through the tests. Pt states she told her caretaker she did not want to come to the hospital. Dr Tinoco notified. After speaking with provider, pt states she \"just wants to go home and be left alone.\"

## 2024-05-02 NOTE — ED NOTES
Patient mobility status  wheelchair bound. Provider aware     I have reviewed discharge instructions with the patient.  The patient verbalized understanding.    Patient left ED via Discharge Method: stretcher to Home with [facility] EMT.    Opportunity for questions and clarification provided.     Patient given 1 e-scripts.

## 2024-05-02 NOTE — ED TRIAGE NOTES
PT to triage/room via EMS with c/o failure to thrive. PT reports being d/c rehab facility a few days ago but pt is unable to take care of herself.    PT is bed bound- no mobility in waist down.     PT reports of pressure ulcer on bottom    A/O x2 per EMS

## 2024-05-05 LAB
BACTERIA SPEC CULT: ABNORMAL
SERVICE CMNT-IMP: ABNORMAL

## 2024-05-27 ENCOUNTER — HOSPITAL ENCOUNTER (EMERGENCY)
Age: 64
Discharge: HOME | End: 2024-05-27
Attending: EMERGENCY MEDICINE
Payer: MEDICARE

## 2024-05-27 VITALS
HEART RATE: 91 BPM | SYSTOLIC BLOOD PRESSURE: 146 MMHG | OXYGEN SATURATION: 97 % | WEIGHT: 110 LBS | TEMPERATURE: 98.4 F | RESPIRATION RATE: 18 BRPM | DIASTOLIC BLOOD PRESSURE: 94 MMHG | BODY MASS INDEX: 17.75 KG/M2

## 2024-05-27 DIAGNOSIS — N30.00 ACUTE CYSTITIS WITHOUT HEMATURIA: Primary | ICD-10-CM

## 2024-05-27 LAB
ALBUMIN SERPL-MCNC: 4.1 G/DL (ref 3.2–4.6)
ALBUMIN/GLOB SERPL: 1.2 (ref 1–1.9)
ALP SERPL-CCNC: 93 U/L (ref 35–104)
ALT SERPL-CCNC: 28 U/L (ref 12–65)
ANION GAP SERPL CALC-SCNC: 17 MMOL/L (ref 9–18)
APPEARANCE UR: CLEAR
AST SERPL-CCNC: 48 U/L (ref 15–37)
BACTERIA URNS QL MICRO: ABNORMAL /HPF
BASOPHILS # BLD: 0.1 K/UL (ref 0–0.2)
BASOPHILS NFR BLD: 1 % (ref 0–2)
BILIRUB SERPL-MCNC: 0.3 MG/DL (ref 0–1.2)
BILIRUB UR QL: NEGATIVE
BUN SERPL-MCNC: 12 MG/DL (ref 8–23)
CALCIUM SERPL-MCNC: 9.9 MG/DL (ref 8.8–10.2)
CASTS URNS QL MICRO: ABNORMAL /LPF
CHLORIDE SERPL-SCNC: 101 MMOL/L (ref 98–107)
CO2 SERPL-SCNC: 20 MMOL/L (ref 20–28)
COLOR UR: ABNORMAL
CREAT SERPL-MCNC: 0.62 MG/DL (ref 0.6–1.1)
DIFFERENTIAL METHOD BLD: ABNORMAL
EKG ATRIAL RATE: 90 BPM
EKG DIAGNOSIS: NORMAL
EKG P AXIS: 0 DEGREES
EKG P-R INTERVAL: 50 MS
EKG Q-T INTERVAL: 386 MS
EKG QRS DURATION: 79 MS
EKG QTC CALCULATION (BAZETT): 475 MS
EKG R AXIS: 69 DEGREES
EKG T AXIS: 91 DEGREES
EKG VENTRICULAR RATE: 91 BPM
EOSINOPHIL # BLD: 0.1 K/UL (ref 0–0.8)
EOSINOPHIL NFR BLD: 1 % (ref 0.5–7.8)
EPI CELLS #/AREA URNS HPF: ABNORMAL /HPF
ERYTHROCYTE [DISTWIDTH] IN BLOOD BY AUTOMATED COUNT: 15 % (ref 11.9–14.6)
GLOBULIN SER CALC-MCNC: 3.5 G/DL (ref 2.3–3.5)
GLUCOSE SERPL-MCNC: 72 MG/DL (ref 70–99)
GLUCOSE UR STRIP.AUTO-MCNC: NEGATIVE MG/DL
HCT VFR BLD AUTO: 42.4 % (ref 35.8–46.3)
HGB BLD-MCNC: 14.1 G/DL (ref 11.7–15.4)
HGB UR QL STRIP: ABNORMAL
IMM GRANULOCYTES # BLD AUTO: 0 K/UL (ref 0–0.5)
IMM GRANULOCYTES NFR BLD AUTO: 0 % (ref 0–5)
KETONES UR QL STRIP.AUTO: 15 MG/DL
LEUKOCYTE ESTERASE UR QL STRIP.AUTO: ABNORMAL
LYMPHOCYTES # BLD: 1.8 K/UL (ref 0.5–4.6)
LYMPHOCYTES NFR BLD: 24 % (ref 13–44)
MCH RBC QN AUTO: 28.7 PG (ref 26.1–32.9)
MCHC RBC AUTO-ENTMCNC: 33.3 G/DL (ref 31.4–35)
MCV RBC AUTO: 86.2 FL (ref 82–102)
MONOCYTES # BLD: 0.4 K/UL (ref 0.1–1.3)
MONOCYTES NFR BLD: 5 % (ref 4–12)
NEUTS SEG # BLD: 5.4 K/UL (ref 1.7–8.2)
NEUTS SEG NFR BLD: 69 % (ref 43–78)
NITRITE UR QL STRIP.AUTO: NEGATIVE
NRBC # BLD: 0 K/UL (ref 0–0.2)
PH UR STRIP: 6.5 (ref 5–9)
PLATELET # BLD AUTO: 314 K/UL (ref 150–450)
PMV BLD AUTO: 10.4 FL (ref 9.4–12.3)
POTASSIUM SERPL-SCNC: 5.9 MMOL/L (ref 3.5–5.1)
PROCALCITONIN SERPL-MCNC: 0.03 NG/ML (ref 0–0.1)
PROT SERPL-MCNC: 7.6 G/DL (ref 6.3–8.2)
PROT UR STRIP-MCNC: NEGATIVE MG/DL
RBC # BLD AUTO: 4.92 M/UL (ref 4.05–5.2)
RBC #/AREA URNS HPF: ABNORMAL /HPF
SODIUM SERPL-SCNC: 138 MMOL/L (ref 136–145)
SP GR UR REFRACTOMETRY: 1.01 (ref 1–1.02)
UROBILINOGEN UR QL STRIP.AUTO: 0.2 EU/DL (ref 0.2–1)
WBC # BLD AUTO: 7.8 K/UL (ref 4.3–11.1)
WBC URNS QL MICRO: ABNORMAL /HPF

## 2024-05-27 PROCEDURE — 2580000003 HC RX 258: Performed by: EMERGENCY MEDICINE

## 2024-05-27 PROCEDURE — 84145 PROCALCITONIN (PCT): CPT

## 2024-05-27 PROCEDURE — 80053 COMPREHEN METABOLIC PANEL: CPT

## 2024-05-27 PROCEDURE — 93010 ELECTROCARDIOGRAM REPORT: CPT | Performed by: INTERNAL MEDICINE

## 2024-05-27 PROCEDURE — 6360000002 HC RX W HCPCS: Performed by: EMERGENCY MEDICINE

## 2024-05-27 PROCEDURE — 99284 EMERGENCY DEPT VISIT MOD MDM: CPT

## 2024-05-27 PROCEDURE — 81001 URINALYSIS AUTO W/SCOPE: CPT

## 2024-05-27 PROCEDURE — 85025 COMPLETE CBC W/AUTO DIFF WBC: CPT

## 2024-05-27 PROCEDURE — 93005 ELECTROCARDIOGRAM TRACING: CPT | Performed by: EMERGENCY MEDICINE

## 2024-05-27 PROCEDURE — 87040 BLOOD CULTURE FOR BACTERIA: CPT

## 2024-05-27 PROCEDURE — 96374 THER/PROPH/DIAG INJ IV PUSH: CPT

## 2024-05-27 PROCEDURE — 87086 URINE CULTURE/COLONY COUNT: CPT

## 2024-05-27 RX ORDER — CEFPODOXIME PROXETIL 100 MG/1
100 TABLET, FILM COATED ORAL 2 TIMES DAILY
Qty: 20 TABLET | Refills: 0 | Status: SHIPPED | OUTPATIENT
Start: 2024-05-27 | End: 2024-06-06

## 2024-05-27 RX ORDER — SODIUM CHLORIDE, SODIUM LACTATE, POTASSIUM CHLORIDE, AND CALCIUM CHLORIDE .6; .31; .03; .02 G/100ML; G/100ML; G/100ML; G/100ML
30 INJECTION, SOLUTION INTRAVENOUS ONCE
Status: COMPLETED | OUTPATIENT
Start: 2024-05-27 | End: 2024-05-27

## 2024-05-27 RX ADMIN — SODIUM CHLORIDE, POTASSIUM CHLORIDE, SODIUM LACTATE AND CALCIUM CHLORIDE 1497 ML: 600; 310; 30; 20 INJECTION, SOLUTION INTRAVENOUS at 13:41

## 2024-05-27 RX ADMIN — WATER 1000 MG: 1 INJECTION INTRAMUSCULAR; INTRAVENOUS; SUBCUTANEOUS at 16:52

## 2024-05-27 ASSESSMENT — PAIN DESCRIPTION - LOCATION
LOCATION: BACK
LOCATION: BACK

## 2024-05-27 ASSESSMENT — PAIN SCALES - GENERAL
PAINLEVEL_OUTOF10: 10
PAINLEVEL_OUTOF10: 7

## 2024-05-27 NOTE — ED NOTES
Multiple nurses have tried multiple times to attempt to get blood but were unsuccessful. Ultrasound guide was attempted as well.     Patient does not want to be stuck anymore.     Patient refused an EJ after talking with MD Tay.

## 2024-05-27 NOTE — ED NOTES
Patient mobility status  with no difficulty. Provider aware     I have reviewed discharge instructions with the patient.  The patient verbalized understanding.    Patient left ED via Discharge Method: stretcher to Home with  EMS .    Opportunity for questions and clarification provided.     Patient given 1 scripts.

## 2024-05-29 LAB
BACTERIA SPEC CULT: NORMAL
BACTERIA SPEC CULT: NORMAL
SERVICE CMNT-IMP: NORMAL

## 2024-06-06 NOTE — ED PROVIDER NOTES
Phosphatase 93 35 - 104 U/L    Total Protein 7.6 6.3 - 8.2 g/dL    Albumin 4.1 3.2 - 4.6 g/dL    Globulin 3.5 2.3 - 3.5 g/dL    Albumin/Globulin Ratio 1.2 1.0 - 1.9     CBC with Auto Differential   Result Value Ref Range    WBC 7.8 4.3 - 11.1 K/uL    RBC 4.92 4.05 - 5.2 M/uL    Hemoglobin 14.1 11.7 - 15.4 g/dL    Hematocrit 42.4 35.8 - 46.3 %    MCV 86.2 82.0 - 102.0 FL    MCH 28.7 26.1 - 32.9 PG    MCHC 33.3 31.4 - 35.0 g/dL    RDW 15.0 (H) 11.9 - 14.6 %    Platelets 314 150 - 450 K/uL    MPV 10.4 9.4 - 12.3 FL    nRBC 0.00 0.0 - 0.2 K/uL    Differential Type AUTOMATED      Neutrophils % 69 43 - 78 %    Lymphocytes % 24 13 - 44 %    Monocytes % 5 4.0 - 12.0 %    Eosinophils % 1 0.5 - 7.8 %    Basophils % 1 0.0 - 2.0 %    Immature Granulocytes % 0 0.0 - 5.0 %    Neutrophils Absolute 5.4 1.7 - 8.2 K/UL    Lymphocytes Absolute 1.8 0.5 - 4.6 K/UL    Monocytes Absolute 0.4 0.1 - 1.3 K/UL    Eosinophils Absolute 0.1 0.0 - 0.8 K/UL    Basophils Absolute 0.1 0.0 - 0.2 K/UL    Immature Granulocytes Absolute 0.0 0.0 - 0.5 K/UL   Procalcitonin   Result Value Ref Range    Procalcitonin 0.03 0.00 - 0.10 ng/mL   EKG 12 Lead   Result Value Ref Range    Ventricular Rate 91 BPM    Atrial Rate 90 BPM    P-R Interval 50 ms    QRS Duration 79 ms    Q-T Interval 386 ms    QTc Calculation (Bazett) 475 ms    P Axis 0 degrees    R Axis 69 degrees    T Axis 91 degrees    Diagnosis       Sinus rhythm  Short CO interval  Low voltage, extremity leads  Nonspecific T abnormalities, lateral leads    Confirmed by BENJA NOLAND (), BRAYAN DODSON ([id number]) on 5/27/2024 1:07:57 PM           No orders to display                No results for input(s): \"COVID19\" in the last 72 hours.     Voice dictation software was used during the making of this note.  This software is not perfect and grammatical and other typographical errors may be present.  This note has not been completely proofread for errors.      Hollie Tay MD  06/06/24 0952

## 2024-10-09 NOTE — ANESTHESIA PREPROCEDURE EVALUATION
Relevant Problems   No relevant active problems       Anesthetic History   No history of anesthetic complications            Review of Systems / Medical History  Patient summary reviewed and pertinent labs reviewed    Pulmonary          Smoker (Former)         Neuro/Psych             Comments: MS - limited to lower extremities and arms - no pharyngeal weakness/symptoms  Cardiovascular                  Exercise tolerance: <4 METS  Comments: Denies known CV history   GI/Hepatic/Renal         Renal disease (GLENROY ): ARF      Comments: Mild transaminitis  Endo/Other             Other Findings   Comments: Decub ulcer for I&D   MS Flare           Physical Exam    Airway  Mallampati: II  TM Distance: 4 - 6 cm  Neck ROM: normal range of motion   Mouth opening: Normal     Cardiovascular    Rhythm: regular  Rate: normal         Dental  No notable dental hx       Pulmonary  Breath sounds clear to auscultation               Abdominal  GI exam deferred       Other Findings            Anesthetic Plan    ASA: 3  Anesthesia type: general          Induction: Intravenous  Anesthetic plan and risks discussed with: Patient      Aware DNR suspended for perioperative period.  GETA with sugammadex for reversal.
Alert and oriented to person, place and time

## (undated) DEVICE — LAP CHOLE: Brand: MEDLINE INDUSTRIES, INC.

## (undated) DEVICE — DEVICE LCK BILI RAP EXCHG OLPS --

## (undated) DEVICE — CANNULA NSL ORAL AD FOR CAPNOFLEX CO2 O2 AIRLFE

## (undated) DEVICE — SPHINCTEROTOME: Brand: JAGTOME RX 44

## (undated) DEVICE — INTENDED FOR TISSUE SEPARATION, AND OTHER PROCEDURES THAT REQUIRE A SHARP SURGICAL BLADE TO PUNCTURE OR CUT.: Brand: BARD-PARKER SAFETY BLADES SIZE 11, STERILE

## (undated) DEVICE — SUT VCRL + 0 36IN UR6 VIO --

## (undated) DEVICE — KENDALL RADIOLUCENT FOAM MONITORING ELECTRODE RECTANGULAR SHAPE: Brand: KENDALL

## (undated) DEVICE — TRAY PREP DRY W/ PREM GLV 2 APPL 6 SPNG 2 UNDPD 1 OVERWRAP

## (undated) DEVICE — KENDALL SCD EXPRESS SLEEVES, KNEE LENGTH, MEDIUM: Brand: KENDALL SCD

## (undated) DEVICE — SOL IRR SOD CL 0.9% 1000ML BTL --

## (undated) DEVICE — NDL PRT INJ NSAF BLNT 18GX1.5 --

## (undated) DEVICE — INTENDED FOR TISSUE SEPARATION, AND OTHER PROCEDURES THAT REQUIRE A SHARP SURGICAL BLADE TO PUNCTURE OR CUT.: Brand: BARD-PARKER SAFETY BLADES SIZE 15, STERILE

## (undated) DEVICE — CONTAINER SPEC FRMLN 120ML --

## (undated) DEVICE — INTENDED FOR TISSUE SEPARATION, AND OTHER PROCEDURES THAT REQUIRE A SHARP SURGICAL BLADE TO PUNCTURE OR CUT.: Brand: BARD-PARKER ® STAINLESS STEEL BLADES

## (undated) DEVICE — Device

## (undated) DEVICE — SYR 3ML LL TIP 1/10ML GRAD --

## (undated) DEVICE — RETRIEVAL BALLOON CATHETER: Brand: EXTRACTOR™ PRO RX

## (undated) DEVICE — UNIVERSAL FIXATION CANNULA: Brand: VERSAONE

## (undated) DEVICE — CANISTER WND VAC ASST CLSR --

## (undated) DEVICE — STRIP,CLOSURE,WOUND,MEDI-STRIP,1/2X4: Brand: MEDLINE

## (undated) DEVICE — INTENDED FOR TISSUE SEPARATION, AND OTHER PROCEDURES THAT REQUIRE A SHARP SURGICAL BLADE TO PUNCTURE OR CUT.: Brand: BARD-PARKER SAFETY BLADES SIZE 10, STERILE

## (undated) DEVICE — APPLIER CLP M/L SHFT DIA5MM 15 LIG LIGAMAX 5

## (undated) DEVICE — REM POLYHESIVE ADULT PATIENT RETURN ELECTRODE: Brand: VALLEYLAB

## (undated) DEVICE — TROCARS: Brand: KII® BLUNT TIP ACCESS SYSTEM

## (undated) DEVICE — SUTURE SZ 0 27IN 5/8 CIR UR-6  TAPER PT VIOLET ABSRB VICRYL J603H

## (undated) DEVICE — KIT,ANTI FOG,W/SPONGE & FLUID,SOFT PACK: Brand: MEDLINE

## (undated) DEVICE — (D)PREP SKN CHLRAPRP APPL 26ML -- CONVERT TO ITEM 371833

## (undated) DEVICE — SOLUTION IV 1000ML 0.9% SOD CHL

## (undated) DEVICE — TROCAR: Brand: KII FIOS FIRST ENTRY

## (undated) DEVICE — BAG SPEC RETRV 275ML 10ML DISPOSABLE RELIACATCH

## (undated) DEVICE — SYRINGE MED 3ML NDL 21GA L1.5IN LUERLOCK TIP REG BVL SFTY N

## (undated) DEVICE — ADHESIVE LIQ H2O INSOLUBLE 3 CC LO RISK N STN MASTISOL

## (undated) DEVICE — GARMENT,MEDLINE,DVT,INT,CALF,MED, GEN2: Brand: MEDLINE

## (undated) DEVICE — NEEDLE INJ 25GA P5MM SHFT L230CM SHTH DIA2.5MM S STL TEF

## (undated) DEVICE — LOGICUT SCISSOR LENGTH 320MM: Brand: LOGI - LAPAROSCOPIC INSTRUMENT SYSTEM

## (undated) DEVICE — SYR 5ML 1/5 GRAD LL NSAF LF --

## (undated) DEVICE — GOWN,REINFORCED,POLY,AURORA,XXLARGE,STR: Brand: MEDLINE

## (undated) DEVICE — MINOR SPLIT GENERAL: Brand: MEDLINE INDUSTRIES, INC.

## (undated) DEVICE — ADULT SPO2 SENSOR: Brand: NELLCOR

## (undated) DEVICE — CONNECTOR TBNG OD5-7MM O2 END DISP

## (undated) DEVICE — [HIGH FLOW INSUFFLATOR,  DO NOT USE IF PACKAGE IS DAMAGED,  KEEP DRY,  KEEP AWAY FROM SUNLIGHT,  PROTECT FROM HEAT AND RADIOACTIVE SOURCES.]: Brand: PNEUMOSURE

## (undated) DEVICE — VISUALIZATION SYSTEM: Brand: CLEARIFY

## (undated) DEVICE — DRSG VAC ASST CLSR GRNUFM MED -- 18X12.5X3.2CM

## (undated) DEVICE — (D)DRSG SPONGE BLK VAC MED

## (undated) DEVICE — (D)SYR 10ML 1/5ML GRAD NSAF -- PKGING CHANGE USE ITEM 338027

## (undated) DEVICE — SURGICAL PROCEDURE PACK BASIC ST FRANCIS

## (undated) DEVICE — CONTAINER PREFIL FRMLN 40ML --

## (undated) DEVICE — Device: Brand: SPOT EX ENDOSCOPIC TATTOO

## (undated) DEVICE — NEEDLE HYPO 21GA L1.5IN INTRAMUSCULAR S STL LATCH BVL UP

## (undated) DEVICE — BLUNT TROCAR WITH THREADED ANCHOR: Brand: VERSAONE

## (undated) DEVICE — MASTISOL ADHESIVE LIQ 2/3ML

## (undated) DEVICE — CARDINAL HEALTH FLEXIBLE LIGHT HANDLE COVER: Brand: CARDINAL HEALTH

## (undated) DEVICE — SUTURE VCRL SZ 3-0 L27IN ABSRB VLT L26MM SH 1/2 CIR J316H

## (undated) DEVICE — BLADELESS OPTICAL TROCAR WITH FIXATION CANNULA: Brand: VERSAPORT

## (undated) DEVICE — FORCEPS BX L240CM JAW DIA2.8MM L CAP W/ NDL MIC MESH TOOTH

## (undated) DEVICE — BLOCK BITE AD 60FR W/ VELC STRP ADDRESSES MOST PT AND